# Patient Record
Sex: MALE | Race: WHITE | NOT HISPANIC OR LATINO | Employment: OTHER | URBAN - METROPOLITAN AREA
[De-identification: names, ages, dates, MRNs, and addresses within clinical notes are randomized per-mention and may not be internally consistent; named-entity substitution may affect disease eponyms.]

---

## 2017-05-28 DIAGNOSIS — F41.9 ANXIETY: ICD-10-CM

## 2017-05-28 DIAGNOSIS — I10 ESSENTIAL HYPERTENSION: ICD-10-CM

## 2017-06-01 RX ORDER — AMLODIPINE, VALSARTAN AND HYDROCHLOROTHIAZIDE 10; 320; 25 MG/1; MG/1; MG/1
TABLET ORAL
Qty: 30 TABLET | Refills: 0 | Status: SHIPPED | OUTPATIENT
Start: 2017-06-01 | End: 2017-07-27 | Stop reason: SDUPTHER

## 2017-06-01 RX ORDER — ESCITALOPRAM OXALATE 10 MG/1
TABLET ORAL
Qty: 30 TABLET | Refills: 0 | Status: SHIPPED | OUTPATIENT
Start: 2017-06-01 | End: 2017-07-27 | Stop reason: SDUPTHER

## 2017-07-13 DIAGNOSIS — I10 ESSENTIAL HYPERTENSION: ICD-10-CM

## 2017-07-13 RX ORDER — AMLODIPINE, VALSARTAN AND HYDROCHLOROTHIAZIDE 10; 320; 25 MG/1; MG/1; MG/1
TABLET ORAL
Qty: 30 TABLET | Refills: 0 | OUTPATIENT
Start: 2017-07-13

## 2017-07-27 ENCOUNTER — OFFICE VISIT (OUTPATIENT)
Dept: FAMILY MEDICINE CLINIC | Facility: CLINIC | Age: 62
End: 2017-07-27

## 2017-07-27 VITALS
WEIGHT: 174 LBS | SYSTOLIC BLOOD PRESSURE: 138 MMHG | BODY MASS INDEX: 23.57 KG/M2 | DIASTOLIC BLOOD PRESSURE: 74 MMHG | RESPIRATION RATE: 16 BRPM | HEIGHT: 72 IN | HEART RATE: 78 BPM | TEMPERATURE: 98.1 F

## 2017-07-27 DIAGNOSIS — I10 ESSENTIAL HYPERTENSION: Primary | ICD-10-CM

## 2017-07-27 DIAGNOSIS — F41.9 ANXIETY: ICD-10-CM

## 2017-07-27 DIAGNOSIS — Z12.5 SCREENING FOR PROSTATE CANCER: ICD-10-CM

## 2017-07-27 DIAGNOSIS — E78.49 OTHER HYPERLIPIDEMIA: ICD-10-CM

## 2017-07-27 DIAGNOSIS — R35.1 NOCTURIA: ICD-10-CM

## 2017-07-27 PROCEDURE — 99214 OFFICE O/P EST MOD 30 MIN: CPT | Performed by: FAMILY MEDICINE

## 2017-07-27 RX ORDER — AMLODIPINE, VALSARTAN AND HYDROCHLOROTHIAZIDE 10; 320; 25 MG/1; MG/1; MG/1
1 TABLET ORAL DAILY
Qty: 90 TABLET | Refills: 1 | Status: SHIPPED | OUTPATIENT
Start: 2017-07-27 | End: 2018-01-18 | Stop reason: SDUPTHER

## 2017-07-27 RX ORDER — ESCITALOPRAM OXALATE 10 MG/1
10 TABLET ORAL DAILY
Qty: 90 TABLET | Refills: 1 | Status: SHIPPED | OUTPATIENT
Start: 2017-07-27 | End: 2018-01-18 | Stop reason: SDUPTHER

## 2017-07-27 RX ORDER — SIMVASTATIN 20 MG
20 TABLET ORAL NIGHTLY
Qty: 90 TABLET | Refills: 1 | Status: SHIPPED | OUTPATIENT
Start: 2017-07-27 | End: 2018-02-25 | Stop reason: SDUPTHER

## 2017-07-27 NOTE — PROGRESS NOTES
"Chief Complaint   Patient presents with   • Hypertension   • Hyperlipidemia       Subjective   Patient presents the office to refill medicines.  Labs are due.  Blood pressure is controlled on current therapy.  Lipids are stable pending labs.  He does have some nocturia.  We will check PSA testing.  Anxiety is controlled on current therapy.  No medicine side effects are reported.  I have reviewed and updated his medications, medical history and problem list during today's office visit.        Social History   Substance Use Topics   • Smoking status: Current Every Day Smoker     Packs/day: 1.00     Types: Cigarettes   • Smokeless tobacco: Never Used   • Alcohol use Yes       Review of Systems   Constitutional: Negative for fatigue.   Cardiovascular: Negative for chest pain.       Objective   /74 (BP Location: Right arm, Patient Position: Sitting, Cuff Size: Adult)  Pulse 78  Temp 98.1 °F (36.7 °C) (Oral)   Resp 16  Ht 72\" (182.9 cm)  Wt 174 lb (78.9 kg)  BMI 23.6 kg/m2  Body mass index is 23.6 kg/(m^2).  Physical Exam   Constitutional: He is cooperative. No distress.   Eyes: Conjunctivae and lids are normal.   Neck: Carotid bruit is not present. No tracheal deviation present.   Cardiovascular: Normal rate, regular rhythm and normal heart sounds.    No murmur heard.  Pulmonary/Chest: Effort normal and breath sounds normal.   Neurological: He is alert. He is not disoriented.   Skin: Skin is warm and dry.   Psychiatric: He has a normal mood and affect. His speech is normal and behavior is normal.   Vitals reviewed.          Assessment/Plan     Problem List Items Addressed This Visit        Cardiovascular and Mediastinum    Essential hypertension - Primary    Relevant Medications    Amlodipine-Valsartan-HCTZ -25 MG tablet    Other Relevant Orders    Comprehensive metabolic panel    CBC and Differential    TSH    Hyperlipidemia    Relevant Medications    simvastatin (ZOCOR) 20 MG tablet    Other Relevant " Orders    Lipid panel       Genitourinary    Nocturia    Relevant Orders    PSA       Other    Anxiety    Relevant Medications    escitalopram (LEXAPRO) 10 MG tablet      Other Visit Diagnoses     Screening for prostate cancer        Relevant Orders    PSA          Outpatient Encounter Prescriptions as of 7/27/2017   Medication Sig Dispense Refill   • Amlodipine-Valsartan-HCTZ -25 MG tablet Take 1 tablet by mouth Daily for 180 days. 90 tablet 1   • escitalopram (LEXAPRO) 10 MG tablet Take 1 tablet by mouth Daily for 180 days. 90 tablet 1   • simvastatin (ZOCOR) 20 MG tablet Take 1 tablet by mouth Every Night for 180 days. 90 tablet 1   • [DISCONTINUED] Amlodipine-Valsartan-HCTZ -25 MG tablet TAKE 1 TABLET BY MOUTH DAILY. 30 tablet 0   • [DISCONTINUED] escitalopram (LEXAPRO) 10 MG tablet TAKE 1 TABLET BY MOUTH DAILY. 30 tablet 0   • [DISCONTINUED] simvastatin (ZOCOR) 20 MG tablet Take 1 tablet by mouth every night. 90 tablet 3     No facility-administered encounter medications on file as of 7/27/2017.        Orders Placed This Encounter   Procedures   • Comprehensive metabolic panel   • Lipid panel   • TSH   • PSA   • CBC and Differential     Order Specific Question:   Manual Differential     Answer:   No       Continue with current treatment plan.         F/U in 6 months

## 2017-08-23 DIAGNOSIS — E78.5 HYPERLIPIDEMIA: ICD-10-CM

## 2017-08-23 RX ORDER — SIMVASTATIN 20 MG
TABLET ORAL
Qty: 90 TABLET | Refills: 3 | OUTPATIENT
Start: 2017-08-23

## 2018-01-18 DIAGNOSIS — I10 ESSENTIAL HYPERTENSION: ICD-10-CM

## 2018-01-18 DIAGNOSIS — F41.9 ANXIETY: ICD-10-CM

## 2018-01-18 RX ORDER — ESCITALOPRAM OXALATE 10 MG/1
TABLET ORAL
Qty: 30 TABLET | Refills: 0 | Status: SHIPPED | OUTPATIENT
Start: 2018-01-18 | End: 2018-04-23 | Stop reason: ALTCHOICE

## 2018-01-18 RX ORDER — AMLODIPINE, VALSARTAN AND HYDROCHLOROTHIAZIDE 10; 320; 25 MG/1; MG/1; MG/1
TABLET ORAL
Qty: 30 TABLET | Refills: 0 | Status: SHIPPED | OUTPATIENT
Start: 2018-01-18 | End: 2018-04-23 | Stop reason: SDUPTHER

## 2018-02-17 DIAGNOSIS — F41.9 ANXIETY: ICD-10-CM

## 2018-02-17 DIAGNOSIS — I10 ESSENTIAL HYPERTENSION: ICD-10-CM

## 2018-02-19 RX ORDER — AMLODIPINE, VALSARTAN AND HYDROCHLOROTHIAZIDE 10; 320; 25 MG/1; MG/1; MG/1
TABLET ORAL
Qty: 30 TABLET | Refills: 0 | OUTPATIENT
Start: 2018-02-19

## 2018-02-19 RX ORDER — ESCITALOPRAM OXALATE 10 MG/1
TABLET ORAL
Qty: 30 TABLET | Refills: 0 | OUTPATIENT
Start: 2018-02-19

## 2018-02-25 DIAGNOSIS — E78.49 OTHER HYPERLIPIDEMIA: ICD-10-CM

## 2018-02-26 RX ORDER — SIMVASTATIN 20 MG
TABLET ORAL
Qty: 30 TABLET | Refills: 0 | Status: SHIPPED | OUTPATIENT
Start: 2018-02-26 | End: 2018-04-23 | Stop reason: SDUPTHER

## 2018-04-23 ENCOUNTER — OFFICE VISIT (OUTPATIENT)
Dept: FAMILY MEDICINE CLINIC | Facility: CLINIC | Age: 63
End: 2018-04-23

## 2018-04-23 VITALS
SYSTOLIC BLOOD PRESSURE: 142 MMHG | WEIGHT: 168 LBS | TEMPERATURE: 97.9 F | DIASTOLIC BLOOD PRESSURE: 82 MMHG | BODY MASS INDEX: 22.75 KG/M2 | HEART RATE: 81 BPM | HEIGHT: 72 IN | RESPIRATION RATE: 16 BRPM

## 2018-04-23 DIAGNOSIS — E78.49 OTHER HYPERLIPIDEMIA: ICD-10-CM

## 2018-04-23 DIAGNOSIS — I10 ESSENTIAL HYPERTENSION: Primary | ICD-10-CM

## 2018-04-23 DIAGNOSIS — F41.9 ANXIETY: ICD-10-CM

## 2018-04-23 DIAGNOSIS — R35.1 NOCTURIA: ICD-10-CM

## 2018-04-23 DIAGNOSIS — R97.20 ELEVATED PSA: ICD-10-CM

## 2018-04-23 PROBLEM — R73.9 ELEVATED BLOOD SUGAR LEVEL: Status: ACTIVE | Noted: 2018-04-23

## 2018-04-23 PROCEDURE — 99214 OFFICE O/P EST MOD 30 MIN: CPT | Performed by: FAMILY MEDICINE

## 2018-04-23 RX ORDER — AMLODIPINE, VALSARTAN AND HYDROCHLOROTHIAZIDE 10; 320; 25 MG/1; MG/1; MG/1
1 TABLET ORAL DAILY
Qty: 90 TABLET | Refills: 1 | Status: SHIPPED | OUTPATIENT
Start: 2018-04-23 | End: 2018-08-01 | Stop reason: SDUPTHER

## 2018-04-23 RX ORDER — SIMVASTATIN 20 MG
20 TABLET ORAL NIGHTLY
Qty: 90 TABLET | Refills: 1 | Status: SHIPPED | OUTPATIENT
Start: 2018-04-23 | End: 2018-08-01 | Stop reason: SDUPTHER

## 2018-04-23 RX ORDER — VILAZODONE HYDROCHLORIDE 20 MG/1
20 TABLET ORAL DAILY
Qty: 30 TABLET | Refills: 0 | Status: SHIPPED | OUTPATIENT
Start: 2018-04-23 | End: 2018-08-01 | Stop reason: HOSPADM

## 2018-04-23 NOTE — PROGRESS NOTES
"Chief Complaint   Patient presents with   • Hypertension   • Hyperlipidemia       Subjective     Osman Aparicio presents to the office today to refill his medications and review recent labs. No medication side effects are reported.  Blood pressure is stable.  Lipids are stable.  His blood sugars elevated.  Anxiety is not fully controlled on current dose of escitalopram.  Labs also showed elevation of PSA level.  His nocturia symptoms or 3 times a night.    I have reviewed and updated his medications, medical history and problem list during today's office visit.      Patient Care Team:  Mathieu Santos MD as PCP - General (Family Medicine)    Social History   Substance Use Topics   • Smoking status: Current Every Day Smoker     Packs/day: 1.00     Types: Cigarettes   • Smokeless tobacco: Never Used   • Alcohol use Yes       Review of Systems   Constitutional: Negative for fatigue.   Cardiovascular: Negative for chest pain.   Genitourinary:        See HPI   Psychiatric/Behavioral: The patient is nervous/anxious.        Objective     /82 (BP Location: Right arm, Patient Position: Sitting, Cuff Size: Adult)   Pulse 81   Temp 97.9 °F (36.6 °C) (Oral)   Resp 16   Ht 182.9 cm (72\")   Wt 76.2 kg (168 lb)   BMI 22.78 kg/m²     Body mass index is 22.78 kg/m².    Physical Exam   Constitutional: He is oriented to person, place, and time. He appears well-developed. No distress.   Eyes: Conjunctivae and lids are normal.   Neck: Carotid bruit is not present.   Cardiovascular: Normal rate, regular rhythm and normal heart sounds.    Pulmonary/Chest: Effort normal and breath sounds normal.   Genitourinary: Prostate is enlarged (no nodules). Prostate is not tender.   Neurological: He is alert and oriented to person, place, and time.   Skin: Skin is warm and dry.   Psychiatric: He has a normal mood and affect. His behavior is normal.   Vitals reviewed.      Data Reviewed:             Assessment/Plan     Problem List Items " Addressed This Visit        Cardiovascular and Mediastinum    Essential hypertension - Primary    Relevant Medications    Amlodipine-Valsartan-HCTZ -25 MG tablet    Other hyperlipidemia    Relevant Medications    simvastatin (ZOCOR) 20 MG tablet       Genitourinary    Nocturia    Relevant Orders    Ambulatory Referral to Urology       Other    Anxiety    Relevant Medications    vilazodone (VIIBRYD) 20 MG tablet tablet    Elevated PSA    Relevant Orders    Ambulatory Referral to Urology      Other Visit Diagnoses    None.         Orders Placed This Encounter   Procedures   • Ambulatory Referral to Urology     Referral Priority:   Routine     Referral Type:   Consultation     Referral Reason:   Specialty Services Required     Requested Specialty:   Urology     Number of Visits Requested:   1         Current Outpatient Prescriptions:   •  Amlodipine-Valsartan-HCTZ -25 MG tablet, Take 1 tablet by mouth Daily for 180 days., Disp: 90 tablet, Rfl: 1  •  simvastatin (ZOCOR) 20 MG tablet, Take 1 tablet by mouth Every Night for 180 days., Disp: 90 tablet, Rfl: 1  •  vilazodone (VIIBRYD) 20 MG tablet tablet, Take 1 tablet by mouth Daily for 30 days., Disp: 30 tablet, Rfl: 0    Return in about 1 month (around 5/23/2018) for Recheck.

## 2018-07-30 DIAGNOSIS — I10 ESSENTIAL HYPERTENSION: ICD-10-CM

## 2018-07-31 RX ORDER — AMLODIPINE, VALSARTAN AND HYDROCHLOROTHIAZIDE 10; 320; 25 MG/1; MG/1; MG/1
1 TABLET ORAL DAILY
Qty: 90 TABLET | Refills: 1 | OUTPATIENT
Start: 2018-07-31 | End: 2019-01-27

## 2018-08-01 ENCOUNTER — OFFICE VISIT (OUTPATIENT)
Dept: FAMILY MEDICINE CLINIC | Facility: CLINIC | Age: 63
End: 2018-08-01

## 2018-08-01 VITALS
WEIGHT: 165 LBS | HEIGHT: 72 IN | DIASTOLIC BLOOD PRESSURE: 91 MMHG | RESPIRATION RATE: 16 BRPM | SYSTOLIC BLOOD PRESSURE: 164 MMHG | TEMPERATURE: 97 F | HEART RATE: 76 BPM | BODY MASS INDEX: 22.35 KG/M2

## 2018-08-01 DIAGNOSIS — I10 ESSENTIAL HYPERTENSION: Primary | ICD-10-CM

## 2018-08-01 DIAGNOSIS — E78.49 OTHER HYPERLIPIDEMIA: ICD-10-CM

## 2018-08-01 DIAGNOSIS — R73.9 ELEVATED BLOOD SUGAR LEVEL: ICD-10-CM

## 2018-08-01 DIAGNOSIS — F41.9 ANXIETY: ICD-10-CM

## 2018-08-01 PROCEDURE — 99214 OFFICE O/P EST MOD 30 MIN: CPT | Performed by: FAMILY MEDICINE

## 2018-08-01 RX ORDER — AMLODIPINE, VALSARTAN AND HYDROCHLOROTHIAZIDE 10; 320; 25 MG/1; MG/1; MG/1
1 TABLET ORAL DAILY
Qty: 90 TABLET | Refills: 1 | Status: SHIPPED | OUTPATIENT
Start: 2018-08-01 | End: 2019-06-06 | Stop reason: SDUPTHER

## 2018-08-01 RX ORDER — SIMVASTATIN 20 MG
20 TABLET ORAL NIGHTLY
Qty: 90 TABLET | Refills: 1 | Status: SHIPPED | OUTPATIENT
Start: 2018-08-01 | End: 2019-06-06 | Stop reason: SDUPTHER

## 2018-08-01 RX ORDER — ESCITALOPRAM OXALATE 20 MG/1
20 TABLET ORAL NIGHTLY
Qty: 90 TABLET | Refills: 1 | Status: SHIPPED | OUTPATIENT
Start: 2018-08-01 | End: 2019-01-26 | Stop reason: SDUPTHER

## 2018-08-01 NOTE — PROGRESS NOTES
"Chief Complaint   Patient presents with   • Hypertension   • Hyperlipidemia   • Anxiety       Subjective     Osman Aparicio presents to the office today to refill his medications. No medication side effects are reported.  Today his blood pressure is elevated but he did miss his dose this morning.  He has run out.  Usually it is well controlled.  Lipids are stable pending lab results.  He could not tolerate Viibryd.  He took some of his wife's escitalopram 20 mg and that seemed to help.  Overall, he feels well.    I have reviewed and updated his medications, medical history and problem list during today's office visit.     Patient Care Team:  Mathieu Santos MD as PCP - General (Family Medicine)    Social History   Substance Use Topics   • Smoking status: Current Every Day Smoker     Packs/day: 1.00     Types: Cigarettes   • Smokeless tobacco: Never Used   • Alcohol use Yes       Review of Systems   Constitutional: Negative for fatigue.   Cardiovascular: Negative for chest pain.       Objective     /91   Pulse 76   Temp 97 °F (36.1 °C) (Oral)   Resp 16   Ht 182.9 cm (72\")   Wt 74.8 kg (165 lb)   BMI 22.38 kg/m²     Body mass index is 22.38 kg/m².    Physical Exam   Constitutional: He is oriented to person, place, and time. He appears well-developed. No distress.   Eyes: Conjunctivae and lids are normal.   Neck: Carotid bruit is not present.   Cardiovascular: Normal rate, regular rhythm and normal heart sounds.    Pulmonary/Chest: Effort normal and breath sounds normal.   Neurological: He is alert and oriented to person, place, and time.   Skin: Skin is warm and dry.   Psychiatric: He has a normal mood and affect. His behavior is normal.   Vitals reviewed.      Data Reviewed:             Assessment/Plan     Problem List Items Addressed This Visit     Essential hypertension - Primary    Relevant Medications    Amlodipine-Valsartan-HCTZ -25 MG tablet    Other hyperlipidemia    Relevant Medications    " simvastatin (ZOCOR) 20 MG tablet    Anxiety    Relevant Medications    escitalopram (LEXAPRO) 20 MG tablet    Elevated blood sugar level          No orders of the defined types were placed in this encounter.        Current Outpatient Prescriptions:   •  Amlodipine-Valsartan-HCTZ -25 MG tablet, Take 1 tablet by mouth Daily for 180 days., Disp: 90 tablet, Rfl: 1  •  simvastatin (ZOCOR) 20 MG tablet, Take 1 tablet by mouth Every Night for 180 days., Disp: 90 tablet, Rfl: 1  •  escitalopram (LEXAPRO) 20 MG tablet, Take 1 tablet by mouth Every Night for 180 days., Disp: 90 tablet, Rfl: 1    Return in about 6 months (around 2/1/2019) for Recheck.

## 2019-01-26 DIAGNOSIS — F41.9 ANXIETY: ICD-10-CM

## 2019-01-28 RX ORDER — ESCITALOPRAM OXALATE 20 MG/1
TABLET ORAL
Qty: 90 TABLET | Refills: 0 | Status: SHIPPED | OUTPATIENT
Start: 2019-01-28 | End: 2019-05-04 | Stop reason: SDUPTHER

## 2019-05-04 DIAGNOSIS — F41.9 ANXIETY: ICD-10-CM

## 2019-05-06 RX ORDER — ESCITALOPRAM OXALATE 20 MG/1
TABLET ORAL
Qty: 30 TABLET | Refills: 0 | Status: SHIPPED | OUTPATIENT
Start: 2019-05-06 | End: 2019-06-06 | Stop reason: SDUPTHER

## 2019-06-06 ENCOUNTER — OFFICE VISIT (OUTPATIENT)
Dept: FAMILY MEDICINE CLINIC | Facility: CLINIC | Age: 64
End: 2019-06-06

## 2019-06-06 VITALS
SYSTOLIC BLOOD PRESSURE: 148 MMHG | WEIGHT: 165 LBS | HEART RATE: 76 BPM | DIASTOLIC BLOOD PRESSURE: 84 MMHG | BODY MASS INDEX: 22.35 KG/M2 | OXYGEN SATURATION: 98 % | HEIGHT: 72 IN | RESPIRATION RATE: 16 BRPM

## 2019-06-06 DIAGNOSIS — F41.9 ANXIETY: ICD-10-CM

## 2019-06-06 DIAGNOSIS — I10 ESSENTIAL HYPERTENSION: ICD-10-CM

## 2019-06-06 DIAGNOSIS — E78.49 OTHER HYPERLIPIDEMIA: ICD-10-CM

## 2019-06-06 PROCEDURE — 99214 OFFICE O/P EST MOD 30 MIN: CPT | Performed by: NURSE PRACTITIONER

## 2019-06-06 RX ORDER — NEBIVOLOL 5 MG/1
5 TABLET ORAL DAILY
Qty: 30 TABLET | Refills: 0 | Status: SHIPPED | OUTPATIENT
Start: 2019-06-06 | End: 2019-07-01 | Stop reason: SDUPTHER

## 2019-06-06 RX ORDER — AMLODIPINE, VALSARTAN AND HYDROCHLOROTHIAZIDE 10; 320; 25 MG/1; MG/1; MG/1
1 TABLET ORAL DAILY
Qty: 90 TABLET | Refills: 1 | Status: SHIPPED | OUTPATIENT
Start: 2019-06-06 | End: 2019-12-04 | Stop reason: SDUPTHER

## 2019-06-06 RX ORDER — SIMVASTATIN 20 MG
20 TABLET ORAL NIGHTLY
Qty: 90 TABLET | Refills: 1 | Status: SHIPPED | OUTPATIENT
Start: 2019-06-06 | End: 2020-02-18

## 2019-06-06 RX ORDER — AMLODIPINE BESYLATE 10 MG/1
10 TABLET ORAL DAILY
COMMUNITY
End: 2019-06-06

## 2019-06-06 RX ORDER — ESCITALOPRAM OXALATE 20 MG/1
20 TABLET ORAL DAILY
Qty: 90 TABLET | Refills: 1 | Status: SHIPPED | OUTPATIENT
Start: 2019-06-06 | End: 2019-12-04 | Stop reason: SDUPTHER

## 2019-06-06 NOTE — PROGRESS NOTES
Subjective   Osman Aparicio is a 63 y.o. male.     History of Present Illness   Osman Aparicio 63 y.o. male who presents today for routine follow up check and medication refills.  he has a history of   Patient Active Problem List   Diagnosis   • Essential hypertension   • Other hyperlipidemia   • Anxiety   • Nocturia   • Elevated PSA   • Elevated blood sugar level   .  Since the last visit, he has overall felt well.  He has has been having elevated blood pressure readings and here to evaluate this.  He states BP has been running 150s/80s-90s.  He states he has not been out of medications and that he recently had refilled at [facility].  he has been compliant with current medications have reviewed them.  The patient denies medication side effects.    No results found for this or any previous visit.    Sees urologist Dr. Shepherd recently. He has f/u at the end of the month.      The following portions of the patient's history were reviewed and updated as appropriate: allergies, current medications, past family history, past medical history, past social history, past surgical history and problem list.    Review of Systems   Constitutional: Negative for fatigue.   Eyes: Negative for visual disturbance.   Respiratory: Negative for cough and shortness of breath.    Cardiovascular: Negative for chest pain and palpitations.   Endocrine: Negative for cold intolerance, heat intolerance, polydipsia, polyphagia and polyuria.   Skin: Negative for rash.   Neurological: Negative for dizziness, light-headedness and headaches.   Psychiatric/Behavioral: Negative for dysphoric mood and sleep disturbance. The patient is not nervous/anxious.        Objective   Physical Exam   Constitutional: He is oriented to person, place, and time. He appears well-developed and well-nourished.   Neck: Carotid bruit is not present.   Cardiovascular: Normal rate and regular rhythm.   Pulmonary/Chest: Effort normal and breath sounds normal.   Neurological: He is  oriented to person, place, and time.   Skin: Skin is warm and dry.   Psychiatric: He has a normal mood and affect. His behavior is normal. Judgment and thought content normal.   Nursing note and vitals reviewed.      Assessment/Plan   Osman was seen today for hypertension.    Diagnoses and all orders for this visit:    Other hyperlipidemia  -     simvastatin (ZOCOR) 20 MG tablet; Take 1 tablet by mouth Every Night for 180 days.    Anxiety  -     escitalopram (LEXAPRO) 20 MG tablet; Take 1 tablet by mouth Daily.    Essential hypertension  -     amLODIPine-Valsartan-HCTZ -25 MG tablet; Take 1 tablet by mouth Daily for 180 days.             Added Bystolic 5 mg daily.  He will f/u in 3-4 weeks.    Will get labs done at [facility].

## 2019-07-09 RX ORDER — NEBIVOLOL HYDROCHLORIDE 5 MG/1
TABLET ORAL
Qty: 30 TABLET | Refills: 0 | Status: SHIPPED | OUTPATIENT
Start: 2019-07-09 | End: 2019-07-11 | Stop reason: SDUPTHER

## 2019-07-11 RX ORDER — NEBIVOLOL 5 MG/1
5 TABLET ORAL DAILY
Qty: 30 TABLET | Refills: 0 | Status: SHIPPED | OUTPATIENT
Start: 2019-07-11 | End: 2019-07-16 | Stop reason: SDUPTHER

## 2019-07-16 ENCOUNTER — OFFICE VISIT (OUTPATIENT)
Dept: FAMILY MEDICINE CLINIC | Facility: CLINIC | Age: 64
End: 2019-07-16

## 2019-07-16 VITALS
WEIGHT: 168 LBS | OXYGEN SATURATION: 98 % | BODY MASS INDEX: 22.75 KG/M2 | TEMPERATURE: 98.2 F | SYSTOLIC BLOOD PRESSURE: 156 MMHG | RESPIRATION RATE: 16 BRPM | HEART RATE: 64 BPM | DIASTOLIC BLOOD PRESSURE: 80 MMHG | HEIGHT: 72 IN

## 2019-07-16 DIAGNOSIS — Z71.6 ENCOUNTER FOR SMOKING CESSATION COUNSELING: ICD-10-CM

## 2019-07-16 DIAGNOSIS — I10 ESSENTIAL HYPERTENSION: Primary | ICD-10-CM

## 2019-07-16 PROCEDURE — 99213 OFFICE O/P EST LOW 20 MIN: CPT | Performed by: NURSE PRACTITIONER

## 2019-07-16 RX ORDER — NEBIVOLOL 10 MG/1
10 TABLET ORAL DAILY
Qty: 30 TABLET | Refills: 0 | Status: SHIPPED | OUTPATIENT
Start: 2019-07-16 | End: 2019-08-08 | Stop reason: SDUPTHER

## 2019-07-16 NOTE — PROGRESS NOTES
Subjective   Osman Aparicio is a 63 y.o. male.     History of Present Illness   Patient presents to office to f/u on blood pressure. At last visit he was started on Bystolic 5 mg. Patient states he has been taking as prescribed. He reports feeling well and denies side effects.  Bp is still elevated today at 156/80.  He reports similar readings at home.        He had labs at [facility] last week. Labs reviewed with pt today during visit. All questions answered.  Discussed elevated fasting glucose.        Patient would like to start Chantix to quit smoking.  He is motivated to quit and understands this will help his blood pressure.    The following portions of the patient's history were reviewed and updated as appropriate: allergies, current medications, past family history, past medical history, past social history, past surgical history and problem list.    Review of Systems   Constitutional: Negative for fatigue.   Eyes: Negative for visual disturbance.   Respiratory: Negative for cough and shortness of breath.    Cardiovascular: Negative for chest pain and palpitations.   Skin: Negative for rash.   Neurological: Negative for dizziness, light-headedness and headaches.   Psychiatric/Behavioral: Negative for dysphoric mood and sleep disturbance. The patient is not nervous/anxious.        Objective   Physical Exam   Constitutional: He is oriented to person, place, and time. He appears well-developed and well-nourished.   Pulmonary/Chest: Effort normal.   Neurological: He is oriented to person, place, and time.   Skin: Skin is warm and dry.   Psychiatric: He has a normal mood and affect. His behavior is normal. Judgment and thought content normal.   Nursing note and vitals reviewed.      Assessment/Plan   Osman was seen today for hypertension and hyperlipidemia.    Diagnoses and all orders for this visit:    Essential hypertension  -     nebivolol (BYSTOLIC) 10 MG tablet; Take 1 tablet by mouth Daily.    Encounter for smoking  cessation counseling  -     varenicline (CHANTIX STARTING MONTH TATA) 0.5 MG X 11 & 1 MG X 42 tablet; Take 0.5 mg one daily on days 1-2 and 0.5 mg twice daily on days 4-7. Then 1 mg twice daily for a total of 12 weeks.

## 2019-07-16 NOTE — PATIENT INSTRUCTIONS
"Carbohydrate Counting for Diabetes Mellitus, Adult  Carbohydrate counting is a method of keeping track of how many carbohydrates you eat. Eating carbohydrates naturally increases the amount of sugar (glucose) in the blood. Counting how many carbohydrates you eat helps keep your blood glucose within normal limits, which helps you manage your diabetes (diabetes mellitus).  It is important to know how many carbohydrates you can safely have in each meal. This is different for every person. A diet and nutrition specialist (registered dietitian) can help you make a meal plan and calculate how many carbohydrates you should have at each meal and snack.  Carbohydrates are found in the following foods:  · Grains, such as breads and cereals.  · Dried beans and soy products.  · Starchy vegetables, such as potatoes, peas, and corn.  · Fruit and fruit juices.  · Milk and yogurt.  · Sweets and snack foods, such as cake, cookies, candy, chips, and soft drinks.    How do I count carbohydrates?  There are two ways to count carbohydrates in food. You can use either of the methods or a combination of both.  Reading \"Nutrition Facts\" on packaged food  The \"Nutrition Facts\" list is included on the labels of almost all packaged foods and beverages in the [place] It includes:  · The serving size.  · Information about nutrients in each serving, including the grams (g) of carbohydrate per serving.    To use the “Nutrition Facts\":  · Decide how many servings you will have.  · Multiply the number of servings by the number of carbohydrates per serving.  · The resulting number is the total amount of carbohydrates that you will be having.    Learning standard serving sizes of other foods  When you eat carbohydrate foods that are not packaged or do not include \"Nutrition Facts\" on the label, you need to measure the servings in order to count the amount of carbohydrates:  · Measure the foods that you will eat with a food scale or measuring cup, if " needed.  · Decide how many standard-size servings you will eat.  · Multiply the number of servings by 15. Most carbohydrate-rich foods have about 15 g of carbohydrates per serving.  ? For example, if you eat 8 oz (170 g) of strawberries, you will have eaten 2 servings and 30 g of carbohydrates (2 servings x 15 g = 30 g).  · For foods that have more than one food mixed, such as soups and casseroles, you must count the carbohydrates in each food that is included.    The following list contains standard serving sizes of common carbohydrate-rich foods. Each of these servings has about 15 g of carbohydrates:  · ½ hamburger bun or ½ English muffin.  · ½ oz (15 mL) syrup.  · ½ oz (14 g) jelly.  · 1 slice of bread.  · 1 six-inch tortilla.  · 3 oz (85 g) cooked rice or pasta.  · 4 oz (113 g) cooked dried beans.  · 4 oz (113 g) starchy vegetable, such as peas, corn, or potatoes.  · 4 oz (113 g) hot cereal.  · 4 oz (113 g) mashed potatoes or ¼ of a large baked potato.  · 4 oz (113 g) canned or frozen fruit.  · 4 oz (120 mL) fruit juice.  · 4-6 crackers.  · 6 chicken nuggets.  · 6 oz (170 g) unsweetened dry cereal.  · 6 oz (170 g) plain fat-free yogurt or yogurt sweetened with artificial sweeteners.  · 8 oz (240 mL) milk.  · 8 oz (170 g) fresh fruit or one small piece of fruit.  · 24 oz (680 g) popped popcorn.    Example of carbohydrate counting  Sample meal  · 3 oz (85 g) chicken breast.  · 6 oz (170 g) brown rice.  · 4 oz (113 g) corn.  · 8 oz (240 mL) milk.  · 8 oz (170 g) strawberries with sugar-free whipped topping.  Carbohydrate calculation  1. Identify the foods that contain carbohydrates:  ? Rice.  ? Corn.  ? Milk.  ? Strawberries.  2. Calculate how many servings you have of each food:  ? 2 servings rice.  ? 1 serving corn.  ? 1 serving milk.  ? 1 serving strawberries.  3. Multiply each number of servings by 15 g:  ? 2 servings rice x 15 g = 30 g.  ? 1 serving corn x 15 g = 15 g.  ? 1 serving milk x 15 g = 15 g.  ? 1  serving strawberries x 15 g = 15 g.  4. Add together all of the amounts to find the total grams of carbohydrates eaten:  ? 30 g + 15 g + 15 g + 15 g = 75 g of carbohydrates total.  Summary  · Carbohydrate counting is a method of keeping track of how many carbohydrates you eat.  · Eating carbohydrates naturally increases the amount of sugar (glucose) in the blood.  · Counting how many carbohydrates you eat helps keep your blood glucose within normal limits, which helps you manage your diabetes.  · A diet and nutrition specialist (registered dietitian) can help you make a meal plan and calculate how many carbohydrates you should have at each meal and snack.  This information is not intended to replace advice given to you by your health care provider. Make sure you discuss any questions you have with your health care provider.  Document Released: 12/18/2006 Document Revised: 06/27/2018 Document Reviewed: 05/31/2017  [employer] Patient Education © 2019 [employer]

## 2019-08-08 DIAGNOSIS — I10 ESSENTIAL HYPERTENSION: ICD-10-CM

## 2019-08-08 RX ORDER — NEBIVOLOL HYDROCHLORIDE 10 MG/1
TABLET ORAL
Qty: 30 TABLET | Refills: 0 | Status: SHIPPED | OUTPATIENT
Start: 2019-08-08 | End: 2019-08-19 | Stop reason: SDUPTHER

## 2019-08-19 ENCOUNTER — OFFICE VISIT (OUTPATIENT)
Dept: FAMILY MEDICINE CLINIC | Facility: CLINIC | Age: 64
End: 2019-08-19

## 2019-08-19 VITALS
HEIGHT: 72 IN | HEART RATE: 82 BPM | BODY MASS INDEX: 22.89 KG/M2 | SYSTOLIC BLOOD PRESSURE: 130 MMHG | DIASTOLIC BLOOD PRESSURE: 68 MMHG | WEIGHT: 169 LBS | RESPIRATION RATE: 16 BRPM | TEMPERATURE: 97.7 F | OXYGEN SATURATION: 97 %

## 2019-08-19 DIAGNOSIS — Z71.6 ENCOUNTER FOR SMOKING CESSATION COUNSELING: ICD-10-CM

## 2019-08-19 DIAGNOSIS — I10 ESSENTIAL HYPERTENSION: Primary | ICD-10-CM

## 2019-08-19 PROCEDURE — 99213 OFFICE O/P EST LOW 20 MIN: CPT | Performed by: NURSE PRACTITIONER

## 2019-08-19 RX ORDER — NEBIVOLOL 10 MG/1
10 TABLET ORAL DAILY
Qty: 90 TABLET | Refills: 1 | Status: SHIPPED | OUTPATIENT
Start: 2019-08-19 | End: 2020-03-09

## 2019-08-19 RX ORDER — VARENICLINE TARTRATE 1 MG/1
1 TABLET, FILM COATED ORAL 2 TIMES DAILY
Qty: 60 TABLET | Refills: 5 | Status: SHIPPED | OUTPATIENT
Start: 2019-08-19 | End: 2020-07-20

## 2019-08-19 NOTE — PROGRESS NOTES
Subjective   Osman Aparicio is a 64 y.o. male.     History of Present Illness   Patient presents to office to f/u on blood pressure. At last visit he was started on Bystolic. Patient states he has been taking as prescribed. He reports feeling well and denies side effects.     The following portions of the patient's history were reviewed and updated as appropriate: allergies, current medications, past family history, past medical history, past social history, past surgical history and problem list.    Review of Systems   Constitutional: Negative for fatigue.   Respiratory: Negative for cough and shortness of breath.    Cardiovascular: Negative for chest pain and palpitations.   Skin: Negative for rash.   Neurological: Negative for dizziness, light-headedness and headaches.   Psychiatric/Behavioral: Negative for dysphoric mood and sleep disturbance. The patient is not nervous/anxious.        Objective   Physical Exam   Constitutional: He is oriented to person, place, and time. He appears well-developed and well-nourished.   Pulmonary/Chest: Effort normal.   Neurological: He is oriented to person, place, and time.   Skin: Skin is warm and dry.   Psychiatric: He has a normal mood and affect. His behavior is normal. Judgment and thought content normal.   Nursing note and vitals reviewed.      Assessment/Plan   Osman was seen today for follow-up and hypertension.    Diagnoses and all orders for this visit:    Essential hypertension  -     nebivolol (BYSTOLIC) 10 MG tablet; Take 1 tablet by mouth Daily.    Encounter for smoking cessation counseling  -     varenicline (CHANTIX CONTINUING MONTH TATA) 1 MG tablet; Take 1 tablet by mouth 2 (Two) Times a Day.

## 2019-12-04 DIAGNOSIS — I10 ESSENTIAL HYPERTENSION: ICD-10-CM

## 2019-12-04 DIAGNOSIS — F41.9 ANXIETY: ICD-10-CM

## 2019-12-04 RX ORDER — AMLODIPINE, VALSARTAN AND HYDROCHLOROTHIAZIDE 10; 320; 25 MG/1; MG/1; MG/1
1 TABLET ORAL DAILY
Qty: 30 TABLET | Refills: 0 | Status: SHIPPED | OUTPATIENT
Start: 2019-12-04 | End: 2019-12-17 | Stop reason: SDUPTHER

## 2019-12-04 RX ORDER — ESCITALOPRAM OXALATE 20 MG/1
20 TABLET ORAL DAILY
Qty: 30 TABLET | Refills: 0 | Status: SHIPPED | OUTPATIENT
Start: 2019-12-04 | End: 2020-03-25 | Stop reason: SDUPTHER

## 2019-12-17 DIAGNOSIS — I10 ESSENTIAL HYPERTENSION: ICD-10-CM

## 2019-12-17 RX ORDER — AMLODIPINE, VALSARTAN AND HYDROCHLOROTHIAZIDE 10; 320; 25 MG/1; MG/1; MG/1
TABLET ORAL
Qty: 90 TABLET | Refills: 0 | Status: SHIPPED | OUTPATIENT
Start: 2019-12-17 | End: 2020-03-25 | Stop reason: SDUPTHER

## 2020-02-18 DIAGNOSIS — E78.49 OTHER HYPERLIPIDEMIA: ICD-10-CM

## 2020-02-18 RX ORDER — SIMVASTATIN 20 MG
20 TABLET ORAL NIGHTLY
Qty: 30 TABLET | Refills: 0 | Status: SHIPPED | OUTPATIENT
Start: 2020-02-18 | End: 2020-03-25 | Stop reason: SDUPTHER

## 2020-03-09 DIAGNOSIS — I10 ESSENTIAL HYPERTENSION: ICD-10-CM

## 2020-03-10 RX ORDER — NEBIVOLOL 10 MG/1
10 TABLET ORAL DAILY
Qty: 30 TABLET | Refills: 0 | Status: SHIPPED | OUTPATIENT
Start: 2020-03-10 | End: 2020-03-24 | Stop reason: SDUPTHER

## 2020-03-13 DIAGNOSIS — E78.49 OTHER HYPERLIPIDEMIA: ICD-10-CM

## 2020-03-13 RX ORDER — SIMVASTATIN 20 MG
TABLET ORAL
Qty: 30 TABLET | Refills: 0 | OUTPATIENT
Start: 2020-03-13

## 2020-03-24 DIAGNOSIS — I10 ESSENTIAL HYPERTENSION: ICD-10-CM

## 2020-03-24 RX ORDER — NEBIVOLOL 10 MG/1
10 TABLET ORAL DAILY
Qty: 30 TABLET | Refills: 0 | Status: SHIPPED | OUTPATIENT
Start: 2020-03-24 | End: 2020-03-25 | Stop reason: SDUPTHER

## 2020-03-25 ENCOUNTER — TELEPHONE (OUTPATIENT)
Dept: FAMILY MEDICINE CLINIC | Facility: CLINIC | Age: 65
End: 2020-03-25

## 2020-03-25 DIAGNOSIS — E78.49 OTHER HYPERLIPIDEMIA: ICD-10-CM

## 2020-03-25 DIAGNOSIS — F41.9 ANXIETY: ICD-10-CM

## 2020-03-25 DIAGNOSIS — I10 ESSENTIAL HYPERTENSION: ICD-10-CM

## 2020-03-25 RX ORDER — ESCITALOPRAM OXALATE 20 MG/1
20 TABLET ORAL DAILY
Qty: 90 TABLET | Refills: 0 | Status: SHIPPED | OUTPATIENT
Start: 2020-03-25 | End: 2020-07-20 | Stop reason: SDUPTHER

## 2020-03-25 RX ORDER — SIMVASTATIN 20 MG
20 TABLET ORAL NIGHTLY
Qty: 90 TABLET | Refills: 0 | Status: SHIPPED | OUTPATIENT
Start: 2020-03-25 | End: 2020-07-20 | Stop reason: SDUPTHER

## 2020-03-25 RX ORDER — AMLODIPINE, VALSARTAN AND HYDROCHLOROTHIAZIDE 10; 320; 25 MG/1; MG/1; MG/1
1 TABLET ORAL DAILY
Qty: 90 TABLET | Refills: 0 | Status: SHIPPED | OUTPATIENT
Start: 2020-03-25 | End: 2020-07-20 | Stop reason: SDUPTHER

## 2020-03-25 RX ORDER — NEBIVOLOL 10 MG/1
10 TABLET ORAL DAILY
Qty: 90 TABLET | Refills: 0 | Status: SHIPPED | OUTPATIENT
Start: 2020-03-25 | End: 2020-07-20 | Stop reason: SDUPTHER

## 2020-07-20 ENCOUNTER — OFFICE VISIT (OUTPATIENT)
Dept: FAMILY MEDICINE CLINIC | Facility: CLINIC | Age: 65
End: 2020-07-20

## 2020-07-20 VITALS
HEART RATE: 59 BPM | TEMPERATURE: 98 F | WEIGHT: 172 LBS | BODY MASS INDEX: 23.3 KG/M2 | SYSTOLIC BLOOD PRESSURE: 164 MMHG | OXYGEN SATURATION: 97 % | HEIGHT: 72 IN | RESPIRATION RATE: 16 BRPM | DIASTOLIC BLOOD PRESSURE: 90 MMHG

## 2020-07-20 DIAGNOSIS — F41.9 ANXIETY: Primary | ICD-10-CM

## 2020-07-20 DIAGNOSIS — Z12.12 SCREENING FOR COLORECTAL CANCER: ICD-10-CM

## 2020-07-20 DIAGNOSIS — I10 ESSENTIAL HYPERTENSION: ICD-10-CM

## 2020-07-20 DIAGNOSIS — E78.49 OTHER HYPERLIPIDEMIA: ICD-10-CM

## 2020-07-20 DIAGNOSIS — Z12.11 SCREENING FOR COLORECTAL CANCER: ICD-10-CM

## 2020-07-20 PROCEDURE — 99214 OFFICE O/P EST MOD 30 MIN: CPT | Performed by: NURSE PRACTITIONER

## 2020-07-20 RX ORDER — NEBIVOLOL 10 MG/1
10 TABLET ORAL DAILY
Qty: 90 TABLET | Refills: 1 | Status: SHIPPED | OUTPATIENT
Start: 2020-07-20 | End: 2020-12-22

## 2020-07-20 RX ORDER — SIMVASTATIN 20 MG
20 TABLET ORAL NIGHTLY
Qty: 90 TABLET | Refills: 1 | Status: SHIPPED | OUTPATIENT
Start: 2020-07-20 | End: 2020-11-18

## 2020-07-20 RX ORDER — AMLODIPINE, VALSARTAN AND HYDROCHLOROTHIAZIDE 10; 320; 25 MG/1; MG/1; MG/1
1 TABLET ORAL DAILY
Qty: 90 TABLET | Refills: 1 | Status: SHIPPED | OUTPATIENT
Start: 2020-07-20 | End: 2021-02-01 | Stop reason: SDUPTHER

## 2020-07-20 RX ORDER — ESCITALOPRAM OXALATE 20 MG/1
20 TABLET ORAL DAILY
Qty: 90 TABLET | Refills: 1 | Status: SHIPPED | OUTPATIENT
Start: 2020-07-20 | End: 2021-02-01 | Stop reason: SDUPTHER

## 2020-07-20 NOTE — PROGRESS NOTES
"Subjective   Osman Aparicio is a 64 y.o. male.     History of Present Illness    Since the last visit, he has overall felt well.  He has Essential Hypertension and well controlled on current medication, Hyperlipidemia with goals met with current Rx and anxiety is well controlled on Lexapro.  he has been compliant with current medications have reviewed them.  The patient denies medication side effects.  Will refill medications. /90   Pulse 59   Temp 98 °F (36.7 °C)   Resp 16   Ht 182.9 cm (72\")   Wt 78 kg (172 lb)   SpO2 97%   BMI 23.33 kg/m²     No results found for this or any previous visit.  Patient has been checking BP at home and readings range from 120s-140s/70s-80s with most readings in 120s-130s.    UTD with urology for elevated PSA. He is due for appt.   The following portions of the patient's history were reviewed and updated as appropriate: allergies, current medications, past family history, past medical history, past social history, past surgical history and problem list.    Review of Systems   Constitutional: Negative for fatigue.   Respiratory: Negative for cough and shortness of breath.    Cardiovascular: Negative for chest pain and palpitations.   Endocrine: Negative for cold intolerance, heat intolerance, polydipsia, polyphagia and polyuria.   Skin: Negative for rash.   Psychiatric/Behavioral: Negative for dysphoric mood and sleep disturbance. The patient is not nervous/anxious.        Objective   Physical Exam   Constitutional: He is oriented to person, place, and time. He appears well-developed and well-nourished.   Neck: Carotid bruit is not present.   Cardiovascular: Normal rate and regular rhythm.   Pulmonary/Chest: Effort normal and breath sounds normal.   Neurological: He is oriented to person, place, and time.   Skin: Skin is warm and dry.   Psychiatric: He has a normal mood and affect. His behavior is normal. Judgment and thought content normal.   Nursing note and vitals " reviewed.      Assessment/Plan   Osman was seen today for hypertension, hyperlipidemia, anxiety and depression.    Diagnoses and all orders for this visit:    Anxiety  -     escitalopram (LEXAPRO) 20 MG tablet; Take 1 tablet by mouth Daily.    Other hyperlipidemia  -     simvastatin (ZOCOR) 20 MG tablet; Take 1 tablet by mouth Every Night for 90 days.    Essential hypertension  -     nebivolol (Bystolic) 10 MG tablet; Take 1 tablet by mouth Daily.  -     amLODIPine-Valsartan-HCTZ -25 MG tablet; Take 1 tablet by mouth Daily.  -     Comprehensive metabolic panel  -     Lipid panel  -     CBC and Differential  -     TSH    Screening for colorectal cancer  -     Cologuard - Stool, Per Rectum

## 2020-08-27 DIAGNOSIS — Z12.11 COLON CANCER SCREENING: Primary | ICD-10-CM

## 2020-08-31 ENCOUNTER — TELEPHONE (OUTPATIENT)
Dept: FAMILY MEDICINE CLINIC | Facility: CLINIC | Age: 65
End: 2020-08-31

## 2020-08-31 NOTE — TELEPHONE ENCOUNTER
----- Message from Mathieu Santos MD sent at 8/26/2020  4:23 PM EDT -----      ----- Message -----  From: Laila Sexton  Sent: 8/26/2020  12:54 PM EDT  To: Mathieu Santos MD

## 2020-09-02 ENCOUNTER — TELEPHONE (OUTPATIENT)
Dept: FAMILY MEDICINE CLINIC | Facility: CLINIC | Age: 65
End: 2020-09-02

## 2020-09-02 NOTE — TELEPHONE ENCOUNTER
Called pt about cologuard results. No answer.   Second attempt   ----- Message from Mathieu Santos MD sent at 8/26/2020  4:23 PM EDT -----      ----- Message -----  From: Laila Sexton  Sent: 8/26/2020  12:54 PM EDT  To: Mathieu Santos MD

## 2020-11-12 NOTE — PROGRESS NOTES
GENERAL SURGERY HISTORY AND PHYSICAL     SUMMARY (A/P):    Mr. Osman Aparicio is a 65 y.o. gentleman with a positive Cologuard test. Asymptomatic, no family history. Will plan for colonoscopy. All risks (bleeding, perforation, false positive cologuard), benefits and alternatives were explained to the patient who agreed and wished to proceed.     Referring Provider: No ref. provider found    CC:    + cologard     HPI:    Mr. Osman Aparicio is a 65 y.o. gentleman who presents with a positive Cologuard test.  He is having no prior screening for colon cancer.  He got this done in August but had multiple family members sick with Covid so delayed follow-up.  He states he has no history of blood in his stool, change in bowel habits, or weight loss.    PMH:   • HTN     PSH:    • None    FAMILY HISTORY:    • No family history of colon cancer  • Brother with lung and liver cancer     SOCIAL HISTORY:   • Smokes 1 ppd  • Social alcohol use    ALLERGIES:   No Known Allergies    MEDICATIONS:     Current Outpatient Medications:   •  amLODIPine-Valsartan-HCTZ -25 MG tablet, Take 1 tablet by mouth Daily., Disp: 90 tablet, Rfl: 1  •  escitalopram (LEXAPRO) 20 MG tablet, Take 1 tablet by mouth Daily., Disp: 90 tablet, Rfl: 1  •  nebivolol (Bystolic) 10 MG tablet, Take 1 tablet by mouth Daily., Disp: 90 tablet, Rfl: 1  •  simvastatin (ZOCOR) 20 MG tablet, Take 1 tablet by mouth Every Night for 90 days., Disp: 90 tablet, Rfl: 1    RADIOLOGY/ENDOSCOPY:    • No recent imaging     LABS:    • 8/19/2020 Cologuard +     ROS:   Influenza-like illness: no fever, no  cough, no  sore throat, no  body aches, no loss of sense of taste or smell, no known exposure to person with Covid-19.  Constitutional: Negative for fevers or chills  HENT: Negative for hearing loss or runny nose  Eyes: Negative for vision changes or scleral icterus  Respiratory: Negative for cough or shortness of breath  Cardiovascular: Negative for chest pain or heart  palpitations  Gastrointestinal: Negative for abdominal pain, nausea, vomiting, constipation, melena, or hematochezia  Genitourinary: Negative for hematuria or dysuria  Musculoskeletal: Negative for joint swelling or gait instability  Neurologic: Negative for tremors or seizures  Psychiatric: Negative for suicidal ideations or depression  All other systems reviewed and negative    PHYSICAL EXAM:   • Constitutional: Well-developed well-nourished, no acute distress  • Eyes: Conjunctiva normal, sclera nonicteric  • ENMT: Hearing grossly normal, oral mucosa moist  • Neck: Supple, no palpable mass, trachea midline  • Respiratory: Clear to auscultation, normal inspiratory effort  • Cardiovascular: Regular rate, no jugular venous distention  • Gastrointestinal: Soft, nontender, nondistended  • Lymphatics (palpable nodes):  cervical-negative, axillary-negative  • Skin:  Warm, dry, no rash on visualized skin surfaces  • Musculoskeletal: Symmetric strength, normal gait  • Psychiatric: Alert and oriented ×3, normal affect       MARIPOSA CROUCH M.D.  General and Endoscopic Surgery  [facility]    [address], [address]  [place], [place], [facility]  P: [phone]  F: [phone]

## 2020-11-18 ENCOUNTER — OFFICE VISIT (OUTPATIENT)
Dept: SURGERY | Facility: CLINIC | Age: 65
End: 2020-11-18

## 2020-11-18 ENCOUNTER — PREP FOR SURGERY (OUTPATIENT)
Dept: OTHER | Facility: HOSPITAL | Age: 65
End: 2020-11-18

## 2020-11-18 VITALS — WEIGHT: 174 LBS | BODY MASS INDEX: 23.57 KG/M2 | HEIGHT: 72 IN

## 2020-11-18 DIAGNOSIS — R19.5 POSITIVE COLORECTAL CANCER SCREENING USING COLOGUARD TEST: Primary | ICD-10-CM

## 2020-11-18 PROCEDURE — 99202 OFFICE O/P NEW SF 15 MIN: CPT | Performed by: STUDENT IN AN ORGANIZED HEALTH CARE EDUCATION/TRAINING PROGRAM

## 2020-11-18 RX ORDER — SIMVASTATIN 20 MG
20 TABLET ORAL NIGHTLY
COMMUNITY
End: 2021-02-01 | Stop reason: SDUPTHER

## 2020-11-23 ENCOUNTER — TRANSCRIBE ORDERS (OUTPATIENT)
Dept: SLEEP MEDICINE | Facility: HOSPITAL | Age: 65
End: 2020-11-23

## 2020-11-23 DIAGNOSIS — Z01.818 OTHER SPECIFIED PRE-OPERATIVE EXAMINATION: Primary | ICD-10-CM

## 2020-12-04 ENCOUNTER — LAB (OUTPATIENT)
Dept: LAB | Facility: HOSPITAL | Age: 65
End: 2020-12-04

## 2020-12-04 DIAGNOSIS — Z01.818 OTHER SPECIFIED PRE-OPERATIVE EXAMINATION: ICD-10-CM

## 2020-12-04 PROCEDURE — U0004 COV-19 TEST NON-CDC HGH THRU: HCPCS

## 2020-12-04 PROCEDURE — C9803 HOPD COVID-19 SPEC COLLECT: HCPCS

## 2020-12-05 LAB — SARS-COV-2 RNA RESP QL NAA+PROBE: NOT DETECTED

## 2020-12-07 ENCOUNTER — ANESTHESIA (OUTPATIENT)
Dept: GASTROENTEROLOGY | Facility: HOSPITAL | Age: 65
End: 2020-12-07

## 2020-12-07 ENCOUNTER — HOSPITAL ENCOUNTER (OUTPATIENT)
Facility: HOSPITAL | Age: 65
Setting detail: HOSPITAL OUTPATIENT SURGERY
Discharge: HOME | End: 2020-12-07
Attending: STUDENT IN AN ORGANIZED HEALTH CARE EDUCATION/TRAINING PROGRAM | Admitting: STUDENT IN AN ORGANIZED HEALTH CARE EDUCATION/TRAINING PROGRAM

## 2020-12-07 ENCOUNTER — ANESTHESIA EVENT (OUTPATIENT)
Dept: GASTROENTEROLOGY | Facility: HOSPITAL | Age: 65
End: 2020-12-07

## 2020-12-07 VITALS
RESPIRATION RATE: 17 BRPM | BODY MASS INDEX: 22.89 KG/M2 | HEART RATE: 58 BPM | SYSTOLIC BLOOD PRESSURE: 143 MMHG | HEIGHT: 72 IN | WEIGHT: 169 LBS | OXYGEN SATURATION: 99 % | DIASTOLIC BLOOD PRESSURE: 78 MMHG

## 2020-12-07 DIAGNOSIS — R19.5 POSITIVE COLORECTAL CANCER SCREENING USING COLOGUARD TEST: ICD-10-CM

## 2020-12-07 PROCEDURE — 45385 COLONOSCOPY W/LESION REMOVAL: CPT | Performed by: STUDENT IN AN ORGANIZED HEALTH CARE EDUCATION/TRAINING PROGRAM

## 2020-12-07 PROCEDURE — 88305 TISSUE EXAM BY PATHOLOGIST: CPT | Performed by: STUDENT IN AN ORGANIZED HEALTH CARE EDUCATION/TRAINING PROGRAM

## 2020-12-07 PROCEDURE — 45380 COLONOSCOPY AND BIOPSY: CPT | Performed by: STUDENT IN AN ORGANIZED HEALTH CARE EDUCATION/TRAINING PROGRAM

## 2020-12-07 PROCEDURE — 25010000002 PROPOFOL 10 MG/ML EMULSION: Performed by: ANESTHESIOLOGY

## 2020-12-07 DEVICE — DEV CLIP ENDO RESOLUTION360 CONTRL ROT 235CM: Type: IMPLANTABLE DEVICE | Site: TRANSVERSE COLON | Status: FUNCTIONAL

## 2020-12-07 RX ORDER — SODIUM CHLORIDE, SODIUM LACTATE, POTASSIUM CHLORIDE, CALCIUM CHLORIDE 600; 310; 30; 20 MG/100ML; MG/100ML; MG/100ML; MG/100ML
30 INJECTION, SOLUTION INTRAVENOUS CONTINUOUS PRN
Status: DISCONTINUED | OUTPATIENT
Start: 2020-12-07 | End: 2020-12-07 | Stop reason: HOSPADM

## 2020-12-07 RX ORDER — SODIUM CHLORIDE 0.9 % (FLUSH) 0.9 %
3 SYRINGE (ML) INJECTION EVERY 12 HOURS SCHEDULED
Status: DISCONTINUED | OUTPATIENT
Start: 2020-12-07 | End: 2020-12-07 | Stop reason: HOSPADM

## 2020-12-07 RX ORDER — PROMETHAZINE HYDROCHLORIDE 25 MG/1
25 SUPPOSITORY RECTAL ONCE AS NEEDED
Status: DISCONTINUED | OUTPATIENT
Start: 2020-12-07 | End: 2020-12-07 | Stop reason: HOSPADM

## 2020-12-07 RX ORDER — PROPOFOL 10 MG/ML
VIAL (ML) INTRAVENOUS CONTINUOUS PRN
Status: DISCONTINUED | OUTPATIENT
Start: 2020-12-07 | End: 2020-12-07 | Stop reason: SURG

## 2020-12-07 RX ORDER — PROMETHAZINE HYDROCHLORIDE 25 MG/1
25 TABLET ORAL ONCE AS NEEDED
Status: DISCONTINUED | OUTPATIENT
Start: 2020-12-07 | End: 2020-12-07 | Stop reason: HOSPADM

## 2020-12-07 RX ORDER — EPHEDRINE SULFATE 50 MG/ML
INJECTION, SOLUTION INTRAVENOUS AS NEEDED
Status: DISCONTINUED | OUTPATIENT
Start: 2020-12-07 | End: 2020-12-07 | Stop reason: SURG

## 2020-12-07 RX ORDER — PROPOFOL 10 MG/ML
VIAL (ML) INTRAVENOUS AS NEEDED
Status: DISCONTINUED | OUTPATIENT
Start: 2020-12-07 | End: 2020-12-07 | Stop reason: SURG

## 2020-12-07 RX ORDER — LIDOCAINE HYDROCHLORIDE 20 MG/ML
INJECTION, SOLUTION INFILTRATION; PERINEURAL AS NEEDED
Status: DISCONTINUED | OUTPATIENT
Start: 2020-12-07 | End: 2020-12-07 | Stop reason: SURG

## 2020-12-07 RX ORDER — SODIUM CHLORIDE 0.9 % (FLUSH) 0.9 %
10 SYRINGE (ML) INJECTION AS NEEDED
Status: DISCONTINUED | OUTPATIENT
Start: 2020-12-07 | End: 2020-12-07 | Stop reason: HOSPADM

## 2020-12-07 RX ADMIN — EPHEDRINE SULFATE 10 MG: 50 INJECTION INTRAVENOUS at 14:39

## 2020-12-07 RX ADMIN — LIDOCAINE HYDROCHLORIDE 40 MG: 20 INJECTION, SOLUTION INFILTRATION; PERINEURAL at 14:05

## 2020-12-07 RX ADMIN — PROPOFOL 150 MG: 10 INJECTION, EMULSION INTRAVENOUS at 14:05

## 2020-12-07 RX ADMIN — SODIUM CHLORIDE, POTASSIUM CHLORIDE, SODIUM LACTATE AND CALCIUM CHLORIDE: 600; 310; 30; 20 INJECTION, SOLUTION INTRAVENOUS at 14:06

## 2020-12-07 RX ADMIN — PROPOFOL 200 MCG/KG/MIN: 10 INJECTION, EMULSION INTRAVENOUS at 14:05

## 2020-12-07 NOTE — OP NOTE
PREOPERATIVE DIAGNOSIS:  Positive cologard     POSTOPERATIVE DIAGNOSIS AND FINDINGS:  Ascending colon polyps x5, hot snare and cold biopsy, clip placement x1  Proximal transverse colon polyp via hot snare, clip placement x1  Mid-transverse colon polyps x4 , hot snare x3 and 1 cold forceps biopsy, clip placement x1  Sigmoid colon polyp x1 via hot snare   Distal sigmoid polyp x1, via hot snare     PROCEDURE:  Colonoscopy to cecum with 14 total biopsies as listed above.     SURGEON:  Regi Crouch MD    ANESTHESIA:  MAC    SPECIMEN(S):  Polyp    DESCRIPTION:  In the decubitus position, a digital rectal exam was performed and was normal. The colonoscope was inserted under direct visualization of the lumen to the cecum, which was confirmed by visualization of the ileocecal valve and appendiceal orifice. The scope was then slowly withdrawn circumferentially examining all mucosal surfaces. 14 polyps were removed in total as detailed above. 3 clips were placed on large polyps with some oozing.    The bowel prep was good.     The patient tolerated the procedure well.     RECOMMENDATION FOR FUTURE SURVEILLANCE:  To be determined based on polyp pathology and issued as separate report    REGI CROUHC M.D.  General and Endoscopic Surgery  [facility]    [address] [place] [address], [address]  [place], [place], [address]  P: [phone]  F: [phone]

## 2020-12-07 NOTE — ANESTHESIA POSTPROCEDURE EVALUATION
Patient: Osman Aparicio    Procedure Summary     Date: 12/07/20 Room / Location:  [facility]    Anesthesia Start: 1404 Anesthesia Stop: 1557    Procedure: COLONOSCOPY INTO CECUM WITH HOT SNARE POLYPECTOMIES, COLD BX POLYPECTOMIES, RESOLUTION CLIPS X (N/A ) Diagnosis:       Positive colorectal cancer screening using Cologuard test      (Positive colorectal cancer screening using Cologuard test [R19.5])    Surgeon: Regi Mercado MD Provider: Oleg Martel MD    Anesthesia Type: MAC ASA Status: 3          Anesthesia Type: MAC    Vitals  Vitals Value Taken Time   /85 12/07/20 1557   Temp     Pulse 58 12/07/20 1557   Resp 15 12/07/20 1557   SpO2 97 % 12/07/20 1557           Post Anesthesia Care and Evaluation    Patient location during evaluation: bedside  Patient participation: complete - patient participated  Level of consciousness: awake  Pain management: adequate  Airway patency: patent  Anesthetic complications: No anesthetic complications  PONV Status: none  Cardiovascular status: acceptable  Respiratory status: acceptable  Hydration status: acceptable  Post Neuraxial Block status: Motor and sensory function returned to baseline

## 2020-12-07 NOTE — ANESTHESIA PREPROCEDURE EVALUATION
Anesthesia Evaluation     Patient summary reviewed and Nursing notes reviewed   no history of anesthetic complications:  NPO Solid Status: > 8 hours  NPO Liquid Status: > 8 hours           Airway   Dental      Pulmonary    (+) a smoker Current Smoked day of surgery,   (-) COPD, asthma, shortness of breath, sleep apnea  Cardiovascular     Patient on routine beta blocker and Beta blocker given within 24 hours of surgery    (+) hypertension well controlled 2 medications or greater, hyperlipidemia,   (-) pacemaker, valvular problems/murmurs, past MI, CAD, dysrhythmias, angina, CHF, KRAMER, cardiac stents, PVD, DVT      Neuro/Psych  (+) psychiatric history Anxiety,     (-) seizures, TIA, CVA, weakness, numbness, dementia  GI/Hepatic/Renal/Endo    (-)  obesity, morbid obesity, GERD, liver disease, no renal disease, diabetes, no thyroid disorder    Musculoskeletal     (-) back pain, neck pain, neck stiffness, chronic pain  Abdominal    Substance History   (-) alcohol use, drug use     OB/GYN    (-)  Pregnant        Other        (-) blood dyscrasia, arthritis, history of cancer, autoimmune disease, chronic steroid use                Anesthesia Plan    ASA 3     MAC     intravenous induction     Anesthetic plan, all risks, benefits, and alternatives have been provided, discussed and informed consent has been obtained with: patient.

## 2020-12-07 NOTE — DISCHARGE INSTRUCTIONS
For the next 24 hours patient needs to be with a responsible adult.    For 24 hours DO NOT drive, operate machinery, appliances, drink alcohol, make important decisions or sign legal documents.    Start with a light or bland diet if you are feeling sick to your stomach otherwise advance to regular diet as tolerated.    Follow recommendations on procedure report if provided by your doctor.    Call Dr Mercado for problems [phone]    Problems may include but not limited to: large amounts of bleeding, trouble breathing, repeated vomiting, severe unrelieved pain, fever or chills.

## 2020-12-09 LAB
CYTO UR: NORMAL
LAB AP CASE REPORT: NORMAL
PATH REPORT.FINAL DX SPEC: NORMAL
PATH REPORT.GROSS SPEC: NORMAL

## 2020-12-10 ENCOUNTER — TELEPHONE (OUTPATIENT)
Dept: SURGERY | Facility: CLINIC | Age: 65
End: 2020-12-10

## 2020-12-10 NOTE — TELEPHONE ENCOUNTER
Spoke to Mr. Aparicio regarding recent colonoscopy. Large amount of tubular adenomas and sessile serrated adenoma on path. Will need repeat colonscopy in 1 year. He understands. Will add to c-scope recall.    Final Diagnosis   1. Ascending Colon, Polyps, Polypectomies:               A. Sessile serrated adenoma, tubular adenoma(s), and hyperplastic polyp(s).               B. Largest inked polyp base appears free of adenomatous mucosa.     2. Proximal Transverse Colon, Polyp, Polypectomy:  A. Fragments of tubular adenoma.     3. Mid Transverse Colon, Polyps, Polypectomies:  A. Tubular adenomas.     4. Sigmoid Colon, Polyp, Polypectomy:  A. Tubular adenoma.     5. Distal Sigmoid Colon, Polyp, Polypectomy:  A. Fragments of hyperplastic polyp.     Regi Mercado MD

## 2020-12-22 DIAGNOSIS — I10 ESSENTIAL HYPERTENSION: ICD-10-CM

## 2020-12-22 RX ORDER — NEBIVOLOL 10 MG/1
10 TABLET ORAL DAILY
Qty: 30 TABLET | Refills: 0 | Status: SHIPPED | OUTPATIENT
Start: 2020-12-22 | End: 2021-02-01 | Stop reason: SDUPTHER

## 2021-01-13 DIAGNOSIS — E78.49 OTHER HYPERLIPIDEMIA: ICD-10-CM

## 2021-01-13 DIAGNOSIS — I10 ESSENTIAL HYPERTENSION: ICD-10-CM

## 2021-01-13 DIAGNOSIS — F41.9 ANXIETY: ICD-10-CM

## 2021-01-13 RX ORDER — ESCITALOPRAM OXALATE 20 MG/1
TABLET ORAL
Qty: 90 TABLET | Refills: 1 | OUTPATIENT
Start: 2021-01-13

## 2021-01-13 RX ORDER — SIMVASTATIN 20 MG
TABLET ORAL
Qty: 90 TABLET | Refills: 1 | OUTPATIENT
Start: 2021-01-13

## 2021-01-13 RX ORDER — AMLODIPINE, VALSARTAN AND HYDROCHLOROTHIAZIDE 10; 320; 25 MG/1; MG/1; MG/1
TABLET ORAL
Qty: 90 TABLET | Refills: 1 | OUTPATIENT
Start: 2021-01-13

## 2021-01-20 DIAGNOSIS — I10 ESSENTIAL HYPERTENSION: ICD-10-CM

## 2021-01-20 RX ORDER — NEBIVOLOL HYDROCHLORIDE 10 MG/1
TABLET ORAL
Qty: 30 TABLET | Refills: 0 | OUTPATIENT
Start: 2021-01-20

## 2021-02-01 ENCOUNTER — TELEPHONE (OUTPATIENT)
Dept: FAMILY MEDICINE CLINIC | Facility: CLINIC | Age: 66
End: 2021-02-01

## 2021-02-01 DIAGNOSIS — I10 ESSENTIAL HYPERTENSION: ICD-10-CM

## 2021-02-01 DIAGNOSIS — F41.9 ANXIETY: ICD-10-CM

## 2021-02-01 NOTE — TELEPHONE ENCOUNTER
Caller: Osman Aparicio    Relationship: Self    Best call back number: [phone]    Medication needed:   Requested Prescriptions     Pending Prescriptions Disp Refills   • amLODIPine-Valsartan-HCTZ -25 MG tablet 90 tablet 1     Sig: Take 1 tablet by mouth Daily.   • simvastatin (ZOCOR) 20 MG tablet        Sig: Take 1 tablet by mouth Every Night.   • nebivolol (Bystolic) 10 MG tablet 30 tablet 0     Sig: Take 1 tablet by mouth Daily for 30 days.   • escitalopram (LEXAPRO) 20 MG tablet 90 tablet 1     Sig: Take 1 tablet by mouth Daily.       When do you need the refill by: ASAP    Does the patient have less than a 3 day supply:  [] Yes  [x] No    What is the patient's preferred pharmacy: [id number] [place] - [address] AT [facility] [phone] [facility] [phone] FX

## 2021-02-02 RX ORDER — ESCITALOPRAM OXALATE 20 MG/1
20 TABLET ORAL DAILY
Qty: 90 TABLET | Refills: 1 | Status: SHIPPED | OUTPATIENT
Start: 2021-02-02 | End: 2021-08-11 | Stop reason: SDUPTHER

## 2021-02-02 RX ORDER — NEBIVOLOL 10 MG/1
10 TABLET ORAL DAILY
Qty: 90 TABLET | Refills: 1 | Status: SHIPPED | OUTPATIENT
Start: 2021-02-02 | End: 2021-08-11 | Stop reason: SDUPTHER

## 2021-02-02 RX ORDER — SIMVASTATIN 20 MG
20 TABLET ORAL NIGHTLY
Qty: 90 TABLET | Refills: 1 | Status: SHIPPED | OUTPATIENT
Start: 2021-02-02 | End: 2021-08-11 | Stop reason: SDUPTHER

## 2021-02-02 RX ORDER — AMLODIPINE, VALSARTAN AND HYDROCHLOROTHIAZIDE 10; 320; 25 MG/1; MG/1; MG/1
1 TABLET ORAL DAILY
Qty: 90 TABLET | Refills: 1 | Status: SHIPPED | OUTPATIENT
Start: 2021-02-02 | End: 2021-05-14 | Stop reason: SDUPTHER

## 2021-05-14 ENCOUNTER — TELEPHONE (OUTPATIENT)
Dept: FAMILY MEDICINE CLINIC | Facility: CLINIC | Age: 66
End: 2021-05-14

## 2021-05-14 DIAGNOSIS — I10 ESSENTIAL HYPERTENSION: ICD-10-CM

## 2021-05-14 RX ORDER — AMLODIPINE, VALSARTAN AND HYDROCHLOROTHIAZIDE 10; 320; 25 MG/1; MG/1; MG/1
1 TABLET ORAL DAILY
Qty: 90 TABLET | Refills: 1 | Status: SHIPPED | OUTPATIENT
Start: 2021-05-14 | End: 2021-08-11 | Stop reason: ALTCHOICE

## 2021-05-14 NOTE — TELEPHONE ENCOUNTER
I called [facility] pharmacy to give verbal ok.  I called pt, pt did not answer.  I LM.  Thanks

## 2021-05-14 NOTE — TELEPHONE ENCOUNTER
Caller: Suzanne Aparicio    Relationship: Emergency Contact    Best call back number: [phone]    Medication needed:   Requested Prescriptions     Pending Prescriptions Disp Refills   • amLODIPine-Valsartan-HCTZ -25 MG tablet 90 tablet 1     Sig: Take 1 tablet by mouth Daily.       What additional details did the patient provide when requesting the medication:     PHARMACY TOLD PATIENT THEY CANNOT GET THIS MEDICATION IN THIS FORM . PATIENT WIFE SAID THE PHARMACY TOLD THEM THEY COULD SPLIT UP IN THREE DIFFERENT PILLS INSTEAD OF ONE. PATIENT WIFE NEEDS TO KNOW IF THIS IS OKAY . PLEASE ADVISE AND LET THEM KNOW IF THIS IS OKAY.     Does the patient have less than a 3 day supply:  [x] Yes  [] No    What is the patient's preferred pharmacy: [id number] - [facility] [place] [place] - [address] AT [facility] - [phone] [facility] [phone] FX

## 2021-07-31 DIAGNOSIS — F41.9 ANXIETY: ICD-10-CM

## 2021-07-31 DIAGNOSIS — I10 ESSENTIAL HYPERTENSION: ICD-10-CM

## 2021-08-03 RX ORDER — ESCITALOPRAM OXALATE 20 MG/1
TABLET ORAL
Qty: 90 TABLET | Refills: 1 | OUTPATIENT
Start: 2021-08-03

## 2021-08-03 RX ORDER — NEBIVOLOL HYDROCHLORIDE 10 MG/1
TABLET ORAL
Qty: 90 TABLET | Refills: 1 | OUTPATIENT
Start: 2021-08-03

## 2021-08-03 RX ORDER — SIMVASTATIN 20 MG
TABLET ORAL
Qty: 90 TABLET | Refills: 1 | OUTPATIENT
Start: 2021-08-03

## 2021-08-11 ENCOUNTER — OFFICE VISIT (OUTPATIENT)
Dept: FAMILY MEDICINE CLINIC | Facility: CLINIC | Age: 66
End: 2021-08-11

## 2021-08-11 VITALS
HEART RATE: 68 BPM | WEIGHT: 175 LBS | DIASTOLIC BLOOD PRESSURE: 60 MMHG | RESPIRATION RATE: 16 BRPM | HEIGHT: 72 IN | BODY MASS INDEX: 23.7 KG/M2 | OXYGEN SATURATION: 96 % | TEMPERATURE: 97.2 F | SYSTOLIC BLOOD PRESSURE: 140 MMHG

## 2021-08-11 DIAGNOSIS — I10 ESSENTIAL HYPERTENSION: ICD-10-CM

## 2021-08-11 DIAGNOSIS — E78.49 OTHER HYPERLIPIDEMIA: Primary | ICD-10-CM

## 2021-08-11 DIAGNOSIS — F41.9 ANXIETY: ICD-10-CM

## 2021-08-11 PROCEDURE — 99214 OFFICE O/P EST MOD 30 MIN: CPT | Performed by: NURSE PRACTITIONER

## 2021-08-11 RX ORDER — AMLODIPINE BESYLATE 10 MG/1
10 TABLET ORAL DAILY
Qty: 90 TABLET | Refills: 1 | Status: SHIPPED | OUTPATIENT
Start: 2021-08-11 | End: 2022-02-08

## 2021-08-11 RX ORDER — AMLODIPINE BESYLATE 10 MG/1
10 TABLET ORAL DAILY
COMMUNITY
Start: 2021-05-14 | End: 2021-08-11 | Stop reason: SDUPTHER

## 2021-08-11 RX ORDER — NEBIVOLOL 10 MG/1
10 TABLET ORAL DAILY
Qty: 90 TABLET | Refills: 1 | Status: SHIPPED | OUTPATIENT
Start: 2021-08-11 | End: 2022-02-08

## 2021-08-11 RX ORDER — SIMVASTATIN 20 MG
20 TABLET ORAL NIGHTLY
Qty: 90 TABLET | Refills: 1 | Status: SHIPPED | OUTPATIENT
Start: 2021-08-11 | End: 2022-02-08

## 2021-08-11 RX ORDER — VALSARTAN 320 MG/1
320 TABLET ORAL DAILY
Qty: 90 TABLET | Refills: 1 | Status: SHIPPED | OUTPATIENT
Start: 2021-08-11 | End: 2022-02-08

## 2021-08-11 RX ORDER — HYDROCHLOROTHIAZIDE 25 MG/1
25 TABLET ORAL DAILY
Qty: 90 TABLET | Refills: 1 | Status: SHIPPED | OUTPATIENT
Start: 2021-08-11 | End: 2022-02-08

## 2021-08-11 RX ORDER — ESCITALOPRAM OXALATE 20 MG/1
20 TABLET ORAL DAILY
Qty: 90 TABLET | Refills: 1 | Status: SHIPPED | OUTPATIENT
Start: 2021-08-11 | End: 2022-02-08

## 2021-08-11 RX ORDER — VALSARTAN 320 MG/1
320 TABLET ORAL DAILY
COMMUNITY
Start: 2021-05-14 | End: 2021-08-11 | Stop reason: SDUPTHER

## 2021-08-11 RX ORDER — HYDROCHLOROTHIAZIDE 25 MG/1
25 TABLET ORAL DAILY
COMMUNITY
Start: 2021-05-14 | End: 2021-08-11 | Stop reason: SDUPTHER

## 2021-08-11 NOTE — PROGRESS NOTES
"Subjective   Osman Aparicio is a 65 y.o. male.     History of Present Illness    Since the last visit, he has overall felt well.  He has Essential Hypertension and well controlled on current medication and anxiety that is well controlled on current medication .  he has been compliant with current medications have reviewed them.  The patient denies medication side effects.  Will refill medications. /60   Pulse 68   Temp 97.2 °F (36.2 °C)   Resp 16   Ht 182.9 cm (72\")   Wt 79.4 kg (175 lb)   SpO2 96%   BMI 23.73 kg/m²     Results for orders placed or performed during the hospital encounter of 12/07/20   Tissue Pathology Exam    Specimen: A: Large Intestine, Right / Ascending Colon; Polyp    B: Large Intestine, Transverse Colon; Polyp    C: Large Intestine, Transverse Colon; Polyp    D: Large Intestine, Sigmoid Colon; Polyp    E: Large Intestine, Sigmoid Colon; Polyp   Result Value Ref Range    Case Report       Surgical Pathology Report                         Case: [id number]                                  Authorizing Provider:  Regi Mercado MD        Collected:           12/07/2020 02:41 PM          Ordering Location:     [facility]  Received:            12/07/2020 10:00 PM                                 ENDO SUITES                                                                  Pathologist:           Dena Edwards MD                                                          Specimens:   1) - Large Intestine, Right / Ascending Colon, ASCENDING COLON POLYPS                               2) - Large Intestine, Transverse Colon, PROXIMAL TRANSVERSE POLYP                                   3) - Large Intestine, Transverse Colon, MID-TRANSVERSE COLON POLYPS                                 4) - Large Intestine, Sigmoid Colon, SIGMOID POLYP                                                  5) - Large Intestine, Sigmoid Colon, DISTAL SIGMOID POLYP                                  Final " "Diagnosis       1. Ascending Colon, Polyps, Polypectomies:   A. Sessile serrated adenoma, tubular adenoma(s), and hyperplastic polyp(s).   B. Largest inked polyp base appears free of adenomatous mucosa.    2. Proximal Transverse Colon, Polyp, Polypectomy:  A. Fragments of tubular adenoma.    3. Mid Transverse Colon, Polyps, Polypectomies:  A. Tubular adenomas.    4. Sigmoid Colon, Polyp, Polypectomy:  A. Tubular adenoma.    5. Distal Sigmoid Colon, Polyp, Polypectomy:  A. Fragments of hyperplastic polyp.    jab/pkm        Gross Description       1.  Received in formalin labeled with the patient's name and designated \"ascending colon polyps\".  The specimen consists of an aggregate of irregular pink tan soft tissue fragments and debris measuring 2.2 x 1.1 x 0.3 cm in total aggregate dimension.  The largest fragment is multilobulated and diffusely erythematous.  It measures 0.9 x 0.4 x 0.4 cm.  The margin of excision is inked in blue and the specimen is bisected through the long axis.  The two second largest fragments are each bisected through the long axis.  All tissue is filtered and submitted in cassette 1A.     2.The specimen is received in formalin labeled with the patient's name and further designated 'proximal transverse polyp ' are multiple small fragments of gray-tan tissue. The specimen is submitted for embedding as received.    3.The specimen is received in formalin labeled with the patient's name and further designated 'mid-transverse colon polyps' are multiple small fragments of gray-tan tissue. The specimen is submitted for embedding as received.    4.  Received in formalin labeled with the patient's name and designated \"sigmoid polyp\".  The specimen consists of a single irregular pink tan soft tissue mass measuring 0.8 x 0.4 x 0.3 cm.  The specimen is bisected through the long axis and submitted in cassette 4A.      5.The specimen is received in formalin labeled with the patient's name and further " designated 'distal sigmoid polyp ' are multiple small fragments of gray-tan tissue. The specimen is submitted for embedding as received.    Total blocks this case:  5      Microscopic Description       Performed, incorporated in diagnosis.           The following portions of the patient's history were reviewed and updated as appropriate: allergies, current medications, past family history, past medical history, past social history, past surgical history and problem list.    Review of Systems   Constitutional: Negative for fatigue.   Eyes: Negative for visual disturbance.   Respiratory: Negative for cough and shortness of breath.    Cardiovascular: Negative for chest pain and palpitations.   Endocrine: Negative for cold intolerance, heat intolerance, polydipsia, polyphagia and polyuria.   Skin: Negative for rash.   Psychiatric/Behavioral: Negative for dysphoric mood and sleep disturbance. The patient is not nervous/anxious.        Objective   Physical Exam  Vitals and nursing note reviewed.   Constitutional:       Appearance: Normal appearance. He is well-developed.   Neck:      Vascular: No carotid bruit.   Cardiovascular:      Rate and Rhythm: Normal rate and regular rhythm.   Pulmonary:      Effort: Pulmonary effort is normal.      Breath sounds: Normal breath sounds.   Neurological:      Mental Status: He is alert and oriented to person, place, and time.   Psychiatric:         Mood and Affect: Mood normal.         Behavior: Behavior normal.         Thought Content: Thought content normal.         Judgment: Judgment normal.         Assessment/Plan   Diagnoses and all orders for this visit:    1. Other hyperlipidemia (Primary)  -     simvastatin (ZOCOR) 20 MG tablet; Take 1 tablet by mouth Every Night.  Dispense: 90 tablet; Refill: 1    2. Essential hypertension  -     valsartan (DIOVAN) 320 MG tablet; Take 1 tablet by mouth Daily.  Dispense: 90 tablet; Refill: 1  -     hydroCHLOROthiazide (HYDRODIURIL) 25 MG  tablet; Take 1 tablet by mouth Daily.  Dispense: 90 tablet; Refill: 1  -     amLODIPine (NORVASC) 10 MG tablet; Take 1 tablet by mouth Daily.  Dispense: 90 tablet; Refill: 1  -     nebivolol (Bystolic) 10 MG tablet; Take 1 tablet by mouth Daily for 30 days.  Dispense: 90 tablet; Refill: 1  -     Comprehensive metabolic panel  -     Lipid panel  -     CBC and Differential  -     TSH    3. Anxiety  -     escitalopram (LEXAPRO) 20 MG tablet; Take 1 tablet by mouth Daily.  Dispense: 90 tablet; Refill: 1  -     Comprehensive metabolic panel  -     Lipid panel  -     CBC and Differential  -     TSH

## 2022-02-08 DIAGNOSIS — I10 ESSENTIAL HYPERTENSION: ICD-10-CM

## 2022-02-08 DIAGNOSIS — E78.49 OTHER HYPERLIPIDEMIA: ICD-10-CM

## 2022-02-08 DIAGNOSIS — F41.9 ANXIETY: ICD-10-CM

## 2022-02-08 RX ORDER — HYDROCHLOROTHIAZIDE 25 MG/1
TABLET ORAL
Qty: 30 TABLET | Refills: 0 | Status: SHIPPED | OUTPATIENT
Start: 2022-02-08 | End: 2022-03-11

## 2022-02-08 RX ORDER — NEBIVOLOL 10 MG/1
TABLET ORAL
Qty: 30 TABLET | Refills: 0 | Status: SHIPPED | OUTPATIENT
Start: 2022-02-08 | End: 2022-03-11

## 2022-02-08 RX ORDER — ESCITALOPRAM OXALATE 20 MG/1
TABLET ORAL
Qty: 30 TABLET | Refills: 0 | Status: SHIPPED | OUTPATIENT
Start: 2022-02-08 | End: 2022-03-14

## 2022-02-08 RX ORDER — VALSARTAN 320 MG/1
TABLET ORAL
Qty: 30 TABLET | Refills: 0 | Status: SHIPPED | OUTPATIENT
Start: 2022-02-08 | End: 2022-03-11

## 2022-02-08 RX ORDER — SIMVASTATIN 20 MG
TABLET ORAL
Qty: 30 TABLET | Refills: 0 | Status: SHIPPED | OUTPATIENT
Start: 2022-02-08 | End: 2022-03-11

## 2022-02-08 RX ORDER — AMLODIPINE BESYLATE 10 MG/1
TABLET ORAL
Qty: 90 TABLET | Refills: 1 | Status: SHIPPED | OUTPATIENT
Start: 2022-02-08 | End: 2022-04-05 | Stop reason: HOSPADM

## 2022-03-11 DIAGNOSIS — I10 ESSENTIAL HYPERTENSION: ICD-10-CM

## 2022-03-11 DIAGNOSIS — E78.49 OTHER HYPERLIPIDEMIA: ICD-10-CM

## 2022-03-11 RX ORDER — SIMVASTATIN 20 MG
TABLET ORAL
Qty: 30 TABLET | Refills: 0 | Status: SHIPPED | OUTPATIENT
Start: 2022-03-11 | End: 2022-04-05 | Stop reason: HOSPADM

## 2022-03-11 RX ORDER — NEBIVOLOL 10 MG/1
TABLET ORAL
Qty: 30 TABLET | Refills: 0 | Status: SHIPPED | OUTPATIENT
Start: 2022-03-11 | End: 2022-04-05 | Stop reason: HOSPADM

## 2022-03-11 RX ORDER — HYDROCHLOROTHIAZIDE 25 MG/1
TABLET ORAL
Qty: 30 TABLET | Refills: 0 | Status: SHIPPED | OUTPATIENT
Start: 2022-03-11 | End: 2022-04-05 | Stop reason: HOSPADM

## 2022-03-11 RX ORDER — VALSARTAN 320 MG/1
TABLET ORAL
Qty: 30 TABLET | Refills: 0 | Status: SHIPPED | OUTPATIENT
Start: 2022-03-11 | End: 2022-04-05 | Stop reason: HOSPADM

## 2022-03-12 DIAGNOSIS — F41.9 ANXIETY: ICD-10-CM

## 2022-03-14 RX ORDER — ESCITALOPRAM OXALATE 20 MG/1
TABLET ORAL
Qty: 30 TABLET | Refills: 0 | Status: SHIPPED | OUTPATIENT
Start: 2022-03-14 | End: 2022-04-08

## 2022-03-26 ENCOUNTER — APPOINTMENT (OUTPATIENT)
Dept: GENERAL RADIOLOGY | Facility: HOSPITAL | Age: 67
End: 2022-03-26

## 2022-03-26 ENCOUNTER — APPOINTMENT (OUTPATIENT)
Dept: CT IMAGING | Facility: HOSPITAL | Age: 67
End: 2022-03-26

## 2022-03-26 ENCOUNTER — HOSPITAL ENCOUNTER (INPATIENT)
Facility: HOSPITAL | Age: 67
LOS: 10 days | Discharge: HOME HEALTH | End: 2022-04-05
Attending: EMERGENCY MEDICINE

## 2022-03-26 ENCOUNTER — APPOINTMENT (OUTPATIENT)
Dept: CARDIOLOGY | Facility: HOSPITAL | Age: 67
End: 2022-03-26

## 2022-03-26 DIAGNOSIS — E87.70 HYPERVOLEMIA, UNSPECIFIED HYPERVOLEMIA TYPE: ICD-10-CM

## 2022-03-26 DIAGNOSIS — I21.4 NSTEMI (NON-ST ELEVATED MYOCARDIAL INFARCTION): ICD-10-CM

## 2022-03-26 DIAGNOSIS — E87.1 HYPONATREMIA: ICD-10-CM

## 2022-03-26 DIAGNOSIS — I25.118 CORONARY ARTERY DISEASE INVOLVING NATIVE CORONARY ARTERY OF NATIVE HEART WITH OTHER FORM OF ANGINA PECTORIS: ICD-10-CM

## 2022-03-26 DIAGNOSIS — R06.02 SHORTNESS OF BREATH: Primary | ICD-10-CM

## 2022-03-26 DIAGNOSIS — D64.9 SYMPTOMATIC ANEMIA: ICD-10-CM

## 2022-03-26 DIAGNOSIS — Z95.1 S/P CABG (CORONARY ARTERY BYPASS GRAFT): ICD-10-CM

## 2022-03-26 DIAGNOSIS — G47.33 OBSTRUCTIVE SLEEP APNEA: ICD-10-CM

## 2022-03-26 DIAGNOSIS — R93.1 ABNORMAL FINDINGS ON DIAGNOSTIC IMAGING OF HEART AND CORONARY CIRCULATION: ICD-10-CM

## 2022-03-26 DIAGNOSIS — J44.9 CHRONIC OBSTRUCTIVE PULMONARY DISEASE, UNSPECIFIED COPD TYPE: ICD-10-CM

## 2022-03-26 DIAGNOSIS — N28.1 COMPLEX RENAL CYST: ICD-10-CM

## 2022-03-26 PROBLEM — J43.1 PANLOBULAR EMPHYSEMA (HCC): Status: ACTIVE | Noted: 2022-03-26

## 2022-03-26 PROBLEM — I71.40 AAA (ABDOMINAL AORTIC ANEURYSM): Status: ACTIVE | Noted: 2022-03-26

## 2022-03-26 PROBLEM — J96.01 ACUTE RESPIRATORY FAILURE WITH HYPOXIA (HCC): Status: ACTIVE | Noted: 2022-03-26

## 2022-03-26 PROBLEM — J81.0 ACUTE PULMONARY EDEMA (HCC): Status: ACTIVE | Noted: 2022-03-26

## 2022-03-26 PROBLEM — R79.89 POSITIVE D DIMER: Status: ACTIVE | Noted: 2022-03-26

## 2022-03-26 PROBLEM — E66.3 OVERWEIGHT (BMI 25.0-29.9): Status: ACTIVE | Noted: 2022-03-26

## 2022-03-26 PROBLEM — Z86.010 HISTORY OF COLON POLYPS: Status: ACTIVE | Noted: 2022-03-26

## 2022-03-26 PROBLEM — D72.829 LEUKOCYTOSIS: Status: ACTIVE | Noted: 2022-03-26

## 2022-03-26 PROBLEM — J44.1 COPD EXACERBATION (HCC): Status: ACTIVE | Noted: 2022-03-26

## 2022-03-26 PROBLEM — J44.1 COPD WITH ACUTE EXACERBATION: Status: ACTIVE | Noted: 2022-03-26

## 2022-03-26 PROBLEM — I50.9 ACUTE CHF (CONGESTIVE HEART FAILURE): Status: ACTIVE | Noted: 2022-03-26

## 2022-03-26 PROBLEM — Z86.0100 HISTORY OF COLON POLYPS: Status: ACTIVE | Noted: 2022-03-26

## 2022-03-26 LAB
ALBUMIN SERPL-MCNC: 4 G/DL (ref 3.5–5.2)
ALBUMIN/GLOB SERPL: 1.5 G/DL
ALP SERPL-CCNC: 71 U/L (ref 39–117)
ALT SERPL W P-5'-P-CCNC: 12 U/L (ref 1–41)
ANION GAP SERPL CALCULATED.3IONS-SCNC: 14.2 MMOL/L (ref 5–15)
AORTIC DIMENSIONLESS INDEX: 0.8 (DI)
AST SERPL-CCNC: 19 U/L (ref 1–40)
B PARAPERT DNA SPEC QL NAA+PROBE: NOT DETECTED
B PERT DNA SPEC QL NAA+PROBE: NOT DETECTED
BASOPHILS # BLD AUTO: 0.02 10*3/MM3 (ref 0–0.2)
BASOPHILS NFR BLD AUTO: 0.2 % (ref 0–1.5)
BH CV ECHO LEFT VENTRICLE GLOBAL LONGITUDINAL STRAIN: -18.2 %
BH CV ECHO MEAS - ACS: 2.06 CM
BH CV ECHO MEAS - AO MAX PG: 9.2 MMHG
BH CV ECHO MEAS - AO MEAN PG: 5 MMHG
BH CV ECHO MEAS - AO V2 MAX: 152 CM/SEC
BH CV ECHO MEAS - AO V2 VTI: 29.8 CM
BH CV ECHO MEAS - AVA(I,D): 2.07 CM2
BH CV ECHO MEAS - CONTRAST EF 4CH: 54 CM2
BH CV ECHO MEAS - EDV(CUBED): 185.4 ML
BH CV ECHO MEAS - EDV(MOD-SP2): 200 ML
BH CV ECHO MEAS - EDV(MOD-SP4): 178 ML
BH CV ECHO MEAS - EF(MOD-BP): 55.7 %
BH CV ECHO MEAS - EF(MOD-SP2): 55.5 %
BH CV ECHO MEAS - EF(MOD-SP4): 54.5 %
BH CV ECHO MEAS - ESV(CUBED): 59.6 ML
BH CV ECHO MEAS - ESV(MOD-SP2): 89 ML
BH CV ECHO MEAS - ESV(MOD-SP4): 81 ML
BH CV ECHO MEAS - FS: 31.5 %
BH CV ECHO MEAS - IVS/LVPW: 1.02 CM
BH CV ECHO MEAS - IVSD: 0.99 CM
BH CV ECHO MEAS - LAT PEAK E' VEL: 9.6 CM/SEC
BH CV ECHO MEAS - LV DIASTOLIC VOL/BSA (35-75): 88.3 CM2
BH CV ECHO MEAS - LV MASS(C)D: 220.5 GRAMS
BH CV ECHO MEAS - LV MAX PG: 6.6 MMHG
BH CV ECHO MEAS - LV MEAN PG: 2.9 MMHG
BH CV ECHO MEAS - LV SYSTOLIC VOL/BSA (12-30): 40.2 CM2
BH CV ECHO MEAS - LV V1 MAX: 128.5 CM/SEC
BH CV ECHO MEAS - LV V1 VTI: 23 CM
BH CV ECHO MEAS - LVIDD: 5.7 CM
BH CV ECHO MEAS - LVIDS: 3.9 CM
BH CV ECHO MEAS - LVOT AREA: 2.7 CM2
BH CV ECHO MEAS - LVOT DIAM: 1.85 CM
BH CV ECHO MEAS - LVPWD: 0.97 CM
BH CV ECHO MEAS - MED PEAK E' VEL: 5.8 CM/SEC
BH CV ECHO MEAS - MV A DUR: 0.13 SEC
BH CV ECHO MEAS - MV A MAX VEL: 61.2 CM/SEC
BH CV ECHO MEAS - MV DEC SLOPE: 525.7 CM/SEC2
BH CV ECHO MEAS - MV DEC TIME: 150 MSEC
BH CV ECHO MEAS - MV E MAX VEL: 177.7 CM/SEC
BH CV ECHO MEAS - MV E/A: 2.9
BH CV ECHO MEAS - MV MAX PG: 5.8 MMHG
BH CV ECHO MEAS - MV MEAN PG: 2.6 MMHG
BH CV ECHO MEAS - MV V2 VTI: 31.5 CM
BH CV ECHO MEAS - MVA(VTI): 1.97 CM2
BH CV ECHO MEAS - PULM A REVS DUR: 0.14 SEC
BH CV ECHO MEAS - PULM A REVS VEL: 21.4 CM/SEC
BH CV ECHO MEAS - PULM DIAS VEL: 86.6 CM/SEC
BH CV ECHO MEAS - PULM SYS VEL: 70.9 CM/SEC
BH CV ECHO MEAS - RAP SYSTOLE: 8 MMHG
BH CV ECHO MEAS - RVSP: 51.9 MMHG
BH CV ECHO MEAS - SI(MOD-SP2): 55 ML/M2
BH CV ECHO MEAS - SI(MOD-SP4): 48.1 ML/M2
BH CV ECHO MEAS - SV(LVOT): 61.9 ML
BH CV ECHO MEAS - SV(MOD-SP2): 111 ML
BH CV ECHO MEAS - SV(MOD-SP4): 97 ML
BH CV ECHO MEAS - TAPSE (>1.6): 2.6 CM
BH CV ECHO MEAS - TR MAX PG: 43.9 MMHG
BH CV ECHO MEAS - TR MAX VEL: 331.2 CM/SEC
BH CV ECHO MEASUREMENTS AVERAGE E/E' RATIO: 23.08
BH CV VAS BP RIGHT ARM: NORMAL MMHG
BH CV XLRA - RV BASE: 3.1 CM
BH CV XLRA - RV LENGTH: 8.4 CM
BH CV XLRA - RV MID: 1.89 CM
BH CV XLRA - TDI S': 14.7 CM/SEC
BILIRUB SERPL-MCNC: 1 MG/DL (ref 0–1.2)
BILIRUB UR QL STRIP: NEGATIVE
BUN SERPL-MCNC: 9 MG/DL (ref 8–23)
BUN/CREAT SERPL: 10.1 (ref 7–25)
C PNEUM DNA NPH QL NAA+NON-PROBE: NOT DETECTED
CALCIUM SPEC-SCNC: 8.2 MG/DL (ref 8.6–10.5)
CHLORIDE SERPL-SCNC: 87 MMOL/L (ref 98–107)
CLARITY UR: CLEAR
CO2 SERPL-SCNC: 21.8 MMOL/L (ref 22–29)
COLOR UR: YELLOW
CREAT SERPL-MCNC: 0.89 MG/DL (ref 0.76–1.27)
D DIMER PPP FEU-MCNC: 0.75 MCGFEU/ML (ref 0–0.49)
DEPRECATED RDW RBC AUTO: 72.9 FL (ref 37–54)
EGFRCR SERPLBLD CKD-EPI 2021: 94.5 ML/MIN/1.73
EOSINOPHIL # BLD AUTO: 0.03 10*3/MM3 (ref 0–0.4)
EOSINOPHIL NFR BLD AUTO: 0.3 % (ref 0.3–6.2)
ERYTHROCYTE [DISTWIDTH] IN BLOOD BY AUTOMATED COUNT: 19.2 % (ref 12.3–15.4)
FERRITIN SERPL-MCNC: 528 NG/ML (ref 30–400)
FLUAV SUBTYP SPEC NAA+PROBE: NOT DETECTED
FLUBV RNA ISLT QL NAA+PROBE: NOT DETECTED
FOLATE SERPL-MCNC: 13.9 NG/ML (ref 4.78–24.2)
GLOBULIN UR ELPH-MCNC: 2.7 GM/DL
GLUCOSE SERPL-MCNC: 174 MG/DL (ref 65–99)
GLUCOSE UR STRIP-MCNC: ABNORMAL MG/DL
HADV DNA SPEC NAA+PROBE: NOT DETECTED
HBA1C MFR BLD: 4.4 % (ref 4.8–5.6)
HCOV 229E RNA SPEC QL NAA+PROBE: NOT DETECTED
HCOV HKU1 RNA SPEC QL NAA+PROBE: NOT DETECTED
HCOV NL63 RNA SPEC QL NAA+PROBE: NOT DETECTED
HCOV OC43 RNA SPEC QL NAA+PROBE: NOT DETECTED
HCT VFR BLD AUTO: 23.9 % (ref 37.5–51)
HGB BLD-MCNC: 8.3 G/DL (ref 13–17.7)
HGB UR QL STRIP.AUTO: NEGATIVE
HMPV RNA NPH QL NAA+NON-PROBE: NOT DETECTED
HPIV1 RNA ISLT QL NAA+PROBE: NOT DETECTED
HPIV2 RNA SPEC QL NAA+PROBE: NOT DETECTED
HPIV3 RNA NPH QL NAA+PROBE: NOT DETECTED
HPIV4 P GENE NPH QL NAA+PROBE: NOT DETECTED
IMM GRANULOCYTES # BLD AUTO: 0.22 10*3/MM3 (ref 0–0.05)
IMM GRANULOCYTES NFR BLD AUTO: 1.9 % (ref 0–0.5)
INR PPP: 1.14 (ref 0.9–1.1)
IRON 24H UR-MRATE: 84 MCG/DL (ref 59–158)
IRON SATN MFR SERPL: 27 % (ref 20–50)
KETONES UR QL STRIP: NEGATIVE
LEFT ATRIUM VOLUME INDEX: 40.1 ML/M2
LEUKOCYTE ESTERASE UR QL STRIP.AUTO: NEGATIVE
LYMPHOCYTES # BLD AUTO: 1.39 10*3/MM3 (ref 0.7–3.1)
LYMPHOCYTES NFR BLD AUTO: 11.8 % (ref 19.6–45.3)
M PNEUMO IGG SER IA-ACNC: NOT DETECTED
MAXIMAL PREDICTED HEART RATE: 154 BPM
MCH RBC QN AUTO: 36.4 PG (ref 26.6–33)
MCHC RBC AUTO-ENTMCNC: 34.7 G/DL (ref 31.5–35.7)
MCV RBC AUTO: 104.8 FL (ref 79–97)
MONOCYTES # BLD AUTO: 0.93 10*3/MM3 (ref 0.1–0.9)
MONOCYTES NFR BLD AUTO: 7.9 % (ref 5–12)
NEUTROPHILS NFR BLD AUTO: 77.9 % (ref 42.7–76)
NEUTROPHILS NFR BLD AUTO: 9.14 10*3/MM3 (ref 1.7–7)
NITRITE UR QL STRIP: NEGATIVE
NRBC BLD AUTO-RTO: 0.3 /100 WBC (ref 0–0.2)
NT-PROBNP SERPL-MCNC: 3408 PG/ML (ref 0–900)
OSMOLALITY SERPL: 255 MOSM/KG (ref 280–301)
OSMOLALITY UR: 337 MOSM/KG
PH UR STRIP.AUTO: 6 [PH] (ref 5–8)
PLATELET # BLD AUTO: 262 10*3/MM3 (ref 140–450)
PMV BLD AUTO: 9.6 FL (ref 6–12)
POTASSIUM SERPL-SCNC: 3.8 MMOL/L (ref 3.5–5.2)
PROCALCITONIN SERPL-MCNC: 0.06 NG/ML (ref 0–0.25)
PROT SERPL-MCNC: 6.7 G/DL (ref 6–8.5)
PROT UR QL STRIP: ABNORMAL
PROTHROMBIN TIME: 14.5 SECONDS (ref 11.7–14.2)
QT INTERVAL: 422 MS
RBC # BLD AUTO: 2.28 10*6/MM3 (ref 4.14–5.8)
RETICS # AUTO: 0.15 10*6/MM3 (ref 0.02–0.13)
RETICS/RBC NFR AUTO: 7 % (ref 0.7–1.9)
RHINOVIRUS RNA SPEC NAA+PROBE: NOT DETECTED
RSV RNA NPH QL NAA+NON-PROBE: NOT DETECTED
SARS-COV-2 RNA NPH QL NAA+NON-PROBE: NOT DETECTED
SODIUM SERPL-SCNC: 123 MMOL/L (ref 136–145)
SODIUM UR-SCNC: 36 MMOL/L
SP GR UR STRIP: 1.01 (ref 1–1.03)
STRESS TARGET HR: 131 BPM
TIBC SERPL-MCNC: 310 MCG/DL (ref 298–536)
TRANSFERRIN SERPL-MCNC: 208 MG/DL (ref 200–360)
TROPONIN T SERPL-MCNC: 0.02 NG/ML (ref 0–0.03)
UROBILINOGEN UR QL STRIP: ABNORMAL
VIT B12 BLD-MCNC: 464 PG/ML (ref 211–946)
WBC NRBC COR # BLD: 11.73 10*3/MM3 (ref 3.4–10.8)

## 2022-03-26 PROCEDURE — 85610 PROTHROMBIN TIME: CPT | Performed by: EMERGENCY MEDICINE

## 2022-03-26 PROCEDURE — 87040 BLOOD CULTURE FOR BACTERIA: CPT | Performed by: INTERNAL MEDICINE

## 2022-03-26 PROCEDURE — 85379 FIBRIN DEGRADATION QUANT: CPT | Performed by: EMERGENCY MEDICINE

## 2022-03-26 PROCEDURE — 83880 ASSAY OF NATRIURETIC PEPTIDE: CPT | Performed by: EMERGENCY MEDICINE

## 2022-03-26 PROCEDURE — 84145 PROCALCITONIN (PCT): CPT | Performed by: EMERGENCY MEDICINE

## 2022-03-26 PROCEDURE — 0 IOPAMIDOL PER 1 ML: Performed by: INTERNAL MEDICINE

## 2022-03-26 PROCEDURE — 93005 ELECTROCARDIOGRAM TRACING: CPT | Performed by: EMERGENCY MEDICINE

## 2022-03-26 PROCEDURE — 82607 VITAMIN B-12: CPT | Performed by: INTERNAL MEDICINE

## 2022-03-26 PROCEDURE — 94799 UNLISTED PULMONARY SVC/PX: CPT

## 2022-03-26 PROCEDURE — 84466 ASSAY OF TRANSFERRIN: CPT | Performed by: INTERNAL MEDICINE

## 2022-03-26 PROCEDURE — 84484 ASSAY OF TROPONIN QUANT: CPT | Performed by: EMERGENCY MEDICINE

## 2022-03-26 PROCEDURE — 93306 TTE W/DOPPLER COMPLETE: CPT | Performed by: INTERNAL MEDICINE

## 2022-03-26 PROCEDURE — 82746 ASSAY OF FOLIC ACID SERUM: CPT | Performed by: INTERNAL MEDICINE

## 2022-03-26 PROCEDURE — 93010 ELECTROCARDIOGRAM REPORT: CPT | Performed by: INTERNAL MEDICINE

## 2022-03-26 PROCEDURE — 25010000002 FUROSEMIDE PER 20 MG: Performed by: EMERGENCY MEDICINE

## 2022-03-26 PROCEDURE — 85025 COMPLETE CBC W/AUTO DIFF WBC: CPT | Performed by: EMERGENCY MEDICINE

## 2022-03-26 PROCEDURE — 93356 MYOCRD STRAIN IMG SPCKL TRCK: CPT | Performed by: INTERNAL MEDICINE

## 2022-03-26 PROCEDURE — 84300 ASSAY OF URINE SODIUM: CPT | Performed by: INTERNAL MEDICINE

## 2022-03-26 PROCEDURE — 71275 CT ANGIOGRAPHY CHEST: CPT

## 2022-03-26 PROCEDURE — 93356 MYOCRD STRAIN IMG SPCKL TRCK: CPT

## 2022-03-26 PROCEDURE — 83540 ASSAY OF IRON: CPT | Performed by: INTERNAL MEDICINE

## 2022-03-26 PROCEDURE — 94761 N-INVAS EAR/PLS OXIMETRY MLT: CPT

## 2022-03-26 PROCEDURE — 93306 TTE W/DOPPLER COMPLETE: CPT

## 2022-03-26 PROCEDURE — 83930 ASSAY OF BLOOD OSMOLALITY: CPT | Performed by: INTERNAL MEDICINE

## 2022-03-26 PROCEDURE — 81003 URINALYSIS AUTO W/O SCOPE: CPT | Performed by: INTERNAL MEDICINE

## 2022-03-26 PROCEDURE — 83036 HEMOGLOBIN GLYCOSYLATED A1C: CPT | Performed by: INTERNAL MEDICINE

## 2022-03-26 PROCEDURE — 99285 EMERGENCY DEPT VISIT HI MDM: CPT

## 2022-03-26 PROCEDURE — 82728 ASSAY OF FERRITIN: CPT | Performed by: INTERNAL MEDICINE

## 2022-03-26 PROCEDURE — 80053 COMPREHEN METABOLIC PANEL: CPT | Performed by: EMERGENCY MEDICINE

## 2022-03-26 PROCEDURE — 25010000002 ENOXAPARIN PER 10 MG: Performed by: INTERNAL MEDICINE

## 2022-03-26 PROCEDURE — 36415 COLL VENOUS BLD VENIPUNCTURE: CPT

## 2022-03-26 PROCEDURE — 83935 ASSAY OF URINE OSMOLALITY: CPT | Performed by: INTERNAL MEDICINE

## 2022-03-26 PROCEDURE — 94664 DEMO&/EVAL PT USE INHALER: CPT

## 2022-03-26 PROCEDURE — 71045 X-RAY EXAM CHEST 1 VIEW: CPT

## 2022-03-26 PROCEDURE — 94640 AIRWAY INHALATION TREATMENT: CPT

## 2022-03-26 PROCEDURE — 85045 AUTOMATED RETICULOCYTE COUNT: CPT | Performed by: INTERNAL MEDICINE

## 2022-03-26 PROCEDURE — 25010000002 PERFLUTREN (DEFINITY) 8.476 MG IN SODIUM CHLORIDE (PF) 0.9 % 10 ML INJECTION: Performed by: INTERNAL MEDICINE

## 2022-03-26 PROCEDURE — 0202U NFCT DS 22 TRGT SARS-COV-2: CPT | Performed by: EMERGENCY MEDICINE

## 2022-03-26 RX ORDER — SODIUM CHLORIDE 0.9 % (FLUSH) 0.9 %
3-10 SYRINGE (ML) INJECTION AS NEEDED
Status: DISCONTINUED | OUTPATIENT
Start: 2022-03-26 | End: 2022-04-05 | Stop reason: HOSPADM

## 2022-03-26 RX ORDER — NEBIVOLOL 10 MG/1
10 TABLET ORAL DAILY
Status: DISCONTINUED | OUTPATIENT
Start: 2022-03-26 | End: 2022-03-28

## 2022-03-26 RX ORDER — VALSARTAN 320 MG/1
320 TABLET ORAL DAILY
Status: DISCONTINUED | OUTPATIENT
Start: 2022-03-27 | End: 2022-03-27

## 2022-03-26 RX ORDER — ESCITALOPRAM OXALATE 20 MG/1
20 TABLET ORAL DAILY
Status: DISCONTINUED | OUTPATIENT
Start: 2022-03-26 | End: 2022-04-05 | Stop reason: HOSPADM

## 2022-03-26 RX ORDER — FAMOTIDINE 20 MG/1
20 TABLET, FILM COATED ORAL 2 TIMES DAILY PRN
Status: DISCONTINUED | OUTPATIENT
Start: 2022-03-26 | End: 2022-04-01

## 2022-03-26 RX ORDER — ACETAMINOPHEN 650 MG/1
650 SUPPOSITORY RECTAL EVERY 4 HOURS PRN
Status: DISCONTINUED | OUTPATIENT
Start: 2022-03-26 | End: 2022-04-05 | Stop reason: HOSPADM

## 2022-03-26 RX ORDER — AZITHROMYCIN 250 MG/1
500 TABLET, FILM COATED ORAL DAILY
Status: COMPLETED | OUTPATIENT
Start: 2022-03-26 | End: 2022-03-26

## 2022-03-26 RX ORDER — ONDANSETRON 2 MG/ML
4 INJECTION INTRAMUSCULAR; INTRAVENOUS EVERY 6 HOURS PRN
Status: DISCONTINUED | OUTPATIENT
Start: 2022-03-26 | End: 2022-03-29 | Stop reason: SDUPTHER

## 2022-03-26 RX ORDER — FUROSEMIDE 10 MG/ML
80 INJECTION INTRAMUSCULAR; INTRAVENOUS ONCE
Status: COMPLETED | OUTPATIENT
Start: 2022-03-26 | End: 2022-03-26

## 2022-03-26 RX ORDER — CHOLECALCIFEROL (VITAMIN D3) 125 MCG
5 CAPSULE ORAL NIGHTLY PRN
Status: DISCONTINUED | OUTPATIENT
Start: 2022-03-26 | End: 2022-04-05 | Stop reason: HOSPADM

## 2022-03-26 RX ORDER — LABETALOL HYDROCHLORIDE 5 MG/ML
10 INJECTION, SOLUTION INTRAVENOUS
Status: DISCONTINUED | OUTPATIENT
Start: 2022-03-26 | End: 2022-04-01

## 2022-03-26 RX ORDER — IPRATROPIUM BROMIDE AND ALBUTEROL SULFATE 2.5; .5 MG/3ML; MG/3ML
3 SOLUTION RESPIRATORY (INHALATION)
Status: DISCONTINUED | OUTPATIENT
Start: 2022-03-26 | End: 2022-04-05 | Stop reason: HOSPADM

## 2022-03-26 RX ORDER — ACETAMINOPHEN 325 MG/1
650 TABLET ORAL EVERY 4 HOURS PRN
Status: DISCONTINUED | OUTPATIENT
Start: 2022-03-26 | End: 2022-04-05 | Stop reason: HOSPADM

## 2022-03-26 RX ORDER — NITROGLYCERIN 0.4 MG/1
0.4 TABLET SUBLINGUAL
Status: DISCONTINUED | OUTPATIENT
Start: 2022-03-26 | End: 2022-04-02

## 2022-03-26 RX ORDER — AZITHROMYCIN 250 MG/1
250 TABLET, FILM COATED ORAL DAILY
Status: DISCONTINUED | OUTPATIENT
Start: 2022-03-27 | End: 2022-03-29

## 2022-03-26 RX ORDER — SODIUM CHLORIDE 0.9 % (FLUSH) 0.9 %
3 SYRINGE (ML) INJECTION EVERY 12 HOURS SCHEDULED
Status: DISCONTINUED | OUTPATIENT
Start: 2022-03-26 | End: 2022-04-05 | Stop reason: HOSPADM

## 2022-03-26 RX ORDER — DOCUSATE SODIUM 100 MG/1
100 CAPSULE, LIQUID FILLED ORAL 2 TIMES DAILY PRN
Status: DISCONTINUED | OUTPATIENT
Start: 2022-03-26 | End: 2022-04-05 | Stop reason: HOSPADM

## 2022-03-26 RX ORDER — ONDANSETRON 4 MG/1
4 TABLET, FILM COATED ORAL EVERY 6 HOURS PRN
Status: DISCONTINUED | OUTPATIENT
Start: 2022-03-26 | End: 2022-03-29 | Stop reason: SDUPTHER

## 2022-03-26 RX ORDER — ACETAMINOPHEN 160 MG/5ML
650 SOLUTION ORAL EVERY 4 HOURS PRN
Status: DISCONTINUED | OUTPATIENT
Start: 2022-03-26 | End: 2022-04-05 | Stop reason: HOSPADM

## 2022-03-26 RX ORDER — ATORVASTATIN CALCIUM 10 MG/1
10 TABLET, FILM COATED ORAL DAILY
Refills: 0 | Status: DISCONTINUED | OUTPATIENT
Start: 2022-03-26 | End: 2022-03-28

## 2022-03-26 RX ORDER — NICOTINE 21 MG/24HR
1 PATCH, TRANSDERMAL 24 HOURS TRANSDERMAL DAILY PRN
Status: DISCONTINUED | OUTPATIENT
Start: 2022-03-26 | End: 2022-04-05 | Stop reason: HOSPADM

## 2022-03-26 RX ADMIN — ESCITALOPRAM 20 MG: 20 TABLET, FILM COATED ORAL at 17:33

## 2022-03-26 RX ADMIN — IPRATROPIUM BROMIDE AND ALBUTEROL SULFATE 3 ML: 2.5; .5 SOLUTION RESPIRATORY (INHALATION) at 21:32

## 2022-03-26 RX ADMIN — ENOXAPARIN SODIUM 40 MG: 100 INJECTION SUBCUTANEOUS at 16:48

## 2022-03-26 RX ADMIN — Medication 3 ML: at 21:59

## 2022-03-26 RX ADMIN — ATORVASTATIN CALCIUM 10 MG: 10 TABLET, FILM COATED ORAL at 17:33

## 2022-03-26 RX ADMIN — NEBIVOLOL 10 MG: 10 TABLET ORAL at 17:34

## 2022-03-26 RX ADMIN — PERFLUTREN 2 ML: 6.52 INJECTION, SUSPENSION INTRAVENOUS at 16:35

## 2022-03-26 RX ADMIN — AZITHROMYCIN DIHYDRATE 500 MG: 250 TABLET, FILM COATED ORAL at 17:33

## 2022-03-26 RX ADMIN — Medication 3 ML: at 16:47

## 2022-03-26 RX ADMIN — IOPAMIDOL 95 ML: 755 INJECTION, SOLUTION INTRAVENOUS at 12:23

## 2022-03-26 RX ADMIN — FUROSEMIDE 80 MG: 10 INJECTION, SOLUTION INTRAMUSCULAR; INTRAVENOUS at 10:36

## 2022-03-26 RX ADMIN — IPRATROPIUM BROMIDE AND ALBUTEROL SULFATE 3 ML: 2.5; .5 SOLUTION RESPIRATORY (INHALATION) at 11:34

## 2022-03-27 PROBLEM — F10.10 ALCOHOL ABUSE: Status: ACTIVE | Noted: 2022-03-27

## 2022-03-27 PROBLEM — I50.31 ACUTE DIASTOLIC CONGESTIVE HEART FAILURE: Status: ACTIVE | Noted: 2022-03-26

## 2022-03-27 LAB
ALBUMIN SERPL-MCNC: 4.1 G/DL (ref 3.5–5.2)
ALBUMIN/GLOB SERPL: 1.8 G/DL
ALP SERPL-CCNC: 60 U/L (ref 39–117)
ALT SERPL W P-5'-P-CCNC: 11 U/L (ref 1–41)
ANION GAP SERPL CALCULATED.3IONS-SCNC: 11.1 MMOL/L (ref 5–15)
AST SERPL-CCNC: 15 U/L (ref 1–40)
BILIRUB SERPL-MCNC: 0.6 MG/DL (ref 0–1.2)
BUN SERPL-MCNC: 15 MG/DL (ref 8–23)
BUN/CREAT SERPL: 15.3 (ref 7–25)
CALCIUM SPEC-SCNC: 8.6 MG/DL (ref 8.6–10.5)
CHLORIDE SERPL-SCNC: 94 MMOL/L (ref 98–107)
CO2 SERPL-SCNC: 25.9 MMOL/L (ref 22–29)
CREAT SERPL-MCNC: 0.98 MG/DL (ref 0.76–1.27)
DEPRECATED RDW RBC AUTO: 72.4 FL (ref 37–54)
EGFRCR SERPLBLD CKD-EPI 2021: 85 ML/MIN/1.73
ERYTHROCYTE [DISTWIDTH] IN BLOOD BY AUTOMATED COUNT: 19.4 % (ref 12.3–15.4)
GLOBULIN UR ELPH-MCNC: 2.3 GM/DL
GLUCOSE SERPL-MCNC: 137 MG/DL (ref 65–99)
HCT VFR BLD AUTO: 22.2 % (ref 37.5–51)
HGB BLD-MCNC: 7.8 G/DL (ref 13–17.7)
MCH RBC QN AUTO: 37 PG (ref 26.6–33)
MCHC RBC AUTO-ENTMCNC: 35.1 G/DL (ref 31.5–35.7)
MCV RBC AUTO: 105.2 FL (ref 79–97)
PLATELET # BLD AUTO: 255 10*3/MM3 (ref 140–450)
PMV BLD AUTO: 9.9 FL (ref 6–12)
POTASSIUM SERPL-SCNC: 3.7 MMOL/L (ref 3.5–5.2)
PROT SERPL-MCNC: 6.4 G/DL (ref 6–8.5)
RBC # BLD AUTO: 2.11 10*6/MM3 (ref 4.14–5.8)
SODIUM SERPL-SCNC: 131 MMOL/L (ref 136–145)
TROPONIN T SERPL-MCNC: 0.09 NG/ML (ref 0–0.03)
TROPONIN T SERPL-MCNC: 0.11 NG/ML (ref 0–0.03)
WBC NRBC COR # BLD: 11.07 10*3/MM3 (ref 3.4–10.8)

## 2022-03-27 PROCEDURE — 94799 UNLISTED PULMONARY SVC/PX: CPT

## 2022-03-27 PROCEDURE — 25010000002 ENOXAPARIN PER 10 MG: Performed by: INTERNAL MEDICINE

## 2022-03-27 PROCEDURE — 80053 COMPREHEN METABOLIC PANEL: CPT | Performed by: INTERNAL MEDICINE

## 2022-03-27 PROCEDURE — 94760 N-INVAS EAR/PLS OXIMETRY 1: CPT

## 2022-03-27 PROCEDURE — 25010000002 FUROSEMIDE PER 20 MG: Performed by: INTERNAL MEDICINE

## 2022-03-27 PROCEDURE — 94664 DEMO&/EVAL PT USE INHALER: CPT

## 2022-03-27 PROCEDURE — 90791 PSYCH DIAGNOSTIC EVALUATION: CPT | Performed by: SOCIAL WORKER

## 2022-03-27 PROCEDURE — 63710000001 PREDNISONE PER 1 MG: Performed by: INTERNAL MEDICINE

## 2022-03-27 PROCEDURE — 99222 1ST HOSP IP/OBS MODERATE 55: CPT | Performed by: INTERNAL MEDICINE

## 2022-03-27 PROCEDURE — 36415 COLL VENOUS BLD VENIPUNCTURE: CPT | Performed by: INTERNAL MEDICINE

## 2022-03-27 PROCEDURE — 82272 OCCULT BLD FECES 1-3 TESTS: CPT | Performed by: INTERNAL MEDICINE

## 2022-03-27 PROCEDURE — 84484 ASSAY OF TROPONIN QUANT: CPT | Performed by: INTERNAL MEDICINE

## 2022-03-27 PROCEDURE — 85027 COMPLETE CBC AUTOMATED: CPT | Performed by: INTERNAL MEDICINE

## 2022-03-27 PROCEDURE — 94761 N-INVAS EAR/PLS OXIMETRY MLT: CPT

## 2022-03-27 PROCEDURE — 99222 1ST HOSP IP/OBS MODERATE 55: CPT | Performed by: STUDENT IN AN ORGANIZED HEALTH CARE EDUCATION/TRAINING PROGRAM

## 2022-03-27 RX ORDER — VALSARTAN 80 MG/1
80 TABLET ORAL
Status: DISCONTINUED | OUTPATIENT
Start: 2022-03-28 | End: 2022-04-01

## 2022-03-27 RX ORDER — FUROSEMIDE 10 MG/ML
20 INJECTION INTRAMUSCULAR; INTRAVENOUS
Status: DISCONTINUED | OUTPATIENT
Start: 2022-03-27 | End: 2022-03-28

## 2022-03-27 RX ORDER — DIPHENOXYLATE HYDROCHLORIDE AND ATROPINE SULFATE 2.5; .025 MG/1; MG/1
1 TABLET ORAL DAILY
Status: DISCONTINUED | OUTPATIENT
Start: 2022-03-28 | End: 2022-03-29

## 2022-03-27 RX ORDER — LORAZEPAM 2 MG/ML
1 INJECTION INTRAMUSCULAR
Status: DISCONTINUED | OUTPATIENT
Start: 2022-03-27 | End: 2022-03-31

## 2022-03-27 RX ORDER — LORAZEPAM 0.5 MG/1
0.5 TABLET ORAL
Status: DISCONTINUED | OUTPATIENT
Start: 2022-03-27 | End: 2022-03-31

## 2022-03-27 RX ORDER — LORAZEPAM 1 MG/1
1 TABLET ORAL
Status: DISCONTINUED | OUTPATIENT
Start: 2022-03-27 | End: 2022-03-31

## 2022-03-27 RX ORDER — PREDNISONE 20 MG/1
40 TABLET ORAL
Status: DISCONTINUED | OUTPATIENT
Start: 2022-03-27 | End: 2022-03-29

## 2022-03-27 RX ORDER — LORAZEPAM 2 MG/ML
0.5 INJECTION INTRAMUSCULAR
Status: DISCONTINUED | OUTPATIENT
Start: 2022-03-27 | End: 2022-03-31

## 2022-03-27 RX ORDER — FOLIC ACID 1 MG/1
1 TABLET ORAL DAILY
Status: DISCONTINUED | OUTPATIENT
Start: 2022-03-28 | End: 2022-03-29

## 2022-03-27 RX ORDER — PANTOPRAZOLE SODIUM 40 MG/1
40 TABLET, DELAYED RELEASE ORAL
Status: DISCONTINUED | OUTPATIENT
Start: 2022-03-27 | End: 2022-04-05 | Stop reason: HOSPADM

## 2022-03-27 RX ADMIN — PANTOPRAZOLE SODIUM 40 MG: 40 TABLET, DELAYED RELEASE ORAL at 15:47

## 2022-03-27 RX ADMIN — IPRATROPIUM BROMIDE AND ALBUTEROL SULFATE 3 ML: 2.5; .5 SOLUTION RESPIRATORY (INHALATION) at 15:43

## 2022-03-27 RX ADMIN — FUROSEMIDE 20 MG: 10 INJECTION, SOLUTION INTRAMUSCULAR; INTRAVENOUS at 18:25

## 2022-03-27 RX ADMIN — Medication 3 ML: at 09:12

## 2022-03-27 RX ADMIN — IPRATROPIUM BROMIDE AND ALBUTEROL SULFATE 3 ML: 2.5; .5 SOLUTION RESPIRATORY (INHALATION) at 07:39

## 2022-03-27 RX ADMIN — ATORVASTATIN CALCIUM 10 MG: 10 TABLET, FILM COATED ORAL at 09:12

## 2022-03-27 RX ADMIN — NEBIVOLOL 10 MG: 10 TABLET ORAL at 09:12

## 2022-03-27 RX ADMIN — Medication 3 ML: at 21:51

## 2022-03-27 RX ADMIN — AZITHROMYCIN DIHYDRATE 250 MG: 250 TABLET, FILM COATED ORAL at 09:12

## 2022-03-27 RX ADMIN — ESCITALOPRAM 20 MG: 20 TABLET, FILM COATED ORAL at 09:12

## 2022-03-27 RX ADMIN — ENOXAPARIN SODIUM 40 MG: 100 INJECTION SUBCUTANEOUS at 11:23

## 2022-03-27 RX ADMIN — PREDNISONE 40 MG: 20 TABLET ORAL at 09:11

## 2022-03-27 RX ADMIN — IPRATROPIUM BROMIDE AND ALBUTEROL SULFATE 3 ML: 2.5; .5 SOLUTION RESPIRATORY (INHALATION) at 11:33

## 2022-03-28 LAB
ALBUMIN SERPL-MCNC: 3.9 G/DL (ref 3.5–5.2)
ALBUMIN/GLOB SERPL: 1.4 G/DL
ALP SERPL-CCNC: 58 U/L (ref 39–117)
ALT SERPL W P-5'-P-CCNC: 11 U/L (ref 1–41)
ANION GAP SERPL CALCULATED.3IONS-SCNC: 12.8 MMOL/L (ref 5–15)
AST SERPL-CCNC: 13 U/L (ref 1–40)
BILIRUB SERPL-MCNC: 0.6 MG/DL (ref 0–1.2)
BUN SERPL-MCNC: 16 MG/DL (ref 8–23)
BUN/CREAT SERPL: 14.7 (ref 7–25)
CALCIUM SPEC-SCNC: 9 MG/DL (ref 8.6–10.5)
CHLORIDE SERPL-SCNC: 93 MMOL/L (ref 98–107)
CHOLEST SERPL-MCNC: 104 MG/DL (ref 0–200)
CO2 SERPL-SCNC: 27.2 MMOL/L (ref 22–29)
CREAT SERPL-MCNC: 1.09 MG/DL (ref 0.76–1.27)
DEPRECATED RDW RBC AUTO: 69.2 FL (ref 37–54)
EGFRCR SERPLBLD CKD-EPI 2021: 74.9 ML/MIN/1.73
ERYTHROCYTE [DISTWIDTH] IN BLOOD BY AUTOMATED COUNT: 18.9 % (ref 12.3–15.4)
GLOBULIN UR ELPH-MCNC: 2.8 GM/DL
GLUCOSE SERPL-MCNC: 107 MG/DL (ref 65–99)
HCT VFR BLD AUTO: 23.1 % (ref 37.5–51)
HDLC SERPL-MCNC: 52 MG/DL (ref 40–60)
HEMOCCULT STL QL: NEGATIVE
HGB BLD-MCNC: 8.1 G/DL (ref 13–17.7)
LDLC SERPL CALC-MCNC: 38 MG/DL (ref 0–100)
LDLC/HDLC SERPL: 0.77 {RATIO}
MAGNESIUM SERPL-MCNC: 2.2 MG/DL (ref 1.6–2.4)
MCH RBC QN AUTO: 36.8 PG (ref 26.6–33)
MCHC RBC AUTO-ENTMCNC: 35.1 G/DL (ref 31.5–35.7)
MCV RBC AUTO: 105 FL (ref 79–97)
PLATELET # BLD AUTO: 286 10*3/MM3 (ref 140–450)
PMV BLD AUTO: 10.4 FL (ref 6–12)
POTASSIUM SERPL-SCNC: 3 MMOL/L (ref 3.5–5.2)
PROT SERPL-MCNC: 6.7 G/DL (ref 6–8.5)
RBC # BLD AUTO: 2.2 10*6/MM3 (ref 4.14–5.8)
SODIUM SERPL-SCNC: 133 MMOL/L (ref 136–145)
TRIGL SERPL-MCNC: 61 MG/DL (ref 0–150)
VLDLC SERPL-MCNC: 14 MG/DL (ref 5–40)
WBC NRBC COR # BLD: 14.46 10*3/MM3 (ref 3.4–10.8)

## 2022-03-28 PROCEDURE — 63710000001 PREDNISONE PER 1 MG: Performed by: INTERNAL MEDICINE

## 2022-03-28 PROCEDURE — 80061 LIPID PANEL: CPT | Performed by: INTERNAL MEDICINE

## 2022-03-28 PROCEDURE — 94760 N-INVAS EAR/PLS OXIMETRY 1: CPT

## 2022-03-28 PROCEDURE — 80053 COMPREHEN METABOLIC PANEL: CPT | Performed by: INTERNAL MEDICINE

## 2022-03-28 PROCEDURE — 25010000002 ENOXAPARIN PER 10 MG: Performed by: INTERNAL MEDICINE

## 2022-03-28 PROCEDURE — 99232 SBSQ HOSP IP/OBS MODERATE 35: CPT | Performed by: INTERNAL MEDICINE

## 2022-03-28 PROCEDURE — 85027 COMPLETE CBC AUTOMATED: CPT | Performed by: INTERNAL MEDICINE

## 2022-03-28 PROCEDURE — 83735 ASSAY OF MAGNESIUM: CPT | Performed by: INTERNAL MEDICINE

## 2022-03-28 PROCEDURE — 94799 UNLISTED PULMONARY SVC/PX: CPT

## 2022-03-28 PROCEDURE — 25010000002 FUROSEMIDE PER 20 MG: Performed by: INTERNAL MEDICINE

## 2022-03-28 PROCEDURE — 94664 DEMO&/EVAL PT USE INHALER: CPT

## 2022-03-28 PROCEDURE — 36415 COLL VENOUS BLD VENIPUNCTURE: CPT | Performed by: INTERNAL MEDICINE

## 2022-03-28 PROCEDURE — 94761 N-INVAS EAR/PLS OXIMETRY MLT: CPT

## 2022-03-28 RX ORDER — POTASSIUM CHLORIDE 750 MG/1
40 TABLET, FILM COATED, EXTENDED RELEASE ORAL DAILY
Status: DISCONTINUED | OUTPATIENT
Start: 2022-03-28 | End: 2022-04-01

## 2022-03-28 RX ORDER — POTASSIUM CHLORIDE 750 MG/1
40 TABLET, FILM COATED, EXTENDED RELEASE ORAL AS NEEDED
Status: DISCONTINUED | OUTPATIENT
Start: 2022-03-28 | End: 2022-04-01

## 2022-03-28 RX ORDER — ATORVASTATIN CALCIUM 20 MG/1
40 TABLET, FILM COATED ORAL DAILY
Status: DISCONTINUED | OUTPATIENT
Start: 2022-03-29 | End: 2022-04-02

## 2022-03-28 RX ORDER — CARVEDILOL 6.25 MG/1
6.25 TABLET ORAL 2 TIMES DAILY WITH MEALS
Status: DISCONTINUED | OUTPATIENT
Start: 2022-03-28 | End: 2022-04-01

## 2022-03-28 RX ORDER — POTASSIUM CHLORIDE 1.5 G/1.77G
40 POWDER, FOR SOLUTION ORAL AS NEEDED
Status: DISCONTINUED | OUTPATIENT
Start: 2022-03-28 | End: 2022-04-01

## 2022-03-28 RX ORDER — FUROSEMIDE 40 MG/1
40 TABLET ORAL DAILY
Status: DISCONTINUED | OUTPATIENT
Start: 2022-03-29 | End: 2022-03-29

## 2022-03-28 RX ADMIN — ATORVASTATIN CALCIUM 10 MG: 10 TABLET, FILM COATED ORAL at 08:42

## 2022-03-28 RX ADMIN — IPRATROPIUM BROMIDE AND ALBUTEROL SULFATE 3 ML: 2.5; .5 SOLUTION RESPIRATORY (INHALATION) at 11:06

## 2022-03-28 RX ADMIN — AZITHROMYCIN DIHYDRATE 250 MG: 250 TABLET, FILM COATED ORAL at 08:42

## 2022-03-28 RX ADMIN — Medication 3 ML: at 21:28

## 2022-03-28 RX ADMIN — PANTOPRAZOLE SODIUM 40 MG: 40 TABLET, DELAYED RELEASE ORAL at 06:17

## 2022-03-28 RX ADMIN — VALSARTAN 80 MG: 80 TABLET, FILM COATED ORAL at 08:42

## 2022-03-28 RX ADMIN — CARVEDILOL 6.25 MG: 6.25 TABLET, FILM COATED ORAL at 17:19

## 2022-03-28 RX ADMIN — NEBIVOLOL 10 MG: 10 TABLET ORAL at 08:41

## 2022-03-28 RX ADMIN — FUROSEMIDE 20 MG: 10 INJECTION, SOLUTION INTRAMUSCULAR; INTRAVENOUS at 08:41

## 2022-03-28 RX ADMIN — IPRATROPIUM BROMIDE AND ALBUTEROL SULFATE 3 ML: 2.5; .5 SOLUTION RESPIRATORY (INHALATION) at 07:20

## 2022-03-28 RX ADMIN — Medication 1 TABLET: at 08:42

## 2022-03-28 RX ADMIN — Medication 100 MG: at 08:41

## 2022-03-28 RX ADMIN — ESCITALOPRAM 20 MG: 20 TABLET, FILM COATED ORAL at 08:42

## 2022-03-28 RX ADMIN — POTASSIUM CHLORIDE 40 MEQ: 10 TABLET, EXTENDED RELEASE ORAL at 14:20

## 2022-03-28 RX ADMIN — PREDNISONE 40 MG: 20 TABLET ORAL at 08:42

## 2022-03-28 RX ADMIN — FOLIC ACID 1 MG: 1 TABLET ORAL at 08:42

## 2022-03-28 RX ADMIN — POTASSIUM CHLORIDE 40 MEQ: 10 TABLET, EXTENDED RELEASE ORAL at 10:04

## 2022-03-28 RX ADMIN — IPRATROPIUM BROMIDE AND ALBUTEROL SULFATE 3 ML: 2.5; .5 SOLUTION RESPIRATORY (INHALATION) at 15:09

## 2022-03-28 RX ADMIN — ENOXAPARIN SODIUM 40 MG: 100 INJECTION SUBCUTANEOUS at 10:04

## 2022-03-28 RX ADMIN — IPRATROPIUM BROMIDE AND ALBUTEROL SULFATE 3 ML: 2.5; .5 SOLUTION RESPIRATORY (INHALATION) at 19:32

## 2022-03-29 ENCOUNTER — APPOINTMENT (OUTPATIENT)
Dept: ULTRASOUND IMAGING | Facility: HOSPITAL | Age: 67
End: 2022-03-29

## 2022-03-29 ENCOUNTER — APPOINTMENT (OUTPATIENT)
Dept: GENERAL RADIOLOGY | Facility: HOSPITAL | Age: 67
End: 2022-03-29

## 2022-03-29 PROBLEM — E87.1 HYPONATREMIA: Status: RESOLVED | Noted: 2022-03-26 | Resolved: 2022-03-29

## 2022-03-29 PROBLEM — I21.4 NSTEMI (NON-ST ELEVATED MYOCARDIAL INFARCTION): Status: ACTIVE | Noted: 2022-03-29

## 2022-03-29 LAB
ALBUMIN SERPL-MCNC: 3.8 G/DL (ref 3.5–5.2)
ALBUMIN/GLOB SERPL: 1.4 G/DL
ALP SERPL-CCNC: 53 U/L (ref 39–117)
ALT SERPL W P-5'-P-CCNC: 12 U/L (ref 1–41)
ANION GAP SERPL CALCULATED.3IONS-SCNC: 22.4 MMOL/L (ref 5–15)
AST SERPL-CCNC: 10 U/L (ref 1–40)
BILIRUB SERPL-MCNC: 0.7 MG/DL (ref 0–1.2)
BUN SERPL-MCNC: 22 MG/DL (ref 8–23)
BUN/CREAT SERPL: 19.8 (ref 7–25)
CALCIUM SPEC-SCNC: 9 MG/DL (ref 8.6–10.5)
CHLORIDE SERPL-SCNC: 91 MMOL/L (ref 98–107)
CO2 SERPL-SCNC: 27.6 MMOL/L (ref 22–29)
CREAT SERPL-MCNC: 1.11 MG/DL (ref 0.76–1.27)
DEPRECATED RDW RBC AUTO: 70 FL (ref 37–54)
EGFRCR SERPLBLD CKD-EPI 2021: 73.2 ML/MIN/1.73
ERYTHROCYTE [DISTWIDTH] IN BLOOD BY AUTOMATED COUNT: 18.8 % (ref 12.3–15.4)
GLOBULIN UR ELPH-MCNC: 2.8 GM/DL
GLUCOSE SERPL-MCNC: 100 MG/DL (ref 65–99)
HCT VFR BLD AUTO: 22.8 % (ref 37.5–51)
HGB BLD-MCNC: 7.9 G/DL (ref 13–17.7)
MCH RBC QN AUTO: 36.1 PG (ref 26.6–33)
MCHC RBC AUTO-ENTMCNC: 34.6 G/DL (ref 31.5–35.7)
MCV RBC AUTO: 104.1 FL (ref 79–97)
PLATELET # BLD AUTO: 282 10*3/MM3 (ref 140–450)
PMV BLD AUTO: 10 FL (ref 6–12)
POTASSIUM SERPL-SCNC: 4.6 MMOL/L (ref 3.5–5.2)
PROT SERPL-MCNC: 6.6 G/DL (ref 6–8.5)
RBC # BLD AUTO: 2.19 10*6/MM3 (ref 4.14–5.8)
SODIUM SERPL-SCNC: 141 MMOL/L (ref 136–145)
TSH SERPL DL<=0.05 MIU/L-ACNC: 1.18 UIU/ML (ref 0.27–4.2)
WBC NRBC COR # BLD: 9.12 10*3/MM3 (ref 3.4–10.8)

## 2022-03-29 PROCEDURE — 71046 X-RAY EXAM CHEST 2 VIEWS: CPT

## 2022-03-29 PROCEDURE — 93458 L HRT ARTERY/VENTRICLE ANGIO: CPT | Performed by: INTERNAL MEDICINE

## 2022-03-29 PROCEDURE — B2111ZZ FLUOROSCOPY OF MULTIPLE CORONARY ARTERIES USING LOW OSMOLAR CONTRAST: ICD-10-PCS | Performed by: INTERNAL MEDICINE

## 2022-03-29 PROCEDURE — 25010000002 FENTANYL CITRATE (PF) 50 MCG/ML SOLUTION: Performed by: INTERNAL MEDICINE

## 2022-03-29 PROCEDURE — 25010000002 HEPARIN (PORCINE) PER 1000 UNITS: Performed by: INTERNAL MEDICINE

## 2022-03-29 PROCEDURE — 94799 UNLISTED PULMONARY SVC/PX: CPT

## 2022-03-29 PROCEDURE — 63710000001 PREDNISONE PER 1 MG: Performed by: INTERNAL MEDICINE

## 2022-03-29 PROCEDURE — 4A023N7 MEASUREMENT OF CARDIAC SAMPLING AND PRESSURE, LEFT HEART, PERCUTANEOUS APPROACH: ICD-10-PCS | Performed by: INTERNAL MEDICINE

## 2022-03-29 PROCEDURE — 80053 COMPREHEN METABOLIC PANEL: CPT | Performed by: INTERNAL MEDICINE

## 2022-03-29 PROCEDURE — 85027 COMPLETE CBC AUTOMATED: CPT | Performed by: INTERNAL MEDICINE

## 2022-03-29 PROCEDURE — C1769 GUIDE WIRE: HCPCS | Performed by: INTERNAL MEDICINE

## 2022-03-29 PROCEDURE — 25010000002 FUROSEMIDE PER 20 MG: Performed by: HOSPITALIST

## 2022-03-29 PROCEDURE — 94761 N-INVAS EAR/PLS OXIMETRY MLT: CPT

## 2022-03-29 PROCEDURE — 99152 MOD SED SAME PHYS/QHP 5/>YRS: CPT | Performed by: INTERNAL MEDICINE

## 2022-03-29 PROCEDURE — 84443 ASSAY THYROID STIM HORMONE: CPT | Performed by: INTERNAL MEDICINE

## 2022-03-29 PROCEDURE — B2151ZZ FLUOROSCOPY OF LEFT HEART USING LOW OSMOLAR CONTRAST: ICD-10-PCS | Performed by: INTERNAL MEDICINE

## 2022-03-29 PROCEDURE — C1894 INTRO/SHEATH, NON-LASER: HCPCS | Performed by: INTERNAL MEDICINE

## 2022-03-29 PROCEDURE — 25010000002 MIDAZOLAM PER 1 MG: Performed by: INTERNAL MEDICINE

## 2022-03-29 PROCEDURE — 0 IOPAMIDOL PER 1 ML: Performed by: INTERNAL MEDICINE

## 2022-03-29 PROCEDURE — 99233 SBSQ HOSP IP/OBS HIGH 50: CPT | Performed by: INTERNAL MEDICINE

## 2022-03-29 PROCEDURE — 36415 COLL VENOUS BLD VENIPUNCTURE: CPT | Performed by: INTERNAL MEDICINE

## 2022-03-29 PROCEDURE — 76775 US EXAM ABDO BACK WALL LIM: CPT

## 2022-03-29 RX ORDER — ACETAMINOPHEN 325 MG/1
650 TABLET ORAL EVERY 4 HOURS PRN
Status: DISCONTINUED | OUTPATIENT
Start: 2022-03-29 | End: 2022-04-05 | Stop reason: SDUPTHER

## 2022-03-29 RX ORDER — FENTANYL CITRATE 50 UG/ML
INJECTION, SOLUTION INTRAMUSCULAR; INTRAVENOUS AS NEEDED
Status: DISCONTINUED | OUTPATIENT
Start: 2022-03-29 | End: 2022-03-29 | Stop reason: HOSPADM

## 2022-03-29 RX ORDER — ONDANSETRON 4 MG/1
4 TABLET, FILM COATED ORAL EVERY 6 HOURS PRN
Status: DISCONTINUED | OUTPATIENT
Start: 2022-03-29 | End: 2022-04-05 | Stop reason: HOSPADM

## 2022-03-29 RX ORDER — ECHINACEA PURPUREA EXTRACT 125 MG
2 TABLET ORAL AS NEEDED
Status: DISCONTINUED | OUTPATIENT
Start: 2022-03-29 | End: 2022-04-05 | Stop reason: HOSPADM

## 2022-03-29 RX ORDER — FUROSEMIDE 10 MG/ML
40 INJECTION INTRAMUSCULAR; INTRAVENOUS EVERY 12 HOURS
Status: DISCONTINUED | OUTPATIENT
Start: 2022-03-29 | End: 2022-04-04

## 2022-03-29 RX ORDER — SODIUM CHLORIDE 9 MG/ML
INJECTION, SOLUTION INTRAVENOUS CONTINUOUS PRN
Status: COMPLETED | OUTPATIENT
Start: 2022-03-29 | End: 2022-03-29

## 2022-03-29 RX ORDER — LIDOCAINE HYDROCHLORIDE 20 MG/ML
INJECTION, SOLUTION INFILTRATION; PERINEURAL AS NEEDED
Status: DISCONTINUED | OUTPATIENT
Start: 2022-03-29 | End: 2022-03-29 | Stop reason: HOSPADM

## 2022-03-29 RX ORDER — MIDAZOLAM HYDROCHLORIDE 1 MG/ML
INJECTION INTRAMUSCULAR; INTRAVENOUS AS NEEDED
Status: DISCONTINUED | OUTPATIENT
Start: 2022-03-29 | End: 2022-03-29 | Stop reason: HOSPADM

## 2022-03-29 RX ORDER — SODIUM CHLORIDE 9 MG/ML
50 INJECTION, SOLUTION INTRAVENOUS CONTINUOUS
Status: DISCONTINUED | OUTPATIENT
Start: 2022-03-29 | End: 2022-03-29

## 2022-03-29 RX ORDER — ONDANSETRON 2 MG/ML
4 INJECTION INTRAMUSCULAR; INTRAVENOUS EVERY 6 HOURS PRN
Status: DISCONTINUED | OUTPATIENT
Start: 2022-03-29 | End: 2022-04-05 | Stop reason: HOSPADM

## 2022-03-29 RX ADMIN — Medication 3 ML: at 21:33

## 2022-03-29 RX ADMIN — ESCITALOPRAM 20 MG: 20 TABLET, FILM COATED ORAL at 18:33

## 2022-03-29 RX ADMIN — PREDNISONE 40 MG: 20 TABLET ORAL at 08:44

## 2022-03-29 RX ADMIN — VALSARTAN 80 MG: 80 TABLET, FILM COATED ORAL at 18:33

## 2022-03-29 RX ADMIN — CARVEDILOL 6.25 MG: 6.25 TABLET, FILM COATED ORAL at 08:44

## 2022-03-29 RX ADMIN — FUROSEMIDE 40 MG: 40 TABLET ORAL at 08:44

## 2022-03-29 RX ADMIN — FUROSEMIDE 40 MG: 10 INJECTION, SOLUTION INTRAMUSCULAR; INTRAVENOUS at 18:35

## 2022-03-29 RX ADMIN — ATORVASTATIN CALCIUM 40 MG: 20 TABLET, FILM COATED ORAL at 18:33

## 2022-03-29 RX ADMIN — CARVEDILOL 6.25 MG: 6.25 TABLET, FILM COATED ORAL at 18:33

## 2022-03-29 RX ADMIN — IPRATROPIUM BROMIDE AND ALBUTEROL SULFATE 3 ML: 2.5; .5 SOLUTION RESPIRATORY (INHALATION) at 09:27

## 2022-03-29 RX ADMIN — AZITHROMYCIN DIHYDRATE 250 MG: 250 TABLET, FILM COATED ORAL at 08:44

## 2022-03-29 RX ADMIN — IPRATROPIUM BROMIDE AND ALBUTEROL SULFATE 3 ML: 2.5; .5 SOLUTION RESPIRATORY (INHALATION) at 19:31

## 2022-03-29 RX ADMIN — POTASSIUM CHLORIDE 40 MEQ: 10 TABLET, EXTENDED RELEASE ORAL at 18:33

## 2022-03-30 ENCOUNTER — APPOINTMENT (OUTPATIENT)
Dept: CARDIOLOGY | Facility: HOSPITAL | Age: 67
End: 2022-03-30

## 2022-03-30 PROBLEM — R93.1 ABNORMAL FINDINGS ON DIAGNOSTIC IMAGING OF HEART AND CORONARY CIRCULATION: Status: ACTIVE | Noted: 2022-03-26

## 2022-03-30 PROBLEM — I25.10 CORONARY ARTERY DISEASE: Status: ACTIVE | Noted: 2022-03-26

## 2022-03-30 LAB
ABO GROUP BLD: NORMAL
ALBUMIN SERPL-MCNC: 4.2 G/DL (ref 3.5–5.2)
ALBUMIN SERPL-MCNC: 4.3 G/DL (ref 3.5–5.2)
ALBUMIN/GLOB SERPL: 1.6 G/DL
ALBUMIN/GLOB SERPL: 1.7 G/DL
ALP SERPL-CCNC: 56 U/L (ref 39–117)
ALP SERPL-CCNC: 62 U/L (ref 39–117)
ALT SERPL W P-5'-P-CCNC: 14 U/L (ref 1–41)
ALT SERPL W P-5'-P-CCNC: 17 U/L (ref 1–41)
ANION GAP SERPL CALCULATED.3IONS-SCNC: 10 MMOL/L (ref 5–15)
ANION GAP SERPL CALCULATED.3IONS-SCNC: 12 MMOL/L (ref 5–15)
APTT PPP: 25 SECONDS (ref 22.7–35.4)
ARTERIAL PATENCY WRIST A: POSITIVE
AST SERPL-CCNC: 13 U/L (ref 1–40)
AST SERPL-CCNC: 17 U/L (ref 1–40)
ATMOSPHERIC PRESS: 732.5 MMHG
BASE EXCESS BLDA CALC-SCNC: 3.4 MMOL/L (ref 0–2)
BDY SITE: ABNORMAL
BH CV XLRA MEAS - DIST GSV CALF DIST LEFT: 0.25 CM
BH CV XLRA MEAS - DIST GSV CALF DIST RIGHT: 0.29 CM
BH CV XLRA MEAS - DIST GSV THIGH DIST LEFT: 0.3 CM
BH CV XLRA MEAS - DIST GSV THIGH DIST RIGHT: 0.23 CM
BH CV XLRA MEAS - DIST LSV CALF DIST LEFT: 0.1 CM
BH CV XLRA MEAS - DIST LSV CALF DIST RIGHT: 0.11 CM
BH CV XLRA MEAS - GSV ANKLE DIST LEFT: 0.21 CM
BH CV XLRA MEAS - GSV ANKLE DIST RIGHT: 0.22 CM
BH CV XLRA MEAS - GSV KNEE DIST LEFT: 0.28 CM
BH CV XLRA MEAS - GSV KNEE DIST RIGHT: 0.26 CM
BH CV XLRA MEAS - GSV ORIGIN DIST LEFT: 0.31 CM
BH CV XLRA MEAS - GSV ORIGIN DIST RIGHT: 0.27 CM
BH CV XLRA MEAS - MID GSV CALF LEFT: 0.29 CM
BH CV XLRA MEAS - MID GSV CALF RIGHT: 0.22 CM
BH CV XLRA MEAS - MID GSV THIGH  LEFT: 0.29 CM
BH CV XLRA MEAS - MID GSV THIGH  RIGHT: 0.27 CM
BH CV XLRA MEAS - MID LSV CALF DIST LEFT: 0.14 CM
BH CV XLRA MEAS - MID LSV CALF DIST RIGHT: 0.09 CM
BH CV XLRA MEAS - PROX GSV CALF DIST LEFT: 0.27 CM
BH CV XLRA MEAS - PROX GSV CALF DIST RIGHT: 0.24 CM
BH CV XLRA MEAS - PROX GSV THIGH  LEFT: 0.24 CM
BH CV XLRA MEAS - PROX GSV THIGH  RIGHT: 0.23 CM
BH CV XLRA MEAS - PROX LSV CALF DIST LEFT: 0.13 CM
BH CV XLRA MEAS - PROX LSV CALF DIST RIGHT: 0.09 CM
BH CV XLRA MEAS LEFT DIST CCA EDV: -13.9 CM/SEC
BH CV XLRA MEAS LEFT DIST CCA PSV: -56.2 CM/SEC
BH CV XLRA MEAS LEFT DIST ICA EDV: -26.4 CM/SEC
BH CV XLRA MEAS LEFT DIST ICA PSV: -87.9 CM/SEC
BH CV XLRA MEAS LEFT ICA/CCA RATIO: 2.08
BH CV XLRA MEAS LEFT MID ICA EDV: -20.5 CM/SEC
BH CV XLRA MEAS LEFT MID ICA PSV: -85.6 CM/SEC
BH CV XLRA MEAS LEFT PROX CCA EDV: 12.6 CM/SEC
BH CV XLRA MEAS LEFT PROX CCA PSV: 74.8 CM/SEC
BH CV XLRA MEAS LEFT PROX ECA EDV: -14.1 CM/SEC
BH CV XLRA MEAS LEFT PROX ECA PSV: -108 CM/SEC
BH CV XLRA MEAS LEFT PROX ICA EDV: -34.6 CM/SEC
BH CV XLRA MEAS LEFT PROX ICA PSV: -117 CM/SEC
BH CV XLRA MEAS LEFT PROX SCLA PSV: 112 CM/SEC
BH CV XLRA MEAS LEFT VERTEBRAL A EDV: 11.8 CM/SEC
BH CV XLRA MEAS LEFT VERTEBRAL A PSV: 56.6 CM/SEC
BH CV XLRA MEAS RIGHT DIST CCA EDV: 15.8 CM/SEC
BH CV XLRA MEAS RIGHT DIST CCA PSV: 90.9 CM/SEC
BH CV XLRA MEAS RIGHT DIST ICA EDV: -29.9 CM/SEC
BH CV XLRA MEAS RIGHT DIST ICA PSV: -97.9 CM/SEC
BH CV XLRA MEAS RIGHT ICA/CCA RATIO: 1.16
BH CV XLRA MEAS RIGHT MID ICA EDV: -25.8 CM/SEC
BH CV XLRA MEAS RIGHT MID ICA PSV: -92.6 CM/SEC
BH CV XLRA MEAS RIGHT PROX CCA EDV: 18.8 CM/SEC
BH CV XLRA MEAS RIGHT PROX CCA PSV: 90.3 CM/SEC
BH CV XLRA MEAS RIGHT PROX ECA EDV: -15.7 CM/SEC
BH CV XLRA MEAS RIGHT PROX ECA PSV: -173 CM/SEC
BH CV XLRA MEAS RIGHT PROX ICA EDV: -28.3 CM/SEC
BH CV XLRA MEAS RIGHT PROX ICA PSV: -105 CM/SEC
BH CV XLRA MEAS RIGHT PROX SCLA PSV: 129 CM/SEC
BH CV XLRA MEAS RIGHT VERTEBRAL A EDV: 20.5 CM/SEC
BH CV XLRA MEAS RIGHT VERTEBRAL A PSV: 70.4 CM/SEC
BILIRUB SERPL-MCNC: 0.7 MG/DL (ref 0–1.2)
BILIRUB SERPL-MCNC: 0.9 MG/DL (ref 0–1.2)
BILIRUB UR QL STRIP: NEGATIVE
BLD GP AB SCN SERPL QL: POSITIVE
BUN SERPL-MCNC: 25 MG/DL (ref 8–23)
BUN SERPL-MCNC: 28 MG/DL (ref 8–23)
BUN/CREAT SERPL: 21.6 (ref 7–25)
BUN/CREAT SERPL: 22.8 (ref 7–25)
CALCIUM SPEC-SCNC: 9.2 MG/DL (ref 8.6–10.5)
CALCIUM SPEC-SCNC: 9.2 MG/DL (ref 8.6–10.5)
CHLORIDE SERPL-SCNC: 95 MMOL/L (ref 98–107)
CHLORIDE SERPL-SCNC: 97 MMOL/L (ref 98–107)
CLARITY UR: CLEAR
CLOSE TME COLL+ADP + EPINEP PNL BLD: 33 % (ref 86–100)
CO2 SERPL-SCNC: 29 MMOL/L (ref 22–29)
CO2 SERPL-SCNC: 30 MMOL/L (ref 22–29)
COLOR UR: YELLOW
CREAT SERPL-MCNC: 1.16 MG/DL (ref 0.76–1.27)
CREAT SERPL-MCNC: 1.23 MG/DL (ref 0.76–1.27)
DEPRECATED RDW RBC AUTO: 70.2 FL (ref 37–54)
EGFRCR SERPLBLD CKD-EPI 2021: 64.7 ML/MIN/1.73
EGFRCR SERPLBLD CKD-EPI 2021: 69.5 ML/MIN/1.73
ERYTHROCYTE [DISTWIDTH] IN BLOOD BY AUTOMATED COUNT: 18.6 % (ref 12.3–15.4)
GLOBULIN UR ELPH-MCNC: 2.6 GM/DL
GLOBULIN UR ELPH-MCNC: 2.6 GM/DL
GLUCOSE SERPL-MCNC: 103 MG/DL (ref 65–99)
GLUCOSE SERPL-MCNC: 98 MG/DL (ref 65–99)
GLUCOSE UR STRIP-MCNC: NEGATIVE MG/DL
HCO3 BLDA-SCNC: 27.7 MMOL/L (ref 22–28)
HCT VFR BLD AUTO: 25.7 % (ref 37.5–51)
HGB BLD-MCNC: 8.9 G/DL (ref 13–17.7)
HGB UR QL STRIP.AUTO: NEGATIVE
INHALED O2 CONCENTRATION: 21 %
INR PPP: 1.06 (ref 0.9–1.1)
KETONES UR QL STRIP: NEGATIVE
LEFT ARM BP: NORMAL MMHG
LEUKOCYTE ESTERASE UR QL STRIP.AUTO: NEGATIVE
MAXIMAL PREDICTED HEART RATE: 154 BPM
MAXIMAL PREDICTED HEART RATE: 154 BPM
MCH RBC QN AUTO: 36.6 PG (ref 26.6–33)
MCHC RBC AUTO-ENTMCNC: 34.6 G/DL (ref 31.5–35.7)
MCV RBC AUTO: 105.8 FL (ref 79–97)
MODALITY: ABNORMAL
NITRITE UR QL STRIP: NEGATIVE
NT-PROBNP SERPL-MCNC: 6632 PG/ML (ref 0–900)
O2 A-A PPRESDIFF RESPIRATORY: 0.5 MMHG
PCO2 BLDA: 40.3 MM HG (ref 35–45)
PH BLDA: 7.45 PH UNITS (ref 7.35–7.45)
PH UR STRIP.AUTO: 7 [PH] (ref 5–8)
PLATELET # BLD AUTO: 320 10*3/MM3 (ref 140–450)
PMV BLD AUTO: 9.6 FL (ref 6–12)
PO2 BLDA: 56.6 MM HG (ref 80–100)
POTASSIUM SERPL-SCNC: 3.7 MMOL/L (ref 3.5–5.2)
POTASSIUM SERPL-SCNC: 3.8 MMOL/L (ref 3.5–5.2)
PROT SERPL-MCNC: 6.8 G/DL (ref 6–8.5)
PROT SERPL-MCNC: 6.9 G/DL (ref 6–8.5)
PROT UR QL STRIP: NEGATIVE
PROTHROMBIN TIME: 13.7 SECONDS (ref 11.7–14.2)
RBC # BLD AUTO: 2.43 10*6/MM3 (ref 4.14–5.8)
RH BLD: POSITIVE
RIGHT ARM BP: NORMAL MMHG
SAO2 % BLDCOA: 90.2 % (ref 92–99)
SODIUM SERPL-SCNC: 136 MMOL/L (ref 136–145)
SODIUM SERPL-SCNC: 137 MMOL/L (ref 136–145)
SP GR UR STRIP: 1.01 (ref 1–1.03)
STRESS TARGET HR: 131 BPM
STRESS TARGET HR: 131 BPM
T&S EXPIRATION DATE: NORMAL
TOTAL RATE: 20 BREATHS/MINUTE
UROBILINOGEN UR QL STRIP: NORMAL
WBC NRBC COR # BLD: 8.87 10*3/MM3 (ref 3.4–10.8)

## 2022-03-30 PROCEDURE — 94664 DEMO&/EVAL PT USE INHALER: CPT

## 2022-03-30 PROCEDURE — U0005 INFEC AGEN DETEC AMPLI PROBE: HCPCS | Performed by: PHYSICIAN ASSISTANT

## 2022-03-30 PROCEDURE — 85576 BLOOD PLATELET AGGREGATION: CPT

## 2022-03-30 PROCEDURE — 94799 UNLISTED PULMONARY SVC/PX: CPT

## 2022-03-30 PROCEDURE — 82803 BLOOD GASES ANY COMBINATION: CPT

## 2022-03-30 PROCEDURE — 99232 SBSQ HOSP IP/OBS MODERATE 35: CPT | Performed by: INTERNAL MEDICINE

## 2022-03-30 PROCEDURE — 85027 COMPLETE CBC AUTOMATED: CPT | Performed by: INTERNAL MEDICINE

## 2022-03-30 PROCEDURE — 86905 BLOOD TYPING RBC ANTIGENS: CPT

## 2022-03-30 PROCEDURE — 86901 BLOOD TYPING SEROLOGIC RH(D): CPT

## 2022-03-30 PROCEDURE — 86900 BLOOD TYPING SEROLOGIC ABO: CPT

## 2022-03-30 PROCEDURE — 85730 THROMBOPLASTIN TIME PARTIAL: CPT

## 2022-03-30 PROCEDURE — 86870 RBC ANTIBODY IDENTIFICATION: CPT

## 2022-03-30 PROCEDURE — 36415 COLL VENOUS BLD VENIPUNCTURE: CPT | Performed by: INTERNAL MEDICINE

## 2022-03-30 PROCEDURE — 36600 WITHDRAWAL OF ARTERIAL BLOOD: CPT

## 2022-03-30 PROCEDURE — 25010000002 FUROSEMIDE PER 20 MG: Performed by: HOSPITALIST

## 2022-03-30 PROCEDURE — 80053 COMPREHEN METABOLIC PANEL: CPT | Performed by: INTERNAL MEDICINE

## 2022-03-30 PROCEDURE — 81003 URINALYSIS AUTO W/O SCOPE: CPT

## 2022-03-30 PROCEDURE — 93970 EXTREMITY STUDY: CPT

## 2022-03-30 PROCEDURE — 85610 PROTHROMBIN TIME: CPT

## 2022-03-30 PROCEDURE — 86922 COMPATIBILITY TEST ANTIGLOB: CPT

## 2022-03-30 PROCEDURE — 94760 N-INVAS EAR/PLS OXIMETRY 1: CPT

## 2022-03-30 PROCEDURE — 83880 ASSAY OF NATRIURETIC PEPTIDE: CPT

## 2022-03-30 PROCEDURE — 86850 RBC ANTIBODY SCREEN: CPT

## 2022-03-30 PROCEDURE — 63710000001 PREDNISONE PER 5 MG: Performed by: HOSPITALIST

## 2022-03-30 PROCEDURE — 86920 COMPATIBILITY TEST SPIN: CPT

## 2022-03-30 PROCEDURE — U0004 COV-19 TEST NON-CDC HGH THRU: HCPCS | Performed by: PHYSICIAN ASSISTANT

## 2022-03-30 PROCEDURE — 93880 EXTRACRANIAL BILAT STUDY: CPT

## 2022-03-30 PROCEDURE — 86880 COOMBS TEST DIRECT: CPT

## 2022-03-30 PROCEDURE — 94761 N-INVAS EAR/PLS OXIMETRY MLT: CPT

## 2022-03-30 RX ORDER — PREDNISONE 10 MG/1
10 TABLET ORAL DAILY
Status: DISCONTINUED | OUTPATIENT
Start: 2022-03-30 | End: 2022-04-01

## 2022-03-30 RX ORDER — CEFAZOLIN SODIUM 2 G/100ML
2 INJECTION, SOLUTION INTRAVENOUS
Status: COMPLETED | OUTPATIENT
Start: 2022-04-01 | End: 2022-04-01

## 2022-03-30 RX ADMIN — ESCITALOPRAM 20 MG: 20 TABLET, FILM COATED ORAL at 08:47

## 2022-03-30 RX ADMIN — IPRATROPIUM BROMIDE AND ALBUTEROL SULFATE 3 ML: 2.5; .5 SOLUTION RESPIRATORY (INHALATION) at 18:55

## 2022-03-30 RX ADMIN — Medication 3 ML: at 08:48

## 2022-03-30 RX ADMIN — POTASSIUM CHLORIDE 40 MEQ: 10 TABLET, EXTENDED RELEASE ORAL at 08:47

## 2022-03-30 RX ADMIN — ATORVASTATIN CALCIUM 40 MG: 20 TABLET, FILM COATED ORAL at 08:47

## 2022-03-30 RX ADMIN — PANTOPRAZOLE SODIUM 40 MG: 40 TABLET, DELAYED RELEASE ORAL at 05:37

## 2022-03-30 RX ADMIN — CARVEDILOL 6.25 MG: 6.25 TABLET, FILM COATED ORAL at 08:47

## 2022-03-30 RX ADMIN — Medication 3 ML: at 20:50

## 2022-03-30 RX ADMIN — FUROSEMIDE 40 MG: 10 INJECTION, SOLUTION INTRAMUSCULAR; INTRAVENOUS at 18:24

## 2022-03-30 RX ADMIN — IPRATROPIUM BROMIDE AND ALBUTEROL SULFATE 3 ML: 2.5; .5 SOLUTION RESPIRATORY (INHALATION) at 08:03

## 2022-03-30 RX ADMIN — FUROSEMIDE 40 MG: 10 INJECTION, SOLUTION INTRAMUSCULAR; INTRAVENOUS at 05:38

## 2022-03-30 RX ADMIN — VALSARTAN 80 MG: 80 TABLET, FILM COATED ORAL at 08:47

## 2022-03-30 RX ADMIN — PREDNISONE 10 MG: 10 TABLET ORAL at 20:52

## 2022-03-30 RX ADMIN — CARVEDILOL 6.25 MG: 6.25 TABLET, FILM COATED ORAL at 18:24

## 2022-03-30 RX ADMIN — IPRATROPIUM BROMIDE AND ALBUTEROL SULFATE 3 ML: 2.5; .5 SOLUTION RESPIRATORY (INHALATION) at 11:42

## 2022-03-31 ENCOUNTER — APPOINTMENT (OUTPATIENT)
Dept: CT IMAGING | Facility: HOSPITAL | Age: 67
End: 2022-03-31

## 2022-03-31 ENCOUNTER — ANESTHESIA EVENT (OUTPATIENT)
Dept: PERIOP | Facility: HOSPITAL | Age: 67
End: 2022-03-31

## 2022-03-31 ENCOUNTER — APPOINTMENT (OUTPATIENT)
Dept: RESPIRATORY THERAPY | Facility: HOSPITAL | Age: 67
End: 2022-03-31

## 2022-03-31 LAB
ANTI-C: NORMAL
BACTERIA SPEC AEROBE CULT: NORMAL
BACTERIA SPEC AEROBE CULT: NORMAL
C AG RBC QL: POSITIVE
CLOSE TME COLL+ADP + EPINEP PNL BLD: 98 % (ref 86–100)
DAT C3: NEGATIVE
DAT IGG-SP REAG RBC-IMP: POSITIVE
DAT POLY-SP REAG RBC QL: POSITIVE
DEPRECATED RDW RBC AUTO: 76 FL (ref 37–54)
ERYTHROCYTE [DISTWIDTH] IN BLOOD BY AUTOMATED COUNT: 19.2 % (ref 12.3–15.4)
HCT VFR BLD AUTO: 27.7 % (ref 37.5–51)
HGB BLD-MCNC: 9.4 G/DL (ref 13–17.7)
MCH RBC QN AUTO: 37.6 PG (ref 26.6–33)
MCHC RBC AUTO-ENTMCNC: 33.9 G/DL (ref 31.5–35.7)
MCV RBC AUTO: 110.8 FL (ref 79–97)
NONSPECIFIC ANTIBODY: NORMAL
PLATELET # BLD AUTO: 346 10*3/MM3 (ref 140–450)
PMV BLD AUTO: 9.7 FL (ref 6–12)
RBC # BLD AUTO: 2.5 10*6/MM3 (ref 4.14–5.8)
SARS-COV-2 ORF1AB RESP QL NAA+PROBE: NOT DETECTED
WBC NRBC COR # BLD: 9.15 10*3/MM3 (ref 3.4–10.8)

## 2022-03-31 PROCEDURE — 74174 CTA ABD&PLVS W/CONTRAST: CPT

## 2022-03-31 PROCEDURE — 86902 BLOOD TYPE ANTIGEN DONOR EA: CPT

## 2022-03-31 PROCEDURE — 94729 DIFFUSING CAPACITY: CPT

## 2022-03-31 PROCEDURE — 94799 UNLISTED PULMONARY SVC/PX: CPT

## 2022-03-31 PROCEDURE — 25010000002 FUROSEMIDE PER 20 MG: Performed by: HOSPITALIST

## 2022-03-31 PROCEDURE — 86900 BLOOD TYPING SEROLOGIC ABO: CPT

## 2022-03-31 PROCEDURE — 0 IOPAMIDOL PER 1 ML: Performed by: HOSPITALIST

## 2022-03-31 PROCEDURE — 86901 BLOOD TYPING SEROLOGIC RH(D): CPT

## 2022-03-31 PROCEDURE — 99232 SBSQ HOSP IP/OBS MODERATE 35: CPT | Performed by: INTERNAL MEDICINE

## 2022-03-31 PROCEDURE — 94761 N-INVAS EAR/PLS OXIMETRY MLT: CPT

## 2022-03-31 PROCEDURE — 85576 BLOOD PLATELET AGGREGATION: CPT | Performed by: REGISTERED NURSE

## 2022-03-31 PROCEDURE — 63710000001 PREDNISONE PER 5 MG: Performed by: HOSPITALIST

## 2022-03-31 PROCEDURE — 94664 DEMO&/EVAL PT USE INHALER: CPT

## 2022-03-31 PROCEDURE — 94060 EVALUATION OF WHEEZING: CPT

## 2022-03-31 RX ORDER — ACETAMINOPHEN 325 MG/1
650 TABLET ORAL EVERY 4 HOURS PRN
Status: DISCONTINUED | OUTPATIENT
Start: 2022-03-31 | End: 2022-04-01 | Stop reason: HOSPADM

## 2022-03-31 RX ORDER — SODIUM CHLORIDE 0.9 % (FLUSH) 0.9 %
10 SYRINGE (ML) INJECTION AS NEEDED
Status: DISCONTINUED | OUTPATIENT
Start: 2022-03-31 | End: 2022-04-01 | Stop reason: HOSPADM

## 2022-03-31 RX ORDER — SODIUM CHLORIDE 0.9 % (FLUSH) 0.9 %
10 SYRINGE (ML) INJECTION EVERY 12 HOURS SCHEDULED
Status: DISCONTINUED | OUTPATIENT
Start: 2022-03-31 | End: 2022-04-01 | Stop reason: HOSPADM

## 2022-03-31 RX ORDER — CHLORHEXIDINE GLUCONATE 500 MG/1
1 CLOTH TOPICAL EVERY 12 HOURS PRN
Status: DISCONTINUED | OUTPATIENT
Start: 2022-03-31 | End: 2022-04-01 | Stop reason: HOSPADM

## 2022-03-31 RX ORDER — DIPHENHYDRAMINE HCL 25 MG
25 CAPSULE ORAL NIGHTLY PRN
Status: DISCONTINUED | OUTPATIENT
Start: 2022-03-31 | End: 2022-04-01 | Stop reason: HOSPADM

## 2022-03-31 RX ORDER — ALBUTEROL SULFATE 2.5 MG/3ML
2.5 SOLUTION RESPIRATORY (INHALATION) ONCE AS NEEDED
Status: COMPLETED | OUTPATIENT
Start: 2022-03-31 | End: 2022-03-31

## 2022-03-31 RX ORDER — ALPRAZOLAM 0.5 MG/1
0.25 TABLET ORAL EVERY 8 HOURS PRN
Status: DISCONTINUED | OUTPATIENT
Start: 2022-03-31 | End: 2022-04-01 | Stop reason: HOSPADM

## 2022-03-31 RX ORDER — CHLORHEXIDINE GLUCONATE 0.12 MG/ML
15 RINSE ORAL ONCE
Status: COMPLETED | OUTPATIENT
Start: 2022-03-31 | End: 2022-03-31

## 2022-03-31 RX ADMIN — ALBUTEROL SULFATE 2.5 MG: 2.5 SOLUTION RESPIRATORY (INHALATION) at 15:38

## 2022-03-31 RX ADMIN — ATORVASTATIN CALCIUM 40 MG: 20 TABLET, FILM COATED ORAL at 09:05

## 2022-03-31 RX ADMIN — VALSARTAN 80 MG: 80 TABLET, FILM COATED ORAL at 09:05

## 2022-03-31 RX ADMIN — CARVEDILOL 6.25 MG: 6.25 TABLET, FILM COATED ORAL at 10:58

## 2022-03-31 RX ADMIN — FUROSEMIDE 40 MG: 10 INJECTION, SOLUTION INTRAMUSCULAR; INTRAVENOUS at 18:04

## 2022-03-31 RX ADMIN — Medication 10 ML: at 20:59

## 2022-03-31 RX ADMIN — PANTOPRAZOLE SODIUM 40 MG: 40 TABLET, DELAYED RELEASE ORAL at 05:39

## 2022-03-31 RX ADMIN — IPRATROPIUM BROMIDE AND ALBUTEROL SULFATE 3 ML: 2.5; .5 SOLUTION RESPIRATORY (INHALATION) at 14:31

## 2022-03-31 RX ADMIN — ESCITALOPRAM 20 MG: 20 TABLET, FILM COATED ORAL at 10:58

## 2022-03-31 RX ADMIN — IPRATROPIUM BROMIDE AND ALBUTEROL SULFATE 3 ML: 2.5; .5 SOLUTION RESPIRATORY (INHALATION) at 11:06

## 2022-03-31 RX ADMIN — POTASSIUM CHLORIDE 40 MEQ: 10 TABLET, EXTENDED RELEASE ORAL at 09:04

## 2022-03-31 RX ADMIN — PREDNISONE 10 MG: 10 TABLET ORAL at 09:05

## 2022-03-31 RX ADMIN — IPRATROPIUM BROMIDE AND ALBUTEROL SULFATE 3 ML: 2.5; .5 SOLUTION RESPIRATORY (INHALATION) at 06:44

## 2022-03-31 RX ADMIN — CARVEDILOL 6.25 MG: 6.25 TABLET, FILM COATED ORAL at 18:05

## 2022-03-31 RX ADMIN — FUROSEMIDE 40 MG: 10 INJECTION, SOLUTION INTRAMUSCULAR; INTRAVENOUS at 05:38

## 2022-03-31 RX ADMIN — CHLORHEXIDINE GLUCONATE 0.12% ORAL RINSE 15 ML: 1.2 LIQUID ORAL at 20:57

## 2022-03-31 RX ADMIN — IOPAMIDOL 95 ML: 755 INJECTION, SOLUTION INTRAVENOUS at 10:06

## 2022-03-31 RX ADMIN — IPRATROPIUM BROMIDE AND ALBUTEROL SULFATE 3 ML: 2.5; .5 SOLUTION RESPIRATORY (INHALATION) at 19:52

## 2022-03-31 RX ADMIN — MUPIROCIN 1 APPLICATION: 20 OINTMENT TOPICAL at 20:57

## 2022-03-31 RX ADMIN — Medication 3 ML: at 10:59

## 2022-04-01 ENCOUNTER — ANCILLARY PROCEDURE (OUTPATIENT)
Dept: PERIOP | Facility: HOSPITAL | Age: 67
End: 2022-04-01

## 2022-04-01 ENCOUNTER — APPOINTMENT (OUTPATIENT)
Dept: GENERAL RADIOLOGY | Facility: HOSPITAL | Age: 67
End: 2022-04-01

## 2022-04-01 ENCOUNTER — ANESTHESIA (OUTPATIENT)
Dept: PERIOP | Facility: HOSPITAL | Age: 67
End: 2022-04-01

## 2022-04-01 LAB
ACT BLD: 130 SECONDS (ref 82–152)
ACT BLD: 136 SECONDS (ref 82–152)
ACT BLD: 249 SECONDS (ref 82–152)
ACT BLD: 285 SECONDS (ref 82–152)
ACT BLD: 297 SECONDS (ref 82–152)
ACT BLD: 309 SECONDS (ref 82–152)
ALBUMIN SERPL-MCNC: 3.8 G/DL (ref 3.5–5.2)
ALBUMIN SERPL-MCNC: 4.3 G/DL (ref 3.5–5.2)
ANION GAP SERPL CALCULATED.3IONS-SCNC: 10 MMOL/L (ref 5–15)
ANION GAP SERPL CALCULATED.3IONS-SCNC: 11 MMOL/L (ref 5–15)
ANION GAP SERPL CALCULATED.3IONS-SCNC: 9 MMOL/L (ref 5–15)
ANION GAP SERPL CALCULATED.3IONS-SCNC: 9 MMOL/L (ref 5–15)
APTT PPP: 28.1 SECONDS (ref 22.7–35.4)
ARTERIAL PATENCY WRIST A: ABNORMAL
ARTERIAL PATENCY WRIST A: ABNORMAL
ATMOSPHERIC PRESS: 751.3 MMHG
ATMOSPHERIC PRESS: 753.5 MMHG
BASE EXCESS BLDA CALC-SCNC: -0.1 MMOL/L (ref 0–2)
BASE EXCESS BLDA CALC-SCNC: 0 MMOL/L (ref -5–5)
BASE EXCESS BLDA CALC-SCNC: 0.8 MMOL/L (ref 0–2)
BASE EXCESS BLDA CALC-SCNC: 2 MMOL/L (ref -5–5)
BASE EXCESS BLDA CALC-SCNC: 2 MMOL/L (ref -5–5)
BASE EXCESS BLDA CALC-SCNC: 4 MMOL/L (ref -5–5)
BASE EXCESS BLDA CALC-SCNC: 7 MMOL/L (ref -5–5)
BDY SITE: ABNORMAL
BDY SITE: ABNORMAL
BUN SERPL-MCNC: 28 MG/DL (ref 8–23)
BUN SERPL-MCNC: 28 MG/DL (ref 8–23)
BUN SERPL-MCNC: 29 MG/DL (ref 8–23)
BUN SERPL-MCNC: 33 MG/DL (ref 8–23)
BUN/CREAT SERPL: 23.5 (ref 7–25)
BUN/CREAT SERPL: 24.6 (ref 7–25)
BUN/CREAT SERPL: 25.8 (ref 7–25)
BUN/CREAT SERPL: 26.6 (ref 7–25)
CA-I BLD-MCNC: 5.3 MG/DL (ref 4.6–5.4)
CA-I BLDA-SCNC: ABNORMAL MMOL/L
CA-I SERPL ISE-MCNC: 1.33 MMOL/L (ref 1.15–1.35)
CALCIUM SPEC-SCNC: 8.8 MG/DL (ref 8.6–10.5)
CALCIUM SPEC-SCNC: 8.9 MG/DL (ref 8.6–10.5)
CALCIUM SPEC-SCNC: 8.9 MG/DL (ref 8.6–10.5)
CALCIUM SPEC-SCNC: 9.4 MG/DL (ref 8.6–10.5)
CHLORIDE SERPL-SCNC: 101 MMOL/L (ref 98–107)
CHLORIDE SERPL-SCNC: 101 MMOL/L (ref 98–107)
CHLORIDE SERPL-SCNC: 102 MMOL/L (ref 98–107)
CHLORIDE SERPL-SCNC: 95 MMOL/L (ref 98–107)
CO2 BLDA-SCNC: 27 MMOL/L (ref 24–29)
CO2 BLDA-SCNC: 28 MMOL/L (ref 24–29)
CO2 BLDA-SCNC: 29 MMOL/L (ref 24–29)
CO2 BLDA-SCNC: 31 MMOL/L (ref 24–29)
CO2 BLDA-SCNC: 33 MMOL/L (ref 24–29)
CO2 SERPL-SCNC: 24 MMOL/L (ref 22–29)
CO2 SERPL-SCNC: 25 MMOL/L (ref 22–29)
CO2 SERPL-SCNC: 26 MMOL/L (ref 22–29)
CO2 SERPL-SCNC: 30 MMOL/L (ref 22–29)
CREAT SERPL-MCNC: 1.09 MG/DL (ref 0.76–1.27)
CREAT SERPL-MCNC: 1.14 MG/DL (ref 0.76–1.27)
CREAT SERPL-MCNC: 1.19 MG/DL (ref 0.76–1.27)
CREAT SERPL-MCNC: 1.28 MG/DL (ref 0.76–1.27)
DEPRECATED RDW RBC AUTO: 68.2 FL (ref 37–54)
DEPRECATED RDW RBC AUTO: 70.9 FL (ref 37–54)
DEPRECATED RDW RBC AUTO: 76.3 FL (ref 37–54)
EGFRCR SERPLBLD CKD-EPI 2021: 61.7 ML/MIN/1.73
EGFRCR SERPLBLD CKD-EPI 2021: 67.4 ML/MIN/1.73
EGFRCR SERPLBLD CKD-EPI 2021: 70.9 ML/MIN/1.73
EGFRCR SERPLBLD CKD-EPI 2021: 74.9 ML/MIN/1.73
ERYTHROCYTE [DISTWIDTH] IN BLOOD BY AUTOMATED COUNT: 18.5 % (ref 12.3–15.4)
ERYTHROCYTE [DISTWIDTH] IN BLOOD BY AUTOMATED COUNT: 19.4 % (ref 12.3–15.4)
ERYTHROCYTE [DISTWIDTH] IN BLOOD BY AUTOMATED COUNT: 19.9 % (ref 12.3–15.4)
FIBRINOGEN PPP-MCNC: 278 MG/DL (ref 219–464)
GLUCOSE BLDC GLUCOMTR-MCNC: 104 MG/DL (ref 70–130)
GLUCOSE BLDC GLUCOMTR-MCNC: 111 MG/DL (ref 70–130)
GLUCOSE BLDC GLUCOMTR-MCNC: 118 MG/DL (ref 70–130)
GLUCOSE BLDC GLUCOMTR-MCNC: 125 MG/DL (ref 70–130)
GLUCOSE BLDC GLUCOMTR-MCNC: 126 MG/DL (ref 70–130)
GLUCOSE BLDC GLUCOMTR-MCNC: 129 MG/DL (ref 70–130)
GLUCOSE BLDC GLUCOMTR-MCNC: 135 MG/DL (ref 70–130)
GLUCOSE BLDC GLUCOMTR-MCNC: 141 MG/DL (ref 70–130)
GLUCOSE BLDC GLUCOMTR-MCNC: 146 MG/DL (ref 70–130)
GLUCOSE BLDC GLUCOMTR-MCNC: 147 MG/DL (ref 70–130)
GLUCOSE BLDC GLUCOMTR-MCNC: 148 MG/DL (ref 70–130)
GLUCOSE BLDC GLUCOMTR-MCNC: 155 MG/DL (ref 70–130)
GLUCOSE BLDC GLUCOMTR-MCNC: 155 MG/DL (ref 70–130)
GLUCOSE BLDC GLUCOMTR-MCNC: 164 MG/DL (ref 70–130)
GLUCOSE BLDC GLUCOMTR-MCNC: 175 MG/DL (ref 70–130)
GLUCOSE SERPL-MCNC: 111 MG/DL (ref 65–99)
GLUCOSE SERPL-MCNC: 112 MG/DL (ref 65–99)
GLUCOSE SERPL-MCNC: 137 MG/DL (ref 65–99)
GLUCOSE SERPL-MCNC: 161 MG/DL (ref 65–99)
HCO3 BLDA-SCNC: 25 MMOL/L (ref 22–28)
HCO3 BLDA-SCNC: 25.6 MMOL/L (ref 22–26)
HCO3 BLDA-SCNC: 26.8 MMOL/L (ref 22–28)
HCO3 BLDA-SCNC: 26.9 MMOL/L (ref 22–26)
HCO3 BLDA-SCNC: 27.2 MMOL/L (ref 22–26)
HCO3 BLDA-SCNC: 29.2 MMOL/L (ref 22–26)
HCO3 BLDA-SCNC: 31.5 MMOL/L (ref 22–26)
HCT VFR BLD AUTO: 27.3 % (ref 37.5–51)
HCT VFR BLD AUTO: 29.6 % (ref 37.5–51)
HCT VFR BLD AUTO: 32.9 % (ref 37.5–51)
HCT VFR BLDA CALC: 25 % (ref 38–51)
HCT VFR BLDA CALC: 26 % (ref 38–51)
HCT VFR BLDA CALC: 27 % (ref 38–51)
HGB BLD-MCNC: 10.7 G/DL (ref 13–17.7)
HGB BLD-MCNC: 11.2 G/DL (ref 13–17.7)
HGB BLD-MCNC: 9.5 G/DL (ref 13–17.7)
HGB BLDA-MCNC: 8.5 G/DL (ref 12–17)
HGB BLDA-MCNC: 8.8 G/DL (ref 12–17)
HGB BLDA-MCNC: 9.2 G/DL (ref 12–17)
INHALED O2 CONCENTRATION: 100 %
INHALED O2 CONCENTRATION: 40 %
INR PPP: 1.28 (ref 0.9–1.1)
LYMPHOCYTES # BLD MANUAL: 2 10*3/MM3 (ref 0.7–3.1)
LYMPHOCYTES NFR BLD MANUAL: 4 % (ref 5–12)
MAGNESIUM SERPL-MCNC: 2.1 MG/DL (ref 1.6–2.4)
MAGNESIUM SERPL-MCNC: 2.2 MG/DL (ref 1.6–2.4)
MCH RBC QN AUTO: 35.6 PG (ref 26.6–33)
MCH RBC QN AUTO: 36.1 PG (ref 26.6–33)
MCH RBC QN AUTO: 36.4 PG (ref 26.6–33)
MCHC RBC AUTO-ENTMCNC: 34 G/DL (ref 31.5–35.7)
MCHC RBC AUTO-ENTMCNC: 34.8 G/DL (ref 31.5–35.7)
MCHC RBC AUTO-ENTMCNC: 36.1 G/DL (ref 31.5–35.7)
MCV RBC AUTO: 100 FL (ref 79–97)
MCV RBC AUTO: 104.4 FL (ref 79–97)
MCV RBC AUTO: 104.6 FL (ref 79–97)
MODALITY: ABNORMAL
MODALITY: ABNORMAL
MONOCYTES # BLD: 1.33 10*3/MM3 (ref 0.1–0.9)
NEUTROPHILS # BLD AUTO: 29.98 10*3/MM3 (ref 1.7–7)
NEUTROPHILS NFR BLD MANUAL: 90 % (ref 42.7–76)
O2 A-A PPRESDIFF RESPIRATORY: 0.4 MMHG
O2 A-A PPRESDIFF RESPIRATORY: 0.4 MMHG
OVALOCYTES BLD QL SMEAR: ABNORMAL
PCO2 BLDA: 41.8 MM HG (ref 35–45)
PCO2 BLDA: 44.2 MM HG (ref 35–45)
PCO2 BLDA: 44.4 MM HG (ref 35–45)
PCO2 BLDA: 44.6 MM HG (ref 35–45)
PCO2 BLDA: 47.9 MM HG (ref 35–45)
PCO2 BLDA: 48.1 MM HG (ref 35–45)
PCO2 BLDA: 49 MM HG (ref 35–45)
PEEP RESPIRATORY: 7 CM[H2O]
PEEP RESPIRATORY: 7 CM[H2O]
PH BLDA: 7.36 PH UNITS (ref 7.35–7.45)
PH BLDA: 7.38 PH UNITS (ref 7.35–7.45)
PH BLDA: 7.41 PH UNITS (ref 7.35–7.6)
PH BLDA: 7.41 PH UNITS (ref 7.35–7.6)
PH BLDA: 7.42 PH UNITS (ref 7.35–7.6)
PH BLDA: 7.43 PH UNITS (ref 7.35–7.6)
PH BLDA: 7.43 PH UNITS (ref 7.35–7.6)
PHOSPHATE SERPL-MCNC: 4.3 MG/DL (ref 2.5–4.5)
PHOSPHATE SERPL-MCNC: 4.7 MG/DL (ref 2.5–4.5)
PLAT MORPH BLD: NORMAL
PLATELET # BLD AUTO: 271 10*3/MM3 (ref 140–450)
PLATELET # BLD AUTO: 309 10*3/MM3 (ref 140–450)
PLATELET # BLD AUTO: 322 10*3/MM3 (ref 140–450)
PMV BLD AUTO: 9.2 FL (ref 6–12)
PMV BLD AUTO: 9.5 FL (ref 6–12)
PMV BLD AUTO: 9.7 FL (ref 6–12)
PO2 BLDA: 236 MMHG (ref 80–105)
PO2 BLDA: 246 MMHG (ref 80–105)
PO2 BLDA: 263.8 MM HG (ref 80–100)
PO2 BLDA: 283 MMHG (ref 80–105)
PO2 BLDA: 334 MMHG (ref 80–105)
PO2 BLDA: 522 MMHG (ref 80–105)
PO2 BLDA: 88.9 MM HG (ref 80–100)
POIKILOCYTOSIS BLD QL SMEAR: ABNORMAL
POTASSIUM BLDA-SCNC: 3.6 MMOL/L (ref 3.5–4.9)
POTASSIUM BLDA-SCNC: 4 MMOL/L (ref 3.5–4.9)
POTASSIUM BLDA-SCNC: 4.1 MMOL/L (ref 3.5–4.9)
POTASSIUM BLDA-SCNC: 4.1 MMOL/L (ref 3.5–4.9)
POTASSIUM BLDA-SCNC: 4.2 MMOL/L (ref 3.5–4.9)
POTASSIUM SERPL-SCNC: 4.2 MMOL/L (ref 3.5–5.2)
POTASSIUM SERPL-SCNC: 4.9 MMOL/L (ref 3.5–5.2)
POTASSIUM SERPL-SCNC: 5 MMOL/L (ref 3.5–5.2)
POTASSIUM SERPL-SCNC: 5.3 MMOL/L (ref 3.5–5.2)
PROTHROMBIN TIME: 15.9 SECONDS (ref 11.7–14.2)
PSV: 8 CMH2O
QT INTERVAL: 443 MS
QT INTERVAL: 464 MS
RBC # BLD AUTO: 2.61 10*6/MM3 (ref 4.14–5.8)
RBC # BLD AUTO: 2.96 10*6/MM3 (ref 4.14–5.8)
RBC # BLD AUTO: 3.15 10*6/MM3 (ref 4.14–5.8)
SAO2 % BLDA: 100 % (ref 95–98)
SAO2 % BLDCOA: 96.3 % (ref 92–99)
SAO2 % BLDCOA: 99.9 % (ref 92–99)
SET MECH RESP RATE: 18
SODIUM SERPL-SCNC: 135 MMOL/L (ref 136–145)
SODIUM SERPL-SCNC: 138 MMOL/L (ref 136–145)
TOTAL RATE: 17 BREATHS/MINUTE
TOTAL RATE: 18 BREATHS/MINUTE
VARIANT LYMPHS NFR BLD MANUAL: 6 % (ref 19.6–45.3)
VENTILATOR MODE: ABNORMAL
VENTILATOR MODE: AC
VT ON VENT VENT: 600 ML
WBC MORPH BLD: NORMAL
WBC NRBC COR # BLD: 21.84 10*3/MM3 (ref 3.4–10.8)
WBC NRBC COR # BLD: 33.31 10*3/MM3 (ref 3.4–10.8)
WBC NRBC COR # BLD: 8.4 10*3/MM3 (ref 3.4–10.8)

## 2022-04-01 PROCEDURE — 25010000002 HEPARIN (PORCINE) PER 1000 UNITS: Performed by: STUDENT IN AN ORGANIZED HEALTH CARE EDUCATION/TRAINING PROGRAM

## 2022-04-01 PROCEDURE — 33508 ENDOSCOPIC VEIN HARVEST: CPT

## 2022-04-01 PROCEDURE — 86900 BLOOD TYPING SEROLOGIC ABO: CPT

## 2022-04-01 PROCEDURE — C1751 CATH, INF, PER/CENT/MIDLINE: HCPCS | Performed by: STUDENT IN AN ORGANIZED HEALTH CARE EDUCATION/TRAINING PROGRAM

## 2022-04-01 PROCEDURE — 33533 CABG ARTERIAL SINGLE: CPT

## 2022-04-01 PROCEDURE — 85014 HEMATOCRIT: CPT

## 2022-04-01 PROCEDURE — 06BP4ZZ EXCISION OF RIGHT SAPHENOUS VEIN, PERCUTANEOUS ENDOSCOPIC APPROACH: ICD-10-PCS

## 2022-04-01 PROCEDURE — 0 METHADONE PER 10 MG: Performed by: STUDENT IN AN ORGANIZED HEALTH CARE EDUCATION/TRAINING PROGRAM

## 2022-04-01 PROCEDURE — 25010000002 CEFAZOLIN IN DEXTROSE 2-4 GM/100ML-% SOLUTION

## 2022-04-01 PROCEDURE — 82803 BLOOD GASES ANY COMBINATION: CPT

## 2022-04-01 PROCEDURE — 85384 FIBRINOGEN ACTIVITY: CPT

## 2022-04-01 PROCEDURE — 94799 UNLISTED PULMONARY SVC/PX: CPT

## 2022-04-01 PROCEDURE — 25010000002 PROPOFOL 10 MG/ML EMULSION: Performed by: STUDENT IN AN ORGANIZED HEALTH CARE EDUCATION/TRAINING PROGRAM

## 2022-04-01 PROCEDURE — 02100Z9 BYPASS CORONARY ARTERY, ONE ARTERY FROM LEFT INTERNAL MAMMARY, OPEN APPROACH: ICD-10-PCS

## 2022-04-01 PROCEDURE — 85347 COAGULATION TIME ACTIVATED: CPT

## 2022-04-01 PROCEDURE — A4648 IMPLANTABLE TISSUE MARKER: HCPCS

## 2022-04-01 PROCEDURE — 25010000002 MORPHINE PER 10 MG

## 2022-04-01 PROCEDURE — 33518 CABG ARTERY-VEIN TWO: CPT

## 2022-04-01 PROCEDURE — 25010000002 ALBUMIN HUMAN 5% PER 50 ML

## 2022-04-01 PROCEDURE — 25010000002 HEPARIN (PORCINE) PER 1000 UNITS

## 2022-04-01 PROCEDURE — 85018 HEMOGLOBIN: CPT

## 2022-04-01 PROCEDURE — 25010000002 MAGNESIUM SULFATE IN D5W 1G/100ML (PREMIX) 1-5 GM/100ML-% SOLUTION

## 2022-04-01 PROCEDURE — 25010000002 VANCOMYCIN 1 G RECONSTITUTED SOLUTION

## 2022-04-01 PROCEDURE — 93318 ECHO TRANSESOPHAGEAL INTRAOP: CPT | Performed by: STUDENT IN AN ORGANIZED HEALTH CARE EDUCATION/TRAINING PROGRAM

## 2022-04-01 PROCEDURE — C1713 ANCHOR/SCREW BN/BN,TIS/BN: HCPCS

## 2022-04-01 PROCEDURE — 25010000002 PROTAMINE SULFATE PER 10 MG: Performed by: STUDENT IN AN ORGANIZED HEALTH CARE EDUCATION/TRAINING PROGRAM

## 2022-04-01 PROCEDURE — P9016 RBC LEUKOCYTES REDUCED: HCPCS

## 2022-04-01 PROCEDURE — 021109W BYPASS CORONARY ARTERY, TWO ARTERIES FROM AORTA WITH AUTOLOGOUS VENOUS TISSUE, OPEN APPROACH: ICD-10-PCS

## 2022-04-01 PROCEDURE — 25010000002 PHENYLEPHRINE 10 MG/ML SOLUTION 5 ML VIAL: Performed by: STUDENT IN AN ORGANIZED HEALTH CARE EDUCATION/TRAINING PROGRAM

## 2022-04-01 PROCEDURE — 25010000002 PAPAVERINE PER 60 MG

## 2022-04-01 PROCEDURE — 25010000002 FUROSEMIDE PER 20 MG

## 2022-04-01 PROCEDURE — 80048 BASIC METABOLIC PNL TOTAL CA: CPT | Performed by: HOSPITALIST

## 2022-04-01 PROCEDURE — 25010000002 MIDAZOLAM PER 1 MG: Performed by: STUDENT IN AN ORGANIZED HEALTH CARE EDUCATION/TRAINING PROGRAM

## 2022-04-01 PROCEDURE — 25010000002 FENTANYL CITRATE (PF) 50 MCG/ML SOLUTION: Performed by: STUDENT IN AN ORGANIZED HEALTH CARE EDUCATION/TRAINING PROGRAM

## 2022-04-01 PROCEDURE — 82947 ASSAY GLUCOSE BLOOD QUANT: CPT

## 2022-04-01 PROCEDURE — 85007 BL SMEAR W/DIFF WBC COUNT: CPT

## 2022-04-01 PROCEDURE — 80069 RENAL FUNCTION PANEL: CPT

## 2022-04-01 PROCEDURE — 85610 PROTHROMBIN TIME: CPT

## 2022-04-01 PROCEDURE — 85027 COMPLETE CBC AUTOMATED: CPT | Performed by: INTERNAL MEDICINE

## 2022-04-01 PROCEDURE — 82962 GLUCOSE BLOOD TEST: CPT

## 2022-04-01 PROCEDURE — 93005 ELECTROCARDIOGRAM TRACING: CPT

## 2022-04-01 PROCEDURE — 86902 BLOOD TYPE ANTIGEN DONOR EA: CPT

## 2022-04-01 PROCEDURE — 85027 COMPLETE CBC AUTOMATED: CPT

## 2022-04-01 PROCEDURE — 85730 THROMBOPLASTIN TIME PARTIAL: CPT

## 2022-04-01 PROCEDURE — C1729 CATH, DRAINAGE: HCPCS

## 2022-04-01 PROCEDURE — 94002 VENT MGMT INPAT INIT DAY: CPT

## 2022-04-01 PROCEDURE — 93005 ELECTROCARDIOGRAM TRACING: CPT | Performed by: THORACIC SURGERY (CARDIOTHORACIC VASCULAR SURGERY)

## 2022-04-01 PROCEDURE — 71045 X-RAY EXAM CHEST 1 VIEW: CPT

## 2022-04-01 PROCEDURE — P9041 ALBUMIN (HUMAN),5%, 50ML: HCPCS

## 2022-04-01 PROCEDURE — 83735 ASSAY OF MAGNESIUM: CPT

## 2022-04-01 PROCEDURE — 94761 N-INVAS EAR/PLS OXIMETRY MLT: CPT

## 2022-04-01 PROCEDURE — 82330 ASSAY OF CALCIUM: CPT

## 2022-04-01 PROCEDURE — 36430 TRANSFUSION BLD/BLD COMPNT: CPT

## 2022-04-01 PROCEDURE — 99232 SBSQ HOSP IP/OBS MODERATE 35: CPT | Performed by: INTERNAL MEDICINE

## 2022-04-01 PROCEDURE — 80048 BASIC METABOLIC PNL TOTAL CA: CPT

## 2022-04-01 DEVICE — SS SUTURE, 3 PER SLEEVE
Type: IMPLANTABLE DEVICE | Site: STERNUM | Status: FUNCTIONAL
Brand: MYO/WIRE II

## 2022-04-01 DEVICE — SS SUTURE, 4 PER SLEEVE
Type: IMPLANTABLE DEVICE | Site: STERNUM | Status: FUNCTIONAL
Brand: MYO/WIRE II

## 2022-04-01 RX ORDER — PROTAMINE SULFATE 10 MG/ML
INJECTION, SOLUTION INTRAVENOUS AS NEEDED
Status: DISCONTINUED | OUTPATIENT
Start: 2022-04-01 | End: 2022-04-01 | Stop reason: SURG

## 2022-04-01 RX ORDER — POTASSIUM CHLORIDE 29.8 MG/ML
20 INJECTION INTRAVENOUS
Status: DISCONTINUED | OUTPATIENT
Start: 2022-04-01 | End: 2022-04-02

## 2022-04-01 RX ORDER — POTASSIUM CHLORIDE 7.45 MG/ML
10 INJECTION INTRAVENOUS
Status: DISCONTINUED | OUTPATIENT
Start: 2022-04-01 | End: 2022-04-02

## 2022-04-01 RX ORDER — LORAZEPAM 0.5 MG/1
0.5 TABLET ORAL
Status: DISCONTINUED | OUTPATIENT
Start: 2022-04-01 | End: 2022-04-05 | Stop reason: HOSPADM

## 2022-04-01 RX ORDER — LORAZEPAM 2 MG/ML
1 INJECTION INTRAMUSCULAR
Status: DISCONTINUED | OUTPATIENT
Start: 2022-04-01 | End: 2022-04-05 | Stop reason: HOSPADM

## 2022-04-01 RX ORDER — HYDROCODONE BITARTRATE AND ACETAMINOPHEN 5; 325 MG/1; MG/1
2 TABLET ORAL EVERY 4 HOURS PRN
Status: DISCONTINUED | OUTPATIENT
Start: 2022-04-01 | End: 2022-04-05 | Stop reason: HOSPADM

## 2022-04-01 RX ORDER — ALPRAZOLAM 0.25 MG/1
0.25 TABLET ORAL EVERY 8 HOURS PRN
Status: DISCONTINUED | OUTPATIENT
Start: 2022-04-01 | End: 2022-04-02

## 2022-04-01 RX ORDER — SODIUM CHLORIDE 9 MG/ML
30 INJECTION, SOLUTION INTRAVENOUS CONTINUOUS PRN
Status: DISCONTINUED | OUTPATIENT
Start: 2022-04-01 | End: 2022-04-02

## 2022-04-01 RX ORDER — SODIUM CHLORIDE 0.9 % (FLUSH) 0.9 %
30 SYRINGE (ML) INJECTION ONCE AS NEEDED
Status: DISCONTINUED | OUTPATIENT
Start: 2022-04-01 | End: 2022-04-05 | Stop reason: HOSPADM

## 2022-04-01 RX ORDER — ROCURONIUM BROMIDE 10 MG/ML
INJECTION, SOLUTION INTRAVENOUS AS NEEDED
Status: DISCONTINUED | OUTPATIENT
Start: 2022-04-01 | End: 2022-04-01 | Stop reason: SURG

## 2022-04-01 RX ORDER — SODIUM CHLORIDE 9 MG/ML
30 INJECTION, SOLUTION INTRAVENOUS CONTINUOUS
Status: DISCONTINUED | OUTPATIENT
Start: 2022-04-01 | End: 2022-04-05 | Stop reason: HOSPADM

## 2022-04-01 RX ORDER — ACETAMINOPHEN 325 MG/1
650 TABLET ORAL EVERY 4 HOURS
Status: DISCONTINUED | OUTPATIENT
Start: 2022-04-01 | End: 2022-04-02

## 2022-04-01 RX ORDER — METHADONE HYDROCHLORIDE 10 MG/ML
INJECTION, SOLUTION INTRAMUSCULAR; INTRAVENOUS; SUBCUTANEOUS AS NEEDED
Status: DISCONTINUED | OUTPATIENT
Start: 2022-04-01 | End: 2022-04-01 | Stop reason: SURG

## 2022-04-01 RX ORDER — MEPERIDINE HYDROCHLORIDE 25 MG/ML
25 INJECTION INTRAMUSCULAR; INTRAVENOUS; SUBCUTANEOUS EVERY 4 HOURS PRN
Status: ACTIVE | OUTPATIENT
Start: 2022-04-01 | End: 2022-04-01

## 2022-04-01 RX ORDER — MAGNESIUM SULFATE HEPTAHYDRATE 40 MG/ML
2 INJECTION, SOLUTION INTRAVENOUS AS NEEDED
Status: DISCONTINUED | OUTPATIENT
Start: 2022-04-01 | End: 2022-04-05 | Stop reason: HOSPADM

## 2022-04-01 RX ORDER — ACETAMINOPHEN 650 MG/1
650 SUPPOSITORY RECTAL EVERY 4 HOURS
Status: DISCONTINUED | OUTPATIENT
Start: 2022-04-01 | End: 2022-04-02

## 2022-04-01 RX ORDER — CYCLOBENZAPRINE HCL 10 MG
10 TABLET ORAL EVERY 8 HOURS PRN
Status: DISCONTINUED | OUTPATIENT
Start: 2022-04-02 | End: 2022-04-05 | Stop reason: HOSPADM

## 2022-04-01 RX ORDER — MIDAZOLAM HYDROCHLORIDE 1 MG/ML
INJECTION INTRAMUSCULAR; INTRAVENOUS AS NEEDED
Status: DISCONTINUED | OUTPATIENT
Start: 2022-04-01 | End: 2022-04-01 | Stop reason: SURG

## 2022-04-01 RX ORDER — ATORVASTATIN CALCIUM 20 MG/1
40 TABLET, FILM COATED ORAL NIGHTLY
Status: DISCONTINUED | OUTPATIENT
Start: 2022-04-01 | End: 2022-04-05 | Stop reason: HOSPADM

## 2022-04-01 RX ORDER — PANTOPRAZOLE SODIUM 40 MG/1
40 TABLET, DELAYED RELEASE ORAL EVERY MORNING
Status: DISCONTINUED | OUTPATIENT
Start: 2022-04-02 | End: 2022-04-02

## 2022-04-01 RX ORDER — ACETAMINOPHEN 325 MG/1
650 TABLET ORAL EVERY 4 HOURS PRN
Status: DISCONTINUED | OUTPATIENT
Start: 2022-04-02 | End: 2022-04-05 | Stop reason: SDUPTHER

## 2022-04-01 RX ORDER — ACETAMINOPHEN 160 MG/5ML
650 SOLUTION ORAL EVERY 4 HOURS
Status: DISCONTINUED | OUTPATIENT
Start: 2022-04-01 | End: 2022-04-02

## 2022-04-01 RX ORDER — ALBUMIN, HUMAN INJ 5% 5 %
1500 SOLUTION INTRAVENOUS AS NEEDED
Status: DISCONTINUED | OUTPATIENT
Start: 2022-04-01 | End: 2022-04-02

## 2022-04-01 RX ORDER — DEXMEDETOMIDINE HYDROCHLORIDE 4 UG/ML
.2-1.5 INJECTION INTRAVENOUS
Status: DISCONTINUED | OUTPATIENT
Start: 2022-04-01 | End: 2022-04-02

## 2022-04-01 RX ORDER — AMOXICILLIN 250 MG
2 CAPSULE ORAL 2 TIMES DAILY
Status: DISCONTINUED | OUTPATIENT
Start: 2022-04-01 | End: 2022-04-05 | Stop reason: HOSPADM

## 2022-04-01 RX ORDER — SODIUM CHLORIDE 9 MG/ML
INJECTION, SOLUTION INTRAVENOUS CONTINUOUS PRN
Status: DISCONTINUED | OUTPATIENT
Start: 2022-04-01 | End: 2022-04-01 | Stop reason: SURG

## 2022-04-01 RX ORDER — LORAZEPAM 1 MG/1
2 TABLET ORAL
Status: DISCONTINUED | OUTPATIENT
Start: 2022-04-01 | End: 2022-04-05 | Stop reason: HOSPADM

## 2022-04-01 RX ORDER — FENTANYL CITRATE 50 UG/ML
INJECTION, SOLUTION INTRAMUSCULAR; INTRAVENOUS AS NEEDED
Status: DISCONTINUED | OUTPATIENT
Start: 2022-04-01 | End: 2022-04-01 | Stop reason: SURG

## 2022-04-01 RX ORDER — METOCLOPRAMIDE HYDROCHLORIDE 5 MG/ML
10 INJECTION INTRAMUSCULAR; INTRAVENOUS EVERY 6 HOURS
Status: DISPENSED | OUTPATIENT
Start: 2022-04-01 | End: 2022-04-02

## 2022-04-01 RX ORDER — DOPAMINE HYDROCHLORIDE 160 MG/100ML
2-20 INJECTION, SOLUTION INTRAVENOUS CONTINUOUS PRN
Status: DISCONTINUED | OUTPATIENT
Start: 2022-04-01 | End: 2022-04-02

## 2022-04-01 RX ORDER — NITROGLYCERIN 20 MG/100ML
5-200 INJECTION INTRAVENOUS
Status: DISCONTINUED | OUTPATIENT
Start: 2022-04-01 | End: 2022-04-02

## 2022-04-01 RX ORDER — POLYETHYLENE GLYCOL 3350 17 G/17G
17 POWDER, FOR SOLUTION ORAL DAILY PRN
Status: DISCONTINUED | OUTPATIENT
Start: 2022-04-01 | End: 2022-04-05 | Stop reason: HOSPADM

## 2022-04-01 RX ORDER — PROPOFOL 10 MG/ML
VIAL (ML) INTRAVENOUS CONTINUOUS PRN
Status: DISCONTINUED | OUTPATIENT
Start: 2022-04-01 | End: 2022-04-01 | Stop reason: SURG

## 2022-04-01 RX ORDER — PROPOFOL 10 MG/ML
VIAL (ML) INTRAVENOUS AS NEEDED
Status: DISCONTINUED | OUTPATIENT
Start: 2022-04-01 | End: 2022-04-01 | Stop reason: SURG

## 2022-04-01 RX ORDER — BISACODYL 10 MG
10 SUPPOSITORY, RECTAL RECTAL DAILY PRN
Status: DISCONTINUED | OUTPATIENT
Start: 2022-04-02 | End: 2022-04-05 | Stop reason: HOSPADM

## 2022-04-01 RX ORDER — MAGNESIUM SULFATE 1 G/100ML
1 INJECTION INTRAVENOUS EVERY 8 HOURS
Status: DISPENSED | OUTPATIENT
Start: 2022-04-01 | End: 2022-04-02

## 2022-04-01 RX ORDER — PHENYLEPHRINE HCL IN 0.9% NACL 0.5 MG/5ML
.2-3 SYRINGE (ML) INTRAVENOUS CONTINUOUS PRN
Status: DISCONTINUED | OUTPATIENT
Start: 2022-04-01 | End: 2022-04-02

## 2022-04-01 RX ORDER — OXYCODONE HYDROCHLORIDE 5 MG/1
10 TABLET ORAL EVERY 4 HOURS PRN
Status: DISCONTINUED | OUTPATIENT
Start: 2022-04-01 | End: 2022-04-02

## 2022-04-01 RX ORDER — CHLORHEXIDINE GLUCONATE 0.12 MG/ML
15 RINSE ORAL EVERY 12 HOURS
Status: DISCONTINUED | OUTPATIENT
Start: 2022-04-01 | End: 2022-04-05 | Stop reason: HOSPADM

## 2022-04-01 RX ORDER — CALCIUM CHLORIDE 100 MG/ML
INJECTION INTRAVENOUS; INTRAVENTRICULAR AS NEEDED
Status: DISCONTINUED | OUTPATIENT
Start: 2022-04-01 | End: 2022-04-01 | Stop reason: SURG

## 2022-04-01 RX ORDER — MORPHINE SULFATE 2 MG/ML
1 INJECTION, SOLUTION INTRAMUSCULAR; INTRAVENOUS EVERY 4 HOURS PRN
Status: DISCONTINUED | OUTPATIENT
Start: 2022-04-01 | End: 2022-04-05 | Stop reason: HOSPADM

## 2022-04-01 RX ORDER — CEFAZOLIN SODIUM 2 G/100ML
2 INJECTION, SOLUTION INTRAVENOUS EVERY 8 HOURS
Status: COMPLETED | OUTPATIENT
Start: 2022-04-01 | End: 2022-04-03

## 2022-04-01 RX ORDER — ACETAMINOPHEN 160 MG/5ML
650 SOLUTION ORAL EVERY 4 HOURS PRN
Status: DISCONTINUED | OUTPATIENT
Start: 2022-04-02 | End: 2022-04-05 | Stop reason: SDUPTHER

## 2022-04-01 RX ORDER — LORAZEPAM 2 MG/ML
0.5 INJECTION INTRAMUSCULAR
Status: DISCONTINUED | OUTPATIENT
Start: 2022-04-01 | End: 2022-04-05 | Stop reason: HOSPADM

## 2022-04-01 RX ORDER — NITROGLYCERIN 20 MG/100ML
INJECTION INTRAVENOUS AS NEEDED
Status: DISCONTINUED | OUTPATIENT
Start: 2022-04-01 | End: 2022-04-01 | Stop reason: SURG

## 2022-04-01 RX ORDER — DEXMEDETOMIDINE HYDROCHLORIDE 4 UG/ML
INJECTION, SOLUTION INTRAVENOUS CONTINUOUS PRN
Status: DISCONTINUED | OUTPATIENT
Start: 2022-04-01 | End: 2022-04-01 | Stop reason: SURG

## 2022-04-01 RX ORDER — KETAMINE HYDROCHLORIDE 10 MG/ML
INJECTION INTRAMUSCULAR; INTRAVENOUS AS NEEDED
Status: DISCONTINUED | OUTPATIENT
Start: 2022-04-01 | End: 2022-04-01 | Stop reason: SURG

## 2022-04-01 RX ORDER — CHLORHEXIDINE GLUCONATE 0.12 MG/ML
15 RINSE ORAL ONCE
Status: COMPLETED | OUTPATIENT
Start: 2022-04-01 | End: 2022-04-01

## 2022-04-01 RX ORDER — MIDAZOLAM HYDROCHLORIDE 1 MG/ML
2 INJECTION INTRAMUSCULAR; INTRAVENOUS
Status: DISCONTINUED | OUTPATIENT
Start: 2022-04-01 | End: 2022-04-02

## 2022-04-01 RX ORDER — MAGNESIUM SULFATE HEPTAHYDRATE 40 MG/ML
4 INJECTION, SOLUTION INTRAVENOUS AS NEEDED
Status: DISCONTINUED | OUTPATIENT
Start: 2022-04-01 | End: 2022-04-05 | Stop reason: HOSPADM

## 2022-04-01 RX ORDER — PAPAVERINE HYDROCHLORIDE 30 MG/ML
INJECTION INTRAMUSCULAR; INTRAVENOUS AS NEEDED
Status: DISCONTINUED | OUTPATIENT
Start: 2022-04-01 | End: 2022-04-01 | Stop reason: HOSPADM

## 2022-04-01 RX ORDER — LORAZEPAM 2 MG/ML
2 INJECTION INTRAMUSCULAR
Status: DISCONTINUED | OUTPATIENT
Start: 2022-04-01 | End: 2022-04-05 | Stop reason: HOSPADM

## 2022-04-01 RX ORDER — ACETAMINOPHEN 650 MG/1
650 SUPPOSITORY RECTAL EVERY 4 HOURS PRN
Status: DISCONTINUED | OUTPATIENT
Start: 2022-04-02 | End: 2022-04-05 | Stop reason: SDUPTHER

## 2022-04-01 RX ORDER — NOREPINEPHRINE BITARTRATE 1 MG/ML
INJECTION, SOLUTION INTRAVENOUS CONTINUOUS PRN
Status: DISCONTINUED | OUTPATIENT
Start: 2022-04-01 | End: 2022-04-01 | Stop reason: SURG

## 2022-04-01 RX ORDER — MAGNESIUM HYDROXIDE 1200 MG/15ML
LIQUID ORAL AS NEEDED
Status: DISCONTINUED | OUTPATIENT
Start: 2022-04-01 | End: 2022-04-01 | Stop reason: HOSPADM

## 2022-04-01 RX ORDER — LORAZEPAM 1 MG/1
1 TABLET ORAL EVERY 4 HOURS PRN
Status: DISCONTINUED | OUTPATIENT
Start: 2022-04-01 | End: 2022-04-05 | Stop reason: HOSPADM

## 2022-04-01 RX ORDER — BUPIVACAINE HCL/0.9 % NACL/PF 0.125 %
PLASTIC BAG, INJECTION (ML) EPIDURAL AS NEEDED
Status: DISCONTINUED | OUTPATIENT
Start: 2022-04-01 | End: 2022-04-01 | Stop reason: SURG

## 2022-04-01 RX ORDER — NICARDIPINE HYDROCHLORIDE 2.5 MG/ML
INJECTION INTRAVENOUS AS NEEDED
Status: DISCONTINUED | OUTPATIENT
Start: 2022-04-01 | End: 2022-04-01 | Stop reason: SURG

## 2022-04-01 RX ORDER — MILRINONE LACTATE 0.2 MG/ML
.25-.75 INJECTION, SOLUTION INTRAVENOUS CONTINUOUS PRN
Status: DISCONTINUED | OUTPATIENT
Start: 2022-04-01 | End: 2022-04-02

## 2022-04-01 RX ORDER — BISACODYL 5 MG/1
10 TABLET, DELAYED RELEASE ORAL DAILY PRN
Status: DISCONTINUED | OUTPATIENT
Start: 2022-04-01 | End: 2022-04-05 | Stop reason: HOSPADM

## 2022-04-01 RX ORDER — MORPHINE SULFATE 2 MG/ML
4 INJECTION, SOLUTION INTRAMUSCULAR; INTRAVENOUS
Status: DISCONTINUED | OUTPATIENT
Start: 2022-04-01 | End: 2022-04-02

## 2022-04-01 RX ORDER — ASPIRIN 81 MG/1
81 TABLET ORAL DAILY
Status: DISCONTINUED | OUTPATIENT
Start: 2022-04-02 | End: 2022-04-05 | Stop reason: HOSPADM

## 2022-04-01 RX ORDER — HEPARIN SODIUM 1000 [USP'U]/ML
INJECTION, SOLUTION INTRAVENOUS; SUBCUTANEOUS AS NEEDED
Status: DISCONTINUED | OUTPATIENT
Start: 2022-04-01 | End: 2022-04-01 | Stop reason: SURG

## 2022-04-01 RX ORDER — PANTOPRAZOLE SODIUM 40 MG/10ML
40 INJECTION, POWDER, LYOPHILIZED, FOR SOLUTION INTRAVENOUS ONCE
Status: COMPLETED | OUTPATIENT
Start: 2022-04-01 | End: 2022-04-01

## 2022-04-01 RX ORDER — NALOXONE HCL 0.4 MG/ML
0.4 VIAL (ML) INJECTION
Status: DISCONTINUED | OUTPATIENT
Start: 2022-04-01 | End: 2022-04-05 | Stop reason: HOSPADM

## 2022-04-01 RX ORDER — ONDANSETRON 2 MG/ML
4 INJECTION INTRAMUSCULAR; INTRAVENOUS EVERY 6 HOURS PRN
Status: DISCONTINUED | OUTPATIENT
Start: 2022-04-01 | End: 2022-04-02

## 2022-04-01 RX ORDER — NOREPINEPHRINE BIT/0.9 % NACL 8 MG/250ML
.02-.3 INFUSION BOTTLE (ML) INTRAVENOUS CONTINUOUS PRN
Status: DISCONTINUED | OUTPATIENT
Start: 2022-04-01 | End: 2022-04-02

## 2022-04-01 RX ADMIN — KETAMINE HYDROCHLORIDE 20 MG: 10 INJECTION INTRAMUSCULAR; INTRAVENOUS at 07:47

## 2022-04-01 RX ADMIN — FENTANYL CITRATE 50 MCG: 0.05 INJECTION, SOLUTION INTRAMUSCULAR; INTRAVENOUS at 10:08

## 2022-04-01 RX ADMIN — ALPRAZOLAM 0.25 MG: 0.5 TABLET ORAL at 06:06

## 2022-04-01 RX ADMIN — FENTANYL CITRATE 100 MCG: 0.05 INJECTION, SOLUTION INTRAMUSCULAR; INTRAVENOUS at 06:59

## 2022-04-01 RX ADMIN — ALBUMIN HUMAN 250 ML: 0.05 INJECTION, SOLUTION INTRAVENOUS at 12:31

## 2022-04-01 RX ADMIN — IPRATROPIUM BROMIDE AND ALBUTEROL SULFATE 3 ML: 2.5; .5 SOLUTION RESPIRATORY (INHALATION) at 19:34

## 2022-04-01 RX ADMIN — DOCUSATE SODIUM 50MG AND SENNOSIDES 8.6MG 2 TABLET: 8.6; 5 TABLET, FILM COATED ORAL at 20:19

## 2022-04-01 RX ADMIN — KETAMINE HYDROCHLORIDE 20 MG: 10 INJECTION INTRAMUSCULAR; INTRAVENOUS at 09:39

## 2022-04-01 RX ADMIN — MUPIROCIN 1 APPLICATION: 20 OINTMENT TOPICAL at 06:05

## 2022-04-01 RX ADMIN — FUROSEMIDE 40 MG: 10 INJECTION, SOLUTION INTRAMUSCULAR; INTRAVENOUS at 18:02

## 2022-04-01 RX ADMIN — ROCURONIUM BROMIDE 20 MG: 50 INJECTION INTRAVENOUS at 09:38

## 2022-04-01 RX ADMIN — SODIUM CHLORIDE: 9 INJECTION, SOLUTION INTRAVENOUS at 06:53

## 2022-04-01 RX ADMIN — METHADONE HYDROCHLORIDE 10 MG: 10 INJECTION, SOLUTION INTRAMUSCULAR; INTRAVENOUS; SUBCUTANEOUS at 07:45

## 2022-04-01 RX ADMIN — SODIUM CHLORIDE 30 ML/HR: 9 INJECTION, SOLUTION INTRAVENOUS at 11:44

## 2022-04-01 RX ADMIN — HEPARIN SODIUM 7000 UNITS: 1000 INJECTION INTRAVENOUS; SUBCUTANEOUS at 08:53

## 2022-04-01 RX ADMIN — CEFAZOLIN SODIUM 2 G: 2 INJECTION, SOLUTION INTRAVENOUS at 22:49

## 2022-04-01 RX ADMIN — CEFAZOLIN SODIUM 2 G: 2 INJECTION, SOLUTION INTRAVENOUS at 18:00

## 2022-04-01 RX ADMIN — ROCURONIUM BROMIDE 20 MG: 50 INJECTION INTRAVENOUS at 08:33

## 2022-04-01 RX ADMIN — FENTANYL CITRATE 100 MCG: 0.05 INJECTION, SOLUTION INTRAMUSCULAR; INTRAVENOUS at 07:51

## 2022-04-01 RX ADMIN — DEXMEDETOMIDINE HYDROCHLORIDE 0.5 MCG/KG/HR: 4 INJECTION, SOLUTION INTRAVENOUS at 07:20

## 2022-04-01 RX ADMIN — MORPHINE SULFATE 4 MG: 2 INJECTION, SOLUTION INTRAMUSCULAR; INTRAVENOUS at 13:39

## 2022-04-01 RX ADMIN — Medication 3 ML: at 20:20

## 2022-04-01 RX ADMIN — METOPROLOL TARTRATE 12.5 MG: 25 TABLET, FILM COATED ORAL at 06:05

## 2022-04-01 RX ADMIN — NITROGLYCERIN 40 MCG: 20 INJECTION INTRAVENOUS at 09:14

## 2022-04-01 RX ADMIN — ROCURONIUM BROMIDE 20 MG: 50 INJECTION INTRAVENOUS at 07:46

## 2022-04-01 RX ADMIN — ALBUMIN HUMAN 250 ML: 0.05 INJECTION, SOLUTION INTRAVENOUS at 13:37

## 2022-04-01 RX ADMIN — HEPARIN SODIUM 5000 UNITS: 1000 INJECTION INTRAVENOUS; SUBCUTANEOUS at 08:45

## 2022-04-01 RX ADMIN — MIDAZOLAM 1 MG: 1 INJECTION INTRAMUSCULAR; INTRAVENOUS at 06:48

## 2022-04-01 RX ADMIN — IPRATROPIUM BROMIDE AND ALBUTEROL SULFATE 3 ML: 2.5; .5 SOLUTION RESPIRATORY (INHALATION) at 15:49

## 2022-04-01 RX ADMIN — NICARDIPINE HYDROCHLORIDE 0.3 MG: 2.5 INJECTION, SOLUTION INTRAVENOUS at 07:55

## 2022-04-01 RX ADMIN — CALCIUM CHLORIDE 0.5 G: 100 INJECTION, SOLUTION INTRAVENOUS at 08:57

## 2022-04-01 RX ADMIN — HEPARIN SODIUM 15000 UNITS: 1000 INJECTION INTRAVENOUS; SUBCUTANEOUS at 08:31

## 2022-04-01 RX ADMIN — PHENYLEPHRINE-NACL PREF SYR 1 MG/10ML-0.9% (100 MCG/ML) 200 MCG: 1-0.9/1 SOLUTION PREFILLED SYRINGE at 09:35

## 2022-04-01 RX ADMIN — MORPHINE SULFATE 4 MG: 2 INJECTION, SOLUTION INTRAMUSCULAR; INTRAVENOUS at 14:45

## 2022-04-01 RX ADMIN — ACETAMINOPHEN 650 MG: 325 TABLET ORAL at 20:14

## 2022-04-01 RX ADMIN — OXYCODONE 10 MG: 5 TABLET ORAL at 17:17

## 2022-04-01 RX ADMIN — NICARDIPINE HYDROCHLORIDE 0.2 MG: 2.5 INJECTION, SOLUTION INTRAVENOUS at 09:10

## 2022-04-01 RX ADMIN — MIDAZOLAM 1 MG: 1 INJECTION INTRAMUSCULAR; INTRAVENOUS at 06:50

## 2022-04-01 RX ADMIN — CEFAZOLIN SODIUM 2 G: 2 INJECTION, SOLUTION INTRAVENOUS at 07:40

## 2022-04-01 RX ADMIN — INSULIN HUMAN 2 UNITS/HR: 1 INJECTION, SOLUTION INTRAVENOUS at 13:11

## 2022-04-01 RX ADMIN — CHLORHEXIDINE GLUCONATE 0.12% ORAL RINSE 15 ML: 1.2 LIQUID ORAL at 20:14

## 2022-04-01 RX ADMIN — NICARDIPINE HYDROCHLORIDE 0.2 MG: 2.5 INJECTION, SOLUTION INTRAVENOUS at 09:11

## 2022-04-01 RX ADMIN — MUPIROCIN 1 APPLICATION: 20 OINTMENT TOPICAL at 20:15

## 2022-04-01 RX ADMIN — MORPHINE SULFATE 4 MG: 2 INJECTION, SOLUTION INTRAMUSCULAR; INTRAVENOUS at 15:25

## 2022-04-01 RX ADMIN — KETAMINE HYDROCHLORIDE 10 MG: 10 INJECTION INTRAMUSCULAR; INTRAVENOUS at 06:51

## 2022-04-01 RX ADMIN — CALCIUM CHLORIDE 0.5 G: 100 INJECTION, SOLUTION INTRAVENOUS at 08:35

## 2022-04-01 RX ADMIN — PHENYLEPHRINE HYDROCHLORIDE 0.3 MCG/KG/MIN: 10 INJECTION INTRAVENOUS at 08:59

## 2022-04-01 RX ADMIN — NOREPINEPHRINE BITARTRATE 0.03 MCG/KG/MIN: 1 INJECTION, SOLUTION, CONCENTRATE INTRAVENOUS at 07:21

## 2022-04-01 RX ADMIN — NICARDIPINE HYDROCHLORIDE 5 MG/HR: 2.5 INJECTION, SOLUTION INTRAVENOUS at 07:52

## 2022-04-01 RX ADMIN — PROPOFOL 100 MG: 10 INJECTION, EMULSION INTRAVENOUS at 06:59

## 2022-04-01 RX ADMIN — PANTOPRAZOLE SODIUM 40 MG: 40 INJECTION, POWDER, FOR SOLUTION INTRAVENOUS at 11:28

## 2022-04-01 RX ADMIN — NITROGLYCERIN 40 MCG: 20 INJECTION INTRAVENOUS at 07:52

## 2022-04-01 RX ADMIN — ACETAMINOPHEN 650 MG: 325 TABLET ORAL at 06:05

## 2022-04-01 RX ADMIN — PANTOPRAZOLE SODIUM 40 MG: 40 TABLET, DELAYED RELEASE ORAL at 06:05

## 2022-04-01 RX ADMIN — OXYCODONE 10 MG: 5 TABLET ORAL at 22:48

## 2022-04-01 RX ADMIN — ATORVASTATIN CALCIUM 40 MG: 20 TABLET, FILM COATED ORAL at 20:14

## 2022-04-01 RX ADMIN — CHLORHEXIDINE GLUCONATE 0.12% ORAL RINSE 15 ML: 1.2 LIQUID ORAL at 06:05

## 2022-04-01 RX ADMIN — PROTAMINE SULFATE 200 MG: 10 INJECTION, SOLUTION INTRAVENOUS at 09:54

## 2022-04-01 RX ADMIN — MAGNESIUM SULFATE 1 G: 1 INJECTION INTRAVENOUS at 20:15

## 2022-04-01 RX ADMIN — MORPHINE SULFATE 4 MG: 2 INJECTION, SOLUTION INTRAMUSCULAR; INTRAVENOUS at 18:05

## 2022-04-01 RX ADMIN — ROCURONIUM BROMIDE 60 MG: 50 INJECTION INTRAVENOUS at 06:59

## 2022-04-01 RX ADMIN — Medication 50 MCG/KG/MIN: at 07:20

## 2022-04-01 NOTE — OP NOTE
Operative Note    Date of Dictation: 04/01/22    Date of Procedure: 04/01/22    Referring Physician: Bong Pandey*    Preoperative diagnosis: Multivessel CAD    Postoperative diagnosis: Same    Procedure:   1. CABG x 3 (LIMA to LAD, SVG to OM1, SVG to OM2) Off Pump.  2. EVH of the right legs    Surgeon: Colten Zamora MD     Assistants: MAR Mckenzie (main) and Rani Jimenez PAC was responsible for performing the following activities: Cardiac Surgery First assist, Endoscopic Vein [address] for CABG, surgical wound closure and their skilled assistance was necessary for the success of this case.     Anesthesia: General endotracheal anesthesia and MARTHA    Findings:  The saphenous vein was harvested endoscopically form the right  leg. The vein had a diameter of 3.5 mm and was of good quality. The coronaries had a diameter of 2 mm and were of good quality.      Estimated Blood Loss:  400 mL of Cell Saver returned.    STS Data: The STS Risk and all risks and alternatives were discussed with the patient and family.  Counseling was done regarding abuse of tobacco, alcohol and drugs as needed. They understand and wish to proceed. The antibiotics and b blockers were given in the STS required window.          Description of the procedure:     The patient was placed supine on the operative table. General anesthesia was given and lines placed. The patient was prepped and draped using the usual sterile technique. A median sternotomy was performed with a scalpel and the layers carried down to the sternum using the electrocautery. The sternum was split in the midline using a vertical oscillating saw. Hemostasis was achieved. The LIMA was harvested skeletonized and prepared with papaverine. It was of good quality. 300 units/kg of IV heparin was given to an ACT over 400. A Favaloro retractor was placed and the mediastinum exposed, the pericardium was opened and the edges tacked to the wound. Using the Maquet  device the operation was done off pump. The LIMA was sewn to the distal LAD with 7-0 Prolene.  2 veins were anastomosed to the ascending aorta with 6-0 Prolene and marked with washers using the Heartstring device. The first vein was sewn to the obtuse marginal one of the circunflex artery with 7-0 Prolene.  The next vein was sewn to obtuse marginal two of the circunflex artery with 7-0 Prolene. All anastomoses were checked and had good flow and morphology. The left pleural space was suctioned and the lungs ventilated.The matching dose of protamine was given and the aortic cannula removed as well. AV temporary wires and pleural and mediastinal chest tubes were placed and the wound sprayed with platelet rich plasma. The sternum was closed with single and double wires and soft tissue in layers of reabsorbable material. The wounds were covered with sterile dressings.       Specimen removed:  none    Complications:  none           Disposition: Cardiovascular recovery room           Condition: Critical but stable.

## 2022-04-01 NOTE — ANESTHESIA POSTPROCEDURE EVALUATION
Patient: Osman Aparicio    Procedure Summary     Date: 04/01/22 Room / Location: [address] CARDIOVASCULAR OPERATING ROOM    Anesthesia Start: 0645 Anesthesia Stop: 1117    Procedures:       STERNOTOMY, CORONARY ARTERY BYPASS UTILIZINGTHE RT SAPHENOUS VEIN WITH LEFT INTERNAL MAMMARY ARTERY GRAFT X3 OFF PUMP, PRP (N/A Chest)      TRANSESOPHAGEAL ECHOCARDIOGRAM WITH ANESTHESIA (N/A Chest) Diagnosis:       Coronary artery disease involving native coronary artery of native heart with other form of angina pectoris (HCC)      Abnormal findings on diagnostic imaging of heart and coronary circulation      (Coronary artery disease involving native coronary artery of native heart with other form of angina pectoris (HCC) [I25.118])      (Abnormal findings on diagnostic imaging of heart and coronary circulation [R93.1])    Surgeons: Colten Zamora MD Provider: Neto Byrne MD    Anesthesia Type: general, [place], CVL, PAC ASA Status: 4          Anesthesia Type: general, [place], CVL, PAC    Vitals  Vitals Value Taken Time   BP 97/75 04/01/22 1112   Temp 35 °C (95 °F) 04/01/22 1116   Pulse 80 04/01/22 1116   Resp     SpO2 100 % 04/01/22 1116   Vitals shown include unvalidated device data.        Post Anesthesia Care and Evaluation    Patient location during evaluation: bedside  Patient participation: complete - patient cannot participate  Level of consciousness: obtunded/minimal responses  Pain management: adequate  Airway patency: Intubated.  Anesthetic complications: No anesthetic complications  PONV Status: controlled  Cardiovascular status: acceptable  Respiratory status: ETT  Hydration status: acceptable

## 2022-04-01 NOTE — NURSING NOTE
Patient scheduled for surgery this morning. Reviewed chart and spoke with RN, Casi Gordon consulted for alcohol abuse but pt. Has declined [facility] resources. CIWA scores have been 0. No alcohol withdrawal noted since hospitalized. Patient has been advised to stop drinking alcohol.     Access will sign off but please contact if further assistance needed/if patient wants [facility] resources.

## 2022-04-01 NOTE — ANESTHESIA PROCEDURE NOTES
Airway  Urgency: elective    Date/Time: 4/1/2022 7:04 AM  Airway not difficult    General Information and Staff    Patient location during procedure: OR  Anesthesiologist: Neto Byrne MD    Indications and Patient Condition  Indications for airway management: airway protection    Preoxygenated: yes  MILS maintained throughout  Mask difficulty assessment: 2 - vent by mask + OA or adjuvant +/- NMBA    Final Airway Details  Final airway type: endotracheal airway      Successful airway: ETT  Cuffed: yes   Successful intubation technique: direct laryngoscopy  Endotracheal tube insertion site: oral  Blade: Sharri  Blade size: 3  ETT size (mm): 8.0  Cormack-Lehane Classification: grade I - full view of glottis  Placement verified by: chest auscultation and capnometry   Measured from: teeth  ETT/EBT  to teeth (cm): 21  Number of attempts at approach: 1  Assessment: lips, teeth, and gum same as pre-op and atraumatic intubation

## 2022-04-01 NOTE — ANESTHESIA PROCEDURE NOTES
Arterial Line      Patient location during procedure: OR  Start time: 4/1/2022 6:52 AM  Stop Time:4/1/2022 6:56 AM       Line placed for respiratory failure, hemodynamic monitoring and ABGs/Labs/ISTAT.  Performed By   Anesthesiologist: Neto Byrne MD  Preanesthetic Checklist  Completed: patient identified, IV checked, site marked, risks and benefits discussed, surgical consent, monitors and equipment checked, pre-op evaluation and timeout performed  Arterial Line Prep   Sterile Tech: cap, gloves and sterile barriers  Prep: ChloraPrep  Patient monitoring: EKG, continuous pulse oximetry and blood pressure monitoring  Arterial Line Procedure   Laterality:left  Location:  radial artery  Catheter size: 20 G   Guidance: ultrasound guided  PROCEDURE NOTE/ULTRASOUND INTERPRETATION.  Using ultrasound guidance the potential vascular sites for insertion of the catheter were visualized to determine the patency of the vessel to be used for vascular access.  After selecting the appropriate site for insertion, the needle was visualized under ultrasound being inserted into the radial artery, followed by ultrasound confirmation of wire and catheter placement. There were no abnormalities seen on ultrasound; an image was taken; and the patient tolerated the procedure with no complications.   Number of attempts: 1  Successful placement: yes  Post Assessment   Dressing Type: wrist guard applied, secured with tape and occlusive dressing applied.   Complications no  Circ/Move/Sens Assessment: normal and unchanged.   Patient Tolerance: patient tolerated the procedure well with no apparent complications

## 2022-04-01 NOTE — ANESTHESIA PROCEDURE NOTES
Central Line      Patient location during procedure: OR  Start time: 4/1/2022 7:02 AM  Stop Time:4/1/2022 7:12 AM  Indications: central pressure monitoring and vascular access  Staff  Anesthesiologist: Neto Byrne MD  Preanesthetic Checklist  Completed: patient identified, IV checked, site marked, risks and benefits discussed, surgical consent, monitors and equipment checked, pre-op evaluation and timeout performed  Central Line Prep  Sterile Tech:gloves, cap, gown, mask and sterile barriers  Prep: chloraprep  Patient monitoring: blood pressure monitoring, continuous pulse oximetry and EKG  Central Line Procedure  Laterality:right  Location:internal jugular  Catheter Type:double lumen and MAC  Catheter Size:9 Fr  Guidance:ultrasound guided  PROCEDURE NOTE/ULTRASOUND INTERPRETATION.  Using ultrasound guidance the potential vascular sites for insertion of the catheter were visualized to determine the patency of the vessel to be used for vascular access.  After selecting the appropriate site for insertion, the needle was visualized under ultrasound being inserted into the internal jugular vein, followed by ultrasound confirmation of wire and catheter placement. There were no abnormalities seen on ultrasound; an image was taken; and the patient tolerated the procedure with no complications. Images: still images obtained, printed/placed on chart  Assessment  Post procedure:biopatch applied, line sutured and occlusive dressing applied  Assessement:blood return through all ports, free fluid flow and chest x-ray ordered  Complications:no  Patient Tolerance:patient tolerated the procedure well with no apparent complications

## 2022-04-01 NOTE — ANESTHESIA PREPROCEDURE EVALUATION
Anesthesia Evaluation     Patient summary reviewed and Nursing notes reviewed   no history of anesthetic complications:  NPO Solid Status: > 8 hours  NPO Liquid Status: > 2 hours           Airway   Mallampati: II  TM distance: >3 FB  Neck ROM: full  Dental    (+) poor dentition    Pulmonary    (+) COPD, shortness of breath,   Cardiovascular     ECG reviewed  Patient on routine beta blocker and Beta blocker given within 24 hours of surgery    (+) hypertension, past MI , CAD, CHF Diastolic >=55% and Systolic <55%, hyperlipidemia,     ROS comment: TTE:  · The left ventricular cavity is mildly dilated.  · Calculated left ventricular EF = 55.7% Estimated left ventricular EF was in agreement with the calculated left ventricular EF. Left ventricular systolic function is normal.  · There is severe hypokinesis of the septal apical wall. There is hypokinesis of the lateral and lateral apical walls. The inferior wall also appears to be hypokinetic  · Left ventricular diastolic function is consistent with (grade III w/high LAP) reversible restrictive pattern.  · Left atrial volume is moderately increased.  · There is mild calcification of the aortic valve mainly affecting the non-coronary and right coronary cusp(s).  · Trace to mild aortic valve regurgitation is present.  · Moderate mitral valve regurgitation is present with a centrally-directed jet noted.  · Trace to mild tricuspid valve regurgitation is present.  · Estimated right ventricular systolic pressure from tricuspid regurgitation is moderately elevated (45-55 mmHg).  · There is a trivial pericardial effusion.        Neuro/Psych  (+) psychiatric history Anxiety,    GI/Hepatic/Renal/Endo - negative ROS     Musculoskeletal (-) negative ROS    Abdominal    Substance History   (+) alcohol use,      OB/GYN          Other        ROS/Med Hx Other: Porcelain aorta                Anesthesia Plan    ASA 4     general, [place], CVL and PAC   (I have reviewed the patient's history  with the patient and the chart, including all pertinent laboratory results and imaging. I have explained the risks of anesthesia including but not limited to dental damage, corneal abrasion, nerve injury, MI, stroke, and death. Questions asked and answered. Anesthetic plan discussed with patient and team as indicated. Patient expressed understanding of the above.  )  intravenous induction   Postoperative Plan: Expected vent after surgery  Anesthetic plan, all risks, benefits, and alternatives have been provided, discussed and informed consent has been obtained with: patient.        CODE STATUS:    Level Of Support Discussed With: Patient  Code Status (Patient has no pulse and is not breathing): CPR (Attempt to Resuscitate)  Medical Interventions (Patient has pulse or is breathing): Full Support

## 2022-04-01 NOTE — PROGRESS NOTES
Vascular Surgery    Patient in OR for coronary bypass, and not seen this morning.  CT angiogram performed yesterday reviewed.  Has 5.1 cm abdominal aortic aneurysm with tortuous proximal infrarenal aortic neck, as previously suspected based on CTA of the chest.  Mild dilatation of the right and left common iliac arteries, with suitable access from the common femoral and external iliac arteries.  The JAVAN is patent.  The right renal artery is small, and the lower pole of the right kidney is atrophic, with a cystic type mass.  The left renal artery is large and longer, and there is a suitable landing zone between the superior mesenteric artery and lower left renal artery.  He may be a candidate for a fenestrated aortic endograft.  Will review images with  and follow-up Monday.

## 2022-04-01 NOTE — ANESTHESIA PROCEDURE NOTES
Diagnostic Intraop MARTHA    Procedure Performed: Diagnostic Intraop MARTHA     Start Time:        End Time:        General Procedure Information  Physician Requesting Echo: Colten Zamora MD  Location performed:  OR  Intubated  Bite block placed  Heart visualized  Probe Insertion:  Easy  Probe Type:  Multiplane  Modalities:  2D only, color flow mapping, continuous wave Doppler and pulse wave Doppler    Echocardiographic and Doppler Measurements    Ventricles    Left Ventricle:  Global Function mildly impaired.  Ejection Fraction 45%.          Valves    Aortic Valve:  Mean Gradient: 4 mmHg.  Pressure Half-time: 1000 ms.                        Diastolic Function Measurements:  Diastolic Dysfunction Grade= indeterminate  E= ms  A= ms  E/A Ratio=   DT= ms  S/D=  IVRT=        Anesthesia Information  Performed Personally  Anesthesiologist:  Neto Byrne MD      Echocardiogram Comments:       66 year old male with CAD presents for off pump CABG.  - Mildly dilated LV, mildly reduced LV function (LVEF 40-45%, +anterior/septal/inferior wall hypokinesis)  - Normal RV size, function  - Mild AI (mildly calcific AV annulus with restrictive RCC, narrow central jet, AI 1/2 time >500 ms)  - Trace-mild MR, trace TR, trace PI  - Significant atheroma throughout ascending, arch, descending aorta  - No intracardiac shunt  - No pericardial/pleural effusion  - No aortic dissection     S/p CABG x3:  - Low-normal LVEF (improvement in anterior/septal/inferior wall motion)  - Normal RV size, function  - Unchanged valves  - No pericardial/pleural effusion  - No aortic dissection     Findings discussed with surgical team

## 2022-04-01 NOTE — PROGRESS NOTES
"  Daily Progress Note.   [facility] CARDIO RECOVERY  4/1/2022    Patient:  Name:  Osman Aparicio  MRN:  [id number]  1955  66 y.o.  male         CC: COPD management    Interval History:    S/p cabg today extubated postoperatively without difficulty.  Patient is awake conversant says there is some pain however not excessive.  No worsening shortness of breath or chest tightness.    Physical Exam:  BP 97/75   Pulse 80   Temp 95.18 °F (35.1 °C)   Resp 16   Ht 180.3 cm (70.98\")   Wt 74.6 kg (164 lb 8 oz)   SpO2 100%   BMI 22.95 kg/m²   Body mass index is 22.95 kg/m².    Intake/Output Summary (Last 24 hours) at 4/1/2022 1739  Last data filed at 4/1/2022 1400  Gross per 24 hour   Intake 3090 ml   Output 1804 ml   Net 1286 ml     General appearance: Nontoxic, conversant   Eyes: anicteric sclerae, moist conjunctivae;  HENT: Atraumatic; oropharynx clear with moist mucous membranes  Neck: Trachea midline; right-sided PA cath  Lungs: Bilateral air entry no rhonchi no wheeze at present time unlabored respiratory effort positive chest tubes  CV: RRR, no rub midline incision bandaged  Abdomen: Bowel sounds hypoactive nonrigid  Extremities: No peripheral edema or extremity lymphadenopathy  Skin: Warm dry  Psych: Appropriate affect, alert and oriented   Neuro moves all ext, speech intact    Data Review:  Notable Labs:  Results from last 7 days   Lab Units 04/01/22  1447 04/01/22  1117 04/01/22  1009 04/01/22  0933 04/01/22  0859 04/01/22  0838 04/01/22  0736 04/01/22  0305 03/30/22  2237 03/30/22  0800 03/29/22  0718 03/28/22  1030   WBC 10*3/mm3 21.84* 33.31*  --   --   --   --   --  8.40 9.15 8.87 9.12 14.46*   HEMOGLOBIN g/dL 10.7* 11.2*  --   --   --   --   --  9.5* 9.4* 8.9* 7.9* 8.1*   HEMOGLOBIN, POC g/dL  --   --  8.5* 8.8* 8.5* 9.2* 8.5*  --   --   --   --   --    PLATELETS 10*3/mm3 271 309  --   --   --   --   --  322 [phone] 716     Results from last 7 days   Lab Units 04/01/22  1447 " 04/01/22  1117 04/01/22  0305 03/30/22 2237 03/30/22  0800 03/29/22  0719 03/28/22  0807   SODIUM mmol/L 135* 135* 135* 136 137 141 133*   POTASSIUM mmol/L 5.3* 5.0 4.2 3.8 3.7 4.6 3.0*   CHLORIDE mmol/L 101 102 95* 95* 97* 91* 93*   CO2 mmol/L 25.0 24.0 30.0* 29.0 30.0* 27.6 27.2   BUN mg/dL 28* 29* 33* 28* 25* 22 16   CREATININE mg/dL 1.14 1.09 1.28* 1.23 1.16 1.11 1.09   GLUCOSE mg/dL 137* 161* 111* 103* 98 100* 107*   CALCIUM mg/dL 8.9 8.9 9.4 9.2 9.2 9.0 9.0   MAGNESIUM mg/dL 2.2 2.1  --   --   --   --  2.2   PHOSPHORUS mg/dL 4.7* 4.3  --   --   --   --   --    Estimated Creatinine Clearance: 67.3 mL/min (by C-G formula based on SCr of 1.14 mg/dL).    Results from last 7 days   Lab Units 04/01/22  1447 04/01/22  1117 04/01/22 0305 03/30/22 2237 03/30/22  0800 03/29/22  0719 03/27/22  1013 03/26/22  1109 03/26/22  0946   AST (SGOT) U/L  --   --   --  17 13 10   < >  --  19   ALT (SGPT) U/L  --   --   --  17 14 12   < >  --  12   PROCALCITONIN ng/mL  --   --   --   --   --   --   --   --  0.06   FERRITIN ng/mL  --   --   --   --   --   --   --  528.00*  --    D DIMER QUANT MCGFEU/mL  --   --   --   --   --   --   --   --  0.75*   PLATELETS 10*3/mm3 271 309 322 346 320  --    < >  --  262    < > = values in this interval not displayed.       Results from last 7 days   Lab Units 04/01/22  1516 04/01/22  1141 04/01/22  1009 04/01/22  0933 04/01/22  0859 04/01/22  0838 04/01/22  0736 03/30/22  1911   PH, ARTERIAL pH units 7.355 7.385 7.4080 7.4230 7.4270 7.4140 7.4270 7.445   PCO2, ARTERIAL mm Hg 47.9* 41.8  --   --   --   --   --  40.3   PO2 ART mm Hg 88.9 263.8*  --   --   --   --   --  56.6*   HCO3 ART mmol/L 26.8 25.0  --   --   --   --   --  27.7       Imaging:  Reviewed chest images personally from past 3 days    Scheduled meds:    acetaminophen, 650 mg, Oral, Q4H   Or  acetaminophen, 650 mg, Oral, Q4H   Or  acetaminophen, 650 mg, Rectal, Q4H  [START ON 4/2/2022] aspirin, 81 mg, Oral, Daily  atorvastatin, 40  mg, Oral, Daily  atorvastatin, 40 mg, Oral, Nightly  ceFAZolin, 2 g, Intravenous, Q8H  chlorhexidine, 15 mL, Mouth/Throat, Q12H  [START ON 4/2/2022] enoxaparin, 40 mg, Subcutaneous, Q24H  escitalopram, 20 mg, Oral, Daily  furosemide, 40 mg, Intravenous, Q12H  ipratropium-albuterol, 3 mL, Nebulization, 4x Daily - RT  magnesium sulfate, 1 g, Intravenous, Q8H  metoclopramide, 10 mg, Intravenous, Q6H  [START ON 4/2/2022] metoprolol tartrate, 12.5 mg, Oral, Q12H  mupirocin, , Each Nare, BID  pantoprazole, 40 mg, Oral, Q AM  [START ON 4/2/2022] pantoprazole, 40 mg, Oral, QAM  senna-docusate sodium, 2 tablet, Oral, BID  sodium chloride, 3 mL, Intravenous, Q12H        ASSESSMENT  /  PLAN:  COPD with acute exacerbation   NSTEMI  Alcohol abuse  Acute respiratory failure hypoxemic  Acute diastolic congestive heart failure  Bilateral pleural effusion  Tobacco abuse  CAD s/p cabg 4-1-2022  Leukocytosis?  Reactive    Tolerating extubation well encourage incentive spirometer  Bronchodilators  Diuresis as renal function allows  At present time no wheezing will hold off on steroids if develops any wheezing or increased shortness of breath consideration of IV Solu-Medrol  Out of bed to chair when patient able.  Discussed with bedside nurse.  All issues new to me today reviewed prior imaging lab values consultation notes.         Rj Thakkar MD  [facility]  04/01/22  17:39 EDT

## 2022-04-01 NOTE — ANESTHESIA PROCEDURE NOTES
Central Line      Patient location during procedure: OR  Start time: 4/1/2022 7:12 AM  Stop Time:4/1/2022 7:14 AM  Indications: central pressure monitoring and vascular access  Staff  Anesthesiologist: Neto Byrne MD  Preanesthetic Checklist  Completed: patient identified, IV checked, site marked, risks and benefits discussed, surgical consent, monitors and equipment checked, pre-op evaluation and timeout performed  Central Line Prep  Sterile Tech:gloves, cap, gown, mask and sterile barriers  Prep: chloraprep  Patient monitoring: blood pressure monitoring, continuous pulse oximetry and EKG  Central Line Procedure  Laterality:right  Location:internal jugular  Catheter Type:[place]  Assessment  Post procedure:biopatch applied, line sutured and occlusive dressing applied  Assessement:blood return through all ports, free fluid flow and chest x-ray ordered  Complications:no  Patient Tolerance:patient tolerated the procedure well with no apparent complications

## 2022-04-01 NOTE — PROGRESS NOTES
Name: Osman Aparicio ADMIT: 3/26/2022   : 1955  PCP: Desire Greer APRN    MRN: [id number] LOS: 6 days   AGE/SEX: 66 y.o. male  ROOM:      Subjective   Subjective   Seen in CVR. Intubated and sedated    Review of Systems     Objective   Objective   Vital Signs  Temp:  [94.82 °F (34.9 °C)-98.6 °F (37 °C)] 95.18 °F (35.1 °C)  Heart Rate:  [57-80] 80  Resp:  [16-18] 16  BP: ()/(56-77) 97/75  Arterial Line BP: ()/(62-69) 105/67  FiO2 (%):  [100 %] 100 %  SpO2:  [92 %-100 %] 100 %  on   ;   Device (Oxygen Therapy): ventilator  Body mass index is 22.95 kg/m².  Physical Exam  Vitals and nursing note reviewed.   Constitutional:       General: He is not in acute distress.     Interventions: He is sedated and intubated.   HENT:      Head: Normocephalic and atraumatic.   Cardiovascular:      Rate and Rhythm: Normal rate and regular rhythm.   Pulmonary:      Effort: No respiratory distress. He is intubated.   Skin:     Findings: No rash.         Results Review     I reviewed the patient's new clinical results.  Results from last 7 days   Lab Units 22  1117 22  1009 22  0933 22  0859 22  0736 225 22  0800   WBC 10*3/mm3 33.31*  --   --   --   --  8.40 9.15 8.87   HEMOGLOBIN g/dL 11.2*  --   --   --   --  9.5* 9.4* 8.9*   HEMOGLOBIN, POC g/dL  --  8.5* 8.8* 8.5*   < >  --   --   --    PLATELETS 10*3/mm3 309  --   --   --   --  322 346 320    < > = values in this interval not displayed.     Results from last 7 days   Lab Units 22  1117 22  0305 22  0800   SODIUM mmol/L 135* 135* 136 137   POTASSIUM mmol/L 5.0 4.2 3.8 3.7   CHLORIDE mmol/L 102 95* 95* 97*   CO2 mmol/L 24.0 30.0* 29.0 30.0*   BUN mg/dL 29* 33* 28* 25*   CREATININE mg/dL 1.09 1.28* 1.23 1.16   GLUCOSE mg/dL 161* 111* 103* 98   EGFR mL/min/1.73 74.9 61.7 64.7 69.5     Results from last 7 days   Lab Units 22  1117 22  03/30/22  0800 03/29/22  0719 03/28/22  0807   ALBUMIN g/dL 3.80 4.20 4.30 3.80 3.90   BILIRUBIN mg/dL  --  0.7 0.9 0.7 0.6   ALK PHOS U/L  --  62 56 53 58   AST (SGOT) U/L  --  17 13 10 13   ALT (SGPT) U/L  --  17 14 12 11     Results from last 7 days   Lab Units 04/01/22  1117 04/01/22  0305 03/30/22  2237 03/30/22  0800 03/29/22  0719 03/28/22  0807   CALCIUM mg/dL 8.9 9.4 9.2 9.2 9.0 9.0   ALBUMIN g/dL 3.80  --  4.20 4.30 3.80 3.90   MAGNESIUM mg/dL 2.1  --   --   --   --  2.2   PHOSPHORUS mg/dL 4.3  --   --   --   --   --      Results from last 7 days   Lab Units 03/26/22  0946   PROCALCITONIN ng/mL 0.06     Glucose   Date/Time Value Ref Range Status   04/01/2022 1122 175 (H) 70 - 130 mg/dL Final     Comment:     Meter: [id number] : [id number] Neno Culp RN   04/01/2022 1009 164 (H) 70 - 130 mg/dL Final   04/01/2022 0933 155 (H) 70 - 130 mg/dL Final   04/01/2022 0859 141 (H) 70 - 130 mg/dL Final   04/01/2022 0838 147 (H) 70 - 130 mg/dL Final   04/01/2022 0736 135 (H) 70 - 130 mg/dL Final   04/01/2022 0452 129 70 - 130 mg/dL Final     Comment:     Meter: [id number] : [id number] Missael ISABEL       XR Chest 1 View  ONE VIEW PORTABLE CHEST AT 12:01 PM     HISTORY: Recent cardiac surgery. Lines and tubes.     FINDINGS: The patient has had recent cardiac surgery with chest tubes at  the bases and there is a suspicion of a very tiny left apical  pneumothorax measuring 3 mm. The lungs are clear except for some minimal  vague atelectasis at the right base.     The heart is borderline enlarged without change from the preoperative  exam. A PA line ends in the main pulmonary outflow tract and ET tube  ends 5.8 cm above the mateus.     This report was finalized on 4/1/2022 12:17 PM by Dr. Vinny Guaman M.D.     Diagnostic Intraop MARTHA  Neto Byrne MD     4/1/2022 10:16 AM  Diagnostic Intraop MARTHA    Procedure Performed: Diagnostic Intraop MARTHA     Start Time:        End Time:      General Procedure  Information  Physician Requesting Echo: Colten Zamora MD  Location performed:  OR  Intubated  Bite block placed  Heart visualized  Probe Insertion:  Easy  Probe Type:  Multiplane  Modalities:  2D only, color flow mapping, continuous wave Doppler and   pulse wave Doppler    Echocardiographic and Doppler Measurements    Ventricles    Left Ventricle:  Global Function mildly impaired.  Ejection Fraction 45%.      Valves    Aortic Valve:  Mean Gradient: 4 mmHg.  Pressure Half-time: 1000 ms.      Diastolic Function Measurements:  Diastolic Dysfunction Grade= indeterminate  E= ms  A= ms  E/A Ratio=   DT= ms  S/D=  IVRT=    Anesthesia Information  Performed Personally  Anesthesiologist:  Neto Byrne MD    Echocardiogram Comments:       66 year old male with CAD presents for off pump CABG.  - Mildly dilated LV, mildly reduced LV function (LVEF 40-45%,   +anterior/septal/inferior wall hypokinesis)  - Normal RV size, function  - Mild AI (mildly calcific AV annulus with restrictive RCC, narrow central   jet, AI 1/2 time >500 ms)  - Trace-mild MR, trace TR, trace PI  - Significant atheroma throughout ascending, arch, descending aorta  - No intracardiac shunt  - No pericardial/pleural effusion  - No aortic dissection     S/p CABG x3:  - Low-normal LVEF (improvement in anterior/septal/inferior wall motion)  - Normal RV size, function  - Unchanged valves  - No pericardial/pleural effusion  - No aortic dissection     Findings discussed with surgical team  Central Line  Neto Byrne MD     4/1/2022  8:27 AM  Central Line    Patient location during procedure: OR  Start time: 4/1/2022 7:12 AM  Stop Time:4/1/2022 7:14 AM  Indications: central pressure monitoring and vascular access  Staff  Anesthesiologist: Neto Byrne MD  Preanesthetic Checklist  Completed: patient identified, IV checked, site marked, risks and benefits   discussed, surgical consent, monitors and equipment checked, pre-op    evaluation and timeout performed  Central Line Prep  Sterile Tech:gloves, cap, gown, mask and sterile barriers  Prep: chloraprep  Patient monitoring: blood pressure monitoring, continuous pulse oximetry   and EKG  Central Line Procedure  Laterality:right  Location:internal jugular  Catheter Type:[place]oop  Assessment  Post procedure:biopatch applied, line sutured and occlusive dressing   applied  Assessement:blood return through all ports, free fluid flow and chest   x-ray ordered  Complications:no  Patient Tolerance:patient tolerated the procedure well with no apparent   complications  Central Line  Neto Byrne MD     4/1/2022  8:27 AM  Central Line    Patient location during procedure: OR  Start time: 4/1/2022 7:02 AM  Stop Time:4/1/2022 7:12 AM  Indications: central pressure monitoring and vascular access  Staff  Anesthesiologist: Neto Byrne MD  Preanesthetic Checklist  Completed: patient identified, IV checked, site marked, risks and benefits   discussed, surgical consent, monitors and equipment checked, pre-op   evaluation and timeout performed  Central Line Prep  Sterile Tech:gloves, cap, gown, mask and sterile barriers  Prep: chloraprep  Patient monitoring: blood pressure monitoring, continuous pulse oximetry   and EKG  Central Line Procedure  Laterality:right  Location:internal jugular  Catheter Type:double lumen and MAC  Catheter Size:9 Fr  Guidance:ultrasound guided  PROCEDURE NOTE/ULTRASOUND INTERPRETATION.  Using ultrasound guidance the   potential vascular sites for insertion of the catheter were visualized to   determine the patency of the vessel to be used for vascular access.  After   selecting the appropriate site for insertion, the needle was visualized   under ultrasound being inserted into the internal jugular vein, followed   by ultrasound confirmation of wire and catheter placement. There were no   abnormalities seen on ultrasound; an image was taken; and the patient    tolerated the procedure with no complications. Images: still images   obtained, printed/placed on chart  Assessment  Post procedure:biopatch applied, line sutured and occlusive dressing   applied  Assessement:blood return through all ports, free fluid flow and chest   x-ray ordered  Complications:no  Patient Tolerance:patient tolerated the procedure well with no apparent   complications  Arterial Line  Neto Byrne MD     4/1/2022  8:26 AM  Arterial Line    Patient location during procedure: OR  Start time: 4/1/2022 6:52 AM  Stop Time:4/1/2022 6:56 AM       Line placed for respiratory failure, hemodynamic monitoring and   ABGs/Labs/ISTAT.  Performed By   Anesthesiologist: Neto Byrne MD  Preanesthetic Checklist  Completed: patient identified, IV checked, site marked, risks and benefits   discussed, surgical consent, monitors and equipment checked, pre-op   evaluation and timeout performed  Arterial Line Prep   Sterile Tech: cap, gloves and sterile barriers  Prep: ChloraPrep  Patient monitoring: EKG, continuous pulse oximetry and blood pressure   monitoring  Arterial Line Procedure   Laterality:left  Location:  radial artery  Catheter size: 20 G   Guidance: ultrasound guided  PROCEDURE NOTE/ULTRASOUND INTERPRETATION.  Using ultrasound guidance the   potential vascular sites for insertion of the catheter were visualized to   determine the patency of the vessel to be used for vascular access.  After   selecting the appropriate site for insertion, the needle was visualized   under ultrasound being inserted into the radial artery, followed by   ultrasound confirmation of wire and catheter placement. There were no   abnormalities seen on ultrasound; an image was taken; and the patient   tolerated the procedure with no complications.   Number of attempts: 1  Successful placement: yes  Post Assessment   Dressing Type: wrist guard applied, secured with tape and occlusive   dressing applied.    Complications no  Circ/Move/Sens Assessment: normal and unchanged.   Patient Tolerance: patient tolerated the procedure well with no apparent   complications    Scheduled Medications  acetaminophen, 650 mg, Oral, Q4H   Or  acetaminophen, 650 mg, Oral, Q4H   Or  acetaminophen, 650 mg, Rectal, Q4H  [START ON 4/2/2022] aspirin, 81 mg, Oral, Daily  atorvastatin, 40 mg, Oral, Daily  atorvastatin, 40 mg, Oral, Nightly  ceFAZolin, 2 g, Intravenous, Q8H  chlorhexidine, 15 mL, Mouth/Throat, Q12H  [START ON 4/2/2022] enoxaparin, 40 mg, Subcutaneous, Q24H  escitalopram, 20 mg, Oral, Daily  furosemide, 40 mg, Intravenous, Q12H  ipratropium-albuterol, 3 mL, Nebulization, 4x Daily - RT  magnesium sulfate, 1 g, Intravenous, Q8H  metoclopramide, 10 mg, Intravenous, Q6H  [START ON 4/2/2022] metoprolol tartrate, 12.5 mg, Oral, Q12H  mupirocin, , Each Nare, BID  pantoprazole, 40 mg, Oral, Q AM  [START ON 4/2/2022] pantoprazole, 40 mg, Oral, QAM  predniSONE, 10 mg, Oral, Daily  senna-docusate sodium, 2 tablet, Oral, BID  sodium chloride, 3 mL, Intravenous, Q12H    Infusions  clevidipine, 2-32 mg/hr  dexmedetomidine, 0.2-1.5 mcg/kg/hr, Last Rate: 0.5 mcg/kg/hr (04/01/22 1125)  DOPamine, 2-20 mcg/kg/min  EPINEPHrine, 0.02-0.3 mcg/kg/min  insulin, 0-50 Units/hr  milrinone, 0.25-0.75 mcg/kg/min  niCARdipine, 5-15 mg/hr  nitroglycerin, 5-200 mcg/min  norepinephrine, 0.02-0.3 mcg/kg/min, Last Rate: 0.04 mcg/kg/min (04/01/22 1115)  phenylephrine, 0.2-3 mcg/kg/min  propofol, 5-50 mcg/kg/min, Last Rate: 50 mcg/kg/min (04/01/22 1115)  sodium chloride, 30 mL/hr, Last Rate: 30 mL/hr (04/01/22 1144)  sodium chloride, 30 mL/hr    Diet  NPO Diet       Assessment/Plan     Active Hospital Problems    Diagnosis  POA   • **COPD with acute exacerbation (HCC) [J44.1]  Yes   • NSTEMI (non-ST elevated myocardial infarction) (HCC) [I21.4]  Yes   • Alcohol abuse [F10.10]  Yes   • Shortness of breath [R06.02]  Yes   • Acute respiratory failure with hypoxia  (HCC) [J96.01]  Yes   • Acute diastolic congestive heart failure (HCC) [I50.31]  Yes   • Acute pulmonary edema (HCC) [J81.0]  Yes   • Leukocytosis [D72.829]  Yes   • Positive D dimer [R79.89]  Yes   • Anemia [D64.9]  Yes   • History of colon polyps [Z86.010]  Not Applicable   • Overweight (BMI 25.0-29.9) [E66.3]  Yes   • AAA (abdominal aortic aneurysm) (HCC) [I71.4]  Yes   • Coronary artery disease [I25.10]  Unknown   • Abnormal findings on diagnostic imaging of heart and coronary circulation [R93.1]  Unknown   • Essential hypertension [I10]  Yes   • Other hyperlipidemia [E78.49]  Yes      Resolved Hospital Problems    Diagnosis Date Resolved POA   • Hyponatremia [E87.1] 03/29/2022 Yes       66 y.o. male admitted with acute hypoxic respiratory failure, likely related to acute pulmonary edema due to diastolic heart failure as well as COPD    COPD exacerbation-s/p azithromycin.   -Restart nebs upon extubation.  Wean vent as per protocol  -We will DC prednisone  -Counseled previously on smoking cessation  -Pulmonary following    NSTEMI with acute diastolic heart failure now status post CABG  -Continue routine postop protocol following CABG  -Repeat chest x-ray 3/29 shows significant edema still.  3/31 CTA shows some improvement at the bases  -Defer IV diuresis management to cardiology/surgery  -Would continue IV diuresis preoperatively  -Continue BB, atorvastatin per card recs. Valsartan d/c'ed  -ASA started.  Monitor hemoglobin    Anemia-improving.  Work-up looks consistent with chronic disease.  -Monitor H&H postop, transfuse as needed   -Hemoccult negative.  Iron stores normal.  No obvious bleeding  -Suspect his anemia may relate more to chronic alcohol use and bone marrow suppression from this, not blood loss or other vitamin deficiency    AAA- 5.5 cm on u/s and CTA.  Vascular following, will eventually need repair once cardiac issues are sorted out.     Complex right renal cyst/atrophic right kidney: Will need MRI  to evaluate further.  Will hold off given acute cardiac issues. Probably could be done prior to discharge    Daily alcohol use/abuse-received vitamin replacement but no sign of withdrawal.  Discontinued CIWA protocol     · SCDs for DVT prophylaxis.  · Discussed with RN at bedside      Ehsan Leon MD  [facility]  04/01/22  13:05 EDT

## 2022-04-01 NOTE — PROGRESS NOTES
LOS: 6 days   Patient Care Team:  Desire Greer APRN as PCP - General (Family Medicine)  Desire Greer APRN as Nurse Practitioner (Family Medicine)    Chief Complaint:     Cad, s/p cabg    Interval History:     Orally intubated, went to the OR this morning.    Objective   Vital Signs  Temp:  [94.82 °F (34.9 °C)-98.6 °F (37 °C)] 95.18 °F (35.1 °C)  Heart Rate:  [57-80] 80  Resp:  [16-18] 16  BP: ()/(56-77) 97/75  Arterial Line BP: ()/(62-69) 105/67  FiO2 (%):  [100 %] 100 %    Intake/Output Summary (Last 24 hours) at 4/1/2022 1312  Last data filed at 4/1/2022 1145  Gross per 24 hour   Intake 2840 ml   Output 1600 ml   Net 1240 ml       Comfortable NAD  Neck supple, no JVD or thyromegaly appreciated  S1/S2 RRR, no m/r/g  Lungs CTA B, normal effort  Abdomen S/NT/ND (+) BS, no HSM appreciated  Extremities warm, no clubbing, cyanosis, or edema  No visible or palpable skin lesions  A/Ox4, mood and affect appropriate    Results Review:      Results from last 7 days   Lab Units 04/01/22  1117 04/01/22  0305 03/30/22  2237   SODIUM mmol/L 135* 135* 136   POTASSIUM mmol/L 5.0 4.2 3.8   CHLORIDE mmol/L 102 95* 95*   CO2 mmol/L 24.0 30.0* 29.0   BUN mg/dL 29* 33* 28*   CREATININE mg/dL 1.09 1.28* 1.23   GLUCOSE mg/dL 161* 111* 103*   CALCIUM mg/dL 8.9 9.4 9.2     Results from last 7 days   Lab Units 03/27/22  1550 03/27/22  1013 03/26/22  0946   TROPONIN T ng/mL 0.085* 0.110* 0.023     Results from last 7 days   Lab Units 04/01/22  1117 04/01/22  1009 04/01/22  0933 04/01/22  0736 04/01/22  0305 03/30/22  2237   WBC 10*3/mm3 33.31*  --   --   --  8.40 9.15   HEMOGLOBIN g/dL 11.2*  --   --   --  9.5* 9.4*   HEMOGLOBIN, POC g/dL  --  8.5* 8.8*   < >  --   --    HEMATOCRIT % 32.9*  --   --   --  27.3* 27.7*   HEMATOCRIT POC %  --  25* 26*   < >  --   --    PLATELETS 10*3/mm3 309  --   --   --  322 346    < > = values in this interval not displayed.     Results from last 7 days   Lab Units  04/01/22  1117 03/30/22  2237 03/26/22  0946   INR  1.28* 1.06 1.14*   APTT seconds 28.1 25.0  --      Results from last 7 days   Lab Units 03/28/22  0807   CHOLESTEROL mg/dL 104     Results from last 7 days   Lab Units 04/01/22  1117   MAGNESIUM mg/dL 2.1     Results from last 7 days   Lab Units 03/28/22  0807   CHOLESTEROL mg/dL 104   TRIGLYCERIDES mg/dL 61   HDL CHOL mg/dL 52   LDL CHOL mg/dL 38       I reviewed the patient's new clinical results.  I personally viewed and interpreted the patient's EKG/Telemetry data        Medication Review:   acetaminophen, 650 mg, Oral, Q4H   Or  acetaminophen, 650 mg, Oral, Q4H   Or  acetaminophen, 650 mg, Rectal, Q4H  [START ON 4/2/2022] aspirin, 81 mg, Oral, Daily  atorvastatin, 40 mg, Oral, Daily  atorvastatin, 40 mg, Oral, Nightly  ceFAZolin, 2 g, Intravenous, Q8H  chlorhexidine, 15 mL, Mouth/Throat, Q12H  [START ON 4/2/2022] enoxaparin, 40 mg, Subcutaneous, Q24H  escitalopram, 20 mg, Oral, Daily  furosemide, 40 mg, Intravenous, Q12H  ipratropium-albuterol, 3 mL, Nebulization, 4x Daily - RT  magnesium sulfate, 1 g, Intravenous, Q8H  metoclopramide, 10 mg, Intravenous, Q6H  [START ON 4/2/2022] metoprolol tartrate, 12.5 mg, Oral, Q12H  mupirocin, , Each Nare, BID  pantoprazole, 40 mg, Oral, Q AM  [START ON 4/2/2022] pantoprazole, 40 mg, Oral, QAM  senna-docusate sodium, 2 tablet, Oral, BID  sodium chloride, 3 mL, Intravenous, Q12H        clevidipine, 2-32 mg/hr  dexmedetomidine, 0.2-1.5 mcg/kg/hr, Last Rate: 0.5 mcg/kg/hr (04/01/22 1125)  DOPamine, 2-20 mcg/kg/min  EPINEPHrine, 0.02-0.3 mcg/kg/min  insulin, 0-50 Units/hr  milrinone, 0.25-0.75 mcg/kg/min  niCARdipine, 5-15 mg/hr  nitroglycerin, 5-200 mcg/min  norepinephrine, 0.02-0.3 mcg/kg/min, Last Rate: 0.04 mcg/kg/min (04/01/22 1115)  phenylephrine, 0.2-3 mcg/kg/min  propofol, 5-50 mcg/kg/min, Last Rate: 50 mcg/kg/min (04/01/22 1115)  sodium chloride, 30 mL/hr, Last Rate: 30 mL/hr (04/01/22 1144)  sodium chloride, 30  mL/hr        Assessment/Plan       COPD with acute exacerbation (HCC)    Essential hypertension    Other hyperlipidemia    Shortness of breath    Acute respiratory failure with hypoxia (HCC)    Acute diastolic congestive heart failure (HCC)    Acute pulmonary edema (HCC)    Leukocytosis    Positive D dimer    Anemia    History of colon polyps    Overweight (BMI 25.0-29.9)    AAA (abdominal aortic aneurysm) (HCC)    Alcohol abuse    NSTEMI (non-ST elevated myocardial infarction) (HCC)    Coronary artery disease    Abnormal findings on diagnostic imaging of heart and coronary circulation    1. CAD, multivessel.  CABG 4/1/22  2. NSTEMI -as above.  3. Hypertension -well-controlled continue sodium restricted diet  4. Dyslipidemia -continue statin  5. TAA -borderline elevation of ascending aorta heavily calcified  6. AAA -5.5 cm  - vascular is followin.   7. Tobacco abuse -counseled on need for cessation  8. Alcohol abuse -WA protocol, drinks 6 beers daily, advised no further alcohol.  9. Anemia-improving, no active bleeding, iron levels are normal, possibly due to bone marrow suppression from heavy alcohol use.    Making urine, is awake, hopeful extubate later, overall looks good.    Susan Claudio MD  04/01/22  13:12 EDT

## 2022-04-02 ENCOUNTER — APPOINTMENT (OUTPATIENT)
Dept: GENERAL RADIOLOGY | Facility: HOSPITAL | Age: 67
End: 2022-04-02

## 2022-04-02 LAB
ALBUMIN SERPL-MCNC: 4.1 G/DL (ref 3.5–5.2)
ANION GAP SERPL CALCULATED.3IONS-SCNC: 11.4 MMOL/L (ref 5–15)
ANION GAP SERPL CALCULATED.3IONS-SCNC: 9.9 MMOL/L (ref 5–15)
BASOPHILS # BLD AUTO: 0.03 10*3/MM3 (ref 0–0.2)
BASOPHILS NFR BLD AUTO: 0.2 % (ref 0–1.5)
BH BB BLOOD EXPIRATION DATE: NORMAL
BH BB BLOOD EXPIRATION DATE: NORMAL
BH BB BLOOD TYPE BARCODE: 5100
BH BB BLOOD TYPE BARCODE: 9500
BH BB DISPENSE STATUS: NORMAL
BH BB DISPENSE STATUS: NORMAL
BH BB PRODUCT CODE: NORMAL
BH BB PRODUCT CODE: NORMAL
BH BB UNIT NUMBER: NORMAL
BH BB UNIT NUMBER: NORMAL
BUN SERPL-MCNC: 27 MG/DL (ref 8–23)
BUN SERPL-MCNC: 27 MG/DL (ref 8–23)
BUN/CREAT SERPL: 21.3 (ref 7–25)
BUN/CREAT SERPL: 23.9 (ref 7–25)
CA-I BLD-MCNC: 4.8 MG/DL (ref 4.6–5.4)
CA-I SERPL ISE-MCNC: 1.21 MMOL/L (ref 1.15–1.35)
CALCIUM SPEC-SCNC: 8.5 MG/DL (ref 8.6–10.5)
CALCIUM SPEC-SCNC: 8.6 MG/DL (ref 8.6–10.5)
CHLORIDE SERPL-SCNC: 101 MMOL/L (ref 98–107)
CHLORIDE SERPL-SCNC: 96 MMOL/L (ref 98–107)
CO2 SERPL-SCNC: 25.1 MMOL/L (ref 22–29)
CO2 SERPL-SCNC: 26.6 MMOL/L (ref 22–29)
CREAT SERPL-MCNC: 1.13 MG/DL (ref 0.76–1.27)
CREAT SERPL-MCNC: 1.27 MG/DL (ref 0.76–1.27)
CROSSMATCH INTERPRETATION: NORMAL
CROSSMATCH INTERPRETATION: NORMAL
DEPRECATED RDW RBC AUTO: 69.3 FL (ref 37–54)
EGFRCR SERPLBLD CKD-EPI 2021: 62.3 ML/MIN/1.73
EGFRCR SERPLBLD CKD-EPI 2021: 71.7 ML/MIN/1.73
EOSINOPHIL # BLD AUTO: 0.01 10*3/MM3 (ref 0–0.4)
EOSINOPHIL NFR BLD AUTO: 0.1 % (ref 0.3–6.2)
ERYTHROCYTE [DISTWIDTH] IN BLOOD BY AUTOMATED COUNT: 19.3 % (ref 12.3–15.4)
GLUCOSE BLDC GLUCOMTR-MCNC: 105 MG/DL (ref 70–130)
GLUCOSE BLDC GLUCOMTR-MCNC: 106 MG/DL (ref 70–130)
GLUCOSE BLDC GLUCOMTR-MCNC: 111 MG/DL (ref 70–130)
GLUCOSE BLDC GLUCOMTR-MCNC: 124 MG/DL (ref 70–130)
GLUCOSE BLDC GLUCOMTR-MCNC: 144 MG/DL (ref 70–130)
GLUCOSE SERPL-MCNC: 117 MG/DL (ref 65–99)
GLUCOSE SERPL-MCNC: 96 MG/DL (ref 65–99)
HCT VFR BLD AUTO: 27.9 % (ref 37.5–51)
HGB BLD-MCNC: 10.1 G/DL (ref 13–17.7)
INR PPP: 1.14 (ref 0.9–1.1)
LYMPHOCYTES # BLD AUTO: 2.58 10*3/MM3 (ref 0.7–3.1)
LYMPHOCYTES NFR BLD AUTO: 15.1 % (ref 19.6–45.3)
MAGNESIUM SERPL-MCNC: 2.4 MG/DL (ref 1.6–2.4)
MCH RBC QN AUTO: 36.3 PG (ref 26.6–33)
MCHC RBC AUTO-ENTMCNC: 36.2 G/DL (ref 31.5–35.7)
MCV RBC AUTO: 100.4 FL (ref 79–97)
MONOCYTES # BLD AUTO: 1.7 10*3/MM3 (ref 0.1–0.9)
MONOCYTES NFR BLD AUTO: 9.9 % (ref 5–12)
NEUTROPHILS NFR BLD AUTO: 12.12 10*3/MM3 (ref 1.7–7)
NEUTROPHILS NFR BLD AUTO: 70.8 % (ref 42.7–76)
PHOSPHATE SERPL-MCNC: 4.1 MG/DL (ref 2.5–4.5)
PLATELET # BLD AUTO: 263 10*3/MM3 (ref 140–450)
PMV BLD AUTO: 9.3 FL (ref 6–12)
POTASSIUM SERPL-SCNC: 3.7 MMOL/L (ref 3.5–5.2)
POTASSIUM SERPL-SCNC: 4.1 MMOL/L (ref 3.5–5.2)
PROTHROMBIN TIME: 14.6 SECONDS (ref 11.7–14.2)
QT INTERVAL: 383 MS
RBC # BLD AUTO: 2.78 10*6/MM3 (ref 4.14–5.8)
SODIUM SERPL-SCNC: 134 MMOL/L (ref 136–145)
SODIUM SERPL-SCNC: 136 MMOL/L (ref 136–145)
UNIT  ABO: NORMAL
UNIT  ABO: NORMAL
UNIT  RH: NORMAL
UNIT  RH: NORMAL
WBC NRBC COR # BLD: 17.11 10*3/MM3 (ref 3.4–10.8)

## 2022-04-02 PROCEDURE — 25010000002 FUROSEMIDE PER 20 MG

## 2022-04-02 PROCEDURE — 85610 PROTHROMBIN TIME: CPT

## 2022-04-02 PROCEDURE — 99231 SBSQ HOSP IP/OBS SF/LOW 25: CPT | Performed by: INTERNAL MEDICINE

## 2022-04-02 PROCEDURE — 71045 X-RAY EXAM CHEST 1 VIEW: CPT

## 2022-04-02 PROCEDURE — 99024 POSTOP FOLLOW-UP VISIT: CPT | Performed by: NURSE PRACTITIONER

## 2022-04-02 PROCEDURE — 82962 GLUCOSE BLOOD TEST: CPT

## 2022-04-02 PROCEDURE — 85025 COMPLETE CBC W/AUTO DIFF WBC: CPT

## 2022-04-02 PROCEDURE — 97116 GAIT TRAINING THERAPY: CPT

## 2022-04-02 PROCEDURE — 25010000002 METOCLOPRAMIDE PER 10 MG

## 2022-04-02 PROCEDURE — 83735 ASSAY OF MAGNESIUM: CPT

## 2022-04-02 PROCEDURE — 25010000002 MORPHINE PER 10 MG

## 2022-04-02 PROCEDURE — 80069 RENAL FUNCTION PANEL: CPT

## 2022-04-02 PROCEDURE — 25010000002 CEFAZOLIN IN DEXTROSE 2-4 GM/100ML-% SOLUTION

## 2022-04-02 PROCEDURE — 97162 PT EVAL MOD COMPLEX 30 MIN: CPT

## 2022-04-02 PROCEDURE — 25010000002 CEFAZOLIN IN DEXTROSE 2-4 GM/100ML-% SOLUTION: Performed by: NURSE PRACTITIONER

## 2022-04-02 PROCEDURE — 82330 ASSAY OF CALCIUM: CPT

## 2022-04-02 PROCEDURE — 93005 ELECTROCARDIOGRAM TRACING: CPT

## 2022-04-02 PROCEDURE — 25010000002 ENOXAPARIN PER 10 MG

## 2022-04-02 PROCEDURE — 94799 UNLISTED PULMONARY SVC/PX: CPT

## 2022-04-02 PROCEDURE — 80048 BASIC METABOLIC PNL TOTAL CA: CPT | Performed by: NURSE PRACTITIONER

## 2022-04-02 PROCEDURE — 25010000002 MAGNESIUM SULFATE IN D5W 1G/100ML (PREMIX) 1-5 GM/100ML-% SOLUTION

## 2022-04-02 RX ORDER — POTASSIUM CHLORIDE 1.5 G/1.77G
40 POWDER, FOR SOLUTION ORAL AS NEEDED
Status: DISCONTINUED | OUTPATIENT
Start: 2022-04-02 | End: 2022-04-05 | Stop reason: HOSPADM

## 2022-04-02 RX ORDER — MULTIPLE VITAMINS W/ MINERALS TAB 9MG-400MCG
1 TAB ORAL DAILY
Status: DISCONTINUED | OUTPATIENT
Start: 2022-04-02 | End: 2022-04-05 | Stop reason: HOSPADM

## 2022-04-02 RX ORDER — INSULIN LISPRO 100 [IU]/ML
0-7 INJECTION, SOLUTION INTRAVENOUS; SUBCUTANEOUS
Status: DISCONTINUED | OUTPATIENT
Start: 2022-04-03 | End: 2022-04-05 | Stop reason: HOSPADM

## 2022-04-02 RX ORDER — GUAIFENESIN 600 MG/1
1200 TABLET, EXTENDED RELEASE ORAL EVERY 12 HOURS SCHEDULED
Status: DISCONTINUED | OUTPATIENT
Start: 2022-04-02 | End: 2022-04-05 | Stop reason: HOSPADM

## 2022-04-02 RX ORDER — NICOTINE POLACRILEX 4 MG
15 LOZENGE BUCCAL
Status: DISCONTINUED | OUTPATIENT
Start: 2022-04-02 | End: 2022-04-05 | Stop reason: HOSPADM

## 2022-04-02 RX ORDER — DEXTROSE MONOHYDRATE 25 G/50ML
25 INJECTION, SOLUTION INTRAVENOUS
Status: DISCONTINUED | OUTPATIENT
Start: 2022-04-02 | End: 2022-04-05 | Stop reason: HOSPADM

## 2022-04-02 RX ORDER — FOLIC ACID 1 MG/1
1 TABLET ORAL DAILY
Status: DISCONTINUED | OUTPATIENT
Start: 2022-04-02 | End: 2022-04-05 | Stop reason: HOSPADM

## 2022-04-02 RX ORDER — POTASSIUM CHLORIDE 7.45 MG/ML
10 INJECTION INTRAVENOUS
Status: DISCONTINUED | OUTPATIENT
Start: 2022-04-02 | End: 2022-04-05 | Stop reason: HOSPADM

## 2022-04-02 RX ORDER — POTASSIUM CHLORIDE 750 MG/1
40 TABLET, FILM COATED, EXTENDED RELEASE ORAL AS NEEDED
Status: DISCONTINUED | OUTPATIENT
Start: 2022-04-02 | End: 2022-04-05 | Stop reason: HOSPADM

## 2022-04-02 RX ADMIN — PANTOPRAZOLE SODIUM 40 MG: 40 TABLET, DELAYED RELEASE ORAL at 06:37

## 2022-04-02 RX ADMIN — DOCUSATE SODIUM 50MG AND SENNOSIDES 8.6MG 2 TABLET: 8.6; 5 TABLET, FILM COATED ORAL at 10:12

## 2022-04-02 RX ADMIN — IPRATROPIUM BROMIDE AND ALBUTEROL SULFATE 3 ML: 2.5; .5 SOLUTION RESPIRATORY (INHALATION) at 15:09

## 2022-04-02 RX ADMIN — ACETAMINOPHEN 650 MG: 325 TABLET ORAL at 00:03

## 2022-04-02 RX ADMIN — METOPROLOL TARTRATE 12.5 MG: 25 TABLET, FILM COATED ORAL at 21:51

## 2022-04-02 RX ADMIN — MUPIROCIN 1 APPLICATION: 20 OINTMENT TOPICAL at 22:02

## 2022-04-02 RX ADMIN — CEFAZOLIN SODIUM 2 G: 2 INJECTION, SOLUTION INTRAVENOUS at 23:57

## 2022-04-02 RX ADMIN — GUAIFENESIN 1200 MG: 600 TABLET, EXTENDED RELEASE ORAL at 10:12

## 2022-04-02 RX ADMIN — MAGNESIUM SULFATE 1 G: 1 INJECTION INTRAVENOUS at 04:41

## 2022-04-02 RX ADMIN — CEFAZOLIN SODIUM 2 G: 2 INJECTION, SOLUTION INTRAVENOUS at 07:56

## 2022-04-02 RX ADMIN — IPRATROPIUM BROMIDE AND ALBUTEROL SULFATE 3 ML: 2.5; .5 SOLUTION RESPIRATORY (INHALATION) at 07:40

## 2022-04-02 RX ADMIN — ASPIRIN 81 MG: 81 TABLET, COATED ORAL at 10:12

## 2022-04-02 RX ADMIN — Medication 3 ML: at 21:53

## 2022-04-02 RX ADMIN — DOCUSATE SODIUM 50MG AND SENNOSIDES 8.6MG 2 TABLET: 8.6; 5 TABLET, FILM COATED ORAL at 21:51

## 2022-04-02 RX ADMIN — OXYCODONE 10 MG: 5 TABLET ORAL at 03:12

## 2022-04-02 RX ADMIN — HYDROCODONE BITARTRATE AND ACETAMINOPHEN 2 TABLET: 5; 325 TABLET ORAL at 01:17

## 2022-04-02 RX ADMIN — Medication 100 MG: at 21:51

## 2022-04-02 RX ADMIN — METOCLOPRAMIDE HYDROCHLORIDE 10 MG: 5 INJECTION INTRAMUSCULAR; INTRAVENOUS at 00:03

## 2022-04-02 RX ADMIN — IPRATROPIUM BROMIDE AND ALBUTEROL SULFATE 3 ML: 2.5; .5 SOLUTION RESPIRATORY (INHALATION) at 20:21

## 2022-04-02 RX ADMIN — HYDROCODONE BITARTRATE AND ACETAMINOPHEN 2 TABLET: 5; 325 TABLET ORAL at 22:07

## 2022-04-02 RX ADMIN — METOCLOPRAMIDE HYDROCHLORIDE 10 MG: 5 INJECTION INTRAMUSCULAR; INTRAVENOUS at 06:36

## 2022-04-02 RX ADMIN — ESCITALOPRAM 20 MG: 20 TABLET, FILM COATED ORAL at 10:13

## 2022-04-02 RX ADMIN — MULTIPLE VITAMINS W/ MINERALS TAB 1 TABLET: TAB at 21:51

## 2022-04-02 RX ADMIN — FUROSEMIDE 40 MG: 10 INJECTION, SOLUTION INTRAMUSCULAR; INTRAVENOUS at 16:41

## 2022-04-02 RX ADMIN — METOPROLOL TARTRATE 12.5 MG: 25 TABLET, FILM COATED ORAL at 10:11

## 2022-04-02 RX ADMIN — ENOXAPARIN SODIUM 40 MG: 100 INJECTION SUBCUTANEOUS at 17:27

## 2022-04-02 RX ADMIN — OXYCODONE 10 MG: 5 TABLET ORAL at 10:21

## 2022-04-02 RX ADMIN — FOLIC ACID 1 MG: 1 TABLET ORAL at 21:51

## 2022-04-02 RX ADMIN — CEFAZOLIN SODIUM 2 G: 2 INJECTION, SOLUTION INTRAVENOUS at 16:41

## 2022-04-02 RX ADMIN — CHLORHEXIDINE GLUCONATE 0.12% ORAL RINSE 15 ML: 1.2 LIQUID ORAL at 10:12

## 2022-04-02 RX ADMIN — OXYCODONE 10 MG: 5 TABLET ORAL at 14:14

## 2022-04-02 RX ADMIN — HYDROCODONE BITARTRATE AND ACETAMINOPHEN 2 TABLET: 5; 325 TABLET ORAL at 18:11

## 2022-04-02 RX ADMIN — CHLORHEXIDINE GLUCONATE 0.12% ORAL RINSE 15 ML: 1.2 LIQUID ORAL at 21:53

## 2022-04-02 RX ADMIN — MORPHINE SULFATE 1 MG: 2 INJECTION, SOLUTION INTRAMUSCULAR; INTRAVENOUS at 05:18

## 2022-04-02 RX ADMIN — IPRATROPIUM BROMIDE AND ALBUTEROL SULFATE 3 ML: 2.5; .5 SOLUTION RESPIRATORY (INHALATION) at 11:27

## 2022-04-02 RX ADMIN — ATORVASTATIN CALCIUM 40 MG: 20 TABLET, FILM COATED ORAL at 21:51

## 2022-04-02 RX ADMIN — MUPIROCIN 1 APPLICATION: 20 OINTMENT TOPICAL at 10:12

## 2022-04-02 RX ADMIN — FUROSEMIDE 40 MG: 10 INJECTION, SOLUTION INTRAMUSCULAR; INTRAVENOUS at 04:41

## 2022-04-02 RX ADMIN — Medication 3 ML: at 10:12

## 2022-04-02 RX ADMIN — GUAIFENESIN 1200 MG: 600 TABLET, EXTENDED RELEASE ORAL at 21:51

## 2022-04-02 NOTE — PROGRESS NOTES
Name: Osman Aparicio ADMIT: 3/26/2022   : 1955  PCP: Desire Greer APRN    MRN: [id number] LOS: 7 days   AGE/SEX: 66 y.o. male  ROOM: /     Subjective   Subjective   Positive atypical chest pain.  Positive exertional dyspnea.  No lower extremity edema.  No palpitation.  No PND orthopnea.  Positive cough productive of yellowish sputum.  No hemoptysis.  No fever or chills.    Review of Systems  GI.  Tolerating clear liquids well.  No nausea or vomiting.  No bowel movement today.  Positive abdominal pain (upper).  .  No dysuria or hematuria with good urine output.  CNS.  No tremor.  No anxiety.  No focal neurological symptoms.  No loss of consciousness or dizziness.     Objective   Objective   Vital Signs  Temp:  [97.88 °F (36.6 °C)-99.68 °F (37.6 °C)] 99.5 °F (37.5 °C)  Heart Rate:  [69-82] 69  Resp:  [10-18] 14  BP: (105-131)/(69-92) 126/70  Arterial Line BP: (108-141)/(52-67) 141/57  SpO2:  [91 %-99 %] 96 %  on  Flow (L/min):  [4-6] 4;   Device (Oxygen Therapy): nasal cannula    Intake/Output Summary (Last 24 hours) at 2022 1707  Last data filed at 2022 1600  Gross per 24 hour   Intake 2313.2 ml   Output 3960 ml   Net -1646.8 ml     Body mass index is 21.04 kg/m².      22  0639 22  2111 22  0600   Weight: 74.5 kg (164 lb 4.8 oz) 74.6 kg (164 lb 8 oz) 68.4 kg (150 lb 12.7 oz)     Physical Exam  General.  Middle-aged gentleman.  Appears older than stated age.  Alert and oriented x3.  No apparent pain/distress/diaphoresis.  Normal mood and affect.  No tremors.   Eyes.  Pupils equal round and reactive.  Intact extraocular musculature.  No pallor or jaundice.  Normal conjunctivae and lids.  Oral cavity.  Moist mucous membrane.  Neck.  No JVD.  C line in the right neck.  Supple.  No lymphadenopathy or thyromegaly.  Cardiovascular.  Dressed sternotomy wound.  Regular rate and rhythm and grade 2 systolic murmur.  Chest.  Poor bilateral air entry with scattered bilateral  rhonchi chest tubes bilaterally.  Abdomen.  Soft lax.  No tenderness.  No organomegaly.  No guarding or rebound.  Extremities.  Trace bilateral lower extremity edema.  No clubbing or cyanosis.    CNS.  No acute focal neurological deficits.          Results Review:      Results from last 7 days   Lab Units 04/02/22  0303 04/01/22 2000 04/01/22  1447 04/01/22  1117 04/01/22  0305 03/30/22  2237 03/30/22  0800 03/29/22  0719 03/28/22  0807 03/27/22  1013   SODIUM mmol/L 136 138 135* 135* 135* 136 137 141 133* 131*   POTASSIUM mmol/L 4.1 4.9 5.3* 5.0 4.2 3.8 3.7 4.6 3.0* 3.7   CHLORIDE mmol/L 101 101 101 102 95* 95* 97* 91* 93* 94*   CO2 mmol/L 25.1 26.0 25.0 24.0 30.0* 29.0 30.0* 27.6 27.2 25.9   BUN mg/dL 27* 28* 28* 29* 33* 28* 25* 22 16 15   CREATININE mg/dL 1.13 1.19 1.14 1.09 1.28* 1.23 1.16 1.11 1.09 0.98   GLUCOSE mg/dL 96 112* 137* 161* 111* 103* 98 100* 107* 137*   CALCIUM mg/dL 8.5* 8.8 8.9 8.9 9.4 9.2 9.2 9.0 9.0 8.6   AST (SGOT) U/L  --   --   --   --   --  17 13 10 13 15   ALT (SGPT) U/L  --   --   --   --   --  17 14 12 11 11     Estimated Creatinine Clearance: 62.2 mL/min (by C-G formula based on SCr of 1.13 mg/dL).      Results from last 7 days   Lab Units 04/02/22  0649 04/02/22  0309 04/02/22  0209 04/02/22  0021 04/01/22  2254 04/01/22  2154 04/01/22  2106 04/01/22  1950   GLUCOSE mg/dL 124 105 106 111 104 111 118 125     Results from last 7 days   Lab Units 03/27/22  1550 03/27/22  1013   TROPONIN T ng/mL 0.085* 0.110*     Results from last 7 days   Lab Units 03/30/22  2237   PROBNP pg/mL 6,632.0*     Results from last 7 days   Lab Units 03/29/22  0719   TSH uIU/mL 1.180     Results from last 7 days   Lab Units 04/02/22  0303 04/01/22  1447 04/01/22  1117 03/28/22  0807   MAGNESIUM mg/dL 2.4 2.2 2.1 2.2     Results from last 7 days   Lab Units 03/28/22  0807   CHOLESTEROL mg/dL 104   TRIGLYCERIDES mg/dL 61   HDL CHOL mg/dL 52     Results from last 7 days   Lab Units 04/02/22  0303 04/01/22  1447  04/01/22  1117 04/01/22  1009 04/01/22  0933 04/01/22  0859 04/01/22  0838 04/01/22  0736 04/01/22  0305 03/30/22  2237 03/30/22  0800 03/29/22  0718   WBC 10*3/mm3 17.11* 21.84* 33.31*  --   --   --   --   --  8.40 9.15 8.87 9.12   HEMOGLOBIN g/dL 10.1* 10.7* 11.2*  --   --   --   --   --  9.5* 9.4* 8.9* 7.9*   HEMOGLOBIN, POC g/dL  --   --   --  8.5* 8.8* 8.5* 9.2*   < >  --   --   --   --    HEMATOCRIT % 27.9* 29.6* 32.9*  --   --   --   --   --  27.3* 27.7* 25.7* 22.8*   HEMATOCRIT POC %  --   --   --  25* 26* 25* 27*   < >  --   --   --   --    PLATELETS 10*3/mm3 263 271 309  --   --   --   --   --  322 346 320 282   MCV fL 100.4* 100.0* 104.4*  --   --   --   --   --  104.6* 110.8* 105.8* 104.1*   MCH pg 36.3* 36.1* 35.6*  --   --   --   --   --  36.4* 37.6* 36.6* 36.1*   MCHC g/dL 36.2* 36.1* 34.0  --   --   --   --   --  34.8 33.9 34.6 34.6   RDW % 19.3* 19.4* 19.9*  --   --   --   --   --  18.5* 19.2* 18.6* 18.8*   RDW-SD fl 69.3* 70.9* 76.3*  --   --   --   --   --  68.2* 76.0* 70.2* 70.0*   MPV fL 9.3 9.7 9.2  --   --   --   --   --  9.5 9.7 9.6 10.0   NEUTROPHIL % % 70.8  --   --   --   --   --   --   --   --   --   --   --    LYMPHOCYTE % % 15.1*  --   --   --   --   --   --   --   --   --   --   --    MONOCYTES % % 9.9  --   --   --   --   --   --   --   --   --   --   --    EOSINOPHIL % % 0.1*  --   --   --   --   --   --   --   --   --   --   --    BASOPHIL % % 0.2  --   --   --   --   --   --   --   --   --   --   --    NEUTROS ABS 10*3/mm3 12.12*  --  29.98*  --   --   --   --   --   --   --   --   --    LYMPHS ABS 10*3/mm3 2.58  --   --   --   --   --   --   --   --   --   --   --    MONOS ABS 10*3/mm3 1.70*  --   --   --   --   --   --   --   --   --   --   --    EOS ABS 10*3/mm3 0.01  --   --   --   --   --   --   --   --   --   --   --    BASOS ABS 10*3/mm3 0.03  --   --   --   --   --   --   --   --   --   --   --     < > = values in this interval not displayed.     Results from last 7 days    Lab Units 04/02/22  0303 04/01/22  1117 03/30/22  2237   INR  1.14* 1.28* 1.06   APTT seconds  --  28.1 25.0     Results from last 7 days   Lab Units 04/01/22  1516 04/01/22  1141 04/01/22  1009 04/01/22  0933 04/01/22  0859 04/01/22  0838 04/01/22  0736 03/30/22  1911   PH, ARTERIAL pH units 7.355 7.385 7.4080 7.4230 7.4270 7.4140 7.4270 7.445   PO2 ART mm Hg 88.9 263.8*  --   --   --   --   --  56.6*   PCO2, ARTERIAL mm Hg 47.9* 41.8  --   --   --   --   --  40.3   HCO3 ART mmol/L 26.8 25.0  --   --   --   --   --  27.7                         Results from last 7 days   Lab Units 03/30/22  2125   NITRITE UA  Negative           Imaging:  Imaging Results (Last 24 Hours)     Procedure Component Value Units Date/Time    XR Chest 1 View [[id number]] Collected: 04/02/22 0450     Updated: 04/02/22 0454    Narrative:      SINGLE VIEW OF THE CHEST     HISTORY: Postop open heart surgery     COMPARISON: 04/01/2022     FINDINGS:  Endotracheal tube has been removed. Remaining tubes and lines are  stable. Cardiomegaly is present. There is vascular congestion. There is  increasing bibasilar atelectasis. There is a tiny left apical  pneumothorax. No definite pneumothorax is seen on the right. There may  be a trace left pleural effusion.       Impression:      Increasing bibasilar atelectasis. Tiny left apical pneumothorax.     This report was finalized on 4/2/2022 4:51 AM by Dr. Yasmin Ramos M.D.                I reviewed the patient's new clinical results / labs / tests / procedures      Assessment/Plan     Active Hospital Problems    Diagnosis  POA   • **COPD with acute exacerbation (HCC) [J44.1]  Yes   • NSTEMI (non-ST elevated myocardial infarction) (HCC) [I21.4]  Yes   • Alcohol abuse [F10.10]  Yes   • Shortness of breath [R06.02]  Yes   • Acute respiratory failure with hypoxia (HCC) [J96.01]  Yes   • Acute diastolic congestive heart failure (HCC) [I50.31]  Yes   • Acute pulmonary edema (HCC) [J81.0]  Yes   •  Leukocytosis [D72.829]  Yes   • Positive D dimer [R79.89]  Yes   • Anemia [D64.9]  Yes   • History of colon polyps [Z86.010]  Not Applicable   • Overweight (BMI 25.0-29.9) [E66.3]  Yes   • AAA (abdominal aortic aneurysm) (HCC) [I71.4]  Yes   • Coronary artery disease [I25.10]  Unknown   • Abnormal findings on diagnostic imaging of heart and coronary circulation [R93.1]  Unknown   • Essential hypertension [I10]  Yes   • Other hyperlipidemia [E78.49]  Yes      Resolved Hospital Problems    Diagnosis Date Resolved POA   • Hyponatremia [E87.1] 03/29/2022 Yes           · Acute hypoxemic respiratory failure secondary to COPD exacerbation and pulmonary edema.  Look below for management.  Improved.  Status post extubation.  · COPD exacerbation.  Improved.  Currently on DuoNebs.  S/p prednisone.  Chest x-ray today with left apical small pneumothorax, vascular congestion, bilateral lung atelectasis.  Continue duo nebs and incentive spirometry.  Pulmonary is following.  No evidence of pneumonia by chest x-ray.  · N STEMI with acute diastolic congestive heart failure and pulmonary edema status post three-vessel CABG.  Currently stable.  Off ventilator.  Currently on aspirin/Lipitor/Lopressor.  Valsartan has been DC'd.  Status post single dose of IV Lasix today.  Cardiology and cardiothoracic surgery is following.  Cardiothoracic surgery plans Plavix at discharge.  On cefazolin in the perioperative period.  · Anemia of chronic disease secondary to bone marrow suppression because of alcohol.  Status post transfusion.  Hemoglobin is stable with a baseline between 8.9 and 10.  Hemoccult stool is negative.  · Abdominal aortic aneurysm.  5.5 cm.  Vascular surgery is following planning surgery after stabilization.  · Right renal atrophy and complex cyst.  MRI once stable.  · Alcohol abuse.  No signs of withdrawal.  Continue CIWA and initiate thiamine/folate/multivitamin.  · VT prophylaxis.  Patient on Lovenox.    Discussed my findings  and plan of treatment with the patient/nurse.    Saima Yancey MD  [facility]  04/02/22  17:07 EDT

## 2022-04-02 NOTE — PLAN OF CARE
Goal Outcome Evaluation:  Plan of Care Reviewed With: patient        Progress: no change  Outcome Evaluation: Oirented to unit. VSS. Monitor vital signs, CT output, pain level, input and output and telemetry

## 2022-04-02 NOTE — PROGRESS NOTES
"  Daily Progress Note.   [facility] CARDIO RECOVERY  4/2/2022    Patient:  Name:  Osman Aparicio  MRN:  [id number]  1955  66 y.o.  male         CC: COPD management    Interval History:  S/p cabg today extubated postoperatively without difficulty day of surgery.     Doing well denies chest tightness wheezing walked with therapist today.  Up to chair this morning.  Using incentive spirometer.  No undue chest pain wheezing or chest tightness.  He is awake and alert and oriented.  No really confusion either.    Physical Exam:  /79   Pulse 70   Temp 99.68 °F (37.6 °C)   Resp 18   Ht 180.3 cm (70.98\")   Wt 68.4 kg (150 lb 12.7 oz)   SpO2 95%   BMI 21.04 kg/m²   Body mass index is 21.04 kg/m².    Intake/Output Summary (Last 24 hours) at 4/2/2022 1226  Last data filed at 4/2/2022 1200  Gross per 24 hour   Intake 1593.2 ml   Output 4089 ml   Net -2495.8 ml     General appearance: Nontoxic, conversant   Eyes: anicteric sclerae, moist conjunctivae;  HENT: Atraumatic; oropharynx clear with moist mucous membranes  Neck: Trachea midline; right-sided PA cath  Lungs: Bilateral air entry no rhonchi no wheeze at present time unlabored respiratory effort positive chest tubes  CV: RRR, no rub midline incision bandaged  Abdomen: Bowel sounds hypoactive nonrigid  Extremities: No peripheral edema or extremity lymphadenopathy  Skin: Warm dry  Psych: Appropriate affect, alert and oriented   Neuro moves all ext, speech intact    Data Review:  Notable Labs:  Results from last 7 days   Lab Units 04/02/22  0303 04/01/22  1447 04/01/22  1117 04/01/22  1009 04/01/22  0933 04/01/22  0859 04/01/22  0838 04/01/22  0736 04/01/22  0305 03/30/22  2237 03/30/22  0800 03/29/22  0718   WBC 10*3/mm3 17.11* 21.84* 33.31*  --   --   --   --   --  8.40 9.15 8.87 9.12   HEMOGLOBIN g/dL 10.1* 10.7* 11.2*  --   --   --   --   --  9.5* 9.4* 8.9* 7.9*   HEMOGLOBIN, POC g/dL  --   --   --  8.5* 8.8* 8.5* 9.2*   < >  --   --   --   --  "   PLATELETS 10*3/mm3 263 271 309  --   --   --   --   --  322 346 320 282    < > = values in this interval not displayed.     Results from last 7 days   Lab Units 04/02/22 0303 04/01/22 2000 04/01/22 1447 04/01/22 1117 04/01/22 0305 03/30/22 2237 03/30/22  0800 03/29/22  0719 03/28/22  0807   SODIUM mmol/L 136 138 135* 135* 135* 136 137   < > 133*   POTASSIUM mmol/L 4.1 4.9 5.3* 5.0 4.2 3.8 3.7   < > 3.0*   CHLORIDE mmol/L 101 101 101 102 95* 95* 97*   < > 93*   CO2 mmol/L 25.1 26.0 25.0 24.0 30.0* 29.0 30.0*   < > 27.2   BUN mg/dL 27* 28* 28* 29* 33* 28* 25*   < > 16   CREATININE mg/dL 1.13 1.19 1.14 1.09 1.28* 1.23 1.16   < > 1.09   GLUCOSE mg/dL 96 112* 137* 161* 111* 103* 98   < > 107*   CALCIUM mg/dL 8.5* 8.8 8.9 8.9 9.4 9.2 9.2   < > 9.0   MAGNESIUM mg/dL 2.4  --  2.2 2.1  --   --   --   --  2.2   PHOSPHORUS mg/dL 4.1  --  4.7* 4.3  --   --   --   --   --     < > = values in this interval not displayed.   Estimated Creatinine Clearance: 62.2 mL/min (by C-G formula based on SCr of 1.13 mg/dL).    Results from last 7 days   Lab Units 04/02/22 0303 04/01/22 1447 04/01/22 1117 04/01/22 0305 03/30/22 2237 03/30/22  0800 03/29/22  0719   AST (SGOT) U/L  --   --   --   --  17 13 10   ALT (SGPT) U/L  --   --   --   --  17 14 12   PLATELETS 10*3/mm3 263 271 309   < > 346 320  --     < > = values in this interval not displayed.       Results from last 7 days   Lab Units 04/01/22  1516 04/01/22  1141 04/01/22  1009 04/01/22  0933 04/01/22  0859 04/01/22  0838 04/01/22  0736 03/30/22  1911   PH, ARTERIAL pH units 7.355 7.385 7.4080 7.4230 7.4270 7.4140 7.4270 7.445   PCO2, ARTERIAL mm Hg 47.9* 41.8  --   --   --   --   --  40.3   PO2 ART mm Hg 88.9 263.8*  --   --   --   --   --  56.6*   HCO3 ART mmol/L 26.8 25.0  --   --   --   --   --  27.7       Imaging:  Reviewed chest images personally from past 3 days    Scheduled meds:    aspirin, 81 mg, Oral, Daily  atorvastatin, 40 mg, Oral, Nightly  ceFAZolin, 2 g,  Intravenous, Q8H  chlorhexidine, 15 mL, Mouth/Throat, Q12H  enoxaparin, 40 mg, Subcutaneous, Q24H  escitalopram, 20 mg, Oral, Daily  furosemide, 40 mg, Intravenous, Q12H  guaiFENesin, 1,200 mg, Oral, Q12H  ipratropium-albuterol, 3 mL, Nebulization, 4x Daily - RT  metoprolol tartrate, 12.5 mg, Oral, Q12H  mupirocin, , Each Nare, BID  pantoprazole, 40 mg, Oral, Q AM  pantoprazole, 40 mg, Oral, QAM  senna-docusate sodium, 2 tablet, Oral, BID  sodium chloride, 3 mL, Intravenous, Q12H        ASSESSMENT  /  PLAN:  COPD with acute exacerbation   NSTEMI  Alcohol abuse  Acute respiratory failure hypoxemic  Acute diastolic congestive heart failure  Bilateral pleural effusion  Tobacco abuse  CAD s/p cabg 4-1-2022  Leukocytosis?  Reactive    Encourage incentive spirometer  Bronchodilators-schedule duo nebs  Diuresis - Lasix given today monitor renal function-chest x-ray did look a little bit volume up  At present time no wheezing will hold off on steroids if develops any wheezing or increased shortness of breath consideration of IV Solu-Medrol  Out of bed to chair   Discussed with bedside nurse.     Rj Thakkar MD  [facility]  04/02/22  1257 EDT

## 2022-04-02 NOTE — PROGRESS NOTES
" LOS: 7 days   Patient Care Team:  Desire Greer APRN as PCP - General (Family Medicine)  Desire Greer APRN as Nurse Practitioner (Family Medicine)    Chief Complaint: post op fu    Subjective:  Symptoms:  No shortness of breath or chest pain.    Diet:  No nausea.    Pain:  Pain is requiring pain medication and well controlled.          Vital Signs  Temp:  [94.82 °F (34.9 °C)-99.68 °F (37.6 °C)] 99.68 °F (37.6 °C)  Heart Rate:  [70-82] 70  Resp:  [10-18] 18  BP: ()/(69-92) 123/77  Arterial Line BP: ()/(51-74) 122/52  FiO2 (%):  [100 %] 100 %  Body mass index is 21.04 kg/m².    Intake/Output Summary (Last 24 hours) at 4/2/2022 0848  Last data filed at 4/2/2022 0700  Gross per 24 hour   Intake 3893.2 ml   Output 4004 ml   Net -110.8 ml     No intake/output data recorded.    Chest tube drainage last 8 hours:  100, 270        03/31/22  0639 03/31/22  2111 04/02/22  0600   Weight: 74.5 kg (164 lb 4.8 oz) 74.6 kg (164 lb 8 oz) 68.4 kg (150 lb 12.7 oz)         Objective:  General Appearance:  Comfortable.    Vital signs: (most recent): Blood pressure 123/77, pulse 70, temperature 99.68 °F (37.6 °C), resp. rate 18, height 180.3 cm (70.98\"), weight 68.4 kg (150 lb 12.7 oz), SpO2 99 %.    Lungs:  Normal effort and normal respiratory rate.  (O2 at 4 liters)  Heart: Normal rate.  Regular rhythm.    Abdomen: Abdomen is soft and non-distended.    Extremities: There is no dependent edema.    Neurological: Patient is alert and oriented to person, place and time.    Skin:  Warm and dry.  (Sternal dressing intact, left leg incision well approximated without erythema, edema, or drainage)            Results Review:        WBC WBC   Date Value Ref Range Status   04/02/2022 17.11 (H) 3.40 - 10.80 10*3/mm3 Final   04/01/2022 21.84 (H) 3.40 - 10.80 10*3/mm3 Final   04/01/2022 33.31 (C) 3.40 - 10.80 10*3/mm3 Final   04/01/2022 8.40 3.40 - 10.80 10*3/mm3 Final   03/30/2022 9.15 3.40 - 10.80 10*3/mm3 Final "      HGB Hemoglobin   Date Value Ref Range Status   04/02/2022 10.1 (L) 13.0 - 17.7 g/dL Final   04/01/2022 10.7 (L) 13.0 - 17.7 g/dL Final   04/01/2022 11.2 (L) 13.0 - 17.7 g/dL Final   04/01/2022 8.5 (L) 12.0 - 17.0 g/dL Final   04/01/2022 8.8 (L) 12.0 - 17.0 g/dL Final   04/01/2022 8.5 (L) 12.0 - 17.0 g/dL Final   04/01/2022 9.2 (L) 12.0 - 17.0 g/dL Final   04/01/2022 8.5 (L) 12.0 - 17.0 g/dL Final   04/01/2022 9.5 (L) 13.0 - 17.7 g/dL Final   03/30/2022 9.4 (L) 13.0 - 17.7 g/dL Final      HCT Hematocrit   Date Value Ref Range Status   04/02/2022 27.9 (L) 37.5 - 51.0 % Final   04/01/2022 29.6 (L) 37.5 - 51.0 % Final   04/01/2022 32.9 (L) 37.5 - 51.0 % Final   04/01/2022 25 (L) 38 - 51 % Final   04/01/2022 26 (L) 38 - 51 % Final   04/01/2022 25 (L) 38 - 51 % Final   04/01/2022 27 (L) 38 - 51 % Final   04/01/2022 25 (L) 38 - 51 % Final   04/01/2022 27.3 (L) 37.5 - 51.0 % Final   03/30/2022 27.7 (L) 37.5 - 51.0 % Final      Platelets Platelets   Date Value Ref Range Status   04/02/2022 263 140 - 450 10*3/mm3 Final   04/01/2022 271 140 - 450 10*3/mm3 Final   04/01/2022 309 140 - 450 10*3/mm3 Final   04/01/2022 322 140 - 450 10*3/mm3 Final   03/30/2022 346 140 - 450 10*3/mm3 Final        PT/INR:    Protime   Date Value Ref Range Status   04/02/2022 14.6 (H) 11.7 - 14.2 Seconds Final   04/01/2022 15.9 (H) 11.7 - 14.2 Seconds Final   03/30/2022 13.7 11.7 - 14.2 Seconds Final   /  INR   Date Value Ref Range Status   04/02/2022 1.14 (H) 0.90 - 1.10 Final   04/01/2022 1.28 (H) 0.90 - 1.10 Final   03/30/2022 1.06 0.90 - 1.10 Final       Sodium Sodium   Date Value Ref Range Status   04/02/2022 136 136 - 145 mmol/L Final   04/01/2022 138 136 - 145 mmol/L Final   04/01/2022 135 (L) 136 - 145 mmol/L Final   04/01/2022 135 (L) 136 - 145 mmol/L Final   04/01/2022 135 (L) 136 - 145 mmol/L Final   03/30/2022 136 136 - 145 mmol/L Final      Potassium Potassium   Date Value Ref Range Status   04/02/2022 4.1 3.5 - 5.2 mmol/L Final    04/01/2022 4.9 3.5 - 5.2 mmol/L Final     Comment:     Slight hemolysis detected by analyzer. Results may be affected.   04/01/2022 5.3 (H) 3.5 - 5.2 mmol/L Final   04/01/2022 5.0 3.5 - 5.2 mmol/L Final     Comment:     Slight hemolysis detected by analyzer. Results may be affected.   04/01/2022 4.2 3.5 - 5.2 mmol/L Final   03/30/2022 3.8 3.5 - 5.2 mmol/L Final      Chloride Chloride   Date Value Ref Range Status   04/02/2022 101 98 - 107 mmol/L Final   04/01/2022 101 98 - 107 mmol/L Final   04/01/2022 101 98 - 107 mmol/L Final   04/01/2022 102 98 - 107 mmol/L Final   04/01/2022 95 (L) 98 - 107 mmol/L Final   03/30/2022 95 (L) 98 - 107 mmol/L Final      Bicarbonate CO2   Date Value Ref Range Status   04/02/2022 25.1 22.0 - 29.0 mmol/L Final   04/01/2022 26.0 22.0 - 29.0 mmol/L Final   04/01/2022 25.0 22.0 - 29.0 mmol/L Final   04/01/2022 24.0 22.0 - 29.0 mmol/L Final   04/01/2022 30.0 (H) 22.0 - 29.0 mmol/L Final   03/30/2022 29.0 22.0 - 29.0 mmol/L Final      BUN BUN   Date Value Ref Range Status   04/02/2022 27 (H) 8 - 23 mg/dL Final   04/01/2022 28 (H) 8 - 23 mg/dL Final   04/01/2022 28 (H) 8 - 23 mg/dL Final   04/01/2022 29 (H) 8 - 23 mg/dL Final   04/01/2022 33 (H) 8 - 23 mg/dL Final   03/30/2022 28 (H) 8 - 23 mg/dL Final      Creatinine Creatinine   Date Value Ref Range Status   04/02/2022 1.13 0.76 - 1.27 mg/dL Final   04/01/2022 1.19 0.76 - 1.27 mg/dL Final   04/01/2022 1.14 0.76 - 1.27 mg/dL Final   04/01/2022 1.09 0.76 - 1.27 mg/dL Final   04/01/2022 1.28 (H) 0.76 - 1.27 mg/dL Final   03/30/2022 1.23 0.76 - 1.27 mg/dL Final      Calcium Calcium   Date Value Ref Range Status   04/02/2022 8.5 (L) 8.6 - 10.5 mg/dL Final   04/01/2022 8.8 8.6 - 10.5 mg/dL Final   04/01/2022 8.9 8.6 - 10.5 mg/dL Final   04/01/2022 8.9 8.6 - 10.5 mg/dL Final   04/01/2022 9.4 8.6 - 10.5 mg/dL Final   03/30/2022 9.2 8.6 - 10.5 mg/dL Final      Magnesium Magnesium   Date Value Ref Range Status   04/02/2022 2.4 1.6 - 2.4 mg/dL  Final   04/01/2022 2.2 1.6 - 2.4 mg/dL Final   04/01/2022 2.1 1.6 - 2.4 mg/dL Final      Troponin No results found for: TROPONIN   BNP No results found for: BNP         aspirin, 81 mg, Oral, Daily  atorvastatin, 40 mg, Oral, Daily  atorvastatin, 40 mg, Oral, Nightly  ceFAZolin, 2 g, Intravenous, Q8H  chlorhexidine, 15 mL, Mouth/Throat, Q12H  enoxaparin, 40 mg, Subcutaneous, Q24H  escitalopram, 20 mg, Oral, Daily  furosemide, 40 mg, Intravenous, Q12H  ipratropium-albuterol, 3 mL, Nebulization, 4x Daily - RT  magnesium sulfate, 1 g, Intravenous, Q8H  metoclopramide, 10 mg, Intravenous, Q6H  metoprolol tartrate, 12.5 mg, Oral, Q12H  mupirocin, , Each Nare, BID  pantoprazole, 40 mg, Oral, Q AM  pantoprazole, 40 mg, Oral, QAM  senna-docusate sodium, 2 tablet, Oral, BID  sodium chloride, 3 mL, Intravenous, Q12H      clevidipine, 2-32 mg/hr  dexmedetomidine, 0.2-1.5 mcg/kg/hr, Last Rate: Stopped (04/01/22 1700)  DOPamine, 2-20 mcg/kg/min  EPINEPHrine, 0.02-0.3 mcg/kg/min  insulin, 0-50 Units/hr, Last Rate: Stopped (04/02/22 0209)  milrinone, 0.25-0.75 mcg/kg/min  niCARdipine, 5-15 mg/hr  nitroglycerin, 5-200 mcg/min  norepinephrine, 0.02-0.3 mcg/kg/min, Last Rate: Stopped (04/01/22 2000)  phenylephrine, 0.2-3 mcg/kg/min  propofol, 5-50 mcg/kg/min, Last Rate: Stopped (04/01/22 1337)  sodium chloride, 30 mL/hr, Last Rate: 30 mL/hr (04/01/22 1144)  sodium chloride, 30 mL/hr, Last Rate: Stopped (04/02/22 0209)        PRN Meds:.•  acetaminophen **OR** acetaminophen **OR** acetaminophen  •  acetaminophen **OR** acetaminophen **OR** acetaminophen  •  acetaminophen  •  albumin human  •  ALPRAZolam  •  bisacodyl  •  bisacodyl  •  clevidipine  •  cyclobenzaprine  •  docusate sodium  •  DOPamine  •  EPINEPHrine  •  HYDROcodone-acetaminophen  •  insulin  •  LORazepam **OR** LORazepam **OR** LORazepam **OR** LORazepam **OR** LORazepam **OR** LORazepam **OR** LORazepam **OR** LORazepam  •  magnesium hydroxide  •  magnesium sulfate  **OR** magnesium sulfate **OR** magnesium sulfate  •  melatonin  •  midazolam  •  milrinone  •  Morphine **AND** naloxone  •  Morphine  •  niCARdipine  •  nicotine  •  nitroglycerin  •  norepinephrine  •  ondansetron **OR** ondansetron  •  ondansetron  •  oxyCODONE  •  phenylephrine  •  polyethylene glycol  •  potassium chloride **OR** potassium chloride  •  potassium chloride  •  potassium chloride  •  potassium chloride  •  propofol  •  sodium chloride  •  sodium chloride  •  sodium chloride  •  sodium chloride    Patient Active Problem List   Diagnosis Code   • Essential hypertension I10   • Other hyperlipidemia E78.49   • Anxiety F41.9   • Nocturia R35.1   • Elevated PSA R97.20   • Elevated blood sugar level R73.9   • Positive colorectal cancer screening using Cologuard test R19.5   • Shortness of breath R06.02   • Acute respiratory failure with hypoxia ([facility]) J96.01   • Acute diastolic congestive heart failure ([facility]) I50.31   • Acute pulmonary edema ([facility]) J81.0   • Leukocytosis D72.829   • Positive D dimer R79.89   • Anemia D64.9   • COPD with acute exacerbation ([facility]) J44.1   • Emphysema ([facility]) J43.1   • History of colon polyps Z86.010   • Overweight (BMI 25.0-29.9) E66.3   • AAA (abdominal aortic aneurysm) ([facility]) I71.4   • Alcohol abuse F10.10   • NSTEMI (non-ST elevated myocardial infarction) ([facility]) I21.4   • Coronary artery disease I25.10   • Abnormal findings on diagnostic imaging of heart and coronary circulation R93.1       Assessment & Plan    - Multivessel CAD, NSTEMI s/p OP CABG x3 with LIMA-----POD #1 ([place]), will start Plavix prior to d/c  - HTN----controlled  - HLD----statin  - Ascending aortic dilation--- mildly dilated, heavily calcified  - Abdominal aortic aneuysm--- 5.5cm aneurysm on ultrasound, vascular following, intervention after recover from CABG  - Tobacco abuse---mucinex,  - COPD----pulmonary following, duonebs  - Etoh abuse--- 6 beers daily, encourage cessation,  CIWA  - Chronic anemia (from ETOH?), post op expected blood loss anemia  - Leukocytosis----reactive, trending down    Lasix 40 IV given this am with good result, weaning down on O2, continue Lasix for now and check bmp this afternoon.  Keep chest tubes today, keep petty until chest tubes out.  Transfer to step down.      Michell Phelps, APRN  04/02/22  08:48 EDT

## 2022-04-02 NOTE — PROGRESS NOTES
LOS: 7 days   Patient Care Team:  Desire Greer APRN as PCP - General (Family Medicine)  Desire Greer APRN as Nurse Practitioner (Family Medicine)    Chief Complaint:  Shortness of Breath    Interval History:   Extubated yesterday and respiratory status is stable.  Chest tubes remain in place.     Objective   Vital Signs  Temp:  [97.88 °F (36.6 °C)-99.68 °F (37.6 °C)] 99.1 °F (37.3 °C)  Heart Rate:  [69-82] 70  Resp:  [10-18] 18  BP: ()/(69-92) 126/74  Arterial Line BP: ()/(51-67) 141/57    Intake/Output Summary (Last 24 hours) at 4/2/2022 1546  Last data filed at 4/2/2022 1400  Gross per 24 hour   Intake 2313.2 ml   Output 3925 ml   Net -1611.8 ml         Physical Exam  Vitals and nursing note reviewed.   Constitutional:       Appearance: Normal appearance.   Cardiovascular:      Rate and Rhythm: Normal rate and regular rhythm.      Pulses: Normal pulses.      Comments: Surgical incision c/d/i  Chest tubes in place.   Pulmonary:      Effort: Pulmonary effort is normal.   Skin:     General: Skin is warm.   Neurological:      General: No focal deficit present.      Mental Status: He is alert and oriented to person, place, and time.   Psychiatric:         Attention and Perception: Attention normal.         Mood and Affect: Mood normal.         Behavior: Behavior normal. Behavior is cooperative.           Results Review:      Results from last 7 days   Lab Units 04/02/22  0303 04/01/22  2000 04/01/22  1447   SODIUM mmol/L 136 138 135*   POTASSIUM mmol/L 4.1 4.9 5.3*   CHLORIDE mmol/L 101 101 101   CO2 mmol/L 25.1 26.0 25.0   BUN mg/dL 27* 28* 28*   CREATININE mg/dL 1.13 1.19 1.14   GLUCOSE mg/dL 96 112* 137*   CALCIUM mg/dL 8.5* 8.8 8.9     Results from last 7 days   Lab Units 03/27/22  1550 03/27/22  1013   TROPONIN T ng/mL 0.085* 0.110*     Results from last 7 days   Lab Units 04/02/22  0303 04/01/22  1447 04/01/22  1117   WBC 10*3/mm3 17.11* 21.84* 33.31*   HEMOGLOBIN g/dL 10.1*  10.7* 11.2*   HEMATOCRIT % 27.9* 29.6* 32.9*   PLATELETS 10*3/mm3 263 271 309     Results from last 7 days   Lab Units 04/02/22  0303 04/01/22  1117 03/30/22  2237   INR  1.14* 1.28* 1.06   APTT seconds  --  28.1 25.0     Results from last 7 days   Lab Units 03/28/22  0807   CHOLESTEROL mg/dL 104     Results from last 7 days   Lab Units 04/02/22  0303   MAGNESIUM mg/dL 2.4     Results from last 7 days   Lab Units 03/28/22  0807   CHOLESTEROL mg/dL 104   TRIGLYCERIDES mg/dL 61   HDL CHOL mg/dL 52   LDL CHOL mg/dL 38       I reviewed the patient's new clinical results.  I personally viewed and interpreted the patient's EKG/Telemetry data        Medication Review:   aspirin, 81 mg, Oral, Daily  atorvastatin, 40 mg, Oral, Nightly  ceFAZolin, 2 g, Intravenous, Q8H  chlorhexidine, 15 mL, Mouth/Throat, Q12H  enoxaparin, 40 mg, Subcutaneous, Q24H  escitalopram, 20 mg, Oral, Daily  furosemide, 40 mg, Intravenous, Q12H  guaiFENesin, 1,200 mg, Oral, Q12H  ipratropium-albuterol, 3 mL, Nebulization, 4x Daily - RT  metoprolol tartrate, 12.5 mg, Oral, Q12H  mupirocin, , Each Nare, BID  pantoprazole, 40 mg, Oral, Q AM  pantoprazole, 40 mg, Oral, QAM  senna-docusate sodium, 2 tablet, Oral, BID  sodium chloride, 3 mL, Intravenous, Q12H        clevidipine, 2-32 mg/hr  dexmedetomidine, 0.2-1.5 mcg/kg/hr, Last Rate: Stopped (04/01/22 1700)  DOPamine, 2-20 mcg/kg/min  EPINEPHrine, 0.02-0.3 mcg/kg/min  insulin, 0-50 Units/hr, Last Rate: Stopped (04/02/22 0209)  milrinone, 0.25-0.75 mcg/kg/min  niCARdipine, 5-15 mg/hr  nitroglycerin, 5-200 mcg/min  norepinephrine, 0.02-0.3 mcg/kg/min, Last Rate: Stopped (04/01/22 2000)  phenylephrine, 0.2-3 mcg/kg/min  propofol, 5-50 mcg/kg/min, Last Rate: Stopped (04/01/22 1337)  sodium chloride, 30 mL/hr, Last Rate: 30 mL/hr (04/01/22 1144)  sodium chloride, 30 mL/hr, Last Rate: Stopped (04/02/22 0209)        Assessment/Plan       COPD with acute exacerbation (HCC)    Essential hypertension    Other  hyperlipidemia    Shortness of breath    Acute respiratory failure with hypoxia (HCC)    Acute diastolic congestive heart failure (HCC)    Acute pulmonary edema (HCC)    Leukocytosis    Positive D dimer    Anemia    History of colon polyps    Overweight (BMI 25.0-29.9)    AAA (abdominal aortic aneurysm) (HCC)    Alcohol abuse    NSTEMI (non-ST elevated myocardial infarction) (HCC)    Coronary artery disease    Abnormal findings on diagnostic imaging of heart and coronary circulation    Respiratory status improving with diuresis, likely to step down today.  Will continue to follow.     Tony Mack MD  04/02/22  15:46 EDT

## 2022-04-02 NOTE — THERAPY EVALUATION
Patient Name: Osman Aparicio  : 1955    MRN: [id number]                              Today's Date: 2022       Admit Date: 3/26/2022    Visit Dx:     ICD-10-CM ICD-9-CM   1. Shortness of breath  R06.02 786.05   2. Hypervolemia, unspecified hypervolemia type  E87.70 276.69   3. Hyponatremia  E87.1 276.1   4. Chronic obstructive pulmonary disease, unspecified COPD type ([facility])  J44.9 496   5. Symptomatic anemia  D64.9 285.9   6. Coronary artery disease involving native coronary artery of native heart with other form of angina pectoris ([facility])  I25.118 414.01     413.9   7. Abnormal findings on diagnostic imaging of heart and coronary circulation  R93.1 794.39   8. NSTEMI (non-ST elevated myocardial infarction) ([facility])  I21.4 410.70     Patient Active Problem List   Diagnosis   • Essential hypertension   • Other hyperlipidemia   • Anxiety   • Nocturia   • Elevated PSA   • Elevated blood sugar level   • Positive colorectal cancer screening using Cologuard test   • Shortness of breath   • Acute respiratory failure with hypoxia (HCC)   • Acute diastolic congestive heart failure (HCC)   • Acute pulmonary edema (HCC)   • Leukocytosis   • Positive D dimer   • Anemia   • COPD with acute exacerbation (HCC)   • Emphysema (HCC)   • History of colon polyps   • Overweight (BMI 25.0-29.9)   • AAA (abdominal aortic aneurysm) (HCC)   • Alcohol abuse   • NSTEMI (non-ST elevated myocardial infarction) ([facility])   • Coronary artery disease   • Abnormal findings on diagnostic imaging of heart and coronary circulation     Past Medical History:   Diagnosis Date   • Abnormal glucose    • Anxiety    • Encounter for special screening examination for neoplasm of prostate 10/2012   • Hematuria    • Hyperlipidemia    • Hypertension    • Plantar wart    • Vitamin D deficiency disease      Past Surgical History:   Procedure Laterality Date   • CARDIAC CATHETERIZATION N/A 3/29/2022    Procedure: Left Heart Cath;  Surgeon: Neto Paige MD;   Location:  [facility];  Service: Cardiology;  Laterality: N/A;   • CARDIAC CATHETERIZATION N/A 3/29/2022    Procedure: Coronary angiography;  Surgeon: Neto Paige MD;  Location:  [facility];  Service: Cardiology;  Laterality: N/A;   • CARDIAC CATHETERIZATION N/A 3/29/2022    Procedure: Left ventriculography;  Surgeon: Neto Paige MD;  Location:  [facility];  Service: Cardiology;  Laterality: N/A;   • COLONOSCOPY N/A 12/7/2020    Procedure: COLONOSCOPY INTO CECUM WITH HOT SNARE POLYPECTOMIES, COLD BX POLYPECTOMIES, RESOLUTION CLIPS X;  Surgeon: Regi Mercado MD;  Location:  [facility];  Service: General;  Laterality: N/A;  PRE:  POSITIVE COLOGUARD  POST:  POLYPS      General Information     Row Name 04/02/22 1224          Physical Therapy Time and Intention    Document Type evaluation  -     Mode of Treatment individual therapy;physical therapy  -     Row Name 04/02/22 1224          General Information    Patient Profile Reviewed yes  -     Prior Level of Function independent:;gait;transfer;bed mobility  -     Existing Precautions/Restrictions cardiac;fall;sternal  -     Barriers to Rehab medically complex  -     Row Name 04/02/22 1224          Living Environment    People in Home spouse;child(phi), adult;grandchild(phi)  -           User Key  (r) = Recorded By, (t) = Taken By, (c) = Cosigned By    Initials Name Provider Type     Jocelin Pope Physical Therapist               Mobility     Row Name 04/02/22 1305          Bed Mobility    Bed Mobility sit-supine;supine-sit  -     Supine-Sit [place] (Bed Mobility) not tested  -     Sit-Supine [place] (Bed Mobility) not tested  formerly [facility]     Comment, (Bed Mobility) [facility]     Row Name 04/02/22 1305          Sit-Stand Transfer    Sit-Stand [place] (Transfers) minimum assist (75% patient effort);2 person assist;nonverbal cues (demo/gesture);verbal cues  formerly [facility]      Assistive Device (Sit-Stand Transfers) other (see comments)  A x2  -BH     Row Name 04/02/22 1305          Gait/Stairs (Locomotion)    [place] Level (Gait) verbal cues;nonverbal cues (demo/gesture);minimum assist (75% patient effort);2 person assist  -     Assistive Device (Gait) other (see comments)  A x2  -     Distance in Feet (Gait) 60ft  -     Deviations/Abnormal Patterns (Gait) antalgic;base of support, wide;tia decreased;gait speed decreased;festinating/shuffling;stride length decreased;weight shifting decreased  -     Bilateral Gait Deviations forward flexed posture;heel strike decreased  -     [place] Level (Stairs) not tested  -     Comment, (Gait/Stairs) Tolerated gait well, distance limited 2/2 fatigue  -           User Key  (r) = Recorded By, (t) = Taken By, (c) = Cosigned By    Initials Name Provider Type     Jocelin Pope Physical Therapist               Obj/Interventions     Row Name 04/02/22 1309          Range of Motion Comprehensive    General Range of Motion bilateral lower extremity ROM WNL  -[facility] Name 04/02/22 1309          Strength Comprehensive (MMT)    General Manual Muscle Testing (MMT) Assessment lower extremity strength deficits identified  -     Comment, General Manual Muscle Testing (MMT) Assessment Expected post-op strength deficits, BLE grossly 4/5  -[facility] Name 04/02/22 1309          Motor Skills    Therapeutic Exercise --  10 reps B AP/LAQ/seated marches  -[facility] Name 04/02/22 1309          Balance    Balance Assessment sitting static balance;sitting dynamic balance;standing static balance;standing dynamic balance  -     Static Sitting Balance standby assist  -     Dynamic Sitting Balance standby assist  -     Position, Sitting Balance sitting in chair  -     Static Standing Balance contact guard  -     Dynamic Standing Balance minimal assist  -     Position/Device Used, Standing Balance supported;other (see comments)   HHA x2  -     Row Name 04/02/22 1309          Sensory Assessment (Somatosensory)    Sensory Assessment (Somatosensory) LE sensation intact  -           User Key  (r) = Recorded By, (t) = Taken By, (c) = Cosigned By    Initials Name Provider Type     Jocelin Pope Physical Therapist               Goals/Plan     Row Name 04/02/22 1316          Problem Specific Goal 1 (PT)    Problem Specific Goal 1 (PT) Cardiac Level 5  -     Time Frame (Problem Specific Goal 1, PT) 1 week  -           User Key  (r) = Recorded By, (t) = Taken By, (c) = Cosigned By    Initials Name Provider Type     Jocelin Pope Physical Therapist               Clinical Impression     Row Name 04/02/22 1310          Pain    Pretreatment Pain Rating 2/10  -     Posttreatment Pain Rating 2/10  -     Pain Location - chest  -     Pain Intervention(s) Repositioned;Ambulation/increased activity  -     Row Name 04/02/22 1310          Plan of Care Review    Plan of Care Reviewed With patient  -     Outcome Evaluation Pt is a 67 yo M POD 1 CABG x3. Pt lives with his wife, adult daughter, and grandchilden. Pt reports independence at BL and denies use of AD. Pt has 3 FERMÍN and no steps inside. Pt presents to PT with impaired strength, endurance, and overall limited mobility. Pt required min A x2 for STS and ambulated 60ft with min A x2 and HHA. Pt with minimal c/o fatigue, but no dizziness/lightheadedness. Pt will continue to benefit from skilled PT to address functional deficits and improve overall mobility. PT recommending D/C home with HHPT.  -     Row Name 04/02/22 1310          Therapy Assessment/Plan (PT)    Patient/Family Therapy Goals Statement (PT) Return to OF  -     Rehab Potential (PT) good, to achieve stated therapy goals  -     Criteria for Skilled Interventions Met (PT) yes  -     Row Name 04/02/22 1310          Positioning and Restraints    Pre-Treatment Position sitting in chair/recliner  -     Post Treatment  Position chair  -BH     In Chair reclined;call light within reach;encouraged to call for assist  -           User Key  (r) = Recorded By, (t) = Taken By, (c) = Cosigned By    Initials Name Provider Type     Jocelin Pope Physical Therapist               Outcome Measures     Row Name 04/02/22 1316          How much help from another person do you currently need...    Turning from your back to your side while in flat bed without using bedrails? 2  -BH     Moving from lying on back to sitting on the side of a flat bed without bedrails? 2  -BH     Moving to and from a bed to a chair (including a wheelchair)? 2  -BH     Standing up from a chair using your arms (e.g., wheelchair, bedside chair)? 2  -BH     Climbing 3-5 steps with a railing? 1  -BH     To walk in hospital room? 3  -BH     AM-PAC 6 Clicks Score (PT) 12  -     Row Name 04/02/22 1316          Functional Assessment    Outcome Measure Options AM-PAC 6 Clicks Basic Mobility (PT)  -           User Key  (r) = Recorded By, (t) = Taken By, (c) = Cosigned By    Initials Name Provider Type     Jocelin Pope Physical Therapist                             Physical Therapy Education                 Title: PT OT SLP Therapies (Done)     Topic: Physical Therapy (Done)     Point: Mobility training (Done)     Learning Progress Summary           Patient Acceptance, E,TB,D, VU,NR by  at 4/2/2022 1317                   Point: Home exercise program (Done)     Learning Progress Summary           Patient Acceptance, E,TB,D, VU,NR by  at 4/2/2022 1317                   Point: Body mechanics (Done)     Learning Progress Summary           Patient Acceptance, E,TB,D, VU,NR by  at 4/2/2022 1317                   Point: Precautions (Done)     Learning Progress Summary           Patient Acceptance, E,TB,D, VU,NR by  at 4/2/2022 1317                               User Key     Initials Effective Dates Name Provider Type [facility] 05/10/21 -  Jocelin Pope  Physical Therapist PT              PT Recommendation and Plan  Planned Therapy Interventions (PT): balance training, bed mobility training, gait training, home exercise program, patient/family education, stair training, strengthening, transfer training  Plan of Care Reviewed With: patient  Outcome Evaluation: Pt is a 65 yo M POD 1 CABG x3. Pt lives with his wife, adult daughter, and grandchilden. Pt reports independence at BL and denies use of AD. Pt has 3 FERMÍN and no steps inside. Pt presents to PT with impaired strength, endurance, and overall limited mobility. Pt required min A x2 for STS and ambulated 60ft with min A x2 and HHA. Pt with minimal c/o fatigue, but no dizziness/lightheadedness. Pt will continue to benefit from skilled PT to address functional deficits and improve overall mobility. PT recommending D/C home with HHPT.     Time Calculation:    PT Charges     Row Name 04/02/22 1317             Time Calculation    Start Time 0855  -      Stop Time 0912  -      Time Calculation (min) 17 min  -      PT Received On 04/02/22  -      PT - Next Appointment 04/04/22  -      PT Goal Re-Cert Due Date 04/09/22  -              Time Calculation- PT    Total Timed Code Minutes- PT 15 minute(s)  -              Timed Charges    13300 - Gait Training Minutes  10  -      07415 - PT Therapeutic Activity Minutes 5  -BH              Untimed Charges    PT Eval/Re-eval Minutes 5  -BH              Total Minutes    Timed Charges Total Minutes 15  -BH      Untimed Charges Total Minutes 5  -BH       Total Minutes 20  -BH            User Key  (r) = Recorded By, (t) = Taken By, (c) = Cosigned By    Initials Name Provider Type     Jocelin Pope Physical Therapist              Therapy Charges for Today     Code Description Service Date Service Provider Modifiers Qty    39963995102 HC GAIT TRAINING EA 15 MIN 4/2/2022 Jocelin Pope GP 1    [id number]  PT EVAL MOD COMPLEXITY 3 4/2/2022 Jocelin Pope GP 1     [id number]  PT THER SUPP EA 15 MIN 4/2/2022 Jocelin Pope GP 1          PT G-Codes  Outcome Measure Options: AM-PAC 6 Clicks Basic Mobility (PT)  AM-PAC 6 Clicks Score (PT): 12    JOCELIN POPE  4/2/2022

## 2022-04-02 NOTE — PLAN OF CARE
Goal Outcome Evaluation:  Plan of Care Reviewed With: patient           Outcome Evaluation: Pt is a 67 yo M POD 1 CABG x3. Pt lives with his wife, adult daughter, and grandchilden. Pt reports independence at BL and denies use of AD. Pt has 3 FERMÍN and no steps inside. Pt presents to PT with impaired strength, endurance, and overall limited mobility. Pt required min A x2 for STS and ambulated 60ft with min A x2 and HHA. Pt with minimal c/o fatigue, but no dizziness/lightheadedness. Pt will continue to benefit from skilled PT to address functional deficits and improve overall mobility. PT recommending D/C home with HHPT.

## 2022-04-03 ENCOUNTER — APPOINTMENT (OUTPATIENT)
Dept: GENERAL RADIOLOGY | Facility: HOSPITAL | Age: 67
End: 2022-04-03

## 2022-04-03 LAB
ANION GAP SERPL CALCULATED.3IONS-SCNC: 9 MMOL/L (ref 5–15)
BH BB BLOOD EXPIRATION DATE: NORMAL
BH BB BLOOD EXPIRATION DATE: NORMAL
BH BB BLOOD TYPE BARCODE: 9500
BH BB BLOOD TYPE BARCODE: 9500
BH BB DISPENSE STATUS: NORMAL
BH BB DISPENSE STATUS: NORMAL
BH BB PRODUCT CODE: NORMAL
BH BB PRODUCT CODE: NORMAL
BH BB UNIT NUMBER: NORMAL
BH BB UNIT NUMBER: NORMAL
BUN SERPL-MCNC: 24 MG/DL (ref 8–23)
BUN/CREAT SERPL: 19.7 (ref 7–25)
CALCIUM SPEC-SCNC: 8.4 MG/DL (ref 8.6–10.5)
CHLORIDE SERPL-SCNC: 95 MMOL/L (ref 98–107)
CO2 SERPL-SCNC: 28 MMOL/L (ref 22–29)
CREAT SERPL-MCNC: 1.22 MG/DL (ref 0.76–1.27)
CROSSMATCH INTERPRETATION: NORMAL
CROSSMATCH INTERPRETATION: NORMAL
DEPRECATED RDW RBC AUTO: 72.1 FL (ref 37–54)
EGFRCR SERPLBLD CKD-EPI 2021: 65.4 ML/MIN/1.73
ERYTHROCYTE [DISTWIDTH] IN BLOOD BY AUTOMATED COUNT: 18.8 % (ref 12.3–15.4)
GLUCOSE BLDC GLUCOMTR-MCNC: 138 MG/DL (ref 70–130)
GLUCOSE BLDC GLUCOMTR-MCNC: 142 MG/DL (ref 70–130)
GLUCOSE BLDC GLUCOMTR-MCNC: 154 MG/DL (ref 70–130)
GLUCOSE BLDC GLUCOMTR-MCNC: 179 MG/DL (ref 70–130)
GLUCOSE SERPL-MCNC: 109 MG/DL (ref 65–99)
HCT VFR BLD AUTO: 27.2 % (ref 37.5–51)
HGB BLD-MCNC: 9.5 G/DL (ref 13–17.7)
MCH RBC QN AUTO: 36.5 PG (ref 26.6–33)
MCHC RBC AUTO-ENTMCNC: 34.9 G/DL (ref 31.5–35.7)
MCV RBC AUTO: 104.6 FL (ref 79–97)
PLATELET # BLD AUTO: 197 10*3/MM3 (ref 140–450)
PMV BLD AUTO: 10.4 FL (ref 6–12)
POTASSIUM SERPL-SCNC: 3.4 MMOL/L (ref 3.5–5.2)
QT INTERVAL: 411 MS
RBC # BLD AUTO: 2.6 10*6/MM3 (ref 4.14–5.8)
SODIUM SERPL-SCNC: 132 MMOL/L (ref 136–145)
UNIT  ABO: NORMAL
UNIT  ABO: NORMAL
UNIT  RH: NORMAL
UNIT  RH: NORMAL
WBC NRBC COR # BLD: 15.79 10*3/MM3 (ref 3.4–10.8)

## 2022-04-03 PROCEDURE — 94799 UNLISTED PULMONARY SVC/PX: CPT

## 2022-04-03 PROCEDURE — 85027 COMPLETE CBC AUTOMATED: CPT | Performed by: NURSE PRACTITIONER

## 2022-04-03 PROCEDURE — 25010000002 FUROSEMIDE PER 20 MG: Performed by: NURSE PRACTITIONER

## 2022-04-03 PROCEDURE — 97116 GAIT TRAINING THERAPY: CPT

## 2022-04-03 PROCEDURE — 82962 GLUCOSE BLOOD TEST: CPT

## 2022-04-03 PROCEDURE — 99232 SBSQ HOSP IP/OBS MODERATE 35: CPT | Performed by: INTERNAL MEDICINE

## 2022-04-03 PROCEDURE — 99024 POSTOP FOLLOW-UP VISIT: CPT | Performed by: NURSE PRACTITIONER

## 2022-04-03 PROCEDURE — 25010000002 MORPHINE PER 10 MG: Performed by: NURSE PRACTITIONER

## 2022-04-03 PROCEDURE — 80048 BASIC METABOLIC PNL TOTAL CA: CPT | Performed by: NURSE PRACTITIONER

## 2022-04-03 PROCEDURE — 25010000002 ENOXAPARIN PER 10 MG: Performed by: NURSE PRACTITIONER

## 2022-04-03 PROCEDURE — 71045 X-RAY EXAM CHEST 1 VIEW: CPT

## 2022-04-03 PROCEDURE — 93005 ELECTROCARDIOGRAM TRACING: CPT

## 2022-04-03 PROCEDURE — 63710000001 INSULIN LISPRO (HUMAN) PER 5 UNITS: Performed by: NURSE PRACTITIONER

## 2022-04-03 RX ORDER — POTASSIUM CHLORIDE 750 MG/1
20 TABLET, FILM COATED, EXTENDED RELEASE ORAL 2 TIMES DAILY WITH MEALS
Status: DISCONTINUED | OUTPATIENT
Start: 2022-04-03 | End: 2022-04-05 | Stop reason: HOSPADM

## 2022-04-03 RX ADMIN — FUROSEMIDE 40 MG: 10 INJECTION, SOLUTION INTRAMUSCULAR; INTRAVENOUS at 04:54

## 2022-04-03 RX ADMIN — DOCUSATE SODIUM 50MG AND SENNOSIDES 8.6MG 2 TABLET: 8.6; 5 TABLET, FILM COATED ORAL at 21:46

## 2022-04-03 RX ADMIN — GUAIFENESIN 1200 MG: 600 TABLET, EXTENDED RELEASE ORAL at 09:04

## 2022-04-03 RX ADMIN — MULTIPLE VITAMINS W/ MINERALS TAB 1 TABLET: TAB at 09:03

## 2022-04-03 RX ADMIN — IPRATROPIUM BROMIDE AND ALBUTEROL SULFATE 3 ML: 2.5; .5 SOLUTION RESPIRATORY (INHALATION) at 10:32

## 2022-04-03 RX ADMIN — GUAIFENESIN 1200 MG: 600 TABLET, EXTENDED RELEASE ORAL at 21:46

## 2022-04-03 RX ADMIN — Medication 3 ML: at 21:51

## 2022-04-03 RX ADMIN — POTASSIUM CHLORIDE 20 MEQ: 10 TABLET, EXTENDED RELEASE ORAL at 17:04

## 2022-04-03 RX ADMIN — METOPROLOL TARTRATE 25 MG: 25 TABLET, FILM COATED ORAL at 21:46

## 2022-04-03 RX ADMIN — CHLORHEXIDINE GLUCONATE 0.12% ORAL RINSE 15 ML: 1.2 LIQUID ORAL at 09:04

## 2022-04-03 RX ADMIN — ESCITALOPRAM 20 MG: 20 TABLET, FILM COATED ORAL at 09:04

## 2022-04-03 RX ADMIN — ATORVASTATIN CALCIUM 40 MG: 20 TABLET, FILM COATED ORAL at 21:46

## 2022-04-03 RX ADMIN — Medication 3 ML: at 09:04

## 2022-04-03 RX ADMIN — IPRATROPIUM BROMIDE AND ALBUTEROL SULFATE 3 ML: 2.5; .5 SOLUTION RESPIRATORY (INHALATION) at 19:29

## 2022-04-03 RX ADMIN — ASPIRIN 81 MG: 81 TABLET, COATED ORAL at 09:05

## 2022-04-03 RX ADMIN — CHLORHEXIDINE GLUCONATE 0.12% ORAL RINSE 15 ML: 1.2 LIQUID ORAL at 21:51

## 2022-04-03 RX ADMIN — MUPIROCIN 1 APPLICATION: 20 OINTMENT TOPICAL at 09:03

## 2022-04-03 RX ADMIN — Medication 100 MG: at 09:04

## 2022-04-03 RX ADMIN — HYDROCODONE BITARTRATE AND ACETAMINOPHEN 2 TABLET: 5; 325 TABLET ORAL at 04:53

## 2022-04-03 RX ADMIN — HYDROCODONE BITARTRATE AND ACETAMINOPHEN 2 TABLET: 5; 325 TABLET ORAL at 16:55

## 2022-04-03 RX ADMIN — IPRATROPIUM BROMIDE AND ALBUTEROL SULFATE 3 ML: 2.5; .5 SOLUTION RESPIRATORY (INHALATION) at 14:10

## 2022-04-03 RX ADMIN — IPRATROPIUM BROMIDE AND ALBUTEROL SULFATE 3 ML: 2.5; .5 SOLUTION RESPIRATORY (INHALATION) at 07:25

## 2022-04-03 RX ADMIN — MUPIROCIN 1 APPLICATION: 20 OINTMENT TOPICAL at 21:46

## 2022-04-03 RX ADMIN — ENOXAPARIN SODIUM 40 MG: 100 INJECTION SUBCUTANEOUS at 17:04

## 2022-04-03 RX ADMIN — METOPROLOL TARTRATE 12.5 MG: 25 TABLET, FILM COATED ORAL at 09:05

## 2022-04-03 RX ADMIN — POTASSIUM CHLORIDE 40 MEQ: 10 TABLET, EXTENDED RELEASE ORAL at 09:04

## 2022-04-03 RX ADMIN — FOLIC ACID 1 MG: 1 TABLET ORAL at 09:04

## 2022-04-03 RX ADMIN — PANTOPRAZOLE SODIUM 40 MG: 40 TABLET, DELAYED RELEASE ORAL at 05:14

## 2022-04-03 RX ADMIN — MORPHINE SULFATE 1 MG: 2 INJECTION, SOLUTION INTRAMUSCULAR; INTRAVENOUS at 01:34

## 2022-04-03 RX ADMIN — DOCUSATE SODIUM 50MG AND SENNOSIDES 8.6MG 2 TABLET: 8.6; 5 TABLET, FILM COATED ORAL at 09:05

## 2022-04-03 RX ADMIN — FUROSEMIDE 40 MG: 10 INJECTION, SOLUTION INTRAMUSCULAR; INTRAVENOUS at 16:55

## 2022-04-03 RX ADMIN — INSULIN LISPRO 2 UNITS: 100 INJECTION, SOLUTION INTRAVENOUS; SUBCUTANEOUS at 11:54

## 2022-04-03 NOTE — PROGRESS NOTES
" LOS: 8 days   Patient Care Team:  Desire Greer APRN as PCP - General (Family Medicine)  Desire Greer APRN as Nurse Practitioner (Family Medicine)    Chief Complaint: post op fu    Subjective:  Symptoms:  No shortness of breath or chest pain.    Diet:  No nausea.    Activity level: Returning to normal.    Pain:  He reports no pain.          Vital Signs  Temp:  [97.7 °F (36.5 °C)-99.5 °F (37.5 °C)] 99.5 °F (37.5 °C)  Heart Rate:  [69-82] 73  Resp:  [10-18] 18  BP: (109-127)/(69-95) 121/77  Body mass index is 23.3 kg/m².    Intake/Output Summary (Last 24 hours) at 4/3/2022 1013  Last data filed at 4/3/2022 0900  Gross per 24 hour   Intake 1180 ml   Output 2075 ml   Net -895 ml     I/O this shift:  In: 360 [P.O.:360]  Out: 750 [Urine:750]    Chest tube drainage last 8 hours:  30, 90 (no air leak)        03/31/22  2111 04/02/22  0600 04/03/22  0640   Weight: 74.6 kg (164 lb 8 oz) 68.4 kg (150 lb 12.7 oz) 75.8 kg (167 lb)         Objective:  General Appearance:  Comfortable.    Vital signs: (most recent): Blood pressure 121/77, pulse 73, temperature 99.5 °F (37.5 °C), temperature source Oral, resp. rate 18, height 180.3 cm (70.98\"), weight 75.8 kg (167 lb), SpO2 95 %.    Output: Producing urine.    Lungs:  Normal effort and normal respiratory rate.  (O2 at 3 liters)  Heart: Normal rate.  Regular rhythm.    Extremities: There is no dependent edema.    Neurological: Patient is alert and oriented to person, place and time.    Skin:  Warm and dry.  (Sternal dressing intact)            Results Review:        WBC WBC   Date Value Ref Range Status   04/03/2022 15.79 (H) 3.40 - 10.80 10*3/mm3 Final   04/02/2022 17.11 (H) 3.40 - 10.80 10*3/mm3 Final   04/01/2022 21.84 (H) 3.40 - 10.80 10*3/mm3 Final   04/01/2022 33.31 (C) 3.40 - 10.80 10*3/mm3 Final   04/01/2022 8.40 3.40 - 10.80 10*3/mm3 Final      HGB Hemoglobin   Date Value Ref Range Status   04/03/2022 9.5 (L) 13.0 - 17.7 g/dL Final   04/02/2022 10.1 " (L) 13.0 - 17.7 g/dL Final   04/01/2022 10.7 (L) 13.0 - 17.7 g/dL Final   04/01/2022 11.2 (L) 13.0 - 17.7 g/dL Final   04/01/2022 8.5 (L) 12.0 - 17.0 g/dL Final   04/01/2022 8.8 (L) 12.0 - 17.0 g/dL Final   04/01/2022 8.5 (L) 12.0 - 17.0 g/dL Final   04/01/2022 9.2 (L) 12.0 - 17.0 g/dL Final   04/01/2022 8.5 (L) 12.0 - 17.0 g/dL Final   04/01/2022 9.5 (L) 13.0 - 17.7 g/dL Final      HCT Hematocrit   Date Value Ref Range Status   04/03/2022 27.2 (L) 37.5 - 51.0 % Final   04/02/2022 27.9 (L) 37.5 - 51.0 % Final   04/01/2022 29.6 (L) 37.5 - 51.0 % Final   04/01/2022 32.9 (L) 37.5 - 51.0 % Final   04/01/2022 25 (L) 38 - 51 % Final   04/01/2022 26 (L) 38 - 51 % Final   04/01/2022 25 (L) 38 - 51 % Final   04/01/2022 27 (L) 38 - 51 % Final   04/01/2022 25 (L) 38 - 51 % Final   04/01/2022 27.3 (L) 37.5 - 51.0 % Final      Platelets Platelets   Date Value Ref Range Status   04/03/2022 197 140 - 450 10*3/mm3 Final   04/02/2022 263 140 - 450 10*3/mm3 Final   04/01/2022 271 140 - 450 10*3/mm3 Final   04/01/2022 309 140 - 450 10*3/mm3 Final   04/01/2022 322 140 - 450 10*3/mm3 Final        PT/INR:    Protime   Date Value Ref Range Status   04/02/2022 14.6 (H) 11.7 - 14.2 Seconds Final   04/01/2022 15.9 (H) 11.7 - 14.2 Seconds Final   /  INR   Date Value Ref Range Status   04/02/2022 1.14 (H) 0.90 - 1.10 Final   04/01/2022 1.28 (H) 0.90 - 1.10 Final       Sodium Sodium   Date Value Ref Range Status   04/03/2022 132 (L) 136 - 145 mmol/L Final   04/02/2022 134 (L) 136 - 145 mmol/L Final   04/02/2022 136 136 - 145 mmol/L Final   04/01/2022 138 136 - 145 mmol/L Final   04/01/2022 135 (L) 136 - 145 mmol/L Final   04/01/2022 135 (L) 136 - 145 mmol/L Final   04/01/2022 135 (L) 136 - 145 mmol/L Final      Potassium Potassium   Date Value Ref Range Status   04/03/2022 3.4 (L) 3.5 - 5.2 mmol/L Final   04/02/2022 3.7 3.5 - 5.2 mmol/L Final   04/02/2022 4.1 3.5 - 5.2 mmol/L Final   04/01/2022 4.9 3.5 - 5.2 mmol/L Final     Comment:     Slight  hemolysis detected by analyzer. Results may be affected.   04/01/2022 5.3 (H) 3.5 - 5.2 mmol/L Final   04/01/2022 5.0 3.5 - 5.2 mmol/L Final     Comment:     Slight hemolysis detected by analyzer. Results may be affected.   04/01/2022 4.2 3.5 - 5.2 mmol/L Final      Chloride Chloride   Date Value Ref Range Status   04/03/2022 95 (L) 98 - 107 mmol/L Final   04/02/2022 96 (L) 98 - 107 mmol/L Final   04/02/2022 101 98 - 107 mmol/L Final   04/01/2022 101 98 - 107 mmol/L Final   04/01/2022 101 98 - 107 mmol/L Final   04/01/2022 102 98 - 107 mmol/L Final   04/01/2022 95 (L) 98 - 107 mmol/L Final      Bicarbonate CO2   Date Value Ref Range Status   04/03/2022 28.0 22.0 - 29.0 mmol/L Final   04/02/2022 26.6 22.0 - 29.0 mmol/L Final   04/02/2022 25.1 22.0 - 29.0 mmol/L Final   04/01/2022 26.0 22.0 - 29.0 mmol/L Final   04/01/2022 25.0 22.0 - 29.0 mmol/L Final   04/01/2022 24.0 22.0 - 29.0 mmol/L Final   04/01/2022 30.0 (H) 22.0 - 29.0 mmol/L Final      BUN BUN   Date Value Ref Range Status   04/03/2022 24 (H) 8 - 23 mg/dL Final   04/02/2022 27 (H) 8 - 23 mg/dL Final   04/02/2022 27 (H) 8 - 23 mg/dL Final   04/01/2022 28 (H) 8 - 23 mg/dL Final   04/01/2022 28 (H) 8 - 23 mg/dL Final   04/01/2022 29 (H) 8 - 23 mg/dL Final   04/01/2022 33 (H) 8 - 23 mg/dL Final      Creatinine Creatinine   Date Value Ref Range Status   04/03/2022 1.22 0.76 - 1.27 mg/dL Final   04/02/2022 1.27 0.76 - 1.27 mg/dL Final   04/02/2022 1.13 0.76 - 1.27 mg/dL Final   04/01/2022 1.19 0.76 - 1.27 mg/dL Final   04/01/2022 1.14 0.76 - 1.27 mg/dL Final   04/01/2022 1.09 0.76 - 1.27 mg/dL Final   04/01/2022 1.28 (H) 0.76 - 1.27 mg/dL Final      Calcium Calcium   Date Value Ref Range Status   04/03/2022 8.4 (L) 8.6 - 10.5 mg/dL Final   04/02/2022 8.6 8.6 - 10.5 mg/dL Final   04/02/2022 8.5 (L) 8.6 - 10.5 mg/dL Final   04/01/2022 8.8 8.6 - 10.5 mg/dL Final   04/01/2022 8.9 8.6 - 10.5 mg/dL Final   04/01/2022 8.9 8.6 - 10.5 mg/dL Final   04/01/2022 9.4 8.6 -  10.5 mg/dL Final      Magnesium Magnesium   Date Value Ref Range Status   04/02/2022 2.4 1.6 - 2.4 mg/dL Final   04/01/2022 2.2 1.6 - 2.4 mg/dL Final   04/01/2022 2.1 1.6 - 2.4 mg/dL Final      Troponin No results found for: TROPONIN   BNP No results found for: BNP         aspirin, 81 mg, Oral, Daily  atorvastatin, 40 mg, Oral, Nightly  chlorhexidine, 15 mL, Mouth/Throat, Q12H  enoxaparin, 40 mg, Subcutaneous, Q24H  escitalopram, 20 mg, Oral, Daily  folic acid, 1 mg, Oral, Daily  furosemide, 40 mg, Intravenous, Q12H  guaiFENesin, 1,200 mg, Oral, Q12H  insulin lispro, 0-7 Units, Subcutaneous, TID AC  ipratropium-albuterol, 3 mL, Nebulization, 4x Daily - RT  metoprolol tartrate, 12.5 mg, Oral, Q12H  multivitamin with minerals, 1 tablet, Oral, Daily  mupirocin, , Each Nare, BID  pantoprazole, 40 mg, Oral, Q AM  senna-docusate sodium, 2 tablet, Oral, BID  sodium chloride, 3 mL, Intravenous, Q12H  thiamine, 100 mg, Oral, Daily      sodium chloride, 30 mL/hr, Last Rate: 30 mL/hr (04/01/22 1144)        PRN Meds:.•  acetaminophen **OR** acetaminophen **OR** acetaminophen  •  acetaminophen **OR** acetaminophen **OR** acetaminophen  •  acetaminophen  •  bisacodyl  •  bisacodyl  •  cyclobenzaprine  •  dextrose  •  dextrose  •  docusate sodium  •  glucagon (human recombinant)  •  HYDROcodone-acetaminophen  •  LORazepam **OR** LORazepam **OR** LORazepam **OR** LORazepam **OR** LORazepam **OR** LORazepam **OR** LORazepam **OR** LORazepam  •  magnesium hydroxide  •  magnesium sulfate **OR** magnesium sulfate **OR** magnesium sulfate  •  melatonin  •  Morphine **AND** naloxone  •  nicotine  •  ondansetron **OR** ondansetron  •  polyethylene glycol  •  potassium chloride **OR** potassium chloride **OR** potassium chloride  •  sodium chloride  •  sodium chloride  •  sodium chloride    Patient Active Problem List   Diagnosis Code   • Essential hypertension I10   • Other hyperlipidemia E78.49   • Anxiety F41.9   • Nocturia R35.1   •  Elevated PSA R97.20   • Elevated blood sugar level R73.9   • Positive colorectal cancer screening using Cologuard test R19.5   • Shortness of breath R06.02   • Acute respiratory failure with hypoxia ([facility]) J96.01   • Acute diastolic congestive heart failure ([facility]) I50.31   • Acute pulmonary edema ([facility]) J81.0   • Leukocytosis D72.829   • Positive D dimer R79.89   • Anemia D64.9   • COPD with acute exacerbation ([facility]) J44.1   • Emphysema ([facility]) J43.1   • History of colon polyps Z86.010   • Overweight (BMI 25.0-29.9) E66.3   • AAA (abdominal aortic aneurysm) ([facility]) I71.4   • Alcohol abuse F10.10   • NSTEMI (non-ST elevated myocardial infarction) ([facility]) I21.4   • Coronary artery disease I25.10   • Abnormal findings on diagnostic imaging of heart and coronary circulation R93.1       Assessment & Plan    - Multivessel CAD, NSTEMI s/p OP CABG x3 with LIMA-----POD #2 ([place]), will start Plavix prior to d/c  - HTN----controlled  - HLD----statin  - Ascending aortic dilation--- mildly dilated, heavily calcified  - Abdominal aortic aneuysm--- 5.5cm aneurysm on ultrasound, vascular following, intervention after recover from CABG  - Tobacco abuse---mucinex,  - COPD----pulmonary following, duonebs  - Etoh abuse--- 6 beers daily, encourage cessation, CIWA, thiamine/folate  - Chronic anemia (from ETOH?), post op expected blood loss anemia---monitor  - Leukocytosis----reactive, trending down    Still with congestion on cxr again today, continue IV lasix, replace K+, wean O2 as tolerated.  D/c chest tubes, petty, cordis.  Increase lopressor, keep wires today.    TAYLOR Longoria  04/03/22  10:13 EDT

## 2022-04-03 NOTE — THERAPY TREATMENT NOTE
Patient Name: Osman Aparicio  : 1955    MRN: [id number]                              Today's Date: 4/3/2022       Admit Date: 3/26/2022    Visit Dx:     ICD-10-CM ICD-9-CM   1. Shortness of breath  R06.02 786.05   2. Hypervolemia, unspecified hypervolemia type  E87.70 276.69   3. Hyponatremia  E87.1 276.1   4. Chronic obstructive pulmonary disease, unspecified COPD type ([facility])  J44.9 496   5. Symptomatic anemia  D64.9 285.9   6. Coronary artery disease involving native coronary artery of native heart with other form of angina pectoris ([facility])  I25.118 414.01     413.9   7. Abnormal findings on diagnostic imaging of heart and coronary circulation  R93.1 794.39   8. NSTEMI (non-ST elevated myocardial infarction) ([facility])  I21.4 410.70     Patient Active Problem List   Diagnosis   • Essential hypertension   • Other hyperlipidemia   • Anxiety   • Nocturia   • Elevated PSA   • Elevated blood sugar level   • Positive colorectal cancer screening using Cologuard test   • Shortness of breath   • Acute respiratory failure with hypoxia (HCC)   • Acute diastolic congestive heart failure (HCC)   • Acute pulmonary edema (HCC)   • Leukocytosis   • Positive D dimer   • Anemia   • COPD with acute exacerbation (HCC)   • Emphysema (HCC)   • History of colon polyps   • Overweight (BMI 25.0-29.9)   • AAA (abdominal aortic aneurysm) (HCC)   • Alcohol abuse   • NSTEMI (non-ST elevated myocardial infarction) ([facility])   • Coronary artery disease   • Abnormal findings on diagnostic imaging of heart and coronary circulation     Past Medical History:   Diagnosis Date   • Abnormal glucose    • Anxiety    • Encounter for special screening examination for neoplasm of prostate 10/2012   • Hematuria    • Hyperlipidemia    • Hypertension    • Plantar wart    • Vitamin D deficiency disease      Past Surgical History:   Procedure Laterality Date   • CARDIAC CATHETERIZATION N/A 3/29/2022    Procedure: Left Heart Cath;  Surgeon: Neto Paige MD;   Location:  [facility];  Service: Cardiology;  Laterality: N/A;   • CARDIAC CATHETERIZATION N/A 3/29/2022    Procedure: Coronary angiography;  Surgeon: Neto Paige MD;  Location:  [facility];  Service: Cardiology;  Laterality: N/A;   • CARDIAC CATHETERIZATION N/A 3/29/2022    Procedure: Left ventriculography;  Surgeon: Neto Paige MD;  Location:  [facility];  Service: Cardiology;  Laterality: N/A;   • COLONOSCOPY N/A 12/7/2020    Procedure: COLONOSCOPY INTO CECUM WITH HOT SNARE POLYPECTOMIES, COLD BX POLYPECTOMIES, RESOLUTION CLIPS X;  Surgeon: Regi Mercado MD;  Location: [facility] ENDOSCOPY;  Service: General;  Laterality: N/A;  PRE:  POSITIVE COLOGUARD  POST:  POLYPS      General Information     Row Name 04/03/22 1253          Physical Therapy Time and Intention    Document Type therapy note (daily note)  -     Mode of Treatment individual therapy;physical therapy  -     Row Name 04/03/22 1253          General Information    Patient Profile Reviewed yes  -     Existing Precautions/Restrictions cardiac;fall;sternal  -     Row Name 04/03/22 1253          Cognition    Orientation Status (Cognition) oriented x 3  -     Row Name 04/03/22 1253          Safety Issues, Functional Mobility    Impairments Affecting Function (Mobility) balance;endurance/activity tolerance;strength;pain  -           User Key  (r) = Recorded By, (t) = Taken By, (c) = Cosigned By    Initials Name Provider Type     Jocelin Pope Physical Therapist               Mobility     Row Name 04/03/22 1254          Bed Mobility    Bed Mobility sit-supine;supine-sit  -     Supine-Sit [place] (Bed Mobility) not tested  -     Sit-Supine [place] (Bed Mobility) not tested  -     Comment, (Bed Mobility) [facility]  -     Row Name 04/03/22 1254          Sit-Stand Transfer    Sit-Stand [place] (Transfers) nonverbal cues (demo/gesture);verbal cues;contact  guard  -     Assistive Device (Sit-Stand Transfers) --  -     Row Name 04/03/22 1254          Gait/Stairs (Locomotion)    [place] Level (Gait) verbal cues;nonverbal cues (demo/gesture);minimum assist (75% patient effort);2 person assist  -     Assistive Device (Gait) other (see comments)  HHA x2  -     Distance in Feet (Gait) 150ft  -     Deviations/Abnormal Patterns (Gait) antalgic;base of support, wide;tia decreased;gait speed decreased;festinating/shuffling;stride length decreased;weight shifting decreased  -     Bilateral Gait Deviations forward flexed posture;heel strike decreased  -     [place] Level (Stairs) not tested  -     Comment, (Gait/Stairs) Progressed gait distance, tolerated ambulation on 2L O2 with min c/o SOA towards end of session, overall steady gait with no LOB  -           User Key  (r) = Recorded By, (t) = Taken By, (c) = Cosigned By    Initials Name Provider Type     Jocelin Pope Physical Therapist               Obj/Interventions     Row Name 04/03/22 1257          Motor Skills    Therapeutic Exercise --  10 reps cardiac rehab protocol  -           User Key  (r) = Recorded By, (t) = Taken By, (c) = Cosigned By    Initials Name Provider Type     Jocelin Pope Physical Therapist               Goals/Plan    No documentation.                Clinical Impression     Row Name 04/03/22 1257          Pain    Pretreatment Pain Rating 0/10 - no pain  -     Posttreatment Pain Rating 0/10 - no pain  -[facility] Name 04/03/22 1257          Plan of Care Review    Plan of Care Reviewed With patient;spouse  -     Progress improving  -     Outcome Evaluation Pt agreeable to PT this AM and able to progress distance. Completed STS from recliner with CGA and ambulated 150ft with min A x2 for equipment. Pt with minimal unsteadiness, ambulated on 2L O2 with mild c/o SOA towards end of session - satting 91% upon return to room. Pt will continue to benefit from skilled  PT to address functional deficits. Anticipate D/C home with HHPT.  -     Row Name 04/03/22 1257          Positioning and Restraints    Pre-Treatment Position sitting in chair/recliner  -     Post Treatment Position chair  -     In Chair sitting;call light within reach;with family/caregiver;encouraged to call for assist  -           User Key  (r) = Recorded By, (t) = Taken By, (c) = Cosigned By    Initials Name Provider Type     Jocelin Pope Physical Therapist               Outcome Measures     Row Name 04/03/22 1300 04/03/22 0905       How much help from another person do you currently need...    Turning from your back to your side while in flat bed without using bedrails? 3  - 3  -AC    Moving from lying on back to sitting on the side of a flat bed without bedrails? 3  - 3  -AC    Moving to and from a bed to a chair (including a wheelchair)? 3  - 3  -AC    Standing up from a chair using your arms (e.g., wheelchair, bedside chair)? 3  - 2  -AC    Climbing 3-5 steps with a railing? 2  - 2  -AC    To walk in hospital room? 3  - 3  -AC    AM-PAC 6 Clicks Score (PT) 17  - 16  -AC    Row Name 04/03/22 1300          Functional Assessment    Outcome Measure Options AM-PAC 6 Clicks Basic Mobility (PT)  -           User Key  (r) = Recorded By, (t) = Taken By, (c) = Cosigned By    Initials Name Provider Type     Kathryn Davidson, RN Registered Nurse     Jocelin Pope Physical Therapist                             Physical Therapy Education                 Title: PT OT SLP Therapies (Done)     Topic: Physical Therapy (Done)     Point: Mobility training (Done)     Learning Progress Summary           Patient Acceptance, E,TB,D, VU,NR by  at 4/3/2022 1301    Acceptance, E,TB,D, VU,NR by  at 4/2/2022 1317                   Point: Home exercise program (Done)     Learning Progress Summary           Patient Acceptance, E,TB,D, VU,NR by  at 4/3/2022 1301    Acceptance, E,TB,D, VU,NR by  at  4/2/2022 1317                   Point: Body mechanics (Done)     Learning Progress Summary           Patient Acceptance, E,TB,D, VU,NR by  at 4/3/2022 1301    Acceptance, E,TB,D, VU,NR by  at 4/2/2022 1317                   Point: Precautions (Done)     Learning Progress Summary           Patient Acceptance, E,TB,D, VU,NR by  at 4/3/2022 1301    Acceptance, E,TB,D, VU,NR by  at 4/2/2022 1317                               User Key     Initials Effective Dates Name Provider Type Discipline     05/10/21 -  Jocelin Pope Physical Therapist PT              PT Recommendation and Plan  Planned Therapy Interventions (PT): balance training, bed mobility training, gait training, home exercise program, patient/family education, stair training, strengthening, transfer training  Plan of Care Reviewed With: patient, spouse  Progress: improving  Outcome Evaluation: Pt agreeable to PT this AM and able to progress distance. Completed STS from recliner with CGA and ambulated 150ft with min A x2 for equipment. Pt with minimal unsteadiness, ambulated on 2L O2 with mild c/o SOA towards end of session - satting 91% upon return to room. Pt will continue to benefit from skilled PT to address functional deficits. Anticipate D/C home with HHPT.     Time Calculation:    PT Charges     Row Name 04/03/22 1301             Time Calculation    Start Time 1012  -      Stop Time 1030  -      Time Calculation (min) 18 min  -      PT Received On 04/03/22  -      PT - Next Appointment 04/04/22  -              Time Calculation- PT    Total Timed Code Minutes- PT 18 minute(s)  -              Timed Charges    37233 - PT Therapeutic Exercise Minutes 4  -      59532 - Gait Training Minutes  10  -      02357 - PT Therapeutic Activity Minutes 4  -              Total Minutes    Timed Charges Total Minutes 18  -       Total Minutes 18  -            User Key  (r) = Recorded By, (t) = Taken By, (c) = Cosigned By    Initials Name  Provider Type     Jocelin Pope Physical Therapist              Therapy Charges for Today     Code Description Service Date Service Provider Modifiers Qty    18795752575 HC GAIT TRAINING EA 15 MIN 4/2/2022 Jocelin Pope GP 1    48112841600 HC PT EVAL MOD COMPLEXITY 3 4/2/2022 Jocelin Poep GP 1    17304555133 HC PT THER SUPP EA 15 MIN 4/2/2022 Jocelin Pope GP 1    13484642051 HC GAIT TRAINING EA 15 MIN 4/3/2022 Jocelin Pope GP 1    29324609409 HC PT THER SUPP EA 15 MIN 4/3/2022 Jocelin Pope GP 1          PT G-Codes  Outcome Measure Options: AM-PAC 6 Clicks Basic Mobility (PT)  AM-PAC 6 Clicks Score (PT): 17    JOCELIN POPE  4/3/2022

## 2022-04-03 NOTE — PLAN OF CARE
Goal Outcome Evaluation:  Plan of Care Reviewed With: patient, spouse        Progress: improving  Outcome Evaluation: Pt agreeable to PT this AM and able to progress distance. Completed STS from recliner with CGA and ambulated 150ft with min A x2 for equipment. Pt with minimal unsteadiness, ambulated on 2L O2 with mild c/o SOA towards end of session - satting 91% upon return to room. Pt will continue to benefit from skilled PT to address functional deficits. Anticipate D/C home with HHPT.

## 2022-04-03 NOTE — PROGRESS NOTES
"  Daily Progress Note.   [facility] CARDIOVASC UNIT  4/3/2022    Patient:  Name:  Osman Aparicio  MRN:  [id number]  1955  66 y.o.  male         CC: COPD management    Interval History:  S/p cabg today extubated postoperatively without difficulty day of surgery.       Remains on 4lnc  No fever overnight t max 37.5  afvss  Awake alert  Using is but says he just cant get the valve up very far  No wheeze or chest tightness sensation no hemoptysis   No confusion  Says he drank a lot of water yesterday      Physical Exam:  /70 (BP Location: Right arm, Patient Position: Lying)   Pulse 74   Temp 98.3 °F (36.8 °C) (Oral)   Resp 16   Ht 180.3 cm (70.98\")   Wt 68.4 kg (150 lb 12.7 oz)   SpO2 94%   BMI 21.04 kg/m²   Body mass index is 21.04 kg/m².    Intake/Output Summary (Last 24 hours) at 4/3/2022 0555  Last data filed at 4/3/2022 0510  Gross per 24 hour   Intake 1572 ml   Output 2395 ml   Net -823 ml     General appearance: Nontoxic, conversant   Eyes: anicteric sclerae, moist conjunctivae;  HENT: Atraumatic; oropharynx clear with moist mucous membranes  Neck: Trachea midline;   Lungs: MOSES no rhonchi no wheeze at present time unlabored respiratory effort positive chest tubes  CV: RRR, no rub midline incision bandaged  Abdomen: Bowel sounds hypoactive nonrigid  Extremities: no sig peripheral edema    Skin: Warm dry  Psych: Appropriate affect, alert and oriented   Neuro moves all ext, speech intact    Data Review:  Notable Labs:  Results from last 7 days   Lab Units 04/03/22  0246 04/02/22  0303 04/01/22  1447 04/01/22  1117 04/01/22  1009 04/01/22  0933 04/01/22  0859 04/01/22  0736 04/01/22  0305 03/30/22  2237 03/30/22  0800   WBC 10*3/mm3 15.79* 17.11* 21.84* 33.31*  --   --   --   --  8.40 9.15 8.87   HEMOGLOBIN g/dL 9.5* 10.1* 10.7* 11.2*  --   --   --   --  9.5* 9.4* 8.9*   HEMOGLOBIN, POC g/dL  --   --   --   --  8.5* 8.8* 8.5*   < >  --   --   --    PLATELETS 10*3/mm3 197 704 611 309  --  "  --   --   --  322 346 320    < > = values in this interval not displayed.     Results from last 7 days   Lab Units 04/03/22  0246 04/02/22  1640 04/02/22  0303 04/01/22  2000 04/01/22  1447 04/01/22  1117 04/01/22  0305 03/29/22  0719 03/28/22  0807   SODIUM mmol/L 132* 134* 136 138 135* 135* 135*   < > 133*   POTASSIUM mmol/L 3.4* 3.7 4.1 4.9 5.3* 5.0 4.2   < > 3.0*   CHLORIDE mmol/L 95* 96* 101 101 101 102 95*   < > 93*   CO2 mmol/L 28.0 26.6 25.1 26.0 25.0 24.0 30.0*   < > 27.2   BUN mg/dL 24* 27* 27* 28* 28* 29* 33*   < > 16   CREATININE mg/dL 1.22 1.27 1.13 1.19 1.14 1.09 1.28*   < > 1.09   GLUCOSE mg/dL 109* 117* 96 112* 137* 161* 111*   < > 107*   CALCIUM mg/dL 8.4* 8.6 8.5* 8.8 8.9 8.9 9.4   < > 9.0   MAGNESIUM mg/dL  --   --  2.4  --  2.2 2.1  --   --  2.2   PHOSPHORUS mg/dL  --   --  4.1  --  4.7* 4.3  --   --   --     < > = values in this interval not displayed.   Estimated Creatinine Clearance: 57.6 mL/min (by C-G formula based on SCr of 1.22 mg/dL).    Results from last 7 days   Lab Units 04/03/22  0246 04/02/22 0303 04/01/22 1447 04/01/22 0305 03/30/22  2237 03/30/22  0800 03/29/22  0719   AST (SGOT) U/L  --   --   --   --  17 13 10   ALT (SGPT) U/L  --   --   --   --  17 14 12   PLATELETS 10*3/mm3 197 263 271   < > 346 320  --     < > = values in this interval not displayed.       Results from last 7 days   Lab Units 04/01/22  1516 04/01/22  1141 04/01/22  1009 04/01/22  0933 04/01/22  0859 04/01/22  0838 04/01/22  0736 03/30/22  1911   PH, ARTERIAL pH units 7.355 7.385 7.4080 7.4230 7.4270 7.4140 7.4270 7.445   PCO2, ARTERIAL mm Hg 47.9* 41.8  --   --   --   --   --  40.3   PO2 ART mm Hg 88.9 263.8*  --   --   --   --   --  56.6*   HCO3 ART mmol/L 26.8 25.0  --   --   --   --   --  27.7       Imaging:  Reviewed chest images personally from past 3 days    Scheduled meds:    aspirin, 81 mg, Oral, Daily  atorvastatin, 40 mg, Oral, Nightly  chlorhexidine, 15 mL, Mouth/Throat, Q12H  enoxaparin, 40 mg,  Subcutaneous, Q24H  escitalopram, 20 mg, Oral, Daily  folic acid, 1 mg, Oral, Daily  furosemide, 40 mg, Intravenous, Q12H  guaiFENesin, 1,200 mg, Oral, Q12H  insulin lispro, 0-7 Units, Subcutaneous, TID AC  ipratropium-albuterol, 3 mL, Nebulization, 4x Daily - RT  metoprolol tartrate, 12.5 mg, Oral, Q12H  multivitamin with minerals, 1 tablet, Oral, Daily  mupirocin, , Each Nare, BID  pantoprazole, 40 mg, Oral, Q AM  senna-docusate sodium, 2 tablet, Oral, BID  sodium chloride, 3 mL, Intravenous, Q12H  thiamine, 100 mg, Oral, Daily        ASSESSMENT  /  PLAN:  COPD with acute exacerbation   NSTEMI  Alcohol abuse  Acute respiratory failure hypoxemic  Acute diastolic congestive heart failure  Bilateral pleural effusion  Tobacco abuse  CAD s/p cabg 4-1-2022  Leukocytosis?  Reactive - improving    Encourage incentive spirometer  Bronchodilators-schedule duo nebs  Diuresis - Lasix 40 q12h - cxr looks like more pulm edema and more atelectasis this am    At present time no wheezing will hold off on steroids if develops any wheezing or increased shortness of breath consideration of IV Solu-Medrol - cont duonebs scheduled    Out of bed to chair        Rj Thakkar MD  [facility]  04/03/22  621 EDT

## 2022-04-03 NOTE — PLAN OF CARE
Goal Outcome Evaluation:  Plan of Care Reviewed With: patient        Progress: improving  Outcome Evaluation: VSS. Chest tubes discontinued and RIJ and petty catheter discontinued. Voiding per urinal. Up in chair most of day. Norco for pain with good output. Pacemaker continued but on standby. Monitor vital signs, labs and  output.

## 2022-04-03 NOTE — PROGRESS NOTES
LOS: 8 days   Patient Care Team:  Desire Greer APRN as PCP - General (Family Medicine)  Desire Greer APRN as Nurse Practitioner (Family Medicine)    Chief Complaint:  Shortness of Breath    Interval History:   Moved to stepdown unit.  He is getting further diuresis today.  Plan is to remove chest tubes and Philip.  Pacing wires remain in place, but he is in sinus rhythm.    Objective   Vital Signs  Temp:  [97.7 °F (36.5 °C)-99.5 °F (37.5 °C)] 97.8 °F (36.6 °C)  Heart Rate:  [69-82] 70  Resp:  [14-18] 17  BP: (109-127)/(67-95) 125/67    Intake/Output Summary (Last 24 hours) at 4/3/2022 1200  Last data filed at 4/3/2022 0900  Gross per 24 hour   Intake 700 ml   Output 1755 ml   Net -1055 ml         Physical Exam  Vitals and nursing note reviewed.   Constitutional:       Appearance: Normal appearance.   Cardiovascular:      Rate and Rhythm: Normal rate and regular rhythm.      Pulses: Normal pulses.      Comments: Surgical incision c/d/i  Chest tubes in place.   Pulmonary:      Effort: Pulmonary effort is normal.   Skin:     General: Skin is warm.   Neurological:      General: No focal deficit present.      Mental Status: He is alert and oriented to person, place, and time.   Psychiatric:         Attention and Perception: Attention normal.         Mood and Affect: Mood normal.         Behavior: Behavior normal. Behavior is cooperative.           Results Review:      Results from last 7 days   Lab Units 04/03/22  0246 04/02/22  1640 04/02/22  0303   SODIUM mmol/L 132* 134* 136   POTASSIUM mmol/L 3.4* 3.7 4.1   CHLORIDE mmol/L 95* 96* 101   CO2 mmol/L 28.0 26.6 25.1   BUN mg/dL 24* 27* 27*   CREATININE mg/dL 1.22 1.27 1.13   GLUCOSE mg/dL 109* 117* 96   CALCIUM mg/dL 8.4* 8.6 8.5*     Results from last 7 days   Lab Units 03/27/22  1550   TROPONIN T ng/mL 0.085*     Results from last 7 days   Lab Units 04/03/22  0246 04/02/22  0303 04/01/22  1447   WBC 10*3/mm3 15.79* 17.11* 21.84*   HEMOGLOBIN  g/dL 9.5* 10.1* 10.7*   HEMATOCRIT % 27.2* 27.9* 29.6*   PLATELETS 10*3/mm3 197 263 271     Results from last 7 days   Lab Units 04/02/22  0303 04/01/22  1117 03/30/22  2237   INR  1.14* 1.28* 1.06   APTT seconds  --  28.1 25.0     Results from last 7 days   Lab Units 03/28/22  0807   CHOLESTEROL mg/dL 104     Results from last 7 days   Lab Units 04/02/22  0303   MAGNESIUM mg/dL 2.4     Results from last 7 days   Lab Units 03/28/22  0807   CHOLESTEROL mg/dL 104   TRIGLYCERIDES mg/dL 61   HDL CHOL mg/dL 52   LDL CHOL mg/dL 38       I reviewed the patient's new clinical results.  I personally viewed and interpreted the patient's EKG/Telemetry data        Medication Review:   aspirin, 81 mg, Oral, Daily  atorvastatin, 40 mg, Oral, Nightly  chlorhexidine, 15 mL, Mouth/Throat, Q12H  enoxaparin, 40 mg, Subcutaneous, Q24H  escitalopram, 20 mg, Oral, Daily  folic acid, 1 mg, Oral, Daily  furosemide, 40 mg, Intravenous, Q12H  guaiFENesin, 1,200 mg, Oral, Q12H  insulin lispro, 0-7 Units, Subcutaneous, TID AC  ipratropium-albuterol, 3 mL, Nebulization, 4x Daily - RT  metoprolol tartrate, 25 mg, Oral, Q12H  multivitamin with minerals, 1 tablet, Oral, Daily  mupirocin, , Each Nare, BID  pantoprazole, 40 mg, Oral, Q AM  potassium chloride, 20 mEq, Oral, BID With Meals  senna-docusate sodium, 2 tablet, Oral, BID  sodium chloride, 3 mL, Intravenous, Q12H  thiamine, 100 mg, Oral, Daily        sodium chloride, 30 mL/hr, Last Rate: 30 mL/hr (04/01/22 1144)        Assessment/Plan       COPD with acute exacerbation (HCC)    Essential hypertension    Other hyperlipidemia    Shortness of breath    Acute respiratory failure with hypoxia (HCC)    Acute diastolic congestive heart failure (HCC)    Acute pulmonary edema (HCC)    Leukocytosis    Positive D dimer    Anemia    History of colon polyps    Overweight (BMI 25.0-29.9)    AAA (abdominal aortic aneurysm) (HCC)    Alcohol abuse    NSTEMI (non-ST elevated myocardial infarction) (HCC)     Coronary artery disease    Abnormal findings on diagnostic imaging of heart and coronary circulation    Agree with continued diuretics.  Plan is to start Plavix before discharge.    AAA, will be evaluated and likely repair as an outpatient.    He is on atorvastatin 40 mg daily.      Tony Mack MD  04/03/22  12:00 EDT

## 2022-04-03 NOTE — PROGRESS NOTES
Name: Osman Aparicio ADMIT: 3/26/2022   : 1955  PCP: Desire Greer APRN    MRN: [id number] LOS: 8 days   AGE/SEX: 66 y.o. male  ROOM:      Subjective   Subjective   Patient looks much better today.atypical chest pain/exertional dyspnea/cough which is productive of yellowish sputum are all better today..  No lower extremity edema.  No palpitation.  No PND orthopnea. No hemoptysis.  No fever or chills.    Review of Systems    GI.  Tolerating regular diet well.  No nausea or vomiting.  No abdominal pain  .  No dysuria or hematuria with good urine output.  CNS.  No tremor.  No anxiety.  No focal neurological symptoms.  No loss of consciousness or dizziness.     Objective   Objective   Vital Signs  Temp:  [97.8 °F (36.6 °C)-99.5 °F (37.5 °C)] 98.5 °F (36.9 °C)  Heart Rate:  [69-82] 69  Resp:  [15-18] 18  BP: (103-132)/(59-77) 103/59  SpO2:  [90 %-100 %] 100 %  on  Flow (L/min):  [3-4] 4;   Device (Oxygen Therapy): nasal cannula    Intake/Output Summary (Last 24 hours) at 4/3/2022 1941  Last data filed at 4/3/2022 1805  Gross per 24 hour   Intake 1180 ml   Output 2120 ml   Net -940 ml     Body mass index is 23.3 kg/m².      22  2111 22  0600 22  0640   Weight: 74.6 kg (164 lb 8 oz) 68.4 kg (150 lb 12.7 oz) 75.8 kg (167 lb)     Physical Exam    General.  Middle-aged gentleman.  Appears older than stated age.  Alert and oriented x3.  No apparent pain/distress/diaphoresis.  Normal mood and affect.  No tremors.   Eyes.  Pupils equal round and reactive.  Intact extraocular musculature.  No pallor or jaundice.  Normal conjunctivae and lids.  Oral cavity.  Moist mucous membrane.  Neck.  No JVD.  C line in the right neck.  Supple.  No lymphadenopathy or thyromegaly.  Cardiovascular.  Dressed sternotomy wound.  Regular rate and rhythm and grade 2 systolic murmur.  Chest.  Poor bilateral air entry with scattered bilateral rhonchi chest tubes have been removed.  Abdomen.  Soft lax.  No  tenderness.  No organomegaly.  No guarding or rebound.  Extremities.  No edema..  No clubbing or cyanosis.    CNS.  No acute focal neurological deficits.          Results Review:      Results from last 7 days   Lab Units 04/03/22  0246 04/02/22  1640 04/02/22  0303 04/01/22  2000 04/01/22  1447 04/01/22  1117 04/01/22  0305 03/30/22  2237 03/30/22  0800 03/29/22  0719 03/28/22  0807   SODIUM mmol/L 132* 134* 136 138 135* 135* 135* 136 137 141 133*   POTASSIUM mmol/L 3.4* 3.7 4.1 4.9 5.3* 5.0 4.2 3.8 3.7 4.6 3.0*   CHLORIDE mmol/L 95* 96* 101 101 101 102 95* 95* 97* 91* 93*   CO2 mmol/L 28.0 26.6 25.1 26.0 25.0 24.0 30.0* 29.0 30.0* 27.6 27.2   BUN mg/dL 24* 27* 27* 28* 28* 29* 33* 28* 25* 22 16   CREATININE mg/dL 1.22 1.27 1.13 1.19 1.14 1.09 1.28* 1.23 1.16 1.11 1.09   GLUCOSE mg/dL 109* 117* 96 112* 137* 161* 111* 103* 98 100* 107*   CALCIUM mg/dL 8.4* 8.6 8.5* 8.8 8.9 8.9 9.4 9.2 9.2 9.0 9.0   AST (SGOT) U/L  --   --   --   --   --   --   --  17 13 10 13   ALT (SGPT) U/L  --   --   --   --   --   --   --  17 14 12 11     Estimated Creatinine Clearance: 63.9 mL/min (by C-G formula based on SCr of 1.22 mg/dL).      Results from last 7 days   Lab Units 04/03/22  1529 04/03/22  1042 04/03/22  0543 04/02/22  2053 04/02/22  0649 04/02/22  0309 04/02/22  0209 04/02/22  0021   GLUCOSE mg/dL 142* 154* 138* 144* 124 105 106 111         Results from last 7 days   Lab Units 03/30/22  2237   PROBNP pg/mL 6,632.0*     Results from last 7 days   Lab Units 03/29/22  0719   TSH uIU/mL 1.180     Results from last 7 days   Lab Units 04/02/22  0303 04/01/22  1447 04/01/22  1117 03/28/22  0807   MAGNESIUM mg/dL 2.4 2.2 2.1 2.2     Results from last 7 days   Lab Units 03/28/22  0807   CHOLESTEROL mg/dL 104   TRIGLYCERIDES mg/dL 61   HDL CHOL mg/dL 52     Results from last 7 days   Lab Units 04/03/22  0246 04/02/22  0303 04/01/22  1447 04/01/22  1117 04/01/22  1009 04/01/22  0933 04/01/22  0859 04/01/22  0736 04/01/22  0305  03/30/22  2237 03/30/22  0800   WBC 10*3/mm3 15.79* 17.11* 21.84* 33.31*  --   --   --   --  8.40 9.15 8.87   HEMOGLOBIN g/dL 9.5* 10.1* 10.7* 11.2*  --   --   --   --  9.5* 9.4* 8.9*   HEMOGLOBIN, POC g/dL  --   --   --   --  8.5* 8.8* 8.5*   < >  --   --   --    HEMATOCRIT % 27.2* 27.9* 29.6* 32.9*  --   --   --   --  27.3* 27.7* 25.7*   HEMATOCRIT POC %  --   --   --   --  25* 26* 25*   < >  --   --   --    PLATELETS 10*3/mm3 197 263 271 309  --   --   --   --  322 346 320   MCV fL 104.6* 100.4* 100.0* 104.4*  --   --   --   --  104.6* 110.8* 105.8*   MCH pg 36.5* 36.3* 36.1* 35.6*  --   --   --   --  36.4* 37.6* 36.6*   MCHC g/dL 34.9 36.2* 36.1* 34.0  --   --   --   --  34.8 33.9 34.6   RDW % 18.8* 19.3* 19.4* 19.9*  --   --   --   --  18.5* 19.2* 18.6*   RDW-SD fl 72.1* 69.3* 70.9* 76.3*  --   --   --   --  68.2* 76.0* 70.2*   MPV fL 10.4 9.3 9.7 9.2  --   --   --   --  9.5 9.7 9.6   NEUTROPHIL % %  --  70.8  --   --   --   --   --   --   --   --   --    LYMPHOCYTE % %  --  15.1*  --   --   --   --   --   --   --   --   --    MONOCYTES % %  --  9.9  --   --   --   --   --   --   --   --   --    EOSINOPHIL % %  --  0.1*  --   --   --   --   --   --   --   --   --    BASOPHIL % %  --  0.2  --   --   --   --   --   --   --   --   --    NEUTROS ABS 10*3/mm3  --  12.12*  --  29.98*  --   --   --   --   --   --   --    LYMPHS ABS 10*3/mm3  --  2.58  --   --   --   --   --   --   --   --   --    MONOS ABS 10*3/mm3  --  1.70*  --   --   --   --   --   --   --   --   --    EOS ABS 10*3/mm3  --  0.01  --   --   --   --   --   --   --   --   --    BASOS ABS 10*3/mm3  --  0.03  --   --   --   --   --   --   --   --   --     < > = values in this interval not displayed.     Results from last 7 days   Lab Units 04/02/22  0303 04/01/22  1117 03/30/22  2237   INR  1.14* 1.28* 1.06   APTT seconds  --  28.1 25.0     Results from last 7 days   Lab Units 04/01/22  1516 04/01/22  1141 04/01/22  1009 04/01/22  0933 04/01/22  0859  04/01/22  0838 04/01/22  0736 03/30/22  1911   PH, ARTERIAL pH units 7.355 7.385 7.4080 7.4230 7.4270 7.4140 7.4270 7.445   PO2 ART mm Hg 88.9 263.8*  --   --   --   --   --  56.6*   PCO2, ARTERIAL mm Hg 47.9* 41.8  --   --   --   --   --  40.3   HCO3 ART mmol/L 26.8 25.0  --   --   --   --   --  27.7                         Results from last 7 days   Lab Units 03/30/22 2125   NITRITE UA  Negative           Imaging:  Imaging Results (Last 24 Hours)     Procedure Component Value Units Date/Time    XR Chest 1 View [[id number]] Collected: 04/03/22 1412     Updated: 04/03/22 1416    Narrative:      ONE VIEW PORTABLE CHEST AT 1:52 PM     HISTORY: Recent cardiac surgery. Chest tube removal.     FINDINGS: The lungs are moderately expanded with some minimal  atelectasis at the bases, right greater than left showing slight  improvement from 9 hours ago. There is no evidence of pneumothorax. The  heart is mildly enlarged without change.     This report was finalized on 4/3/2022 2:13 PM by Dr. Vinny Guaman M.D.       XR Chest 1 View [[id number]] Collected: 04/03/22 0624     Updated: 04/03/22 0628    Narrative:      ONE VIEW PORTABLE CHEST AT 4:34 AM     HISTORY: Recent cardiac surgery. Atelectasis.     FINDINGS: The lungs are moderately expanded with scattered atelectasis  in both lungs, right greater than left showing slight worsening from  yesterday. There remains mild generalized vascular congestion. There is  no evidence of pneumothorax. The heart remains mildly enlarged. A right  jugular sheath ends in the SVC.     This report was finalized on 4/3/2022 6:25 AM by Dr. Vinny Guaman M.D.                I reviewed the patient's new clinical results / labs / tests / procedures      Assessment/Plan     Active Hospital Problems    Diagnosis  POA   • **COPD with acute exacerbation (HCC) [J44.1]  Yes   • NSTEMI (non-ST elevated myocardial infarction) (HCC) [I21.4]  Yes   • Alcohol abuse [F10.10]  Yes   • Shortness of breath  [R06.02]  Yes   • Acute respiratory failure with hypoxia (HCC) [J96.01]  Yes   • Acute diastolic congestive heart failure (HCC) [I50.31]  Yes   • Acute pulmonary edema (HCC) [J81.0]  Yes   • Leukocytosis [D72.829]  Yes   • Positive D dimer [R79.89]  Yes   • Anemia [D64.9]  Yes   • History of colon polyps [Z86.010]  Not Applicable   • Overweight (BMI 25.0-29.9) [E66.3]  Yes   • AAA (abdominal aortic aneurysm) (HCC) [I71.4]  Yes   • Coronary artery disease [I25.10]  Unknown   • Abnormal findings on diagnostic imaging of heart and coronary circulation [R93.1]  Unknown   • Essential hypertension [I10]  Yes   • Other hyperlipidemia [E78.49]  Yes      Resolved Hospital Problems    Diagnosis Date Resolved POA   • Hyponatremia [E87.1] 03/29/2022 Yes           · Acute hypoxemic respiratory failure secondary to COPD exacerbation and pulmonary edema.  Look below for management.  Improved.  Status post extubation.  · COPD exacerbation.  Improved.  Currently on DuoNebs.  S/p prednisone.  Chest x-ray today with resolution of left apical small pneumothorax, resolution of vascular congestion, improving bilateral lung atelectasis.  Continue duo nebs and incentive spirometry.  Pulmonary is following.  No evidence of pneumonia by chest x-ray.  · N STEMI with acute diastolic congestive heart failure and pulmonary edema status post three-vessel CABG.  Currently stable.  Off ventilator.  Currently on aspirin/Lipitor/Lopressor.  Valsartan has been DC'd.  Started on IV Lasix today.  Cardiology and cardiothoracic surgery is following.  Cardiothoracic surgery plans Plavix at discharge.  On cefazolin in the perioperative period.  Ambulated with physical therapy today.  · Anemia of chronic disease secondary to bone marrow suppression because of alcohol.  Status post transfusion.  Positive hemoglobin drop today.  Will monitor.  Transfuse as needed.  Hemoccult stool is negative.  · Abdominal aortic aneurysm.  5.5 cm.  Vascular surgery is  following planning surgery after stabilization.  · Right renal atrophy and complex cyst.  MRI once stable.  · Alcohol abuse.  No signs of withdrawal.  Continue CIWA and thiamine/folate/multivitamin.  · Hypokalemia.  Continue substitution.  · VT prophylaxis.  Patient on Lovenox.    Discussed my findings and plan of treatment with the patient/nurse.    Saima Yancey MD  [facility]  04/03/22  19:41 EDT

## 2022-04-04 ENCOUNTER — APPOINTMENT (OUTPATIENT)
Dept: GENERAL RADIOLOGY | Facility: HOSPITAL | Age: 67
End: 2022-04-04

## 2022-04-04 LAB
ALBUMIN SERPL-MCNC: 3.8 G/DL (ref 3.5–5.2)
ALBUMIN/GLOB SERPL: 1.5 G/DL
ALP SERPL-CCNC: 56 U/L (ref 39–117)
ALT SERPL W P-5'-P-CCNC: 10 U/L (ref 1–41)
ANION GAP SERPL CALCULATED.3IONS-SCNC: 11 MMOL/L (ref 5–15)
AST SERPL-CCNC: 25 U/L (ref 1–40)
BILIRUB SERPL-MCNC: 0.5 MG/DL (ref 0–1.2)
BUN SERPL-MCNC: 19 MG/DL (ref 8–23)
BUN/CREAT SERPL: 14.1 (ref 7–25)
CALCIUM SPEC-SCNC: 8.6 MG/DL (ref 8.6–10.5)
CHLORIDE SERPL-SCNC: 94 MMOL/L (ref 98–107)
CO2 SERPL-SCNC: 28 MMOL/L (ref 22–29)
CREAT SERPL-MCNC: 1.35 MG/DL (ref 0.76–1.27)
DEPRECATED RDW RBC AUTO: 65.7 FL (ref 37–54)
EGFRCR SERPLBLD CKD-EPI 2021: 57.9 ML/MIN/1.73
ERYTHROCYTE [DISTWIDTH] IN BLOOD BY AUTOMATED COUNT: 17.5 % (ref 12.3–15.4)
GLOBULIN UR ELPH-MCNC: 2.6 GM/DL
GLUCOSE BLDC GLUCOMTR-MCNC: 130 MG/DL (ref 70–130)
GLUCOSE BLDC GLUCOMTR-MCNC: 149 MG/DL (ref 70–130)
GLUCOSE BLDC GLUCOMTR-MCNC: 156 MG/DL (ref 70–130)
GLUCOSE BLDC GLUCOMTR-MCNC: 161 MG/DL (ref 70–130)
GLUCOSE SERPL-MCNC: 120 MG/DL (ref 65–99)
HCT VFR BLD AUTO: 25.1 % (ref 37.5–51)
HGB BLD-MCNC: 8.6 G/DL (ref 13–17.7)
MCH RBC QN AUTO: 35.4 PG (ref 26.6–33)
MCHC RBC AUTO-ENTMCNC: 34.3 G/DL (ref 31.5–35.7)
MCV RBC AUTO: 103.3 FL (ref 79–97)
PLATELET # BLD AUTO: 190 10*3/MM3 (ref 140–450)
PMV BLD AUTO: 10.2 FL (ref 6–12)
POTASSIUM SERPL-SCNC: 3.3 MMOL/L (ref 3.5–5.2)
PROT SERPL-MCNC: 6.4 G/DL (ref 6–8.5)
RBC # BLD AUTO: 2.43 10*6/MM3 (ref 4.14–5.8)
SODIUM SERPL-SCNC: 133 MMOL/L (ref 136–145)
WBC NRBC COR # BLD: 11.13 10*3/MM3 (ref 3.4–10.8)

## 2022-04-04 PROCEDURE — 94799 UNLISTED PULMONARY SVC/PX: CPT

## 2022-04-04 PROCEDURE — 99232 SBSQ HOSP IP/OBS MODERATE 35: CPT | Performed by: NURSE PRACTITIONER

## 2022-04-04 PROCEDURE — 94761 N-INVAS EAR/PLS OXIMETRY MLT: CPT

## 2022-04-04 PROCEDURE — 25010000002 FUROSEMIDE PER 20 MG: Performed by: NURSE PRACTITIONER

## 2022-04-04 PROCEDURE — 80053 COMPREHEN METABOLIC PANEL: CPT | Performed by: REGISTERED NURSE

## 2022-04-04 PROCEDURE — 85027 COMPLETE CBC AUTOMATED: CPT | Performed by: NURSE PRACTITIONER

## 2022-04-04 PROCEDURE — 94760 N-INVAS EAR/PLS OXIMETRY 1: CPT

## 2022-04-04 PROCEDURE — 82962 GLUCOSE BLOOD TEST: CPT

## 2022-04-04 PROCEDURE — 63710000001 INSULIN LISPRO (HUMAN) PER 5 UNITS: Performed by: NURSE PRACTITIONER

## 2022-04-04 PROCEDURE — 71046 X-RAY EXAM CHEST 2 VIEWS: CPT

## 2022-04-04 PROCEDURE — 25010000002 ENOXAPARIN PER 10 MG: Performed by: NURSE PRACTITIONER

## 2022-04-04 PROCEDURE — 94664 DEMO&/EVAL PT USE INHALER: CPT

## 2022-04-04 RX ORDER — POTASSIUM CHLORIDE 750 MG/1
20 TABLET, FILM COATED, EXTENDED RELEASE ORAL ONCE
Status: COMPLETED | OUTPATIENT
Start: 2022-04-05 | End: 2022-04-05

## 2022-04-04 RX ORDER — FUROSEMIDE 40 MG/1
40 TABLET ORAL DAILY
Status: DISCONTINUED | OUTPATIENT
Start: 2022-04-05 | End: 2022-04-05 | Stop reason: HOSPADM

## 2022-04-04 RX ORDER — NITROGLYCERIN 0.4 MG/1
0.4 TABLET SUBLINGUAL
Status: DISCONTINUED | OUTPATIENT
Start: 2022-04-04 | End: 2022-04-05 | Stop reason: HOSPADM

## 2022-04-04 RX ADMIN — Medication 3 ML: at 21:34

## 2022-04-04 RX ADMIN — ASPIRIN 81 MG: 81 TABLET, COATED ORAL at 09:47

## 2022-04-04 RX ADMIN — MUPIROCIN 1 APPLICATION: 20 OINTMENT TOPICAL at 21:30

## 2022-04-04 RX ADMIN — FOLIC ACID 1 MG: 1 TABLET ORAL at 09:48

## 2022-04-04 RX ADMIN — MULTIPLE VITAMINS W/ MINERALS TAB 1 TABLET: TAB at 09:47

## 2022-04-04 RX ADMIN — METOPROLOL TARTRATE 25 MG: 25 TABLET, FILM COATED ORAL at 21:30

## 2022-04-04 RX ADMIN — IPRATROPIUM BROMIDE AND ALBUTEROL SULFATE 3 ML: 2.5; .5 SOLUTION RESPIRATORY (INHALATION) at 10:56

## 2022-04-04 RX ADMIN — HYDROCODONE BITARTRATE AND ACETAMINOPHEN 2 TABLET: 5; 325 TABLET ORAL at 05:48

## 2022-04-04 RX ADMIN — IPRATROPIUM BROMIDE AND ALBUTEROL SULFATE 3 ML: 2.5; .5 SOLUTION RESPIRATORY (INHALATION) at 15:56

## 2022-04-04 RX ADMIN — DOCUSATE SODIUM 50MG AND SENNOSIDES 8.6MG 2 TABLET: 8.6; 5 TABLET, FILM COATED ORAL at 21:30

## 2022-04-04 RX ADMIN — FUROSEMIDE 40 MG: 10 INJECTION, SOLUTION INTRAMUSCULAR; INTRAVENOUS at 05:48

## 2022-04-04 RX ADMIN — Medication 100 MG: at 09:48

## 2022-04-04 RX ADMIN — ESCITALOPRAM 20 MG: 20 TABLET, FILM COATED ORAL at 09:48

## 2022-04-04 RX ADMIN — Medication 3 ML: at 09:48

## 2022-04-04 RX ADMIN — GUAIFENESIN 1200 MG: 600 TABLET, EXTENDED RELEASE ORAL at 21:30

## 2022-04-04 RX ADMIN — DOCUSATE SODIUM 50MG AND SENNOSIDES 8.6MG 2 TABLET: 8.6; 5 TABLET, FILM COATED ORAL at 09:47

## 2022-04-04 RX ADMIN — IPRATROPIUM BROMIDE AND ALBUTEROL SULFATE 3 ML: 2.5; .5 SOLUTION RESPIRATORY (INHALATION) at 19:57

## 2022-04-04 RX ADMIN — POTASSIUM CHLORIDE 20 MEQ: 10 TABLET, EXTENDED RELEASE ORAL at 09:47

## 2022-04-04 RX ADMIN — HYDROCODONE BITARTRATE AND ACETAMINOPHEN 2 TABLET: 5; 325 TABLET ORAL at 15:05

## 2022-04-04 RX ADMIN — CHLORHEXIDINE GLUCONATE 0.12% ORAL RINSE 15 ML: 1.2 LIQUID ORAL at 21:30

## 2022-04-04 RX ADMIN — METOPROLOL TARTRATE 25 MG: 25 TABLET, FILM COATED ORAL at 09:47

## 2022-04-04 RX ADMIN — POTASSIUM CHLORIDE 40 MEQ: 10 TABLET, EXTENDED RELEASE ORAL at 14:40

## 2022-04-04 RX ADMIN — INSULIN LISPRO 2 UNITS: 100 INJECTION, SOLUTION INTRAVENOUS; SUBCUTANEOUS at 16:30

## 2022-04-04 RX ADMIN — GUAIFENESIN 1200 MG: 600 TABLET, EXTENDED RELEASE ORAL at 09:48

## 2022-04-04 RX ADMIN — PANTOPRAZOLE SODIUM 40 MG: 40 TABLET, DELAYED RELEASE ORAL at 05:48

## 2022-04-04 RX ADMIN — POTASSIUM CHLORIDE 40 MEQ: 10 TABLET, EXTENDED RELEASE ORAL at 18:46

## 2022-04-04 RX ADMIN — ATORVASTATIN CALCIUM 40 MG: 20 TABLET, FILM COATED ORAL at 21:30

## 2022-04-04 RX ADMIN — IPRATROPIUM BROMIDE AND ALBUTEROL SULFATE 3 ML: 2.5; .5 SOLUTION RESPIRATORY (INHALATION) at 06:44

## 2022-04-04 RX ADMIN — ENOXAPARIN SODIUM 40 MG: 100 INJECTION SUBCUTANEOUS at 18:45

## 2022-04-04 NOTE — PLAN OF CARE
Goal Outcome Evaluation:  Plan of Care Reviewed With: patient, spouse        Outcome Evaluation: POD 3 CABG. NSR on tele, all other VSS. Intermittently requiring 2L O2. AV wires removed per order. Pain treated per MAR. Encouraging use of IS, ambulating SBA. Tentative d/c in the next day or two per provider notes. WCTM.

## 2022-04-04 NOTE — PROGRESS NOTES
Name: Osman Aparicio ADMIT: 3/26/2022   : 1955  PCP: Desire Greer APRN    MRN: [id number] LOS: 9 days   AGE/SEX: 66 y.o. male  ROOM:    [facility]    [place], Subsequent hospital care    66 y.o. male admitted to the hospital with shortness of breath, identified with coronary artery disease and pulmonary hypertension, and today is postop day 3 from off-pump coronary bypass .  Patient doing very well.  Minimal shortness of breath.  Has not been too active just yet postoperatively.  No abdominal complaints.    Scheduled Medications:   aspirin, 81 mg, Oral, Daily  atorvastatin, 40 mg, Oral, Nightly  chlorhexidine, 15 mL, Mouth/Throat, Q12H  enoxaparin, 40 mg, Subcutaneous, Q24H  escitalopram, 20 mg, Oral, Daily  folic acid, 1 mg, Oral, Daily  [START ON 2022] furosemide, 40 mg, Oral, Daily  guaiFENesin, 1,200 mg, Oral, Q12H  insulin lispro, 0-7 Units, Subcutaneous, TID AC  ipratropium-albuterol, 3 mL, Nebulization, 4x Daily - RT  metoprolol tartrate, 25 mg, Oral, Q12H  multivitamin with minerals, 1 tablet, Oral, Daily  mupirocin, , Each Nare, BID  pantoprazole, 40 mg, Oral, Q AM  potassium chloride, 20 mEq, Oral, BID With Meals  [START ON 2022] potassium chloride, 20 mEq, Oral, Once  senna-docusate sodium, 2 tablet, Oral, BID  sodium chloride, 3 mL, Intravenous, Q12H  thiamine, 100 mg, Oral, Daily    Vital Signs  Vital Signs Patient Vitals for the past 24 hrs:   BP Temp Temp src Pulse Resp SpO2 Weight   22 1556 -- -- -- 70 18 91 % --   22 1525 95/60 98.4 °F (36.9 °C) Oral 71 18 90 % --   22 1435 -- -- -- 81 -- -- --   22 1415 106/64 -- -- 72 18 (!) 89 % --   22 1400 102/58 -- -- 70 18 (!) 87 % --   22 1345 92/62 -- -- 69 -- 91 % --   22 1056 -- -- -- -- 18 -- --   22 1050 121/68 98.4 °F (36.9 °C) Oral -- 18 -- --   22 0705 112/65 99 °F (37.2 °C) Oral 68 18 91 % --   22 0655 -- -- -- -- -- -- 75.4 kg (166 lb 3.2 oz)    04/04/22 0644 -- -- -- 61 18 92 % --   04/04/22 0300 100/54 98.2 °F (36.8 °C) Oral 62 18 96 % --   04/03/22 2146 118/62 -- -- 68 -- -- --   04/03/22 1934 -- -- -- 69 18 100 % --   04/03/22 1929 -- -- -- 70 18 100 % --   04/03/22 1915 103/59 98.5 °F (36.9 °C) Oral 70 18 99 % --   04/03/22 1700 -- -- -- -- -- 100 % --   04/03/22 1655 -- -- -- -- -- 98 % --     I/O:  I/O last 3 completed shifts:  In: 1540 [P.O.:1540]  Out: 2470 [Urine:2350; Chest Tube:120]    Physical Exam: Sternotomy incision and right lower extremity Endo vein harvest incisions healing well.  Abdomen nondistended, soft and nontender.     CBC    Results from last 7 days   Lab Units 04/04/22  1349 04/03/22  0246 04/02/22  0303 04/01/22  1447 04/01/22  1117 04/01/22  1009 04/01/22  0933 04/01/22  0736 04/01/22  0305 03/30/22  2237   WBC 10*3/mm3 11.13* 15.79* 17.11* 21.84* 33.31*  --   --   --  8.40 9.15   HEMOGLOBIN g/dL 8.6* 9.5* 10.1* 10.7* 11.2*  --   --   --  9.5* 9.4*   HEMOGLOBIN, POC g/dL  --   --   --   --   --  8.5* 8.8*   < >  --   --    PLATELETS 10*3/mm3 190 197 263 271 309  --   --   --  322 346    < > = values in this interval not displayed.     BMP   Results from last 7 days   Lab Units 04/04/22  1349 04/03/22  0246 04/02/22  1640 04/02/22  0303 04/01/22  2000 04/01/22  1447 04/01/22  1117   SODIUM mmol/L 133* 132* 134* 136 138 135* 135*   POTASSIUM mmol/L 3.3* 3.4* 3.7 4.1 4.9 5.3* 5.0   CHLORIDE mmol/L 94* 95* 96* 101 101 101 102   CO2 mmol/L 28.0 28.0 26.6 25.1 26.0 25.0 24.0   BUN mg/dL 19 24* 27* 27* 28* 28* 29*   CREATININE mg/dL 1.35* 1.22 1.27 1.13 1.19 1.14 1.09   GLUCOSE mg/dL 120* 109* 117* 96 112* 137* 161*   MAGNESIUM mg/dL  --   --   --  2.4  --  2.2 2.1   PHOSPHORUS mg/dL  --   --   --  4.1  --  4.7* 4.3     Cr Clearance Estimated Creatinine Clearance: 57.4 mL/min (A) (by C-G formula based on SCr of 1.35 mg/dL (H)).     CTA reviewed in detail and discussed with [facility] vascular representative.  Aneurysm measures 5.5 cm and  is confined to abdominal aorta.  JAVAN is patent middle and lower zones of the right kidney are atrophic.  The upper pole of the right kidney comprising about 1/3-1/4 of right medial mass is healthy appearing.  The iliac arteries appear to represent good landing zones.  The right renal artery measures 4 mm, and shows AP tortuosity.  The left renal artery measures about 8 mm and is widely patent and perfuses a normal-appearing kidney.  There is modest stenosis of the very proximal left external iliac artery.    Assessment/Plan   Assessment & Plan    COPD with acute exacerbation (HCC)    Essential hypertension    Other hyperlipidemia    Shortness of breath    Acute respiratory failure with hypoxia (HCC)    Acute diastolic congestive heart failure (HCC)    Acute pulmonary edema (HCC)    Leukocytosis    Positive D dimer    Anemia    History of colon polyps    Overweight (BMI 25.0-29.9)    AAA (abdominal aortic aneurysm) (HCC)    Alcohol abuse    NSTEMI (non-ST elevated myocardial infarction) ([facility])    Coronary artery disease    Abnormal findings on diagnostic imaging of heart and coronary circulation    66 y.o. male with intact abdominal aortic aneurysm discovered incidentally by CT angiogram of the chest.  The aneurysm measures 5.5 cm and he appears to be a candidate for repair.  He is now postop day 3 from coronary bypass performed off-pump, and the patient appears to have had an uncomplicated postop course to date.  He is not an ideal candidate for open abdominal aortic aneurysm repair, given recent surgery and pulmonary hypertension.  Yet he is a candidate for open repair.  On the other hand, he is also a candidate for repair with custom made, fenestrated aortic graft.  Would not recommend attempting to revascularize the right kidney, since the artery would be at high risk of dissection and occlusion by trying to prestented to open it, and adding a fenestration adds another potential mode for a type Ia endoleak.  Thus  I would recommend percutaneous endovascular repair with the operative plan being to perform stent grafting of the left renal artery but intentionally covering the right renal artery origin.  Although he has had a slight bump in his renal function given contrast studies for CT, cardiac catheterization and for his surgery, his baseline renal function appears normal.  I have presented the above to the patient.  I confirmed that he has an aneurysm, and is a candidate for repair based on size criteria.  I also described the fact that percutaneous endovascular repair is very possible with good expectation of outcome, but will not be straightforward.  The patient would like some time to consider his options.  He will discuss with his wife, but is thinking for many reasons that he would like to pursue treatment toward the end of the year.  I described that while there is nothing about his his aneurysm that causes us to be concerned, I would be unable to estimate the safety of postponing aneurysm surgery for this long.  Since his graft is custom made, we would need 6 weeks lead time to create the graft.  I will follow up again with him tomorrow.    Mariusz Kumar MD  04/04/22  16:50 EDT    Please call my office with any question: [phone]    Active Hospital Problems    Diagnosis  POA   • **COPD with acute exacerbation (HCC) [J44.1]  Yes   • NSTEMI (non-ST elevated myocardial infarction) ([facility]) [I21.4]  Yes   • Alcohol abuse [F10.10]  Yes   • Shortness of breath [R06.02]  Yes   • Acute respiratory failure with hypoxia ([facility]) [J96.01]  Yes   • Acute diastolic congestive heart failure (HCC) [I50.31]  Yes   • Acute pulmonary edema (HCC) [J81.0]  Yes   • Leukocytosis [D72.829]  Yes   • Positive D dimer [R79.89]  Yes   • Anemia [D64.9]  Yes   • History of colon polyps [Z86.010]  Not Applicable   • Overweight (BMI 25.0-29.9) [E66.3]  Yes   • AAA (abdominal aortic aneurysm) ([facility]) [I71.4]  Yes   • Coronary artery disease  [I25.10]  Unknown   • Abnormal findings on diagnostic imaging of heart and coronary circulation [R93.1]  Unknown   • Essential hypertension [I10]  Yes   • Other hyperlipidemia [E78.49]  Yes      Resolved Hospital Problems    Diagnosis Date Resolved POA   • Hyponatremia [E87.1] 03/29/2022 Yes

## 2022-04-04 NOTE — PROGRESS NOTES
HOSPITAL FOLLOW UP NOTE    Patient Name: Osman Aparicio  Patient : 1955        Date of Service:22  Provider of Service: TAYLOR Sorensen  Place of Service: [facility]  Referral Provider: Bong Pandey*          Follow Up: Coronary artery disease, shortness of breath    Interval Hx: Sinus rhythm on telemetry. Sitting in with no complaints.  Blood pressure well controlled.  Getting 1500 cc on incentive spirometry.  On room air.  Appears euvolemic    OBJECTIVE  Temp:  [97.8 °F (36.6 °C)-99 °F (37.2 °C)] 99 °F (37.2 °C)  Heart Rate:  [61-73] 68  Resp:  [17-18] 18  BP: (100-132)/(54-71) 112/65     Intake/Output Summary (Last 24 hours) at 2022 0856  Last data filed at 2022 0525  Gross per 24 hour   Intake 1200 ml   Output 975 ml   Net 225 ml     Body mass index is 23.19 kg/m².      22  0600 22  0640 22  0655   Weight: 68.4 kg (150 lb 12.7 oz) 75.8 kg (167 lb) 75.4 kg (166 lb 3.2 oz)         Physical Exam:     General Appearance:    Alert, cooperative, in no acute distress           Neck:  no thyromegaly, no   carotid bruit, no JVD   Lungs:     Clear to auscultation,respirations regular, even and unlabored    Heart:    Regular rhythm and normal rate, normal S1 and S2, no murmur, no gallop, no rub, no click   Chest Wall:    No abnormalities observed   Abdomen:     Normal bowel sounds, no masses, no organomegaly, soft, nontender, nondistended, no guarding, no rebound  tenderness   Extremities:   Moves all extremities well, no edema, no cyanosis, no redness   Pulses:   Pulses palpable and equal bilaterally.          CURRENT MEDS    Scheduled Meds:aspirin, 81 mg, Oral, Daily  atorvastatin, 40 mg, Oral, Nightly  chlorhexidine, 15 mL, Mouth/Throat, Q12H  enoxaparin, 40 mg, Subcutaneous, Q24H  escitalopram, 20 mg, Oral, Daily  folic acid, 1 mg, Oral, Daily  furosemide, 40 mg, Intravenous, Q12H  guaiFENesin, 1,200 mg, Oral, Q12H  insulin lispro, 0-7 Units,  Subcutaneous, TID AC  ipratropium-albuterol, 3 mL, Nebulization, 4x Daily - RT  metoprolol tartrate, 25 mg, Oral, Q12H  multivitamin with minerals, 1 tablet, Oral, Daily  mupirocin, , Each Nare, BID  pantoprazole, 40 mg, Oral, Q AM  potassium chloride, 20 mEq, Oral, BID With Meals  senna-docusate sodium, 2 tablet, Oral, BID  sodium chloride, 3 mL, Intravenous, Q12H  thiamine, 100 mg, Oral, Daily      Continuous Infusions:sodium chloride, 30 mL/hr, Last Rate: 30 mL/hr (04/01/22 1144)          Lab Review:   Results from last 7 days   Lab Units 04/03/22  0246 04/02/22  1640 04/01/22  0305 03/30/22 2237 03/30/22  0800   SODIUM mmol/L 132* 134*   < > 136 137   POTASSIUM mmol/L 3.4* 3.7   < > 3.8 3.7   CHLORIDE mmol/L 95* 96*   < > 95* 97*   CO2 mmol/L 28.0 26.6   < > 29.0 30.0*   BUN mg/dL 24* 27*   < > 28* 25*   CREATININE mg/dL 1.22 1.27   < > 1.23 1.16   GLUCOSE mg/dL 109* 117*   < > 103* 98   CALCIUM mg/dL 8.4* 8.6   < > 9.2 9.2   AST (SGOT) U/L  --   --   --  17 13   ALT (SGPT) U/L  --   --   --  17 14    < > = values in this interval not displayed.         Results from last 7 days   Lab Units 04/03/22  0246 04/02/22  0303   WBC 10*3/mm3 15.79* 17.11*   HEMOGLOBIN g/dL 9.5* 10.1*   HEMATOCRIT % 27.2* 27.9*   PLATELETS 10*3/mm3 197 263     Results from last 7 days   Lab Units 04/02/22  0303 04/01/22  1117 03/30/22 2237   INR  1.14* 1.28* 1.06   APTT seconds  --  28.1 25.0     Results from last 7 days   Lab Units 04/02/22  0303 04/01/22  1447   MAGNESIUM mg/dL 2.4 2.2         Results from last 7 days   Lab Units 03/30/22  2237   PROBNP pg/mL 6,632.0*     Results from last 7 days   Lab Units 03/29/22  0719   TSH uIU/mL 1.180           ASSESSMENT & PLAN    COPD with acute exacerbation (HCC)    Essential hypertension    Other hyperlipidemia    Shortness of breath    Acute respiratory failure with hypoxia (HCC)    Acute diastolic congestive heart failure (HCC)    Acute pulmonary edema (HCC)    Leukocytosis    Positive D  dimer    Anemia    History of colon polyps    Overweight (BMI 25.0-29.9)    AAA (abdominal aortic aneurysm) (HCC)    Alcohol abuse    NSTEMI (non-ST elevated myocardial infarction) (HCC)    Coronary artery disease    Abnormal findings on diagnostic imaging of heart and coronary circulation    1.  Coronary artery disease s/p CABGx3 ( LIMA-LAD<SVG to OM1, SVG to OM 2) POD #3: on ASA, statin, BB.  Plavix to start prior to DC per CV surgery  2.  Hypertension: Well-controlled on beta-blocker  3.  Hyperlipidemia: On statin therapy  4.  Acute diastolic congestive heart failure: Intra-Op LVEF 45%, 2D echocardiogram 3/26/2022 showed LVEF 56%, remains on IV diuretic. Euvolemic  5.  Positive D-dimer: CT negative for PE  6.  Acute exacerbation COPD: Pulmonary following  7. AAA: 5.5 cm aneurysm on ultrasound, vascular following      Jennie Duarte, APRN  04/04/22

## 2022-04-04 NOTE — PROGRESS NOTES
Name: Osman Aparicio ADMIT: 3/26/2022   : 1955  PCP: Desire Greer APRN    MRN: [id number] LOS: 9 days   AGE/SEX: 66 y.o. male  ROOM:      Subjective   Subjective   Patient feels well today.  There are no chest pain/exertional dyspnea/cough.  Ambulating with physical therapy.  No lower extremity edema.  No palpitation.  No PND orthopnea. No hemoptysis.  No fever or chills.    Review of Systems    GI.  Tolerating regular diet well.  No nausea or vomiting.  No abdominal pain.  Normal bowel movement without constipation/diarrhea/bleeding per rectum/melena.  .  No dysuria or hematuria with good urine output.  CNS.  No tremor.  No anxiety.  No focal neurological symptoms.  No loss of consciousness or dizziness.     Objective   Objective   Vital Signs  Temp:  [98.2 °F (36.8 °C)-99 °F (37.2 °C)] 98.4 °F (36.9 °C)  Heart Rate:  [61-72] 68  Resp:  [17-18] 18  BP: (100-132)/(54-71) 121/68  SpO2:  [90 %-100 %] 91 %  on  Flow (L/min):  [2-4] 2;   Device (Oxygen Therapy): room air    Intake/Output Summary (Last 24 hours) at 2022 1255  Last data filed at 2022 1225  Gross per 24 hour   Intake 1060 ml   Output 975 ml   Net 85 ml     Body mass index is 23.19 kg/m².      22  0600 22  0640 22  0655   Weight: 68.4 kg (150 lb 12.7 oz) 75.8 kg (167 lb) 75.4 kg (166 lb 3.2 oz)     Physical Exam    General.  Middle-aged gentleman.  Appears older than stated age.  Alert and oriented x3.  No apparent pain/distress/diaphoresis.  Normal mood and affect.  No tremors.   Eyes.  Pupils equal round and reactive.  Intact extraocular musculature.  No pallor or jaundice.  Normal conjunctivae and lids.  Oral cavity.  Moist mucous membrane.  Neck.  No JVD.  C line in the right neck.  Supple.  No lymphadenopathy or thyromegaly.  Cardiovascular.  Dressed sternotomy wound.  Regular rate and rhythm and grade 2 systolic murmur.  Chest.  Poor bilateral air entry with scattered bilateral rhonchi chest tubes  have been removed.  Abdomen.  Soft lax.  No tenderness.  No organomegaly.  No guarding or rebound.  Extremities.  No edema..  No clubbing or cyanosis.    CNS.  No acute focal neurological deficits.          Results Review:      Results from last 7 days   Lab Units 04/03/22  0246 04/02/22  1640 04/02/22  0303 04/01/22  2000 04/01/22  1447 04/01/22  1117 04/01/22  0305 03/30/22  2237 03/30/22  0800 03/29/22  0719   SODIUM mmol/L 132* 134* 136 138 135* 135* 135* 136 137 141   POTASSIUM mmol/L 3.4* 3.7 4.1 4.9 5.3* 5.0 4.2 3.8 3.7 4.6   CHLORIDE mmol/L 95* 96* 101 101 101 102 95* 95* 97* 91*   CO2 mmol/L 28.0 26.6 25.1 26.0 25.0 24.0 30.0* 29.0 30.0* 27.6   BUN mg/dL 24* 27* 27* 28* 28* 29* 33* 28* 25* 22   CREATININE mg/dL 1.22 1.27 1.13 1.19 1.14 1.09 1.28* 1.23 1.16 1.11   GLUCOSE mg/dL 109* 117* 96 112* 137* 161* 111* 103* 98 100*   CALCIUM mg/dL 8.4* 8.6 8.5* 8.8 8.9 8.9 9.4 9.2 9.2 9.0   AST (SGOT) U/L  --   --   --   --   --   --   --  17 13 10   ALT (SGPT) U/L  --   --   --   --   --   --   --  17 14 12     Estimated Creatinine Clearance: 63.5 mL/min (by C-G formula based on SCr of 1.22 mg/dL).      Results from last 7 days   Lab Units 04/04/22  1139 04/04/22  0556 04/03/22 2047 04/03/22  1529 04/03/22  1042 04/03/22  0543 04/02/22 2053 04/02/22  0649   GLUCOSE mg/dL 130 149* 179* 142* 154* 138* 144* 124         Results from last 7 days   Lab Units 03/30/22  2237   PROBNP pg/mL 6,632.0*     Results from last 7 days   Lab Units 03/29/22  0719   TSH uIU/mL 1.180     Results from last 7 days   Lab Units 04/02/22  0303 04/01/22  1447 04/01/22  1117   MAGNESIUM mg/dL 2.4 2.2 2.1           Invalid input(s): LDLCALC  Results from last 7 days   Lab Units 04/03/22  0246 04/02/22  0303 04/01/22  1447 04/01/22  1117 04/01/22  1009 04/01/22  0933 04/01/22  0859 04/01/22  0736 04/01/22  0305 03/30/22  2237 03/30/22  0800   WBC 10*3/mm3 15.79* 17.11* 21.84* 33.31*  --   --   --   --  8.40 9.15 8.87   HEMOGLOBIN g/dL 9.5*  10.1* 10.7* 11.2*  --   --   --   --  9.5* 9.4* 8.9*   HEMOGLOBIN, POC g/dL  --   --   --   --  8.5* 8.8* 8.5*   < >  --   --   --    HEMATOCRIT % 27.2* 27.9* 29.6* 32.9*  --   --   --   --  27.3* 27.7* 25.7*   HEMATOCRIT POC %  --   --   --   --  25* 26* 25*   < >  --   --   --    PLATELETS 10*3/mm3 197 263 271 309  --   --   --   --  322 346 320   MCV fL 104.6* 100.4* 100.0* 104.4*  --   --   --   --  104.6* 110.8* 105.8*   MCH pg 36.5* 36.3* 36.1* 35.6*  --   --   --   --  36.4* 37.6* 36.6*   MCHC g/dL 34.9 36.2* 36.1* 34.0  --   --   --   --  34.8 33.9 34.6   RDW % 18.8* 19.3* 19.4* 19.9*  --   --   --   --  18.5* 19.2* 18.6*   RDW-SD fl 72.1* 69.3* 70.9* 76.3*  --   --   --   --  68.2* 76.0* 70.2*   MPV fL 10.4 9.3 9.7 9.2  --   --   --   --  9.5 9.7 9.6   NEUTROPHIL % %  --  70.8  --   --   --   --   --   --   --   --   --    LYMPHOCYTE % %  --  15.1*  --   --   --   --   --   --   --   --   --    MONOCYTES % %  --  9.9  --   --   --   --   --   --   --   --   --    EOSINOPHIL % %  --  0.1*  --   --   --   --   --   --   --   --   --    BASOPHIL % %  --  0.2  --   --   --   --   --   --   --   --   --    NEUTROS ABS 10*3/mm3  --  12.12*  --  29.98*  --   --   --   --   --   --   --    LYMPHS ABS 10*3/mm3  --  2.58  --   --   --   --   --   --   --   --   --    MONOS ABS 10*3/mm3  --  1.70*  --   --   --   --   --   --   --   --   --    EOS ABS 10*3/mm3  --  0.01  --   --   --   --   --   --   --   --   --    BASOS ABS 10*3/mm3  --  0.03  --   --   --   --   --   --   --   --   --     < > = values in this interval not displayed.     Results from last 7 days   Lab Units 04/02/22  0303 04/01/22  1117 03/30/22  2237   INR  1.14* 1.28* 1.06   APTT seconds  --  28.1 25.0     Results from last 7 days   Lab Units 04/01/22  1516 04/01/22  1141 04/01/22  1009 04/01/22  0933 04/01/22  0859 04/01/22  0838 04/01/22  0736 03/30/22  1911   PH, ARTERIAL pH units 7.355 7.385 7.4080 7.4230 7.4270 7.4140 7.4270 7.445   PO2 ART  mm Hg 88.9 263.8*  --   --   --   --   --  56.6*   PCO2, ARTERIAL mm Hg 47.9* 41.8  --   --   --   --   --  40.3   HCO3 ART mmol/L 26.8 25.0  --   --   --   --   --  27.7                         Results from last 7 days   Lab Units 03/30/22  2125   NITRITE UA  Negative           Imaging:  Imaging Results (Last 24 Hours)     Procedure Component Value Units Date/Time    XR Chest PA & Lateral [[id number]] Collected: 04/04/22 0912     Updated: 04/04/22 0917    Narrative:      XR CHEST PA AND LATERAL-     HISTORY:  Postop open heart.     COMPARISON:  Chest radiograph 04/03/2022.     FINDINGS:    2 views of the chest were obtained. Epicardial pacing wires are present.  The cardiac silhouette is normal in size. There are postsurgical changes  from CABG. There is calcific aortic atherosclerosis. There is mild  linear opacity at the right lung base, favored to reflect subsegmental  atelectasis, similar to 04/03/2022. There are mildly prominent  interstitial opacities, concerning for pulmonary edema in the  appropriate clinical setting. There are trace bilateral pleural  effusions. Median sternotomy wires are intact.     This report was finalized on 4/4/2022 9:14 AM by Dr. Shira Stark M.D.       XR Chest 1 View [[id number]] Collected: 04/03/22 1412     Updated: 04/03/22 1416    Narrative:      ONE VIEW PORTABLE CHEST AT 1:52 PM     HISTORY: Recent cardiac surgery. Chest tube removal.     FINDINGS: The lungs are moderately expanded with some minimal  atelectasis at the bases, right greater than left showing slight  improvement from 9 hours ago. There is no evidence of pneumothorax. The  heart is mildly enlarged without change.     This report was finalized on 4/3/2022 2:13 PM by Dr. Vinny Guaman M.D.                I reviewed the patient's new clinical results / labs / tests / procedures      Assessment/Plan     Active Hospital Problems    Diagnosis  POA   • **COPD with acute exacerbation (HCC) [J44.1]  Yes   • NSTEMI  (non-ST elevated myocardial infarction) (HCC) [I21.4]  Yes   • Alcohol abuse [F10.10]  Yes   • Shortness of breath [R06.02]  Yes   • Acute respiratory failure with hypoxia (HCC) [J96.01]  Yes   • Acute diastolic congestive heart failure (HCC) [I50.31]  Yes   • Acute pulmonary edema (HCC) [J81.0]  Yes   • Leukocytosis [D72.829]  Yes   • Positive D dimer [R79.89]  Yes   • Anemia [D64.9]  Yes   • History of colon polyps [Z86.010]  Not Applicable   • Overweight (BMI 25.0-29.9) [E66.3]  Yes   • AAA (abdominal aortic aneurysm) (HCC) [I71.4]  Yes   • Coronary artery disease [I25.10]  Unknown   • Abnormal findings on diagnostic imaging of heart and coronary circulation [R93.1]  Unknown   • Essential hypertension [I10]  Yes   • Other hyperlipidemia [E78.49]  Yes      Resolved Hospital Problems    Diagnosis Date Resolved POA   • Hyponatremia [E87.1] 03/29/2022 Yes           · Acute hypoxemic respiratory failure secondary to COPD exacerbation and pulmonary edema.  Look below for management.  Improved.  Status post extubation.  · COPD exacerbation.  Improved.  Currently on DuoNebs.  S/p prednisone.  Chest x-ray today with resolution of left apical small pneumothorax, resolution of vascular congestion, improving bilateral lung atelectasis.  Continue duo nebs and incentive spirometry.  Pulmonary is following.  No evidence of pneumonia by chest x-ray.  · N STEMI with acute diastolic congestive heart failure and pulmonary edema status post three-vessel CABG.  Currently stable.  Off ventilator.  Currently on aspirin/Lipitor/Lopressor/p.o. agents..  Valsartan has been IV Lasix DC'd..  Cardiology and cardiothoracic surgery is following.  Cardiothoracic surgery plans Plavix at discharge.  Status post cefazolin in the perioperative period.  Ambulated with physical therapy today.  Improved leukocytosis.  No fever.  · Anemia of chronic disease secondary to bone marrow suppression because of alcohol.  Status post transfusion.  Positive  hemoglobin drop today.  Will monitor.  Transfuse as needed.  Hemoccult stool is negative.  · Abdominal aortic aneurysm.  5.5 cm.  Vascular surgery is following planning surgery after stabilization.  · Right renal atrophy and complex cyst.  MRI once stable with urology.  · Alcohol abuse.  No signs of withdrawal.  Continue CIWA and thiamine/folate/multivitamin.  · Hypokalemia.  Continue substitution.  · VT prophylaxis.  Patient on Lovenox.    Discussed my findings and plan of treatment with the patient  Disposition.  Hopefully home tomorrow if okay with consultants.    Saima Yancey MD  [facility]  04/04/22  12:55 EDT

## 2022-04-04 NOTE — PROGRESS NOTES
"..  3/26/2022  9:21 AM    LOS: 9 days   Patient Care Team:  Desire Greer APRN as PCP - General (Family Medicine)  Desire Greer APRN as Nurse Practitioner (Family Medicine)    Chief Complaint:  Post-op    Subjective:  Symptoms:  No shortness of breath or chest pain.    Diet:  No nausea or vomiting.    Activity level: Returning to normal.        Vital Signs  Temp:  [98.2 °F (36.8 °C)-99 °F (37.2 °C)] 99 °F (37.2 °C)  Heart Rate:  [61-72] 68  Resp:  [17-18] 18  BP: (100-132)/(54-71) 112/65  Body mass index is 23.19 kg/m².    Intake/Output Summary (Last 24 hours) at 4/4/2022 1056  Last data filed at 4/4/2022 0855  Gross per 24 hour   Intake 960 ml   Output 975 ml   Net -15 ml             04/02/22  0600 04/03/22  0640 04/04/22  0655   Weight: 68.4 kg (150 lb 12.7 oz) 75.8 kg (167 lb) 75.4 kg (166 lb 3.2 oz)         Objective:  General Appearance:  Comfortable, well-appearing and in no acute distress.    Vital signs: (most recent): Blood pressure 112/65, pulse 68, temperature 99 °F (37.2 °C), temperature source Oral, resp. rate 18, height 180.3 cm (70.98\"), weight 75.4 kg (166 lb 3.2 oz), SpO2 91 %.  Vital signs are normal.  No fever.    Output: Producing urine.    HEENT: Normal HEENT exam.    Lungs:  Normal effort and normal respiratory rate.  Breath sounds clear to auscultation.    Heart: Normal rate.  Regular rhythm.  S1 normal and S2 normal.    Abdomen: Abdomen is soft.  Bowel sounds are normal.     Neurological: Patient is alert and oriented to person, place and time.    Pupils:  Pupils are equal, round, and reactive to light.    Skin:  Warm and dry.  (Mid-sternal incision dressing -- CDI)          Results Review:    WBC WBC   Date Value Ref Range Status   04/03/2022 15.79 (H) 3.40 - 10.80 10*3/mm3 Final   04/02/2022 17.11 (H) 3.40 - 10.80 10*3/mm3 Final   04/01/2022 21.84 (H) 3.40 - 10.80 10*3/mm3 Final   04/01/2022 33.31 (C) 3.40 - 10.80 10*3/mm3 Final      HGB Hemoglobin   Date Value Ref " Range Status   04/03/2022 9.5 (L) 13.0 - 17.7 g/dL Final   04/02/2022 10.1 (L) 13.0 - 17.7 g/dL Final   04/01/2022 10.7 (L) 13.0 - 17.7 g/dL Final   04/01/2022 11.2 (L) 13.0 - 17.7 g/dL Final      HCT Hematocrit   Date Value Ref Range Status   04/03/2022 27.2 (L) 37.5 - 51.0 % Final   04/02/2022 27.9 (L) 37.5 - 51.0 % Final   04/01/2022 29.6 (L) 37.5 - 51.0 % Final   04/01/2022 32.9 (L) 37.5 - 51.0 % Final      Platelets Platelets   Date Value Ref Range Status   04/03/2022 197 140 - 450 10*3/mm3 Final   04/02/2022 263 140 - 450 10*3/mm3 Final   04/01/2022 271 140 - 450 10*3/mm3 Final   04/01/2022 309 140 - 450 10*3/mm3 Final        PT/INR:    Protime   Date Value Ref Range Status   04/02/2022 14.6 (H) 11.7 - 14.2 Seconds Final   04/01/2022 15.9 (H) 11.7 - 14.2 Seconds Final   /  INR   Date Value Ref Range Status   04/02/2022 1.14 (H) 0.90 - 1.10 Final   04/01/2022 1.28 (H) 0.90 - 1.10 Final       Sodium Sodium   Date Value Ref Range Status   04/03/2022 132 (L) 136 - 145 mmol/L Final   04/02/2022 134 (L) 136 - 145 mmol/L Final   04/02/2022 136 136 - 145 mmol/L Final   04/01/2022 138 136 - 145 mmol/L Final   04/01/2022 135 (L) 136 - 145 mmol/L Final   04/01/2022 135 (L) 136 - 145 mmol/L Final      Potassium Potassium   Date Value Ref Range Status   04/03/2022 3.4 (L) 3.5 - 5.2 mmol/L Final   04/02/2022 3.7 3.5 - 5.2 mmol/L Final   04/02/2022 4.1 3.5 - 5.2 mmol/L Final   04/01/2022 4.9 3.5 - 5.2 mmol/L Final     Comment:     Slight hemolysis detected by analyzer. Results may be affected.   04/01/2022 5.3 (H) 3.5 - 5.2 mmol/L Final   04/01/2022 5.0 3.5 - 5.2 mmol/L Final     Comment:     Slight hemolysis detected by analyzer. Results may be affected.      Chloride Chloride   Date Value Ref Range Status   04/03/2022 95 (L) 98 - 107 mmol/L Final   04/02/2022 96 (L) 98 - 107 mmol/L Final   04/02/2022 101 98 - 107 mmol/L Final   04/01/2022 101 98 - 107 mmol/L Final   04/01/2022 101 98 - 107 mmol/L Final   04/01/2022 102 98  - 107 mmol/L Final      Bicarbonate CO2   Date Value Ref Range Status   04/03/2022 28.0 22.0 - 29.0 mmol/L Final   04/02/2022 26.6 22.0 - 29.0 mmol/L Final   04/02/2022 25.1 22.0 - 29.0 mmol/L Final   04/01/2022 26.0 22.0 - 29.0 mmol/L Final   04/01/2022 25.0 22.0 - 29.0 mmol/L Final   04/01/2022 24.0 22.0 - 29.0 mmol/L Final      BUN BUN   Date Value Ref Range Status   04/03/2022 24 (H) 8 - 23 mg/dL Final   04/02/2022 27 (H) 8 - 23 mg/dL Final   04/02/2022 27 (H) 8 - 23 mg/dL Final   04/01/2022 28 (H) 8 - 23 mg/dL Final   04/01/2022 28 (H) 8 - 23 mg/dL Final   04/01/2022 29 (H) 8 - 23 mg/dL Final      Creatinine Creatinine   Date Value Ref Range Status   04/03/2022 1.22 0.76 - 1.27 mg/dL Final   04/02/2022 1.27 0.76 - 1.27 mg/dL Final   04/02/2022 1.13 0.76 - 1.27 mg/dL Final   04/01/2022 1.19 0.76 - 1.27 mg/dL Final   04/01/2022 1.14 0.76 - 1.27 mg/dL Final   04/01/2022 1.09 0.76 - 1.27 mg/dL Final      Calcium Calcium   Date Value Ref Range Status   04/03/2022 8.4 (L) 8.6 - 10.5 mg/dL Final   04/02/2022 8.6 8.6 - 10.5 mg/dL Final   04/02/2022 8.5 (L) 8.6 - 10.5 mg/dL Final   04/01/2022 8.8 8.6 - 10.5 mg/dL Final   04/01/2022 8.9 8.6 - 10.5 mg/dL Final   04/01/2022 8.9 8.6 - 10.5 mg/dL Final      Magnesium Magnesium   Date Value Ref Range Status   04/02/2022 2.4 1.6 - 2.4 mg/dL Final   04/01/2022 2.2 1.6 - 2.4 mg/dL Final   04/01/2022 2.1 1.6 - 2.4 mg/dL Final        aspirin, 81 mg, Oral, Daily  atorvastatin, 40 mg, Oral, Nightly  chlorhexidine, 15 mL, Mouth/Throat, Q12H  enoxaparin, 40 mg, Subcutaneous, Q24H  escitalopram, 20 mg, Oral, Daily  folic acid, 1 mg, Oral, Daily  [START ON 4/5/2022] furosemide, 40 mg, Oral, Daily  guaiFENesin, 1,200 mg, Oral, Q12H  insulin lispro, 0-7 Units, Subcutaneous, TID AC  ipratropium-albuterol, 3 mL, Nebulization, 4x Daily - RT  metoprolol tartrate, 25 mg, Oral, Q12H  multivitamin with minerals, 1 tablet, Oral, Daily  mupirocin, , Each Nare, BID  pantoprazole, 40 mg, Oral, Q  AM  potassium chloride, 20 mEq, Oral, BID With Meals  [START ON 4/5/2022] potassium chloride, 20 mEq, Oral, Once  senna-docusate sodium, 2 tablet, Oral, BID  sodium chloride, 3 mL, Intravenous, Q12H  thiamine, 100 mg, Oral, Daily      sodium chloride, 30 mL/hr, Last Rate: 30 mL/hr (04/01/22 1144)        Patient Active Problem List   Diagnosis Code   • Essential hypertension I10   • Other hyperlipidemia E78.49   • Anxiety F41.9   • Nocturia R35.1   • Elevated PSA R97.20   • Elevated blood sugar level R73.9   • Positive colorectal cancer screening using Cologuard test R19.5   • Shortness of breath R06.02   • Acute respiratory failure with hypoxia ([facility]) J96.01   • Acute diastolic congestive heart failure ([facility]) I50.31   • Acute pulmonary edema ([facility]) J81.0   • Leukocytosis D72.829   • Positive D dimer R79.89   • Anemia D64.9   • COPD with acute exacerbation ([facility]) J44.1   • Emphysema ([facility]) J43.1   • History of colon polyps Z86.010   • Overweight (BMI 25.0-29.9) E66.3   • AAA (abdominal aortic aneurysm) ([facility]) I71.4   • Alcohol abuse F10.10   • NSTEMI (non-ST elevated myocardial infarction) ([facility]) I21.4   • Coronary artery disease I25.10   • Abnormal findings on diagnostic imaging of heart and coronary circulation R93.1       Assessment & Plan:  - Multivessel CAD, NSTEMI  - HTN  - HLD  - Ascending aortic dilation--- mildly dilated, heavily calcified  - Abdominal aortic aneuysm--- 5.5cm aneurysm on ultrasound, vascular consulted  - Tobacco abuse--- encourage cessation  - Etoh abuse--- 6 beers daily, encourage cessation, monitor for withdrawal  - Chronic anemia  - Post-op anemia -- expected blood loss  - Leukocytosis -- likely reactive    Sitting in chair -- looks good -- no acute complaints/concern  Congestion improved on CXR  Tolerating Ra  Transition to PO diuresis with K repeltion  DC AV wires  Mobilize. Encourage pulmonary toilet  Continue routine/supportive care    Hope to discharge home next 1-2 days    Thank you for  allowing us to participate in this patient's plan of care.     TAYLOR Londono  04/04/22  10:56 EDT   ..Electronically signed by TAYLOR Londono, 04/04/22, 12:42 PM EDT.

## 2022-04-04 NOTE — PROGRESS NOTES
Adult Nutrition  Assessment/PES    Patient Name:  Osman Aparicio  YOB: 1955  MRN: [id number]  Admit Date:  3/26/2022    Assessment Date:  4/4/2022    Comments:  F/u need for education. Pt on regular; cardiac diet. Average intake x 5 meals is 70%. Pt reported he is eating well. Continue to follow.     Reason for Assessment     Row Name 04/04/22 1625          Reason for Assessment    Reason For Assessment follow-up protocol     Identified At Risk by Screening Criteria need for education                Nutrition/Diet History     Row Name 04/04/22 1625          Nutrition/Diet History    Typical Intake (Food/Fluid/EN/PN) Pt on regular; cardiac diet. Pt reported he's eating well. % intake documented.                  Labs/Tests/Procedures/Meds     Row Name 04/04/22 1626          Labs/Procedures/Meds    Lab Results Reviewed reviewed     Lab Results Comments Na (low), K (low), Cl (low), Glu (high), Creatinine (high)            Diagnostic Tests/Procedures    Diagnostic Test/Procedure Reviewed reviewed            Medications    Pertinent Medications Reviewed reviewed     Pertinent Medications Comments lipitor, peridex, lovenox, lexapro, folic acid, lasix, insulin, lopressor, multivitamin with minerals, pantoprazole, potassium chloride, pericolace, sodium chloride, thiamine, continuous: sodium chloride                    Nutrition Prescription Ordered     Row Name 04/04/22 1627          Nutrition Prescription PO    Current PO Diet Regular     Common Modifiers Cardiac                Evaluation of Received Nutrient/Fluid Intake     Row Name 04/04/22 1627          PO Evaluation    % PO Intake 70% average intake x 5 meals                     Problem/Interventions:           Intervention Goal     Row Name 04/04/22 1626          Intervention Goal    General Maintain nutrition;Meet nutritional needs for age/condition;Disease management/therapy;Improved nutrition related lab(s)     PO Tolerate PO;Meet estimated  needs     Weight Maintain weight                Nutrition Intervention     Row Name 04/04/22 1628          Nutrition Intervention    RD/Tech Action Care plan reviewd;Follow Tx progress;Encourage intake                  Education/Evaluation     Row Name 04/04/22 1628          Education    Education Will Instruct as appropriate            Monitor/Evaluation    Monitor Per protocol;I&O;PO intake;Pertinent labs;Weight;Symptoms;Skin status                 Electronically signed by:  Karen Wilkins RD  04/04/22 16:29 EDT

## 2022-04-04 NOTE — PROGRESS NOTES
"  Daily Progress Note.   [facility] CARDIO RECOVERY  4/4/2022    Patient:  Name:  Osman Aparicio  MRN:  [id number]  1955  66 y.o.  male         CC: COPD management    Interval History:  S/p cabg  extubated postoperatively without difficulty day of surgery.        Doing well now on room air  No increased wheeze, soa, sputum cp.  No confusion  Walking working with pt    Physical Exam:  /68 (BP Location: Right arm, Patient Position: Sitting)   Pulse 68   Temp 98.4 °F (36.9 °C) (Oral)   Resp 18   Ht 180.3 cm (70.98\")   Wt 75.4 kg (166 lb 3.2 oz)   SpO2 91%   BMI 23.19 kg/m²   Body mass index is 23.19 kg/m².    Intake/Output Summary (Last 24 hours) at 4/4/2022 1145  Last data filed at 4/4/2022 0855  Gross per 24 hour   Intake 960 ml   Output 975 ml   Net -15 ml     General appearance: Nontoxic, conversant   Eyes: anicteric sclerae, moist conjunctivae;  HENT: Atraumatic; oropharynx clear with moist mucous membranes  Neck: Trachea midline;   Lungs:  no rhonchi no wheeze  unlabored respiratory effort   CV: RRR, no rub midline incision bandaged  Abdomen: Bowel sounds hypoactive nonrigid  Extremities: no sig peripheral edema    Skin: Warm dry  Psych: Appropriate affect, alert and oriented   Neuro moves all ext, speech intact    Data Review:  Notable Labs:  Results from last 7 days   Lab Units 04/03/22  0246 04/02/22  0303 04/01/22  1447 04/01/22  1117 04/01/22  1009 04/01/22  0933 04/01/22  0859 04/01/22  0736 04/01/22  0305 03/30/22  2237 03/30/22  0800   WBC 10*3/mm3 15.79* 17.11* 21.84* 33.31*  --   --   --   --  8.40 9.15 8.87   HEMOGLOBIN g/dL 9.5* 10.1* 10.7* 11.2*  --   --   --   --  9.5* 9.4* 8.9*   HEMOGLOBIN, POC g/dL  --   --   --   --  8.5* 8.8* 8.5*   < >  --   --   --    PLATELETS 10*3/mm3 197 263 271 309  --   --   --   --  322 346 320    < > = values in this interval not displayed.     Results from last 7 days   Lab Units 04/03/22  0246 04/02/22  1640 04/02/22  0303 04/01/22 2000 " 04/01/22  1447 04/01/22  1117 04/01/22  0305   SODIUM mmol/L 132* 134* 136 138 135* 135* 135*   POTASSIUM mmol/L 3.4* 3.7 4.1 4.9 5.3* 5.0 4.2   CHLORIDE mmol/L 95* 96* 101 101 101 102 95*   CO2 mmol/L 28.0 26.6 25.1 26.0 25.0 24.0 30.0*   BUN mg/dL 24* 27* 27* 28* 28* 29* 33*   CREATININE mg/dL 1.22 1.27 1.13 1.19 1.14 1.09 1.28*   GLUCOSE mg/dL 109* 117* 96 112* 137* 161* 111*   CALCIUM mg/dL 8.4* 8.6 8.5* 8.8 8.9 8.9 9.4   MAGNESIUM mg/dL  --   --  2.4  --  2.2 2.1  --    PHOSPHORUS mg/dL  --   --  4.1  --  4.7* 4.3  --    Estimated Creatinine Clearance: 63.5 mL/min (by C-G formula based on SCr of 1.22 mg/dL).    Results from last 7 days   Lab Units 04/03/22  0246 04/02/22  0303 04/01/22  1447 04/01/22  0305 03/30/22  2237 03/30/22  0800 03/29/22  0719   AST (SGOT) U/L  --   --   --   --  17 13 10   ALT (SGPT) U/L  --   --   --   --  17 14 12   PLATELETS 10*3/mm3 197 263 271   < > 346 320  --     < > = values in this interval not displayed.       Results from last 7 days   Lab Units 04/01/22  1516 04/01/22  1141 04/01/22  1009 04/01/22  0933 04/01/22  0859 04/01/22  0838 04/01/22  0736 03/30/22  1911   PH, ARTERIAL pH units 7.355 7.385 7.4080 7.4230 7.4270 7.4140 7.4270 7.445   PCO2, ARTERIAL mm Hg 47.9* 41.8  --   --   --   --   --  40.3   PO2 ART mm Hg 88.9 263.8*  --   --   --   --   --  56.6*   HCO3 ART mmol/L 26.8 25.0  --   --   --   --   --  27.7       Imaging:  Reviewed chest images personally from past 3 days    Scheduled meds:    aspirin, 81 mg, Oral, Daily  atorvastatin, 40 mg, Oral, Nightly  chlorhexidine, 15 mL, Mouth/Throat, Q12H  enoxaparin, 40 mg, Subcutaneous, Q24H  escitalopram, 20 mg, Oral, Daily  folic acid, 1 mg, Oral, Daily  [START ON 4/5/2022] furosemide, 40 mg, Oral, Daily  guaiFENesin, 1,200 mg, Oral, Q12H  insulin lispro, 0-7 Units, Subcutaneous, TID AC  ipratropium-albuterol, 3 mL, Nebulization, 4x Daily - RT  metoprolol tartrate, 25 mg, Oral, Q12H  multivitamin with minerals, 1 tablet,  Oral, Daily  mupirocin, , Each Nare, BID  pantoprazole, 40 mg, Oral, Q AM  potassium chloride, 20 mEq, Oral, BID With Meals  [START ON 4/5/2022] potassium chloride, 20 mEq, Oral, Once  senna-docusate sodium, 2 tablet, Oral, BID  sodium chloride, 3 mL, Intravenous, Q12H  thiamine, 100 mg, Oral, Daily        ASSESSMENT  /  PLAN:  COPD with acute exacerbation   NSTEMI  Alcohol abuse  Acute respiratory failure hypoxemic  Acute diastolic congestive heart failure  Bilateral pleural effusion  Tobacco abuse  CAD s/p cabg 4-1-2022  Leukocytosis?  Reactive - improving    Encourage incentive spirometer  Bronchodilators-schedule duo nebs  Diuresis - Lasix  Watching cr  Doing well from pulm perspective           Rj Thakkar MD  [facility]  04/04/22  6219 EDT

## 2022-04-05 ENCOUNTER — READMISSION MANAGEMENT (OUTPATIENT)
Dept: CALL CENTER | Facility: HOSPITAL | Age: 67
End: 2022-04-05

## 2022-04-05 ENCOUNTER — HOME HEALTH ADMISSION (OUTPATIENT)
Dept: HOME HEALTH SERVICES | Facility: HOME HEALTHCARE | Age: 67
End: 2022-04-05

## 2022-04-05 VITALS
WEIGHT: 168.2 LBS | SYSTOLIC BLOOD PRESSURE: 126 MMHG | DIASTOLIC BLOOD PRESSURE: 76 MMHG | HEIGHT: 71 IN | BODY MASS INDEX: 23.55 KG/M2 | TEMPERATURE: 98.4 F | OXYGEN SATURATION: 94 % | RESPIRATION RATE: 20 BRPM | HEART RATE: 69 BPM

## 2022-04-05 PROBLEM — J96.01 ACUTE RESPIRATORY FAILURE WITH HYPOXIA (HCC): Status: RESOLVED | Noted: 2022-03-26 | Resolved: 2022-04-05

## 2022-04-05 PROBLEM — J81.0 ACUTE PULMONARY EDEMA: Status: RESOLVED | Noted: 2022-03-26 | Resolved: 2022-04-05

## 2022-04-05 LAB
ANION GAP SERPL CALCULATED.3IONS-SCNC: 9 MMOL/L (ref 5–15)
BASOPHILS # BLD AUTO: 0.05 10*3/MM3 (ref 0–0.2)
BASOPHILS NFR BLD AUTO: 0.4 % (ref 0–1.5)
BUN SERPL-MCNC: 19 MG/DL (ref 8–23)
BUN/CREAT SERPL: 18.6 (ref 7–25)
CALCIUM SPEC-SCNC: 8.6 MG/DL (ref 8.6–10.5)
CHLORIDE SERPL-SCNC: 98 MMOL/L (ref 98–107)
CO2 SERPL-SCNC: 25 MMOL/L (ref 22–29)
CREAT SERPL-MCNC: 1.02 MG/DL (ref 0.76–1.27)
DEPRECATED RDW RBC AUTO: 64.4 FL (ref 37–54)
EGFRCR SERPLBLD CKD-EPI 2021: 81.1 ML/MIN/1.73
EOSINOPHIL # BLD AUTO: 0.28 10*3/MM3 (ref 0–0.4)
EOSINOPHIL NFR BLD AUTO: 2.5 % (ref 0.3–6.2)
ERYTHROCYTE [DISTWIDTH] IN BLOOD BY AUTOMATED COUNT: 17.6 % (ref 12.3–15.4)
GLUCOSE BLDC GLUCOMTR-MCNC: 142 MG/DL (ref 70–130)
GLUCOSE BLDC GLUCOMTR-MCNC: 154 MG/DL (ref 70–130)
GLUCOSE SERPL-MCNC: 111 MG/DL (ref 65–99)
HCT VFR BLD AUTO: 25.2 % (ref 37.5–51)
HGB BLD-MCNC: 8.7 G/DL (ref 13–17.7)
IMM GRANULOCYTES # BLD AUTO: 0.21 10*3/MM3 (ref 0–0.05)
IMM GRANULOCYTES NFR BLD AUTO: 1.9 % (ref 0–0.5)
LYMPHOCYTES # BLD AUTO: 2.4 10*3/MM3 (ref 0.7–3.1)
LYMPHOCYTES NFR BLD AUTO: 21.3 % (ref 19.6–45.3)
MCH RBC QN AUTO: 35.4 PG (ref 26.6–33)
MCHC RBC AUTO-ENTMCNC: 34.5 G/DL (ref 31.5–35.7)
MCV RBC AUTO: 102.4 FL (ref 79–97)
MONOCYTES # BLD AUTO: 1.09 10*3/MM3 (ref 0.1–0.9)
MONOCYTES NFR BLD AUTO: 9.7 % (ref 5–12)
NEUTROPHILS NFR BLD AUTO: 64.2 % (ref 42.7–76)
NEUTROPHILS NFR BLD AUTO: 7.23 10*3/MM3 (ref 1.7–7)
NRBC BLD AUTO-RTO: 0 /100 WBC (ref 0–0.2)
PLATELET # BLD AUTO: 212 10*3/MM3 (ref 140–450)
PMV BLD AUTO: 10.5 FL (ref 6–12)
POTASSIUM SERPL-SCNC: 4.5 MMOL/L (ref 3.5–5.2)
RBC # BLD AUTO: 2.46 10*6/MM3 (ref 4.14–5.8)
SODIUM SERPL-SCNC: 132 MMOL/L (ref 136–145)
WBC NRBC COR # BLD: 11.26 10*3/MM3 (ref 3.4–10.8)

## 2022-04-05 PROCEDURE — 94761 N-INVAS EAR/PLS OXIMETRY MLT: CPT

## 2022-04-05 PROCEDURE — 80048 BASIC METABOLIC PNL TOTAL CA: CPT | Performed by: NURSE PRACTITIONER

## 2022-04-05 PROCEDURE — 82962 GLUCOSE BLOOD TEST: CPT

## 2022-04-05 PROCEDURE — 94664 DEMO&/EVAL PT USE INHALER: CPT

## 2022-04-05 PROCEDURE — 85025 COMPLETE CBC W/AUTO DIFF WBC: CPT | Performed by: INTERNAL MEDICINE

## 2022-04-05 PROCEDURE — 94799 UNLISTED PULMONARY SVC/PX: CPT

## 2022-04-05 PROCEDURE — 99232 SBSQ HOSP IP/OBS MODERATE 35: CPT | Performed by: NURSE PRACTITIONER

## 2022-04-05 RX ORDER — MULTIPLE VITAMINS W/ MINERALS TAB 9MG-400MCG
1 TAB ORAL DAILY
Qty: 30 TABLET | Refills: 3 | Status: SHIPPED | OUTPATIENT
Start: 2022-04-06 | End: 2022-08-30

## 2022-04-05 RX ORDER — GUAIFENESIN 600 MG/1
1200 TABLET, EXTENDED RELEASE ORAL EVERY 12 HOURS SCHEDULED
Qty: 60 TABLET | Refills: 3 | Status: SHIPPED | OUTPATIENT
Start: 2022-04-05 | End: 2023-01-18

## 2022-04-05 RX ORDER — BUDESONIDE AND FORMOTEROL FUMARATE DIHYDRATE 160; 4.5 UG/1; UG/1
2 AEROSOL RESPIRATORY (INHALATION)
Qty: 1 EACH | Refills: 3 | Status: SHIPPED | OUTPATIENT
Start: 2022-04-05 | End: 2022-10-03

## 2022-04-05 RX ORDER — NITROGLYCERIN 0.4 MG/1
0.4 TABLET SUBLINGUAL
Qty: 30 TABLET | Refills: 3 | Status: ON HOLD | OUTPATIENT
Start: 2022-04-05

## 2022-04-05 RX ORDER — ASPIRIN 81 MG/1
81 TABLET ORAL DAILY
Qty: 30 TABLET | Refills: 3 | Status: SHIPPED | OUTPATIENT
Start: 2022-04-06 | End: 2022-08-30

## 2022-04-05 RX ORDER — FUROSEMIDE 40 MG/1
40 TABLET ORAL DAILY
Qty: 30 TABLET | Refills: 3 | Status: SHIPPED | OUTPATIENT
Start: 2022-04-06 | End: 2022-04-12 | Stop reason: SDUPTHER

## 2022-04-05 RX ORDER — PANTOPRAZOLE SODIUM 40 MG/1
40 TABLET, DELAYED RELEASE ORAL
Qty: 30 TABLET | Refills: 3 | Status: SHIPPED | OUTPATIENT
Start: 2022-04-06 | End: 2022-04-14 | Stop reason: ALTCHOICE

## 2022-04-05 RX ORDER — FOLIC ACID 1 MG/1
1 TABLET ORAL DAILY
Qty: 30 TABLET | Refills: 3 | Status: ON HOLD | OUTPATIENT
Start: 2022-04-06

## 2022-04-05 RX ORDER — HYDROCODONE BITARTRATE AND ACETAMINOPHEN 5; 325 MG/1; MG/1
2 TABLET ORAL EVERY 4 HOURS PRN
Qty: 12 TABLET | Refills: 0 | Status: SHIPPED | OUTPATIENT
Start: 2022-04-05 | End: 2022-04-12 | Stop reason: ALTCHOICE

## 2022-04-05 RX ORDER — CLOPIDOGREL BISULFATE 75 MG/1
75 TABLET ORAL DAILY
Status: DISCONTINUED | OUTPATIENT
Start: 2022-04-05 | End: 2022-04-05 | Stop reason: HOSPADM

## 2022-04-05 RX ORDER — ALBUTEROL SULFATE 90 UG/1
2 AEROSOL, METERED RESPIRATORY (INHALATION) EVERY 4 HOURS PRN
Qty: 18 G | Refills: 0 | Status: ON HOLD | OUTPATIENT
Start: 2022-04-05

## 2022-04-05 RX ORDER — POTASSIUM CHLORIDE 750 MG/1
10 TABLET, FILM COATED, EXTENDED RELEASE ORAL 2 TIMES DAILY
Qty: 30 TABLET | Refills: 3 | Status: SHIPPED | OUTPATIENT
Start: 2022-04-05 | End: 2022-04-12 | Stop reason: SDUPTHER

## 2022-04-05 RX ORDER — CLOPIDOGREL BISULFATE 75 MG/1
75 TABLET ORAL DAILY
Qty: 30 TABLET | Refills: 3 | Status: SHIPPED | OUTPATIENT
Start: 2022-04-05 | End: 2022-07-01 | Stop reason: SDUPTHER

## 2022-04-05 RX ADMIN — DOCUSATE SODIUM 50MG AND SENNOSIDES 8.6MG 2 TABLET: 8.6; 5 TABLET, FILM COATED ORAL at 08:05

## 2022-04-05 RX ADMIN — POTASSIUM CHLORIDE 20 MEQ: 750 TABLET, EXTENDED RELEASE ORAL at 06:20

## 2022-04-05 RX ADMIN — Medication 3 ML: at 08:05

## 2022-04-05 RX ADMIN — Medication 100 MG: at 08:04

## 2022-04-05 RX ADMIN — MULTIPLE VITAMINS W/ MINERALS TAB 1 TABLET: TAB at 08:04

## 2022-04-05 RX ADMIN — IPRATROPIUM BROMIDE AND ALBUTEROL SULFATE 3 ML: 2.5; .5 SOLUTION RESPIRATORY (INHALATION) at 06:23

## 2022-04-05 RX ADMIN — ASPIRIN 81 MG: 81 TABLET, COATED ORAL at 08:05

## 2022-04-05 RX ADMIN — GUAIFENESIN 1200 MG: 600 TABLET, EXTENDED RELEASE ORAL at 08:04

## 2022-04-05 RX ADMIN — METOPROLOL TARTRATE 25 MG: 25 TABLET, FILM COATED ORAL at 08:04

## 2022-04-05 RX ADMIN — HYDROCODONE BITARTRATE AND ACETAMINOPHEN 2 TABLET: 5; 325 TABLET ORAL at 06:20

## 2022-04-05 RX ADMIN — ESCITALOPRAM 20 MG: 20 TABLET, FILM COATED ORAL at 08:04

## 2022-04-05 RX ADMIN — IPRATROPIUM BROMIDE AND ALBUTEROL SULFATE 3 ML: 2.5; .5 SOLUTION RESPIRATORY (INHALATION) at 10:22

## 2022-04-05 RX ADMIN — PANTOPRAZOLE SODIUM 40 MG: 40 TABLET, DELAYED RELEASE ORAL at 06:20

## 2022-04-05 RX ADMIN — FUROSEMIDE 40 MG: 40 TABLET ORAL at 08:04

## 2022-04-05 RX ADMIN — FOLIC ACID 1 MG: 1 TABLET ORAL at 08:04

## 2022-04-05 NOTE — PROGRESS NOTES
Patient is discharging today . Spoke to wife and patient who are agreeable to home health. Verified face sheet information is correct . Orders in [facility] for home health SN. Please assess need for current health monitor . Thank you !

## 2022-04-05 NOTE — PROGRESS NOTES
"  Daily Progress Note.   [facility] CARDIOVASC UNIT  4/5/2022    Patient:  Name:  Osman Aparicio  MRN:  [id number]  1955  66 y.o.  male         CC: COPD management    Interval History:  S/p cabg  extubated postoperatively without difficulty day of surgery.     Doing well no soa cough, walked in castro.    No cp no hemoptysis.    Physical Exam:  /76 (BP Location: Right arm, Patient Position: Sitting)   Pulse 69   Temp 98.4 °F (36.9 °C) (Oral)   Resp 20   Ht 180.3 cm (70.98\")   Wt 76.3 kg (168 lb 3.2 oz)   SpO2 94%   BMI 23.47 kg/m²   Body mass index is 23.47 kg/m².    Intake/Output Summary (Last 24 hours) at 4/5/2022 1102  Last data filed at 4/5/2022 0803  Gross per 24 hour   Intake 580 ml   Output 900 ml   Net -320 ml     General appearance: Nontoxic, conversant   Eyes: anicteric sclerae, moist conjunctivae;  HENT: Atraumatic; oropharynx clear with moist mucous membranes  Neck: Trachea midline;   Lungs:  no rhonchi no wheeze  unlabored respiratory effort   CV: RRR, no rub midline incision bandaged  Abdomen: Bowel sounds hypoactive nonrigid  Extremities: no sig peripheral edema    Skin: Warm dry  Psych: Appropriate affect, alert and oriented   Neuro moves all ext, speech intact    Data Review:  Notable Labs:  Results from last 7 days   Lab Units 04/05/22  0302 04/04/22  1349 04/03/22  0246 04/02/22  0303 04/01/22  1447 04/01/22  1117 04/01/22  1009 04/01/22  0736 04/01/22  0305   WBC 10*3/mm3 11.26* 11.13* 15.79* 17.11* 21.84* 33.31*  --   --  8.40   HEMOGLOBIN g/dL 8.7* 8.6* 9.5* 10.1* 10.7* 11.2*  --   --  9.5*   HEMOGLOBIN, POC g/dL  --   --   --   --   --   --  8.5*   < >  --    PLATELETS 10*3/mm3 212 190 197 263 271 309  --   --  322    < > = values in this interval not displayed.     Results from last 7 days   Lab Units 04/05/22  0302 04/04/22  1349 04/03/22  0246 04/02/22  1640 04/02/22  0303 04/01/22  2000 04/01/22  1447 04/01/22  1117   SODIUM mmol/L 132* 133* 132* 134* 136 138 " 135* 135*   POTASSIUM mmol/L 4.5 3.3* 3.4* 3.7 4.1 4.9 5.3* 5.0   CHLORIDE mmol/L 98 94* 95* 96* 101 101 101 102   CO2 mmol/L 25.0 28.0 28.0 26.6 25.1 26.0 25.0 24.0   BUN mg/dL 19 19 24* 27* 27* 28* 28* 29*   CREATININE mg/dL 1.02 1.35* 1.22 1.27 1.13 1.19 1.14 1.09   GLUCOSE mg/dL 111* 120* 109* 117* 96 112* 137* 161*   CALCIUM mg/dL 8.6 8.6 8.4* 8.6 8.5* 8.8 8.9 8.9   MAGNESIUM mg/dL  --   --   --   --  2.4  --  2.2 2.1   PHOSPHORUS mg/dL  --   --   --   --  4.1  --  4.7* 4.3   Estimated Creatinine Clearance: 76.9 mL/min (by C-G formula based on SCr of 1.02 mg/dL).    Results from last 7 days   Lab Units 04/05/22  0302 04/04/22  1349 04/03/22  0246 04/01/22  0305 03/30/22  2237 03/30/22  0800   AST (SGOT) U/L  --  25  --   --  17 13   ALT (SGPT) U/L  --  10  --   --  17 14   PLATELETS 10*3/mm3 212 190 197   < > 346 320    < > = values in this interval not displayed.       Results from last 7 days   Lab Units 04/01/22  1516 04/01/22  1141 04/01/22  1009 04/01/22  0933 04/01/22  0859 04/01/22  0838 04/01/22  0736 03/30/22  1911   PH, ARTERIAL pH units 7.355 7.385 7.4080 7.4230 7.4270 7.4140 7.4270 7.445   PCO2, ARTERIAL mm Hg 47.9* 41.8  --   --   --   --   --  40.3   PO2 ART mm Hg 88.9 263.8*  --   --   --   --   --  56.6*   HCO3 ART mmol/L 26.8 25.0  --   --   --   --   --  27.7       Imaging:  Reviewed chest images personally from past 3 days    Scheduled meds:    aspirin, 81 mg, Oral, Daily  atorvastatin, 40 mg, Oral, Nightly  chlorhexidine, 15 mL, Mouth/Throat, Q12H  clopidogrel, 75 mg, Oral, Daily  enoxaparin, 40 mg, Subcutaneous, Q24H  escitalopram, 20 mg, Oral, Daily  folic acid, 1 mg, Oral, Daily  furosemide, 40 mg, Oral, Daily  guaiFENesin, 1,200 mg, Oral, Q12H  insulin lispro, 0-7 Units, Subcutaneous, TID AC  ipratropium-albuterol, 3 mL, Nebulization, 4x Daily - RT  metoprolol tartrate, 25 mg, Oral, Q12H  multivitamin with minerals, 1 tablet, Oral, Daily  mupirocin, , Each Nare, BID  pantoprazole, 40 mg,  Oral, Q AM  potassium chloride, 20 mEq, Oral, BID With Meals  senna-docusate sodium, 2 tablet, Oral, BID  sodium chloride, 3 mL, Intravenous, Q12H  thiamine, 100 mg, Oral, Daily        ASSESSMENT  /  PLAN:  COPD with acute exacerbation   NSTEMI  Alcohol abuse  Acute respiratory failure hypoxemic  Acute diastolic congestive heart failure  Bilateral pleural effusion  Tobacco abuse  CAD s/p cabg 4-1-2022  Leukocytosis?  Reactive - improving      Fol,low up as outpt  Encouraged sleep clinic follow up  Discussed and counseled on alessandra.       Rj Thakkar MD  [facility]  04/05/22  7330 EDT

## 2022-04-05 NOTE — CASE MANAGEMENT/SOCIAL WORK
Continued Stay Note  [facility]     Patient Name: Osman Aparicio  MRN: [id number]  Today's Date: 4/5/2022    Admit Date: 3/26/2022     Discharge Plan     Row Name 04/05/22 1110       Plan    Plan Home w/ [facility]    Provided Post Acute Provider List? Yes    Post Acute Provider List Home Health    Delivered To Patient    Method of Delivery In person    Plan Comments CCP spoke with Pt and spouse at bedside to update his d/c plan.  Pt was given a home health list to review.  Pt chose [facility].  Referral given to Vani/[facility] and she accepted........Janna VAZQUEZ/CHANCE CM               Discharge Codes    No documentation.               Expected Discharge Date and Time     Expected Discharge Date Expected Discharge Time    Apr 5, 2022             Janna Dutta RN

## 2022-04-05 NOTE — OUTREACH NOTE
Prep Survey    Flowsheet Row Responses   [facility] patient discharged from? [place]   Is LACE score < 7 ? No   Emergency Room discharge w/ pulse ox? No   Eligibility [facility]   Date of Admission 03/26/22   Date of Discharge 04/05/22   Discharge Disposition Home-Health Care Svc   Discharge diagnosis CABGxw   Does the patient have one of the following disease processes/diagnoses(primary or secondary)? Cardiothoracic surgery   Does the patient have Home health ordered? Yes   What is the Home health agency?  [facility]    Is there a DME ordered? No   Prep survey completed? Yes          MARJORIE GUTIERREZ - Registered Nurse

## 2022-04-05 NOTE — CONSULTS
Met with patient and wife prior to surgery, will contact after home health to schedule cardiac rehab.

## 2022-04-05 NOTE — DISCHARGE PLACEMENT REQUEST
"Osman Aparicio (66 y.o. Male)             Date of Birth   1955    Social Security Number       Address   [address] [place] [address]    Home Phone   [phone]    MRN   [id number]       P[employer]ism   Unknown    Marital Status                               Admission Date   3/26/22    Admission Type   Emergency    Admitting Provider   Bong Pandey MD    Attending Provider   Saima Yancey MD    Department, Room/Bed   [facility] CARDIOVASC UNIT, 2221/1       Discharge Date       Discharge Disposition   Home-Health Care Svc    Discharge Destination                               Attending Provider: Saima Yancey MD    Allergies: No Known Allergies    Isolation: None   Infection: None   Code Status: CPR   Advance Care Planning Activity    Ht: 180.3 cm (70.98\")   Wt: 76.3 kg (168 lb 3.2 oz)    Admission Cmt: None   Principal Problem: COPD with acute exacerbation (HCC) [J44.1]                 Active Insurance as of 3/26/2022     Primary Coverage     Payor Plan Insurance Group Employer/Plan Group    MEDICARE MEDICARE A & B      Payor Plan Address Payor Plan Phone Number Payor Plan Fax Number Effective Dates    [address] [phone]  8/1/2020 - None Entered    [facility] [id number]       Subscriber Name Subscriber Birth Date Member ID       OSMAN APARICIO 1955 [id number]           Secondary Coverage     Payor Plan Insurance Group Employer/Plan Group    [facility] SUPP [id number]     Payor Plan Address Payor Plan Phone Number Payor Plan Fax Number Effective Dates    [address]   8/1/2020 - None Entered    [facility] [address]       Subscriber Name Subscriber Birth Date Member ID       OSMAN APARICIO 1955 [id number]                 Emergency Contacts      (Rel.) Home Phone Work Phone Mobile Phone    Suzanne Aparicio (Spouse) [phone] -- [phone]              "

## 2022-04-05 NOTE — PROGRESS NOTES
[facility] Pharmacy Services: Patient Counseling Consult    Osman Aparicio has requested a pharmacist to  this patient on their new medications. Counseling points included the followin) Explained indication of each medication, and patient's need for these medications.  2) Went over dosing and frequency of each medication.  3) Discussed any special administration, storage, or monitoring instructions with medications.  4) Discussed all important drug interactions, including over-the-counter medications and supplements.  5) Explained possible side effects for each medication.  6) Instructed the patient not to begin or discontinue any medications without informing his/her physician/pharmacist.  7) Addressed any specific questions the patient had in regards to their medication:    Patient expressed understanding and had no further questions.      [facility]  Clinical Pharmacist

## 2022-04-05 NOTE — PROGRESS NOTES
"    Patient Name: Osman Aparicio  :1955  66 y.o.      Patient Care Team:  Desire Greer APRN as PCP - General (Family Medicine)  Desire Greer APRN as Nurse Practitioner (Family Medicine)    Chief Complaint: follow up coronary artery disease    Interval History: he is resting in bed. He feels great. Ambulating well.        Objective   Vital Signs  Temp:  [97.3 °F (36.3 °C)-98.4 °F (36.9 °C)] 98.4 °F (36.9 °C)  Heart Rate:  [69-86] 69  Resp:  [18-20] 20  BP: ()/(58-76) 126/76    Intake/Output Summary (Last 24 hours) at 2022 1100  Last data filed at 2022 0803  Gross per 24 hour   Intake 580 ml   Output 900 ml   Net -320 ml     Flowsheet Rows    Flowsheet Row First Filed Value   Admission Height 180.3 cm (71\") Documented at 2022 1343   Admission Weight 81.9 kg (180 lb 9.6 oz) Documented at 2022 1343          Physical Exam:   General Appearance:    Alert, cooperative, in no acute distress   Lungs:     Clear to auscultation.  Normal respiratory effort and rate.      Heart:    Regular rhythm and normal rate, normal S1 and S2, no murmurs, gallops or rubs.     Chest Wall:    No abnormalities observed midline incision open to air. Well approximated   Abdomen:     Soft, nontender, positive bowel sounds.     Extremities:   no cyanosis, clubbing or edema.  No marked joint deformities.  Adequate musculoskeletal strength.  Right EVH site, well approximated no drainage.      Results Review:    Results from last 7 days   Lab Units 22  030   SODIUM mmol/L 132*   POTASSIUM mmol/L 4.5   CHLORIDE mmol/L 98   CO2 mmol/L 25.0   BUN mg/dL 19   CREATININE mg/dL 1.02   GLUCOSE mg/dL 111*   CALCIUM mg/dL 8.6         Results from last 7 days   Lab Units 22  0302   WBC 10*3/mm3 11.26*   HEMOGLOBIN g/dL 8.7*   HEMATOCRIT % 25.2*   PLATELETS 10*3/mm3 212     Results from last 7 days   Lab Units 22  0303 22  1117 22  2237   INR  1.14* 1.28* 1.06   APTT seconds  -- "  28.1 25.0     Results from last 7 days   Lab Units 04/02/22  0303   MAGNESIUM mg/dL 2.4                   Medication Review:   aspirin, 81 mg, Oral, Daily  atorvastatin, 40 mg, Oral, Nightly  chlorhexidine, 15 mL, Mouth/Throat, Q12H  clopidogrel, 75 mg, Oral, Daily  enoxaparin, 40 mg, Subcutaneous, Q24H  escitalopram, 20 mg, Oral, Daily  folic acid, 1 mg, Oral, Daily  furosemide, 40 mg, Oral, Daily  guaiFENesin, 1,200 mg, Oral, Q12H  insulin lispro, 0-7 Units, Subcutaneous, TID AC  ipratropium-albuterol, 3 mL, Nebulization, 4x Daily - RT  metoprolol tartrate, 25 mg, Oral, Q12H  multivitamin with minerals, 1 tablet, Oral, Daily  mupirocin, , Each Nare, BID  pantoprazole, 40 mg, Oral, Q AM  potassium chloride, 20 mEq, Oral, BID With Meals  senna-docusate sodium, 2 tablet, Oral, BID  sodium chloride, 3 mL, Intravenous, Q12H  thiamine, 100 mg, Oral, Daily         sodium chloride, 30 mL/hr, Last Rate: 30 mL/hr (04/01/22 1144)        Assessment/Plan   1. NSTEMI - multivessel coronary artery disease status post CABG x 3 (LIMA to LAD, SVG to OM1, SVG to OM2).  POD #4  Preserved LVEF.   2. Acute pulmonary edema (presented with)   3. Abdominal aortic aneurysm - 5.5 cm on ultrasound. Vascular surgery is following and considering percutaneous endovascular repair with custom graft which would take at least 6 weeks. The patient may want to wait longer.   4. Tobacco abuse - cessation encouraged.   5. COPD  6. EtOH overuse   7. Anemia, expected blood loss post op on chronic anemia    He looks great. Remains SR.   No objection to discharge. He saw Dr. Claudio all last week and would like to follow up with her. See NP in one week and dr. Claudio in 6 weeks.       TAYLOR Johnson  [facility]  04/05/22  11:00 EDT

## 2022-04-05 NOTE — PROGRESS NOTES
" LOS: 10 days   Patient Care Team:  Desire Greer APRN as PCP - General (Family Medicine)  Desire Greer APRN as Nurse Practitioner (Family Medicine)    Chief Complaint:   Post-op    Subjective:  Symptoms:  Stable.  No shortness of breath, chest pain, weakness or chest pressure.    Diet:  Adequate intake.  No nausea or vomiting.    Activity level: Impaired due to weakness.    Pain:  He reports no pain.        Vital Signs  Temp:  [97.3 °F (36.3 °C)-98.4 °F (36.9 °C)] 98.4 °F (36.9 °C)  Heart Rate:  [69-86] 69  Resp:  [18-20] 20  BP: ()/(58-76) 126/76      04/03/22  0640 04/04/22  0655 04/05/22  0610   Weight: 75.8 kg (167 lb) 75.4 kg (166 lb 3.2 oz) 76.3 kg (168 lb 3.2 oz)     Body mass index is 23.47 kg/m².    Intake/Output Summary (Last 24 hours) at 4/5/2022 0808  Last data filed at 4/5/2022 0340  Gross per 24 hour   Intake 460 ml   Output 900 ml   Net -440 ml     No intake/output data recorded.    Objective:  General Appearance:  Well-appearing and in no acute distress.    Vital signs: (most recent): Blood pressure 126/76, pulse 69, temperature 98.4 °F (36.9 °C), temperature source Oral, resp. rate 20, height 180.3 cm (70.98\"), weight 76.3 kg (168 lb 3.2 oz), SpO2 99 %.  Vital signs are normal.  No fever.    Output: Producing urine and producing stool.    Lungs:  Normal effort and normal respiratory rate.  Breath sounds clear to auscultation.    Heart: Normal rate.  Regular rhythm.                Results Review:        WBC WBC   Date Value Ref Range Status   04/05/2022 11.26 (H) 3.40 - 10.80 10*3/mm3 Final   04/04/2022 11.13 (H) 3.40 - 10.80 10*3/mm3 Final   04/03/2022 15.79 (H) 3.40 - 10.80 10*3/mm3 Final      HGB Hemoglobin   Date Value Ref Range Status   04/05/2022 8.7 (L) 13.0 - 17.7 g/dL Final   04/04/2022 8.6 (L) 13.0 - 17.7 g/dL Final   04/03/2022 9.5 (L) 13.0 - 17.7 g/dL Final      HCT Hematocrit   Date Value Ref Range Status   04/05/2022 25.2 (L) 37.5 - 51.0 % Final "   04/04/2022 25.1 (L) 37.5 - 51.0 % Final   04/03/2022 27.2 (L) 37.5 - 51.0 % Final      Platelets Platelets   Date Value Ref Range Status   04/05/2022 212 140 - 450 10*3/mm3 Final   04/04/2022 190 140 - 450 10*3/mm3 Final   04/03/2022 197 140 - 450 10*3/mm3 Final        PT/INR:  No results found for: PROTIME/No results found for: INR    Sodium Sodium   Date Value Ref Range Status   04/05/2022 132 (L) 136 - 145 mmol/L Final   04/04/2022 133 (L) 136 - 145 mmol/L Final   04/03/2022 132 (L) 136 - 145 mmol/L Final   04/02/2022 134 (L) 136 - 145 mmol/L Final      Potassium Potassium   Date Value Ref Range Status   04/05/2022 4.5 3.5 - 5.2 mmol/L Final   04/04/2022 3.3 (L) 3.5 - 5.2 mmol/L Final   04/03/2022 3.4 (L) 3.5 - 5.2 mmol/L Final   04/02/2022 3.7 3.5 - 5.2 mmol/L Final      Chloride Chloride   Date Value Ref Range Status   04/05/2022 98 98 - 107 mmol/L Final   04/04/2022 94 (L) 98 - 107 mmol/L Final   04/03/2022 95 (L) 98 - 107 mmol/L Final   04/02/2022 96 (L) 98 - 107 mmol/L Final      Bicarbonate CO2   Date Value Ref Range Status   04/05/2022 25.0 22.0 - 29.0 mmol/L Final   04/04/2022 28.0 22.0 - 29.0 mmol/L Final   04/03/2022 28.0 22.0 - 29.0 mmol/L Final   04/02/2022 26.6 22.0 - 29.0 mmol/L Final      BUN BUN   Date Value Ref Range Status   04/05/2022 19 8 - 23 mg/dL Final   04/04/2022 19 8 - 23 mg/dL Final   04/03/2022 24 (H) 8 - 23 mg/dL Final   04/02/2022 27 (H) 8 - 23 mg/dL Final      Creatinine Creatinine   Date Value Ref Range Status   04/05/2022 1.02 0.76 - 1.27 mg/dL Final   04/04/2022 1.35 (H) 0.76 - 1.27 mg/dL Final   04/03/2022 1.22 0.76 - 1.27 mg/dL Final   04/02/2022 1.27 0.76 - 1.27 mg/dL Final      Calcium Calcium   Date Value Ref Range Status   04/05/2022 8.6 8.6 - 10.5 mg/dL Final   04/04/2022 8.6 8.6 - 10.5 mg/dL Final   04/03/2022 8.4 (L) 8.6 - 10.5 mg/dL Final   04/02/2022 8.6 8.6 - 10.5 mg/dL Final      Magnesium No results found for: MG       aspirin, 81 mg, Oral, Daily  atorvastatin, 40  mg, Oral, Nightly  chlorhexidine, 15 mL, Mouth/Throat, Q12H  enoxaparin, 40 mg, Subcutaneous, Q24H  escitalopram, 20 mg, Oral, Daily  folic acid, 1 mg, Oral, Daily  furosemide, 40 mg, Oral, Daily  guaiFENesin, 1,200 mg, Oral, Q12H  insulin lispro, 0-7 Units, Subcutaneous, TID AC  ipratropium-albuterol, 3 mL, Nebulization, 4x Daily - RT  metoprolol tartrate, 25 mg, Oral, Q12H  multivitamin with minerals, 1 tablet, Oral, Daily  mupirocin, , Each Nare, BID  pantoprazole, 40 mg, Oral, Q AM  potassium chloride, 20 mEq, Oral, BID With Meals  senna-docusate sodium, 2 tablet, Oral, BID  sodium chloride, 3 mL, Intravenous, Q12H  thiamine, 100 mg, Oral, Daily      sodium chloride, 30 mL/hr, Last Rate: 30 mL/hr (04/01/22 1144)            Patient Active Problem List   Diagnosis Code   • Essential hypertension I10   • Other hyperlipidemia E78.49   • Anxiety F41.9   • Nocturia R35.1   • Elevated PSA R97.20   • Elevated blood sugar level R73.9   • Positive colorectal cancer screening using Cologuard test R19.5   • Shortness of breath R06.02   • Acute respiratory failure with hypoxia ([facility]) J96.01   • Acute diastolic congestive heart failure ([facility]) I50.31   • Acute pulmonary edema ([facility]) J81.0   • Leukocytosis D72.829   • Positive D dimer R79.89   • Anemia D64.9   • COPD with acute exacerbation ([facility]) J44.1   • Emphysema ([facility]) J43.1   • History of colon polyps Z86.010   • Overweight (BMI 25.0-29.9) E66.3   • AAA (abdominal aortic aneurysm) ([facility]) I71.4   • Alcohol abuse F10.10   • NSTEMI (non-ST elevated myocardial infarction) ([facility]) I21.4   • Coronary artery disease I25.10   • Abnormal findings on diagnostic imaging of heart and coronary circulation R93.1       Assessment & Plan  - Multivessel CAD, NSTEMI s/p OP CABG x3 with LIMA-----POD #4 ([place]), will start Plavix prior to d/c  - HTN----controlled  - HLD----statin  - Ascending aortic dilation--- mildly dilated, heavily calcified  - Abdominal aortic aneuysm--- 5.5cm aneurysm  on ultrasound, vascular following, intervention after recover from CABG  - Tobacco abuse  - COPD----pulmonary following, duonebs  - Etoh abuse--- 6 beers daily, encourage cessation, CIWA, thiamine/folate  - Chronic anemia (from ETOH?), post op expected blood loss anemia---monitor  - Leukocytosis----reactive, trending down    Looks good this morning-- no complaints of pain   WBC 11.2 today -- was 11.1  Follow up is scheduled for 5/6 at 1:00pm  Discharge home today with home health     Nani Mas PA-C  04/05/22  08:08 EDT

## 2022-04-05 NOTE — DISCHARGE SUMMARY
Patient Name: Osman Aparicio  : 1955  MRN: [id number]    Date of Admission: 3/26/2022  Date of Discharge:  2022  Primary Care Physician: Desire Greer APRN      Discharge Diagnoses     Active Hospital Problems    Diagnosis  POA   • **COPD with acute exacerbation (HCC) [J44.1]  Yes   • NSTEMI (non-ST elevated myocardial infarction) (HCC) [I21.4]  Yes   • Alcohol abuse [F10.10]  Yes   • Shortness of breath [R06.02]  Yes   • Acute diastolic congestive heart failure (HCC) [I50.31]  Yes   • Leukocytosis [D72.829]  Yes   • Positive D dimer [R79.89]  Yes   • Anemia [D64.9]  Yes   • History of colon polyps [Z86.010]  Not Applicable   • AAA (abdominal aortic aneurysm) (HCC) [I71.4]  Yes   • Coronary artery disease [I25.10]  Yes   • Abnormal findings on diagnostic imaging of heart and coronary circulation [R93.1]  Yes   • Essential hypertension [I10]  Yes   • Other hyperlipidemia [E78.49]  Yes      Resolved Hospital Problems    Diagnosis Date Resolved POA   • Acute respiratory failure with hypoxia (HCC) [J96.01] 2022 Yes   • Hyponatremia [E87.1] 2022 Yes   • Acute pulmonary edema (HCC) [J81.0] 2022 Yes        Hospital Course     Brief admission history and physical.  Please refer to the H&P for full details.  A pleasant 66 years old white gentleman with a past history of tobacco/alcohol use/glucose intolerance/hematuria/dyslipidemia/hypertension/vitamin D deficiency presented to the emergency department with progressive shortness of breath over the last 1 day this was associated with fatigue called EMS and presented to the emergency department.  Physical examination on admission included a temperature of 97.9 a pulse of 69 respiratory rate of 20 and blood pressure 110/63.  The rest of the examination is remarkable for tachypnea/poor bilateral air entry otherwise negative.  Hospital course.  Initial evaluation in the emergency department included a reticulocyte count that was elevated  at 7.  A1c was 4.4.  Procalcitonin normal.  CBC normal except a white count of 11.7, hemoglobin 8.3, MCV of 104.8.  Troponin was normal.  D-dimer was elevated at 0.75.  proBNP elevated at 3/4/2008.  INR 1.1.  CMP normal except a random blood sugar of 174, sodium 123, chloride 87, CO2 21, calcium 8.2.  Respiratory PCR panel including COVID was negative.  B12 was normal.  Folic acid was normal.  Ferritin was elevated at 528.  Iron profile was normal.  Blood cultures obtained in the emergency department.  UA was negative.  Serum osmolarity was low at 255.  Chest x-ray revealed pulmonary edema.  CTA of the chest revealed no PE and mild bilateral pleural effusion with dependent atelectasis versus infiltrate and positive pulmonary edema and COPD changes with abdominal aortic aneurysm that is partially included.  Patient was subsequently admitted.  His hospital course will be approached systematically   Cardiac.  Patient appears to have pulmonary edema contributing to his acute hypoxemic respiratory failure.  And subsequently diagnosed with an non-STEMI MI.  Ejection fraction was 45 by echo.  He was started on IV diuresis and a cardiology consult was obtained.  Cardiac catheterization was done and the patient had significant coronary artery disease.  Cardiothoracic surgery was consulted and the patient underwent three-vessel CABG.  Postoperatively he has done very well and he was started on aspirin/beta-blockers/diuretics with good control of his high blood pressure and resolution of the pulmonary edema.  Plavix was added to his regimen at the time of discharge per cardiothoracic surgery recommendation.  Both cardiothoracic surgery and cardiology okay for discharge with follow-up.  Home health will be following up with the patient.  Patient was given cardiac rehab follow-up.  Pulmonary.  Patient was diagnosed with COPD exacerbation and he was placed on duo nebs and steroids.  He subsequently developed pneumonia with  leukocytosis.  Pulmonary was consulted he was placed on duo nebs and appropriate antibiotics.  At the time of discharge he was switched to metered-dose inhalers.  He was able to be weaned off oxygen.  He will follow-up with the pulmonologist.  Pulmonologist okayed the discharge.  He has remained stable respiratory status at the time of discharge.  Anemia.  Patient was noted to have anemia on admission.  Anemia work-up revealed chronic disease anemia.  He has macrocytosis which is most likely secondary to the alcohol.  He has received transfusion after hemoglobin dropped post CABG.  His hemoglobin subsequently remained stable.  His Hemoccult stool was negative.  Vascular.  Incidental finding of an abdominal aortic aneurysm was noted.  Abdominal aortic ultrasound was done revealed a 5.5 cm abdominal aortic aneurysm.  Vascular surgery was consulted.  They recommend surgery after stabilization from his cardiac status. outpatient follow-up with vascular was arranged.  Renal.  Also incidental finding is a right renal atrophia and a right complex cyst.  Renal function remained stable.  Urology consulted.  Urology planning MRI once cardiac status is stable and he was given an follow-up at the time of discharge.  Alcoholism/tobacco abuse he was counseled about both during this admission.  He was given nicotine patch.  He was placed on CIWA protocol and detoxification with vitamins during this admission.  He did not experience any withdrawal from alcohol during this admission.  Discharged on multivitamin/thiamine/folate.  Access center was consulted and he was given resources.  Metabolic.  During the hospital stay his electrolytes were monitored.  He was noted to have hypokalemia and this was substituted.  Physical therapy saw the patient during this admission after CABG he did fairly well with them.  Patient is being discharged home with home health to follow-up checking on the wounds which appear to be healthy at the time  of discharge.  At the time of discharge patient was hemodynamically stable without complaints.  All consultants okayed the discharge.  Consultants     Consult Orders (all) (From admission, onward)     Start     Ordered    04/05/22 0944  Cardiac Rehab Evaluation and Enrollment  Once        Provider:  (Not yet assigned)    04/05/22 0945    04/01/22 1109  Cardiac Rehab Evaluation and Enrollment  Once        Provider:  (Not yet assigned)    04/01/22 1109    04/01/22 1109  Consult to Case Management /   Once        Provider:  (Not yet assigned)    04/01/22 1109    03/31/22 0000  Inpatient Anesthesiology Consult  Once,   Status:  Canceled        Specialty:  Anesthesiology  Provider:  Nolberto Coats MD    03/30/22 1455    03/30/22 1719  Inpatient Pulmonology Consult  Once        Specialty:  Pulmonary Disease  Provider:  Dwayne Orozco MD    03/30/22 1718    03/30/22 1149  Inpatient Vascular Surgery Consult  Once        Specialty:  Vascular Surgery  Provider:  Mariusz Kumar MD    03/30/22 1148    03/29/22 1509  Inpatient Cardiothoracic Surgery Consult  Once        Specialty:  Cardiothoracic Surgery  Provider:  Josh Pérez MD    03/29/22 1508    03/28/22 0937  Inpatient Nutrition Consult  Once        Comments: CAD and CHF   Provider:  (Not yet assigned)    03/28/22 0936    03/27/22 1654  Inpatient Access Center Consult  Once        Provider:  (Not yet assigned)    03/27/22 1653    03/27/22 1139  Inpatient General Surgery Consult  Once        Specialty:  General Surgery  Provider:  Regi Mercado MD    03/27/22 1139    03/27/22 1136  Inpatient Case Management  Consult  Once        Provider:  (Not yet assigned)    03/27/22 1135    03/27/22 0631  Inpatient Cardiology Consult  Once        Specialty:  Cardiology  Provider:  Neto Paige MD    03/27/22 0631    03/26/22 1612  Case Management Consult - Verify Coverage / Pre-Authorization Need  Once        Provider:  (Not  yet assigned)    03/26/22 1612    03/26/22 1610  Inpatient Nutrition Consult - BMI 25-29.9  Once        Provider:  (Not yet assigned)    03/26/22 1609    03/26/22 1018  [facility] (on-call MD unless specified) Details  Once        Specialty:  Hospitalist  Provider:  Bong Pandey MD    03/26/22 1017              Procedures     Imaging Results (All)     Procedure Component Value Units Date/Time    XR Chest PA & Lateral [[id number]] Collected: 04/04/22 0912     Updated: 04/04/22 0917    Narrative:      XR CHEST PA AND LATERAL-     HISTORY:  Postop open heart.     COMPARISON:  Chest radiograph 04/03/2022.     FINDINGS:    2 views of the chest were obtained. Epicardial pacing wires are present.  The cardiac silhouette is normal in size. There are postsurgical changes  from CABG. There is calcific aortic atherosclerosis. There is mild  linear opacity at the right lung base, favored to reflect subsegmental  atelectasis, similar to 04/03/2022. There are mildly prominent  interstitial opacities, concerning for pulmonary edema in the  appropriate clinical setting. There are trace bilateral pleural  effusions. Median sternotomy wires are intact.     This report was finalized on 4/4/2022 9:14 AM by Dr. Shira Stark M.D.       XR Chest 1 View [[id number]] Collected: 04/03/22 1412     Updated: 04/03/22 1416    Narrative:      ONE VIEW PORTABLE CHEST AT 1:52 PM     HISTORY: Recent cardiac surgery. Chest tube removal.     FINDINGS: The lungs are moderately expanded with some minimal  atelectasis at the bases, right greater than left showing slight  improvement from 9 hours ago. There is no evidence of pneumothorax. The  heart is mildly enlarged without change.     This report was finalized on 4/3/2022 2:13 PM by Dr. Vinny Guaman M.D.       XR Chest 1 View [[id number]] Collected: 04/03/22 0624     Updated: 04/03/22 0628    Narrative:      ONE VIEW PORTABLE CHEST AT 4:34 AM     HISTORY: Recent cardiac surgery. Atelectasis.      FINDINGS: The lungs are moderately expanded with scattered atelectasis  in both lungs, right greater than left showing slight worsening from  yesterday. There remains mild generalized vascular congestion. There is  no evidence of pneumothorax. The heart remains mildly enlarged. A right  jugular sheath ends in the SVC.     This report was finalized on 4/3/2022 6:25 AM by Dr. Vinny Guaman M.D.       XR Chest 1 View [[id number]] Collected: 04/02/22 0450     Updated: 04/02/22 0454    Narrative:      SINGLE VIEW OF THE CHEST     HISTORY: Postop open heart surgery     COMPARISON: 04/01/2022     FINDINGS:  Endotracheal tube has been removed. Remaining tubes and lines are  stable. Cardiomegaly is present. There is vascular congestion. There is  increasing bibasilar atelectasis. There is a tiny left apical  pneumothorax. No definite pneumothorax is seen on the right. There may  be a trace left pleural effusion.       Impression:      Increasing bibasilar atelectasis. Tiny left apical pneumothorax.     This report was finalized on 4/2/2022 4:51 AM by Dr. Yasmin Ramos M.D.       XR Chest 1 View [[id number]] Collected: 04/01/22 1215     Updated: 04/01/22 1221    Narrative:      ONE VIEW PORTABLE CHEST AT 12:01 PM     HISTORY: Recent cardiac surgery. Lines and tubes.     FINDINGS: The patient has had recent cardiac surgery with chest tubes at  the bases and there is a suspicion of a very tiny left apical  pneumothorax measuring 3 mm. The lungs are clear except for some minimal  vague atelectasis at the right base.     The heart is borderline enlarged without change from the preoperative  exam. A PA line ends in the main pulmonary outflow tract and ET tube  ends 5.8 cm above the mateus.     This report was finalized on 4/1/2022 12:17 PM by Dr. Vinny Guaman M.D.       CT Angiogram Abdomen Pelvis [[id number]] Collected: 03/31/22 1106     Updated: 03/31/22 1114    Narrative:      CTA ABDOMEN AND PELVIS WITH IV CONTRAST      HISTORY: Abdominal aneurysm     TECHNIQUE: Radiation dose reduction techniques were utilized, including  automated exposure control and exposure modulation based on body size.   3 mm images were obtained through the abdomen and pelvis after the  administration of IV contrast.   Please note evaluation of solid organs is somewhat limited by arterial  phase imaging.        COMPARISON: Ultrasound 03/29/2022 with 5.5 cm abdominal aortic  aneurysm., 03/26/2022 CT chest     FINDINGS:  Lower chest: Improving bibasilar airspace disease with near complete  resolution of bilateral pleural effusions. The heart size is stable.     Liver: Morphologic changes suggestive of chronic liver disease in the  appropriate setting.     Biliary tract: Septation or fold..     Spleen: Within normal limits.     Pancreas: Lobular contour of the pancreas particularly along the tail  and areas of fatty infiltration similar to the prior.      Adrenals: Thickening, left greater than right.     Kidneys:  Atrophic native right kidney. Renal parenchymal scarring and  thinning with perinephric stranding right greater than left.  Indeterminate bilateral renal lesions including a stable complex cystic  lesion in the posterior right upper pole approximating 2.1 cm with  enhancing septations (coronal image 97). The largest low-attenuation  lesion in the interpolar left kidney favors a cyst. No hydronephrosis.     Bowel:  The stomach is with high density material in the dependent  portion which is hyperdense to the blood pool favoring ingested enteric  contents. No obstruction. Moderate colonic stool burden. Normal  appendix.     Peritoneum: Within normal limits.     Vasculature:    Moderate calcific atherosclerotic changes abdominal  aorta and branch vessels with focal stenosis at the celiac origin and  poststenotic dilatation measuring 1 cm.     Aneurysmal dilatation of the juxta/infrarenal abdominal aorta measuring  5.4 x 5.6 cm x 12.1 cm measured near  the JAVAN. Aneurysmal dilatation  extends from just below the level of the renal vasculature to the  bifurcation.     Eccentric calcified and noncalcified mural thrombus particularly along  the left anterior lateral margin. The common iliac arteries measure  within normal limits. There is extensive atherosclerosis of the  aortobiiliacs with opacification noted to the groin.     No periaortic fluid collections or active extravasation of contrast.     Lymph Nodes:  Within normal limits.                                                            Pelvic organs: Heterogeneous prostate with coarse calcifications. The  bladder is incompletely distended. Small fat-containing inguinal  hernias.     Abdominal/Pelvic Wall: Tiny fat-containing umbilical hernia.     Bones: Multilevel degenerative changes.       Impression:      1.  Abdominal aortic aneurysm measuring 5.4 x 5.6 x 12.1 cm (measured at  the level of the JAVAN) extending from just below the renal vasculature to  the bifurcation.  2.  Atrophic right kidney with a complex cystic mass in the right upper  pole. Recommend further evaluation with ultrasound or renal mass  protocol MRI.  3.  Hyperdensity layering within the stomach favors ingested enteric  contents.  4.  Improving bibasilar airspace disease with near resolution of pleural  effusions  5.  Please see above for additional findings and recommendations.     This report was finalized on 3/31/2022 11:11 AM by Dr. Chucky Blankenship M.D.       US Aorta Limited [[id number]] Collected: 03/29/22 1315     Updated: 03/29/22 1327    Narrative:      US AORTA LIMITED-     Clinical: Abdominal aortic aneurysm     Ultrasound findings: The proximal abdominal aorta has a maximum diameter  of 3.4 cm, midportion a maximum diameter of 4.7 cm and distally a  maximum diameter of 5.5 cm.     Dilatation of the right common iliac artery measuring 21 mm in maximum  diameter, dilatation of the left common iliac artery having a maximum  diameter of  18 mm.     CONCLUSION: Abdominal aortic aneurysm, maximum diameter 5.5 cm. The  findings were called to the [address] [place] [address] at the time of  completion, 1:30 PM.     This report was finalized on 3/29/2022 1:19 PM by Dr. Alexis Escalante M.D.       XR Chest PA & Lateral [[id number]] Collected: 03/29/22 1041     Updated: 03/29/22 1045    Narrative:      TWO-VIEW CHEST     HISTORY: Congestive heart failure. Shortness of breath.     FINDINGS: There is cardiomegaly with extensive interstitial prominence  and peribronchial thickening and haziness combined with very small  pleural effusions consistent with severe congestive heart failure. This  shows very slight improvement from 3 days ago.     This report was finalized on 3/29/2022 10:41 AM by Dr. Vinny Guaman M.D.       CT Angiogram Chest With & Without Contrast [[id number]] Collected: 03/26/22 1234     Updated: 03/26/22 1245    Narrative:      CT ANGIOGRAM CHEST W WO CONTRAST-     INDICATIONS: Possible pulmonary embolism.  Radiation dose reduction  techniques were utilized, including automated exposure control and  exposure modulation based on body size.     TECHNIQUE: CT angiography of the chest. Three-dimensional  reconstructions.     COMPARISON: Correlated with chest x-ray from same date     FINDINGS:     No pulmonary embolism. No aortic dissection. Ascending aorta is mildly  dilated, 3.8 cm.     The heart size is borderline without pericardial effusion. Coronary  arterial calcifications are moderate to prominent. Nonspecific  mediastinal lymph nodes are seen up to about 1.3 cm in short axis, could  be reactive in nature or potentially evidence of neoplasm, clinical  correlation follow-up recommended. A right hilar lymph node on axial  image 69 also measures 1.3 cm short axis. The airways appear clear.     Mild bilateral pleural effusions are seen.     The lungs show no focal pulmonary consolidation or mass.  Atelectasis/infiltrate is present in the lower  lungs. Apical predominant  emphysematous changes are noted. Septal thickening in the lower lungs is  compatible with mild edema.     Upper abdominal structures show abdominal aneurysm, only partly  included, measures at least as much is about 4.1 cm on the sagittal  images, further imaging evaluation of the abdominal aorta is advised.  Fatty infiltration is seen in the pancreas. The right kidney especially  the lower aspect, is asymmetrically atrophied.     Degenerative changes are seen in the spine. Motion artifact limits  assessment of the sternum.       Impression:            1. No pulmonary embolism. Mild bilateral pleural effusions and dependent  atelectasis/infiltrate, possibility of developing pneumonias not  excluded. Mild pulmonary edema. Emphysematous changes in the lungs.  2. Abdominal aortic aneurysm, only partly included. Dilated ascending  aorta.     This report was finalized on 3/26/2022 12:42 PM by Dr. Dl Veras M.D.       XR Chest 1 View [[id number]] Collected: 03/26/22 0943     Updated: 03/26/22 0948    Narrative:      XR CHEST 1 VW-     HISTORY: Male who is 66 years-old,  short of breath     TECHNIQUE: Frontal views of the chest     COMPARISON: 9/20/2012     FINDINGS: The heart size is borderline. Aorta is calcified. Pulmonary  vasculature is congested with pulmonary edema. No pleural effusion, or  pneumothorax. No acute osseous process.       Impression:      Borderline heart size with pulmonary vascular congestion and  pulmonary edema, follow-up recommended.     This report was finalized on 3/26/2022 9:44 AM by Dr. Dl Veras M.D.             Pertinent Labs     Results from last 7 days   Lab Units 04/05/22  0302 04/04/22  1349 04/03/22  0246 04/02/22  0303   WBC 10*3/mm3 11.26* 11.13* 15.79* 17.11*   HEMOGLOBIN g/dL 8.7* 8.6* 9.5* 10.1*   PLATELETS 10*3/mm3 212 190 197 263     Results from last 7 days   Lab Units 04/05/22  0302 04/04/22  1349 04/03/22  0246 04/02/22  1640    SODIUM mmol/L 132* 133* 132* 134*   POTASSIUM mmol/L 4.5 3.3* 3.4* 3.7   CHLORIDE mmol/L 98 94* 95* 96*   CO2 mmol/L 25.0 28.0 28.0 26.6   BUN mg/dL 19 19 24* 27*   CREATININE mg/dL 1.02 1.35* 1.22 1.27   GLUCOSE mg/dL 111* 120* 109* 117*   Estimated Creatinine Clearance: 76.9 mL/min (by C-G formula based on SCr of 1.02 mg/dL).  Results from last 7 days   Lab Units 04/04/22  1349 04/02/22  0303 04/01/22  1447 04/01/22  1117 03/30/22 2237 03/30/22  0800   ALBUMIN g/dL 3.80 4.10 4.30 3.80 4.20 4.30   BILIRUBIN mg/dL 0.5  --   --   --  0.7 0.9   ALK PHOS U/L 56  --   --   --  62 56   AST (SGOT) U/L 25  --   --   --  17 13   ALT (SGPT) U/L 10  --   --   --  17 14     Results from last 7 days   Lab Units 04/05/22  0302 04/04/22  1349 04/03/22  0246 04/02/22  1640 04/02/22  0303 04/01/22  2000 04/01/22  1447 04/01/22  1117   CALCIUM mg/dL 8.6 8.6 8.4* 8.6 8.5*   < > 8.9 8.9   ALBUMIN g/dL  --  3.80  --   --  4.10  --  4.30 3.80   MAGNESIUM mg/dL  --   --   --   --  2.4  --  2.2 2.1   PHOSPHORUS mg/dL  --   --   --   --  4.1  --  4.7* 4.3    < > = values in this interval not displayed.       Results from last 7 days   Lab Units 03/30/22  2237   PROBNP pg/mL 6,632.0*           Invalid input(s): LDLCALC      Imaging Results (Last 24 Hours)     ** No results found for the last 24 hours. **          Test Results Pending at Discharge         Discharge Exam   Physical Exam  Vitals.  Temperature 98.4 a pulse of 69 respiratory rate of 20 and blood pressure 126/76 and O2 sats of 99% on room air  General.  Middle-aged gentleman.  Appears older than stated age.  Alert and oriented x3.  No apparent pain/distress/diaphoresis.  Normal mood and affect.  No tremors.   Eyes.  Pupils equal round and reactive.  Intact extraocular musculature.  No pallor or jaundice.  Normal conjunctivae and lids.  Oral cavity.  Moist mucous membrane.  Neck.  No JVD.  C line in the right neck.  Supple.  No lymphadenopathy or thyromegaly.  Cardiovascular.     Healthy sternotomy wound.  Regular rate and rhythm and grade 2 systolic murmur.  Chest.  Poor bilateral air entry with few scattered right rhonchi.  Chest tubes have been removed.  Abdomen.  Soft lax.  No tenderness.  No organomegaly.  No guarding or rebound.  Extremities.  No edema..  No clubbing or cyanosis.    CNS.  No acute focal neurological deficits.  Discharge Details        Discharge Medications      New Medications      Instructions Start Date   albuterol sulfate  (90 Base) MCG/ACT inhaler  Commonly known as: Proventil HFA   2 puffs, Inhalation, Every 4 Hours PRN      aspirin 81 MG EC tablet   81 mg, Oral, Daily   Start Date: April 6, 2022     budesonide-formoterol 160-4.5 MCG/ACT inhaler  Commonly known as: Symbicort   2 puffs, Inhalation, 2 Times Daily - RT      clopidogrel 75 MG tablet  Commonly known as: PLAVIX   75 mg, Oral, Daily      folic acid 1 MG tablet  Commonly known as: FOLVITE   1 mg, Oral, Daily   Start Date: April 6, 2022     furosemide 40 MG tablet  Commonly known as: LASIX   40 mg, Oral, Daily   Start Date: April 6, 2022     guaiFENesin 600 MG 12 hr tablet  Commonly known as: MUCINEX   1,200 mg, Oral, Every 12 Hours Scheduled      HYDROcodone-acetaminophen 5-325 MG per tablet  Commonly known as: NORCO   2 tablets, Oral, Every 4 Hours PRN      multivitamin with minerals tablet tablet   1 tablet, Oral, Daily   Start Date: April 6, 2022     nitroglycerin 0.4 MG SL tablet  Commonly known as: NITROSTAT   0.4 mg, Sublingual, Every 5 Minutes PRN, Take no more than 3 doses in 15 minutes.      pantoprazole 40 MG EC tablet  Commonly known as: PROTONIX   40 mg, Oral, Every Early Morning   Start Date: April 6, 2022     potassium chloride 10 MEQ CR tablet   10 mEq, Oral, 2 Times Daily      thiamine 100 MG tablet  tablet  Commonly known as: VITAMIN B-1   100 mg, Oral, Daily   Start Date: April 6, 2022     tiotropium 18 MCG per inhalation capsule  Commonly known as: Spiriva HandiHaler   1  capsule, Inhalation, Daily - RT         Continue These Medications      Instructions Start Date   escitalopram 20 MG tablet  Commonly known as: LEXAPRO   TAKE 1 TABLET BY MOUTH EVERY DAY      VITAMIN C PO   1 tablet, Oral, Daily         Stop These Medications    amLODIPine 10 MG tablet  Commonly known as: NORVASC     hydroCHLOROthiazide 25 MG tablet  Commonly known as: HYDRODIURIL     nebivolol 10 MG tablet  Commonly known as: BYSTOLIC     simvastatin 20 MG tablet  Commonly known as: ZOCOR     valsartan 320 MG tablet  Commonly known as: DIOVAN            No Known Allergies      Discharge Disposition:  Condition: Stable    Diet:   Diet Order   Procedures   • Diet Regular; Cardiac       Activity:   Activity Instructions     Activity as Tolerated      Measure Weight Daily      Instruct patient on daily weights    Measure Weight Daily      Instruct patient on daily weights          Counseling : DC alcohol and smoking    CODE STATUS:    Code Status and Medical Interventions:   Ordered at: 03/26/22 1106     Level Of Support Discussed With:    Patient     Code Status (Patient has no pulse and is not breathing):    CPR (Attempt to Resuscitate)     Medical Interventions (Patient has pulse or is breathing):    Full Support       Future Appointments   Date Time Provider Department Center   5/6/2022  1:00 PM Casi Parson APRN [facility]     Additional Instructions for the Follow-ups that You Need to Schedule     Ambulatory Referral to Cardiac Rehab   As directed      Ambulatory Referral to Home Health (Hospital)   As directed      Face to Face Visit Date: 4/5/2022    Follow-up provider for Plan of Care?: I treated the patient in an acute care facility and will not continue treatment after discharge.    Follow-up provider: DEDE MONTOYA [[id number]]    Reason/Clinical Findings: post-op open heart    Describe mobility limitations that make leaving [facility] difficult[facility]    Nursing/Therapeutic  Services Requested: Skilled Nursing    Skilled nursing orders: Post CABG care    Frequency: 1 Week 1         Ambulatory Referral to Sleep Medicine   As directed      Call MD With Problems / Concerns   As directed      Instructions: Call MD or return to ER if chest pain/shortness of breath/cough/hemoptysis/fever or chills/nausea or vomiting/lower extremity edema/weight gain more than 3 pounds.    Order Comments: Instructions: Call MD or return to ER if chest pain/shortness of breath/cough/hemoptysis/fever or chills/nausea or vomiting/lower extremity edema/weight gain more than 3 pounds.          Discharge Follow-up with PCP   As directed       Currently Documented PCP:    Desire Greer APRN    PCP Phone Number:    [phone]     Follow Up Details: Primary MD.  Alcohol and tobacco abuse/coronary artery disease/status post CABG/abdominal aortic aneurysm/COPD         Discharge Follow-up with Specialty: Cardiology per their recs   As directed      Specialty: Cardiology per their recs         Discharge Follow-up with Specialty: Vascular; 2 Weeks   As directed      Specialty: Vascular    Follow Up: 2 Weeks    Follow Up Details: Abdominal aortic aneurysm         Discharge Follow-up with Specified Provider: Cardiology; 2 Weeks   As directed      To: Cardiology    Follow Up: 2 Weeks    Follow Up Details: Coronary artery disease status post CABG         Discharge Follow-up with Specified Provider: Cardiothoracic surgery; 2 Weeks   As directed      To: Cardiothoracic surgery    Follow Up: 2 Weeks    Follow Up Details: Coronary artery disease status post CABG         Discharge Follow-up with Specified Provider: Pulmonary; 2 Weeks   As directed      To: Pulmonary    Follow Up: 2 Weeks    Follow Up Details: COPD/pneumonia            Follow-up Information     Neto Paige MD Follow up.    Specialty: Cardiology  Why: performed your cardiac cath on 3/29/22  Contact information:  [address]  [facility]  [address]  [facility] [id number] [phone]             [facility] .    Specialty: Cardiac Rehabilitation  Contact information:  [address]  [facility] [address] [phone]           [facility] .    Specialty: Sleep Medicine  Contact information:  [address]  [address]  [facility] [address] [phone]  Additional information:  Phone:  [phone].  From [employer], heading [address] [facility], turn onto [address].  Building has signs with [facility] and [facility].  Sleep Lab is located in [address].           Desire Greer APRN .    Specialty: Family Medicine  Why: Primary MD.  Alcohol and tobacco abuse/coronary artery disease/status post CABG/abdominal aortic aneurysm/COPD  Contact information:  [address]  [address]  [facility] [id number] [phone]                           Time Spent on Discharge:  Greater than 30 minutes      Saima Yancey MD  [facility]  04/05/22  09:47 EDT      4/22/2022 at 4:14 PM.  Addendum.  Patient was discharged on aspirin/Plavix/Lasix.  Beta-blockers and valsartan has been DC'd secondary to inability of the blood pressure to tolerate (blood pressure 95/60 on 4/4/2022..  Blood pressure has normalized after stopping these agents and at the time of discharge.  Patient had weakness and myalgia at the time of discharge and her Zocor and statins have been held.  Will have to reconsider starting beta-blockers, ACE or ARB's, statin as an outpatient.

## 2022-04-05 NOTE — CASE MANAGEMENT/SOCIAL WORK
Case Management Discharge Note      Final Note: Pt discharged home with [facility] ……..SS/CCP    Provided Post Acute Provider List?: Yes  Post Acute Provider List: Home Health  Delivered To: Patient  Method of Delivery: In person    Selected Continued Care - Discharged on 4/5/2022 Admission date: 3/26/2022 - Discharge disposition: Home-Health Care Svc    Destination    No services have been selected for the patient.              Durable Medical Equipment    No services have been selected for the patient.              Dialysis/Infusion    No services have been selected for the patient.              Home Medical Care Coordination complete.    Service Provider Selected Services Address Phone Fax Patient Preferred     [facility]  [facility] [address] [address] [phone] --          Therapy    No services have been selected for the patient.              Community Resources    No services have been selected for the patient.              Community & DME    No services have been selected for the patient.                       Final Discharge Disposition Code: 06 - home with home health care

## 2022-04-06 ENCOUNTER — HOME CARE VISIT (OUTPATIENT)
Dept: HOME HEALTH SERVICES | Facility: HOME HEALTHCARE | Age: 67
End: 2022-04-06

## 2022-04-06 ENCOUNTER — TRANSITIONAL CARE MANAGEMENT TELEPHONE ENCOUNTER (OUTPATIENT)
Dept: CALL CENTER | Facility: HOSPITAL | Age: 67
End: 2022-04-06

## 2022-04-06 VITALS
HEART RATE: 67 BPM | RESPIRATION RATE: 18 BRPM | HEIGHT: 78 IN | OXYGEN SATURATION: 98 % | SYSTOLIC BLOOD PRESSURE: 138 MMHG | DIASTOLIC BLOOD PRESSURE: 72 MMHG | BODY MASS INDEX: 19.53 KG/M2 | TEMPERATURE: 96.8 F | WEIGHT: 168.8 LBS

## 2022-04-06 PROCEDURE — G0299 HHS/HOSPICE OF RN EA 15 MIN: HCPCS

## 2022-04-06 NOTE — OUTREACH NOTE
Call Center TCM Note    Flowsheet Row Responses   [facility] patient discharged from? [place]   Does the patient have one of the following disease processes/diagnoses(primary or secondary)? Cardiothoracic surgery   TCM attempt successful? No   Unsuccessful attempts Attempt 1          Wendie London MA    4/6/2022, 15:11 EDT

## 2022-04-06 NOTE — OUTREACH NOTE
Call Center TCM Note    Flowsheet Row Responses   [facility] patient discharged from? [place]   Does the patient have one of the following disease processes/diagnoses(primary or secondary)? Cardiothoracic surgery   TCM attempt successful? Yes   Discharge diagnosis CABGxw   Meds reviewed with patient/caregiver? Yes   Is the patient having any side effects they believe may be caused by any medication additions or changes? No   Does the patient have all medications related to this admission filled (includes all antibiotics, pain medications, cardiac medications, etc.) Yes   Is the patient taking all medications as directed (includes completed medication regime)? Yes   Does the patient have a primary care provider?  Yes   Does the patient have an appointment scheduled with their C/T surgeon? Yes   Comments regarding PCP TCM APPT with PCP Desire Greer is 04/14/2022. CARDIO MD appt is 04/12/2022.   Has the patient kept scheduled appointments due by today? N/A   What is the Home health agency?  [facility]   Has home health visited the patient within 72 hours of discharge? Yes   Psychosocial issues? No   Did the patient receive a copy of their discharge instructions? Yes   Nursing interventions Reviewed instructions with patient   What is the patient's perception of their health status since discharge? Improving   Nursing interventions Nurse provided patient education   Is the patient/caregiver able to teach back normal signs of recovery? Nausea and lack of appetite, Constipation, Depression or irritability, Pain or discomfort at incisional site   Is the patient /caregiver able to teach back basic post-op care? Continue use of incentive spirometry at least 1 week post discharge, Take showers only when approved by MD-sponge bathe until then, Do not remove steri-strips, Lifting as instructed by MD in discharge instructions, No tub bath, swimming, or hot tub until instructed by MD, Practice 'cough and deep breath',  Drive as instructed by MD in discharge instructions, Keep incision areas clean, dry and protected   Is the patient/caregiver able to teach back signs and symptoms of incisional infection? Incisional warmth, Increased drainage or bleeding, Fever, Pus or odor from incision, Increased redness, swelling or pain at the incisonal site   Is the patient/caregiver able to teach back steps to recovery at home? Set small, achievable goals for return to baseline health, Practice good oral hygiene, Eat a well-balance diet, Rest and rebuild strength, gradually increase activity, Weigh daily, Make a list of questions for surgeon's appointment   Is the patient/caregiver able to teach back the hierarchy of who to call/visit for symptoms/problems? PCP, Specialist, Home health nurse, Urgent Care, ED, 911 Yes   TCM call completed? Yes   Wrap up additional comments P tfeleing really well s/p emergent CABGx3. New long list of medications in place. Pt understands all changes. HH saw pt today. No questions at this time. TCM APPT with PCP Desire Greer is 04/14/2022. CARDIO MD appt is 04/12/2022.          Wendie London MA    4/6/2022, 16:32 EDT

## 2022-04-07 DIAGNOSIS — E78.49 OTHER HYPERLIPIDEMIA: ICD-10-CM

## 2022-04-07 DIAGNOSIS — I10 ESSENTIAL HYPERTENSION: ICD-10-CM

## 2022-04-07 RX ORDER — NEBIVOLOL 10 MG/1
TABLET ORAL
Qty: 30 TABLET | Refills: 0 | OUTPATIENT
Start: 2022-04-07

## 2022-04-07 RX ORDER — SIMVASTATIN 20 MG
TABLET ORAL
Qty: 30 TABLET | Refills: 0 | OUTPATIENT
Start: 2022-04-07

## 2022-04-07 RX ORDER — VALSARTAN 320 MG/1
TABLET ORAL
Qty: 30 TABLET | Refills: 0 | OUTPATIENT
Start: 2022-04-07

## 2022-04-07 RX ORDER — HYDROCHLOROTHIAZIDE 25 MG/1
TABLET ORAL
Qty: 30 TABLET | Refills: 0 | OUTPATIENT
Start: 2022-04-07

## 2022-04-08 ENCOUNTER — HOME CARE VISIT (OUTPATIENT)
Dept: HOME HEALTH SERVICES | Facility: HOME HEALTHCARE | Age: 67
End: 2022-04-08

## 2022-04-08 VITALS
OXYGEN SATURATION: 95 % | RESPIRATION RATE: 18 BRPM | TEMPERATURE: 98.7 F | DIASTOLIC BLOOD PRESSURE: 76 MMHG | SYSTOLIC BLOOD PRESSURE: 122 MMHG | HEART RATE: 78 BPM

## 2022-04-08 DIAGNOSIS — F41.9 ANXIETY: ICD-10-CM

## 2022-04-08 PROCEDURE — G0299 HHS/HOSPICE OF RN EA 15 MIN: HCPCS

## 2022-04-08 RX ORDER — ESCITALOPRAM OXALATE 20 MG/1
TABLET ORAL
Qty: 30 TABLET | Refills: 0 | Status: SHIPPED | OUTPATIENT
Start: 2022-04-08 | End: 2022-04-14 | Stop reason: SDUPTHER

## 2022-04-09 NOTE — HOME HEALTH
post cabg, doing well   up no assisitve devices  pain now 2 just sore,   can go up to a 6 especially with coughing   t/i on use of pillow. for comfort.  on inhaler t/ i on need to rinse out mouth   and we rev use of nitro, and pain meds.  we also discussed lasix and potassium use, and why he is taking both.   Stressed need to weigh daily. which he is doing and rev when to call nursing

## 2022-04-11 ENCOUNTER — HOME CARE VISIT (OUTPATIENT)
Dept: HOME HEALTH SERVICES | Facility: HOME HEALTHCARE | Age: 67
End: 2022-04-11

## 2022-04-11 VITALS
TEMPERATURE: 97.8 F | WEIGHT: 163 LBS | SYSTOLIC BLOOD PRESSURE: 132 MMHG | OXYGEN SATURATION: 98 % | BODY MASS INDEX: 16.24 KG/M2 | DIASTOLIC BLOOD PRESSURE: 80 MMHG | RESPIRATION RATE: 18 BRPM | HEART RATE: 68 BPM

## 2022-04-11 PROCEDURE — G0299 HHS/HOSPICE OF RN EA 15 MIN: HCPCS

## 2022-04-11 PROCEDURE — G0180 MD CERTIFICATION HHA PATIENT: HCPCS

## 2022-04-11 NOTE — HOME HEALTH
ASSESSED PT POST CABG. INCISION IS CLEAN, DRY, AND INTACT. PATIENT IS DOING VERY WELL AND HAS BEEN USING INCENTIVE SPIROMETER AS ORDERED AND TAKING MEDS AS PRESCRIBED.   PLAN FOR NEXT VISIT: ASSESS MIDLINE CHEST INCISION FOR S/S OF INFECTION

## 2022-04-12 ENCOUNTER — OFFICE VISIT (OUTPATIENT)
Dept: CARDIOLOGY | Facility: CLINIC | Age: 67
End: 2022-04-12

## 2022-04-12 VITALS
WEIGHT: 163 LBS | HEART RATE: 70 BPM | SYSTOLIC BLOOD PRESSURE: 146 MMHG | BODY MASS INDEX: 22.82 KG/M2 | DIASTOLIC BLOOD PRESSURE: 86 MMHG | HEIGHT: 71 IN

## 2022-04-12 DIAGNOSIS — I21.4 NSTEMI (NON-ST ELEVATED MYOCARDIAL INFARCTION): ICD-10-CM

## 2022-04-12 DIAGNOSIS — Z95.1 S/P CABG (CORONARY ARTERY BYPASS GRAFT): Primary | ICD-10-CM

## 2022-04-12 DIAGNOSIS — I25.10 CORONARY ARTERY DISEASE INVOLVING NATIVE CORONARY ARTERY OF NATIVE HEART WITHOUT ANGINA PECTORIS: ICD-10-CM

## 2022-04-12 DIAGNOSIS — I10 ESSENTIAL HYPERTENSION: ICD-10-CM

## 2022-04-12 PROCEDURE — 99214 OFFICE O/P EST MOD 30 MIN: CPT | Performed by: NURSE PRACTITIONER

## 2022-04-12 PROCEDURE — 93000 ELECTROCARDIOGRAM COMPLETE: CPT | Performed by: NURSE PRACTITIONER

## 2022-04-12 RX ORDER — POTASSIUM CHLORIDE 750 MG/1
10 TABLET, FILM COATED, EXTENDED RELEASE ORAL DAILY
Qty: 30 TABLET | Refills: 0 | Status: SHIPPED | OUTPATIENT
Start: 2022-04-12 | End: 2022-05-17

## 2022-04-12 RX ORDER — FUROSEMIDE 20 MG/1
20 TABLET ORAL DAILY
Qty: 30 TABLET | Refills: 0 | Status: SHIPPED | OUTPATIENT
Start: 2022-04-12 | End: 2022-05-25

## 2022-04-12 NOTE — PROGRESS NOTES
Date of Office Visit: 2022  Encounter Provider: Regine Davenport, MJ, APRN  Place of Service: [facility] CARDIOLOGY  Patient Name: Osman Aparicio  :1955        Subjective:     Chief Complaint:  CAD w/ NSTEMI s/p CABG      History of Present Illness:  Osman Aparicio is a 66 y.o. male patient of Dr. Claudio.  This patient is new to me and I have reviewed his records.    Patient has a history of hypertension, hyperlipidemia, COPD, thoracic aortic aneurysm, abdominal aortic aneurysm, tobacco abuse, alcohol pulmonary edema, anxiety, CAD, CABG.    Patient presented to the ER 3/2022 with shortness of breath that was progressively worse.  He had recently been having throat pain.  He had 2 brothers who  of MIs in their 40s.  He was smoking a pack of cigarettes a day.  EKG was abnormal and troponin was elevated.  proBNP was also elevated and chest x-ray showed pulmonary edema.  Patient was hyponatremic.  He was given IV Lasix and admitted for volume overload, COPD.  Pulmonary status improved.  Was placed on CIWA protocol for history of alcohol abuse.  Echo 3/2022 showed normal LV systolic function with EF of 56%, wall motion abnormalities, grade 3 diastolic dysfunction, moderately increased left atrial volume, valvular calcification with trace to mild aortic regurgitation moderate mitral regurgitation with trace to mild tricuspid regurgitation and moderately elevated RVSP with trivial pericardial effusion.  Heart cath 3/29/2022 showed severe multivessel coronary artery disease and CT surgery was consulted.  Preop carotid ultrasound showed mild bilateral carotid artery stenosis.  Ascending thoracic aorta was mildly dilated and calcified.  Vascular surgery was consulted for abdominal aortic aneurysm plans for repair as an outpatient.  Patient underwent CABG x3 with LIMA to LAD, SVG to OM1, SVG to OM 2.      Patient presents to office today for follow-up appointment.  Patient's wife is  with him in the office today, per patient preference.  Patient reports he is doing very well since hospital discharge.  He is home with home health.  Pain well controlled with as needed Tylenol.  Denies dyspnea, palpitations, edema, syncope, near syncope, falls, fatigue, or abnormal bleeding.  He has lost about 5 pounds since hospital discharge.  Heart rate staying 60s to 70s.  Blood pressure readings fluctuating quite a bit, not sure home cuff accurate.  Has been having some lightheadedness looks to be getting a little dry.        Past Medical History:   Diagnosis Date   • Abnormal glucose    • Anxiety    • Encounter for special screening examination for neoplasm of prostate 10/2012   • Hematuria    • Hyperlipidemia    • Hypertension    • Plantar wart    • Vitamin D deficiency disease      Past Surgical History:   Procedure Laterality Date   • CARDIAC CATHETERIZATION N/A 3/29/2022    Procedure: Left Heart Cath;  Surgeon: Neto Paige MD;  Location: [facility] CATH INVASIVE LOCATION;  Service: Cardiology;  Laterality: N/A;   • CARDIAC CATHETERIZATION N/A 3/29/2022    Procedure: Coronary angiography;  Surgeon: Neto Paige MD;  Location: [facility] CATH INVASIVE LOCATION;  Service: Cardiology;  Laterality: N/A;   • CARDIAC CATHETERIZATION N/A 3/29/2022    Procedure: Left ventriculography;  Surgeon: Neto Paige MD;  Location: [facility] CATH INVASIVE LOCATION;  Service: Cardiology;  Laterality: N/A;   • COLONOSCOPY N/A 12/7/2020    Procedure: COLONOSCOPY INTO CECUM WITH HOT SNARE POLYPECTOMIES, COLD BX POLYPECTOMIES, RESOLUTION CLIPS X;  Surgeon: Regi Mercado MD;  Location: [facility] ENDOSCOPY;  Service: General;  Laterality: N/A;  PRE:  POSITIVE COLOGUARD  POST:  POLYPS   • CORONARY ARTERY BYPASS GRAFT N/A 4/1/2022    Procedure: STERNOTOMY, CORONARY ARTERY BYPASS UTILIZINGTHE RT SAPHENOUS VEIN WITH LEFT INTERNAL MAMMARY ARTERY GRAFT X3 OFF PUMP, PRP;  Surgeon: Colten Zamora MD;   Location: [facility];  Service: Cardiothoracic;  Laterality: N/A;   • TRANSESOPHAGEAL ECHOCARDIOGRAM (MARTHA) N/A 4/1/2022    Procedure: TRANSESOPHAGEAL ECHOCARDIOGRAM WITH ANESTHESIA;  Surgeon: Colten Zamora MD;  Location: [facility];  Service: Cardiothoracic;  Laterality: N/A;     Outpatient Medications Prior to Visit   Medication Sig Dispense Refill   • albuterol sulfate HFA (Proventil HFA) 108 (90 Base) MCG/ACT inhaler Inhale 2 puffs Every 4 (Four) Hours As Needed for Wheezing. 18 g 0   • Ascorbic Acid (VITAMIN C PO) Take 1 tablet by mouth Daily.     • aspirin 81 MG EC tablet Take 1 tablet by mouth Daily. 30 tablet 3   • budesonide-formoterol (Symbicort) 160-4.5 MCG/ACT inhaler Inhale 2 puffs 2 (Two) Times a Day. 1 each 3   • clopidogrel (PLAVIX) 75 MG tablet Take 1 tablet by mouth Daily. 30 tablet 3   • escitalopram (LEXAPRO) 20 MG tablet TAKE 1 TABLET BY MOUTH EVERY DAY 30 tablet 0   • folic acid (FOLVITE) 1 MG tablet Take 1 tablet by mouth Daily. 30 tablet 3   • guaiFENesin (MUCINEX) 600 MG 12 hr tablet Take 2 tablets by mouth Every 12 (Twelve) Hours. 60 tablet 3   • multivitamin with minerals tablet tablet Take 1 tablet by mouth Daily. 30 tablet 3   • nitroglycerin (NITROSTAT) 0.4 MG SL tablet Place 1 tablet under the tongue Every 5 (Five) Minutes As Needed for Chest Pain (Only if SBP Greater Than 100). Take no more than 3 doses in 15 minutes. 30 tablet 3   • pantoprazole (PROTONIX) 40 MG EC tablet Take 1 tablet by mouth Every Morning. 30 tablet 3   • thiamine (VITAMIN B-1) 100 MG tablet  tablet Take 1 tablet by mouth Daily. 30 tablet 3   • tiotropium (Spiriva HandiHaler) 18 MCG per inhalation capsule Place 1 capsule into inhaler and inhale Daily. 30 capsule 3   • furosemide (LASIX) 40 MG tablet Take 1 tablet by mouth Daily. 30 tablet 3   • potassium chloride 10 MEQ CR tablet Take 1 tablet by mouth 2 (Two) Times a Day. 30 tablet 3   • HYDROcodone-acetaminophen (NORCO) 5-325 MG per tablet Take 2  "tablets by mouth Every 4 (Four) Hours As Needed for Moderate Pain  for up to 6 days. 12 tablet 0     No facility-administered medications prior to visit.       Allergies as of 2022   • (No Known Allergies)     Social History     Socioeconomic History   • Marital status:    Tobacco Use   • Smoking status: Former Smoker     Packs/day: 1.00     Years: 45.00     Pack years: 45.00     Types: Cigarettes     Quit date: 3/26/2022     Years since quittin.0   • Smokeless tobacco: Never Used   • Tobacco comment: caffeine - 3 cups coffee daily, tea or soda occas.   Vaping Use   • Vaping Use: Never used   Substance and Sexual Activity   • Alcohol use: Not Currently   • Drug use: No   • Sexual activity: Defer     Family History   Problem Relation Age of Onset   • Lung cancer Brother    • Liver cancer Brother    • Colon polyps Brother        Review of Systems   Constitutional: Negative for malaise/fatigue.   Cardiovascular:        SEE HPI   Respiratory: Negative for shortness of breath.    Hematologic/Lymphatic: Negative for bleeding problem.   Musculoskeletal: Negative for falls.   Gastrointestinal: Negative for melena.   Genitourinary: Negative for hematuria.   Neurological: Positive for light-headedness.   Psychiatric/Behavioral: Negative for altered mental status.          Objective:     Vitals:    22 1400   BP: 146/86   BP Location: Left arm   Patient Position: Sitting   Pulse: 70   Weight: 73.9 kg (163 lb)   Height: 180.3 cm (71\")     Body mass index is 22.73 kg/m².      PHYSICAL EXAM:  Constitutional:       General: Not in acute distress.     Appearance: Well-developed. Not diaphoretic.   HENT:      Head: Normocephalic and atraumatic.   Neck:      Vascular: No JVD.   Pulmonary:      Effort: Pulmonary effort is normal. No respiratory distress.      Breath sounds: Normal breath sounds. No wheezing. No rales.   Cardiovascular:      Normal rate. Regular rhythm.      Murmurs: There is no murmur.      No " gallop. No click. No rub.      Comments: Chest incision looks great, chest wall stable, no signs/symptoms of infection or complication.  Vein graft site also looks great.  No redness, swelling, or tenderness.  No signs/symptoms of infection or complication.  Edema:     Peripheral edema absent.   Abdominal:      General: Bowel sounds are normal. There is no distension.   Musculoskeletal: Normal range of motion.         General: No tenderness or deformity.      Cervical back: Neck supple. Skin:     General: Skin is warm and dry.      Findings: No erythema or rash.   Neurological:      Mental Status: Alert and oriented to person, place, and time.             ECG 12 Lead    Date/Time: 4/12/2022 2:10 PM  Performed by: Regine Davenport DNP, APRN  Authorized by: Regine Davenport DNP, TAYLOR   Comparison: compared with previous ECG from 4/3/2022  Rhythm: sinus rhythm  BPM: 70  Other findings: non-specific ST-T wave changes  Comments: No significant changes from previous EKG              Assessment:       Diagnosis Plan   1. S/P CABG (coronary artery bypass graft)  Basic Metabolic Panel   2. Essential hypertension     3. Coronary artery disease involving native coronary artery of native heart without angina pectoris     4. NSTEMI (non-ST elevated myocardial infarction) (formerly [facility])           Plan:     1. NSTEMI with multivessel coronary artery disease status post CABG x3: With LIMA to LAD, SVG to OM1, SVG to OM2.  EF 45% on intraoperative MARTHA.   Patient is feeling much improved.  He reports he should be cleared from home health soon and will be doing cardiac rehab after.  Looks like he was tolerating beta-blocker well in the hospital.  Will resume beta-blocker at this time, metoprolol XL 25 mg daily.  Will increase as needed for better blood pressure control.  2. Hypertension: Blood pressure elevated  in the office today.  Has been somewhat labile at home.  Home health getting readings more like 120s-130s/70s-80s.  Recommended  having home health check patient's electronic blood pressure cuff for accuracy.  Patient to call back with some updated readings in a few days.  Would like to have him on his metoprolol XL if he can tolerate it.    3. Abdominal aortic aneurysm: Following with vascular surgery and repair being considered  4. History of pulmonary edema: Chest x-ray prior to discharge did not mention pleural effusions or evidence of pulmonary edema. Slight atelectasis was seen with improvement from previous chest x-ray.  Patient's lungs are clear on exam.  No evidence of volume overload.  No dyspnea symptoms.  Actually has been having a little bit of lightheadedness and looks to be getting a little dry.  We will decrease Lasix and potassium dose.  Patient to monitor daily weights and call if he gains more than 2 pounds in 1 day or 5 pounds in a week or for swelling or shortness of breath issues.  Otherwise he will call back with updated weights in 1 week and update on symptoms in may stop Lasix/potassium at that point.  He will call sooner for issues or concerns.  Check BMP.  5. Carotid artery stenosis: Mild bilateral on 3/2022 preop carotid ultrasounds.  Otherwise plaque noted.  6. COPD  7. Former smoker: Patient reports he has not smoked since hospitalization.  Recommended continued cessation.  8. History of alcohol abuse: Patient reports he has not had any alcohol since hospital discharge.  Recommended continuing to abstain.  9. Chronic anemia: Felt to likely be multifactorial.  Patient continue to follow with PCP on this.    Patient to keep May follow-up with Dr. Claudio as scheduled or follow-up sooner if needed for any new, recurrent, or worsening symptoms or concerns.  Discussed in detail signs/symptoms that warrant sooner call or follow-up to the office or ER visit.           Your medication list          Accurate as of April 12, 2022 11:59 PM. If you have any questions, ask your nurse or doctor.            START taking these  medications      Instructions Last Dose Given Next Dose Due   metoprolol succinate XL 25 MG 24 hr tablet  Commonly known as: TOPROL-XL  Started by: Regine Davenport DNP, APRN      Take 1 tablet by mouth Daily.          CHANGE how you take these medications      Instructions Last Dose Given Next Dose Due   furosemide 20 MG tablet  Commonly known as: LASIX  What changed:   · medication strength  · how much to take  Changed by: Regine Davenport DNP, APRN      Take 1 tablet by mouth Daily.       potassium chloride 10 MEQ CR tablet  What changed: when to take this  Changed by: Regine Davenport DNP, APRN      Take 1 tablet by mouth Daily.          CONTINUE taking these medications      Instructions Last Dose Given Next Dose Due   albuterol sulfate  (90 Base) MCG/ACT inhaler  Commonly known as: Proventil HFA      Inhale 2 puffs Every 4 (Four) Hours As Needed for Wheezing.       aspirin 81 MG EC tablet      Take 1 tablet by mouth Daily.       budesonide-formoterol 160-4.5 MCG/ACT inhaler  Commonly known as: Symbicort      Inhale 2 puffs 2 (Two) Times a Day.       clopidogrel 75 MG tablet  Commonly known as: PLAVIX      Take 1 tablet by mouth Daily.       escitalopram 20 MG tablet  Commonly known as: LEXAPRO      TAKE 1 TABLET BY MOUTH EVERY DAY       folic acid 1 MG tablet  Commonly known as: FOLVITE      Take 1 tablet by mouth Daily.       guaiFENesin 600 MG 12 hr tablet  Commonly known as: MUCINEX      Take 2 tablets by mouth Every 12 (Twelve) Hours.       multivitamin with minerals tablet tablet      Take 1 tablet by mouth Daily.       nitroglycerin 0.4 MG SL tablet  Commonly known as: NITROSTAT      Place 1 tablet under the tongue Every 5 (Five) Minutes As Needed for Chest Pain (Only if SBP Greater Than 100). Take no more than 3 doses in 15 minutes.       pantoprazole 40 MG EC tablet  Commonly known as: PROTONIX      Take 1 tablet by mouth Every Morning.       thiamine 100 MG tablet  tablet  Commonly known as:  VITAMIN B-1      Take 1 tablet by mouth Daily.       tiotropium 18 MCG per inhalation capsule  Commonly known as: Spiriva HandiHaler      Place 1 capsule into inhaler and inhale Daily.       VITAMIN C PO      Take 1 tablet by mouth Daily.             Where to Get Your Medications      These medications were sent to [facility]/pharmacy #[id number] - [facility] [place] - [address] AT [facility] [phone] [facility] [phone]   [address] [facility] [address]    Phone: [phone]   · furosemide 20 MG tablet  · metoprolol succinate XL 25 MG 24 hr tablet  · potassium chloride 10 MEQ CR tablet         The above medication changes may not have been made by this provider.  Patient's medication list was updated to reflect medications they are currently taking including medication changes made by other providers.            Thanks,    Regine Davenport, MJ, APRN  04/12/2022         Dictated utilizing Dragon dictation

## 2022-04-13 ENCOUNTER — HOME CARE VISIT (OUTPATIENT)
Dept: HOME HEALTH SERVICES | Facility: HOME HEALTHCARE | Age: 67
End: 2022-04-13

## 2022-04-13 PROCEDURE — G0299 HHS/HOSPICE OF RN EA 15 MIN: HCPCS

## 2022-04-13 RX ORDER — METOPROLOL SUCCINATE 25 MG/1
25 TABLET, EXTENDED RELEASE ORAL DAILY
Qty: 30 TABLET | Refills: 1 | Status: SHIPPED | OUTPATIENT
Start: 2022-04-13 | End: 2022-05-06

## 2022-04-14 ENCOUNTER — OFFICE VISIT (OUTPATIENT)
Dept: FAMILY MEDICINE CLINIC | Facility: CLINIC | Age: 67
End: 2022-04-14

## 2022-04-14 VITALS
SYSTOLIC BLOOD PRESSURE: 132 MMHG | DIASTOLIC BLOOD PRESSURE: 84 MMHG | HEART RATE: 84 BPM | WEIGHT: 163.8 LBS | TEMPERATURE: 96.3 F | BODY MASS INDEX: 22.85 KG/M2 | RESPIRATION RATE: 18 BRPM | OXYGEN SATURATION: 96 %

## 2022-04-14 VITALS
RESPIRATION RATE: 16 BRPM | HEART RATE: 67 BPM | OXYGEN SATURATION: 97 % | BODY MASS INDEX: 22.82 KG/M2 | DIASTOLIC BLOOD PRESSURE: 72 MMHG | WEIGHT: 163 LBS | TEMPERATURE: 97.5 F | SYSTOLIC BLOOD PRESSURE: 130 MMHG | HEIGHT: 71 IN

## 2022-04-14 DIAGNOSIS — I10 ESSENTIAL HYPERTENSION: ICD-10-CM

## 2022-04-14 DIAGNOSIS — Z09 HOSPITAL DISCHARGE FOLLOW-UP: ICD-10-CM

## 2022-04-14 DIAGNOSIS — F10.11 HISTORY OF ALCOHOL ABUSE: ICD-10-CM

## 2022-04-14 DIAGNOSIS — E78.49 OTHER HYPERLIPIDEMIA: ICD-10-CM

## 2022-04-14 DIAGNOSIS — E55.9 VITAMIN D DEFICIENCY, UNSPECIFIED: ICD-10-CM

## 2022-04-14 DIAGNOSIS — D64.9 ANEMIA, UNSPECIFIED TYPE: ICD-10-CM

## 2022-04-14 DIAGNOSIS — F41.9 ANXIETY: ICD-10-CM

## 2022-04-14 DIAGNOSIS — N17.9 ACUTE KIDNEY INJURY: ICD-10-CM

## 2022-04-14 DIAGNOSIS — E78.49 OTHER HYPERLIPIDEMIA: Primary | ICD-10-CM

## 2022-04-14 DIAGNOSIS — Z95.1 S/P CABG X 3: ICD-10-CM

## 2022-04-14 PROCEDURE — 99495 TRANSJ CARE MGMT MOD F2F 14D: CPT | Performed by: NURSE PRACTITIONER

## 2022-04-14 RX ORDER — SIMVASTATIN 20 MG
TABLET ORAL
Qty: 30 TABLET | Refills: 0 | OUTPATIENT
Start: 2022-04-14

## 2022-04-14 RX ORDER — ESCITALOPRAM OXALATE 20 MG/1
20 TABLET ORAL DAILY
Qty: 90 TABLET | Refills: 1 | Status: SHIPPED | OUTPATIENT
Start: 2022-04-14 | End: 2022-08-30

## 2022-04-14 RX ORDER — OMEPRAZOLE 20 MG/1
20 CAPSULE, DELAYED RELEASE ORAL DAILY
COMMUNITY
End: 2022-05-25 | Stop reason: ALTCHOICE

## 2022-04-14 RX ORDER — VALSARTAN 320 MG/1
TABLET ORAL
Qty: 30 TABLET | Refills: 0 | OUTPATIENT
Start: 2022-04-14

## 2022-04-14 NOTE — PROGRESS NOTES
Subjective   Osman Aparicio is a 66 y.o. male.     History of Present Illness   Osman Aparicio 66 y.o. male presents today for hosptial follow up.  he was treated [facility] for acute respiratory failure, hyponatremia, CHF.  I reviewed all of the labs and diagnostic testing.  The patient's medications were not changed:  Current outpatient and discharge medications have been reconciled for the patient.  Reviewed by: TAYLOR Hernandez    he does have a follow up appointment with a specialist:     Hospital note as follows:     Brief admission history and physical.  Please refer to the H&P for full details.  A pleasant 66 years old white gentleman with a past history of tobacco/alcohol use/glucose intolerance/hematuria/dyslipidemia/hypertension/vitamin D deficiency presented to the emergency department with progressive shortness of breath over the last 1 day this was associated with fatigue called EMS and presented to the emergency department.  Physical examination on admission included a temperature of 97.9 a pulse of 69 respiratory rate of 20 and blood pressure 110/63.  The rest of the examination is remarkable for tachypnea/poor bilateral air entry otherwise negative.  Hospital course.  Initial evaluation in the emergency department included a reticulocyte count that was elevated at 7.  A1c was 4.4.  Procalcitonin normal.  CBC normal except a white count of 11.7, hemoglobin 8.3, MCV of 104.8.  Troponin was normal.  D-dimer was elevated at 0.75.  proBNP elevated at 3/4/2008.  INR 1.1.  CMP normal except a random blood sugar of 174, sodium 123, chloride 87, CO2 21, calcium 8.2.  Respiratory PCR panel including COVID was negative.  B12 was normal.  Folic acid was normal.  Ferritin was elevated at 528.  Iron profile was normal.  Blood cultures obtained in the emergency department.  UA was negative.  Serum osmolarity was low at 255.  Chest x-ray revealed pulmonary edema.  CTA of the chest revealed no PE and mild bilateral  pleural effusion with dependent atelectasis versus infiltrate and positive pulmonary edema and COPD changes with abdominal aortic aneurysm that is partially included.  Patient was subsequently admitted.  His hospital course will be approached systematically   Cardiac.  Patient appears to have pulmonary edema contributing to his acute hypoxemic respiratory failure.  And subsequently diagnosed with an non-STEMI MI.  Ejection fraction was 45 by echo.  He was started on IV diuresis and a cardiology consult was obtained.  Cardiac catheterization was done and the patient had significant coronary artery disease.  Cardiothoracic surgery was consulted and the patient underwent three-vessel CABG.  Postoperatively he has done very well and he was started on aspirin/beta-blockers/diuretics with good control of his high blood pressure and resolution of the pulmonary edema.  Plavix was added to his regimen at the time of discharge per cardiothoracic surgery recommendation.  Both cardiothoracic surgery and cardiology okay for discharge with follow-up.  Home health will be following up with the patient.  Patient was given cardiac rehab follow-up.  Pulmonary.  Patient was diagnosed with COPD exacerbation and he was placed on duo nebs and steroids.  He subsequently developed pneumonia with leukocytosis.  Pulmonary was consulted he was placed on duo nebs and appropriate antibiotics.  At the time of discharge he was switched to metered-dose inhalers.  He was able to be weaned off oxygen.  He will follow-up with the pulmonologist.  Pulmonologist okayed the discharge.  He has remained stable respiratory status at the time of discharge.  Anemia.  Patient was noted to have anemia on admission.  Anemia work-up revealed chronic disease anemia.  He has macrocytosis which is most likely secondary to the alcohol.  He has received transfusion after hemoglobin dropped post CABG.  His hemoglobin subsequently remained stable.  His Hemoccult stool  was negative.  Vascular.  Incidental finding of an abdominal aortic aneurysm was noted.  Abdominal aortic ultrasound was done revealed a 5.5 cm abdominal aortic aneurysm.  Vascular surgery was consulted.  They recommend surgery after stabilization from his cardiac status. outpatient follow-up with vascular was arranged.  Renal.  Also incidental finding is a right renal atrophia and a right complex cyst.  Renal function remained stable.  Urology consulted.  Urology planning MRI once cardiac status is stable and he was given an follow-up at the time of discharge.  Alcoholism/tobacco abuse he was counseled about both during this admission.  He was given nicotine patch.  He was placed on CIWA protocol and detoxification with vitamins during this admission.  He did not experience any withdrawal from alcohol during this admission.  Discharged on multivitamin/thiamine/folate.  Access center was consulted and he was given resources.  Metabolic.  During the hospital stay his electrolytes were monitored.  He was noted to have hypokalemia and this was substituted.  Physical therapy saw the patient during this admission after CABG he did fairly well with them.  Patient is being discharged home with home health to follow-up checking on the wounds which appear to be healthy at the time of discharge.  At the time of discharge patient was hemodynamically stable without complaints.  All consultants okayed the discharge.      Sees cardiology on 5/6 and pulmonology on 5/21 and vascular surgery 4/29.   He reports feeling much improved. He denies increased lower extremity edema or chest pain.  He reports incision site from CABG is healing well and denies significant pain.  He still has some elevated BP readings in the morning but states it is normal by afternoon.  It is 130/72 in office today. He has f/u appts scheduled.  He needs CBC recheck. Renal function was improved by discharge but will recheck.  Will also check iron and B12.  He  needs refill on his Lexapro that he takes for anxiety. He has not smoked a cigarette or had an alcoholic beverage since before hospitalization. He is committed to staying off alcohol and nicotine.  He has f/u with vascular for AAA.  He understands to avoid any trauma to the abdominal area and importance of BP control.   The following portions of the patient's history were reviewed and updated as appropriate: allergies, current medications, past family history, past medical history, past social history, past surgical history and problem list.    Review of Systems   Constitutional: Positive for fatigue. Negative for unexpected weight change.   Respiratory: Positive for shortness of breath. Negative for cough.    Cardiovascular: Negative for chest pain, palpitations and leg swelling.   Skin: Negative for rash.   Psychiatric/Behavioral: Negative for dysphoric mood and sleep disturbance. The patient is not nervous/anxious.        Objective   Physical Exam  Vitals and nursing note reviewed.   Constitutional:       Appearance: Normal appearance. He is well-developed.   Neck:      Vascular: No carotid bruit.   Cardiovascular:      Rate and Rhythm: Normal rate and regular rhythm.   Pulmonary:      Effort: Pulmonary effort is normal.      Breath sounds: Normal breath sounds.   Neurological:      Mental Status: He is alert and oriented to person, place, and time.   Psychiatric:         Mood and Affect: Mood normal.         Behavior: Behavior normal.         Thought Content: Thought content normal.         Judgment: Judgment normal.         Assessment/Plan   Diagnoses and all orders for this visit:    1. Other hyperlipidemia (Primary)  -     CBC & Differential  -     Iron level  -     Vitamin B12  -     Comprehensive metabolic panel  -     Vitamin D 25 Hydroxy    2. Anxiety  -     escitalopram (LEXAPRO) 20 MG tablet; Take 1 tablet by mouth Daily.  Dispense: 90 tablet; Refill: 1  -     CBC & Differential  -     Iron level  -      Vitamin B12  -     Comprehensive metabolic panel    3. Acute kidney injury (HCC)  -     CBC & Differential  -     Iron level  -     Vitamin B12  -     Comprehensive metabolic panel    4. History of alcohol abuse  -     CBC & Differential  -     Iron level  -     Vitamin B12  -     Comprehensive metabolic panel  -     Vitamin D 25 Hydroxy    5. Anemia, unspecified type  -     CBC & Differential  -     Iron level  -     Vitamin B12  -     Comprehensive metabolic panel  -     Vitamin D 25 Hydroxy    6. Vitamin D deficiency, unspecified   -     Vitamin D 25 Hydroxy    7. Hospital discharge follow-up    8. S/P CABG x 3              Hospital records reviewed with pt confirming HPI.      Current outpatient and discharge medications have been reconciled for the patient.  Reviewed by: TAYLOR Hernandez

## 2022-04-14 NOTE — HOME HEALTH
PATIENT IS DOING WELL POST OP CABG. HE IS TAKING ALL MEDS AS ORDERED. INCISION IS CLEAN, DRY, AND INTACT    PLAN FOR NEXT VISIT: ASSESS CABG

## 2022-04-15 ENCOUNTER — READMISSION MANAGEMENT (OUTPATIENT)
Dept: CALL CENTER | Facility: HOSPITAL | Age: 67
End: 2022-04-15

## 2022-04-15 ENCOUNTER — HOME CARE VISIT (OUTPATIENT)
Dept: HOME HEALTH SERVICES | Facility: HOME HEALTHCARE | Age: 67
End: 2022-04-15

## 2022-04-15 LAB
25(OH)D3+25(OH)D2 SERPL-MCNC: 35.8 NG/ML (ref 30–100)
ALBUMIN SERPL-MCNC: 4.3 G/DL (ref 3.8–4.8)
ALBUMIN/GLOB SERPL: 1.5 {RATIO} (ref 1.2–2.2)
ALP SERPL-CCNC: 86 IU/L (ref 44–121)
ALT SERPL-CCNC: 19 IU/L (ref 0–44)
AST SERPL-CCNC: 16 IU/L (ref 0–40)
BASOPHILS # BLD AUTO: 0.1 X10E3/UL (ref 0–0.2)
BASOPHILS NFR BLD AUTO: 1 %
BILIRUB SERPL-MCNC: 0.4 MG/DL (ref 0–1.2)
BUN SERPL-MCNC: 18 MG/DL (ref 8–27)
BUN/CREAT SERPL: 18 (ref 10–24)
CALCIUM SERPL-MCNC: 9 MG/DL (ref 8.6–10.2)
CHLORIDE SERPL-SCNC: 94 MMOL/L (ref 96–106)
CO2 SERPL-SCNC: 21 MMOL/L (ref 20–29)
CREAT SERPL-MCNC: 1 MG/DL (ref 0.76–1.27)
EGFRCR SERPLBLD CKD-EPI 2021: 83 ML/MIN/1.73
EOSINOPHIL # BLD AUTO: 0.3 X10E3/UL (ref 0–0.4)
EOSINOPHIL NFR BLD AUTO: 3 %
ERYTHROCYTE [DISTWIDTH] IN BLOOD BY AUTOMATED COUNT: 16.2 % (ref 11.6–15.4)
GLOBULIN SER CALC-MCNC: 2.8 G/DL (ref 1.5–4.5)
GLUCOSE SERPL-MCNC: 99 MG/DL (ref 65–99)
HCT VFR BLD AUTO: 27.6 % (ref 37.5–51)
HGB BLD-MCNC: 9.4 G/DL (ref 13–17.7)
IMM GRANULOCYTES # BLD AUTO: 0.1 X10E3/UL (ref 0–0.1)
IMM GRANULOCYTES NFR BLD AUTO: 1 %
IRON SERPL-MCNC: 39 UG/DL (ref 38–169)
LYMPHOCYTES # BLD AUTO: 2.3 X10E3/UL (ref 0.7–3.1)
LYMPHOCYTES NFR BLD AUTO: 29 %
MCH RBC QN AUTO: 34.7 PG (ref 26.6–33)
MCHC RBC AUTO-ENTMCNC: 34.1 G/DL (ref 31.5–35.7)
MCV RBC AUTO: 102 FL (ref 79–97)
MONOCYTES # BLD AUTO: 0.7 X10E3/UL (ref 0.1–0.9)
MONOCYTES NFR BLD AUTO: 9 %
NEUTROPHILS # BLD AUTO: 4.6 X10E3/UL (ref 1.4–7)
NEUTROPHILS NFR BLD AUTO: 57 %
PLATELET # BLD AUTO: 508 X10E3/UL (ref 150–450)
POTASSIUM SERPL-SCNC: 5.2 MMOL/L (ref 3.5–5.2)
PROT SERPL-MCNC: 7.1 G/DL (ref 6–8.5)
RBC # BLD AUTO: 2.71 X10E6/UL (ref 4.14–5.8)
SODIUM SERPL-SCNC: 130 MMOL/L (ref 134–144)
VIT B12 SERPL-MCNC: 1395 PG/ML (ref 232–1245)
WBC # BLD AUTO: 8 X10E3/UL (ref 3.4–10.8)

## 2022-04-15 PROCEDURE — G0299 HHS/HOSPICE OF RN EA 15 MIN: HCPCS

## 2022-04-15 NOTE — OUTREACH NOTE
CT Surgery Week 2 Survey    Flowsheet Row Responses   [facility] patient discharged from? [place]   Does the patient have one of the following disease processes/diagnoses(primary or secondary)? Cardiothoracic surgery   Week 2 attempt successful? Yes   Call start time [id number]   Call end time 1642   Discharge diagnosis CABGxw   Person spoke with today (if not patient) and relationship Patient   Meds reviewed with patient/caregiver? Yes   Is the patient having any side effects they believe may be caused by any medication additions or changes? No   Does the patient have all medications related to this admission filled (includes all antibiotics, pain medications, cardiac medications, etc.) Yes   Is the patient taking all medications as directed (includes completed medication regime)? Yes   Does the patient have a primary care provider?  Yes   Does the patient have an appointment scheduled with their C/T surgeon? Yes   Has the patient kept scheduled appointments due by today? N/A   What is the Home health agency?  [facility]   Has home health visited the patient within 72 hours of discharge? Yes   Home health comments  dc'd,  last visit on 4/15/22   Psychosocial issues? No   What is the patient's perception of their health status since discharge? Improving   Is the patient /caregiver able to teach back basic post-op care? Take showers only when approved by MD-sponge bathe until then, No tub bath, swimming, or hot tub until instructed by MD, Keep incision areas clean, dry and protected, Lifting as instructed by MD in discharge instructions   Is the patient/caregiver able to teach back signs and symptoms of incisional infection? Increased redness, swelling or pain at the incisonal site, Increased drainage or bleeding, Incisional warmth, Pus or odor from incision, Fever   Is the patient/caregiver able to teach back steps to recovery at home? Rest and rebuild strength, gradually increase activity, Eat a well-balance  diet   Week 2 call completed? 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 No questions/concerns.          VINCENT VAZQUEZ - Registered Nurse

## 2022-04-18 VITALS
OXYGEN SATURATION: 98 % | WEIGHT: 168 LBS | DIASTOLIC BLOOD PRESSURE: 82 MMHG | SYSTOLIC BLOOD PRESSURE: 130 MMHG | RESPIRATION RATE: 18 BRPM | HEART RATE: 80 BPM | BODY MASS INDEX: 23.43 KG/M2 | TEMPERATURE: 97.2 F

## 2022-04-18 NOTE — HOME HEALTH
PATIENT IS DOING WELL POST CABG. HE STARTS OUTPATIENT CARDIAC REHAB IN 1 WEEK.     PLAN FOR NEXT VISIT: PREPARE FOR DC TO OUTPATIENT CARDIAC REHAB

## 2022-04-20 ENCOUNTER — HOME CARE VISIT (OUTPATIENT)
Dept: HOME HEALTH SERVICES | Facility: HOME HEALTHCARE | Age: 67
End: 2022-04-20

## 2022-04-20 PROCEDURE — G0299 HHS/HOSPICE OF RN EA 15 MIN: HCPCS

## 2022-04-22 VITALS
BODY MASS INDEX: 23.01 KG/M2 | WEIGHT: 165 LBS | OXYGEN SATURATION: 97 % | RESPIRATION RATE: 18 BRPM | SYSTOLIC BLOOD PRESSURE: 124 MMHG | DIASTOLIC BLOOD PRESSURE: 64 MMHG

## 2022-04-26 ENCOUNTER — OFFICE VISIT (OUTPATIENT)
Dept: CARDIAC REHAB | Facility: HOSPITAL | Age: 67
End: 2022-04-26

## 2022-04-26 DIAGNOSIS — I21.4 NSTEMI (NON-ST ELEVATED MYOCARDIAL INFARCTION): Primary | ICD-10-CM

## 2022-04-26 DIAGNOSIS — Z95.1 S/P CABG (CORONARY ARTERY BYPASS GRAFT): ICD-10-CM

## 2022-04-26 PROCEDURE — 93797 PHYS/QHP OP CAR RHAB WO ECG: CPT

## 2022-04-28 ENCOUNTER — READMISSION MANAGEMENT (OUTPATIENT)
Dept: CALL CENTER | Facility: HOSPITAL | Age: 67
End: 2022-04-28

## 2022-04-28 NOTE — OUTREACH NOTE
CT Surgery Week 4 Survey    Flowsheet Row Responses   [facility] patient discharged from? [place]   Does the patient have one of the following disease processes/diagnoses(primary or secondary)? Cardiothoracic surgery   Week 4 attempt successful? No          VINCENT VAZQUEZ - Registered Nurse

## 2022-04-29 ENCOUNTER — TELEPHONE (OUTPATIENT)
Dept: CARDIOLOGY | Facility: CLINIC | Age: 67
End: 2022-04-29

## 2022-04-30 DIAGNOSIS — I10 ESSENTIAL HYPERTENSION: ICD-10-CM

## 2022-04-30 DIAGNOSIS — E78.49 OTHER HYPERLIPIDEMIA: ICD-10-CM

## 2022-05-02 ENCOUNTER — TREATMENT (OUTPATIENT)
Dept: CARDIAC REHAB | Facility: HOSPITAL | Age: 67
End: 2022-05-02

## 2022-05-02 ENCOUNTER — TELEPHONE (OUTPATIENT)
Dept: CARDIAC SURGERY | Facility: CLINIC | Age: 67
End: 2022-05-02

## 2022-05-02 DIAGNOSIS — I21.4 NSTEMI (NON-ST ELEVATED MYOCARDIAL INFARCTION): Primary | ICD-10-CM

## 2022-05-02 PROCEDURE — 93798 PHYS/QHP OP CAR RHAB W/ECG: CPT

## 2022-05-02 RX ORDER — SIMVASTATIN 20 MG
TABLET ORAL
Qty: 30 TABLET | Refills: 0 | Status: SHIPPED | OUTPATIENT
Start: 2022-05-02 | End: 2022-05-25

## 2022-05-02 RX ORDER — VALSARTAN 320 MG/1
TABLET ORAL
Qty: 30 TABLET | Refills: 0 | Status: SHIPPED | OUTPATIENT
Start: 2022-05-02 | End: 2022-05-06

## 2022-05-02 NOTE — TELEPHONE ENCOUNTER
Pt called 4/28 to report a stitch coming out of his midsternal incision. Called pt 5/2 to follow up and he states that it is gone now. Will evaluate at appt 5/6. Pt will call back with any other issues.

## 2022-05-04 ENCOUNTER — APPOINTMENT (OUTPATIENT)
Dept: CARDIAC REHAB | Facility: HOSPITAL | Age: 67
End: 2022-05-04

## 2022-05-06 ENCOUNTER — OFFICE VISIT (OUTPATIENT)
Dept: CARDIAC SURGERY | Facility: CLINIC | Age: 67
End: 2022-05-06

## 2022-05-06 ENCOUNTER — TREATMENT (OUTPATIENT)
Dept: CARDIAC REHAB | Facility: HOSPITAL | Age: 67
End: 2022-05-06

## 2022-05-06 VITALS
BODY MASS INDEX: 23.52 KG/M2 | HEIGHT: 71 IN | OXYGEN SATURATION: 98 % | RESPIRATION RATE: 18 BRPM | SYSTOLIC BLOOD PRESSURE: 146 MMHG | HEART RATE: 83 BPM | TEMPERATURE: 97.8 F | DIASTOLIC BLOOD PRESSURE: 87 MMHG | WEIGHT: 168 LBS

## 2022-05-06 DIAGNOSIS — I21.4 NSTEMI (NON-ST ELEVATED MYOCARDIAL INFARCTION): Primary | ICD-10-CM

## 2022-05-06 DIAGNOSIS — Z95.1 S/P CABG X 3: Primary | ICD-10-CM

## 2022-05-06 PROCEDURE — 93798 PHYS/QHP OP CAR RHAB W/ECG: CPT

## 2022-05-06 PROCEDURE — 99024 POSTOP FOLLOW-UP VISIT: CPT | Performed by: REGISTERED NURSE

## 2022-05-06 RX ORDER — FUROSEMIDE 20 MG/1
TABLET ORAL
Qty: 30 TABLET | Refills: 0 | OUTPATIENT
Start: 2022-05-06

## 2022-05-06 RX ORDER — METOPROLOL SUCCINATE 25 MG/1
TABLET, EXTENDED RELEASE ORAL
Qty: 30 TABLET | Refills: 1 | Status: SHIPPED | OUTPATIENT
Start: 2022-05-06 | End: 2022-05-25 | Stop reason: SDUPTHER

## 2022-05-06 NOTE — PATIENT INSTRUCTIONS
Continue lifting restriction of 10 lbs until 6 weeks and 50 lbs until 12 weeks from date of surgery. No excessive jarring motions or twisting motions until 12 weeks from date of surgery.     Weigh daily.  Take Lasix (furosemide) for weight gain of 3 lbs in 24 hours or 5 lbs in 72 hours, take potassium pill with every Lasix dose.       Fat and Cholesterol Restricted Eating Plan  Getting too much fat and cholesterol in your diet may cause health problems. Choosing the right foods helps keep your fat and cholesterol at normal levels. This can keep you from getting certain diseases.  Your doctor may recommend an eating plan that includes:  Total fat: ______% or less of total calories a day.  Saturated fat: ______% or less of total calories a day.  Cholesterol: less than _________mg a day.  Fiber: ______g a day.  What are tips for following this plan?  Meal planning  At meals, divide your plate into four equal parts:  Fill one-half of your plate with vegetables and green salads.  Fill one-fourth of your plate with whole grains.  Fill one-fourth of your plate with low-fat (lean) protein foods.  Eat fish that is high in omega-3 fats at least two times a week. This includes mackerel, tuna, sardines, and salmon.  Eat foods that are high in fiber, such as whole grains, beans, apples, broccoli, carrots, peas, and barley.  General tips       Work with your doctor to lose weight if you need to.  Avoid:  Foods with added sugar.  Fried foods.  Foods with partially hydrogenated oils.  Limit alcohol intake to no more than 1 drink a day for nonpregnant women and 2 drinks a day for men. One drink equals 12 oz of beer, 5 oz of wine, or 1½ oz of hard liquor.  Reading food labels  Check food labels for:  Trans fats.  Partially hydrogenated oils.  Saturated fat (g) in each serving.  Cholesterol (mg) in each serving.  Fiber (g) in each serving.  Choose foods with healthy fats, such as:  Monounsaturated fats.  Polyunsaturated fats.  Omega-3  "fats.  Choose grain products that have whole grains. Look for the word \"whole\" as the first word in the ingredient list.  Cooking  Cook foods using low-fat methods. These include baking, boiling, grilling, and broiling.  Eat more home-cooked foods. Eat at restaurants and buffets less often.  Avoid cooking using saturated fats, such as butter, cream, palm oil, palm kernel oil, and coconut oil.  Recommended foods       Fruits  All fresh, canned (in natural juice), or frozen fruits.  Vegetables  Fresh or frozen vegetables (raw, steamed, roasted, or grilled). Green salads.  Grains  Whole grains, such as whole wheat or whole grain breads, crackers, cereals, and pasta. Unsweetened oatmeal, bulgur, barley, quinoa, or brown rice. Corn or whole wheat flour tortillas.  Meats and other protein foods  Ground beef (85% or leaner), grass-fed beef, or beef trimmed of fat. Skinless chicken or turkey. Ground chicken or turkey. Pork trimmed of fat. All fish and seafood. Egg whites. Dried beans, peas, or lentils. Unsalted nuts or seeds. Unsalted canned beans. Nut butters without added sugar or oil.  Dairy  Low-fat or nonfat dairy products, such as skim or 1% milk, 2% or reduced-fat cheeses, low-fat and fat-free ricotta or cottage cheese, or plain low-fat and nonfat yogurt.  Fats and oils  Tub margarine without trans fats. Light or reduced-fat mayonnaise and salad dressings. Avocado. Olive, canola, sesame, or safflower oils.  The items listed above may not be a complete list of foods and beverages you can eat. Contact a dietitian for more information.  Foods to avoid  Fruits  Canned fruit in heavy syrup. Fruit in cream or butter sauce. Fried fruit.  Vegetables  Vegetables cooked in cheese, cream, or butter sauce. Fried vegetables.  Grains  White bread. White pasta. White rice. Cornbread. Bagels, pastries, and croissants. Crackers and snack foods that contain trans fat and hydrogenated oils.  Meats and other protein foods  Fatty cuts of " meat. Ribs, chicken wings, hawley, sausage, bologna, salami, chitterlings, fatback, hot dogs, bratwurst, and packaged lunch meats. Liver and organ meats. Whole eggs and egg yolks. Chicken and turkey with skin. Fried meat.  Dairy  Whole or 2% milk, cream, half-and-half, and cream cheese. Whole milk cheeses. Whole-fat or sweetened yogurt. Full-fat cheeses. Nondairy creamers and whipped toppings. Processed cheese, cheese spreads, and cheese curds.  Beverages  Alcohol. Sugar-sweetened drinks such as sodas, lemonade, and fruit drinks.  Fats and oils  Butter, stick margarine, lard, shortening, ghee, or hawley fat. Coconut, palm kernel, and palm oils.  Sweets and desserts  Corn syrup, sugars, honey, and molasses. Candy. Jam and jelly. Syrup. Sweetened cereals. Cookies, pies, cakes, donuts, muffins, and ice cream.  The items listed above may not be a complete list of foods and beverages you should avoid. Contact a dietitian for more information.  Summary  Choosing the right foods helps keep your fat and cholesterol at normal levels. This can keep you from getting certain diseases.  At meals, fill one-half of your plate with vegetables and green salads.  Eat high-fiber foods, like whole grains, beans, apples, carrots, peas, and barley.  Limit added sugar, saturated fats, alcohol, and fried foods.  This information is not intended to replace advice given to you by your health care provider. Make sure you discuss any questions you have with your health care provider.  Document Revised: 08/21/2019 Document Reviewed: 09/04/2018  [employer] Patient Education © 2020 [employer]

## 2022-05-09 ENCOUNTER — TREATMENT (OUTPATIENT)
Dept: CARDIAC REHAB | Facility: HOSPITAL | Age: 67
End: 2022-05-09

## 2022-05-09 ENCOUNTER — APPOINTMENT (OUTPATIENT)
Dept: SLEEP MEDICINE | Facility: HOSPITAL | Age: 67
End: 2022-05-09

## 2022-05-09 DIAGNOSIS — I21.4 NSTEMI (NON-ST ELEVATED MYOCARDIAL INFARCTION): Primary | ICD-10-CM

## 2022-05-09 PROCEDURE — 93798 PHYS/QHP OP CAR RHAB W/ECG: CPT

## 2022-05-10 NOTE — PROGRESS NOTES
"..CARDIOVASCULAR SURGERY FOLLOW-UP PROGRESS NOTE    Chief Complaint: Post-Op Follow-Up      HPI:   Dear Percy, TAYLOR Young and colleagues:    It was nice to see Osman Aparicio in follow up approximately 6 weeks after surgery.  As you know, he is a 66 y.o. male with PMH significant for CAD, recent NSTEMI, tobacco/alcohol use, glucose intolerance, hematuria, HLD, HTN, 5.5cm abdominal aneurysm, 3.8c.m ascending aortic aneurysm, and vitamin D deficiency. Underwent Off pump CABG x3 utilizing LIMA to LAD, SVG to OM1, SVG to OM2 with Dr. Zamora on 4/1/2022.     He did well post-operatively.  He did experience congestion post-operatively, requiring several doses of diuresis.  On POD#4 he was deemed appropriate for discharge home with home health.     Mr. Aparicio presents to office today for follow-up. Appears well and has no acute complaints/concerns. Denies popping/clicking/rubbing in the sternum. Denies fever/chills & sign/symptoms of infection. Mid-sternal incision and RSVG healing nicely with no erythema/drainage/dehiscence present.  Sternum stable on palpation.  Has followed up with his PCP & Cardiology.  Plans to initiate cardiac rehab shortly.     From a surgical standpoint Mr. Aparicio is progressing well. No need for scheduled follow-up visit in our office.                /87 (BP Location: Right arm, Patient Position: Sitting, Cuff Size: Adult)   Pulse 83   Temp 97.8 °F (36.6 °C)   Resp 18   Ht 180.3 cm (71\")   Wt 76.2 kg (168 lb)   SpO2 98%   BMI 23.43 kg/m²   Heart:  regular rate and rhythm, S1, S2 normal, no murmur, click, rub or gallop, no rub  Lungs:  clear to auscultation bilaterally  Extremities:  no edema  Incision(s):  mid chest healing well, no significant drainage, no dehiscence, no significant erythema, right leg healing well, no significant drainage, no dehiscence, no significant erythema, sternum stable    Assessment/Plan:     S/P CABG. Overall, he is doing well.    No significant " post-op complications    Okay to gradually begin lifting >10 lb (up to 50 lb) up until 12 weeks  Keep incisions clean and dry  OK to drive if not taking narcotic pain medicine  OK to begin cardiac rehab  Follow-up as scheduled with cardiology  Follow-up as scheduled with PCP  Follow-up with CT surgery prn    Encouraged patient to call office with any questions or concerns as scheduled follow-up is not required at this time.   Instructions/restrictions verbally reviewed with patient -- verbalized understanding.  After visit summary provided to patient.    Thank you for allowing me to participate in the plan of care for your patient.  Best Regards,    TAYLOR Silva  05/10/22  13:09 EDT   ..Electronically signed by TAYLOR Londono, 05/10/22, 1:57 PM EDT.

## 2022-05-11 ENCOUNTER — TREATMENT (OUTPATIENT)
Dept: CARDIAC REHAB | Facility: HOSPITAL | Age: 67
End: 2022-05-11

## 2022-05-11 DIAGNOSIS — I21.4 NSTEMI (NON-ST ELEVATED MYOCARDIAL INFARCTION): Primary | ICD-10-CM

## 2022-05-11 PROCEDURE — 93798 PHYS/QHP OP CAR RHAB W/ECG: CPT

## 2022-05-13 ENCOUNTER — TREATMENT (OUTPATIENT)
Dept: CARDIAC REHAB | Facility: HOSPITAL | Age: 67
End: 2022-05-13

## 2022-05-13 DIAGNOSIS — I21.4 NSTEMI (NON-ST ELEVATED MYOCARDIAL INFARCTION): Primary | ICD-10-CM

## 2022-05-13 PROCEDURE — 93798 PHYS/QHP OP CAR RHAB W/ECG: CPT

## 2022-05-16 ENCOUNTER — HOSPITAL ENCOUNTER (EMERGENCY)
Facility: HOSPITAL | Age: 67
Discharge: HOME | End: 2022-05-16
Attending: EMERGENCY MEDICINE | Admitting: EMERGENCY MEDICINE

## 2022-05-16 ENCOUNTER — TELEPHONE (OUTPATIENT)
Dept: CARDIOLOGY | Facility: CLINIC | Age: 67
End: 2022-05-16

## 2022-05-16 ENCOUNTER — APPOINTMENT (OUTPATIENT)
Dept: GENERAL RADIOLOGY | Facility: HOSPITAL | Age: 67
End: 2022-05-16

## 2022-05-16 ENCOUNTER — APPOINTMENT (OUTPATIENT)
Dept: CT IMAGING | Facility: HOSPITAL | Age: 67
End: 2022-05-16

## 2022-05-16 VITALS
DIASTOLIC BLOOD PRESSURE: 96 MMHG | RESPIRATION RATE: 17 BRPM | SYSTOLIC BLOOD PRESSURE: 156 MMHG | OXYGEN SATURATION: 96 % | TEMPERATURE: 97.4 F | HEART RATE: 68 BPM

## 2022-05-16 DIAGNOSIS — R05.9 COUGH: ICD-10-CM

## 2022-05-16 DIAGNOSIS — Z87.09 HISTORY OF COPD: ICD-10-CM

## 2022-05-16 DIAGNOSIS — Z95.1 HX OF CABG: ICD-10-CM

## 2022-05-16 DIAGNOSIS — R50.9 INTERMITTENT FEVER: ICD-10-CM

## 2022-05-16 DIAGNOSIS — J18.9 PNEUMONIA OF BOTH LUNGS DUE TO INFECTIOUS ORGANISM, UNSPECIFIED PART OF LUNG: Primary | ICD-10-CM

## 2022-05-16 DIAGNOSIS — Z86.79 HISTORY OF HYPERTENSION: ICD-10-CM

## 2022-05-16 LAB
ALBUMIN SERPL-MCNC: 4 G/DL (ref 3.5–5.2)
ALBUMIN/GLOB SERPL: 1.4 G/DL
ALP SERPL-CCNC: 66 U/L (ref 39–117)
ALT SERPL W P-5'-P-CCNC: 8 U/L (ref 1–41)
ANION GAP SERPL CALCULATED.3IONS-SCNC: 11 MMOL/L (ref 5–15)
AST SERPL-CCNC: 10 U/L (ref 1–40)
BASOPHILS # BLD AUTO: 0.04 10*3/MM3 (ref 0–0.2)
BASOPHILS NFR BLD AUTO: 0.3 % (ref 0–1.5)
BILIRUB SERPL-MCNC: 0.5 MG/DL (ref 0–1.2)
BILIRUB UR QL STRIP: NEGATIVE
BUN SERPL-MCNC: 12 MG/DL (ref 8–23)
BUN/CREAT SERPL: 15 (ref 7–25)
CALCIUM SPEC-SCNC: 9.2 MG/DL (ref 8.6–10.5)
CHLORIDE SERPL-SCNC: 94 MMOL/L (ref 98–107)
CLARITY UR: CLEAR
CO2 SERPL-SCNC: 23 MMOL/L (ref 22–29)
COLOR UR: YELLOW
CREAT SERPL-MCNC: 0.8 MG/DL (ref 0.76–1.27)
D-LACTATE SERPL-SCNC: 1 MMOL/L (ref 0.5–2)
DEPRECATED RDW RBC AUTO: 50.6 FL (ref 37–54)
EGFRCR SERPLBLD CKD-EPI 2021: 97.6 ML/MIN/1.73
EOSINOPHIL # BLD AUTO: 0.1 10*3/MM3 (ref 0–0.4)
EOSINOPHIL NFR BLD AUTO: 0.7 % (ref 0.3–6.2)
ERYTHROCYTE [DISTWIDTH] IN BLOOD BY AUTOMATED COUNT: 14.8 % (ref 12.3–15.4)
FLUAV SUBTYP SPEC NAA+PROBE: NOT DETECTED
FLUBV RNA ISLT QL NAA+PROBE: NOT DETECTED
GLOBULIN UR ELPH-MCNC: 2.9 GM/DL
GLUCOSE SERPL-MCNC: 113 MG/DL (ref 65–99)
GLUCOSE UR STRIP-MCNC: NEGATIVE MG/DL
HCT VFR BLD AUTO: 28.7 % (ref 37.5–51)
HGB BLD-MCNC: 10 G/DL (ref 13–17.7)
HGB UR QL STRIP.AUTO: NEGATIVE
IMM GRANULOCYTES # BLD AUTO: 0.38 10*3/MM3 (ref 0–0.05)
IMM GRANULOCYTES NFR BLD AUTO: 2.6 % (ref 0–0.5)
KETONES UR QL STRIP: NEGATIVE
LEUKOCYTE ESTERASE UR QL STRIP.AUTO: NEGATIVE
LYMPHOCYTES # BLD AUTO: 2.58 10*3/MM3 (ref 0.7–3.1)
LYMPHOCYTES NFR BLD AUTO: 17.5 % (ref 19.6–45.3)
MAGNESIUM SERPL-MCNC: 2.1 MG/DL (ref 1.6–2.4)
MCH RBC QN AUTO: 33.3 PG (ref 26.6–33)
MCHC RBC AUTO-ENTMCNC: 34.8 G/DL (ref 31.5–35.7)
MCV RBC AUTO: 95.7 FL (ref 79–97)
MONOCYTES # BLD AUTO: 1.18 10*3/MM3 (ref 0.1–0.9)
MONOCYTES NFR BLD AUTO: 8 % (ref 5–12)
NEUTROPHILS NFR BLD AUTO: 10.43 10*3/MM3 (ref 1.7–7)
NEUTROPHILS NFR BLD AUTO: 70.9 % (ref 42.7–76)
NITRITE UR QL STRIP: NEGATIVE
NRBC BLD AUTO-RTO: 0 /100 WBC (ref 0–0.2)
PH UR STRIP.AUTO: 6 [PH] (ref 5–8)
PLATELET # BLD AUTO: 322 10*3/MM3 (ref 140–450)
PMV BLD AUTO: 9.9 FL (ref 6–12)
POTASSIUM SERPL-SCNC: 3.7 MMOL/L (ref 3.5–5.2)
PROCALCITONIN SERPL-MCNC: 0.15 NG/ML (ref 0–0.25)
PROT SERPL-MCNC: 6.9 G/DL (ref 6–8.5)
PROT UR QL STRIP: NEGATIVE
QT INTERVAL: 428 MS
RBC # BLD AUTO: 3 10*6/MM3 (ref 4.14–5.8)
SARS-COV-2 RNA PNL SPEC NAA+PROBE: NOT DETECTED
SODIUM SERPL-SCNC: 128 MMOL/L (ref 136–145)
SP GR UR STRIP: 1.01 (ref 1–1.03)
UROBILINOGEN UR QL STRIP: NORMAL
WBC NRBC COR # BLD: 14.71 10*3/MM3 (ref 3.4–10.8)

## 2022-05-16 PROCEDURE — 84145 PROCALCITONIN (PCT): CPT | Performed by: EMERGENCY MEDICINE

## 2022-05-16 PROCEDURE — 71045 X-RAY EXAM CHEST 1 VIEW: CPT

## 2022-05-16 PROCEDURE — 81003 URINALYSIS AUTO W/O SCOPE: CPT | Performed by: EMERGENCY MEDICINE

## 2022-05-16 PROCEDURE — 85025 COMPLETE CBC W/AUTO DIFF WBC: CPT | Performed by: EMERGENCY MEDICINE

## 2022-05-16 PROCEDURE — 71260 CT THORAX DX C+: CPT

## 2022-05-16 PROCEDURE — 80053 COMPREHEN METABOLIC PANEL: CPT | Performed by: EMERGENCY MEDICINE

## 2022-05-16 PROCEDURE — 25010000002 IOPAMIDOL 61 % SOLUTION: Performed by: EMERGENCY MEDICINE

## 2022-05-16 PROCEDURE — 93005 ELECTROCARDIOGRAM TRACING: CPT

## 2022-05-16 PROCEDURE — 36415 COLL VENOUS BLD VENIPUNCTURE: CPT

## 2022-05-16 PROCEDURE — 87636 SARSCOV2 & INF A&B AMP PRB: CPT | Performed by: EMERGENCY MEDICINE

## 2022-05-16 PROCEDURE — 93005 ELECTROCARDIOGRAM TRACING: CPT | Performed by: EMERGENCY MEDICINE

## 2022-05-16 PROCEDURE — 99283 EMERGENCY DEPT VISIT LOW MDM: CPT

## 2022-05-16 PROCEDURE — 83605 ASSAY OF LACTIC ACID: CPT | Performed by: EMERGENCY MEDICINE

## 2022-05-16 PROCEDURE — 93010 ELECTROCARDIOGRAM REPORT: CPT | Performed by: INTERNAL MEDICINE

## 2022-05-16 PROCEDURE — 83735 ASSAY OF MAGNESIUM: CPT | Performed by: EMERGENCY MEDICINE

## 2022-05-16 RX ORDER — AZITHROMYCIN 250 MG/1
500 TABLET, FILM COATED ORAL ONCE
Status: COMPLETED | OUTPATIENT
Start: 2022-05-16 | End: 2022-05-16

## 2022-05-16 RX ORDER — DOXYCYCLINE 100 MG/1
100 CAPSULE ORAL 2 TIMES DAILY
Qty: 14 CAPSULE | Refills: 0 | Status: SHIPPED | OUTPATIENT
Start: 2022-05-16 | End: 2022-05-23

## 2022-05-16 RX ORDER — AMOXICILLIN 250 MG/1
1000 CAPSULE ORAL ONCE
Status: COMPLETED | OUTPATIENT
Start: 2022-05-16 | End: 2022-05-16

## 2022-05-16 RX ORDER — AMOXICILLIN 500 MG/1
1000 CAPSULE ORAL 3 TIMES DAILY
Qty: 42 CAPSULE | Refills: 0 | Status: SHIPPED | OUTPATIENT
Start: 2022-05-16 | End: 2022-05-23

## 2022-05-16 RX ADMIN — IOPAMIDOL 85 ML: 612 INJECTION, SOLUTION INTRAVENOUS at 19:30

## 2022-05-16 RX ADMIN — AZITHROMYCIN DIHYDRATE 500 MG: 250 TABLET, FILM COATED ORAL at 20:32

## 2022-05-16 RX ADMIN — AMOXICILLIN 1000 MG: 250 CAPSULE ORAL at 20:32

## 2022-05-16 NOTE — TELEPHONE ENCOUNTER
Pt's wife called this afternoon. She said that four days ago Osman started running a fever of 101 and he was very chilled. He has been fatigued since then, has a cough and some chest tightness. I advised that pt go to Urgent Care to be tested for COVID and possible respiratory infection. Pt's wife verbalized understanding and they will follow up with us if needed after he is seen at .    Thank you,    Holly Steven, RN  Triage [facility]

## 2022-05-16 NOTE — ED NOTES
Patient from home via [facility]; c/o fever since Wednesday. Fever has been intermittent since then. Tylenol helps with fever temporarily. Last tylenol was last night.

## 2022-05-16 NOTE — ED NOTES
Pt c/o fever 4-5 days ago. Peak temp 102. Fever is intermittent and responsive to tylenol. Pt reports sinus drainage and tightness in chest.  Pt had CABG done 4/1.  Suture sites well approximated with no drainage or redness    This RN wore mask and goggles during time of contact

## 2022-05-16 NOTE — ED PROVIDER NOTES
EMERGENCY DEPARTMENT ENCOUNTER    Room Number:  25/25  Date of encounter:  5/16/2022  PCP: Desire Greer APRN  Historian: Patient      HPI:  Chief Complaint: Fever  A complete HPI/ROS/PMH/PSH/SH/FH are unobtainable due to: None    Context: Osman Aparicio is a 66 y.o. male who presents to the ED via private vehicle for 2 intermittent fevers, first 1 on Wednesday up to 102 with chills and rigors.  Felt fine in the interim until Sunday night when he had another fever which was less severe.  Has had a mild increased cough from baseline, no body aches, no chest pain or shortness of breath.  No vomiting or diarrhea, no abdominal pains, eating and drinking well.  Had a CABG on April 1.       MEDICAL RECORD REVIEW    Chart review in epic shows op note from April 1, 2022 with the patient undergoing CABG x3 with Dr. Zamora.     PAST MEDICAL HISTORY  Active Ambulatory Problems     Diagnosis Date Noted   • Essential hypertension 06/01/2016   • Other hyperlipidemia 06/01/2016   • Anxiety 06/01/2016   • Nocturia 07/27/2017   • Elevated PSA 04/23/2018   • Elevated blood sugar level 04/23/2018   • Positive colorectal cancer screening using Cologuard test 11/18/2020   • Shortness of breath 03/26/2022   • Acute diastolic congestive heart failure (HCC) 03/26/2022   • Leukocytosis 03/26/2022   • Positive D dimer 03/26/2022   • Anemia 03/26/2022   • COPD with acute exacerbation (HCC) 03/26/2022   • Emphysema ([facility]) 03/26/2022   • History of colon polyps 03/26/2022   • AAA (abdominal aortic aneurysm) ([facility]) 03/26/2022   • Alcohol abuse 03/27/2022   • NSTEMI (non-ST elevated myocardial infarction) ([facility]) 03/29/2022   • Coronary artery disease involving native coronary artery of native heart without angina pectoris 03/26/2022   • Abnormal findings on diagnostic imaging of heart and coronary circulation 03/26/2022     Resolved Ambulatory Problems     Diagnosis Date Noted   • Acute respiratory failure with hypoxia ([facility])  03/26/2022   • Hyponatremia 03/26/2022   • Acute pulmonary edema (HCC) 03/26/2022     Past Medical History:   Diagnosis Date   • Abnormal glucose    • Encounter for special screening examination for neoplasm of prostate 10/2012   • Hematuria    • Hyperlipidemia    • Hypertension    • Plantar wart    • Vitamin D deficiency disease          PAST SURGICAL HISTORY  Past Surgical History:   Procedure Laterality Date   • CARDIAC CATHETERIZATION N/A 3/29/2022    Procedure: Left Heart Cath;  Surgeon: Neto Paige MD;  Location: [facility] CATH INVASIVE LOCATION;  Service: Cardiology;  Laterality: N/A;   • CARDIAC CATHETERIZATION N/A 3/29/2022    Procedure: Coronary angiography;  Surgeon: Neto Paige MD;  Location: [facility] CATH INVASIVE LOCATION;  Service: Cardiology;  Laterality: N/A;   • CARDIAC CATHETERIZATION N/A 3/29/2022    Procedure: Left ventriculography;  Surgeon: Neto Paige MD;  Location: [facility] CATH INVASIVE LOCATION;  Service: Cardiology;  Laterality: N/A;   • COLONOSCOPY N/A 12/7/2020    Procedure: COLONOSCOPY INTO CECUM WITH HOT SNARE POLYPECTOMIES, COLD BX POLYPECTOMIES, RESOLUTION CLIPS X;  Surgeon: Regi Mercado MD;  Location: [facility] ENDOSCOPY;  Service: General;  Laterality: N/A;  PRE:  POSITIVE COLOGUARD  POST:  POLYPS   • CORONARY ARTERY BYPASS GRAFT N/A 4/1/2022    Procedure: STERNOTOMY, CORONARY ARTERY BYPASS UTILIZINGTHE RT SAPHENOUS VEIN WITH LEFT INTERNAL MAMMARY ARTERY GRAFT X3 OFF PUMP, PRP;  Surgeon: Colten Zamora MD;  Location: [facility];  Service: Cardiothoracic;  Laterality: N/A;   • TRANSESOPHAGEAL ECHOCARDIOGRAM (MARTHA) N/A 4/1/2022    Procedure: TRANSESOPHAGEAL ECHOCARDIOGRAM WITH ANESTHESIA;  Surgeon: Colten Zamora MD;  Location: [facility];  Service: Cardiothoracic;  Laterality: N/A;         FAMILY HISTORY  Family History   Problem Relation Age of Onset   • Lung cancer Brother    • Liver cancer Brother    • Colon polyps Brother           SOCIAL HISTORY  Social History     Socioeconomic History   • Marital status:    Tobacco Use   • Smoking status: Former Smoker     Packs/day: 1.00     Years: 45.00     Pack years: 45.00     Types: Cigarettes     Quit date: 3/26/2022     Years since quittin.1   • Smokeless tobacco: Never Used   • Tobacco comment: caffeine - 3 cups coffee daily, tea or soda occas.   Vaping Use   • Vaping Use: Never used   Substance and Sexual Activity   • Alcohol use: Not Currently   • Drug use: No   • Sexual activity: Defer         ALLERGIES  Patient has no known allergies.        REVIEW OF SYSTEMS  Review of Systems     All systems reviewed and negative except for those discussed in HPI.       PHYSICAL EXAM    I have reviewed the triage vital signs and nursing notes.    ED Triage Vitals   Temp Heart Rate Resp BP SpO2   22 1631 22 1631 22 1631 22 1632 22 1631   97.4 °F (36.3 °C) 78 16 146/82 96 %      Temp src Heart Rate Source Patient Position BP Location FiO2 (%)   -- -- -- -- --              Physical Exam  General: No acute distress, nontoxic, nondiaphoretic  HEENT: Mucous membranes moist, atraumatic, EOMI  Neck: Full ROM  Pulm: Symmetric chest rise, nonlabored, lungs CTAB  Cardiovascular: Regular rate and rhythm, intact distal pulses, no peripheral edema  GI: Soft, nontender, nondistended, no rebound, no guarding, bowel sounds present  MSK: Full ROM, no deformity, well-healing sternal surgical scar site with no erythema, tenderness, or purulence  Skin: Warm, dry  Neuro: Awake, alert, oriented x 4, GCS 15, moving all extremities, no focal deficits  Psych: Calm, cooperative      N95, protective eye goggles, and gloves used during this encounter. Patient in surgical mask.      LAB RESULTS  Recent Results (from the past 24 hour(s))   ECG 12 Lead    Collection Time: 22  4:36 PM   Result Value Ref Range    QT Interval 428 ms   Comprehensive Metabolic Panel    Collection Time:  05/16/22  5:35 PM    Specimen: Blood   Result Value Ref Range    Glucose 113 (H) 65 - 99 mg/dL    BUN 12 8 - 23 mg/dL    Creatinine 0.80 0.76 - 1.27 mg/dL    Sodium 128 (L) 136 - 145 mmol/L    Potassium 3.7 3.5 - 5.2 mmol/L    Chloride 94 (L) 98 - 107 mmol/L    CO2 23.0 22.0 - 29.0 mmol/L    Calcium 9.2 8.6 - 10.5 mg/dL    Total Protein 6.9 6.0 - 8.5 g/dL    Albumin 4.00 3.50 - 5.20 g/dL    ALT (SGPT) 8 1 - 41 U/L    AST (SGOT) 10 1 - 40 U/L    Alkaline Phosphatase 66 39 - 117 U/L    Total Bilirubin 0.5 0.0 - 1.2 mg/dL    Globulin 2.9 gm/dL    A/G Ratio 1.4 g/dL    BUN/Creatinine Ratio 15.0 7.0 - 25.0    Anion Gap 11.0 5.0 - 15.0 mmol/L    eGFR 97.6 >60.0 mL/min/1.73   Lactic Acid, Plasma    Collection Time: 05/16/22  5:35 PM    Specimen: Blood   Result Value Ref Range    Lactate 1.0 0.5 - 2.0 mmol/L   Procalcitonin    Collection Time: 05/16/22  5:35 PM    Specimen: Blood   Result Value Ref Range    Procalcitonin 0.15 0.00 - 0.25 ng/mL   Magnesium    Collection Time: 05/16/22  5:35 PM    Specimen: Blood   Result Value Ref Range    Magnesium 2.1 1.6 - 2.4 mg/dL   CBC Auto Differential    Collection Time: 05/16/22  5:35 PM    Specimen: Blood   Result Value Ref Range    WBC 14.71 (H) 3.40 - 10.80 10*3/mm3    RBC 3.00 (L) 4.14 - 5.80 10*6/mm3    Hemoglobin 10.0 (L) 13.0 - 17.7 g/dL    Hematocrit 28.7 (L) 37.5 - 51.0 %    MCV 95.7 79.0 - 97.0 fL    MCH 33.3 (H) 26.6 - 33.0 pg    MCHC 34.8 31.5 - 35.7 g/dL    RDW 14.8 12.3 - 15.4 %    RDW-SD 50.6 37.0 - 54.0 fl    MPV 9.9 6.0 - 12.0 fL    Platelets 322 140 - 450 10*3/mm3    Neutrophil % 70.9 42.7 - 76.0 %    Lymphocyte % 17.5 (L) 19.6 - 45.3 %    Monocyte % 8.0 5.0 - 12.0 %    Eosinophil % 0.7 0.3 - 6.2 %    Basophil % 0.3 0.0 - 1.5 %    Immature Grans % 2.6 (H) 0.0 - 0.5 %    Neutrophils, Absolute 10.43 (H) 1.70 - 7.00 10*3/mm3    Lymphocytes, Absolute 2.58 0.70 - 3.10 10*3/mm3    Monocytes, Absolute 1.18 (H) 0.10 - 0.90 10*3/mm3    Eosinophils, Absolute 0.10 0.00 - 0.40  10*3/mm3    Basophils, Absolute 0.04 0.00 - 0.20 10*3/mm3    Immature Grans, Absolute 0.38 (H) 0.00 - 0.05 10*3/mm3    nRBC 0.0 0.0 - 0.2 /100 WBC   COVID-19 and FLU A/B PCR - Swab, Nasopharynx    Collection Time: 05/16/22  5:36 PM    Specimen: Nasopharynx; Swab   Result Value Ref Range    COVID19 Not Detected Not Detected - Ref. Range    Influenza A PCR Not Detected Not Detected    Influenza B PCR Not Detected Not Detected   Urinalysis With Microscopic If Indicated (No Culture) - Urine, Clean Catch    Collection Time: 05/16/22  5:37 PM    Specimen: Urine, Clean Catch   Result Value Ref Range    Color, UA Yellow Yellow, Straw    Appearance, UA Clear Clear    pH, UA 6.0 5.0 - 8.0    Specific Gravity, UA 1.015 1.005 - 1.030    Glucose, UA Negative Negative    Ketones, UA Negative Negative    Bilirubin, UA Negative Negative    Blood, UA Negative Negative    Protein, UA Negative Negative    Leuk Esterase, UA Negative Negative    Nitrite, UA Negative Negative    Urobilinogen, UA 0.2 E.U./dL 0.2 - 1.0 E.U./dL       Ordered the above labs and independently reviewed the results.        RADIOLOGY  CT Chest With Contrast Diagnostic    Result Date: 5/16/2022  CT CHEST W CONTRAST DIAGNOSTIC-  INDICATIONS: Fever  TECHNIQUE: Radiation dose reduction techniques were utilized, including automated exposure control and exposure modulation based on body size. ENHANCED CHEST CT  COMPARISON: 03/26/2022  FINDINGS:    The heart size is normal without pericardial effusion. Moderate to prominent coronary arterial calcifications are seen. The ascending aorta is dilated, 3.8 centimeter, stable. Previous nonspecific mediastinal lymph nodes show no obvious increase. A previous right hilar lymph node was 1.3 cm short axis now measures 1.0 cm short axis. Sternotomy changes are seen.  The airways appear clear.  Trace left pleural effusion is seen.  The lungs show apical predominant emphysematous changes. Multifocal nodular infiltrates suggest  multifocal pneumonia, follow-up recommended to exclude any possibility of an underlying lesion. For example, pulmonary opacities are seen medially in the right middle lobe, and anteriorly and posteriorly inferiorly in the left lower lobe. Also posteriorly in the right upper lobe, axial image 28, DICOM series 3.  Upper abdominal structures show no acute findings. Previous right renal atrophy is partly included on this exam.  Degenerative changes are seen in the spine. No acute fracture is identified.       1. Multifocal bilateral nodular pulmonary infiltrates, suggesting pneumonia, follow-up recommended to characterize resolution and to exclude any possibility of underlying lesions.  This report was finalized on 5/16/2022 7:48 PM by Dr. Dl Veras M.D.      XR Chest 1 View    Result Date: 5/16/2022  PORTABLE CHEST 05/16/2022 AT 5:47 PM  CLINICAL HISTORY: Fever and chills.  Compared to the previous chest dated 04/04/2022.  The lungs are well-expanded and appear free of infiltrates. There are no pleural effusions. The heart is mildly enlarged. The thoracic aorta is calcified and mildly tortuous.  IMPRESSIONS: No evidence of acute disease within the chest.  This report was finalized on 5/16/2022 6:26 PM by Dr. Mariusz Patton M.D.        I ordered the above noted radiological studies. Reviewed by me.  See dictation for official radiology interpretation.      PROCEDURES    Procedures  EKG    EKG Time: 1636  Rhythm/Rate: Sinus rhythm with rate of 60  Normal axis  Normal intervals  Repolarization abnormalities in the lateral leads I and aVL as well as some T wave inversions in leads V2 no acute ischemic changes  No STEMI     Interpreted Contemporaneously by me, independently viewed  These are stable changes compared to April 12, 2020      MEDICATIONS GIVEN IN ER    Medications   iopamidol (ISOVUE-300) 61 % injection 100 mL (85 mL Intravenous Given 5/16/22 1930)   amoxicillin (AMOXIL) capsule 1,000 mg (1,000 mg Oral  Given 5/16/22 2032)   azithromycin (ZITHROMAX) tablet 500 mg (500 mg Oral Given 5/16/22 2032)         PROGRESS, DATA ANALYSIS, CONSULTS, AND MEDICAL DECISION MAKING    All labs have been independently reviewed by me.  All radiology studies have been reviewed by me and discussed with radiologist dictating the report.   EKG's independently viewed and interpreted by me.  Discussion below represents my analysis of pertinent findings related to patient's condition, differential diagnosis, treatment plan and final disposition.    Initial concern for community-acquired pneumonia, viral pneumonia, viral process, dehydration, renal failure, electrolyte abnormalities, heart failure, renal failure, among others.  Plan for labs, chest x-ray, and reevaluation with results.    ED Course as of 05/16/22 2126   Mon May 16, 2022   1753 Nitrite, UA: Negative [DC]   1753 Leukocytes, UA: Negative [DC]   1753 Ketones, UA: Negative [DC]   1807 WBC(!): 14.71 [DC]   1807 Hemoglobin(!): 10.0 [DC]   1807 Platelets: 322 [DC]   1824 Lactate: 1.0 [DC]   1824 Procalcitonin: 0.15 [DC]   1824 Magnesium: 2.1 [DC]   1824 Glucose(!): 113 [DC]   1824 BUN: 12 [DC]   1824 Creatinine: 0.80 [DC]   1824 Sodium(!): 128  130 one month ago [DC]   1824 Potassium: 3.7 [DC]   1824 ALT (SGPT): 8 [DC]   1824 AST (SGOT): 10 [DC]   1824 Alkaline Phosphatase: 66 [DC]   1824 Total Bilirubin: 0.5 [DC]   1833 XR Chest 1 View  reviewed [DC]   1858 COVID-19 and FLU A/B PCR - Swab, Nasopharynx  reviewed [DC]   2037 CT Chest With Contrast Diagnostic  Reviewed, patient hemodynamically stable, no respiratory distress, no hypoxia, no tachycardia or hypotension.  Afebrile here.  Will treat with amoxicillin and azithromycin, close outpatient follow-up.  ED return for worsening symptoms as needed. [DC]      ED Course User Index  [DC] Angel Norman MD       AS OF 21:26 EDT VITALS:    BP - 156/96  HR - 68  TEMP - 97.4 °F (36.3 °C)  02 SATS - 96%        DIAGNOSIS  Final diagnoses:    Pneumonia of both lungs due to infectious organism, unspecified part of lung   Intermittent fever   Cough   Hx of CABG   History of COPD   History of hypertension         DISPOSITION  DISCHARGE    Patient discharged in stable condition.    Reviewed implications of results, diagnosis, meds, responsibility to follow up, warning signs and symptoms of possible worsening, potential complications and reasons to return to ER.    Patient/Family voiced understanding of above instructions.    Discussed plan for discharge, as there is no emergent indication for admission. Patient referred to primary care provider for BP management due to today's BP. Pt/family is agreeable and understands need for follow up and repeat testing.  Pt is aware that discharge does not mean that nothing is wrong but it indicates no emergency is present that requires admission and they must continue care with follow-up as given below or physician of their choice.     FOLLOW-UP  [facility] Emergency Department  [address] [place] [address] [phone]    As needed, If symptoms worsen    Desire Greer, APRN  [address]  [facility] [address]  [place] [id number] [phone]    Schedule an appointment as soon as possible for a visit   for recheck in 1-2 weeks         Medication List      New Prescriptions    amoxicillin 500 MG capsule  Commonly known as: AMOXIL  Take 2 capsules by mouth 3 (Three) Times a Day for 7 days.     doxycycline 100 MG capsule  Commonly known as: MONODOX  Take 1 capsule by mouth 2 (Two) Times a Day for 7 days.           Where to Get Your Medications      These medications were sent to [facility]/pharmacy #[id number] - [facility], [place] - [address] AT [facility] [phone] [facility] [phone]   [address], [facility] [address]    Phone: [phone]   · amoxicillin 500 MG capsule  · doxycycline 100 MG capsule                    Angel Norman MD  05/16/22  2129

## 2022-05-17 RX ORDER — POTASSIUM CHLORIDE 750 MG/1
TABLET, FILM COATED, EXTENDED RELEASE ORAL
Qty: 30 TABLET | Refills: 0 | Status: SHIPPED | OUTPATIENT
Start: 2022-05-17 | End: 2022-05-25

## 2022-05-18 ENCOUNTER — APPOINTMENT (OUTPATIENT)
Dept: CARDIAC REHAB | Facility: HOSPITAL | Age: 67
End: 2022-05-18

## 2022-05-20 ENCOUNTER — APPOINTMENT (OUTPATIENT)
Dept: CARDIAC REHAB | Facility: HOSPITAL | Age: 67
End: 2022-05-20

## 2022-05-23 ENCOUNTER — TREATMENT (OUTPATIENT)
Dept: CARDIAC REHAB | Facility: HOSPITAL | Age: 67
End: 2022-05-23

## 2022-05-23 DIAGNOSIS — I21.4 NSTEMI (NON-ST ELEVATED MYOCARDIAL INFARCTION): Primary | ICD-10-CM

## 2022-05-23 PROCEDURE — 93798 PHYS/QHP OP CAR RHAB W/ECG: CPT

## 2022-05-25 ENCOUNTER — OFFICE VISIT (OUTPATIENT)
Dept: CARDIOLOGY | Facility: CLINIC | Age: 67
End: 2022-05-25

## 2022-05-25 ENCOUNTER — TREATMENT (OUTPATIENT)
Dept: CARDIAC REHAB | Facility: HOSPITAL | Age: 67
End: 2022-05-25

## 2022-05-25 VITALS
HEART RATE: 60 BPM | BODY MASS INDEX: 23.94 KG/M2 | WEIGHT: 171 LBS | HEIGHT: 71 IN | DIASTOLIC BLOOD PRESSURE: 82 MMHG | SYSTOLIC BLOOD PRESSURE: 140 MMHG

## 2022-05-25 DIAGNOSIS — I71.40 ABDOMINAL AORTIC ANEURYSM (AAA) WITHOUT RUPTURE: ICD-10-CM

## 2022-05-25 DIAGNOSIS — I50.31 ACUTE DIASTOLIC CONGESTIVE HEART FAILURE: ICD-10-CM

## 2022-05-25 DIAGNOSIS — I21.4 NSTEMI (NON-ST ELEVATED MYOCARDIAL INFARCTION): Primary | ICD-10-CM

## 2022-05-25 DIAGNOSIS — I25.10 CORONARY ARTERY DISEASE INVOLVING NATIVE CORONARY ARTERY OF NATIVE HEART WITHOUT ANGINA PECTORIS: Primary | ICD-10-CM

## 2022-05-25 DIAGNOSIS — I21.4 NSTEMI (NON-ST ELEVATED MYOCARDIAL INFARCTION): ICD-10-CM

## 2022-05-25 DIAGNOSIS — I10 ESSENTIAL HYPERTENSION: ICD-10-CM

## 2022-05-25 DIAGNOSIS — E78.49 OTHER HYPERLIPIDEMIA: ICD-10-CM

## 2022-05-25 PROCEDURE — 99214 OFFICE O/P EST MOD 30 MIN: CPT | Performed by: INTERNAL MEDICINE

## 2022-05-25 PROCEDURE — 93798 PHYS/QHP OP CAR RHAB W/ECG: CPT

## 2022-05-25 PROCEDURE — 93000 ELECTROCARDIOGRAM COMPLETE: CPT | Performed by: INTERNAL MEDICINE

## 2022-05-25 RX ORDER — LOSARTAN POTASSIUM 25 MG/1
25 TABLET ORAL EVERY MORNING
Qty: 90 TABLET | Refills: 3 | Status: SHIPPED | OUTPATIENT
Start: 2022-05-25 | End: 2022-06-27

## 2022-05-25 RX ORDER — ROSUVASTATIN CALCIUM 20 MG/1
20 TABLET, COATED ORAL NIGHTLY
Qty: 90 TABLET | Refills: 3 | Status: ON HOLD | OUTPATIENT
Start: 2022-05-25

## 2022-05-25 RX ORDER — METOPROLOL SUCCINATE 25 MG/1
25 TABLET, EXTENDED RELEASE ORAL NIGHTLY
Qty: 90 TABLET | Refills: 3 | Status: SHIPPED | OUTPATIENT
Start: 2022-05-25 | End: 2022-07-01

## 2022-05-25 NOTE — PROGRESS NOTES
Date of Office Visit: 2022  Encounter Provider: Susan Claudio MD  Place of Service: [facility] CARDIOLOGY  Patient Name: Osman Aparicio  :1955      Patient ID:  Osman Aparicio is a 66 y.o. male is here for  followup for CAD.          History of Present Illness    He has a past medical history of hyperlipidemia, hypertension, current tobacco use (>40 year smoking history), alcohol abuse, COPD and impaired fasting glucose.  He is here for CAD, status post CABG, diastolic dysfunction, abdominal aortic aneurysm.     He presented to the emergency department with complaints of shortness of air 3/26/2022. His shortness of breath began days ago, but progressively worsened last night and he called EMS. In route to the hospital, he received IV Solumedrol and a duoneb and arrived to [facility] ER on 5LNC. Per patient, his shortness of breath was greatly improved with oxygen and the medications given.    He also admits to orthopnea and PND as well some mild chest pain.  He ruled in for myocardial infarction.    He has 2 brothers with myocardial infarction in their 40s.       He had an echo done done 3/26/22 showing mild left ventricular dilation, ejection fraction 56%, grade 3 reversible restrictive diastolic dysfunction, moderate left atrial lodgment, moderate mitral insufficiency, trace to mild aortic and tricuspid insufficiency, severe septal apical hypokinesis, RVSP 52 mmHg.  He had a CT of the chest which showed mild pulmonary edema, abdominal aortic aneurysm, mild bilateral pleural effusions but no pulmonary embolism.  Cath done 3/29/2022 showed calcified left main without stenosis, chronically occluded ostial to mid LAD, 89% first OM stenosis, 95% second OM stenosis, 50% distal RCA stenosis.  The LAD filled via left to left collaterals.  He went on for CABG done 2022 with receiving LIMA to LAD, SVG to OM1, SVG to OM 2.  He also had an abdominal aortic ultrasound which  showed an abdominal aortic aneurysm measuring 5.5 cm.  Vascular surgery saw him and arrange for outpatient follow-up - Dr. Kumar.  Carotid duplex showed mild bilateral internal carotid artery stenosis.  Laboratory values done 5/16/2022 show glucose 113, sodium 128, potassium 3.7, otherwise normal CMP, hemoglobin 10, otherwise normal CBC.  Lipids in 3/20/2022 showed LDL 38, HDL 52, total cholesterol 104, VLDL 14, triglycerides 61.    He has noticed a small abnormality in chest incision.  He had pneumonia about 2 weeks ago but that has resolved.  He is doing cardiac rehabilitation.  He does not feel his heart racing or skipping.  He has no fevers or chills and no cough.  He has no heart racing or skipping.  He has some dizziness.  His blood pressure is somewhat labile.  He has no exertional chest tightness or pressure no orthopnea or PND.  He has no exertional dyspnea.  He is walking the golf course with friends in addition to his cardiac rehab.  He has not smoked since his admission March 2022.    Past Medical History:   Diagnosis Date   • Abnormal glucose    • Anxiety    • Encounter for special screening examination for neoplasm of prostate 10/2012   • Hematuria    • Hyperlipidemia    • Hypertension    • Plantar wart    • Vitamin D deficiency disease          Past Surgical History:   Procedure Laterality Date   • CARDIAC CATHETERIZATION N/A 3/29/2022    Procedure: Left Heart Cath;  Surgeon: eNto Paige MD;  Location:  [facility];  Service: Cardiology;  Laterality: N/A;   • CARDIAC CATHETERIZATION N/A 3/29/2022    Procedure: Coronary angiography;  Surgeon: Neto Paige MD;  Location:  [facility];  Service: Cardiology;  Laterality: N/A;   • CARDIAC CATHETERIZATION N/A 3/29/2022    Procedure: Left ventriculography;  Surgeon: Neto Paige MD;  Location:  [facility];  Service: Cardiology;  Laterality: N/A;   • COLONOSCOPY N/A 12/7/2020     Procedure: COLONOSCOPY INTO CECUM WITH HOT SNARE POLYPECTOMIES, COLD BX POLYPECTOMIES, RESOLUTION CLIPS X;  Surgeon: Regi Mercado MD;  Location: [facility] ENDOSCOPY;  Service: General;  Laterality: N/A;  PRE:  POSITIVE COLOGUARD  POST:  POLYPS   • CORONARY ARTERY BYPASS GRAFT N/A 4/1/2022    Procedure: STERNOTOMY, CORONARY ARTERY BYPASS UTILIZINGTHE RT SAPHENOUS VEIN WITH LEFT INTERNAL MAMMARY ARTERY GRAFT X3 OFF PUMP, PRP;  Surgeon: Colten Zamora MD;  Location: [facility] CVOR;  Service: Cardiothoracic;  Laterality: N/A;   • TRANSESOPHAGEAL ECHOCARDIOGRAM (MARTHA) N/A 4/1/2022    Procedure: TRANSESOPHAGEAL ECHOCARDIOGRAM WITH ANESTHESIA;  Surgeon: Colten Zamora MD;  Location: [facility];  Service: Cardiothoracic;  Laterality: N/A;       Current Outpatient Medications on File Prior to Visit   Medication Sig Dispense Refill   • albuterol sulfate HFA (Proventil HFA) 108 (90 Base) MCG/ACT inhaler Inhale 2 puffs Every 4 (Four) Hours As Needed for Wheezing. 18 g 0   • Ascorbic Acid (VITAMIN C PO) Take 1 tablet by mouth Daily.     • aspirin 81 MG EC tablet Take 1 tablet by mouth Daily. 30 tablet 3   • budesonide-formoterol (Symbicort) 160-4.5 MCG/ACT inhaler Inhale 2 puffs 2 (Two) Times a Day. 1 each 3   • clopidogrel (PLAVIX) 75 MG tablet Take 1 tablet by mouth Daily. 30 tablet 3   • escitalopram (LEXAPRO) 20 MG tablet Take 1 tablet by mouth Daily. 90 tablet 1   • Ferrous Sulfate (Iron) 28 MG tablet Take  by mouth.     • folic acid (FOLVITE) 1 MG tablet Take 1 tablet by mouth Daily. 30 tablet 3   • furosemide (LASIX) 20 MG tablet Take 1 tablet by mouth Daily. 30 tablet 0   • guaiFENesin (MUCINEX) 600 MG 12 hr tablet Take 2 tablets by mouth Every 12 (Twelve) Hours. 60 tablet 3   • metoprolol succinate XL (TOPROL-XL) 25 MG 24 hr tablet TAKE 1 TABLET BY MOUTH EVERY DAY 30 tablet 1   • multivitamin with minerals tablet tablet Take 1 tablet by mouth Daily. 30 tablet 3   • nitroglycerin (NITROSTAT) 0.4 MG SL  "tablet Place 1 tablet under the tongue Every 5 (Five) Minutes As Needed for Chest Pain (Only if SBP Greater Than 100). Take no more than 3 doses in 15 minutes. 30 tablet 3   • potassium chloride 10 MEQ CR tablet TAKE 1 TABLET BY MOUTH EVERY DAY 30 tablet 0   • simvastatin (ZOCOR) 20 MG tablet TAKE 1 TABLET BY MOUTH EVERY DAY AT NIGHT 30 tablet 0   • thiamine (VITAMIN B-1) 100 MG tablet  tablet Take 1 tablet by mouth Daily. 30 tablet 3   • tiotropium (Spiriva HandiHaler) 18 MCG per inhalation capsule Place 1 capsule into inhaler and inhale Daily. 30 capsule 3   • omeprazole (priLOSEC) 20 MG capsule Take 20 mg by mouth Daily.       No current facility-administered medications on file prior to visit.       Social History     Socioeconomic History   • Marital status:    Tobacco Use   • Smoking status: Former Smoker     Packs/day: 1.00     Years: 45.00     Pack years: 45.00     Types: Cigarettes     Quit date: 3/26/2022     Years since quittin.1   • Smokeless tobacco: Never Used   • Tobacco comment: caffeine - 3 cups coffee daily, tea or soda occas.   Vaping Use   • Vaping Use: Never used   Substance and Sexual Activity   • Alcohol use: Not Currently   • Drug use: No   • Sexual activity: Defer           ROS    Procedures    ECG 12 Lead    Date/Time: 2022 1:53 PM  Performed by: Susan Claudio MD  Authorized by: Susan Claudio MD   Comparison: compared with previous ECG   Similar to previous ECG  Rhythm: sinus rhythm  T inversion: aVL    Clinical impression: abnormal EKG                Objective:      Vitals:    22 1333   BP: 140/82   Pulse: 60   Weight: 77.6 kg (171 lb)   Height: 180.3 cm (71\")     Body mass index is 23.85 kg/m².    Vitals reviewed.   Constitutional:       General: Not in acute distress.     Appearance: Well-developed. Not diaphoretic.   Eyes:      General: No scleral icterus.     Conjunctiva/sclera: Conjunctivae normal.   HENT:      Head: Normocephalic and atraumatic. "   Neck:      Thyroid: No thyromegaly.      Vascular: No carotid bruit or JVD.      Lymphadenopathy: No cervical adenopathy.   Pulmonary:      Effort: Pulmonary effort is normal. No respiratory distress.      Breath sounds: Normal breath sounds. No wheezing. No rhonchi. No rales.   Chest:      Chest wall: Not tender to palpatation.   Cardiovascular:      Normal rate. Regular rhythm.      Murmurs: There is no murmur.      No gallop.   Pulses:     Intact distal pulses.   Edema:     Peripheral edema absent.   Abdominal:      General: Bowel sounds are normal. There is no distension or abdominal bruit.      Palpations: Abdomen is soft. There is no abdominal mass.      Tenderness: There is no abdominal tenderness.   Musculoskeletal:         General: No deformity.      Extremities: No clubbing present.     Cervical back: Neck supple. Skin:     General: Skin is warm and dry. There is no cyanosis.      Coloration: Skin is not pale.      Findings: No rash.   Neurological:      Mental Status: Alert and oriented to person, place, and time.      Cranial Nerves: No cranial nerve deficit.   Psychiatric:         Judgment: Judgment normal.         Lab Review:       Assessment:      Diagnosis Plan   1. Coronary artery disease involving native coronary artery of native heart without angina pectoris     2. Abdominal aortic aneurysm (AAA) without rupture (HCC)     3. Acute diastolic congestive heart failure (HCC)     4. Essential hypertension     5. Other hyperlipidemia     6. NSTEMI (non-ST elevated myocardial infarction) (HCC)       1. CAD, status post CABG, on aspirin, no angina, no heart failure.  2. Abdominal aortic aneurysm, seeing Dr. Kumar.  Planning for an endovascular repair  3. Hypertension, goal less than 120/80, add losartan 25 mg daily.  4. Mild bilateral carotid artery stenosis  5. COPD  6. Diastolic dysfunction  7. Hyponatremia  8. Anemia  9. Hyperlipidemia, changed to rosuvastatin 20 mg nightly.     Plan:       See  Lois in 3 months, stop potassium, stop furosemide, stop simvastatin use rosuvastatin instead.  Spoke with Dr. Zamora.  Would wait 6 months before having his abdominal aortic aneurysm repair.  Continue cardiac rehab and exercise on his own.  No lifting over 50 pounds.    Addendum: I spoke with Dr. Kumar and we are in agreement to wait about 6 months out from his open heart surgery especially given the fact that he is stable.

## 2022-05-26 ENCOUNTER — TRANSCRIBE ORDERS (OUTPATIENT)
Dept: ADMINISTRATIVE | Facility: HOSPITAL | Age: 67
End: 2022-05-26

## 2022-05-26 DIAGNOSIS — R91.8 PULMONARY INFILTRATE: Primary | ICD-10-CM

## 2022-05-27 ENCOUNTER — TELEPHONE (OUTPATIENT)
Dept: CARDIOLOGY | Facility: CLINIC | Age: 67
End: 2022-05-27

## 2022-05-27 ENCOUNTER — TREATMENT (OUTPATIENT)
Dept: CARDIAC REHAB | Facility: HOSPITAL | Age: 67
End: 2022-05-27

## 2022-05-27 DIAGNOSIS — I21.4 NSTEMI (NON-ST ELEVATED MYOCARDIAL INFARCTION): Primary | ICD-10-CM

## 2022-05-27 PROCEDURE — 93798 PHYS/QHP OP CAR RHAB W/ECG: CPT

## 2022-05-27 RX ORDER — MUPIROCIN CALCIUM 20 MG/G
1 CREAM TOPICAL 2 TIMES DAILY
Qty: 15 G | Refills: 0 | Status: SHIPPED | OUTPATIENT
Start: 2022-05-27 | End: 2022-08-25

## 2022-05-27 NOTE — TELEPHONE ENCOUNTER
Patient called and stated that you advised them to use Bactroban on his incision but the RX they had at home was 5 yrs old and he will need a new RX.  Please advise.    Gisell

## 2022-05-28 DIAGNOSIS — I10 ESSENTIAL HYPERTENSION: ICD-10-CM

## 2022-06-01 ENCOUNTER — TREATMENT (OUTPATIENT)
Dept: CARDIAC REHAB | Facility: HOSPITAL | Age: 67
End: 2022-06-01

## 2022-06-01 DIAGNOSIS — I21.4 NSTEMI (NON-ST ELEVATED MYOCARDIAL INFARCTION): Primary | ICD-10-CM

## 2022-06-01 PROCEDURE — 93798 PHYS/QHP OP CAR RHAB W/ECG: CPT

## 2022-06-01 RX ORDER — AMLODIPINE BESYLATE 10 MG/1
TABLET ORAL
Qty: 90 TABLET | Refills: 1 | OUTPATIENT
Start: 2022-06-01

## 2022-06-03 ENCOUNTER — TREATMENT (OUTPATIENT)
Dept: CARDIAC REHAB | Facility: HOSPITAL | Age: 67
End: 2022-06-03

## 2022-06-03 DIAGNOSIS — I21.4 NSTEMI (NON-ST ELEVATED MYOCARDIAL INFARCTION): Primary | ICD-10-CM

## 2022-06-03 PROCEDURE — 93798 PHYS/QHP OP CAR RHAB W/ECG: CPT

## 2022-06-06 ENCOUNTER — TREATMENT (OUTPATIENT)
Dept: CARDIAC REHAB | Facility: HOSPITAL | Age: 67
End: 2022-06-06

## 2022-06-06 DIAGNOSIS — I21.4 NSTEMI (NON-ST ELEVATED MYOCARDIAL INFARCTION): Primary | ICD-10-CM

## 2022-06-06 PROCEDURE — 93798 PHYS/QHP OP CAR RHAB W/ECG: CPT

## 2022-06-08 ENCOUNTER — TREATMENT (OUTPATIENT)
Dept: CARDIAC REHAB | Facility: HOSPITAL | Age: 67
End: 2022-06-08

## 2022-06-08 DIAGNOSIS — I21.4 NSTEMI (NON-ST ELEVATED MYOCARDIAL INFARCTION): Primary | ICD-10-CM

## 2022-06-08 PROCEDURE — 93798 PHYS/QHP OP CAR RHAB W/ECG: CPT

## 2022-06-10 ENCOUNTER — APPOINTMENT (OUTPATIENT)
Dept: CARDIAC REHAB | Facility: HOSPITAL | Age: 67
End: 2022-06-10

## 2022-06-13 ENCOUNTER — APPOINTMENT (OUTPATIENT)
Dept: CARDIAC REHAB | Facility: HOSPITAL | Age: 67
End: 2022-06-13

## 2022-06-15 ENCOUNTER — APPOINTMENT (OUTPATIENT)
Dept: CARDIAC REHAB | Facility: HOSPITAL | Age: 67
End: 2022-06-15

## 2022-06-17 ENCOUNTER — APPOINTMENT (OUTPATIENT)
Dept: CARDIAC REHAB | Facility: HOSPITAL | Age: 67
End: 2022-06-17

## 2022-06-20 ENCOUNTER — APPOINTMENT (OUTPATIENT)
Dept: CARDIAC REHAB | Facility: HOSPITAL | Age: 67
End: 2022-06-20

## 2022-06-22 ENCOUNTER — APPOINTMENT (OUTPATIENT)
Dept: CARDIAC REHAB | Facility: HOSPITAL | Age: 67
End: 2022-06-22

## 2022-06-24 ENCOUNTER — APPOINTMENT (OUTPATIENT)
Dept: CARDIAC REHAB | Facility: HOSPITAL | Age: 67
End: 2022-06-24

## 2022-06-27 ENCOUNTER — TELEPHONE (OUTPATIENT)
Dept: CARDIOLOGY | Facility: CLINIC | Age: 67
End: 2022-06-27

## 2022-06-27 ENCOUNTER — APPOINTMENT (OUTPATIENT)
Dept: CARDIAC REHAB | Facility: HOSPITAL | Age: 67
End: 2022-06-27

## 2022-06-27 RX ORDER — LOSARTAN POTASSIUM 50 MG/1
50 TABLET ORAL EVERY MORNING
Qty: 30 TABLET | Refills: 11 | Status: SHIPPED | OUTPATIENT
Start: 2022-06-27 | End: 2022-07-18

## 2022-06-27 NOTE — TELEPHONE ENCOUNTER
Patient called and stated that his BP has been running high around 150-160/80 (every night last week).    Current medication   Losartan 25 mg in AM  Metoprolol 25 mg in PM    C/O-stiff neck, H/A, dizziness (when he gets up)    Denies-Edema, CP    Please advise.    Next OV-08/30/22-TMM  Last OV-05/25/22-RM    Plan:       See Lois in 3 months, stop potassium, stop furosemide, stop simvastatin use rosuvastatin instead.  Spoke with Dr. Zamora.  Would wait 6 months before having his abdominal aortic aneurysm repair.  Continue cardiac rehab and exercise on his own.  No lifting over 50 pounds.     Addendum: I spoke with Dr. Kumar and we are in agreement to wait about 6 months out from his open heart surgery especially given the fact that he is stable.    Labs done in April and May (in Epic)    CB: [phone]    Gisell

## 2022-06-27 NOTE — TELEPHONE ENCOUNTER
OK to increase losartan to 50 mg daily. I sent in 50 mg tablets to [facility], but he can use 2-25 mg tablets until he runs out.     Thanks!  Lois Talavera, APRN

## 2022-06-29 ENCOUNTER — APPOINTMENT (OUTPATIENT)
Dept: CARDIAC REHAB | Facility: HOSPITAL | Age: 67
End: 2022-06-29

## 2022-07-01 ENCOUNTER — TELEPHONE (OUTPATIENT)
Dept: CARDIOLOGY | Facility: CLINIC | Age: 67
End: 2022-07-01

## 2022-07-01 ENCOUNTER — APPOINTMENT (OUTPATIENT)
Dept: CARDIAC REHAB | Facility: HOSPITAL | Age: 67
End: 2022-07-01

## 2022-07-01 RX ORDER — METOPROLOL SUCCINATE 50 MG/1
50 TABLET, EXTENDED RELEASE ORAL NIGHTLY
Qty: 30 TABLET | Refills: 11 | Status: SHIPPED | OUTPATIENT
Start: 2022-07-01 | End: 2022-08-10

## 2022-07-01 RX ORDER — CLOPIDOGREL BISULFATE 75 MG/1
75 TABLET ORAL DAILY
Qty: 30 TABLET | Refills: 3 | Status: SHIPPED | OUTPATIENT
Start: 2022-07-01 | End: 2022-08-30

## 2022-07-01 RX ORDER — FOLIC ACID 1 MG/1
1 TABLET ORAL DAILY
Qty: 30 TABLET | Refills: 3 | OUTPATIENT
Start: 2022-07-01

## 2022-07-01 NOTE — TELEPHONE ENCOUNTER
Caller: Suzanne Aparicio    Relationship: Emergency Contact    Best call back number: [phone]  Requested Prescriptions:   Requested Prescriptions     Pending Prescriptions Disp Refills   • folic acid (FOLVITE) 1 MG tablet 30 tablet 3     Sig: Take 1 tablet by mouth Daily.   • thiamine (VITAMIN B-1) 100 MG tablet  tablet 30 tablet 3     Sig: Take 1 tablet by mouth Daily.        Pharmacy where request should be sent: [id number] - [facility], [place] - [address] AT [facility] [phone] [facility] [phone]      Additional details provided by patient: PATIENT HAS 3 DAYS LEFT    Does the patient have less than a 3 day supply:  [x] Yes  [] No    Sola Ernst Rep   07/01/22 16:13 EDT

## 2022-07-01 NOTE — TELEPHONE ENCOUNTER
Patient wife called and stated that the patient has ran out of his medications and wanted to know if we would refill his B1, folic acid, and plavix.  She stated that her  has requested the refills and we have not received them.  Do you want to fill these or have PCP fill them?  Please advise.    CB: [phone]      Thanks,  Gisell

## 2022-07-06 ENCOUNTER — APPOINTMENT (OUTPATIENT)
Dept: CARDIAC REHAB | Facility: HOSPITAL | Age: 67
End: 2022-07-06

## 2022-07-08 ENCOUNTER — APPOINTMENT (OUTPATIENT)
Dept: CARDIAC REHAB | Facility: HOSPITAL | Age: 67
End: 2022-07-08

## 2022-07-11 ENCOUNTER — APPOINTMENT (OUTPATIENT)
Dept: CARDIAC REHAB | Facility: HOSPITAL | Age: 67
End: 2022-07-11

## 2022-07-13 ENCOUNTER — APPOINTMENT (OUTPATIENT)
Dept: CARDIAC REHAB | Facility: HOSPITAL | Age: 67
End: 2022-07-13

## 2022-07-14 NOTE — TELEPHONE ENCOUNTER
Caller: Suzanne Aparicio    Relationship: Emergency Contact    Best call back number: [phone]    Requested Prescriptions:   Requested Prescriptions     Pending Prescriptions Disp Refills   • thiamine (VITAMIN B-1) 100 MG tablet  tablet 30 tablet 3     Sig: Take 1 tablet by mouth Daily.        Pharmacy where request should be sent: [id number] - [place], [place] - [address] AT [facility] [phone]  - [phone] FX     Additional details provided by patient: OUT OF MEDICATION NOT SURE IF HE SHOULD STILL BE TAKING MEDICATION PLEASE CALL AND ADVISE     Does the patient have less than a 3 day supply:  [x] Yes  [] No    Krista Lamas, Sola Rep   07/14/22 10:16 EDT

## 2022-07-15 ENCOUNTER — APPOINTMENT (OUTPATIENT)
Dept: CARDIAC REHAB | Facility: HOSPITAL | Age: 67
End: 2022-07-15

## 2022-07-18 ENCOUNTER — APPOINTMENT (OUTPATIENT)
Dept: CARDIAC REHAB | Facility: HOSPITAL | Age: 67
End: 2022-07-18

## 2022-07-18 ENCOUNTER — TELEPHONE (OUTPATIENT)
Dept: CARDIOLOGY | Facility: CLINIC | Age: 67
End: 2022-07-18

## 2022-07-18 RX ORDER — LOSARTAN POTASSIUM 100 MG/1
100 TABLET ORAL DAILY
Qty: 30 TABLET | Refills: 11 | Status: ON HOLD | OUTPATIENT
Start: 2022-07-18

## 2022-07-18 NOTE — TELEPHONE ENCOUNTER
Pt called with B/P readings. Pt states that he takes the B/P mid-day.    7/11/22 155/75  HR:74    7/12/22 144/75  HR: 73    7/13/22 141/79   HR: 69    7/14/22  147/71   HR: 64    7/15/22  164/92   HR: 64    7/16/22   159/86  HR: 68    7/17/22    150/86   HR: 65

## 2022-07-18 NOTE — TELEPHONE ENCOUNTER
Please have increase losartan to 100 mg daily. Also, ask if he is having any swelling?    Thanks!  TAYLOR Leos

## 2022-07-19 NOTE — TELEPHONE ENCOUNTER
Notified patient about recommendations. He verbalized understanding. He denies any swelling.    Loreto Emanuel RN  Triage [facility]

## 2022-07-19 NOTE — TELEPHONE ENCOUNTER
Lois,   Called and left a vm.  Will continue to try to reach pt    Mony Enrique RN  [facility] [place] [facility]

## 2022-07-20 ENCOUNTER — APPOINTMENT (OUTPATIENT)
Dept: CARDIAC REHAB | Facility: HOSPITAL | Age: 67
End: 2022-07-20

## 2022-07-22 ENCOUNTER — APPOINTMENT (OUTPATIENT)
Dept: CARDIAC REHAB | Facility: HOSPITAL | Age: 67
End: 2022-07-22

## 2022-07-25 ENCOUNTER — APPOINTMENT (OUTPATIENT)
Dept: CARDIAC REHAB | Facility: HOSPITAL | Age: 67
End: 2022-07-25

## 2022-07-27 ENCOUNTER — APPOINTMENT (OUTPATIENT)
Dept: CARDIAC REHAB | Facility: HOSPITAL | Age: 67
End: 2022-07-27

## 2022-07-29 ENCOUNTER — APPOINTMENT (OUTPATIENT)
Dept: CARDIAC REHAB | Facility: HOSPITAL | Age: 67
End: 2022-07-29

## 2022-08-10 ENCOUNTER — TELEPHONE (OUTPATIENT)
Dept: CARDIOLOGY | Facility: CLINIC | Age: 67
End: 2022-08-10

## 2022-08-10 RX ORDER — METOPROLOL SUCCINATE 50 MG/1
75 TABLET, EXTENDED RELEASE ORAL NIGHTLY
Qty: 30 TABLET | Refills: 11 | Status: SHIPPED | OUTPATIENT
Start: 2022-08-10 | End: 2022-08-25

## 2022-08-10 NOTE — TELEPHONE ENCOUNTER
Patient wanted to report BP readings.    BP log    144/79  74  150/83  68  155/85  61  151/85  63  159/84  65  155/77  68  150/77  72    Current meds:    Losartan 100 mg daily  Metoprolol 50 mg daily      CB: [phone]    Gisell Benites

## 2022-08-10 NOTE — TELEPHONE ENCOUNTER
Called and spoke with the patient and advised him of the above.  He verbalized understanding.    Gisell

## 2022-08-16 ENCOUNTER — APPOINTMENT (OUTPATIENT)
Dept: GENERAL RADIOLOGY | Facility: HOSPITAL | Age: 67
End: 2022-08-16

## 2022-08-16 ENCOUNTER — HOSPITAL ENCOUNTER (EMERGENCY)
Facility: HOSPITAL | Age: 67
Discharge: HOME | End: 2022-08-16
Attending: EMERGENCY MEDICINE | Admitting: EMERGENCY MEDICINE

## 2022-08-16 VITALS
SYSTOLIC BLOOD PRESSURE: 167 MMHG | TEMPERATURE: 98.1 F | HEART RATE: 73 BPM | DIASTOLIC BLOOD PRESSURE: 77 MMHG | OXYGEN SATURATION: 97 % | RESPIRATION RATE: 18 BRPM

## 2022-08-16 DIAGNOSIS — S62.101A RIGHT WRIST FRACTURE, CLOSED, INITIAL ENCOUNTER: Primary | ICD-10-CM

## 2022-08-16 PROCEDURE — 99283 EMERGENCY DEPT VISIT LOW MDM: CPT

## 2022-08-16 PROCEDURE — 73110 X-RAY EXAM OF WRIST: CPT

## 2022-08-16 PROCEDURE — 73100 X-RAY EXAM OF WRIST: CPT

## 2022-08-16 PROCEDURE — 0 LIDOCAINE 1 % SOLUTION: Performed by: PHYSICIAN ASSISTANT

## 2022-08-16 RX ORDER — LIDOCAINE HYDROCHLORIDE 10 MG/ML
10 INJECTION, SOLUTION INFILTRATION; PERINEURAL ONCE
Status: COMPLETED | OUTPATIENT
Start: 2022-08-16 | End: 2022-08-16

## 2022-08-16 RX ORDER — OXYCODONE HYDROCHLORIDE AND ACETAMINOPHEN 5; 325 MG/1; MG/1
1 TABLET ORAL EVERY 6 HOURS PRN
Qty: 15 TABLET | Refills: 0 | Status: SHIPPED | OUTPATIENT
Start: 2022-08-16 | End: 2022-09-06

## 2022-08-16 RX ORDER — HYDROCODONE BITARTRATE AND ACETAMINOPHEN 7.5; 325 MG/1; MG/1
1 TABLET ORAL ONCE
Status: COMPLETED | OUTPATIENT
Start: 2022-08-16 | End: 2022-08-16

## 2022-08-16 RX ADMIN — LIDOCAINE HYDROCHLORIDE 10 ML: 10 INJECTION, SOLUTION INFILTRATION; PERINEURAL at 17:19

## 2022-08-16 RX ADMIN — HYDROCODONE BITARTRATE AND ACETAMINOPHEN 1 TABLET: 7.5; 325 TABLET ORAL at 17:17

## 2022-08-16 NOTE — ED TRIAGE NOTES
Patient to Er via [place] for missed step causing fall and right wrist injury    Patient wearing mask this RN in PPE

## 2022-08-16 NOTE — ED PROVIDER NOTES
MD ATTESTATION NOTE    The KAVON and I have discussed this patient's history, physical exam, and treatment plan.  I have reviewed the documentation and personally had a face to face interaction with the patient. I affirm the documentation and agree with the treatment and plan.  The attached note describes my personal findings.      I provided a substantive portion of the care of the patient.  I personally performed the physical exam in its entirety, and below are my findings.  For this patient encounter, the patient wore surgical mask, I wore full protective PPE including N95 and eye protection.      Brief HPI: Patient presents for evaluation of right wrist pain.  Patient was golfing and fell on outstretched right wrist.  Patient is right-hand dominant.    PHYSICAL EXAM  ED Triage Vitals   Temp Heart Rate Resp BP SpO2   08/16/22 1618 08/16/22 1618 08/16/22 1618 08/16/22 1718 08/16/22 1618   98.1 °F (36.7 °C) 73 18 143/86 97 %      Temp src Heart Rate Source Patient Position BP Location FiO2 (%)   -- -- -- -- --                GENERAL: no acute distress  HENT: nares patent  EYES: no scleral icterus    RESPIRATORY: normal effort    MUSCULOSKELETAL: Deformity right wrist  NEURO: alert, moves all extremities, follows commands  PSYCH:  calm, cooperative  SKIN: warm, dry    Vital signs and nursing notes reviewed.        Plan: Splint, hand surgeon       Kirk Sutton MD  08/16/22 1370

## 2022-08-16 NOTE — ED PROVIDER NOTES
EMERGENCY DEPARTMENT ENCOUNTER    Room Number:  B10/10  Date of encounter:  8/16/2022  PCP: Desire Greer APRN  Historian: Patient  Full history not obtainable due to: None    HPI:  Chief Complaint: Wrist pain    Context: Osman Aparicio is a 66 y.o. male who presents to the ED c/o right wrist pain after a fall today.  The patient was golfing and was hitting off of an uneven surface when he swung and lost his balance.  He fell backward and caught himself on his right outstretched wrist.  He immediately felt a pop and noticed a deformity.  He came here directly for further evaluation.  He took some Tylenol which has alleviated symptoms somewhat.  He denies any numbness or tingling distally.  No other injury sustained at this time.      MEDICAL RECORD REVIEW:    Upon review of the medical record it appears the patient was evaluated most recently in the office with his cardiologist on 5/25/2022 for CAD follow-up      PAST MEDICAL HISTORY    Active Ambulatory Problems     Diagnosis Date Noted   • Essential hypertension 06/01/2016   • Other hyperlipidemia 06/01/2016   • Anxiety 06/01/2016   • Nocturia 07/27/2017   • Elevated PSA 04/23/2018   • Elevated blood sugar level 04/23/2018   • Positive colorectal cancer screening using Cologuard test 11/18/2020   • Shortness of breath 03/26/2022   • Acute diastolic congestive heart failure (HCC) 03/26/2022   • Leukocytosis 03/26/2022   • Positive D dimer 03/26/2022   • Anemia 03/26/2022   • COPD with acute exacerbation (Formerly [facility]) 03/26/2022   • Emphysema (Formerly [facility]) 03/26/2022   • History of colon polyps 03/26/2022   • AAA (abdominal aortic aneurysm) (Formerly [facility]) 03/26/2022   • Alcohol abuse 03/27/2022   • NSTEMI (non-ST elevated myocardial infarction) (Formerly [facility]) 03/29/2022   • Coronary artery disease involving native coronary artery of native heart without angina pectoris 03/26/2022   • Abnormal findings on diagnostic imaging of heart and coronary circulation 03/26/2022     Resolved Ambulatory  Problems     Diagnosis Date Noted   • Acute respiratory failure with hypoxia (HCC) 03/26/2022   • Hyponatremia 03/26/2022   • Acute pulmonary edema (HCC) 03/26/2022     Past Medical History:   Diagnosis Date   • Abnormal glucose    • Encounter for special screening examination for neoplasm of prostate 10/2012   • Hematuria    • Hyperlipidemia    • Hypertension    • Plantar wart    • Vitamin D deficiency disease          PAST SURGICAL HISTORY  Past Surgical History:   Procedure Laterality Date   • CARDIAC CATHETERIZATION N/A 3/29/2022    Procedure: Left Heart Cath;  Surgeon: Neto Paige MD;  Location: [facility] CATH INVASIVE LOCATION;  Service: Cardiology;  Laterality: N/A;   • CARDIAC CATHETERIZATION N/A 3/29/2022    Procedure: Coronary angiography;  Surgeon: Neto Paige MD;  Location: [facility] CATH INVASIVE LOCATION;  Service: Cardiology;  Laterality: N/A;   • CARDIAC CATHETERIZATION N/A 3/29/2022    Procedure: Left ventriculography;  Surgeon: Neto Paige MD;  Location: [facility] CATH INVASIVE LOCATION;  Service: Cardiology;  Laterality: N/A;   • COLONOSCOPY N/A 12/7/2020    Procedure: COLONOSCOPY INTO CECUM WITH HOT SNARE POLYPECTOMIES, COLD BX POLYPECTOMIES, RESOLUTION CLIPS X;  Surgeon: Regi Mercado MD;  Location: [facility] ENDOSCOPY;  Service: General;  Laterality: N/A;  PRE:  POSITIVE COLOGUARD  POST:  POLYPS   • CORONARY ARTERY BYPASS GRAFT N/A 4/1/2022    Procedure: STERNOTOMY, CORONARY ARTERY BYPASS UTILIZINGTHE RT SAPHENOUS VEIN WITH LEFT INTERNAL MAMMARY ARTERY GRAFT X3 OFF PUMP, PRP;  Surgeon: Coltne Zamora MD;  Location: [facility];  Service: Cardiothoracic;  Laterality: N/A;   • TRANSESOPHAGEAL ECHOCARDIOGRAM (MARTHA) N/A 4/1/2022    Procedure: TRANSESOPHAGEAL ECHOCARDIOGRAM WITH ANESTHESIA;  Surgeon: Colten Zamora MD;  Location: [facility];  Service: Cardiothoracic;  Laterality: N/A;         FAMILY HISTORY  Family History   Problem Relation Age of Onset   •  Lung cancer Brother    • Liver cancer Brother    • Colon polyps Brother          SOCIAL HISTORY  Social History     Socioeconomic History   • Marital status:    Tobacco Use   • Smoking status: Former Smoker     Packs/day: 1.00     Years: 45.00     Pack years: 45.00     Types: Cigarettes     Quit date: 3/26/2022     Years since quittin.3   • Smokeless tobacco: Never Used   • Tobacco comment: caffeine - 3 cups coffee daily, tea or soda occas.   Vaping Use   • Vaping Use: Never used   Substance and Sexual Activity   • Alcohol use: Not Currently   • Drug use: No   • Sexual activity: Defer         ALLERGIES  Patient has no known allergies.        REVIEW OF SYSTEMS    All systems reviewed and marked as negative except as listed in HPI     PHYSICAL EXAM    I have reviewed the triage vital signs and nursing notes.    ED Triage Vitals [22 1618]   Temp Heart Rate Resp BP SpO2   98.1 °F (36.7 °C) 73 18 -- 97 %      Temp src Heart Rate Source Patient Position BP Location FiO2 (%)   -- -- -- -- --       Physical Exam  Constitutional:       General: He is not in acute distress.     Appearance: He is well-developed.   HENT:      Head: Normocephalic and atraumatic.   Eyes:      General: No scleral icterus.     Conjunctiva/sclera: Conjunctivae normal.   Neck:      Trachea: No tracheal deviation.   Cardiovascular:      Rate and Rhythm: Normal rate and regular rhythm.   Pulmonary:      Effort: Pulmonary effort is normal.      Breath sounds: Normal breath sounds.   Abdominal:      Palpations: Abdomen is soft.      Tenderness: There is no abdominal tenderness. There is no guarding.   Musculoskeletal:      Right wrist: Swelling and deformity present. No tenderness or bony tenderness. Decreased range of motion.      Cervical back: Normal range of motion.   Lymphadenopathy:      Cervical: No cervical adenopathy.   Skin:     General: Skin is warm and dry.   Neurological:      Mental Status: He is alert and oriented to  person, place, and time.   Psychiatric:         Behavior: Behavior normal.         Vital signs and nursing notes reviewed.            LAB RESULTS  No results found for this or any previous visit (from the past 24 hour(s)).    Ordered the above labs and independently reviewed the results.        RADIOLOGY  XR Wrist 3+ View Right    Result Date: 8/16/2022  XR WRIST 3+ VW RIGHT-  08/16/2022  HISTORY: Fell, wrist injury.  There is a comminuted impacted fracture of the distal right radius. Fracture of the ulnar styloid process is seen.  No fractures of the carpal bones are seen.      1. Distal right radius and ulnar styloid process fractures.  This report was finalized on 8/16/2022 5:03 PM by Dr. Dickson Aparicio M.D.        I ordered the above noted radiological studies. Independently reviewed by me and discussed with radiologist.  See dictation above for official radiology interpretation.      PROCEDURES    FX Dislocation    Date/Time: 8/16/2022 5:45 PM  Performed by: Mariusz Nava PA  Authorized by: Kirk Sutton MD     Consent:     Consent obtained:  Verbal    Consent given by:  Patient    Risks, benefits, and alternatives were discussed: yes      Risks discussed:  Pain and nerve damage  Universal protocol:     Patient identity confirmed:  Verbally with patient  Injury:     Injury location:  Forearm    Forearm injury location:  R forearm    Forearm fracture type: distal radius and ulnar styloid    Pre-procedure details:     Distal neurologic exam:  Normal    Distal perfusion: distal pulses strong      Range of motion: reduced    Sedation:     Sedation type:  None  Anesthesia:     Anesthesia method: Hematoma block.  Procedure details:     Manipulation performed: yes      Immobilization:  Splint    Supplies used:  Fiberglass and elastic bandage    Additional details:  Sugar tong    Attestation: Splint applied and adjusted personally by me    Post-procedure details:     Distal neurologic exam:  Normal     Distal perfusion: distal pulses strong      Range of motion: normal      Procedure completion:  Tolerated well, no immediate complications  Comments:      As I reduced the wrist the patient tolerated the procedure extremely well.  However the fracture felt very unstable and I could feel the fracture sliding out of place as I was placing the splint.  I attempted to hold the splint as it dried to keep the wrist in place but I have concern that the wrist continues to be displaced at the fracture site.  Plan to have the patient follow-up closely with orthopedics.            MEDICATIONS GIVEN IN ER    Medications   lidocaine (XYLOCAINE) 1 % injection 10 mL (10 mL Injection Given 8/16/22 1719)   HYDROcodone-acetaminophen (NORCO) 7.5-325 MG per tablet 1 tablet (1 tablet Oral Given 8/16/22 1717)         PROGRESS, DATA ANALYSIS, CONSULTS, AND MEDICAL DECISION MAKING    All labs have been independently reviewed by me.  All radiology studies have been reviewed by me.   EKG's independently reviewed by me.  Discussion below represents my analysis of pertinent findings related to patient's condition, differential diagnosis, treatment plan and final disposition.    DIFFERENTIAL DIAGNOSIS INCLUDE BUT NOT LIMITED TO:     Wrist fracture, hand fracture, wrist sprain         AS OF 18:32 EDT VITALS:    BP - 143/86  HR - 73  TEMP - 98.1 °F (36.7 °C)  02 SATS - 97%    1831 I rechecked the patient.  I discussed the patient's radiology findings (including all incidental findings), diagnosis, and plan for discharge.  A repeat exam reveals no new worrisome changes from my initial exam findings.  The patient understands that the fact that they are being discharged does not denote that nothing is abnormal, it indicates that no clinical emergency is present and that they must follow-up as directed in order to properly maintain their health.  Follow-up instructions (specifically listed below) and return to ER precautions were given at this time.   I specifically instructed the patient to follow-up with their PCP.  The patient understands and agrees with the plan, and is ready for discharge.  All questions answered.        DIAGNOSIS  Final diagnoses:   Right wrist fracture, closed, initial encounter         DISPOSITION  D/c    Pt masked in first look. I wore a surgical mask throughout my encounters with the pt. I performed hand hygiene on entry into the pt room and upon exit.     Dictated utilizing Dragon dictation      Mariusz Nava PA  08/18/22 0982

## 2022-08-18 ENCOUNTER — HOSPITAL ENCOUNTER (OUTPATIENT)
Dept: CT IMAGING | Facility: HOSPITAL | Age: 67
Discharge: HOME | End: 2022-08-18
Admitting: INTERNAL MEDICINE

## 2022-08-18 DIAGNOSIS — R91.8 PULMONARY INFILTRATE: ICD-10-CM

## 2022-08-18 PROCEDURE — 71250 CT THORAX DX C-: CPT

## 2022-08-19 ENCOUNTER — TELEPHONE (OUTPATIENT)
Dept: CARDIOLOGY | Facility: CLINIC | Age: 67
End: 2022-08-19

## 2022-08-19 DIAGNOSIS — I10 ESSENTIAL HYPERTENSION: ICD-10-CM

## 2022-08-19 RX ORDER — AMLODIPINE BESYLATE 10 MG/1
TABLET ORAL
Qty: 90 TABLET | Refills: 1 | Status: SHIPPED | OUTPATIENT
Start: 2022-08-19 | End: 2022-08-30 | Stop reason: SDUPTHER

## 2022-08-19 NOTE — TELEPHONE ENCOUNTER
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 He had CABG in April 2022. He is on plavix following surgery. He also needs aortic aneurysm repair which is being planned 6 months post-CABG.     He fell and broke his wrist. Surgery is planned for 8/26/22. Orthopedics wants to know if plavix can be held. I wasn't sure what the recommendations were for plavix following CABG. I'm looking them up, but I thought I'd try to catch a physician before it got too late on a Friday.     Thanks!  TAYLOR Leos

## 2022-08-19 NOTE — TELEPHONE ENCOUNTER
Cm received a return phone call from [facility] program  Per representative, patient is only interested in outpatient services  [facility] would like for CM to contact them to tell them when patient has discharged  Cm informed DORA Clark also  Patient wife called x2.     She stated that he patient has fallen and broken his wrist in 2 places.  She stated that the surgeon is wanting him to hold the plavix.  He is wanting to know the followin. Can he hold the plavix?  2. If so, how long he should hold it for and when should he resume it?    Please advise.    CB: [phone]-pt  CB: [phone]-wife    I have called and left a message for the wife letting her know that we were in clinic and that I would get this message to the provider as soon as I could.      I also called the patient and let him know the same and assured him that I would have someone address this being he is scheduled to have surgery on his wrist on 22.  Patient verbalized understanding.    Gisell

## 2022-08-19 NOTE — TELEPHONE ENCOUNTER
Hi, I'm Lois Talavera, a nurse practitioner with Dr. Claudio.     Mr. Aparicio had surgery with you in April 2022 and is following with Dr. Claudio. He will also need to have aorta aneurysm repair with vascular; you recommended that he wait 6 months before undergoing this surgery.    He called today to report that he will need surgery on 8/26/22 with orthopedics after falling and breaking his wrist this week. I wanted you to be aware. He is on Plavix; I was going to call him on Monday stating ok to hold his plavix.     Thanks!  Lois Talavera

## 2022-08-19 NOTE — TELEPHONE ENCOUNTER
As long as there is no other contraindication to holding Plavix, such as recent stenting, it should be okay to hold this after CABG.  Thanks.    Regis

## 2022-08-22 ENCOUNTER — TELEPHONE (OUTPATIENT)
Dept: CARDIOLOGY | Facility: CLINIC | Age: 67
End: 2022-08-22

## 2022-08-22 NOTE — TELEPHONE ENCOUNTER
Called and spoke with the patient and let him know the above, he verbalized understanding.      Gisell

## 2022-08-22 NOTE — TELEPHONE ENCOUNTER
He can hold it starting today. Should be able to resume the day after surgery.     Thanks!  TAYLOR Leos

## 2022-08-22 NOTE — TELEPHONE ENCOUNTER
Pt is calling to report his BP readin/12/22: 128/65 P: 69    22: 147/79 P: 64    22: 136/70 P: 63    8/15/22 154/88 P: 57    22: 159/82 P: 52    22: 147/73 P: 73    22: 143/77 P: 72      PT is taking:     AMLODIPINE 10 mg DAILY  LOSARTAN 100 MG DAILY  METOPROLOL SUCC 50 MG DAILY    PH#: [phone]

## 2022-08-24 NOTE — TELEPHONE ENCOUNTER
He is having PAT at [facility] tomorrow at 0730. Can he come in for BP check tomorrow? I'm not sure how long PAT will be or how early a BP check can be scheduled???    I don't mind to check his BP myself; I could be here at 8:30.    Thanks!  TAYLOR Leos

## 2022-08-25 ENCOUNTER — CLINICAL SUPPORT (OUTPATIENT)
Dept: CARDIOLOGY | Facility: CLINIC | Age: 67
End: 2022-08-25

## 2022-08-25 ENCOUNTER — PRE-ADMISSION TESTING (OUTPATIENT)
Dept: PREADMISSION TESTING | Facility: HOSPITAL | Age: 67
End: 2022-08-25

## 2022-08-25 VITALS — DIASTOLIC BLOOD PRESSURE: 82 MMHG | SYSTOLIC BLOOD PRESSURE: 160 MMHG | HEART RATE: 66 BPM | OXYGEN SATURATION: 98 %

## 2022-08-25 VITALS
SYSTOLIC BLOOD PRESSURE: 180 MMHG | HEART RATE: 55 BPM | OXYGEN SATURATION: 96 % | WEIGHT: 183.7 LBS | RESPIRATION RATE: 18 BRPM | HEIGHT: 71 IN | BODY MASS INDEX: 25.72 KG/M2 | DIASTOLIC BLOOD PRESSURE: 84 MMHG | TEMPERATURE: 98.3 F

## 2022-08-25 PROBLEM — S52.531A CLOSED COLLES' FRACTURE OF RIGHT RADIUS: Status: ACTIVE | Noted: 2022-08-25

## 2022-08-25 LAB
ALBUMIN SERPL-MCNC: 4 G/DL (ref 3.5–5.2)
ALBUMIN/GLOB SERPL: 1.3 G/DL
ALP SERPL-CCNC: 61 U/L (ref 39–117)
ALT SERPL W P-5'-P-CCNC: 14 U/L (ref 1–41)
ANION GAP SERPL CALCULATED.3IONS-SCNC: 7.6 MMOL/L (ref 5–15)
AST SERPL-CCNC: 17 U/L (ref 1–40)
BASOPHILS # BLD MANUAL: 0.15 10*3/MM3 (ref 0–0.2)
BASOPHILS NFR BLD MANUAL: 2 % (ref 0–1.5)
BILIRUB SERPL-MCNC: 0.4 MG/DL (ref 0–1.2)
BILIRUB UR QL STRIP: NEGATIVE
BUN SERPL-MCNC: 17 MG/DL (ref 8–23)
BUN/CREAT SERPL: 16.7 (ref 7–25)
CALCIUM SPEC-SCNC: 9 MG/DL (ref 8.6–10.5)
CHLORIDE SERPL-SCNC: 100 MMOL/L (ref 98–107)
CLARITY UR: CLEAR
CO2 SERPL-SCNC: 23.4 MMOL/L (ref 22–29)
COLOR UR: YELLOW
CREAT SERPL-MCNC: 1.02 MG/DL (ref 0.76–1.27)
DEPRECATED RDW RBC AUTO: 44.2 FL (ref 37–54)
EGFRCR SERPLBLD CKD-EPI 2021: 80.6 ML/MIN/1.73
ERYTHROCYTE [DISTWIDTH] IN BLOOD BY AUTOMATED COUNT: 13.3 % (ref 12.3–15.4)
GLOBULIN UR ELPH-MCNC: 3 GM/DL
GLUCOSE SERPL-MCNC: 110 MG/DL (ref 65–99)
GLUCOSE UR STRIP-MCNC: NEGATIVE MG/DL
HCT VFR BLD AUTO: 31.6 % (ref 37.5–51)
HGB BLD-MCNC: 11 G/DL (ref 13–17.7)
HGB UR QL STRIP.AUTO: NEGATIVE
KETONES UR QL STRIP: NEGATIVE
LEUKOCYTE ESTERASE UR QL STRIP.AUTO: NEGATIVE
LYMPHOCYTES # BLD MANUAL: 3.29 10*3/MM3 (ref 0.7–3.1)
LYMPHOCYTES NFR BLD MANUAL: 3 % (ref 5–12)
MCH RBC QN AUTO: 32.1 PG (ref 26.6–33)
MCHC RBC AUTO-ENTMCNC: 34.8 G/DL (ref 31.5–35.7)
MCV RBC AUTO: 92.1 FL (ref 79–97)
MONOCYTES # BLD: 0.23 10*3/MM3 (ref 0.1–0.9)
NEUTROPHILS # BLD AUTO: 3.91 10*3/MM3 (ref 1.7–7)
NEUTROPHILS NFR BLD MANUAL: 51.5 % (ref 42.7–76)
NITRITE UR QL STRIP: NEGATIVE
PH UR STRIP.AUTO: 6 [PH] (ref 5–8)
PLAT MORPH BLD: NORMAL
PLATELET # BLD AUTO: 279 10*3/MM3 (ref 140–450)
PMV BLD AUTO: 9.8 FL (ref 6–12)
POTASSIUM SERPL-SCNC: 4.2 MMOL/L (ref 3.5–5.2)
PROT SERPL-MCNC: 7 G/DL (ref 6–8.5)
PROT UR QL STRIP: NEGATIVE
RBC # BLD AUTO: 3.43 10*6/MM3 (ref 4.14–5.8)
RBC MORPH BLD: NORMAL
SARS-COV-2 ORF1AB RESP QL NAA+PROBE: NOT DETECTED
SODIUM SERPL-SCNC: 131 MMOL/L (ref 136–145)
SP GR UR STRIP: 1.02 (ref 1–1.03)
UROBILINOGEN UR QL STRIP: NORMAL
VARIANT LYMPHS NFR BLD MANUAL: 43.4 % (ref 19.6–45.3)
WBC MORPH BLD: NORMAL
WBC NRBC COR # BLD: 7.59 10*3/MM3 (ref 3.4–10.8)

## 2022-08-25 PROCEDURE — C9803 HOPD COVID-19 SPEC COLLECT: HCPCS

## 2022-08-25 PROCEDURE — 80053 COMPREHEN METABOLIC PANEL: CPT

## 2022-08-25 PROCEDURE — 85025 COMPLETE CBC W/AUTO DIFF WBC: CPT

## 2022-08-25 PROCEDURE — 36415 COLL VENOUS BLD VENIPUNCTURE: CPT

## 2022-08-25 PROCEDURE — 85007 BL SMEAR W/DIFF WBC COUNT: CPT

## 2022-08-25 PROCEDURE — 81003 URINALYSIS AUTO W/O SCOPE: CPT

## 2022-08-25 PROCEDURE — U0004 COV-19 TEST NON-CDC HGH THRU: HCPCS

## 2022-08-25 PROCEDURE — U0005 INFEC AGEN DETEC AMPLI PROBE: HCPCS

## 2022-08-25 RX ORDER — METOPROLOL SUCCINATE 100 MG/1
100 TABLET, EXTENDED RELEASE ORAL NIGHTLY
Qty: 30 TABLET | Refills: 11 | Status: SHIPPED | OUTPATIENT
Start: 2022-08-25 | End: 2022-08-30

## 2022-08-25 NOTE — DISCHARGE INSTRUCTIONS
Take the following medications the morning of surgery:  AMLODIPINE  SPIRIVA AND SYMBICORT  BRING RESCUE INHALER    If you are on prescription narcotic pain medication to control your pain you may also take that medication the morning of surgery.    General Instructions:  Do not eat solid food after midnight the night before surgery.  You may drink clear liquids day of surgery but must stop at least one hour before your hospital arrival time.  It is beneficial for you to have a clear drink that contains carbohydrates the day of surgery.  We suggest a 12 to 20 ounce bottle of Gatorade or Powerade for non-diabetic patients or a 12 to 20 ounce bottle of G2 or Powerade Zero for diabetic patients. (Pediatric patients, are not advised to drink a 12 to 20 ounce carbohydrate drink)    Clear liquids are liquids you can see through.  Nothing red in color.     Plain water                               Sports drinks  Sodas                                   Gelatin (Jell-O)  Fruit juices without pulp such as white grape juice and apple juice  Popsicles that contain no fruit or yogurt  Tea or coffee (no cream or milk added)  Gatorade / Powerade  G2 / Powerade Zero    Infants may have breast milk up to four hours before surgery.  Infants drinking formula may drink formula up to six hours before surgery.   Patients who avoid smoking, chewing tobacco and alcohol for 4 weeks prior to surgery have a reduced risk of post-operative complications.  Quit smoking as many days before surgery as you can.  Do not smoke, use chewing tobacco or drink alcohol the day of surgery.   If applicable bring your C-PAP/ BI-PAP machine.  Bring any papers given to you in the doctor’s office.  Wear clean comfortable clothes.  Do not wear contact lenses, false eyelashes or make-up.  Bring a case for your glasses.   Bring crutches or walker if applicable.  Remove all piercings.  Leave jewelry and any other valuables at home.  Hair extensions with metal clips  must be removed prior to surgery.  The Pre-Admission Testing nurse will instruct you to bring medications if unable to obtain an accurate list in Pre-Admission Testing.        If you were given a blood bank ID arm band remember to bring it with you the day of surgery.    Preventing a Surgical Site Infection:  For 2 to 3 days before surgery, avoid shaving with a razor because the razor can irritate skin and make it easier to develop an infection.    Any areas of open skin can increase the risk of a post-operative wound infection by allowing bacteria to enter and travel throughout the body.  Notify your surgeon if you have any skin wounds / rashes even if it is not near the expected surgical site.  The area will need assessed to determine if surgery should be delayed until it is healed.  The night prior to surgery shower using a fresh bar of anti-bacterial soap (such as Dial) and clean washcloth.  Sleep in a clean bed with clean clothing.  Do not allow pets to sleep with you.  Shower on the morning of surgery using a fresh bar of anti-bacterial soap (such as Dial) and clean washcloth.  Dry with a clean towel and dress in clean clothing.  Ask your surgeon if you will be receiving antibiotics prior to surgery.  Make sure you, your family, and all healthcare providers clean their hands with soap and water or an alcohol based hand  before caring for you or your wound.    Day of surgery:8/26/2022 PT WILL BE NOTIFIED OF ARRIVAL TIME   Your arrival time is approximately two hours before your scheduled surgery time.  Upon arrival, a Pre-op nurse and Anesthesiologist will review your health history, obtain vital signs, and answer questions you may have.  The only belongings needed at this time will be a list of your home medications and if applicable your C-PAP/BI-PAP machine.  A Pre-op nurse will start an IV and you may receive medication in preparation for surgery, including something to help you relax.     Please  be aware that surgery does come with discomfort.  We want to make every effort to control your discomfort so please discuss any uncontrolled symptoms with your nurse.   Your doctor will most likely have prescribed pain medications.      If you are going home after surgery you will receive individualized written care instructions before being discharged.  A responsible adult must drive you to and from the hospital on the day of your surgery and stay with you for 24 hours.  Discharge prescriptions can be filled by the hospital pharmacy during regular pharmacy hours.  If you are having surgery late in the day/evening your prescription may be e-prescribed to your pharmacy.  Please verify your pharmacy hours or chose a 24 hour pharmacy to avoid not having access to your prescription because your pharmacy has closed for the day.    If you are staying overnight following surgery, you will be transported to your hospital room following the recovery period.  [facility] has all private rooms.    If you have any questions please call Pre-Admission Testing at [phone].  Deductibles and co-payments are collected on the day of service. Please be prepared to pay the required co-pay, deductible or deposit on the day of service as defined by your plan.    Call your surgeon immediately if you experience any of the following symptoms:  Sore Throat  Shortness of Breath or difficulty breathing  Cough  Chills  Body soreness or muscle pain  Headache  Fever  New loss of taste or smell  Do not arrive for your surgery ill.  Your procedure will need to be rescheduled to another time.  You will need to call your physician before the day of surgery to avoid any unnecessary exposure to hospital staff as well as other patients.

## 2022-08-25 NOTE — DISCHARGE INSTRUCTIONS
Orthopaedic & Hand Surgery  Distal Radius Fracture Fixation Discharge Instructions  Dr. Bubba Mello  [phone]    INCISION CARE  The postoperative dressings should be left in place until your follow-up appointment.  You will receive instructions at this appointment on whether your injury requires continued immobilization.  Your surgeon will instruct you regarding suture or staple removal. In general, this happens at the 1-2-week postoperative appointment.  Once postoperative splinting is discontinued, new dry dressings can then be placed around the wound and held in place with an Ace wrap.   Dressings should be changed at least daily or if visibly soiled.  Wash your hands prior to dressing changes  No creams or ointments to the incision until 4 weeks post op.   You may remove dressing once the incision is completely free of drainage.  Do not touch or pick at the incision  Check incision every day and notify surgeon immediately if any of the following signs or symptoms are seen:  Increase in redness  Increase in swelling around the incision and of the entire extremity  Increase in pain  NEW drainage or oozing from the incision  Pulling apart of the edges of the incision  Increase in overall body temperature (greater than 100.4°F)    ACTIVITIES  Exercises:  Physical therapy may or may not be needed after surgery. Once some healing has occurred, it will be possible to start therapy if you wish. However, most patients get their function back fairly well after surgery without formal therapy.  Activities of Daily Living:   Showering may begin immediately if the postoperative dressing can be protected. The dressing/splint and incision cannot get wet after surgery.   No tub baths, hot tubs, or swimming pools for 4 weeks.  May shower and let water run over the incision once the sutures are removed if there is no drainage and the wound is well healing. A new dry dressing can be applied after showering.        Restrictions  Weight: Do not put weight on the injured arm until instructed to do so. Your surgeon will discuss with restrictions in terms of activities allowed. In general, anything more strenuous than holding a pen or piece of paper should be avoided, the other hand should do the vast majority of the work until the injured side had healed enough that it is not painful.  Driving: Many patients have questions about when it is safe to return to driving. The answer is that this is extremely variable. It depends on how quickly you heal. Until you can safely navigate the steering wheel, and are off of all narcotics, driving is not permitted. Your surgeon cannot “clear” you to return to driving, only you can make the decision when you feel it is safe.     Pain Control  Ice:  Ice is an excellent pain reliever. This can be used regardless of whether or not you are taking pain medication.  Apply an ice pack to the surgical area for 20 minutes at a time, removing it for at least an hour to prevent frostbite.  You should keep a towel between any dressings on the ice pack to prevent them from getting damp and from developing frostbite on the operative site.  Elevation:  Elevation is another easy way to control pain after surgery. Whenever possible, keep the operative limb elevated above the level of your heart to reduce swelling.  Acetaminophen (Tylenol):  CLASSIFICATION: A non-narcotic medication that is available without a prescription.  Acetaminophen controls pain but does not affect swelling or inflammation.  DRIVING: Acetaminophen will not impair your ability to drive. It is safe to drive while taking if your physical condition does not limit you.  POTENTIAL SIDE EFFECTS: nausea, stomach upset, liver failure if taken in large doses.  Interactions: Some narcotic medications contain acetaminophen. If you have a narcotic prescription, make sure to cut back on the acetaminophen if you are taking the narcotic. You should  never take more 3000 mg of acetaminophen in one 24-hour period.  DOSAGE:  Following surgery, you may take two regular strength (325 mg) tabs to control pain every 6 hours. This can be taken with NSAIDs (see below) or alternating the two.  After the initial surgical pain begins to resolve, you may begin to decrease the pain medication. By the end of a few weeks, you should be off of pain medications.  NSAIDS: This includes aspirin, ibuprofen, naproxen, Motrin, Aleve, Mobic, Celebrex  CLASSIFICATION: These are non-narcotic medications that are available without a prescription.  They are particularly effective at reducing swelling and inflammation  DRIVING: These medications will not impair your ability to drive. It is safe to drive while taking these medications if your physical condition does not limit you.  POTENTIAL SIDE EFFECTS: nausea, stomach upset, ulcer, gastric bleeding, kidney failure.  DOSAGE:  Following surgery, you may take ibuprofen (Motrin) 600 mg to control pain every 6 hours with food. It helps to take it scheduled (around the clock) to allow it to help reduce swelling.  After the initial surgical pain begins to resolve, you may begin to decrease the pain medication. By the end of a few weeks, you should be off of pain medications.  Narcotic Pain Medications: Utilized after surgery. This is some general information about these medications.  CLASSIFICATION: Prescription pain medications are called Opioids and are narcotics  LEGALITIES: It is illegal to share narcotics with others  DRIVING: it is illegal to drive while under the influence of narcotics. Doing so is a DUI.  POTENTIAL SIDE EFFECTS: nausea, vomiting, itching, dizziness, drowsiness, dry mouth, constipation, and difficulty urinating.  POTENTIAL ADVERSE EFFECTS:  Opioid tolerance can develop with use of pain medications and this simply means that it requires more and more of the medication to control pain. However, this is seen more in  patients that use opioids for longer periods of time.  Opioid dependence can develop with use of Opioids. People with opioid dependence will experience withdrawal symptoms upon cessation of the medication.  Opioid addiction can develop with use of Opioids. The incidence of this is very unlikely in patients who take the medications as ordered and stop the medications as instructed.  Opioid overdose can be dangerous, but is unlikely when the medication is taken as ordered and stopped when ordered. It is important not to mix opioids with alcohol as this can lead to over sedation and respiratory difficulty.  DOSAGE:  After the initial surgical pain begins to resolve, you should begin to decrease the pain medication dose and frequency. By the end of a few weeks, you should be off of narcotic pain medications.  Refills will not be given by the office during evening hours, on weekends, or after 6 weeks post-op. You are responsible for weaning off of pain medication.  To seek refills on pain medications during the initial 6-week post-operative period, you must call the office to request the refill. The office will then notify you when to  the prescription. DO NOT wait until you are out of the medication to request a refill. Prescriptions will not be filled over the weekend and it may take a couple days for the prescription to be available. Someone will have to pick the prescription in person at the office.    Other Medications  Anticoagulants: After upper extremity surgery most patients do not require an anticoagulant unless you have another injury that will be keeping you from mobilizing.  If you were already taking an anticoagulant (commonly Aspirin, Coumadin, or Plavix) you will likely be resuming your normal dose postoperatively and will be continuing that medication at the discretion of the prescribing physician.  Stool Softeners: You will be at greater risk of constipation after surgery due to being less mobile  and the narcotic pain medications.  Take stool softeners as needed. Over the counter Colace 100 mg 1-2 capsules twice daily can be taken.  If stools become too loose or too frequent, please decreases the dosage or stop the stool softener.  If constipation occurs despite use of stool softeners, you are to continue the stool softeners and add a laxative (Milk of Magnesia 1 ounce daily as needed)  Drink plenty of fluids, and eat fruits and vegetables during your recovery time. Getting up and mobilizing will help the bowels to recover their regular function, as will weaning off of all narcotics when the pain becomes tolerable.    FOLLOW-UP VISITS  You will need to follow up in the office with your surgeon in 1-2 weeks, or as instructed elsewhere in your discharge paperwork. Please call this number [phone] to schedule this appointment if one has not already been made.   If you have any concerns or suspected complications prior to your follow up visit, please call the office. Do not wait until your appointment time if you suspect complications. These will need to be addressed in the office promptly.      Bubba Mello MD, PhD  Orthopaedic Surgery  [facility]           What to expect after a Nerve Block    Nerve blocks administered to block pain affect many types of nerves, including those nerves that control movement, pain, and normal sensation. Following a nerve block, you may notice some bruising at the site where the block was given. You may experience sensations such as: numbness of the affected area or limb, tingling, heaviness (that is the limb feels heavy to you), weakness or inability to move the affected arm or leg, or a feeling as if your arm or leg has “fallen asleep.”     A nerve block can last from 2 to 36 hours depending on the medications used.  Usually the weakness wears off first followed by the tingling and heaviness. As the block wears off, you may begin to notice pain;  however, this sequence of events may occur in any order. Typically, you will be able to move your limb before you will feel it. Once a nerve block begins to wear off, the effects are usually completely gone within 60 minutes.  If you experience continued side effects that you believe are block related for longer than 48 hours, please call your healthcare provider. Please see block-specific instructions below.    Instructions for any block involving the shoulder or arm  If you have had any kind of shoulder/arm block, you will go home with your arm in a sling. Wear the sling until the block has completely worn off. You may be required to wear it for a longer period of time per your surgeon’s recommendations.  If you have had a shoulder/arm block, it is a good idea to sleep on a recliner with pillows under your arm.    You may experience symptoms such as:  Shortness of breath  Hoarseness   Blurry vision  Unequal pupils  Drooping of your face on the same side as the block was performed    These are side effects associated with this kind of block and should go away within 12 hours.    Note: If you have severe or prolonged shortness of breath, please seek medical assistance as soon as possible.     Protection of a “blocked” arm or leg (limb)  After a nerve block, you cannot feel pain, pressure, or extremes of temperature in the affected limb. And because of this, your blocked limb is at more risk for injury. For example, it is possible to burn your limb on an extremely hot surface without feeling it.     When resting, it is important to reposition your limb periodically to avoid prolonged pressure on it. This may require the use of pillows and padding.    While sleeping, you should avoid rolling onto the affected limb or putting too much pressure on it.     If you have a cast or tight dressing, check the color of your fingers or toes of the affected limb. Call your surgeon if they look discolored (that is, dusky, dark  colored).    Use caution in cold weather. Cover your limb appropriately to protect it from the cold.      Pain Management:    Your surgeon will give you a prescription for pain medication. Begin taking this before the nerve block wears off. Bear in mind that sometimes the block can wear off in the middle of the night.

## 2022-08-25 NOTE — PROGRESS NOTES
Pt came in for b/p only . Vitals are in the rooming tab. Gave pt blp check log for baseline and for two hours later after medication. Lois Talavera came in and seen the pt.

## 2022-08-26 ENCOUNTER — APPOINTMENT (OUTPATIENT)
Dept: GENERAL RADIOLOGY | Facility: HOSPITAL | Age: 67
End: 2022-08-26

## 2022-08-26 ENCOUNTER — ANESTHESIA (OUTPATIENT)
Dept: PERIOP | Facility: HOSPITAL | Age: 67
End: 2022-08-26

## 2022-08-26 ENCOUNTER — ANESTHESIA EVENT (OUTPATIENT)
Dept: PERIOP | Facility: HOSPITAL | Age: 67
End: 2022-08-26

## 2022-08-26 ENCOUNTER — HOSPITAL ENCOUNTER (OUTPATIENT)
Facility: HOSPITAL | Age: 67
Setting detail: HOSPITAL OUTPATIENT SURGERY
Discharge: HOME | End: 2022-08-26
Attending: STUDENT IN AN ORGANIZED HEALTH CARE EDUCATION/TRAINING PROGRAM | Admitting: STUDENT IN AN ORGANIZED HEALTH CARE EDUCATION/TRAINING PROGRAM

## 2022-08-26 VITALS
WEIGHT: 183.64 LBS | BODY MASS INDEX: 25.71 KG/M2 | HEIGHT: 71 IN | SYSTOLIC BLOOD PRESSURE: 148 MMHG | OXYGEN SATURATION: 95 % | DIASTOLIC BLOOD PRESSURE: 84 MMHG | RESPIRATION RATE: 16 BRPM | HEART RATE: 66 BPM | TEMPERATURE: 97.1 F

## 2022-08-26 DIAGNOSIS — S52.531A CLOSED COLLES' FRACTURE OF RIGHT RADIUS, INITIAL ENCOUNTER: Primary | ICD-10-CM

## 2022-08-26 PROCEDURE — C1713 ANCHOR/SCREW BN/BN,TIS/BN: HCPCS | Performed by: STUDENT IN AN ORGANIZED HEALTH CARE EDUCATION/TRAINING PROGRAM

## 2022-08-26 PROCEDURE — 25010000002 DEXAMETHASONE PER 1 MG: Performed by: ANESTHESIOLOGY

## 2022-08-26 PROCEDURE — 25010000002 MIDAZOLAM PER 1 MG: Performed by: ANESTHESIOLOGY

## 2022-08-26 PROCEDURE — 25010000002 CEFAZOLIN IN DEXTROSE 2-4 GM/100ML-% SOLUTION: Performed by: STUDENT IN AN ORGANIZED HEALTH CARE EDUCATION/TRAINING PROGRAM

## 2022-08-26 PROCEDURE — 76942 ECHO GUIDE FOR BIOPSY: CPT | Performed by: STUDENT IN AN ORGANIZED HEALTH CARE EDUCATION/TRAINING PROGRAM

## 2022-08-26 PROCEDURE — 73100 X-RAY EXAM OF WRIST: CPT | Performed by: STUDENT IN AN ORGANIZED HEALTH CARE EDUCATION/TRAINING PROGRAM

## 2022-08-26 PROCEDURE — 25010000002 PROPOFOL 10 MG/ML EMULSION: Performed by: ANESTHESIOLOGY

## 2022-08-26 PROCEDURE — 76000 FLUOROSCOPY <1 HR PHYS/QHP: CPT | Performed by: STUDENT IN AN ORGANIZED HEALTH CARE EDUCATION/TRAINING PROGRAM

## 2022-08-26 PROCEDURE — 25010000002 ROPIVACAINE PER 1 MG: Performed by: ANESTHESIOLOGY

## 2022-08-26 DEVICE — 2.5 TRILOCK EXPRESS SCREW 24MM,HD7,5/PKG
Type: IMPLANTABLE DEVICE | Site: RADIUS | Status: FUNCTIONAL
Brand: APTUS

## 2022-08-26 DEVICE — 2.5 TRILOCK SCREW 12MM, HD7, 1/PKG
Type: IMPLANTABLE DEVICE | Site: RADIUS | Status: FUNCTIONAL
Brand: APTUS

## 2022-08-26 DEVICE — 2.5 TRILOCK SCREW 18MM, HD7, 1/PKG
Type: IMPLANTABLE DEVICE | Site: RADIUS | Status: FUNCTIONAL
Brand: APTUS

## 2022-08-26 DEVICE — 2.5 TRILOCK EXPRESS SCREW 18MM,HD7,5/PKG
Type: IMPLANTABLE DEVICE | Site: RADIUS | Status: FUNCTIONAL
Brand: APTUS

## 2022-08-26 DEVICE — 2.5 CORTICAL SCREW 14MM, HD7, 1/PKG
Type: IMPLANTABLE DEVICE | Site: RADIUS | Status: FUNCTIONAL
Brand: APTUS

## 2022-08-26 DEVICE — 1.6 K-WIRE, TROCAR, 150MM, 1/PKG
Type: IMPLANTABLE DEVICE | Site: RADIUS | Status: FUNCTIONAL
Brand: APTUS

## 2022-08-26 DEVICE — 2.5 TRILOCK SCREW 14MM, HD7, 1/PKG
Type: IMPLANTABLE DEVICE | Site: RADIUS | Status: FUNCTIONAL
Brand: APTUS

## 2022-08-26 DEVICE — 2.5 TRILOCK EXPRESS SCREW 22MM,HD7,5/PKG
Type: IMPLANTABLE DEVICE | Site: RADIUS | Status: FUNCTIONAL
Brand: APTUS

## 2022-08-26 DEVICE — 2.5 ADAPTIVE II TRILOCK DISTRAD.PL VOL R
Type: IMPLANTABLE DEVICE | Site: RADIUS | Status: FUNCTIONAL
Brand: APTUS

## 2022-08-26 RX ORDER — PROPOFOL 10 MG/ML
VIAL (ML) INTRAVENOUS CONTINUOUS PRN
Status: DISCONTINUED | OUTPATIENT
Start: 2022-08-26 | End: 2022-08-26 | Stop reason: SURG

## 2022-08-26 RX ORDER — MIDAZOLAM HYDROCHLORIDE 1 MG/ML
0.5 INJECTION INTRAMUSCULAR; INTRAVENOUS
Status: COMPLETED | OUTPATIENT
Start: 2022-08-26 | End: 2022-08-26

## 2022-08-26 RX ORDER — BUPIVACAINE HYDROCHLORIDE AND EPINEPHRINE 2.5; 5 MG/ML; UG/ML
INJECTION, SOLUTION EPIDURAL; INFILTRATION; INTRACAUDAL; PERINEURAL
Status: COMPLETED | OUTPATIENT
Start: 2022-08-26 | End: 2022-08-26

## 2022-08-26 RX ORDER — GLYCOPYRROLATE 0.2 MG/ML
INJECTION INTRAMUSCULAR; INTRAVENOUS AS NEEDED
Status: DISCONTINUED | OUTPATIENT
Start: 2022-08-26 | End: 2022-08-26 | Stop reason: SURG

## 2022-08-26 RX ORDER — LIDOCAINE HYDROCHLORIDE AND EPINEPHRINE 10; 10 MG/ML; UG/ML
INJECTION, SOLUTION INFILTRATION; PERINEURAL AS NEEDED
Status: DISCONTINUED | OUTPATIENT
Start: 2022-08-26 | End: 2022-08-26 | Stop reason: HOSPADM

## 2022-08-26 RX ORDER — CEFAZOLIN SODIUM 2 G/100ML
2 INJECTION, SOLUTION INTRAVENOUS ONCE
Status: COMPLETED | OUTPATIENT
Start: 2022-08-26 | End: 2022-08-26

## 2022-08-26 RX ORDER — KETAMINE HYDROCHLORIDE 10 MG/ML
INJECTION INTRAMUSCULAR; INTRAVENOUS AS NEEDED
Status: DISCONTINUED | OUTPATIENT
Start: 2022-08-26 | End: 2022-08-26 | Stop reason: SURG

## 2022-08-26 RX ORDER — FAMOTIDINE 10 MG/ML
20 INJECTION, SOLUTION INTRAVENOUS ONCE
Status: COMPLETED | OUTPATIENT
Start: 2022-08-26 | End: 2022-08-26

## 2022-08-26 RX ORDER — HYDROCODONE BITARTRATE AND ACETAMINOPHEN 5; 325 MG/1; MG/1
1 TABLET ORAL EVERY 4 HOURS PRN
Qty: 30 TABLET | Refills: 0 | Status: SHIPPED | OUTPATIENT
Start: 2022-08-26 | End: 2022-10-03

## 2022-08-26 RX ORDER — FENTANYL CITRATE 50 UG/ML
50 INJECTION, SOLUTION INTRAMUSCULAR; INTRAVENOUS
Status: DISCONTINUED | OUTPATIENT
Start: 2022-08-26 | End: 2022-08-26 | Stop reason: HOSPADM

## 2022-08-26 RX ORDER — SODIUM CHLORIDE, SODIUM LACTATE, POTASSIUM CHLORIDE, CALCIUM CHLORIDE 600; 310; 30; 20 MG/100ML; MG/100ML; MG/100ML; MG/100ML
9 INJECTION, SOLUTION INTRAVENOUS CONTINUOUS
Status: DISCONTINUED | OUTPATIENT
Start: 2022-08-26 | End: 2022-08-26 | Stop reason: HOSPADM

## 2022-08-26 RX ORDER — DEXAMETHASONE SODIUM PHOSPHATE 4 MG/ML
INJECTION, SOLUTION INTRA-ARTICULAR; INTRALESIONAL; INTRAMUSCULAR; INTRAVENOUS; SOFT TISSUE
Status: COMPLETED | OUTPATIENT
Start: 2022-08-26 | End: 2022-08-26

## 2022-08-26 RX ORDER — MIDAZOLAM HYDROCHLORIDE 1 MG/ML
INJECTION INTRAMUSCULAR; INTRAVENOUS AS NEEDED
Status: DISCONTINUED | OUTPATIENT
Start: 2022-08-26 | End: 2022-08-26 | Stop reason: SURG

## 2022-08-26 RX ORDER — LIDOCAINE HYDROCHLORIDE 10 MG/ML
0.5 INJECTION, SOLUTION EPIDURAL; INFILTRATION; INTRACAUDAL; PERINEURAL ONCE AS NEEDED
Status: DISCONTINUED | OUTPATIENT
Start: 2022-08-26 | End: 2022-08-26 | Stop reason: HOSPADM

## 2022-08-26 RX ORDER — ROPIVACAINE HYDROCHLORIDE 5 MG/ML
INJECTION, SOLUTION EPIDURAL; INFILTRATION; PERINEURAL
Status: COMPLETED | OUTPATIENT
Start: 2022-08-26 | End: 2022-08-26

## 2022-08-26 RX ORDER — MAGNESIUM HYDROXIDE 1200 MG/15ML
LIQUID ORAL AS NEEDED
Status: DISCONTINUED | OUTPATIENT
Start: 2022-08-26 | End: 2022-08-26 | Stop reason: HOSPADM

## 2022-08-26 RX ORDER — SODIUM CHLORIDE 0.9 % (FLUSH) 0.9 %
3-10 SYRINGE (ML) INJECTION AS NEEDED
Status: DISCONTINUED | OUTPATIENT
Start: 2022-08-26 | End: 2022-08-26 | Stop reason: HOSPADM

## 2022-08-26 RX ORDER — SODIUM CHLORIDE 0.9 % (FLUSH) 0.9 %
3 SYRINGE (ML) INJECTION EVERY 12 HOURS SCHEDULED
Status: DISCONTINUED | OUTPATIENT
Start: 2022-08-26 | End: 2022-08-26 | Stop reason: HOSPADM

## 2022-08-26 RX ORDER — LIDOCAINE HYDROCHLORIDE 20 MG/ML
INJECTION, SOLUTION INFILTRATION; PERINEURAL AS NEEDED
Status: DISCONTINUED | OUTPATIENT
Start: 2022-08-26 | End: 2022-08-26 | Stop reason: SURG

## 2022-08-26 RX ADMIN — KETAMINE HYDROCHLORIDE 5 MG: 10 INJECTION INTRAMUSCULAR; INTRAVENOUS at 09:57

## 2022-08-26 RX ADMIN — FAMOTIDINE 20 MG: 10 INJECTION, SOLUTION INTRAVENOUS at 08:15

## 2022-08-26 RX ADMIN — ROPIVACAINE HYDROCHLORIDE 15 ML: 5 INJECTION EPIDURAL; INFILTRATION; PERINEURAL at 08:30

## 2022-08-26 RX ADMIN — KETAMINE HYDROCHLORIDE 5 MG: 10 INJECTION INTRAMUSCULAR; INTRAVENOUS at 09:42

## 2022-08-26 RX ADMIN — SODIUM CHLORIDE, POTASSIUM CHLORIDE, SODIUM LACTATE AND CALCIUM CHLORIDE 9 ML/HR: 600; 310; 30; 20 INJECTION, SOLUTION INTRAVENOUS at 08:03

## 2022-08-26 RX ADMIN — MIDAZOLAM 1 MG: 1 INJECTION INTRAMUSCULAR; INTRAVENOUS at 10:02

## 2022-08-26 RX ADMIN — MIDAZOLAM 0.5 MG: 1 INJECTION INTRAMUSCULAR; INTRAVENOUS at 08:36

## 2022-08-26 RX ADMIN — MIDAZOLAM 1 MG: 1 INJECTION INTRAMUSCULAR; INTRAVENOUS at 09:45

## 2022-08-26 RX ADMIN — KETAMINE HYDROCHLORIDE 5 MG: 10 INJECTION INTRAMUSCULAR; INTRAVENOUS at 09:22

## 2022-08-26 RX ADMIN — KETAMINE HYDROCHLORIDE 5 MG: 10 INJECTION INTRAMUSCULAR; INTRAVENOUS at 09:27

## 2022-08-26 RX ADMIN — KETAMINE HYDROCHLORIDE 5 MG: 10 INJECTION INTRAMUSCULAR; INTRAVENOUS at 09:37

## 2022-08-26 RX ADMIN — PROPOFOL 100 MCG/KG/MIN: 10 INJECTION, EMULSION INTRAVENOUS at 09:13

## 2022-08-26 RX ADMIN — KETAMINE HYDROCHLORIDE 5 MG: 10 INJECTION INTRAMUSCULAR; INTRAVENOUS at 09:17

## 2022-08-26 RX ADMIN — KETAMINE HYDROCHLORIDE 5 MG: 10 INJECTION INTRAMUSCULAR; INTRAVENOUS at 09:52

## 2022-08-26 RX ADMIN — LIDOCAINE HYDROCHLORIDE 80 MG: 20 INJECTION, SOLUTION INFILTRATION; PERINEURAL at 09:13

## 2022-08-26 RX ADMIN — MIDAZOLAM 0.5 MG: 1 INJECTION INTRAMUSCULAR; INTRAVENOUS at 08:16

## 2022-08-26 RX ADMIN — BUPIVACAINE HYDROCHLORIDE AND EPINEPHRINE BITARTRATE 15 ML: 2.5; .005 INJECTION, SOLUTION EPIDURAL; INFILTRATION; INTRACAUDAL; PERINEURAL at 08:30

## 2022-08-26 RX ADMIN — CEFAZOLIN SODIUM 2 G: 2 INJECTION, SOLUTION INTRAVENOUS at 08:50

## 2022-08-26 RX ADMIN — DEXAMETHASONE SODIUM PHOSPHATE 3 MG: 4 INJECTION, SOLUTION INTRAMUSCULAR; INTRAVENOUS at 08:30

## 2022-08-26 RX ADMIN — KETAMINE HYDROCHLORIDE 5 MG: 10 INJECTION INTRAMUSCULAR; INTRAVENOUS at 09:32

## 2022-08-26 RX ADMIN — KETAMINE HYDROCHLORIDE 5 MG: 10 INJECTION INTRAMUSCULAR; INTRAVENOUS at 09:47

## 2022-08-26 RX ADMIN — KETAMINE HYDROCHLORIDE 5 MG: 10 INJECTION INTRAMUSCULAR; INTRAVENOUS at 10:02

## 2022-08-26 RX ADMIN — GLYCOPYRROLATE 0.1 MG: 1 INJECTION INTRAMUSCULAR; INTRAVENOUS at 09:13

## 2022-08-26 NOTE — INTERVAL H&P NOTE
H&P reviewed. The patient was examined and there are no changes to the H&P.    Bubba Mello MD, PhD  Orthopaedic & Hand Surgery  [facility]  [phone] - [place] [facility]  [phone] - [facility]

## 2022-08-26 NOTE — ANESTHESIA PROCEDURE NOTES
Peripheral Block    Pre-sedation assessment completed: 8/26/2022 8:24 AM    Patient reassessed immediately prior to procedure    Patient location during procedure: holding area  Start time: 8/26/2022 8:27 AM  Stop time: 8/26/2022 8:31 AM  Reason for block: at surgeon's request and post-op pain management  Performed by  Anesthesiologist: Jaime Beckford DO  Preanesthetic Checklist  Completed: patient identified, IV checked, site marked, risks and benefits discussed, surgical consent, monitors and equipment checked, pre-op evaluation and timeout performed  Prep:  Pt Position: sitting  Sterile barriers:gloves, mask, cap and washed/disinfected hands  Prep: ChloraPrep  Patient monitoring: blood pressure monitoring, continuous pulse oximetry and EKG  Procedure    Sedation: yes  Performed under: local infiltration  Guidance:ultrasound guided    ULTRASOUND INTERPRETATION. Using ultrasound guidance a gauge needle was placed in close proximity to the brachial plexus nerve, at which point, under ultrasound guidance anesthetic was injected in the area of the nerve and spread of the anesthesia was seen on ultrasound in close proximity thereto.  There were no abnormalities seen on ultrasound; a digital image was taken; and the patient tolerated the procedure with no complications. Images:still images obtained, printed/placed on chart    Laterality:right  Block Type:supraclavicular  Injection Technique:single-shot  Needle Type:echogenic  Needle Gauge:22 G  Resistance on Injection: none    Medications Used: dexamethasone (DECADRON) injection, 3 mg  ropivacaine (NAROPIN) 0.5 % injection, 15 mL  bupivacaine-EPINEPHrine PF (MARCAINE w/EPI) 0.25% -1:[id number] injection, 15 mL  Med administered at 8/26/2022 8:30 AM      Medications  Comment:Ultrasound Interpretation:  Using ultrasound guidance the needle was placed in close proximity to the brachial plexus and anesthetic was injected in the area of the nerve and spread of the  anesthetic was seen on ultrasound in close proximity thereto.  There were no abnormalities seen on ultrasound; a digital image was taken; and the patient tolerated the procedure with no complications.      Post Assessment  Injection Assessment: negative aspiration for heme, no paresthesia on injection and incremental injection  Patient Tolerance:comfortable throughout block  Complications:no  Additional Notes  Ultrasound guidance was used to both view and verify local anesthetic placement and disbursement. In addition, it was used to evaluate the respective anatomy to determine needle approach and placement.

## 2022-08-26 NOTE — NURSING NOTE
Patient up to bathroom prior to leaving and c/o of inability to void. Rested and assisted up to bathroom again. States he was still unable to void and states he feels pressure in lower abdomen and discomfort in left lower back. Dr. Oliveira consulted. Bladder scanner done at bedside with 582 cc noted x three.

## 2022-08-26 NOTE — OP NOTE
Orthopaedic & Hand Surgery  Wrist Fracture Operative Report  Dr. Bubba Mello  [phone]      PATIENT NAME: Osman Aparicio  MRN: [id number]  : 1955 AGE: 67 y.o. GENDER: male  DATE OF OPERATION: 2022  PREOPERATIVE DIAGNOSIS:   • Right extraarticular distal radial fracture  POSTOPERATIVE DIAGNOSIS:   • Right extraarticular distal radial fracture  OPERATION PERFORMED:   • Open treatment of extraarticular distal radial fracture (CPT 38710)  SURGEON: Bubba Mello MD, PhD  ANESTHESIA: Local and Sedation with Block  ASSISTANT: None  ESTIMATED BLOOD LOSS: <5 ml  SPONGE AND NEEDLE COUNT: Correct  INDICATIONS: The patient is as 67 y.o. male, who sustained a displaced distal radius fracture following a fall onto an outstretched hand. Given the unstable pattern, it was recommended that he undergo open reduction internal fixation. The risks of surgery were discussed and included Pain, Bleeding, Infection, Complications of anesthesia, Damage to blood vessels, nerves and other surrounding structures, Stiffness, Scar, Further procedures, Nonunion or malunion, Hardware Failure and Unforeseen risks of surgery including stroke, heart stoppage or death. No guarantees were made. Following a thorough discussion, questions were solicited and answered to his satisfaction. Verbal and written consent were obtained prior to proceeding with surgery.    COMPONENTS:   • [employer] Plate and Screws    PERTINENT FINDINGS:   • Following fixation:  o There was restoration of anatomic volar tilt and radial inclination  o The fracture was stable taking the wrist through a range of motion and without crepitance.   o The DRUJ was stable following internal fixation    TOURNIQUET TIME:   • 55 Minutes    DETAILS OF PROCEDURE:  The patient was met in the preoperative area. The site was marked. The consent and H&P were reviewed. The patient was then wheeled back to the operative suite and transferred to the operative table with the  operative limb extended onto a hand table. The patient submitted to anesthesia. A tourniquet was placed on the operative upper arm. Surgical alcohol was used to thoroughly clean the entire operative extremity. The operative arm was then prepped in the normal sterile fashion, which included Chloroprep and multiple layers of sterile drapes. After a surgical timeout in which the patient's identity, the surgical side/site, planned procedure, and the administration of preoperative antibiotics were confirmed, the limb was exsanguinated with an Esmarch bandage and the tourniquet was inflated.    The standard trans-FCR approach to the volar distal radius was performed. Incision was made in line with the FCR. FCR sheath and subsheath were divided. Care was taken to avoid damage to the palmar cutaneous branch of the median nerve. Floor of the FCR subsheath was entered. Pronator quadratus was identified and reflected off of the radius in an ulnarward fashion. This gave us full exposure to the fracture. We then elevated the brachioradialis off the distal radius fragment to allow for the reduction. We pronated the radius out of the way, and released the dorsal periosteum.    We selected a plate from the [employer] distal radius set and provisionally fixed it to the radius using nonlocking screws. Following this, a reduction maneuver was then performed with longitudinal traction volar flexion and ulnar translation. This brought the distal fragment over the proximal fragment, reducing the fracture. The fracture was provisionally fixed with wires through the plate. Position was checked in the AP and lateral fluoroscopic views and found to be acceptable. We then placed locking pegs in the distal metaphyseal fragment, taking 2 mm off of each measured length and position was checked in the AP, lateral and dorsal tangential fluoroscopic views. We were able to confirm that the screws were subchondral and not penetrating the joint. After  this, the final fixation was carried out by placing additional locking screws into the shaft fragment. Final position was checked in the AP, lateral fluoroscopic views and found to have restored the distal radius anatomy. Screw and peg positions were acceptable. We then checked the DRUJ and it was found to be stable.    The wound was thoroughly irrigated. Pronator quadratus was repaired. Tourniquet was deflated at 55 min. Bleeding was controlled with a combination of direct pressure and bipolar cautery. The wound was closed in layers.    A sterile dressing and volar resting splint were applied and secured with an ACE bandage. Anesthesia was reversed without complication, the patient was transferred to the [facility] and taken to the recovery room in stable condition. Sponge and needle count were reported to me as correct. There were no immediate complications. Patient tolerated the procedure well.    Post Operative Plan:   • Wound & dressing care  o Keep the postoperative dressing in place until the first post-op visit  o Once the postoperative dressing is removed, the patient may shower with regular soap and water.  o No immersion until 1 week following suture removal  • Weightbearing & ROM  o Weight bearing is limited to no lifting greater than:  - 1 pound until 2 weeks postop  - 5 pounds until 6 weeks postop  - 25 pounds until 12 weeks postop.  o Range of motion of the fingers and wrist is unrestricted and may be done as tolerated.  • Therapy  o A postsurgical follow-up, the patient will be provided with a volar forearm-based wrist brace, and will receive instructions for a home exercise program.  o The patient will be encouraged to do unrestricted finger and wrist range of motion exercises at home.  • Brace wear  o Brace wear should be full time for the first 2 weeks, except for bathing and ROM exercises.   - The brace may be removed anytime for ROM exercises.  o After 2 weeks, he should not wear the brace inside  the home and only wear the brace when out of the house.  o By 4 weeks, the orthosis should be discontinued completely.  • Follow-up  o Follow-up as scheduled with Dr. Mello in 1-2 weeks. At that time, he will have his wounds checked and range of motion checked.     Bubba Mello MD, PhD  Orthopaedic & Hand Surgery  [facility]  [phone] - [place] [facility]  [phone] - [facility]

## 2022-08-26 NOTE — ANESTHESIA POSTPROCEDURE EVALUATION
"Patient: Osman Aparicio    Procedure Summary     Date: 08/26/22 Room / Location:  [facility] [address] [place] [address] [facility]    Anesthesia Start: 0855 Anesthesia Stop:     Procedure: RIGHT DISTAL RADIUS FRACTURE OPEN REDUCTION INTERNAL FIXATION (Right Wrist) Diagnosis:     Surgeons: Bubba Mello MD Provider: Dl Garner MD    Anesthesia Type: MAC, regional ASA Status: 3          Anesthesia Type: MAC, regional    Vitals  No vitals data found for the desired time range.          Post Anesthesia Care and Evaluation    Patient location during evaluation: PHASE II  Patient participation: complete - patient participated  Level of consciousness: awake  Pain management: adequate    Airway patency: patent  Anesthetic complications: No anesthetic complications    Cardiovascular status: acceptable  Respiratory status: acceptable  Hydration status: acceptable    Comments: /72 (BP Location: Left arm, Patient Position: Lying)   Pulse 62   Temp 36.7 °C (98 °F) (Oral)   Resp 16   Ht 180.3 cm (70.98\")   Wt 83.3 kg (183 lb 10.3 oz)   SpO2 98%   BMI 25.62 kg/m²         "

## 2022-08-26 NOTE — ANESTHESIA PREPROCEDURE EVALUATION
Anesthesia Evaluation     Patient summary reviewed and Nursing notes reviewed   no history of anesthetic complications:  NPO Solid Status: > 8 hours  NPO Liquid Status: > 2 hours           Airway   Mallampati: II  TM distance: >3 FB  Neck ROM: full  No difficulty expected  Dental    (+) poor dentition    Pulmonary     breath sounds clear to auscultation  (+) a smoker Former, COPD,   (-) shortness of breath, recent URI  Cardiovascular   Exercise tolerance: good (4-7 METS) ([place] )    ECG reviewed  PT is on anticoagulation therapy  Patient on routine beta blocker and Beta blocker given within 24 hours of surgery  Rhythm: regular  Rate: normal    (+) hypertension, past MI  3-6 months, CAD, CABG 3-6 Months, hyperlipidemia,   (-) CHF    ROS comment: 4-2022 TTE:  · The left ventricular cavity is mildly dilated.  · Calculated left ventricular EF = 55.7% Estimated left ventricular EF was in agreement with the calculated left ventricular EF. Left ventricular systolic function is normal.  · There is severe hypokinesis of the septal apical wall. There is hypokinesis of the lateral and lateral apical walls. The inferior wall also appears to be hypokinetic  · Left ventricular diastolic function is consistent with (grade III w/high LAP) reversible restrictive pattern.  · Left atrial volume is moderately increased.  · There is mild calcification of the aortic valve mainly affecting the non-coronary and right coronary cusp(s).  · Trace to mild aortic valve regurgitation is present.  · Moderate mitral valve regurgitation is present with a centrally-directed jet noted.  · Trace to mild tricuspid valve regurgitation is present.  · Estimated right ventricular systolic pressure from tricuspid regurgitation is moderately elevated (45-55 mmHg).  · There is a trivial pericardial effusion.        Neuro/Psych  (+) psychiatric history Anxiety,    (-) seizures, CVA  GI/Hepatic/Renal/Endo    (-)  obesity, liver disease, no  renal disease, diabetes    Musculoskeletal     Abdominal    Substance History   (+) alcohol use,      OB/GYN          Other        ROS/Med Hx Other: Last plavix 8/21                    Anesthesia Plan    ASA 3     MAC and regional     (MAC anesthesia discussed with patient and/or patient representative. Risks (including but not limited to intra-op awareness), benefits, and alternatives were discussed. Understanding was voiced with an agreement to proceed with a MAC technique and General as a backup option. )    Anesthetic plan, risks, benefits, and alternatives have been provided, discussed and informed consent has been obtained with: patient.    Plan discussed with CRNA.        CODE STATUS:

## 2022-08-30 ENCOUNTER — OFFICE VISIT (OUTPATIENT)
Dept: CARDIOLOGY | Facility: CLINIC | Age: 67
End: 2022-08-30

## 2022-08-30 VITALS
BODY MASS INDEX: 26.2 KG/M2 | OXYGEN SATURATION: 98 % | SYSTOLIC BLOOD PRESSURE: 128 MMHG | WEIGHT: 183 LBS | DIASTOLIC BLOOD PRESSURE: 78 MMHG | HEIGHT: 70 IN | HEART RATE: 56 BPM

## 2022-08-30 DIAGNOSIS — I25.10 CORONARY ARTERY DISEASE INVOLVING NATIVE CORONARY ARTERY OF NATIVE HEART WITHOUT ANGINA PECTORIS: Primary | ICD-10-CM

## 2022-08-30 DIAGNOSIS — J44.1 COPD WITH ACUTE EXACERBATION: ICD-10-CM

## 2022-08-30 DIAGNOSIS — I34.0 NONRHEUMATIC MITRAL VALVE REGURGITATION: ICD-10-CM

## 2022-08-30 DIAGNOSIS — E78.49 OTHER HYPERLIPIDEMIA: ICD-10-CM

## 2022-08-30 DIAGNOSIS — I10 ESSENTIAL HYPERTENSION: ICD-10-CM

## 2022-08-30 DIAGNOSIS — F10.10 ALCOHOL ABUSE: ICD-10-CM

## 2022-08-30 DIAGNOSIS — F41.9 ANXIETY: ICD-10-CM

## 2022-08-30 DIAGNOSIS — I71.40 ABDOMINAL AORTIC ANEURYSM (AAA) WITHOUT RUPTURE: ICD-10-CM

## 2022-08-30 PROBLEM — I50.32 CHRONIC DIASTOLIC CONGESTIVE HEART FAILURE (HCC): Status: ACTIVE | Noted: 2022-03-26

## 2022-08-30 PROCEDURE — 93000 ELECTROCARDIOGRAM COMPLETE: CPT | Performed by: NURSE PRACTITIONER

## 2022-08-30 PROCEDURE — 99213 OFFICE O/P EST LOW 20 MIN: CPT | Performed by: NURSE PRACTITIONER

## 2022-08-30 RX ORDER — MULTIPLE VITAMINS W/ MINERALS TAB 9MG-400MCG
1 TAB ORAL DAILY
Qty: 30 TABLET | Refills: 3 | Status: ON HOLD
Start: 2022-08-30

## 2022-08-30 RX ORDER — CLOPIDOGREL BISULFATE 75 MG/1
75 TABLET ORAL DAILY
Qty: 30 TABLET | Refills: 3
Start: 2022-08-30 | End: 2022-10-03

## 2022-08-30 RX ORDER — AMLODIPINE BESYLATE 10 MG/1
10 TABLET ORAL NIGHTLY
Qty: 90 TABLET | Refills: 1
Start: 2022-08-30 | End: 2023-02-24 | Stop reason: SDUPTHER

## 2022-08-30 RX ORDER — ASPIRIN 81 MG/1
81 TABLET ORAL DAILY
Qty: 30 TABLET | Refills: 3 | Status: ON HOLD
Start: 2022-08-30

## 2022-08-30 RX ORDER — METOPROLOL SUCCINATE 50 MG/1
75 TABLET, EXTENDED RELEASE ORAL NIGHTLY
Qty: 30 TABLET | Refills: 11
Start: 2022-08-30 | End: 2022-10-03

## 2022-08-30 RX ORDER — ESCITALOPRAM OXALATE 20 MG/1
20 TABLET ORAL NIGHTLY
Qty: 30 TABLET | Refills: 0
Start: 2022-08-30 | End: 2023-03-21 | Stop reason: SDUPTHER

## 2022-08-30 NOTE — PROGRESS NOTES
[facility] CARDIOLOGY  [address]  [facility] [address]  [facility] [address]  Phone: [phone]      Patient Name: Osman Aparicio  :1955  Age: 67 y.o.  Primary Cardiologist: Susan Claudio MD  Encounter Provider:  TAYLOR Moore      Chief Complaint     Chief Complaint: Follow-up, Hypertension, Coronary Artery Disease, and hld      SUBJECTIVE     History of Present Illness:  Osman Aparicio is a 67 y.o.  male whose medical history includes hyperlipidemia, hypertension, current tobacco use, alcohol abuse, COPD.  He is followed in our office by Dr. Claudio for coronary artery disease, diastolic dysfunction, and abdominal aortic aneurysm.     22 Follow-up:  He is here for follow-up of hypertension.  Losartan has been increased to 100 mg nightly and his metoprolol succinate has been increased to 75mg daily.  He saw him for blood pressure check last week; he had fallen while playing golf and broke his wrist and required surgical repair.  His Plavix and aspirin were held for 2 days; has resumed it and is having no bleeding.  His pain is controlled.  His blood pressure at home has been 120s to 130s over 70s with heart rate 60s most of the day but BP 150s in the morning.  He denies chest pain, dyspnea with exertion, orthopnea, leg swelling, or palpitations.  He has some occasional lightheadedness but attributes this to the recent blood pressure medication changes.  He continues to abstain from cigarettes and has reduced his alcohol intake to 2 beers per day.    Below is a summary of pertinent cardiology findings:  • He has 2 brothers who have had MIs in their 40s.  • Coronary artery disease/Diastolic dysfunction/Mitral insufficiency -3/26/2022 ruled in for MI (dyspnea with exertion, orthopnea, PND, chest pain).  Echocardiogram showed mild LV dilation, EF 56%, grade 3 reversible restrictive diastolic dysfunction, moderate left atrial enlargement, moderate mitral  insufficiency, trace to mild aortic and tricuspid insufficiency, severe septal apical hypokinesis, RVSP 52 mmHg.  CT chest showed mild pulmonary edema, abdominal aortic aneurysm, mild bilateral pleural effusions but no PE.  • 3/29/2022 cardiac catheterization showed a calcified left main without stenosis, chronically occluded ostial to mid LAD, 89% first OM stenosis, 95% second OM stenosis, and 50% distal RCA stenosis; the LAD fills via left to left collaterals.  • 2022 CABG x3 with LIMA to LAD, SVG to OM1, SVG to OM 2.  • Abdominal aortic aneurysm -measuring 5.5 cm.  He was evaluated by Dr. Kumar in hospitalization and will require AAA repair.  Carotid artery Doppler study showed mild bilateral internal carotid artery stenosis.    Past Medical History:   Diagnosis Date   • AAA (abdominal aortic aneurysm) ([facility])    • Abnormal glucose    • Anxiety    • COPD (chronic obstructive pulmonary disease) ([facility])    • Coronary artery disease    • Encounter for special screening examination for neoplasm of prostate 10/2012   • Hematuria    • History of COVID-19    • Hyperlipidemia    • Hypertension    • Radius fracture    • Vitamin D deficiency disease        Past Surgical History:  • CABG 2022    Social History     Socioeconomic History   • Marital status:    Tobacco Use   • Smoking status: Former Smoker     Packs/day: 1.00     Years: 45.00     Pack years: 45.00     Types: Cigarettes     Quit date: 3/26/2022     Years since quittin.4   • Smokeless tobacco: Never Used   • Tobacco comment: caffeine - 3 cups coffee daily, tea or soda occas.   Vaping Use   • Vaping Use: Never used   Substance and Sexual Activity   • Alcohol use: Yes     Comment: 2 BEERS DAY   • Drug use: No   • Sexual activity: Defer         Review of Systems     Review of Systems   Cardiovascular: Negative.    Neurological: Positive for light-headedness.       Medications     Allergies as of 2022   • (No Known Allergies)       Current  Outpatient Medications on File Prior to Visit   Medication Sig   • albuterol sulfate HFA (Proventil HFA) 108 (90 Base) MCG/ACT inhaler Inhale 2 puffs Every 4 (Four) Hours As Needed for Wheezing.   • Ascorbic Acid (VITAMIN C PO) Take 1 tablet by mouth Daily. HOLD PRIOR TO SURG   • budesonide-formoterol (Symbicort) 160-4.5 MCG/ACT inhaler Inhale 2 puffs 2 (Two) Times a Day.   • Ferrous Sulfate (Iron) 28 MG tablet Take  by mouth.   • folic acid (FOLVITE) 1 MG tablet Take 1 tablet by mouth Daily.   • guaiFENesin (MUCINEX) 600 MG 12 hr tablet Take 2 tablets by mouth Every 12 (Twelve) Hours.   • HYDROcodone-acetaminophen (NORCO) 5-325 MG per tablet Take 1 tablet by mouth Every 4 (Four) Hours As Needed for Severe Pain .   • losartan (Cozaar) 100 MG tablet Take 1 tablet by mouth Daily.   • nitroglycerin (NITROSTAT) 0.4 MG SL tablet Place 1 tablet under the tongue Every 5 (Five) Minutes As Needed for Chest Pain (Only if SBP Greater Than 100). Take no more than 3 doses in 15 minutes.   • oxyCODONE-acetaminophen (PERCOCET) 5-325 MG per tablet Take 1 tablet by mouth Every 6 (Six) Hours As Needed for Moderate Pain .   • rosuvastatin (CRESTOR) 20 MG tablet Take 1 tablet by mouth Every Night.   • thiamine (VITAMIN B-1) 100 MG tablet  tablet Take 1 tablet by mouth Daily. (Patient taking differently: Take 100 mg by mouth Daily. HOLD PRIOR TO SURG)   • tiotropium (Spiriva HandiHaler) 18 MCG per inhalation capsule Place 1 capsule into inhaler and inhale Daily.   • [DISCONTINUED] amLODIPine (NORVASC) 10 MG tablet TAKE 1 TABLET BY MOUTH EVERY DAY   • [DISCONTINUED] aspirin 81 MG EC tablet Take 1 tablet by mouth Daily. (Patient taking differently: Take 81 mg by mouth Daily. HOLDING FOR SURGERY)   • [DISCONTINUED] clopidogrel (PLAVIX) 75 MG tablet Take 1 tablet by mouth Daily. (Patient taking differently: Take 75 mg by mouth Daily. HOLDING FOR SURGERY LAST DOSE 8/22/2022 PER MD)   • [DISCONTINUED] escitalopram (LEXAPRO) 20 MG tablet Take  "1 tablet by mouth Daily. (Patient taking differently: Take 20 mg by mouth Every Night.)   • [DISCONTINUED] metoprolol succinate XL (TOPROL-XL) 100 MG 24 hr tablet Take 1 tablet by mouth Every Night.   • [DISCONTINUED] multivitamin with minerals tablet tablet Take 1 tablet by mouth Daily. (Patient taking differently: Take 1 tablet by mouth Daily. HOLD PRIOR TO SURG)     No current facility-administered medications on file prior to visit.          OBJECTIVE     Vital Signs:   /78   Pulse 56   Ht 177.8 cm (70\")   Wt 83 kg (183 lb)   SpO2 98%   BMI 26.26 kg/m²       Weight:  Wt Readings from Last 3 Encounters:   08/30/22 83 kg (183 lb)   08/26/22 83.3 kg (183 lb 10.3 oz)   08/25/22 83.3 kg (183 lb 11.2 oz)     Body mass index is 26.26 kg/m².      Physical Exam     Physical Exam  Constitutional:       General: He is not in acute distress.  HENT:      Head: Normocephalic and atraumatic.      Mouth/Throat:      Mouth: Mucous membranes are moist.   Eyes:      General: No scleral icterus.     Extraocular Movements: Extraocular movements intact.      Conjunctiva/sclera: Conjunctivae normal.      Pupils: Pupils are equal, round, and reactive to light.   Cardiovascular:      Rate and Rhythm: Normal rate and regular rhythm.      Pulses: Normal pulses.      Heart sounds: S1 normal and S2 normal. Murmur heard.      Comments: Murmur at RSB  Pulmonary:      Effort: No respiratory distress.      Breath sounds: Normal breath sounds. No wheezing, rhonchi or rales.   Abdominal:      General: Bowel sounds are normal. There is no distension.      Palpations: Abdomen is soft.      Tenderness: There is no abdominal tenderness.   Musculoskeletal:         General: Signs of injury present. Normal range of motion.      Cervical back: Normal range of motion and neck supple.      Right lower leg: No edema.      Left lower leg: No edema.      Comments: Right wrist in ace bandage   Skin:     General: Skin is warm and dry.      " Coloration: Skin is not jaundiced.   Neurological:      Mental Status: He is alert and oriented to person, place, and time.   Psychiatric:         Mood and Affect: Mood normal.         Reviewed Data     Result Review :  The following data was reviewed by TAYLOR Moore on 08/30/22:  • Labs 08/25/2022:  cr 1.0, K 4.2,  otherwise unremarkable CMP, Hgb 11.0, Plt 279  • Labs 03/28/2022:  Chol 104, HDL 52, LDL 88, Trig 61      ECG 12 Lead    Date/Time: 8/30/2022 11:08 AM  Performed by: Helen Talavera APRN  Authorized by: Helen Talavera APRN   Comparison: compared with previous ECG from 5/25/2022  Similar to previous ECG  Rhythm: sinus bradycardia  Rate: bradycardic  BPM: 50  ST Elevation: V1 and V2    Clinical impression: abnormal EKG            Assessment and Plan        Assessment and Plan     Assessment:  1. Coronary artery disease involving native coronary artery of native heart without angina pectoris    2. Nonrheumatic mitral valve regurgitation    3. Abdominal aortic aneurysm (AAA) without rupture (HCC)    4. Essential hypertension    5. Other hyperlipidemia    6. COPD with acute exacerbation (HCC)    7. Alcohol abuse    8. Anxiety         1. Coronary artery disease: Strong family history with 2 brothers who had MI in their 40s.  Cardiac catheterization in March 2022 showed multivessel coronary artery disease.  He had CABG x3 on April 1, 2022 with LIMA to LAD, SVG to OM1, SVG to OM 2.  He denies chest pain.  His medical therapy includes 10 mg amlodipine daily, 81 mg aspirin daily, 75 mg Plavix daily, 75 mg Toprol nightly, 100 mg losartan daily, and 20 mg rosuvastatin nightly.  2. Mitral valve insufficiency: This was graded as moderate on echocardiogram from March 2022.  He is compensated by exam today.  3. Abdominal aortic aneurysm: Noted during hospitalization in March to April 2022, measuring 5.5 cm.  There are plans for AAA repair with Dr. Kumar once his aspirin  and Plavix can be held.  Per Dr. Mercedes, she would like for him to be on aspirin and Plavix for 6 months; this would be the beginning of October 2022.  4. He denies abdominal or back pain.  5. Hypertension: Better controlled on current regimen.  He is starting to have a little bit of lightheadedness.  6. Hyperlipidemia: He is treated with 20 mg rosuvastatin nightly.  7. COPD: No evidence of exacerbation today.  He continues to abstain from cigarettes; he stopped smoking in March 2022.  8. Alcohol abuse: He has reduced his daily alcohol intake to 2 beers per day.      Plan:  1. I made no medication changes today but I did switch his amlodipine from morning to nightly for better a.m. blood pressure control.  He will continue to monitor blood pressure and heart rate at home.  2. We will see him back the first week of October 2022 and we will discuss stopping aspirin and Plavix at that time to prepare him for AAA repair.      Follow Up:  Return for Follow-up with TAYLOR Leos first week of October 2022.  Orders Placed This Encounter   Procedures   • ECG 12 Lead      New Medications Ordered This Visit   Medications   • aspirin 81 MG EC tablet     Sig: Take 1 tablet by mouth Daily.     Dispense:  30 tablet     Refill:  3   • clopidogrel (PLAVIX) 75 MG tablet     Sig: Take 1 tablet by mouth Daily.     Dispense:  30 tablet     Refill:  3   • escitalopram (LEXAPRO) 20 MG tablet     Sig: Take 1 tablet by mouth Every Night.     Dispense:  30 tablet     Refill:  0   • multivitamin with minerals tablet tablet     Sig: Take 1 tablet by mouth Daily.     Dispense:  30 tablet     Refill:  3   • metoprolol succinate XL (TOPROL-XL) 50 MG 24 hr tablet     Sig: Take 1.5 tablets by mouth Every Night.     Dispense:  30 tablet     Refill:  11   • amLODIPine (NORVASC) 10 MG tablet     Sig: Take 1 tablet by mouth Every Night.     Dispense:  90 tablet     Refill:  1         Thank you the opportunity to participate in this  patient's care.    TAYLOR Farrell    This office note has been dictated.

## 2022-09-02 ENCOUNTER — TELEPHONE (OUTPATIENT)
Dept: FAMILY MEDICINE CLINIC | Facility: CLINIC | Age: 67
End: 2022-09-02

## 2022-09-02 NOTE — TELEPHONE ENCOUNTER
Caller: Osman Aparicio    Relationship: Self    Best call back number: [phone]    What medication are you requesting: MUSCLE RELAXER    What are your current symptoms: PINCHED NERVE IN LOWER BACK, WHEN HE TURNS OR STANDS UP IT'S A SHARP SHOOTING PAIN    How long have you been experiencing symptoms: FOUR DAYS     Have you had these symptoms before:    [x] Yes  [] No    Have you been treated for these symptoms before:   [x] Yes  [] No    If a prescription is needed, what is your preferred pharmacy and phone number: [facility]/PHARMACY [facility][id number] [facility] [place] - [address] AT [facility] - [phone] [facility] [phone] FX     Additional notes: PLEASE ADVISE

## 2022-09-06 ENCOUNTER — OFFICE VISIT (OUTPATIENT)
Dept: FAMILY MEDICINE CLINIC | Facility: CLINIC | Age: 67
End: 2022-09-06

## 2022-09-06 VITALS
RESPIRATION RATE: 16 BRPM | HEART RATE: 51 BPM | BODY MASS INDEX: 26.05 KG/M2 | HEIGHT: 70 IN | DIASTOLIC BLOOD PRESSURE: 74 MMHG | WEIGHT: 182 LBS | TEMPERATURE: 97.5 F | SYSTOLIC BLOOD PRESSURE: 138 MMHG | OXYGEN SATURATION: 98 %

## 2022-09-06 DIAGNOSIS — F32.A ANXIETY AND DEPRESSION: Primary | ICD-10-CM

## 2022-09-06 DIAGNOSIS — M54.50 ACUTE MIDLINE LOW BACK PAIN WITHOUT SCIATICA: ICD-10-CM

## 2022-09-06 DIAGNOSIS — F41.9 ANXIETY AND DEPRESSION: Primary | ICD-10-CM

## 2022-09-06 PROCEDURE — 99214 OFFICE O/P EST MOD 30 MIN: CPT | Performed by: NURSE PRACTITIONER

## 2022-09-06 RX ORDER — PREDNISONE 20 MG/1
20 TABLET ORAL DAILY
Qty: 7 TABLET | Refills: 0 | Status: SHIPPED | OUTPATIENT
Start: 2022-09-06 | End: 2022-10-03

## 2022-09-06 RX ORDER — BUPROPION HYDROCHLORIDE 150 MG/1
150 TABLET ORAL DAILY
Qty: 30 TABLET | Refills: 0 | Status: SHIPPED | OUTPATIENT
Start: 2022-09-06 | End: 2022-10-03 | Stop reason: SDUPTHER

## 2022-09-06 RX ORDER — LOSARTAN POTASSIUM 25 MG/1
25 TABLET ORAL EVERY MORNING
COMMUNITY
Start: 2022-08-20 | End: 2022-09-06 | Stop reason: ALTCHOICE

## 2022-09-06 NOTE — PROGRESS NOTES
"Subjective   Osman Aparicio is a 67 y.o. male.     History of Present Illness    Back Pain (Low back, center x 1 week)   Anxiety (Had bypass surgery April 1 and has been having a rough time.  Wants to do things but just can't make himself.)   Depression    Osman Aparicio male 67 y.o., /74   Pulse 51   Temp 97.5 °F (36.4 °C)   Resp 16   Ht 177.8 cm (70\")   Wt 82.6 kg (182 lb)   SpO2 98%   BMI 26.11 kg/m²   who presents today for evaluation and treatment of Depression and Anxiety.  He reports depressed mood, fatigue, anxiety, anhedonia, depressed mood and anxiety, excessive worry and unable to relax. Onset of symptoms was approximately several months ago. He denies current suicidal and homicidal ideation. Risk factors are chronic illness and grief.  He is already on escitalopram and has been for over 10 years.  He feels anxiety and depression has worsened since April after bypass surgery but he has also had 3 people close to him that have committed suicide.  PHQ9 score is 24. Patient denies suicidal ideation but reports little desire to do things.     Also reports midline low back pain for 1 week. Denies known injury. Does radiate to left upper leg. Reports it is more of a shooting pain and is worse when sitting.  He states it has seemed less stiff the past 2 days.    The following portions of the patient's history were reviewed and updated as appropriate: allergies, current medications, past family history, past medical history, past social history, past surgical history and problem list.    Review of Systems   Constitutional: Positive for fatigue.   Respiratory: Negative for cough and shortness of breath.    Cardiovascular: Negative for chest pain and palpitations.   Musculoskeletal: Positive for back pain. Negative for gait problem.   Skin: Negative for rash.   Psychiatric/Behavioral: Positive for decreased concentration and dysphoric mood. Negative for self-injury and suicidal ideas. The patient is " nervous/anxious.        Objective   Physical Exam  Vitals and nursing note reviewed.   Constitutional:       Appearance: Normal appearance. He is well-developed.   Cardiovascular:      Rate and Rhythm: Normal rate and regular rhythm.   Pulmonary:      Effort: Pulmonary effort is normal.      Breath sounds: Normal breath sounds.   Musculoskeletal:      Lumbar back: Tenderness present. No swelling, spasms or bony tenderness. Normal range of motion.        Back:    Skin:     General: Skin is warm and dry.   Neurological:      Mental Status: He is alert and oriented to person, place, and time.   Psychiatric:         Mood and Affect: Mood normal.         Behavior: Behavior normal.         Thought Content: Thought content normal.         Judgment: Judgment normal.         Assessment & Plan   Diagnoses and all orders for this visit:    1. Anxiety and depression (Primary)  -     buPROPion XL (Wellbutrin XL) 150 MG 24 hr tablet; Take 1 tablet by mouth Daily.  Dispense: 30 tablet; Refill: 0    2. Acute midline low back pain without sciatica  -     predniSONE (DELTASONE) 20 MG tablet; Take 1 tablet by mouth Daily.  Dispense: 7 tablet; Refill: 0      Wellbutrin added to escitalopram.   F/u in 4 weeks or sooner if needed.

## 2022-09-20 ENCOUNTER — TELEPHONE (OUTPATIENT)
Dept: CARDIOLOGY | Facility: CLINIC | Age: 67
End: 2022-09-20

## 2022-09-20 NOTE — TELEPHONE ENCOUNTER
Pt was seen on 8/30/22. This was the plan:        He has been taking Metoprolol 100 MG nightly. I advise that he should be taking Metoprolol 75 MG nightly and losartan 100 MG daily.    Pt is wanting to know if he should continue to take metoprolol 75 MG or 100 MG nightly?    PT # [phone]

## 2022-09-20 NOTE — TELEPHONE ENCOUNTER
Patient is supposed to call back and let us know the dose of metoprolol he is taking so that we can send in refill.    Gisell

## 2022-10-03 ENCOUNTER — OFFICE VISIT (OUTPATIENT)
Dept: CARDIOLOGY | Facility: CLINIC | Age: 67
End: 2022-10-03

## 2022-10-03 VITALS
SYSTOLIC BLOOD PRESSURE: 132 MMHG | BODY MASS INDEX: 26.63 KG/M2 | HEART RATE: 52 BPM | WEIGHT: 186 LBS | OXYGEN SATURATION: 97 % | DIASTOLIC BLOOD PRESSURE: 70 MMHG | HEIGHT: 70 IN

## 2022-10-03 DIAGNOSIS — I71.40 ABDOMINAL AORTIC ANEURYSM (AAA) WITHOUT RUPTURE, UNSPECIFIED PART: ICD-10-CM

## 2022-10-03 DIAGNOSIS — I34.0 NONRHEUMATIC MITRAL VALVE REGURGITATION: ICD-10-CM

## 2022-10-03 DIAGNOSIS — F10.10 ALCOHOL ABUSE: ICD-10-CM

## 2022-10-03 DIAGNOSIS — I25.10 CORONARY ARTERY DISEASE INVOLVING NATIVE CORONARY ARTERY OF NATIVE HEART WITHOUT ANGINA PECTORIS: Primary | ICD-10-CM

## 2022-10-03 DIAGNOSIS — F32.A ANXIETY AND DEPRESSION: ICD-10-CM

## 2022-10-03 DIAGNOSIS — F41.9 ANXIETY AND DEPRESSION: ICD-10-CM

## 2022-10-03 DIAGNOSIS — I10 ESSENTIAL HYPERTENSION: ICD-10-CM

## 2022-10-03 DIAGNOSIS — E78.49 OTHER HYPERLIPIDEMIA: ICD-10-CM

## 2022-10-03 PROCEDURE — 93000 ELECTROCARDIOGRAM COMPLETE: CPT | Performed by: NURSE PRACTITIONER

## 2022-10-03 PROCEDURE — 99213 OFFICE O/P EST LOW 20 MIN: CPT | Performed by: NURSE PRACTITIONER

## 2022-10-03 RX ORDER — METOPROLOL SUCCINATE 50 MG/1
100 TABLET, EXTENDED RELEASE ORAL NIGHTLY
Qty: 60 TABLET | Refills: 11
Start: 2022-10-03 | End: 2022-10-21

## 2022-10-03 RX ORDER — CLOPIDOGREL BISULFATE 75 MG/1
TABLET ORAL
Qty: 90 TABLET | Refills: 0 | Status: SHIPPED | OUTPATIENT
Start: 2022-10-03 | End: 2022-12-30

## 2022-10-03 NOTE — TELEPHONE ENCOUNTER
Caller: Osman Aparicio    Relationship: Self    Best call back number: [phone]     Requested Prescriptions:   Requested Prescriptions     Pending Prescriptions Disp Refills   • buPROPion XL (Wellbutrin XL) 150 MG 24 hr tablet 30 tablet 0     Sig: Take 1 tablet by mouth Daily.        Pharmacy where request should be sent: [facility]/PHARMACY #[id number] [place], [place] - [address] AT [facility] [phone] [facility] [phone] FX     PATIENT STATED THAT HE WAS SUPPOSED TO COME IN FOR A 30 DAY CHECK IN FOR THIS MEDICATION.    THE PATIENT STATES THAT HIS INSURANCE IS BILLING HIM $150 EVERY TIME HE COMES IN FOR AN APPOINTMENT, AND HE HAS YET TO FIGURE OUT WHY. HE WOULD LIKE A REFILL ON THIS MEDICATION, AS HE SAYS IT IS WORKING FINE.    Does the patient have less than a 3 day supply:  [] Yes  [x] No    Sola Martínez Rep   10/03/22 15:17 EDT

## 2022-10-03 NOTE — PROGRESS NOTES
[facility] CARDIOLOGY  [address]  [facility] [address]  [facility] [address]  Phone: [phone]      Patient Name: Osman Aparicio  :1955  Age: 67 y.o.  Primary Cardiologist: Susan Claudio MD  Encounter Provider:  TAYLOR Moore      Chief Complaint     Chief Complaint: Follow-up, Hypertension, and hld      SUBJECTIVE     History of Present Illness:  Osman Aparicio is a 67 y.o.  male whose medical history includes hyperlipidemia, hypertension, current tobacco use, alcohol abuse, COPD.  He is followed in our office by Dr. Claudio for coronary artery disease, diastolic dysfunction, and abdominal aortic aneurysm.     10/03/22 Follow-up:  He is here for follow-up of hypertension.  BP is better at home; 120-130s/70s.  He is taking 100 mg losartan every morning, 100 mg Toprol every night, and 10 mg amlodipine every night.  He has occasional lightheadedness when standing up.  He denies chest pain, dyspnea with exertion, orthopnea, leg swelling, or palpitations.  He has been having some anxiety due to change in lifestyle and upcoming surgery.  He has been started on Wellbutrin in addition to Lexapro which is helping.    Below is a summary of pertinent cardiology findings:  • He has 2 brothers who have had MIs in their 40s.  • Coronary artery disease/Diastolic dysfunction/Mitral insufficiency -3/26/2022 ruled in for MI (dyspnea with exertion, orthopnea, PND, chest pain).  Echocardiogram showed mild LV dilation, EF 56%, grade 3 reversible restrictive diastolic dysfunction, moderate left atrial enlargement, moderate mitral insufficiency, trace to mild aortic and tricuspid insufficiency, severe septal apical hypokinesis, RVSP 52 mmHg.  CT chest showed mild pulmonary edema, abdominal aortic aneurysm, mild bilateral pleural effusions but no PE.  • 3/29/2022 cardiac catheterization showed a calcified left main without stenosis, chronically occluded ostial to mid LAD, 89% first  OM stenosis, 95% second OM stenosis, and 50% distal RCA stenosis; the LAD fills via left to left collaterals.  • 2022 CABG x3 with LIMA to LAD, SVG to OM1, SVG to OM 2.  • Abdominal aortic aneurysm -measuring 5.5 cm.  He was evaluated by Dr. Kumar in hospitalization and will require AAA repair.  Carotid artery Doppler study showed mild bilateral internal carotid artery stenosis.    Past Medical History:   Diagnosis Date   • AAA (abdominal aortic aneurysm)    • Abnormal glucose    • Anxiety    • COPD (chronic obstructive pulmonary disease) (HCC)    • Coronary artery disease    • Encounter for special screening examination for neoplasm of prostate 10/2012   • Hematuria    • History of COVID-19    • Hyperlipidemia    • Hypertension    • Radius fracture    • Vitamin D deficiency disease        Past Surgical History:  • CABG 2022    Social History     Socioeconomic History   • Marital status:    Tobacco Use   • Smoking status: Former Smoker     Packs/day: 1.00     Years: 45.00     Pack years: 45.00     Types: Cigarettes     Quit date: 3/26/2022     Years since quittin.5   • Smokeless tobacco: Never Used   • Tobacco comment: caffeine - 3 cups coffee daily, tea or soda occas.   Vaping Use   • Vaping Use: Never used   Substance and Sexual Activity   • Alcohol use: Yes     Comment: 2 BEERS DAY   • Drug use: No   • Sexual activity: Defer         Review of Systems     Review of Systems   Cardiovascular: Negative.    Neurological: Positive for light-headedness.       Medications     Allergies as of 10/03/2022   • (No Known Allergies)       Current Outpatient Medications on File Prior to Visit   Medication Sig   • albuterol sulfate HFA (Proventil HFA) 108 (90 Base) MCG/ACT inhaler Inhale 2 puffs Every 4 (Four) Hours As Needed for Wheezing.   • amLODIPine (NORVASC) 10 MG tablet Take 1 tablet by mouth Every Night.   • Ascorbic Acid (VITAMIN C PO) Take 1 tablet by mouth Daily. HOLD PRIOR TO SURG   • aspirin 81  MG EC tablet Take 1 tablet by mouth Daily.   • buPROPion XL (Wellbutrin XL) 150 MG 24 hr tablet Take 1 tablet by mouth Daily.   • clopidogrel (PLAVIX) 75 MG tablet TAKE 1 TABLET BY MOUTH EVERY DAY   • escitalopram (LEXAPRO) 20 MG tablet Take 1 tablet by mouth Every Night.   • Ferrous Sulfate (Iron) 28 MG tablet Take 1 tablet by mouth Daily.   • Fluticasone-Umeclidin-Vilant (TRELEGY ELLIPTA IN) Inhale.   • folic acid (FOLVITE) 1 MG tablet Take 1 tablet by mouth Daily.   • guaiFENesin (MUCINEX) 600 MG 12 hr tablet Take 2 tablets by mouth Every 12 (Twelve) Hours.   • losartan (Cozaar) 100 MG tablet Take 1 tablet by mouth Daily.   • multivitamin with minerals tablet tablet Take 1 tablet by mouth Daily.   • nitroglycerin (NITROSTAT) 0.4 MG SL tablet Place 1 tablet under the tongue Every 5 (Five) Minutes As Needed for Chest Pain (Only if SBP Greater Than 100). Take no more than 3 doses in 15 minutes.   • rosuvastatin (CRESTOR) 20 MG tablet Take 1 tablet by mouth Every Night.   • thiamine (VITAMIN B-1) 100 MG tablet  tablet Take 1 tablet by mouth Daily. (Patient taking differently: Take 100 mg by mouth Daily. HOLD PRIOR TO SURG)   • [DISCONTINUED] metoprolol succinate XL (TOPROL-XL) 50 MG 24 hr tablet Take 1.5 tablets by mouth Every Night. (Patient taking differently: Take 100 mg by mouth Every Night.)   • [DISCONTINUED] tiotropium (Spiriva HandiHaler) 18 MCG per inhalation capsule Place 1 capsule into inhaler and inhale Daily.   • [DISCONTINUED] budesonide-formoterol (Symbicort) 160-4.5 MCG/ACT inhaler Inhale 2 puffs 2 (Two) Times a Day.   • [DISCONTINUED] clopidogrel (PLAVIX) 75 MG tablet Take 1 tablet by mouth Daily.   • [DISCONTINUED] HYDROcodone-acetaminophen (NORCO) 5-325 MG per tablet Take 1 tablet by mouth Every 4 (Four) Hours As Needed for Severe Pain .   • [DISCONTINUED] predniSONE (DELTASONE) 20 MG tablet Take 1 tablet by mouth Daily. (Patient taking differently: Take 11 mg by mouth Daily.)     No current  "facility-administered medications on file prior to visit.          OBJECTIVE     Vital Signs:   /70   Pulse 52   Ht 177.8 cm (70\")   Wt 84.4 kg (186 lb)   SpO2 97%   BMI 26.69 kg/m²       Weight:  Wt Readings from Last 3 Encounters:   10/03/22 84.4 kg (186 lb)   09/06/22 82.6 kg (182 lb)   08/30/22 83 kg (183 lb)     Body mass index is 26.69 kg/m².      Physical Exam     Physical Exam  Constitutional:       General: He is not in acute distress.  HENT:      Head: Normocephalic and atraumatic.      Mouth/Throat:      Mouth: Mucous membranes are moist.   Eyes:      General: No scleral icterus.     Extraocular Movements: Extraocular movements intact.      Conjunctiva/sclera: Conjunctivae normal.      Pupils: Pupils are equal, round, and reactive to light.   Cardiovascular:      Rate and Rhythm: Normal rate and regular rhythm.      Pulses: Normal pulses.      Heart sounds: S1 normal and S2 normal. No murmur heard.     Comments: Murmur at RSB  Pulmonary:      Effort: No respiratory distress.      Breath sounds: Normal breath sounds. No wheezing, rhonchi or rales.   Abdominal:      General: Bowel sounds are normal. There is no distension.      Palpations: Abdomen is soft.      Tenderness: There is no abdominal tenderness.   Musculoskeletal:         General: Normal range of motion.      Cervical back: Normal range of motion and neck supple.      Right lower leg: No edema.      Left lower leg: No edema.   Skin:     General: Skin is warm and dry.      Coloration: Skin is not jaundiced.   Neurological:      Mental Status: He is alert and oriented to person, place, and time.   Psychiatric:         Mood and Affect: Mood normal.         Reviewed Data     Result Review :  The following data was reviewed by TAYLOR Moore on 10/03/22:  • Labs 08/25/2022:  cr 1.0, K 4.2,  otherwise unremarkable CMP, Hgb 11.0, Plt 279  • Labs 03/28/2022:  Chol 104, HDL 52, LDL 88, Trig 61  • 10/03/22 no new labs to " review      ECG 12 Lead    Date/Time: 10/3/2022 2:00 PM  Performed by: Helen Talavera APRN  Authorized by: Helen Talavera APRN   Comparison: compared with previous ECG from 8/30/2022  Similar to previous ECG  Rhythm: sinus bradycardia  Rate: bradycardic  BPM: 45  ST Elevation: II, III and aVF  ST Depression: V6  Other findings: non-specific ST-T wave changes    Clinical impression: abnormal EKG            Assessment and Plan        Assessment and Plan     Assessment:  1. Coronary artery disease involving native coronary artery of native heart without angina pectoris    2. Nonrheumatic mitral valve regurgitation    3. Abdominal aortic aneurysm (AAA) without rupture, unspecified part    4. Essential hypertension    5. Other hyperlipidemia    6. Alcohol abuse         1. Coronary artery disease: Strong family history with 2 brothers who had MI in their 40s.  Cardiac catheterization in March 2022 showed multivessel coronary artery disease.  He had CABG x3 on April 1, 2022 with LIMA to LAD, SVG to OM1, SVG to OM 2.  He denies chest pain.  His medical therapy includes 10 mg amlodipine daily, 81 mg aspirin daily, 75 mg Plavix daily, 100 mg Toprol nightly, 100 mg losartan daily, and 20 mg rosuvastatin nightly.  2. Mitral valve insufficiency: This was graded as moderate on echocardiogram from March 2022.  He remains compensated by exam today.  3. Abdominal aortic aneurysm: Noted during hospitalization in March to April 2022, measuring 5.5 cm.  There are plans for AAA repair with Dr. Kumar once his aspirin and Plavix can be held.  Per Dr. Mercedes, she would like for him to be on aspirin and Plavix for 6 months; this would be the beginning of October 2022. He denies abdominal or back pain.  4. Hypertension: Better controlled on current regimen.  He has occasional lightheadedness  5. Hyperlipidemia: He is treated with 20 mg rosuvastatin nightly.  6. COPD: No evidence of exacerbation today.  He  continues to abstain from cigarettes; he stopped smoking in March 2022.  7. Alcohol abuse: He has reduced his daily alcohol intake to 2 beers per day.      Plan:  1. I made no medication changes today.  2. I will discuss with Dr. Claudio if aspirin and plavix can be stopped prior to vascular surgery and if he needs to be seen again prior to surgery.       Follow Up:  No follow-ups on file.  Orders Placed This Encounter   Procedures   • ECG 12 Lead      New Medications Ordered This Visit   Medications   • metoprolol succinate XL (TOPROL-XL) 50 MG 24 hr tablet     Sig: Take 2 tablets by mouth Every Night.     Dispense:  60 tablet     Refill:  11         Thank you the opportunity to participate in this patient's care.    TAYLOR Farrell    This office note has been dictated.

## 2022-10-04 RX ORDER — BUPROPION HYDROCHLORIDE 150 MG/1
150 TABLET ORAL DAILY
Qty: 30 TABLET | Refills: 0 | Status: SHIPPED | OUTPATIENT
Start: 2022-10-04 | End: 2022-10-31

## 2022-10-14 ENCOUNTER — TELEPHONE (OUTPATIENT)
Dept: CARDIOLOGY | Facility: CLINIC | Age: 67
End: 2022-10-14

## 2022-10-14 NOTE — TELEPHONE ENCOUNTER
Patient wife called and wanted to know if you had talked to his vascular surgeon about having the surgery.  She was under the impression that they would be getting a call from us advising him of what was discussed and plans to move forward with surgery.  Please advise.    CB: [phone]    ThanksGisell    Plan:  1. I made no medication changes today.  2. I will discuss with Dr. Claudio if aspirin and plavix can be stopped prior to vascular surgery and if he needs to be seen again prior to surgery.

## 2022-10-17 NOTE — TELEPHONE ENCOUNTER
Called and left a message on voicemail with the above and advised that the patient call back if he has any further questions.    Gisell

## 2022-10-17 NOTE — TELEPHONE ENCOUNTER
Patient called and stated that it was not Dr. Marvin that needs to be contacted, he stated that it was a Dr. Kumar (spelling?).  Please let me know if there is anything that you need from me?    Gisell

## 2022-10-17 NOTE — TELEPHONE ENCOUNTER
I called Dr. Kumar's office and left a message with his staff that Mr. Aparicio could have surgery.     Thanks!  TAYLOR Leos

## 2022-10-20 NOTE — TELEPHONE ENCOUNTER
Patient called and wanted to know if we had called Dr. Kumar's office and I called him back and let him know that we have called and left a message letting them know that he is good to proceed with surgery.    Gisell

## 2022-10-28 ENCOUNTER — TRANSCRIBE ORDERS (OUTPATIENT)
Dept: GENERAL RADIOLOGY | Facility: HOSPITAL | Age: 67
End: 2022-10-28

## 2022-10-28 DIAGNOSIS — I71.40 ABDOMINAL AORTIC ANEURYSM (AAA) WITHOUT RUPTURE, UNSPECIFIED PART: Primary | ICD-10-CM

## 2022-10-31 DIAGNOSIS — F41.9 ANXIETY AND DEPRESSION: ICD-10-CM

## 2022-10-31 DIAGNOSIS — F32.A ANXIETY AND DEPRESSION: ICD-10-CM

## 2022-10-31 RX ORDER — BUPROPION HYDROCHLORIDE 150 MG/1
TABLET ORAL
Qty: 30 TABLET | Refills: 0 | Status: SHIPPED | OUTPATIENT
Start: 2022-10-31 | End: 2022-11-03 | Stop reason: SDUPTHER

## 2022-10-31 NOTE — TELEPHONE ENCOUNTER
Rx Refill Note  Requested Prescriptions     Pending Prescriptions Disp Refills   • buPROPion XL (WELLBUTRIN XL) 150 MG 24 hr tablet [Pharmacy Med Name: BUPROPION HCL  MG TABLET] 30 tablet 0     Sig: TAKE 1 TABLET BY MOUTH EVERY DAY      Last office visit with prescribing clinician: 9/6/2022      Next office visit with prescribing clinician: Visit date not found   {  Sent a 30 day RX. Pt is needing an appointment.     Okay for the HUB to relay message

## 2022-11-03 ENCOUNTER — OFFICE VISIT (OUTPATIENT)
Dept: FAMILY MEDICINE CLINIC | Facility: CLINIC | Age: 67
End: 2022-11-03

## 2022-11-03 VITALS
OXYGEN SATURATION: 99 % | RESPIRATION RATE: 16 BRPM | SYSTOLIC BLOOD PRESSURE: 140 MMHG | HEIGHT: 72 IN | BODY MASS INDEX: 24.79 KG/M2 | HEART RATE: 49 BPM | DIASTOLIC BLOOD PRESSURE: 80 MMHG | TEMPERATURE: 97.5 F | WEIGHT: 183 LBS

## 2022-11-03 DIAGNOSIS — F32.A ANXIETY AND DEPRESSION: ICD-10-CM

## 2022-11-03 DIAGNOSIS — F41.9 ANXIETY AND DEPRESSION: ICD-10-CM

## 2022-11-03 PROCEDURE — 99213 OFFICE O/P EST LOW 20 MIN: CPT | Performed by: NURSE PRACTITIONER

## 2022-11-03 RX ORDER — BUPROPION HYDROCHLORIDE 300 MG/1
300 TABLET ORAL DAILY
Qty: 30 TABLET | Refills: 0 | Status: SHIPPED | OUTPATIENT
Start: 2022-11-03 | End: 2022-12-12

## 2022-11-03 NOTE — PROGRESS NOTES
"Subjective   Osman Aparicio is a 67 y.o. male.     History of Present Illness   Osman Aparicio male 67 y.o., /80   Pulse (!) 49   Temp 97.5 °F (36.4 °C)   Resp 16   Ht 182.9 cm (72\")   Wt 83 kg (183 lb)   SpO2 99%   BMI 24.82 kg/m²   who presents today for follow up of Depression and Anxiety.  He reports addition of Wellbutrin has helped his anxiety about 70% but has not helped his depression.  He denies any side effects and is willing to try dose increase.       The following portions of the patient's history were reviewed and updated as appropriate: allergies, current medications, past family history, past medical history, past social history, past surgical history and problem list.    Review of Systems   Respiratory: Negative for cough and shortness of breath.    Cardiovascular: Negative for chest pain and palpitations.   Skin: Negative for rash.   Psychiatric/Behavioral: Positive for dysphoric mood. Negative for sleep disturbance. The patient is not nervous/anxious.        Objective   Physical Exam  Vitals and nursing note reviewed.   Constitutional:       Appearance: Normal appearance. He is well-developed.   Pulmonary:      Effort: Pulmonary effort is normal.   Neurological:      Mental Status: He is alert and oriented to person, place, and time.   Psychiatric:         Mood and Affect: Mood normal.         Behavior: Behavior normal.         Thought Content: Thought content normal.         Judgment: Judgment normal.         Assessment & Plan   Diagnoses and all orders for this visit:    1. Anxiety and depression  -     buPROPion XL (WELLBUTRIN XL) 300 MG 24 hr tablet; Take 1 tablet by mouth Daily.  Dispense: 30 tablet; Refill: 0        Notify in 4 weeks of symptom improvement.         "

## 2022-11-18 ENCOUNTER — HOSPITAL ENCOUNTER (OUTPATIENT)
Dept: CT IMAGING | Facility: HOSPITAL | Age: 67
Discharge: HOME | End: 2022-11-18

## 2022-11-18 ENCOUNTER — HOSPITAL ENCOUNTER (OUTPATIENT)
Dept: RADIOLOGY | Facility: HOSPITAL | Age: 67
Discharge: HOME | End: 2022-11-18

## 2022-11-18 DIAGNOSIS — I71.40 ABDOMINAL AORTIC ANEURYSM (AAA) WITHOUT RUPTURE, UNSPECIFIED PART: ICD-10-CM

## 2022-11-18 LAB — CREAT BLDA-MCNC: 1.2 MG/DL (ref 0.6–1.3)

## 2022-11-18 PROCEDURE — 82565 ASSAY OF CREATININE: CPT

## 2022-11-18 PROCEDURE — 96361 HYDRATE IV INFUSION ADD-ON: CPT

## 2022-11-18 PROCEDURE — 74174 CTA ABD&PLVS W/CONTRAST: CPT

## 2022-11-18 PROCEDURE — 96360 HYDRATION IV INFUSION INIT: CPT

## 2022-11-18 PROCEDURE — 0 IOPAMIDOL PER 1 ML: Performed by: SURGERY

## 2022-11-18 RX ORDER — SODIUM CHLORIDE 9 MG/ML
500 INJECTION, SOLUTION INTRAVENOUS CONTINUOUS
Status: ACTIVE | OUTPATIENT
Start: 2022-11-18 | End: 2022-11-18

## 2022-11-18 RX ORDER — SODIUM CHLORIDE 9 MG/ML
100 INJECTION, SOLUTION INTRAVENOUS CONTINUOUS
Status: ACTIVE | OUTPATIENT
Start: 2022-11-18 | End: 2022-11-18

## 2022-11-18 RX ADMIN — IOPAMIDOL 95 ML: 755 INJECTION, SOLUTION INTRAVENOUS at 09:51

## 2022-11-18 RX ADMIN — SODIUM CHLORIDE 500 ML/HR: 9 INJECTION, SOLUTION INTRAVENOUS at 08:35

## 2022-12-10 DIAGNOSIS — F41.9 ANXIETY AND DEPRESSION: ICD-10-CM

## 2022-12-10 DIAGNOSIS — F32.A ANXIETY AND DEPRESSION: ICD-10-CM

## 2022-12-12 RX ORDER — BUPROPION HYDROCHLORIDE 300 MG/1
TABLET ORAL
Qty: 30 TABLET | Refills: 0 | Status: SHIPPED | OUTPATIENT
Start: 2022-12-12 | End: 2023-01-18 | Stop reason: SDUPTHER

## 2022-12-30 RX ORDER — CLOPIDOGREL BISULFATE 75 MG/1
TABLET ORAL
Qty: 90 TABLET | Refills: 0 | Status: SHIPPED | OUTPATIENT
Start: 2022-12-30 | End: 2023-01-18

## 2023-01-18 ENCOUNTER — OFFICE VISIT (OUTPATIENT)
Dept: FAMILY MEDICINE CLINIC | Facility: CLINIC | Age: 68
End: 2023-01-18
Payer: MEDICARE

## 2023-01-18 VITALS
HEIGHT: 72 IN | RESPIRATION RATE: 16 BRPM | HEART RATE: 51 BPM | DIASTOLIC BLOOD PRESSURE: 68 MMHG | OXYGEN SATURATION: 100 % | TEMPERATURE: 97.5 F | WEIGHT: 191 LBS | BODY MASS INDEX: 25.87 KG/M2 | SYSTOLIC BLOOD PRESSURE: 100 MMHG

## 2023-01-18 DIAGNOSIS — F41.9 ANXIETY AND DEPRESSION: ICD-10-CM

## 2023-01-18 DIAGNOSIS — F32.A ANXIETY AND DEPRESSION: ICD-10-CM

## 2023-01-18 PROCEDURE — 99213 OFFICE O/P EST LOW 20 MIN: CPT | Performed by: NURSE PRACTITIONER

## 2023-01-18 RX ORDER — BUPROPION HYDROCHLORIDE 300 MG/1
300 TABLET ORAL DAILY
Qty: 30 TABLET | Refills: 0 | Status: SHIPPED | OUTPATIENT
Start: 2023-01-18 | End: 2023-02-16

## 2023-01-18 RX ORDER — BUPROPION HYDROCHLORIDE 150 MG/1
150 TABLET ORAL DAILY
Qty: 30 TABLET | Refills: 0 | Status: SHIPPED | OUTPATIENT
Start: 2023-01-18 | End: 2023-02-16 | Stop reason: SDUPTHER

## 2023-01-18 NOTE — PROGRESS NOTES
"Subjective   Osman Aparicio is a 67 y.o. male.     History of Present Illness   Osman Aparicio male 67 y.o., /68   Pulse 51   Temp 97.5 °F (36.4 °C)   Resp 16   Ht 182.9 cm (72\")   Wt 86.6 kg (191 lb)   SpO2 100%   BMI 25.90 kg/m²   who presents today for follow up of Depression and Anxiety.  He reports change in medication from last visit has only helped slightly, but willing to try increasing dose again. Denies side effects.     The following portions of the patient's history were reviewed and updated as appropriate: allergies, current medications, past family history, past medical history, past social history, past surgical history and problem list.    Review of Systems   Constitutional: Positive for fatigue.   Respiratory: Negative for shortness of breath.    Cardiovascular: Negative for chest pain and palpitations.   Skin: Negative for rash.   Psychiatric/Behavioral: Negative for dysphoric mood. The patient is nervous/anxious.        Objective   Physical Exam  Vitals and nursing note reviewed.   Constitutional:       Appearance: Normal appearance. He is well-developed.   Cardiovascular:      Rate and Rhythm: Bradycardia present.   Pulmonary:      Effort: Pulmonary effort is normal.   Neurological:      Mental Status: He is alert and oriented to person, place, and time.   Psychiatric:         Mood and Affect: Mood normal.         Behavior: Behavior normal.         Thought Content: Thought content normal.         Judgment: Judgment normal.         Assessment & Plan   Diagnoses and all orders for this visit:    1. Anxiety and depression  -     buPROPion XL (WELLBUTRIN XL) 300 MG 24 hr tablet; Take 1 tablet by mouth Daily. To be taken with 150 mg daily for total of 450 mg daily  Dispense: 30 tablet; Refill: 0  -     buPROPion XL (Wellbutrin XL) 150 MG 24 hr tablet; Take 1 tablet by mouth Daily. To be taken with 300 mg daily for total of 450 mg daily.  Dispense: 30 tablet; Refill: 0               "

## 2023-01-27 ENCOUNTER — PRE-ADMISSION TESTING (OUTPATIENT)
Dept: PREADMISSION TESTING | Facility: HOSPITAL | Age: 68
DRG: 269 | End: 2023-01-27
Payer: MEDICARE

## 2023-01-27 VITALS
TEMPERATURE: 97.5 F | RESPIRATION RATE: 16 BRPM | WEIGHT: 189.6 LBS | HEIGHT: 71 IN | SYSTOLIC BLOOD PRESSURE: 130 MMHG | OXYGEN SATURATION: 98 % | DIASTOLIC BLOOD PRESSURE: 72 MMHG | BODY MASS INDEX: 26.54 KG/M2 | HEART RATE: 49 BPM

## 2023-01-27 LAB
ABO GROUP BLD: NORMAL
ANION GAP SERPL CALCULATED.3IONS-SCNC: 9 MMOL/L (ref 5–15)
BLD GP AB SCN SERPL QL: NEGATIVE
BUN SERPL-MCNC: 17 MG/DL (ref 8–23)
BUN/CREAT SERPL: 13.7 (ref 7–25)
CALCIUM SPEC-SCNC: 9.3 MG/DL (ref 8.6–10.5)
CHLORIDE SERPL-SCNC: 97 MMOL/L (ref 98–107)
CO2 SERPL-SCNC: 27 MMOL/L (ref 22–29)
CREAT SERPL-MCNC: 1.24 MG/DL (ref 0.76–1.27)
DEPRECATED RDW RBC AUTO: 42.3 FL (ref 37–54)
EGFRCR SERPLBLD CKD-EPI 2021: 63.7 ML/MIN/1.73
ERYTHROCYTE [DISTWIDTH] IN BLOOD BY AUTOMATED COUNT: 12.8 % (ref 12.3–15.4)
GLUCOSE SERPL-MCNC: 121 MG/DL (ref 65–99)
HCT VFR BLD AUTO: 35.3 % (ref 37.5–51)
HGB BLD-MCNC: 11.9 G/DL (ref 13–17.7)
MCH RBC QN AUTO: 30.5 PG (ref 26.6–33)
MCHC RBC AUTO-ENTMCNC: 33.7 G/DL (ref 31.5–35.7)
MCV RBC AUTO: 90.5 FL (ref 79–97)
PLATELET # BLD AUTO: 302 10*3/MM3 (ref 140–450)
PMV BLD AUTO: 9.7 FL (ref 6–12)
POTASSIUM SERPL-SCNC: 4.2 MMOL/L (ref 3.5–5.2)
RBC # BLD AUTO: 3.9 10*6/MM3 (ref 4.14–5.8)
RH BLD: POSITIVE
SODIUM SERPL-SCNC: 133 MMOL/L (ref 136–145)
T&S EXPIRATION DATE: NORMAL
WBC NRBC COR # BLD: 6.35 10*3/MM3 (ref 3.4–10.8)

## 2023-01-27 PROCEDURE — 86900 BLOOD TYPING SEROLOGIC ABO: CPT

## 2023-01-27 PROCEDURE — 86901 BLOOD TYPING SEROLOGIC RH(D): CPT

## 2023-01-27 PROCEDURE — 86920 COMPATIBILITY TEST SPIN: CPT

## 2023-01-27 PROCEDURE — 36415 COLL VENOUS BLD VENIPUNCTURE: CPT

## 2023-01-27 PROCEDURE — 80048 BASIC METABOLIC PNL TOTAL CA: CPT

## 2023-01-27 PROCEDURE — 86850 RBC ANTIBODY SCREEN: CPT

## 2023-01-27 PROCEDURE — 85027 COMPLETE CBC AUTOMATED: CPT

## 2023-01-27 PROCEDURE — 86902 BLOOD TYPE ANTIGEN DONOR EA: CPT

## 2023-01-27 PROCEDURE — 86922 COMPATIBILITY TEST ANTIGLOB: CPT

## 2023-01-27 RX ORDER — CHLORHEXIDINE GLUCONATE 500 MG/1
CLOTH TOPICAL
Status: ON HOLD | COMMUNITY
End: 2023-01-30

## 2023-01-27 NOTE — DISCHARGE INSTRUCTIONS
Take the following medications the morning of surgery:    TRELEGY INHALER.    ARRIVE AT 0530.      If you are on prescription narcotic pain medication to control your pain you may also take that medication the morning of surgery.    General Instructions:  Do not eat solid food after midnight the night before surgery.  You may drink clear liquids day of surgery but must stop at least one hour before your hospital arrival time.  It is beneficial for you to have a clear drink that contains carbohydrates the day of surgery.  We suggest a 12 to 20 ounce bottle of Gatorade or Powerade for non-diabetic patients or a 12 to 20 ounce bottle of G2 or Powerade Zero for diabetic patients. (Pediatric patients, are not advised to drink a 12 to 20 ounce carbohydrate drink)    Clear liquids are liquids you can see through.  Nothing red in color.     Plain water                               Sports drinks  Sodas                                   Gelatin (Jell-O)  Fruit juices without pulp such as white grape juice and apple juice  Popsicles that contain no fruit or yogurt  Tea or coffee (no cream or milk added)  Gatorade / Powerade  G2 / Powerade Zero    Infants may have breast milk up to four hours before surgery.  Infants drinking formula may drink formula up to six hours before surgery.   Patients who avoid smoking, chewing tobacco and alcohol for 4 weeks prior to surgery have a reduced risk of post-operative complications.  Quit smoking as many days before surgery as you can.  Do not smoke, use chewing tobacco or drink alcohol the day of surgery.   If applicable bring your C-PAP/ BI-PAP machine.  Bring any papers given to you in the doctor’s office.  Wear clean comfortable clothes.  Do not wear contact lenses, false eyelashes or make-up.  Bring a case for your glasses.   Bring crutches or walker if applicable.  Remove all piercings.  Leave jewelry and any other valuables at home.  Hair extensions with metal clips must be removed  prior to surgery.  The Pre-Admission Testing nurse will instruct you to bring medications if unable to obtain an accurate list in Pre-Admission Testing.        If you were given a blood bank ID arm band remember to bring it with you the day of surgery.    Preventing a Surgical Site Infection:  For 2 to 3 days before surgery, avoid shaving with a razor because the razor can irritate skin and make it easier to develop an infection.    Any areas of open skin can increase the risk of a post-operative wound infection by allowing bacteria to enter and travel throughout the body.  Notify your surgeon if you have any skin wounds / rashes even if it is not near the expected surgical site.  The area will need assessed to determine if surgery should be delayed until it is healed.  The night prior to surgery shower using a fresh bar of anti-bacterial soap (such as Dial) and clean washcloth.  Sleep in a clean bed with clean clothing.  Do not allow pets to sleep with you.  Shower on the morning of surgery using a fresh bar of anti-bacterial soap (such as Dial) and clean washcloth.  Dry with a clean towel and dress in clean clothing.  Ask your surgeon if you will be receiving antibiotics prior to surgery.  Make sure you, your family, and all healthcare providers clean their hands with soap and water or an alcohol based hand  before caring for you or your wound.    Day of surgery:  Your arrival time is approximately two hours before your scheduled surgery time.  Upon arrival, a Pre-op nurse and Anesthesiologist will review your health history, obtain vital signs, and answer questions you may have.  The only belongings needed at this time will be a list of your home medications and if applicable your C-PAP/BI-PAP machine.  A Pre-op nurse will start an IV and you may receive medication in preparation for surgery, including something to help you relax.     Please be aware that surgery does come with discomfort.  We want to  make every effort to control your discomfort so please discuss any uncontrolled symptoms with your nurse.   Your doctor will most likely have prescribed pain medications.      If you are going home after surgery you will receive individualized written care instructions before being discharged.  A responsible adult must drive you to and from the hospital on the day of your surgery and stay with you for 24 hours.  Discharge prescriptions can be filled by the hospital pharmacy during regular pharmacy hours.  If you are having surgery late in the day/evening your prescription may be e-prescribed to your pharmacy.  Please verify your pharmacy hours or chose a 24 hour pharmacy to avoid not having access to your prescription because your pharmacy has closed for the day.    If you are staying overnight following surgery, you will be transported to your hospital room following the recovery period.  [facility] has all private rooms.    If you have any questions please call Pre-Admission Testing at [phone].  Deductibles and co-payments are collected on the day of service. Please be prepared to pay the required co-pay, deductible or deposit on the day of service as defined by your plan.    Call your surgeon immediately if you experience any of the following symptoms:  Sore Throat  Shortness of Breath or difficulty breathing  Cough  Chills  Body soreness or muscle pain  Headache  Fever  New loss of taste or smell  Do not arrive for your surgery ill.  Your procedure will need to be rescheduled to another time.  You will need to call your physician before the day of surgery to avoid any unnecessary exposure to hospital staff as well as other patients.     CHLORHEXIDINE CLOTH INSTRUCTIONS  The morning of surgery follow these instructions using the Chlorhexidine cloths you've been given.  These steps reduce bacteria on the body.  Do not use the cloths near your eyes, ears mouth, genitalia or on open wounds.   Throw the cloths away after use but do not try to flush them down a toilet.      Open and remove one cloth at a time from the package.    Leave the cloth unfolded and begin the bathing.  Massage the skin with the cloths using gentle pressure to remove bacteria.  Do not scrub harshly.   Follow the steps below with one 2% CHG cloth per area (6 total cloths).  One cloth for neck, shoulders and chest.  One cloth for both arms, hands, fingers and underarms (do underarms last).  One cloth for the abdomen followed by groin.  One cloth for right leg and foot including between the toes.  One cloth for left leg and foot including between the toes.  The last cloth is to be used for the back of the neck, back and buttocks.    Allow the CHG to air dry 3 minutes on the skin which will give it time to work and decrease the chance of irritation.  The skin may feel sticky until it is dry.  Do not rinse with water or any other liquid or you will lose the beneficial effects of the CHG.  If mild skin irritation occurs, do rinse the skin to remove the CHG.  Report this to the nurse at time of admission.  Do not apply lotions, creams, ointments, deodorants or perfumes after using the clothes. Dress in clean clothes before coming to the hospital.

## 2023-01-30 ENCOUNTER — HOSPITAL ENCOUNTER (INPATIENT)
Facility: HOSPITAL | Age: 68
LOS: 1 days | Discharge: HOME | DRG: 269 | End: 2023-01-31
Attending: SURGERY | Admitting: SURGERY
Payer: MEDICARE

## 2023-01-30 ENCOUNTER — ANESTHESIA EVENT (OUTPATIENT)
Dept: PERIOP | Facility: HOSPITAL | Age: 68
DRG: 269 | End: 2023-01-30
Payer: MEDICARE

## 2023-01-30 ENCOUNTER — APPOINTMENT (OUTPATIENT)
Dept: GENERAL RADIOLOGY | Facility: HOSPITAL | Age: 68
DRG: 269 | End: 2023-01-30
Payer: MEDICARE

## 2023-01-30 ENCOUNTER — ANESTHESIA (OUTPATIENT)
Dept: PERIOP | Facility: HOSPITAL | Age: 68
DRG: 269 | End: 2023-01-30
Payer: MEDICARE

## 2023-01-30 DIAGNOSIS — I71.42 JUXTARENAL ABDOMINAL AORTIC ANEURYSM (AAA) WITHOUT RUPTURE: Primary | Chronic | ICD-10-CM

## 2023-01-30 PROCEDURE — C1894 INTRO/SHEATH, NON-LASER: HCPCS | Performed by: SURGERY

## 2023-01-30 PROCEDURE — 25010000002 PROPOFOL 10 MG/ML EMULSION: Performed by: REGISTERED NURSE

## 2023-01-30 PROCEDURE — 85347 COAGULATION TIME ACTIVATED: CPT

## 2023-01-30 PROCEDURE — C1887 CATHETER, GUIDING: HCPCS | Performed by: SURGERY

## 2023-01-30 PROCEDURE — 25010000002 FENTANYL CITRATE (PF) 50 MCG/ML SOLUTION: Performed by: REGISTERED NURSE

## 2023-01-30 PROCEDURE — C1769 GUIDE WIRE: HCPCS | Performed by: SURGERY

## 2023-01-30 PROCEDURE — C1760 CLOSURE DEV, VASC: HCPCS | Performed by: SURGERY

## 2023-01-30 PROCEDURE — 0 IOPAMIDOL PER 1 ML: Performed by: SURGERY

## 2023-01-30 PROCEDURE — C1874 STENT, COATED/COV W/DEL SYS: HCPCS | Performed by: SURGERY

## 2023-01-30 PROCEDURE — C1725 CATH, TRANSLUMIN NON-LASER: HCPCS | Performed by: SURGERY

## 2023-01-30 PROCEDURE — 25010000002 PROTAMINE SULFATE PER 10 MG: Performed by: REGISTERED NURSE

## 2023-01-30 PROCEDURE — 25010000002 HEPARIN (PORCINE) PER 1000 UNITS: Performed by: SURGERY

## 2023-01-30 PROCEDURE — 25010000002 HEPARIN (PORCINE) PER 1000 UNITS: Performed by: REGISTERED NURSE

## 2023-01-30 PROCEDURE — 25010000002 DEXAMETHASONE SODIUM PHOSPHATE 20 MG/5ML SOLUTION: Performed by: REGISTERED NURSE

## 2023-01-30 PROCEDURE — 25010000002 CEFAZOLIN IN DEXTROSE 2-4 GM/100ML-% SOLUTION: Performed by: SURGERY

## 2023-01-30 PROCEDURE — 25010000002 MIDAZOLAM PER 1 MG: Performed by: ANESTHESIOLOGY

## 2023-01-30 PROCEDURE — C2628 CATHETER, OCCLUSION: HCPCS | Performed by: SURGERY

## 2023-01-30 PROCEDURE — 25010000002 FENTANYL CITRATE (PF) 50 MCG/ML SOLUTION: Performed by: ANESTHESIOLOGY

## 2023-01-30 PROCEDURE — 25010000002 ONDANSETRON PER 1 MG: Performed by: REGISTERED NURSE

## 2023-01-30 PROCEDURE — 04V03EZ RESTRICTION OF ABDOMINAL AORTA WITH BRANCHED OR FENESTRATED INTRALUMINAL DEVICE, ONE OR TWO ARTERIES, PERCUTANEOUS APPROACH: ICD-10-PCS | Performed by: SURGERY

## 2023-01-30 DEVICE — GRFT AAA ZENITH FEN BIFUR DIST 20F 28X76: Type: IMPLANTABLE DEVICE | Site: AORTA | Status: FUNCTIONAL

## 2023-01-30 DEVICE — FLOSEAL HEMOSTATIC MATRIX, 10ML
Type: IMPLANTABLE DEVICE | Site: GROIN | Status: FUNCTIONAL
Brand: FLOSEAL HEMOSTATIC MATRIX

## 2023-01-30 DEVICE — ICAST COVERED STENT, 7MMX22MMX120CM
Type: IMPLANTABLE DEVICE | Site: ARTERIAL | Status: FUNCTIONAL
Brand: ICAST

## 2023-01-30 DEVICE — GRFT AAA ZENITH SPIRAL Z ILIAC LEG 16X74: Type: IMPLANTABLE DEVICE | Site: ARTERIAL | Status: FUNCTIONAL

## 2023-01-30 DEVICE — IMPLANTABLE DEVICE: Type: IMPLANTABLE DEVICE | Site: AORTA | Status: FUNCTIONAL

## 2023-01-30 RX ORDER — DIPHENHYDRAMINE HYDROCHLORIDE 50 MG/ML
12.5 INJECTION INTRAMUSCULAR; INTRAVENOUS
Status: DISCONTINUED | OUTPATIENT
Start: 2023-01-30 | End: 2023-01-30 | Stop reason: HOSPADM

## 2023-01-30 RX ORDER — NITROGLYCERIN 0.4 MG/1
0.4 TABLET SUBLINGUAL
Status: DISCONTINUED | OUTPATIENT
Start: 2023-01-30 | End: 2023-01-31 | Stop reason: HOSPADM

## 2023-01-30 RX ORDER — SODIUM CHLORIDE, SODIUM LACTATE, POTASSIUM CHLORIDE, CALCIUM CHLORIDE 600; 310; 30; 20 MG/100ML; MG/100ML; MG/100ML; MG/100ML
9 INJECTION, SOLUTION INTRAVENOUS CONTINUOUS
Status: DISCONTINUED | OUTPATIENT
Start: 2023-01-30 | End: 2023-01-30

## 2023-01-30 RX ORDER — CEFAZOLIN SODIUM 2 G/100ML
2 INJECTION, SOLUTION INTRAVENOUS EVERY 8 HOURS
Status: COMPLETED | OUTPATIENT
Start: 2023-01-30 | End: 2023-01-31

## 2023-01-30 RX ORDER — ONDANSETRON 2 MG/ML
INJECTION INTRAMUSCULAR; INTRAVENOUS AS NEEDED
Status: DISCONTINUED | OUTPATIENT
Start: 2023-01-30 | End: 2023-01-30 | Stop reason: SURG

## 2023-01-30 RX ORDER — FAMOTIDINE 10 MG/ML
20 INJECTION, SOLUTION INTRAVENOUS ONCE
Status: COMPLETED | OUTPATIENT
Start: 2023-01-30 | End: 2023-01-30

## 2023-01-30 RX ORDER — ASPIRIN 81 MG/1
81 TABLET ORAL DAILY
Status: DISCONTINUED | OUTPATIENT
Start: 2023-01-30 | End: 2023-01-31 | Stop reason: HOSPADM

## 2023-01-30 RX ORDER — CEFAZOLIN SODIUM 2 G/100ML
2 INJECTION, SOLUTION INTRAVENOUS ONCE
Status: COMPLETED | OUTPATIENT
Start: 2023-01-30 | End: 2023-01-30

## 2023-01-30 RX ORDER — PROTAMINE SULFATE 10 MG/ML
INJECTION, SOLUTION INTRAVENOUS AS NEEDED
Status: DISCONTINUED | OUTPATIENT
Start: 2023-01-30 | End: 2023-01-30 | Stop reason: SURG

## 2023-01-30 RX ORDER — EPHEDRINE SULFATE 50 MG/ML
5 INJECTION, SOLUTION INTRAVENOUS ONCE AS NEEDED
Status: DISCONTINUED | OUTPATIENT
Start: 2023-01-30 | End: 2023-01-30 | Stop reason: HOSPADM

## 2023-01-30 RX ORDER — NALOXONE HCL 0.4 MG/ML
0.2 VIAL (ML) INJECTION AS NEEDED
Status: DISCONTINUED | OUTPATIENT
Start: 2023-01-30 | End: 2023-01-30 | Stop reason: HOSPADM

## 2023-01-30 RX ORDER — HYDROCODONE BITARTRATE AND ACETAMINOPHEN 7.5; 325 MG/1; MG/1
1 TABLET ORAL EVERY 4 HOURS PRN
Status: DISCONTINUED | OUTPATIENT
Start: 2023-01-30 | End: 2023-01-31 | Stop reason: HOSPADM

## 2023-01-30 RX ORDER — ROCURONIUM BROMIDE 10 MG/ML
INJECTION, SOLUTION INTRAVENOUS AS NEEDED
Status: DISCONTINUED | OUTPATIENT
Start: 2023-01-30 | End: 2023-01-30 | Stop reason: SURG

## 2023-01-30 RX ORDER — FENTANYL CITRATE 50 UG/ML
INJECTION, SOLUTION INTRAMUSCULAR; INTRAVENOUS AS NEEDED
Status: DISCONTINUED | OUTPATIENT
Start: 2023-01-30 | End: 2023-01-30 | Stop reason: SURG

## 2023-01-30 RX ORDER — GLYCOPYRROLATE 0.2 MG/ML
INJECTION INTRAMUSCULAR; INTRAVENOUS AS NEEDED
Status: DISCONTINUED | OUTPATIENT
Start: 2023-01-30 | End: 2023-01-30 | Stop reason: SURG

## 2023-01-30 RX ORDER — ENOXAPARIN SODIUM 100 MG/ML
40 INJECTION SUBCUTANEOUS DAILY
Status: DISCONTINUED | OUTPATIENT
Start: 2023-01-31 | End: 2023-01-31 | Stop reason: HOSPADM

## 2023-01-30 RX ORDER — LIDOCAINE HYDROCHLORIDE 10 MG/ML
0.5 INJECTION, SOLUTION EPIDURAL; INFILTRATION; INTRACAUDAL; PERINEURAL ONCE AS NEEDED
Status: DISCONTINUED | OUTPATIENT
Start: 2023-01-30 | End: 2023-01-30 | Stop reason: HOSPADM

## 2023-01-30 RX ORDER — ESCITALOPRAM OXALATE 20 MG/1
20 TABLET ORAL NIGHTLY
Status: DISCONTINUED | OUTPATIENT
Start: 2023-01-30 | End: 2023-01-31 | Stop reason: HOSPADM

## 2023-01-30 RX ORDER — BUPROPION HYDROCHLORIDE 300 MG/1
300 TABLET ORAL DAILY
Status: DISCONTINUED | OUTPATIENT
Start: 2023-01-30 | End: 2023-01-31 | Stop reason: HOSPADM

## 2023-01-30 RX ORDER — AMLODIPINE BESYLATE 10 MG/1
10 TABLET ORAL NIGHTLY
Status: DISCONTINUED | OUTPATIENT
Start: 2023-01-30 | End: 2023-01-31 | Stop reason: HOSPADM

## 2023-01-30 RX ORDER — DIPHENHYDRAMINE HCL 25 MG
25 CAPSULE ORAL
Status: DISCONTINUED | OUTPATIENT
Start: 2023-01-30 | End: 2023-01-30 | Stop reason: HOSPADM

## 2023-01-30 RX ORDER — METOPROLOL SUCCINATE 100 MG/1
100 TABLET, EXTENDED RELEASE ORAL
Status: DISCONTINUED | OUTPATIENT
Start: 2023-01-30 | End: 2023-01-31 | Stop reason: HOSPADM

## 2023-01-30 RX ORDER — SODIUM CHLORIDE 9 MG/ML
INJECTION, SOLUTION INTRAVENOUS CONTINUOUS PRN
Status: DISCONTINUED | OUTPATIENT
Start: 2023-01-30 | End: 2023-01-30 | Stop reason: SURG

## 2023-01-30 RX ORDER — ALBUTEROL SULFATE 2.5 MG/3ML
2.5 SOLUTION RESPIRATORY (INHALATION) EVERY 4 HOURS PRN
Status: DISCONTINUED | OUTPATIENT
Start: 2023-01-30 | End: 2023-01-31 | Stop reason: HOSPADM

## 2023-01-30 RX ORDER — SODIUM CHLORIDE 0.9 % (FLUSH) 0.9 %
3-10 SYRINGE (ML) INJECTION AS NEEDED
Status: DISCONTINUED | OUTPATIENT
Start: 2023-01-30 | End: 2023-01-30 | Stop reason: HOSPADM

## 2023-01-30 RX ORDER — LOSARTAN POTASSIUM 100 MG/1
100 TABLET ORAL DAILY
Status: DISCONTINUED | OUTPATIENT
Start: 2023-01-30 | End: 2023-01-31 | Stop reason: HOSPADM

## 2023-01-30 RX ORDER — SODIUM CHLORIDE 0.9 % (FLUSH) 0.9 %
3 SYRINGE (ML) INJECTION EVERY 12 HOURS SCHEDULED
Status: DISCONTINUED | OUTPATIENT
Start: 2023-01-30 | End: 2023-01-30 | Stop reason: HOSPADM

## 2023-01-30 RX ORDER — HYDRALAZINE HYDROCHLORIDE 20 MG/ML
5 INJECTION INTRAMUSCULAR; INTRAVENOUS
Status: DISCONTINUED | OUTPATIENT
Start: 2023-01-30 | End: 2023-01-30 | Stop reason: HOSPADM

## 2023-01-30 RX ORDER — LIDOCAINE HYDROCHLORIDE 20 MG/ML
INJECTION, SOLUTION INFILTRATION; PERINEURAL AS NEEDED
Status: DISCONTINUED | OUTPATIENT
Start: 2023-01-30 | End: 2023-01-30 | Stop reason: SURG

## 2023-01-30 RX ORDER — PROMETHAZINE HYDROCHLORIDE 25 MG/1
25 SUPPOSITORY RECTAL ONCE AS NEEDED
Status: DISCONTINUED | OUTPATIENT
Start: 2023-01-30 | End: 2023-01-30 | Stop reason: HOSPADM

## 2023-01-30 RX ORDER — FENTANYL CITRATE 50 UG/ML
INJECTION, SOLUTION INTRAMUSCULAR; INTRAVENOUS
Status: COMPLETED | OUTPATIENT
Start: 2023-01-30 | End: 2023-01-30

## 2023-01-30 RX ORDER — BUPROPION HYDROCHLORIDE 150 MG/1
150 TABLET ORAL DAILY
Status: DISCONTINUED | OUTPATIENT
Start: 2023-01-30 | End: 2023-01-31 | Stop reason: HOSPADM

## 2023-01-30 RX ORDER — MIDAZOLAM HYDROCHLORIDE 1 MG/ML
INJECTION INTRAMUSCULAR; INTRAVENOUS
Status: COMPLETED | OUTPATIENT
Start: 2023-01-30 | End: 2023-01-30

## 2023-01-30 RX ORDER — HYDROMORPHONE HYDROCHLORIDE 1 MG/ML
0.5 INJECTION, SOLUTION INTRAMUSCULAR; INTRAVENOUS; SUBCUTANEOUS
Status: DISCONTINUED | OUTPATIENT
Start: 2023-01-30 | End: 2023-01-31 | Stop reason: HOSPADM

## 2023-01-30 RX ORDER — ONDANSETRON 2 MG/ML
4 INJECTION INTRAMUSCULAR; INTRAVENOUS ONCE AS NEEDED
Status: DISCONTINUED | OUTPATIENT
Start: 2023-01-30 | End: 2023-01-30 | Stop reason: HOSPADM

## 2023-01-30 RX ORDER — NALOXONE HCL 0.4 MG/ML
0.4 VIAL (ML) INJECTION
Status: DISCONTINUED | OUTPATIENT
Start: 2023-01-30 | End: 2023-01-31 | Stop reason: HOSPADM

## 2023-01-30 RX ORDER — ONDANSETRON 2 MG/ML
4 INJECTION INTRAMUSCULAR; INTRAVENOUS EVERY 6 HOURS PRN
Status: DISCONTINUED | OUTPATIENT
Start: 2023-01-30 | End: 2023-01-31 | Stop reason: HOSPADM

## 2023-01-30 RX ORDER — LABETALOL HYDROCHLORIDE 5 MG/ML
5 INJECTION, SOLUTION INTRAVENOUS
Status: DISCONTINUED | OUTPATIENT
Start: 2023-01-30 | End: 2023-01-30 | Stop reason: HOSPADM

## 2023-01-30 RX ORDER — DEXAMETHASONE SODIUM PHOSPHATE 4 MG/ML
INJECTION, SOLUTION INTRA-ARTICULAR; INTRALESIONAL; INTRAMUSCULAR; INTRAVENOUS; SOFT TISSUE AS NEEDED
Status: DISCONTINUED | OUTPATIENT
Start: 2023-01-30 | End: 2023-01-30 | Stop reason: SURG

## 2023-01-30 RX ORDER — FENTANYL CITRATE 50 UG/ML
50 INJECTION, SOLUTION INTRAMUSCULAR; INTRAVENOUS
Status: DISCONTINUED | OUTPATIENT
Start: 2023-01-30 | End: 2023-01-30 | Stop reason: HOSPADM

## 2023-01-30 RX ORDER — DEXTROSE, SODIUM CHLORIDE, AND POTASSIUM CHLORIDE 5; .45; .15 G/100ML; G/100ML; G/100ML
100 INJECTION INTRAVENOUS CONTINUOUS
Status: DISCONTINUED | OUTPATIENT
Start: 2023-01-30 | End: 2023-01-31

## 2023-01-30 RX ORDER — ONDANSETRON 4 MG/1
4 TABLET, FILM COATED ORAL EVERY 6 HOURS PRN
Status: DISCONTINUED | OUTPATIENT
Start: 2023-01-30 | End: 2023-01-31 | Stop reason: HOSPADM

## 2023-01-30 RX ORDER — EPHEDRINE SULFATE 50 MG/ML
INJECTION, SOLUTION INTRAVENOUS AS NEEDED
Status: DISCONTINUED | OUTPATIENT
Start: 2023-01-30 | End: 2023-01-30 | Stop reason: SURG

## 2023-01-30 RX ORDER — OXYCODONE AND ACETAMINOPHEN 7.5; 325 MG/1; MG/1
1 TABLET ORAL EVERY 4 HOURS PRN
Status: DISCONTINUED | OUTPATIENT
Start: 2023-01-30 | End: 2023-01-30 | Stop reason: HOSPADM

## 2023-01-30 RX ORDER — ROSUVASTATIN CALCIUM 20 MG/1
20 TABLET, COATED ORAL NIGHTLY
Status: DISCONTINUED | OUTPATIENT
Start: 2023-01-30 | End: 2023-01-31 | Stop reason: HOSPADM

## 2023-01-30 RX ORDER — PROPOFOL 10 MG/ML
VIAL (ML) INTRAVENOUS AS NEEDED
Status: DISCONTINUED | OUTPATIENT
Start: 2023-01-30 | End: 2023-01-30 | Stop reason: SURG

## 2023-01-30 RX ORDER — HEPARIN SODIUM 1000 [USP'U]/ML
INJECTION, SOLUTION INTRAVENOUS; SUBCUTANEOUS AS NEEDED
Status: DISCONTINUED | OUTPATIENT
Start: 2023-01-30 | End: 2023-01-30 | Stop reason: SURG

## 2023-01-30 RX ORDER — PROMETHAZINE HYDROCHLORIDE 25 MG/1
25 TABLET ORAL ONCE AS NEEDED
Status: DISCONTINUED | OUTPATIENT
Start: 2023-01-30 | End: 2023-01-30 | Stop reason: HOSPADM

## 2023-01-30 RX ORDER — HYDROMORPHONE HYDROCHLORIDE 1 MG/ML
0.5 INJECTION, SOLUTION INTRAMUSCULAR; INTRAVENOUS; SUBCUTANEOUS
Status: DISCONTINUED | OUTPATIENT
Start: 2023-01-30 | End: 2023-01-30 | Stop reason: HOSPADM

## 2023-01-30 RX ORDER — HYDROCODONE BITARTRATE AND ACETAMINOPHEN 7.5; 325 MG/1; MG/1
1 TABLET ORAL ONCE AS NEEDED
Status: DISCONTINUED | OUTPATIENT
Start: 2023-01-30 | End: 2023-01-30 | Stop reason: HOSPADM

## 2023-01-30 RX ORDER — FLUMAZENIL 0.1 MG/ML
0.2 INJECTION INTRAVENOUS AS NEEDED
Status: DISCONTINUED | OUTPATIENT
Start: 2023-01-30 | End: 2023-01-30 | Stop reason: HOSPADM

## 2023-01-30 RX ORDER — KETAMINE HCL IN NACL, ISO-OSM 100MG/10ML
SYRINGE (ML) INJECTION AS NEEDED
Status: DISCONTINUED | OUTPATIENT
Start: 2023-01-30 | End: 2023-01-30 | Stop reason: SURG

## 2023-01-30 RX ORDER — MIDAZOLAM HYDROCHLORIDE 1 MG/ML
0.5 INJECTION INTRAMUSCULAR; INTRAVENOUS
Status: DISCONTINUED | OUTPATIENT
Start: 2023-01-30 | End: 2023-01-30 | Stop reason: HOSPADM

## 2023-01-30 RX ADMIN — METOPROLOL SUCCINATE 100 MG: 100 TABLET, EXTENDED RELEASE ORAL at 22:12

## 2023-01-30 RX ADMIN — HEPARIN SODIUM 3000 UNITS: 1000 INJECTION INTRAVENOUS; SUBCUTANEOUS at 10:05

## 2023-01-30 RX ADMIN — AMLODIPINE BESYLATE 10 MG: 10 TABLET ORAL at 19:55

## 2023-01-30 RX ADMIN — Medication 10 MG: at 08:42

## 2023-01-30 RX ADMIN — Medication 15 MG: at 09:27

## 2023-01-30 RX ADMIN — ESCITALOPRAM 20 MG: 20 TABLET, FILM COATED ORAL at 19:53

## 2023-01-30 RX ADMIN — FAMOTIDINE 20 MG: 10 INJECTION INTRAVENOUS at 06:58

## 2023-01-30 RX ADMIN — IOPAMIDOL 110 ML: 510 INJECTION, SOLUTION INTRAVASCULAR at 10:36

## 2023-01-30 RX ADMIN — ONDANSETRON 4 MG: 2 INJECTION INTRAMUSCULAR; INTRAVENOUS at 08:06

## 2023-01-30 RX ADMIN — Medication 100 MG: at 16:33

## 2023-01-30 RX ADMIN — LOSARTAN POTASSIUM 100 MG: 100 TABLET, FILM COATED ORAL at 16:33

## 2023-01-30 RX ADMIN — BUPROPION HYDROCHLORIDE 150 MG: 150 TABLET, EXTENDED RELEASE ORAL at 16:33

## 2023-01-30 RX ADMIN — GLYCOPYRROLATE 0.2 MG: 0.2 INJECTION INTRAMUSCULAR; INTRAVENOUS at 08:23

## 2023-01-30 RX ADMIN — HEPARIN SODIUM 3000 UNITS: 1000 INJECTION INTRAVENOUS; SUBCUTANEOUS at 09:19

## 2023-01-30 RX ADMIN — CEFAZOLIN SODIUM 2 G: 2 INJECTION, SOLUTION INTRAVENOUS at 07:20

## 2023-01-30 RX ADMIN — FENTANYL CITRATE 50 MCG: 50 INJECTION, SOLUTION INTRAMUSCULAR; INTRAVENOUS at 06:58

## 2023-01-30 RX ADMIN — ROSUVASTATIN CALCIUM 20 MG: 20 TABLET, FILM COATED ORAL at 19:56

## 2023-01-30 RX ADMIN — EPHEDRINE SULFATE 10 MG: 50 INJECTION INTRAVENOUS at 08:02

## 2023-01-30 RX ADMIN — CEFAZOLIN SODIUM 2 G: 2 INJECTION, SOLUTION INTRAVENOUS at 16:34

## 2023-01-30 RX ADMIN — SODIUM CHLORIDE: 9 INJECTION, SOLUTION INTRAVENOUS at 07:47

## 2023-01-30 RX ADMIN — FENTANYL CITRATE 50 MCG: 50 INJECTION, SOLUTION INTRAMUSCULAR; INTRAVENOUS at 10:46

## 2023-01-30 RX ADMIN — BUPROPION HYDROCHLORIDE 300 MG: 300 TABLET, EXTENDED RELEASE ORAL at 16:33

## 2023-01-30 RX ADMIN — ROCURONIUM BROMIDE 10 MG: 10 INJECTION, SOLUTION INTRAVENOUS at 08:01

## 2023-01-30 RX ADMIN — SODIUM CHLORIDE, POTASSIUM CHLORIDE, SODIUM LACTATE AND CALCIUM CHLORIDE 9 ML/HR: 600; 310; 30; 20 INJECTION, SOLUTION INTRAVENOUS at 06:58

## 2023-01-30 RX ADMIN — DEXTROSE MONOHYDRATE, SODIUM CHLORIDE, AND POTASSIUM CHLORIDE 100 ML/HR: 50; 4.5; 1.49 INJECTION, SOLUTION INTRAVENOUS at 16:34

## 2023-01-30 RX ADMIN — HYDROCODONE BITARTRATE AND ACETAMINOPHEN 1 TABLET: 7.5; 325 TABLET ORAL at 22:12

## 2023-01-30 RX ADMIN — EPHEDRINE SULFATE 15 MG: 50 INJECTION INTRAVENOUS at 08:52

## 2023-01-30 RX ADMIN — EPHEDRINE SULFATE 5 MG: 50 INJECTION INTRAVENOUS at 08:13

## 2023-01-30 RX ADMIN — Medication 25 MG: at 07:58

## 2023-01-30 RX ADMIN — ROCURONIUM BROMIDE 40 MG: 10 INJECTION, SOLUTION INTRAVENOUS at 07:47

## 2023-01-30 RX ADMIN — ASPIRIN 81 MG: 81 TABLET, COATED ORAL at 16:33

## 2023-01-30 RX ADMIN — ROCURONIUM BROMIDE 20 MG: 10 INJECTION, SOLUTION INTRAVENOUS at 08:41

## 2023-01-30 RX ADMIN — HEPARIN SODIUM 3000 UNITS: 1000 INJECTION INTRAVENOUS; SUBCUTANEOUS at 08:42

## 2023-01-30 RX ADMIN — PROPOFOL 160 MG: 10 INJECTION, EMULSION INTRAVENOUS at 07:47

## 2023-01-30 RX ADMIN — MIDAZOLAM 1 MG: 1 INJECTION INTRAMUSCULAR; INTRAVENOUS at 06:58

## 2023-01-30 RX ADMIN — HEPARIN SODIUM 3000 UNITS: 1000 INJECTION INTRAVENOUS; SUBCUTANEOUS at 08:27

## 2023-01-30 RX ADMIN — HEPARIN SODIUM 12000 UNITS: 1000 INJECTION INTRAVENOUS; SUBCUTANEOUS at 08:16

## 2023-01-30 RX ADMIN — LIDOCAINE HYDROCHLORIDE 100 MG: 20 INJECTION, SOLUTION INFILTRATION; PERINEURAL at 07:47

## 2023-01-30 RX ADMIN — PROTAMINE SULFATE 40 MG: 10 INJECTION, SOLUTION INTRAVENOUS at 10:44

## 2023-01-30 RX ADMIN — SUGAMMADEX 400 MG: 100 INJECTION, SOLUTION INTRAVENOUS at 10:51

## 2023-01-30 RX ADMIN — DEXAMETHASONE SODIUM PHOSPHATE 8 MG: 4 INJECTION, SOLUTION INTRAMUSCULAR; INTRAVENOUS at 08:06

## 2023-01-30 NOTE — ANESTHESIA PROCEDURE NOTES
Airway  Urgency: elective    Date/Time: 1/30/2023 7:49 AM  Airway not difficult    General Information and Staff    Patient location during procedure: OR  CRNA/CAA: Misty Broussard CRNA    Indications and Patient Condition  Indications for airway management: airway protection    Preoxygenated: yes  MILS maintained throughout  Mask difficulty assessment: 1 - vent by mask    Final Airway Details  Final airway type: endotracheal airway      Successful airway: ETT  Cuffed: yes   Successful intubation technique: direct laryngoscopy  Endotracheal tube insertion site: oral  Blade: Sharri  Blade size: 3  ETT size (mm): 7.5  Cormack-Lehane Classification: grade I - full view of glottis  Placement verified by: chest auscultation and capnometry   Measured from: lips  ETT/EBT  to lips (cm): 22  Number of attempts at approach: 1  Assessment: lips, teeth, and gum same as pre-op and atraumatic intubation    Additional Comments  Atraumatic insertion

## 2023-01-30 NOTE — ANESTHESIA PREPROCEDURE EVALUATION
Anesthesia Evaluation     Patient summary reviewed and Nursing notes reviewed   no history of anesthetic complications:  NPO Solid Status: > 8 hours  NPO Liquid Status: > 2 hours           Airway   Mallampati: II  TM distance: >3 FB  Neck ROM: full  No difficulty expected  Dental    (+) poor dentition    Pulmonary     breath sounds clear to auscultation  (+) a smoker Former, COPD,   (-) shortness of breath, recent URI  Cardiovascular   Exercise tolerance: good (4-7 METS) ([place] )    ECG reviewed  PT is on anticoagulation therapy  Patient on routine beta blocker and Beta blocker given within 24 hours of surgery  Rhythm: regular  Rate: normal    (+) hypertension, past MI  6-12 months, CAD, CABG 3-6 Months, hyperlipidemia,   (-) CHF    ROS comment: 4-2022 TTE:  · The left ventricular cavity is mildly dilated.  · Calculated left ventricular EF = 55.7% Estimated left ventricular EF was in agreement with the calculated left ventricular EF. Left ventricular systolic function is normal.  · There is severe hypokinesis of the septal apical wall. There is hypokinesis of the lateral and lateral apical walls. The inferior wall also appears to be hypokinetic  · Left ventricular diastolic function is consistent with (grade III w/high LAP) reversible restrictive pattern.  · Left atrial volume is moderately increased.  · There is mild calcification of the aortic valve mainly affecting the non-coronary and right coronary cusp(s).  · Trace to mild aortic valve regurgitation is present.  · Moderate mitral valve regurgitation is present with a centrally-directed jet noted.  · Trace to mild tricuspid valve regurgitation is present.  · Estimated right ventricular systolic pressure from tricuspid regurgitation is moderately elevated (45-55 mmHg).  · There is a trivial pericardial effusion.        Neuro/Psych  (+) psychiatric history Anxiety,    (-) seizures, CVA  GI/Hepatic/Renal/Endo    (-)  obesity, liver disease, no  renal disease, diabetes    Musculoskeletal     Abdominal    Substance History   (+) alcohol use,      OB/GYN          Other                        Anesthesia Plan    ASA 3     general       Anesthetic plan, risks, benefits, and alternatives have been provided, discussed and informed consent has been obtained with: patient.    Plan discussed with CRNA.        CODE STATUS:

## 2023-01-30 NOTE — ANESTHESIA POSTPROCEDURE EVALUATION
Patient: Osman Aparicio    Procedure Summary     Date: 01/30/23 Room / [facility]    Anesthesia Start: 0727 Anesthesia Stop: 1106    Procedure: Zenith Fenestrated Endovascular Repair Diagnosis:     Surgeons: Mariusz Kumar MD Provider: Mark Samano MD    Anesthesia Type: general ASA Status: 3          Anesthesia Type: general    Vitals  Vitals Value Taken Time   /81 01/30/23 1301   Temp 36.8 °C (98.2 °F) 01/30/23 1105   Pulse 55 01/30/23 1312   Resp 16 01/30/23 1105   SpO2 94 % 01/30/23 1312   Vitals shown include unvalidated device data.        Post Anesthesia Care and Evaluation    Patient location during evaluation: bedside  Patient participation: complete - patient participated  Level of consciousness: awake  Pain management: adequate    Airway patency: patent  Anesthetic complications: No anesthetic complications    Cardiovascular status: acceptable  Respiratory status: acceptable  Hydration status: acceptable

## 2023-01-30 NOTE — ADDENDUM NOTE
Addendum  created 01/30/23 1631 by Mark Samano MD    Attestation recorded in Intraprocedure, Intraprocedure Attestations filed

## 2023-01-30 NOTE — ANESTHESIA PROCEDURE NOTES
Arterial Line      Patient location during procedure: holding area   Line placed for hemodynamic monitoring.  Performed By   Anesthesiologist: Mark Samano MD   Preanesthetic Checklist  Completed: patient identified, IV checked, site marked, risks and benefits discussed, surgical consent, monitors and equipment checked, pre-op evaluation and timeout performed  Arterial Line Prep    Sterile Tech: gloves  Prep: ChloraPrep  Patient monitoring: blood pressure monitoring, continuous pulse oximetry and EKG  Arterial Line Procedure   Laterality:right  Location:  radial artery  Catheter size: 20 G   Guidance: ultrasound guided  PROCEDURE NOTE/ULTRASOUND INTERPRETATION.  Using ultrasound guidance the potential vascular sites for insertion of the catheter were visualized to determine the patency of the vessel to be used for vascular access.  After selecting the appropriate site for insertion, the needle was visualized under ultrasound being inserted into the radial artery, followed by ultrasound confirmation of wire and catheter placement. There were no abnormalities seen on ultrasound; an image was taken; and the patient tolerated the procedure with no complications.   Number of attempts: 1  Successful placement: yes Images: still images obtained, printed/placed on the chart  Post Assessment   Dressing Type: occlusive dressing applied, secured with tape and wrist guard applied.   Complications no  Circ/Move/Sens Assessment: normal and unchanged.   Patient Tolerance: patient tolerated the procedure well with no apparent complications  Additional Notes  Ultrasound interpretation note:  Permanent image recorded.  Under U/S guidance, vessels analyzed, vessel seen patent.  Needle, wire and catheter seen entering vessel.  No abnormalities noted.

## 2023-01-31 ENCOUNTER — READMISSION MANAGEMENT (OUTPATIENT)
Dept: CALL CENTER | Facility: HOSPITAL | Age: 68
End: 2023-01-31
Payer: MEDICARE

## 2023-01-31 VITALS
SYSTOLIC BLOOD PRESSURE: 142 MMHG | TEMPERATURE: 98.1 F | WEIGHT: 189 LBS | HEART RATE: 54 BPM | OXYGEN SATURATION: 92 % | RESPIRATION RATE: 16 BRPM | BODY MASS INDEX: 26.46 KG/M2 | DIASTOLIC BLOOD PRESSURE: 68 MMHG | HEIGHT: 71 IN

## 2023-01-31 LAB
ACT BLD: 137 SECONDS (ref 82–152)
ACT BLD: 203 SECONDS (ref 82–152)
ACT BLD: 239 SECONDS (ref 82–152)
ACT BLD: 239 SECONDS (ref 82–152)
ACT BLD: 251 SECONDS (ref 82–152)
ANION GAP SERPL CALCULATED.3IONS-SCNC: 7.5 MMOL/L (ref 5–15)
BUN SERPL-MCNC: 17 MG/DL (ref 8–23)
BUN/CREAT SERPL: 12.2 (ref 7–25)
CALCIUM SPEC-SCNC: 8.8 MG/DL (ref 8.6–10.5)
CHLORIDE SERPL-SCNC: 97 MMOL/L (ref 98–107)
CO2 SERPL-SCNC: 25.5 MMOL/L (ref 22–29)
CREAT SERPL-MCNC: 1.39 MG/DL (ref 0.76–1.27)
DEPRECATED RDW RBC AUTO: 44.5 FL (ref 37–54)
EGFRCR SERPLBLD CKD-EPI 2021: 55.6 ML/MIN/1.73
ERYTHROCYTE [DISTWIDTH] IN BLOOD BY AUTOMATED COUNT: 13.3 % (ref 12.3–15.4)
GLUCOSE SERPL-MCNC: 149 MG/DL (ref 65–99)
HCT VFR BLD AUTO: 29.1 % (ref 37.5–51)
HGB BLD-MCNC: 9.7 G/DL (ref 13–17.7)
MCH RBC QN AUTO: 31 PG (ref 26.6–33)
MCHC RBC AUTO-ENTMCNC: 33.3 G/DL (ref 31.5–35.7)
MCV RBC AUTO: 93 FL (ref 79–97)
PLATELET # BLD AUTO: 204 10*3/MM3 (ref 140–450)
PMV BLD AUTO: 10.3 FL (ref 6–12)
POTASSIUM SERPL-SCNC: 3.6 MMOL/L (ref 3.5–5.2)
RBC # BLD AUTO: 3.13 10*6/MM3 (ref 4.14–5.8)
SODIUM SERPL-SCNC: 130 MMOL/L (ref 136–145)
WBC NRBC COR # BLD: 10.58 10*3/MM3 (ref 3.4–10.8)

## 2023-01-31 PROCEDURE — 25010000002 CEFAZOLIN IN DEXTROSE 2-4 GM/100ML-% SOLUTION: Performed by: SURGERY

## 2023-01-31 PROCEDURE — 85027 COMPLETE CBC AUTOMATED: CPT | Performed by: SURGERY

## 2023-01-31 PROCEDURE — 80048 BASIC METABOLIC PNL TOTAL CA: CPT | Performed by: SURGERY

## 2023-01-31 RX ORDER — HYDROCODONE BITARTRATE AND ACETAMINOPHEN 5; 325 MG/1; MG/1
1 TABLET ORAL EVERY 6 HOURS PRN
Qty: 8 TABLET | Refills: 0 | Status: SHIPPED | OUTPATIENT
Start: 2023-01-31

## 2023-01-31 RX ADMIN — METOPROLOL SUCCINATE 100 MG: 100 TABLET, EXTENDED RELEASE ORAL at 09:13

## 2023-01-31 RX ADMIN — Medication 100 MG: at 09:13

## 2023-01-31 RX ADMIN — BUPROPION HYDROCHLORIDE 300 MG: 300 TABLET, EXTENDED RELEASE ORAL at 09:13

## 2023-01-31 RX ADMIN — ASPIRIN 81 MG: 81 TABLET, COATED ORAL at 09:13

## 2023-01-31 RX ADMIN — BUPROPION HYDROCHLORIDE 150 MG: 150 TABLET, EXTENDED RELEASE ORAL at 09:14

## 2023-01-31 RX ADMIN — CEFAZOLIN SODIUM 2 G: 2 INJECTION, SOLUTION INTRAVENOUS at 00:46

## 2023-01-31 RX ADMIN — LOSARTAN POTASSIUM 100 MG: 100 TABLET, FILM COATED ORAL at 09:13

## 2023-01-31 RX ADMIN — DEXTROSE MONOHYDRATE, SODIUM CHLORIDE, AND POTASSIUM CHLORIDE 100 ML/HR: 50; 4.5; 1.49 INJECTION, SOLUTION INTRAVENOUS at 01:47

## 2023-02-01 ENCOUNTER — TRANSITIONAL CARE MANAGEMENT TELEPHONE ENCOUNTER (OUTPATIENT)
Dept: CALL CENTER | Facility: HOSPITAL | Age: 68
End: 2023-02-01
Payer: MEDICARE

## 2023-02-01 NOTE — CASE MANAGEMENT/SOCIAL WORK
Case Management Discharge Note      Final Note: Pt discharged home.  KENDRICK Walker RN         Selected Continued Care - Discharged on 1/31/2023 Admission date: 1/30/2023 - Discharge disposition: Home or Self Care    Destination    No services have been selected for the patient.              Durable Medical Equipment    No services have been selected for the patient.              Dialysis/Infusion    No services have been selected for the patient.              Home Medical Care    No services have been selected for the patient.              Therapy    No services have been selected for the patient.              Community Resources    No services have been selected for the patient.              Community & DME    No services have been selected for the patient.                  Transportation Services  Private: Car    Final Discharge Disposition Code: 01 - home or self-care

## 2023-02-01 NOTE — OUTREACH NOTE
Call Center TCM Note    Flowsheet Row Responses   [facility] [place] operated by [facility] patient discharged from? [place]   Does the patient have one of the following disease processes/diagnoses(primary or secondary)? Other   TCM attempt successful? No   Unsuccessful attempts Attempt 1          Wendie London MA    2/1/2023, 14:37 EST

## 2023-02-01 NOTE — OUTREACH NOTE
Prep Survey    Flowsheet Row Responses   [facility] patient discharged from? [place]   Is LACE score < 7 ? No   Eligibility [facility]   Date of Admission 01/30/23   Date of Discharge 01/31/23   Discharge Disposition Home or Self Care   Discharge diagnosis Juxtarenal abdominal aortic aneurysm,   elective percutaneous endovascular repair    Does the patient have one of the following disease processes/diagnoses(primary or secondary)? Other   Does the patient have Home health ordered? No   Is there a DME ordered? No   Prep survey completed? Yes          DEE GEE - Registered Nurse

## 2023-02-01 NOTE — OUTREACH NOTE
Call Center TCM Note    Flowsheet Row Responses   [facility] patient discharged from? [place]   Does the patient have one of the following disease processes/diagnoses(primary or secondary)? Other   TCM attempt successful? Yes   Discharge diagnosis Juxtarenal abdominal aortic aneurysm,   elective percutaneous endovascular repair    Meds reviewed with patient/caregiver? Yes   Is the patient having any side effects they believe may be caused by any medication additions or changes? No   Does the patient have all medications ordered at discharge? Yes   Is the patient taking all medications as directed (includes completed medication regime)? Yes   Does the patient have an appointment with their PCP within 7 days of discharge? No   Nursing Interventions Patient desires to follow up with specialty only   Has home health visited the patient within 72 hours of discharge? N/A   Psychosocial issues? No   Did the patient receive a copy of their discharge instructions? Yes   Nursing interventions Reviewed instructions with patient   What is the patient's perception of their health status since discharge? Improving   Is the patient/caregiver able to teach back signs and symptoms related to disease process for when to call PCP? Yes   Is the patient/caregiver able to teach back signs and symptoms related to disease process for when to call 911? Yes   Is the patient/caregiver able to teach back the hierarchy of who to call/visit for symptoms/problems? PCP, Specialist, Home health nurse, Urgent Care, ED, 911 Yes   If the patient is a current smoker, are they able to teach back resources for cessation? Not a smoker   TCM call completed? Yes   Wrap up additional comments D/C DX: Juxtarenal abdominal aortic aneurysm,   elective percutaneous endovascular repair - Pt doing well, no questions. Pt will be calling to sched POST OP appt with Dr Kumar. Pt declines TCM APPT with PCP Desire Greer as he jus cassi her 01/18/2023, and will  be folloing up again in about  a month.           Wendie London MA    2/1/2023, 16:08 EST

## 2023-02-03 LAB
BH BB BLOOD EXPIRATION DATE: NORMAL
BH BB BLOOD EXPIRATION DATE: NORMAL
BH BB BLOOD TYPE BARCODE: 5100
BH BB BLOOD TYPE BARCODE: NORMAL
BH BB DISPENSE STATUS: NORMAL
BH BB DISPENSE STATUS: NORMAL
BH BB PRODUCT CODE: NORMAL
BH BB PRODUCT CODE: NORMAL
BH BB UNIT NUMBER: NORMAL
BH BB UNIT NUMBER: NORMAL
CROSSMATCH INTERPRETATION: NORMAL
CROSSMATCH INTERPRETATION: NORMAL
UNIT  ABO: NORMAL
UNIT  ABO: NORMAL
UNIT  RH: NORMAL
UNIT  RH: NORMAL

## 2023-02-15 DIAGNOSIS — F32.A ANXIETY AND DEPRESSION: ICD-10-CM

## 2023-02-15 DIAGNOSIS — F41.9 ANXIETY AND DEPRESSION: ICD-10-CM

## 2023-02-15 NOTE — TELEPHONE ENCOUNTER
Rx Refill Note  Requested Prescriptions     Pending Prescriptions Disp Refills   • buPROPion XL (WELLBUTRIN XL) 300 MG 24 hr tablet [Pharmacy Med Name: BUPROPION HCL  MG TABLET] 30 tablet 0     Sig: TAKE 1 TABLET BY MOUTH EVERY DAY TO BE TAKEN WITH 150 MG FOR TOTAL  MG      Last office visit with prescribing clinician: 1/18/2023   Last telemedicine visit with prescribing clinician: Visit date not found   Next office visit with prescribing clinician: Visit date not found                         Would you like a call back once the refill request has been completed: [] Yes [] No    If the office needs to give you a call back, can they leave a voicemail: [] Yes [] No    Bella Meredith MA  02/15/23, 18:09 EST

## 2023-02-16 ENCOUNTER — TELEPHONE (OUTPATIENT)
Dept: FAMILY MEDICINE CLINIC | Facility: CLINIC | Age: 68
End: 2023-02-16
Payer: MEDICARE

## 2023-02-16 DIAGNOSIS — F41.9 ANXIETY AND DEPRESSION: ICD-10-CM

## 2023-02-16 DIAGNOSIS — F32.A ANXIETY AND DEPRESSION: ICD-10-CM

## 2023-02-16 RX ORDER — BUPROPION HYDROCHLORIDE 150 MG/1
150 TABLET ORAL DAILY
Qty: 90 TABLET | Refills: 0 | Status: SHIPPED | OUTPATIENT
Start: 2023-02-16 | End: 2023-03-28 | Stop reason: SDUPTHER

## 2023-02-16 RX ORDER — BUPROPION HYDROCHLORIDE 300 MG/1
300 TABLET ORAL EVERY MORNING
Qty: 90 TABLET | Refills: 0 | Status: SHIPPED | OUTPATIENT
Start: 2023-02-16 | End: 2023-03-28 | Stop reason: SDUPTHER

## 2023-02-16 RX ORDER — BUPROPION HYDROCHLORIDE 300 MG/1
TABLET ORAL
Qty: 30 TABLET | Refills: 0 | Status: SHIPPED | OUTPATIENT
Start: 2023-02-16 | End: 2023-02-16 | Stop reason: SDUPTHER

## 2023-02-16 NOTE — TELEPHONE ENCOUNTER
----- Message from Velma Mendes LPN sent at 2/15/2023  9:01 PM EST -----  Regarding: FW: Bupropion  Contact: [phone]    ----- Message -----  From: Osman Aparicio  Sent: 2/15/2023   6:58 PM EST  To: [facility] Clinical Pool  Subject: Bupropion                                        I would like to try 1 more month of 450 bupropion. It Seems to be helping. Thanks

## 2023-02-17 ENCOUNTER — TRANSCRIBE ORDERS (OUTPATIENT)
Dept: ADMINISTRATIVE | Facility: HOSPITAL | Age: 68
End: 2023-02-17
Payer: MEDICARE

## 2023-02-17 DIAGNOSIS — I71.40 ABDOMINAL AORTIC ANEURYSM (AAA) WITHOUT RUPTURE, UNSPECIFIED PART: Primary | ICD-10-CM

## 2023-02-24 DIAGNOSIS — I10 ESSENTIAL HYPERTENSION: ICD-10-CM

## 2023-02-24 RX ORDER — AMLODIPINE BESYLATE 10 MG/1
10 TABLET ORAL NIGHTLY
Qty: 90 TABLET | Refills: 0
Start: 2023-02-24 | End: 2023-02-28 | Stop reason: SDUPTHER

## 2023-02-24 NOTE — TELEPHONE ENCOUNTER
Caller: Osman Aparicio    Relationship: Self    Best call back number: [phone]    Requested Prescriptions:   Requested Prescriptions     Pending Prescriptions Disp Refills   • amLODIPine (NORVASC) 10 MG tablet 90 tablet 1     Sig: Take 1 tablet by mouth Every Night.        Pharmacy where request should be sent: [id number] [place], [place] - [address] AT [address] [facility] [phone]  - [phone]      Additional details provided by patient:PATIENT HAS TWO DAYS OF MEDICATION LEFT    Does the patient have less than a 3 day supply:  [x] Yes  [] No    Would you like a call back once the refill request has been completed: [x] Yes [] No    If the office needs to give you a call back, can they leave a voicemail: [x] Yes [] No    Sola Duffy Rep   02/24/23 12:42 EST

## 2023-02-24 NOTE — TELEPHONE ENCOUNTER
Rx Refill Note  Requested Prescriptions     Pending Prescriptions Disp Refills   • amLODIPine (NORVASC) 10 MG tablet 90 tablet 0     Sig: Take 1 tablet by mouth Every Night.      Last office visit with prescribing clinician: 1/18/2023   Last telemedicine visit with prescribing clinician: Visit date not found   Next office visit with prescribing clinician: Visit date not found                         Would you like a call back once the refill request has been completed: [] Yes [] No    If the office needs to give you a call back, can they leave a voicemail: [] Yes [] No    Bella Meredith MA  02/24/23, 13:33 EST

## 2023-02-27 DIAGNOSIS — I10 ESSENTIAL HYPERTENSION: ICD-10-CM

## 2023-02-28 DIAGNOSIS — I10 ESSENTIAL HYPERTENSION: ICD-10-CM

## 2023-02-28 RX ORDER — AMLODIPINE BESYLATE 10 MG/1
10 TABLET ORAL NIGHTLY
Qty: 90 TABLET | Refills: 0
Start: 2023-02-28 | End: 2023-03-01

## 2023-02-28 RX ORDER — AMLODIPINE BESYLATE 10 MG/1
TABLET ORAL
Qty: 90 TABLET | Refills: 1 | OUTPATIENT
Start: 2023-02-28

## 2023-02-28 NOTE — TELEPHONE ENCOUNTER
Pt called in about his amLODpine 10 mg tablet. It was intended to be sent on 02/24/2023 but the class status says no print.     Pt is completely out of medication.    Please advise.

## 2023-03-01 RX ORDER — AMLODIPINE BESYLATE 10 MG/1
TABLET ORAL
Qty: 90 TABLET | Refills: 1 | Status: SHIPPED | OUTPATIENT
Start: 2023-03-01

## 2023-03-15 DIAGNOSIS — F41.9 ANXIETY: ICD-10-CM

## 2023-03-15 RX ORDER — ESCITALOPRAM OXALATE 20 MG/1
TABLET ORAL
Qty: 90 TABLET | Refills: 1 | OUTPATIENT
Start: 2023-03-15

## 2023-03-21 ENCOUNTER — TELEPHONE (OUTPATIENT)
Dept: FAMILY MEDICINE CLINIC | Facility: CLINIC | Age: 68
End: 2023-03-21

## 2023-03-21 DIAGNOSIS — F41.9 ANXIETY: ICD-10-CM

## 2023-03-22 ENCOUNTER — TELEPHONE (OUTPATIENT)
Dept: FAMILY MEDICINE CLINIC | Facility: CLINIC | Age: 68
End: 2023-03-22
Payer: MEDICARE

## 2023-03-22 RX ORDER — ESCITALOPRAM OXALATE 20 MG/1
20 TABLET ORAL NIGHTLY
Qty: 30 TABLET | Refills: 0
Start: 2023-03-22 | End: 2023-03-28 | Stop reason: SDUPTHER

## 2023-03-22 NOTE — TELEPHONE ENCOUNTER
This pt wife called about his medication, concern about who refilled pt medication. I seen that Helen Abreu refilled it today.     escitalopram (LEXAPRO) 20 MG tablet     She stating that her  must have called Helen Talavera APRN     To get a refilled on medication. Pt wife would like for you to do a follow up on this medication.

## 2023-03-23 ENCOUNTER — HOSPITAL ENCOUNTER (OUTPATIENT)
Dept: CT IMAGING | Facility: HOSPITAL | Age: 68
Discharge: HOME | End: 2023-03-23
Admitting: SURGERY
Payer: MEDICARE

## 2023-03-23 ENCOUNTER — TELEPHONE (OUTPATIENT)
Dept: CARDIOLOGY | Facility: CLINIC | Age: 68
End: 2023-03-23
Payer: MEDICARE

## 2023-03-23 DIAGNOSIS — F41.9 ANXIETY: ICD-10-CM

## 2023-03-23 DIAGNOSIS — I71.40 ABDOMINAL AORTIC ANEURYSM (AAA) WITHOUT RUPTURE, UNSPECIFIED PART: ICD-10-CM

## 2023-03-23 PROCEDURE — 74174 CTA ABD&PLVS W/CONTRAST: CPT

## 2023-03-23 PROCEDURE — 82565 ASSAY OF CREATININE: CPT

## 2023-03-23 PROCEDURE — 25510000001 IOPAMIDOL PER 1 ML: Performed by: SURGERY

## 2023-03-23 RX ORDER — ESCITALOPRAM OXALATE 20 MG/1
TABLET ORAL
Qty: 90 TABLET | Refills: 1 | OUTPATIENT
Start: 2023-03-23

## 2023-03-23 RX ADMIN — IOPAMIDOL 95 ML: 755 INJECTION, SOLUTION INTRAVENOUS at 15:41

## 2023-03-24 LAB — CREAT BLDA-MCNC: 1.5 MG/DL (ref 0.6–1.3)

## 2023-03-28 ENCOUNTER — OFFICE VISIT (OUTPATIENT)
Dept: FAMILY MEDICINE CLINIC | Facility: CLINIC | Age: 68
End: 2023-03-28
Payer: MEDICARE

## 2023-03-28 VITALS
DIASTOLIC BLOOD PRESSURE: 70 MMHG | TEMPERATURE: 97.5 F | HEIGHT: 71 IN | RESPIRATION RATE: 16 BRPM | BODY MASS INDEX: 26.74 KG/M2 | OXYGEN SATURATION: 99 % | WEIGHT: 191 LBS | HEART RATE: 50 BPM | SYSTOLIC BLOOD PRESSURE: 118 MMHG

## 2023-03-28 DIAGNOSIS — F41.9 ANXIETY AND DEPRESSION: ICD-10-CM

## 2023-03-28 DIAGNOSIS — F41.9 ANXIETY: ICD-10-CM

## 2023-03-28 DIAGNOSIS — F32.A ANXIETY AND DEPRESSION: ICD-10-CM

## 2023-03-28 PROCEDURE — 3078F DIAST BP <80 MM HG: CPT | Performed by: NURSE PRACTITIONER

## 2023-03-28 PROCEDURE — 99213 OFFICE O/P EST LOW 20 MIN: CPT | Performed by: NURSE PRACTITIONER

## 2023-03-28 PROCEDURE — 3074F SYST BP LT 130 MM HG: CPT | Performed by: NURSE PRACTITIONER

## 2023-03-28 PROCEDURE — 1159F MED LIST DOCD IN RCRD: CPT | Performed by: NURSE PRACTITIONER

## 2023-03-28 PROCEDURE — 1160F RVW MEDS BY RX/DR IN RCRD: CPT | Performed by: NURSE PRACTITIONER

## 2023-03-28 RX ORDER — BUPROPION HYDROCHLORIDE 300 MG/1
300 TABLET ORAL EVERY MORNING
Qty: 90 TABLET | Refills: 1 | Status: SHIPPED | OUTPATIENT
Start: 2023-03-28

## 2023-03-28 RX ORDER — BUPROPION HYDROCHLORIDE 150 MG/1
150 TABLET ORAL DAILY
Qty: 90 TABLET | Refills: 1 | Status: SHIPPED | OUTPATIENT
Start: 2023-03-28

## 2023-03-28 RX ORDER — ESCITALOPRAM OXALATE 20 MG/1
20 TABLET ORAL NIGHTLY
Qty: 90 TABLET | Refills: 1 | Status: SHIPPED | OUTPATIENT
Start: 2023-03-28

## 2023-03-28 NOTE — PROGRESS NOTES
Subjective   Osman Aparicio is a 67 y.o. male.     History of Present Illness    Anxiety (Thinks medications are working, feels stress free at the moment)   Depression  He feels that the combination of escitalopram and bupropion have controlled his anxiety well and he would like to continue current regimen.   The following portions of the patient's history were reviewed and updated as appropriate: allergies, current medications, past family history, past medical history, past social history, past surgical history and problem list.    Review of Systems   Constitutional: Negative for fatigue.   Respiratory: Negative for cough and shortness of breath.    Cardiovascular: Negative for chest pain and palpitations.   Skin: Negative for rash.   Psychiatric/Behavioral: Negative for dysphoric mood and sleep disturbance. The patient is not nervous/anxious.        Objective   Physical Exam  Vitals and nursing note reviewed.   Constitutional:       Appearance: Normal appearance. He is well-developed.   Cardiovascular:      Rate and Rhythm: Normal rate and regular rhythm.   Pulmonary:      Effort: Pulmonary effort is normal.      Breath sounds: Normal breath sounds.   Neurological:      Mental Status: He is alert and oriented to person, place, and time.   Psychiatric:         Mood and Affect: Mood normal.         Behavior: Behavior normal.         Thought Content: Thought content normal.         Judgment: Judgment normal.         Assessment & Plan   Problems Addressed this Visit        Mental Health    Anxiety    Relevant Medications    escitalopram (LEXAPRO) 20 MG tablet   Other Visit Diagnoses     Anxiety and depression        Relevant Medications    buPROPion XL (Wellbutrin XL) 150 MG 24 hr tablet    buPROPion XL (WELLBUTRIN XL) 300 MG 24 hr tablet    escitalopram (LEXAPRO) 20 MG tablet      Diagnoses       Codes Comments    Anxiety and depression     ICD-10-CM: F41.9, F32.A  ICD-9-CM: 300.00, 311     Anxiety     ICD-10-CM:  F41.9  ICD-9-CM: 300.00

## 2023-04-04 ENCOUNTER — APPOINTMENT (OUTPATIENT)
Dept: GENERAL RADIOLOGY | Facility: HOSPITAL | Age: 68
DRG: 194 | End: 2023-04-04
Payer: MEDICARE

## 2023-04-04 ENCOUNTER — HOSPITAL ENCOUNTER (INPATIENT)
Facility: HOSPITAL | Age: 68
LOS: 4 days | Discharge: HOME | DRG: 194 | End: 2023-04-09
Attending: EMERGENCY MEDICINE | Admitting: HOSPITALIST
Payer: MEDICARE

## 2023-04-04 DIAGNOSIS — R09.02 HYPOXIA: ICD-10-CM

## 2023-04-04 DIAGNOSIS — R50.9 FEVER, UNSPECIFIED FEVER CAUSE: ICD-10-CM

## 2023-04-04 DIAGNOSIS — J18.9 PNEUMONIA OF BOTH LOWER LOBES DUE TO INFECTIOUS ORGANISM: Primary | ICD-10-CM

## 2023-04-04 DIAGNOSIS — J44.1 COPD WITH ACUTE EXACERBATION: ICD-10-CM

## 2023-04-04 PROCEDURE — 0202U NFCT DS 22 TRGT SARS-COV-2: CPT | Performed by: EMERGENCY MEDICINE

## 2023-04-04 PROCEDURE — 93005 ELECTROCARDIOGRAM TRACING: CPT

## 2023-04-04 PROCEDURE — 87040 BLOOD CULTURE FOR BACTERIA: CPT | Performed by: EMERGENCY MEDICINE

## 2023-04-04 PROCEDURE — 85730 THROMBOPLASTIN TIME PARTIAL: CPT | Performed by: EMERGENCY MEDICINE

## 2023-04-04 PROCEDURE — 84145 PROCALCITONIN (PCT): CPT | Performed by: EMERGENCY MEDICINE

## 2023-04-04 PROCEDURE — 93005 ELECTROCARDIOGRAM TRACING: CPT | Performed by: EMERGENCY MEDICINE

## 2023-04-04 PROCEDURE — 85610 PROTHROMBIN TIME: CPT | Performed by: EMERGENCY MEDICINE

## 2023-04-04 PROCEDURE — 99285 EMERGENCY DEPT VISIT HI MDM: CPT

## 2023-04-04 PROCEDURE — 80053 COMPREHEN METABOLIC PANEL: CPT | Performed by: EMERGENCY MEDICINE

## 2023-04-04 PROCEDURE — 84484 ASSAY OF TROPONIN QUANT: CPT | Performed by: EMERGENCY MEDICINE

## 2023-04-04 PROCEDURE — 93010 ELECTROCARDIOGRAM REPORT: CPT | Performed by: INTERNAL MEDICINE

## 2023-04-04 PROCEDURE — 83735 ASSAY OF MAGNESIUM: CPT | Performed by: EMERGENCY MEDICINE

## 2023-04-04 PROCEDURE — 85025 COMPLETE CBC W/AUTO DIFF WBC: CPT | Performed by: EMERGENCY MEDICINE

## 2023-04-04 PROCEDURE — 83605 ASSAY OF LACTIC ACID: CPT | Performed by: EMERGENCY MEDICINE

## 2023-04-04 PROCEDURE — 83880 ASSAY OF NATRIURETIC PEPTIDE: CPT | Performed by: EMERGENCY MEDICINE

## 2023-04-04 PROCEDURE — 85007 BL SMEAR W/DIFF WBC COUNT: CPT | Performed by: EMERGENCY MEDICINE

## 2023-04-04 RX ORDER — ACETAMINOPHEN 500 MG
1000 TABLET ORAL ONCE
Status: COMPLETED | OUTPATIENT
Start: 2023-04-04 | End: 2023-04-05

## 2023-04-04 RX ORDER — SODIUM CHLORIDE 0.9 % (FLUSH) 0.9 %
10 SYRINGE (ML) INJECTION AS NEEDED
Status: DISCONTINUED | OUTPATIENT
Start: 2023-04-04 | End: 2023-04-09 | Stop reason: HOSPADM

## 2023-04-05 ENCOUNTER — APPOINTMENT (OUTPATIENT)
Dept: GENERAL RADIOLOGY | Facility: HOSPITAL | Age: 68
DRG: 194 | End: 2023-04-05
Payer: MEDICARE

## 2023-04-05 PROBLEM — R09.02 HYPOXIA: Status: ACTIVE | Noted: 2023-04-05

## 2023-04-05 PROBLEM — R50.9 FEVER: Status: ACTIVE | Noted: 2023-04-05

## 2023-04-05 PROBLEM — J18.9 PNEUMONIA OF BOTH LOWER LOBES DUE TO INFECTIOUS ORGANISM: Status: ACTIVE | Noted: 2023-04-05

## 2023-04-05 LAB
ALBUMIN SERPL-MCNC: 3.8 G/DL (ref 3.5–5.2)
ALBUMIN/GLOB SERPL: 1.1 G/DL
ALP SERPL-CCNC: 70 U/L (ref 39–117)
ALT SERPL W P-5'-P-CCNC: 30 U/L (ref 1–41)
ANION GAP SERPL CALCULATED.3IONS-SCNC: 12 MMOL/L (ref 5–15)
ANION GAP SERPL CALCULATED.3IONS-SCNC: 13.4 MMOL/L (ref 5–15)
ANISOCYTOSIS BLD QL: ABNORMAL
APTT PPP: 28.5 SECONDS (ref 22.7–35.4)
AST SERPL-CCNC: 23 U/L (ref 1–40)
B PARAPERT DNA SPEC QL NAA+PROBE: NOT DETECTED
B PERT DNA SPEC QL NAA+PROBE: NOT DETECTED
BACTERIA UR QL AUTO: NORMAL /HPF
BASOPHILS # BLD MANUAL: 0.2 10*3/MM3 (ref 0–0.2)
BASOPHILS NFR BLD MANUAL: 1 % (ref 0–1.5)
BILIRUB SERPL-MCNC: 0.5 MG/DL (ref 0–1.2)
BILIRUB UR QL STRIP: NEGATIVE
BUN SERPL-MCNC: 14 MG/DL (ref 8–23)
BUN SERPL-MCNC: 17 MG/DL (ref 8–23)
BUN/CREAT SERPL: 12.7 (ref 7–25)
BUN/CREAT SERPL: 14.5 (ref 7–25)
C PNEUM DNA NPH QL NAA+NON-PROBE: NOT DETECTED
CALCIUM SPEC-SCNC: 8.4 MG/DL (ref 8.6–10.5)
CALCIUM SPEC-SCNC: 9.8 MG/DL (ref 8.6–10.5)
CHLORIDE SERPL-SCNC: 92 MMOL/L (ref 98–107)
CHLORIDE SERPL-SCNC: 96 MMOL/L (ref 98–107)
CLARITY UR: CLEAR
CO2 SERPL-SCNC: 22 MMOL/L (ref 22–29)
CO2 SERPL-SCNC: 24.6 MMOL/L (ref 22–29)
COLOR UR: YELLOW
CREAT SERPL-MCNC: 1.1 MG/DL (ref 0.76–1.27)
CREAT SERPL-MCNC: 1.17 MG/DL (ref 0.76–1.27)
D-LACTATE SERPL-SCNC: 1.1 MMOL/L (ref 0.5–2)
DEPRECATED RDW RBC AUTO: 47.1 FL (ref 37–54)
DEPRECATED RDW RBC AUTO: 47.8 FL (ref 37–54)
EGFRCR SERPLBLD CKD-EPI 2021: 68.3 ML/MIN/1.73
EGFRCR SERPLBLD CKD-EPI 2021: 73.6 ML/MIN/1.73
ERYTHROCYTE [DISTWIDTH] IN BLOOD BY AUTOMATED COUNT: 14 % (ref 12.3–15.4)
ERYTHROCYTE [DISTWIDTH] IN BLOOD BY AUTOMATED COUNT: 14.1 % (ref 12.3–15.4)
FLUAV SUBTYP SPEC NAA+PROBE: NOT DETECTED
FLUBV RNA ISLT QL NAA+PROBE: NOT DETECTED
GEN 5 2HR TROPONIN T REFLEX: 21 NG/L
GLOBULIN UR ELPH-MCNC: 3.6 GM/DL
GLUCOSE SERPL-MCNC: 142 MG/DL (ref 65–99)
GLUCOSE SERPL-MCNC: 177 MG/DL (ref 65–99)
GLUCOSE UR STRIP-MCNC: NEGATIVE MG/DL
HADV DNA SPEC NAA+PROBE: NOT DETECTED
HCOV 229E RNA SPEC QL NAA+PROBE: NOT DETECTED
HCOV HKU1 RNA SPEC QL NAA+PROBE: NOT DETECTED
HCOV NL63 RNA SPEC QL NAA+PROBE: NOT DETECTED
HCOV OC43 RNA SPEC QL NAA+PROBE: NOT DETECTED
HCT VFR BLD AUTO: 26.3 % (ref 37.5–51)
HCT VFR BLD AUTO: 30.8 % (ref 37.5–51)
HEMOCCULT STL QL: NEGATIVE
HGB BLD-MCNC: 10.4 G/DL (ref 13–17.7)
HGB BLD-MCNC: 9 G/DL (ref 13–17.7)
HGB UR QL STRIP.AUTO: NEGATIVE
HMPV RNA NPH QL NAA+NON-PROBE: NOT DETECTED
HPIV1 RNA ISLT QL NAA+PROBE: NOT DETECTED
HPIV2 RNA SPEC QL NAA+PROBE: NOT DETECTED
HPIV3 RNA NPH QL NAA+PROBE: NOT DETECTED
HPIV4 P GENE NPH QL NAA+PROBE: NOT DETECTED
HYALINE CASTS UR QL AUTO: NORMAL /LPF
INR PPP: 1.08 (ref 0.9–1.1)
KETONES UR QL STRIP: NEGATIVE
L PNEUMO1 AG UR QL IA: NEGATIVE
LEUKOCYTE ESTERASE UR QL STRIP.AUTO: NEGATIVE
LYMPHOCYTES # BLD MANUAL: 0.77 10*3/MM3 (ref 0.7–3.1)
LYMPHOCYTES # BLD MANUAL: 1.96 10*3/MM3 (ref 0.7–3.1)
LYMPHOCYTES NFR BLD MANUAL: 4 % (ref 5–12)
M PNEUMO IGG SER IA-ACNC: NOT DETECTED
MAGNESIUM SERPL-MCNC: 2 MG/DL (ref 1.6–2.4)
MCH RBC QN AUTO: 31 PG (ref 26.6–33)
MCH RBC QN AUTO: 31.4 PG (ref 26.6–33)
MCHC RBC AUTO-ENTMCNC: 33.8 G/DL (ref 31.5–35.7)
MCHC RBC AUTO-ENTMCNC: 34.2 G/DL (ref 31.5–35.7)
MCV RBC AUTO: 91.6 FL (ref 79–97)
MCV RBC AUTO: 91.9 FL (ref 79–97)
MONOCYTES # BLD: 0.79 10*3/MM3 (ref 0.1–0.9)
MYELOCYTES NFR BLD MANUAL: 4 % (ref 0–0)
NEUTROPHILS # BLD AUTO: 15.91 10*3/MM3 (ref 1.7–7)
NEUTROPHILS # BLD AUTO: 25 10*3/MM3 (ref 1.7–7)
NEUTROPHILS NFR BLD MANUAL: 81 % (ref 42.7–76)
NEUTROPHILS NFR BLD MANUAL: 97 % (ref 42.7–76)
NITRITE UR QL STRIP: NEGATIVE
NRBC BLD AUTO-RTO: 0.1 /100 WBC (ref 0–0.2)
NT-PROBNP SERPL-MCNC: 2237 PG/ML (ref 0–900)
PH UR STRIP.AUTO: 5.5 [PH] (ref 5–8)
PLAT MORPH BLD: NORMAL
PLAT MORPH BLD: NORMAL
PLATELET # BLD AUTO: 281 10*3/MM3 (ref 140–450)
PLATELET # BLD AUTO: 336 10*3/MM3 (ref 140–450)
PMV BLD AUTO: 10.4 FL (ref 6–12)
PMV BLD AUTO: 10.6 FL (ref 6–12)
POLYCHROMASIA BLD QL SMEAR: ABNORMAL
POTASSIUM SERPL-SCNC: 3.6 MMOL/L (ref 3.5–5.2)
POTASSIUM SERPL-SCNC: 3.7 MMOL/L (ref 3.5–5.2)
PROCALCITONIN SERPL-MCNC: 0.18 NG/ML (ref 0–0.25)
PROT SERPL-MCNC: 7.4 G/DL (ref 6–8.5)
PROT UR QL STRIP: ABNORMAL
PROTHROMBIN TIME: 14.1 SECONDS (ref 11.7–14.2)
QT INTERVAL: 374 MS
RBC # BLD AUTO: 2.87 10*6/MM3 (ref 4.14–5.8)
RBC # BLD AUTO: 3.35 10*6/MM3 (ref 4.14–5.8)
RBC # UR STRIP: NORMAL /HPF
RBC MORPH BLD: NORMAL
REF LAB TEST METHOD: NORMAL
RHINOVIRUS RNA SPEC NAA+PROBE: NOT DETECTED
RSV RNA NPH QL NAA+NON-PROBE: NOT DETECTED
S PNEUM AG SPEC QL LA: NEGATIVE
SARS-COV-2 RNA NPH QL NAA+NON-PROBE: NOT DETECTED
SODIUM SERPL-SCNC: 130 MMOL/L (ref 136–145)
SODIUM SERPL-SCNC: 130 MMOL/L (ref 136–145)
SP GR UR STRIP: 1.02 (ref 1–1.03)
SQUAMOUS #/AREA URNS HPF: NORMAL /HPF
TROPONIN T DELTA: 0 NG/L
TROPONIN T SERPL HS-MCNC: 21 NG/L
UROBILINOGEN UR QL STRIP: ABNORMAL
VARIANT LYMPHS NFR BLD MANUAL: 10 % (ref 19.6–45.3)
VARIANT LYMPHS NFR BLD MANUAL: 3 % (ref 19.6–45.3)
WBC # UR STRIP: NORMAL /HPF
WBC MORPH BLD: NORMAL
WBC MORPH BLD: NORMAL
WBC NRBC COR # BLD: 19.64 10*3/MM3 (ref 3.4–10.8)
WBC NRBC COR # BLD: 25.77 10*3/MM3 (ref 3.4–10.8)

## 2023-04-05 PROCEDURE — 25010000002 AZITHROMYCIN PER 500 MG: Performed by: EMERGENCY MEDICINE

## 2023-04-05 PROCEDURE — 36415 COLL VENOUS BLD VENIPUNCTURE: CPT | Performed by: STUDENT IN AN ORGANIZED HEALTH CARE EDUCATION/TRAINING PROGRAM

## 2023-04-05 PROCEDURE — 87040 BLOOD CULTURE FOR BACTERIA: CPT | Performed by: EMERGENCY MEDICINE

## 2023-04-05 PROCEDURE — 85025 COMPLETE CBC W/AUTO DIFF WBC: CPT | Performed by: STUDENT IN AN ORGANIZED HEALTH CARE EDUCATION/TRAINING PROGRAM

## 2023-04-05 PROCEDURE — 25010000002 CEFTRIAXONE PER 250 MG: Performed by: EMERGENCY MEDICINE

## 2023-04-05 PROCEDURE — 25010000002 CEFTRIAXONE PER 250 MG: Performed by: NURSE PRACTITIONER

## 2023-04-05 PROCEDURE — 84484 ASSAY OF TROPONIN QUANT: CPT | Performed by: EMERGENCY MEDICINE

## 2023-04-05 PROCEDURE — 85007 BL SMEAR W/DIFF WBC COUNT: CPT | Performed by: STUDENT IN AN ORGANIZED HEALTH CARE EDUCATION/TRAINING PROGRAM

## 2023-04-05 PROCEDURE — 80048 BASIC METABOLIC PNL TOTAL CA: CPT | Performed by: STUDENT IN AN ORGANIZED HEALTH CARE EDUCATION/TRAINING PROGRAM

## 2023-04-05 PROCEDURE — 87449 NOS EACH ORGANISM AG IA: CPT | Performed by: HOSPITALIST

## 2023-04-05 PROCEDURE — 82272 OCCULT BLD FECES 1-3 TESTS: CPT | Performed by: HOSPITALIST

## 2023-04-05 PROCEDURE — 71045 X-RAY EXAM CHEST 1 VIEW: CPT

## 2023-04-05 PROCEDURE — 87899 AGENT NOS ASSAY W/OPTIC: CPT | Performed by: HOSPITALIST

## 2023-04-05 PROCEDURE — 81001 URINALYSIS AUTO W/SCOPE: CPT | Performed by: EMERGENCY MEDICINE

## 2023-04-05 RX ORDER — BUPROPION HYDROCHLORIDE 300 MG/1
300 TABLET ORAL EVERY MORNING
Status: DISCONTINUED | OUTPATIENT
Start: 2023-04-05 | End: 2023-04-09 | Stop reason: HOSPADM

## 2023-04-05 RX ORDER — ACETAMINOPHEN 160 MG/5ML
650 SOLUTION ORAL EVERY 4 HOURS PRN
Status: DISCONTINUED | OUTPATIENT
Start: 2023-04-05 | End: 2023-04-09 | Stop reason: HOSPADM

## 2023-04-05 RX ORDER — AMLODIPINE BESYLATE 10 MG/1
10 TABLET ORAL DAILY
Status: DISCONTINUED | OUTPATIENT
Start: 2023-04-05 | End: 2023-04-09 | Stop reason: HOSPADM

## 2023-04-05 RX ORDER — ESCITALOPRAM OXALATE 20 MG/1
20 TABLET ORAL NIGHTLY
Status: DISCONTINUED | OUTPATIENT
Start: 2023-04-05 | End: 2023-04-09 | Stop reason: HOSPADM

## 2023-04-05 RX ORDER — SODIUM CHLORIDE 0.9 % (FLUSH) 0.9 %
10 SYRINGE (ML) INJECTION AS NEEDED
Status: DISCONTINUED | OUTPATIENT
Start: 2023-04-05 | End: 2023-04-09 | Stop reason: HOSPADM

## 2023-04-05 RX ORDER — FOLIC ACID 1 MG/1
1 TABLET ORAL DAILY
Status: DISCONTINUED | OUTPATIENT
Start: 2023-04-05 | End: 2023-04-09 | Stop reason: HOSPADM

## 2023-04-05 RX ORDER — ONDANSETRON 4 MG/1
4 TABLET, FILM COATED ORAL EVERY 6 HOURS PRN
Status: DISCONTINUED | OUTPATIENT
Start: 2023-04-05 | End: 2023-04-09 | Stop reason: HOSPADM

## 2023-04-05 RX ORDER — METOPROLOL SUCCINATE 100 MG/1
100 TABLET, EXTENDED RELEASE ORAL
Status: DISCONTINUED | OUTPATIENT
Start: 2023-04-05 | End: 2023-04-09 | Stop reason: HOSPADM

## 2023-04-05 RX ORDER — ACETAMINOPHEN 650 MG/1
650 SUPPOSITORY RECTAL EVERY 4 HOURS PRN
Status: DISCONTINUED | OUTPATIENT
Start: 2023-04-05 | End: 2023-04-09 | Stop reason: HOSPADM

## 2023-04-05 RX ORDER — ONDANSETRON 2 MG/ML
4 INJECTION INTRAMUSCULAR; INTRAVENOUS EVERY 6 HOURS PRN
Status: DISCONTINUED | OUTPATIENT
Start: 2023-04-05 | End: 2023-04-09 | Stop reason: HOSPADM

## 2023-04-05 RX ORDER — ACETAMINOPHEN 325 MG/1
650 TABLET ORAL EVERY 4 HOURS PRN
Status: DISCONTINUED | OUTPATIENT
Start: 2023-04-05 | End: 2023-04-09 | Stop reason: HOSPADM

## 2023-04-05 RX ORDER — CALCIUM CARBONATE 200(500)MG
2 TABLET,CHEWABLE ORAL 2 TIMES DAILY PRN
Status: DISCONTINUED | OUTPATIENT
Start: 2023-04-05 | End: 2023-04-09 | Stop reason: HOSPADM

## 2023-04-05 RX ORDER — ROSUVASTATIN CALCIUM 20 MG/1
20 TABLET, COATED ORAL NIGHTLY
Status: DISCONTINUED | OUTPATIENT
Start: 2023-04-05 | End: 2023-04-09 | Stop reason: HOSPADM

## 2023-04-05 RX ORDER — ASPIRIN 81 MG/1
81 TABLET ORAL DAILY
Status: DISCONTINUED | OUTPATIENT
Start: 2023-04-05 | End: 2023-04-09 | Stop reason: HOSPADM

## 2023-04-05 RX ORDER — SODIUM CHLORIDE 9 MG/ML
40 INJECTION, SOLUTION INTRAVENOUS AS NEEDED
Status: DISCONTINUED | OUTPATIENT
Start: 2023-04-05 | End: 2023-04-09 | Stop reason: HOSPADM

## 2023-04-05 RX ORDER — SODIUM CHLORIDE 0.9 % (FLUSH) 0.9 %
10 SYRINGE (ML) INJECTION EVERY 12 HOURS SCHEDULED
Status: DISCONTINUED | OUTPATIENT
Start: 2023-04-05 | End: 2023-04-09 | Stop reason: HOSPADM

## 2023-04-05 RX ADMIN — AMLODIPINE BESYLATE 10 MG: 10 TABLET ORAL at 12:37

## 2023-04-05 RX ADMIN — ACETAMINOPHEN 1000 MG: 500 TABLET ORAL at 00:22

## 2023-04-05 RX ADMIN — Medication 100 MG: at 12:37

## 2023-04-05 RX ADMIN — ESCITALOPRAM 20 MG: 20 TABLET, FILM COATED ORAL at 22:49

## 2023-04-05 RX ADMIN — AZITHROMYCIN MONOHYDRATE 500 MG: 500 INJECTION, POWDER, LYOPHILIZED, FOR SOLUTION INTRAVENOUS at 01:29

## 2023-04-05 RX ADMIN — Medication 10 ML: at 10:04

## 2023-04-05 RX ADMIN — METOPROLOL SUCCINATE 100 MG: 100 TABLET, EXTENDED RELEASE ORAL at 12:37

## 2023-04-05 RX ADMIN — ASPIRIN 81 MG: 81 TABLET, COATED ORAL at 12:37

## 2023-04-05 RX ADMIN — Medication 10 ML: at 02:51

## 2023-04-05 RX ADMIN — BUPROPION HYDROCHLORIDE 300 MG: 300 TABLET, EXTENDED RELEASE ORAL at 12:37

## 2023-04-05 RX ADMIN — Medication 1 MG: at 12:37

## 2023-04-05 RX ADMIN — CEFTRIAXONE SODIUM 1 G: 1 INJECTION, POWDER, FOR SOLUTION INTRAMUSCULAR; INTRAVENOUS at 00:52

## 2023-04-05 RX ADMIN — ROSUVASTATIN CALCIUM 20 MG: 20 TABLET, FILM COATED ORAL at 22:49

## 2023-04-05 RX ADMIN — SODIUM CHLORIDE 500 ML: 9 INJECTION, SOLUTION INTRAVENOUS at 00:24

## 2023-04-05 RX ADMIN — Medication 10 ML: at 22:00

## 2023-04-05 RX ADMIN — CEFTRIAXONE SODIUM 1 G: 1 INJECTION, POWDER, FOR SOLUTION INTRAMUSCULAR; INTRAVENOUS at 22:49

## 2023-04-05 NOTE — PLAN OF CARE
Problem: Infection (Pneumonia)  Goal: Resolution of Infection Signs and Symptoms  Outcome: Ongoing, Progressing     Problem: Respiratory Compromise (Pneumonia)  Goal: Effective Oxygenation and Ventilation  Outcome: Ongoing, Progressing  Intervention: Optimize Oxygenation and Ventilation  Recent Flowsheet Documentation  Taken 4/5/2023 1617 by Lois Chand RN  Head of Bed (HOB) Positioning: HOB at 45 degrees  Taken 4/5/2023 1400 by Lois Chand RN  Head of Bed (HOB) Positioning: HOB at 20-30 degrees  Taken 4/5/2023 1000 by Lois Chand RN  Head of Bed (HOB) Positioning: HOB at 30-45 degrees  Taken 4/5/2023 0850 by Lois Chand RN  Head of Bed (HOB) Positioning: HOB elevated   Goal Outcome Evaluation:      Patient is alert x4. Up ad deidra. Patient on 2 l oxygen at 92-94%.  No c/o of SOB voiced at this time.  Will continue to monitor.

## 2023-04-05 NOTE — H&P
HISTORY AND PHYSICAL   [facility]        Patient Identification:  Name: Osman Aparicio  Age: 67 y.o.  Sex: male  :  1955  MRN: [id number]                     Primary Care Physician: Desire Hughes APRN ([place])    Chief Complaint: Fever chills and a cough    History of Present Illness:       The patient is a 67 y.o. male with history of abdominal aortic aneurysm, anxiety, COPD, coronary disease, hypertension, and hyperlipidemia who presents to the hospital complaining cough and feeling under the weather for the past week or so.  Patient arrives hypoxic in triage started on oxygen.  Patient denies any chest pain abdominal pain nausea vomiting.  States he has had a nonproductive cough.  Denies recent sick contacts.  The patient was evaluated in the ER and imaging studies showed changes consistent with pneumonia.  The patient was started on some broad-spectrum IV antibiotics and cultures were done.  The patient is admitted for further evaluation treatment of pneumonia.      Past Medical History:  Past Medical History:   Diagnosis Date   • AAA (abdominal aortic aneurysm)    • Abnormal glucose    • Anxiety    • COPD (chronic obstructive pulmonary disease)    • Coronary artery disease    • Encounter for special screening examination for neoplasm of prostate 10/2012   • Hematuria     JUST MONITORING   • History of COVID-2021   • Hyperlipidemia    • Hypertension    • Myocardial infarction 2022   • Radius fracture     RIGHT   • Vitamin D deficiency disease      Past Surgical History:  Past Surgical History:   Procedure Laterality Date   • ARTERIOGRAM AORTIC N/A 2023    Procedure: Zenith Fenestrated Endovascular Repair;  Surgeon: Mariusz Kumar MD;  Location: [facility] OR ;  Service: Vascular;  Laterality: N/A;   • CARDIAC CATHETERIZATION N/A 2022    Procedure: Left Heart Cath;  Surgeon: Neto Paige MD;  Location: [facility] CATH INVASIVE LOCATION;  Service:  Cardiology;  Laterality: N/A;   • CARDIAC CATHETERIZATION N/A 03/29/2022    Procedure: Coronary angiography;  Surgeon: Neto Paige MD;  Location: [facility] CATH INVASIVE LOCATION;  Service: Cardiology;  Laterality: N/A;   • CARDIAC CATHETERIZATION N/A 03/29/2022    Procedure: Left ventriculography;  Surgeon: Neto Paige MD;  Location: [facility] CATH INVASIVE LOCATION;  Service: Cardiology;  Laterality: N/A;   • COLONOSCOPY N/A 12/07/2020    Procedure: COLONOSCOPY INTO CECUM WITH HOT SNARE POLYPECTOMIES, COLD BX POLYPECTOMIES, RESOLUTION CLIPS X;  Surgeon: Regi Mercado MD;  Location: [facility] ENDOSCOPY;  Service: General;  Laterality: N/A;  PRE:  POSITIVE COLOGUARD  POST:  POLYPS   • CORONARY ARTERY BYPASS GRAFT N/A 04/01/2022    Procedure: STERNOTOMY, CORONARY ARTERY BYPASS UTILIZINGTHE RT SAPHENOUS VEIN WITH LEFT INTERNAL MAMMARY ARTERY GRAFT X3 OFF PUMP, PRP;  Surgeon: Colten Zamora MD;  Location: [facility];  Service: Cardiothoracic;  Laterality: N/A;   • ORIF WRIST FRACTURE Right 08/26/2022    Procedure: RIGHT DISTAL RADIUS FRACTURE OPEN REDUCTION INTERNAL FIXATION;  Surgeon: Bubab Mello MD;  Location: [facility] [place] [facility];  Service: Orthopedics;  Laterality: Right;   • TRANSESOPHAGEAL ECHOCARDIOGRAM (MARTHA) N/A 04/01/2022    Procedure: TRANSESOPHAGEAL ECHOCARDIOGRAM WITH ANESTHESIA;  Surgeon: Colten Zamora MD;  Location: [facility];  Service: Cardiothoracic;  Laterality: N/A;      Home Meds:  Medications Prior to Admission   Medication Sig Dispense Refill Last Dose   • amLODIPine (NORVASC) 10 MG tablet TAKE 1 TABLET BY MOUTH EVERY DAY 90 tablet 1 4/4/2023   • Ascorbic Acid (VITAMIN C PO) Take 1 tablet by mouth Daily. HOLD PRIOR TO SURG   4/4/2023   • aspirin 81 MG EC tablet Take 1 tablet by mouth Daily. 30 tablet 3 4/4/2023   • buPROPion XL (Wellbutrin XL) 150 MG 24 hr tablet Take 1 tablet by mouth Daily. To be taken with 300 mg daily for total of 450 mg daily. 90 tablet 1  4/4/2023   • buPROPion XL (WELLBUTRIN XL) 300 MG 24 hr tablet Take 1 tablet by mouth Every Morning. 90 tablet 1 4/4/2023   • escitalopram (LEXAPRO) 20 MG tablet Take 1 tablet by mouth Every Night. 90 tablet 1 4/4/2023   • Ferrous Sulfate (Iron) 28 MG tablet Take 1 tablet by mouth Daily.   4/4/2023   • folic acid (FOLVITE) 1 MG tablet Take 1 tablet by mouth Daily. 30 tablet 3 4/4/2023   • losartan (Cozaar) 100 MG tablet Take 1 tablet by mouth Daily. 30 tablet 11 4/4/2023   • metoprolol succinate XL (TOPROL-XL) 25 MG 24 hr tablet Take 4 tablets by mouth every night at bedtime.   4/4/2023   • multivitamin with minerals tablet tablet Take 1 tablet by mouth Daily. 30 tablet 3 4/4/2023   • nitroglycerin (NITROSTAT) 0.4 MG SL tablet Place 1 tablet under the tongue Every 5 (Five) Minutes As Needed for Chest Pain (Only if SBP Greater Than 100). Take no more than 3 doses in 15 minutes. 30 tablet 3    • rosuvastatin (CRESTOR) 20 MG tablet Take 1 tablet by mouth Every Night. 90 tablet 3 4/4/2023   • thiamine (VITAMIN B-1) 100 MG tablet  tablet Take 1 tablet by mouth Daily. 30 tablet 3 4/4/2023   • Trelegy Ellipta 100-62.5-25 MCG/INH inhaler Inhale 1 puff Daily.   4/4/2023   • albuterol sulfate HFA (Proventil HFA) 108 (90 Base) MCG/ACT inhaler Inhale 2 puffs Every 4 (Four) Hours As Needed for Wheezing. 18 g 0 More than a month   • HYDROcodone-acetaminophen (NORCO) 5-325 MG per tablet Take 1 tablet by mouth Every 6 (Six) Hours As Needed for Moderate Pain. 8 tablet 0      Current meds    Current Facility-Administered Medications:   •  acetaminophen (TYLENOL) tablet 650 mg, 650 mg, Oral, Q4H PRN **OR** acetaminophen (TYLENOL) 160 MG/5ML solution 650 mg, 650 mg, Oral, Q4H PRN **OR** acetaminophen (TYLENOL) suppository 650 mg, 650 mg, Rectal, Q4H PRN, Pascale Suarez APRN  •  calcium carbonate (TUMS) chewable tablet 500 mg (200 mg elemental), 2 tablet, Oral, BID PRN, Pascale Suarez APRN  •  cefTRIAXone (ROCEPHIN) 1 g in  sodium chloride 0.9 % 100 mL IVPB-VTB, 1 g, Intravenous, Q24H, Pascale Suarez APRN  •  ondansetron (ZOFRAN) tablet 4 mg, 4 mg, Oral, Q6H PRN **OR** ondansetron (ZOFRAN) injection 4 mg, 4 mg, Intravenous, Q6H PRN, Pascale Suarez APRN  •  [COMPLETED] Insert Peripheral IV, , , Once **AND** sodium chloride 0.9 % flush 10 mL, 10 mL, Intravenous, PRN, Yon Mccarty MD  •  sodium chloride 0.9 % flush 10 mL, 10 mL, Intravenous, Q12H, Pascale Suarez APRN, 10 mL at 23 1004  •  sodium chloride 0.9 % flush 10 mL, 10 mL, Intravenous, PRN, Pascale Suarez APRN  •  sodium chloride 0.9 % infusion 40 mL, 40 mL, Intravenous, PRN, Pascale Suarez APRN  Allergies:  No Known Allergies  Immunizations:  Immunization History   Administered Date(s) Administered   • COVID-19 (PFIZER) PURPLE CAP 2021, 2021     Social History:   Social History     Social History Narrative   • Not on file     Social History     Socioeconomic History   • Marital status:    Tobacco Use   • Smoking status: Former     Packs/day: 1.00     Years: 45.00     Pack years: 45.00     Types: Cigarettes     Quit date: 3/26/2022     Years since quittin.0   • Smokeless tobacco: Never   • Tobacco comments:     caffeine - 3 cups coffee daily, tea or soda occas.   Vaping Use   • Vaping Use: Never used   Substance and Sexual Activity   • Alcohol use: Yes     Comment: 2 BEERS OCCASIONAL   • Drug use: No   • Sexual activity: Defer       Family History:  Family History   Problem Relation Age of Onset   • Lung cancer Brother    • Liver cancer Brother    • Colon polyps Brother    • Malig Hyperthermia Neg Hx         Review of Systems  See history of present illness and past medical history.  Patient denies headache, dizziness, syncope, falls, trauma, change in vision, change in hearing, change in taste, changes in weight, changes in appetite, focal weakness, numbness, or paresthesia.  Patient denies chest pain,  "palpitations, dyspnea, orthopnea, PND, cough, sinus pressure, rhinorrhea, epistaxis, hemoptysis, nausea, vomiting, hematemesis, diarrhea, constipation or hematchezia.  Denies cold or heat intolerance, polydipsia, polyuria, polyphagia. Denies hematuria, pyuria, dysuria, hesitancy, frequency or urgency.  Denies fever, chills, sweats, night sweats.  Denies missing any routine medications. Remainder of ROS is negative.    Objective:  tMax 24 hrs: Temp (24hrs), Av.9 °F (37.2 °C), Min:97.5 °F (36.4 °C), Max:102.3 °F (39.1 °C)    Vitals Ranges:   Temp:  [97.5 °F (36.4 °C)-102.3 °F (39.1 °C)] 97.6 °F (36.4 °C)  Heart Rate:  [] 67  Resp:  [18-20] 18  BP: (138-184)/(60-81) 144/72      Exam:  /72 (BP Location: Right arm, Patient Position: Lying)   Pulse 67   Temp 97.6 °F (36.4 °C) (Oral)   Resp 18   Ht 180.3 cm (71\")   Wt 86.3 kg (190 lb 4.1 oz)   SpO2 92%   BMI 26.54 kg/m²     General Appearance:    Alert, cooperative, no distress, appears stated age   Head:    Normocephalic, without obvious abnormality, atraumatic   Eyes:    PERRL, conjunctivae/corneas clear, EOM's intact, both eyes   Ears:    Normal external ear canals, both ears   Nose:   Nares normal, septum midline, mucosa normal, no drainage    or sinus tenderness   Throat:   Lips, mucosa, and tongue normal   Neck:   Supple, symmetrical, trachea midline, no adenopathy;     thyroid:  no enlargement/tenderness/nodules; no carotid    bruit or JVD   Back:     Symmetric, no curvature, ROM normal, no CVA tenderness   Lungs:    Rhonchi bilaterally, respirations unlabored   Chest Wall:    No tenderness or deformity    Heart:    Regular rate and rhythm, S1 and S2 normal, no murmur, rub   or gallop   Abdomen:     Soft, nontender, bowel sounds active all four quadrants,     no masses, no hepatomegaly, no splenomegaly   Extremities:   Extremities normal, atraumatic, no cyanosis or edema   Pulses:   2+ and symmetric all extremities   Skin:   Skin color, " texture, turgor normal, no rashes or lesions   Lymph nodes:   Cervical, supraclavicular, and axillary nodes normal   Neurologic:   CNII-XII intact, normal strength, sensation intact throughout      .    Data Review:  Lab Results (last 72 hours)     Procedure Component Value Units Date/Time    High Sensitivity Troponin T 2Hr [[id number]]  (Abnormal) Collected: 04/05/23 0207    Specimen: Blood Updated: 04/05/23 0254     HS Troponin T 21 ng/L      Troponin T Delta 0 ng/L     Narrative:      High Sensitive Troponin T Reference Range:  <10.0 ng/L- Negative Female for AMI  <15.0 ng/L- Negative Male for AMI  >=10 - Abnormal Female indicating possible myocardial injury.  >=15 - Abnormal Male indicating possible myocardial injury.   Clinicians would have to utilize clinical acumen, EKG, Troponin, and serial changes to determine if it is an Acute Myocardial Infarction or myocardial injury due to an underlying chronic condition.         Blood Culture - Blood, Arm, Left [[id number]] Collected: 04/04/23 2358    Specimen: Blood from Arm, Left Updated: 04/05/23 0143    Blood Culture - Blood, Arm, Right [[id number]] Collected: 04/05/23 0001    Specimen: Blood from Arm, Right Updated: 04/05/23 0143    Respiratory Panel PCR w/COVID-19(SARS-CoV-2) ESPINOZA/MIGUEL/AUSTIN/PAD/COR/MAD/LORI In-House, NP Swab in UTM/VTM, 3-4 HR TAT - Swab, Nasopharynx [[id number]]  (Normal) Collected: 04/04/23 2353    Specimen: Swab from Nasopharynx Updated: 04/05/23 0103     ADENOVIRUS, PCR Not Detected     Coronavirus 229E Not Detected     Coronavirus HKU1 Not Detected     Coronavirus NL63 Not Detected     Coronavirus OC43 Not Detected     COVID19 Not Detected     Human Metapneumovirus Not Detected     Human Rhinovirus/Enterovirus Not Detected     Influenza A PCR Not Detected     Influenza B PCR Not Detected     Parainfluenza Virus 1 Not Detected     Parainfluenza Virus 2 Not Detected     Parainfluenza Virus 3 Not Detected     Parainfluenza Virus 4 Not Detected     RSV,  PCR Not Detected     Bordetella pertussis pcr Not Detected     Bordetella parapertussis PCR Not Detected     Chlamydophila pneumoniae PCR Not Detected     Mycoplasma pneumo by PCR Not Detected    Narrative:      In the setting of a positive respiratory panel with a viral infection PLUS a negative procalcitonin without other underlying concern for bacterial infection, consider observing off antibiotics or discontinuation of antibiotics and continue supportive care. If the respiratory panel is positive for atypical bacterial infection (Bordetella pertussis, Chlamydophila pneumoniae, or Mycoplasma pneumoniae), consider antibiotic de-escalation to target atypical bacterial infection.    Manual Differential [[id number]]  (Abnormal) Collected: 04/04/23 2350    Specimen: Blood Updated: 04/05/23 0102     Neutrophil % 81.0 %      Lymphocyte % 10.0 %      Monocyte % 4.0 %      Basophil % 1.0 %      Myelocyte % 4.0 %      Neutrophils Absolute 15.91 10*3/mm3      Lymphocytes Absolute 1.96 10*3/mm3      Monocytes Absolute 0.79 10*3/mm3      Basophils Absolute 0.20 10*3/mm3      RBC Morphology Normal     WBC Morphology Normal     Platelet Morphology Normal    Urinalysis, Microscopic Only - Urine, Clean Catch [[id number]] Collected: 04/05/23 0021    Specimen: Urine, Clean Catch Updated: 04/05/23 0043     RBC, UA 0-2 /HPF      WBC, UA 0-2 /HPF      Bacteria, UA None Seen /HPF      Squamous Epithelial Cells, UA 0-2 /HPF      Hyaline Casts, UA None Seen /LPF      Methodology Automated Microscopy    Urinalysis With Microscopic If Indicated (No Culture) - Urine, Clean Catch [[id number]]  (Abnormal) Collected: 04/05/23 0021    Specimen: Urine, Clean Catch Updated: 04/05/23 0041     Color, UA Yellow     Appearance, UA Clear     pH, UA 5.5     Specific Gravity, UA 1.019     Glucose, UA Negative     Ketones, UA Negative     Bilirubin, UA Negative     Blood, UA Negative     Protein, UA 30 mg/dL (1+)     Leuk Esterase, UA Negative     Nitrite,  "UA Negative     Urobilinogen, UA 0.2 E.U./dL    High Sensitivity Troponin T [[id number]]  (Abnormal) Collected: 04/04/23 2350    Specimen: Blood Updated: 04/05/23 0040     HS Troponin T 21 ng/L     Narrative:      High Sensitive Troponin T Reference Range:  <10.0 ng/L- Negative Female for AMI  <15.0 ng/L- Negative Male for AMI  >=10 - Abnormal Female indicating possible myocardial injury.  >=15 - Abnormal Male indicating possible myocardial injury.   Clinicians would have to utilize clinical acumen, EKG, Troponin, and serial changes to determine if it is an Acute Myocardial Infarction or myocardial injury due to an underlying chronic condition.         Procalcitonin [[id number]]  (Normal) Collected: 04/04/23 2350    Specimen: Blood Updated: 04/05/23 0040     Procalcitonin 0.18 ng/mL     Narrative:      As a Marker for Sepsis (Non-Neonates):    1. <0.5 ng/mL represents a low risk of severe sepsis and/or septic shock.  2. >2 ng/mL represents a high risk of severe sepsis and/or septic shock.    As a Marker for Lower Respiratory Tract Infections that require antibiotic therapy:    PCT on Admission    Antibiotic Therapy       6-12 Hrs later    >0.5                Strongly Recommended  >0.25 - <0.5        Recommended   0.1 - 0.25          Discouraged              Remeasure/reassess PCT  <0.1                Strongly Discouraged     Remeasure/reassess PCT    As 28 day mortality risk marker: \"Change in Procalcitonin Result\" (>80% or <=80%) if Day 0 (or Day 1) and Day 4 values are available. Refer to http://www.[facility]-pct-calculator.com    Change in PCT <=80%  A decrease of PCT levels below or equal to 80% defines a positive change in PCT test result representing a higher risk for 28-day all-cause mortality of patients diagnosed with severe sepsis for septic shock.    Change in PCT >80%  A decrease of PCT levels of more than 80% defines a negative change in PCT result representing a lower risk for 28-day all-cause mortality of " patients diagnosed with severe sepsis or septic shock.       BNP [[id number]]  (Abnormal) Collected: 04/04/23 2350    Specimen: Blood Updated: 04/05/23 0040     proBNP 2,237.0 pg/mL     Narrative:      Among patients with dyspnea, NT-proBNP is highly sensitive for the detection of acute congestive heart failure. In addition NT-proBNP of <300 pg/ml effectively rules out acute congestive heart failure with 99% negative predictive value.    Results may be falsely decreased if patient taking Biotin.      Lactic Acid, Plasma [[id number]]  (Normal) Collected: 04/04/23 2350    Specimen: Blood Updated: 04/05/23 0035     Lactate 1.1 mmol/L     Magnesium [[id number]]  (Normal) Collected: 04/04/23 2350    Specimen: Blood Updated: 04/05/23 0035     Magnesium 2.0 mg/dL     Comprehensive Metabolic Panel [[id number]]  (Abnormal) Collected: 04/04/23 2350    Specimen: Blood Updated: 04/05/23 0035     Glucose 142 mg/dL      BUN 17 mg/dL      Creatinine 1.17 mg/dL      Sodium 130 mmol/L      Potassium 3.6 mmol/L      Chloride 92 mmol/L      CO2 24.6 mmol/L      Calcium 9.8 mg/dL      Total Protein 7.4 g/dL      Albumin 3.8 g/dL      ALT (SGPT) 30 U/L      AST (SGOT) 23 U/L      Alkaline Phosphatase 70 U/L      Total Bilirubin 0.5 mg/dL      Globulin 3.6 gm/dL      A/G Ratio 1.1 g/dL      BUN/Creatinine Ratio 14.5     Anion Gap 13.4 mmol/L      eGFR 68.3 mL/min/1.73     Narrative:      GFR Normal >60  Chronic Kidney Disease <60  Kidney Failure <15      Protime-INR [[id number]]  (Normal) Collected: 04/04/23 2350    Specimen: Blood Updated: 04/05/23 0028     Protime 14.1 Seconds      INR 1.08    aPTT [[id number]]  (Normal) Collected: 04/04/23 2350    Specimen: Blood Updated: 04/05/23 0028     PTT 28.5 seconds     CBC & Differential [[id number]]  (Abnormal) Collected: 04/04/23 2350    Specimen: Blood Updated: 04/05/23 0017    Narrative:      The following orders were created for panel order CBC & Differential.  Procedure                                Abnormality         Status                     ---------                               -----------         ------                     CBC Auto Differential[[id number]]        Abnormal            Final result                 Please view results for these tests on the individual orders.    CBC Auto Differential [[id number]]  (Abnormal) Collected: 04/04/23 2350    Specimen: Blood Updated: 04/05/23 0017     WBC 19.64 10*3/mm3      RBC 3.35 10*6/mm3      Hemoglobin 10.4 g/dL      Hematocrit 30.8 %      MCV 91.9 fL      MCH 31.0 pg      MCHC 33.8 g/dL      RDW 14.0 %      RDW-SD 47.1 fl      MPV 10.4 fL      Platelets 336 10*3/mm3      nRBC 0.1 /100 WBC                    Imaging Results (All)     Procedure Component Value Units Date/Time    XR Chest 1 View [[id number]] Collected: 04/05/23 0038     Updated: 04/05/23 0042    Narrative:      SINGLE VIEW OF THE CHEST     HISTORY: Shortness of air. Cough and fever.     COMPARISON: 08/18/2022     FINDINGS:  The patient has dense consolidation at the right lung base, and to a  lesser extent, at the left lung base. Patient is status post median  sternotomy with coronary artery bypass grafting. The heart is enlarged.  No pneumothorax is seen. No definite effusion is identified. There is  extensive calcification and tortuosity of the thoracic aorta.     Changes of COPD are noted.       Impression:      Bibasilar infiltrates, right greater than left, concerning for  pneumonia.     This report was finalized on 4/5/2023 12:39 AM by Dr. Yasmin Ramos M.D.           Past Medical History:   Diagnosis Date   • AAA (abdominal aortic aneurysm)    • Abnormal glucose    • Anxiety    • COPD (chronic obstructive pulmonary disease)    • Coronary artery disease    • Encounter for special screening examination for neoplasm of prostate 10/2012   • Hematuria     JUST MONITORING   • History of COVID-19 2021   • Hyperlipidemia    • Hypertension    • Myocardial infarction 03/2022   • Radius  fracture     RIGHT   • Vitamin D deficiency disease        Assessment:  Active Hospital Problems    Diagnosis  POA   • **Pneumonia of both lower lobes due to infectious organism [J18.9]  Yes   • Hypoxia [R09.02]  Unknown   • Fever [R50.9]  Unknown   • Chronic diastolic congestive heart failure [I50.32]  Yes   • Emphysema (HCC) [J43.1]  Yes   • Primary hypertension [I10]  Yes   • Other hyperlipidemia [E78.49]  Yes   • Anxiety [F41.9]  Yes      Resolved Hospital Problems   No resolved problems to display.       Plan:  The patient is admitted to the hospital continue with broad-spectrum IV antibiotics for pneumonia.  Await results of cultures.  We will get follow-up lab studies.    Matthew Hazel MD  4/5/2023  11:35 EDT

## 2023-04-05 NOTE — CASE MANAGEMENT/SOCIAL WORK
Discharge Planning Assessment  [facility]     Patient Name: Osman Aparicio  MRN: [id number]  Today's Date: 4/5/2023    Admit Date: 4/4/2023    Plan: Home with family to transport   Discharge Needs Assessment     Row Name 04/05/23 1532       Living Environment    People in Home spouse    Name(s) of People in Home Suzanne Palacios    Current Living Arrangements home    Potentially Unsafe Housing Conditions none    Primary Care Provided by self    Provides Primary Care For no one    Family Caregiver if Needed spouse    Family Caregiver Names Spouse, Suzanne    Quality of Family Relationships helpful;involved;supportive    Able to Return to Prior Arrangements yes       Resource/Environmental Concerns    Resource/Environmental Concerns none    Transportation Concerns none       Transition Planning    Patient/Family Anticipates Transition to home with family    Patient/Family Anticipated Services at Transition none    Transportation Anticipated family or friend will provide       Discharge Needs Assessment    Equipment Currently Used at Home none    Concerns to be Addressed discharge planning               Discharge Plan     Row Name 04/05/23 1533       Plan    Plan Home with family to transport    Patient/Family in Agreement with Plan yes    Plan Comments CCP spoke with patient at bedside; CCP role explained, face sheet verified, and discharge plan discussed. Patient resides with spouse, Suzanne, in one level home with 3-4 steps to enter. Patient reports he is able to get around okay as he does not use DME and works at [employer]. Confirms pharmacy is [facility] on [address]. Patient has used HH services in the past but cannot recall name of agency. Denies use of rehab in the past. Patient anticipates no needs and declined any referrals at this time. Plan to return home with family to transport. CCP following should discharge needs arise. Miriam VALERIO LCSW              Continued Care and Services - Admitted Since 4/4/2023     Coordination has not been started for this encounter.       Expected Discharge Date and Time     Expected Discharge Date Expected Discharge Time    Apr 7, 2023          Demographic Summary     Row Name 04/05/23 1532       General Information    Admission Type inpatient    Arrived From home    Reason for Consult discharge planning    Preferred Language English               Functional Status     Row Name 04/05/23 1532       Functional Status    Usual Activity Tolerance good    Current Activity Tolerance moderate       Functional Status, IADL    Medications independent    Meal Preparation independent    Housekeeping independent    Laundry independent    Shopping independent       Mental Status    General Appearance WDL WDL               Psychosocial    No documentation.                Abuse/Neglect    No documentation.                Legal    No documentation.                Substance Abuse    No documentation.                Patient Forms    No documentation.                   Miriam Ardon

## 2023-04-05 NOTE — ED PROVIDER NOTES
EMERGENCY DEPARTMENT ENCOUNTER    Room Number:  P589/1  Date of encounter:  4/5/2023  PCP: Desire Hughes APRN ([place])  Patient Care Team:  Desire Hughes APRN ([place]) as PCP - General (Family Medicine)  Desire Hughes APRN ([place]) as Nurse Practitioner (Family Medicine)   Independent Historians: Patient    HPI:  Chief Complaint: Fever chills cough    A complete HPI/ROS/PMH/PSH/SH/FH are unobtainable due to: None    Chronic or social conditions impacting patient care (Social Determinants of Health): None  (Financial Resource Strain / Food Insecurity / Transportation Needs / Physical Activity / Stress / Social Connections / Intimate Partner Violence / Housing Stability)    Context: Osman Aparicio is a 67 y.o. male who presents to the ED c/o cough and feeling under the weather for the past week or so.  Patient arrives hypoxic in triage started on oxygen.  Patient denies any chest pain abdominal pain nausea vomiting.  States he has had a nonproductive cough.  Denies recent sick contacts    Review of prior external notes (non-ED): I reviewed patient's primary care office note from 3/28/2023    Review of prior external test results outside of this encounter: I reviewed patient's CBC and BMP from 1/31/2023      PAST MEDICAL HISTORY  Active Ambulatory Problems     Diagnosis Date Noted   • Primary hypertension 06/01/2016   • Other hyperlipidemia 06/01/2016   • Anxiety 06/01/2016   • Nocturia 07/27/2017   • Elevated PSA 04/23/2018   • Elevated blood sugar level 04/23/2018   • Positive colorectal cancer screening using Cologuard test 11/18/2020   • Shortness of breath 03/26/2022   • Chronic diastolic congestive heart failure 03/26/2022   • Leukocytosis 03/26/2022   • Positive D dimer 03/26/2022   • Anemia 03/26/2022   • COPD with acute exacerbation 03/26/2022   • Emphysema (HCC) 03/26/2022   • History of colon polyps 03/26/2022   • Juxtarenal abdominal aortic aneurysm (AAA) without rupture 03/26/2022   • Alcohol abuse  03/27/2022   • NSTEMI (non-ST elevated myocardial infarction) 03/29/2022   • Coronary artery disease involving native coronary artery of native heart without angina pectoris 03/26/2022   • Abnormal findings on diagnostic imaging of heart and coronary circulation 03/26/2022   • Closed Colles' fracture of right radius 08/25/2022     Resolved Ambulatory Problems     Diagnosis Date Noted   • Acute respiratory failure with hypoxia 03/26/2022   • Hyponatremia 03/26/2022   • Acute pulmonary edema 03/26/2022     Past Medical History:   Diagnosis Date   • AAA (abdominal aortic aneurysm)    • Abnormal glucose    • COPD (chronic obstructive pulmonary disease)    • Coronary artery disease    • Encounter for special screening examination for neoplasm of prostate 10/2012   • Hematuria    • History of COVID-19    • Hyperlipidemia    • Hypertension    • Myocardial infarction 03/2022   • Radius fracture    • Vitamin D deficiency disease            PAST SURGICAL HISTORY  Past Surgical History:   Procedure Laterality Date   • ARTERIOGRAM AORTIC N/A 01/30/2023    Procedure: Zenith Fenestrated Endovascular Repair;  Surgeon: Mariusz Kumar MD;  Location: [facility] 18/19;  Service: Vascular;  Laterality: N/A;   • CARDIAC CATHETERIZATION N/A 03/29/2022    Procedure: Left Heart Cath;  Surgeon: Neto Paige MD;  Location: [facility] INVASIVE LOCATION;  Service: Cardiology;  Laterality: N/A;   • CARDIAC CATHETERIZATION N/A 03/29/2022    Procedure: Coronary angiography;  Surgeon: Neto Paige MD;  Location: [facility] CATH INVASIVE LOCATION;  Service: Cardiology;  Laterality: N/A;   • CARDIAC CATHETERIZATION N/A 03/29/2022    Procedure: Left ventriculography;  Surgeon: Neto Paige MD;  Location: [facility] CATH INVASIVE LOCATION;  Service: Cardiology;  Laterality: N/A;   • COLONOSCOPY N/A 12/07/2020    Procedure: COLONOSCOPY INTO CECUM WITH HOT SNARE POLYPECTOMIES, COLD BX POLYPECTOMIES, RESOLUTION CLIPS  X;  Surgeon: Regi Mercado MD;  Location: [facility] ENDOSCOPY;  Service: General;  Laterality: N/A;  PRE:  POSITIVE COLOGUARD  POST:  POLYPS   • CORONARY ARTERY BYPASS GRAFT N/A 2022    Procedure: STERNOTOMY, CORONARY ARTERY BYPASS UTILIZINGTHE RT SAPHENOUS VEIN WITH LEFT INTERNAL MAMMARY ARTERY GRAFT X3 OFF PUMP, PRP;  Surgeon: Colten Zamora MD;  Location: [facility] CVOR;  Service: Cardiothoracic;  Laterality: N/A;   • ORIF WRIST FRACTURE Right 2022    Procedure: RIGHT DISTAL RADIUS FRACTURE OPEN REDUCTION INTERNAL FIXATION;  Surgeon: Bubba Mello MD;  Location[place] [facility] [place] [facility];  Service: Orthopedics;  Laterality: Right;   • TRANSESOPHAGEAL ECHOCARDIOGRAM (MARTHA) N/A 2022    Procedure: TRANSESOPHAGEAL ECHOCARDIOGRAM WITH ANESTHESIA;  Surgeon: Colten Zamora MD;  Location: [facility] CVOR;  Service: Cardiothoracic;  Laterality: N/A;         FAMILY HISTORY  Family History   Problem Relation Age of Onset   • Lung cancer Brother    • Liver cancer Brother    • Colon polyps Brother    • Malig Hyperthermia Neg Hx          SOCIAL HISTORY  Social History     Socioeconomic History   • Marital status:    Tobacco Use   • Smoking status: Former     Packs/day: 1.00     Years: 45.00     Pack years: 45.00     Types: Cigarettes     Quit date: 3/26/2022     Years since quittin.0   • Smokeless tobacco: Never   • Tobacco comments:     caffeine - 3 cups coffee daily, tea or soda occas.   Vaping Use   • Vaping Use: Never used   Substance and Sexual Activity   • Alcohol use: Yes     Comment: 2 BEERS OCCASIONAL   • Drug use: No   • Sexual activity: Defer         ALLERGIES  Patient has no known allergies.        REVIEW OF SYSTEMS  Review of Systems     All systems reviewed and negative except for those discussed in HPI.       PHYSICAL EXAM    I have reviewed the triage vital signs and nursing notes.    ED Triage Vitals   Temp Heart Rate Resp BP SpO2   23 2302 23 2302 23 230  04/04/23 2321 04/04/23 2302   (!) 102.3 °F (39.1 °C) 100 20 (!) 184/81 95 %      Temp src Heart Rate Source Patient Position BP Location FiO2 (%)   04/04/23 2302 04/04/23 2302 -- -- --   Tympanic Monitor          Physical Exam  GENERAL: alert, no acute distress  SKIN: Warm, dry  HENT: Normocephalic, atraumatic  EYES: no scleral icterus  CV: regular rhythm, regular rate  RESPIRATORY: normal effort, coarse breath sounds bilaterally  ABDOMEN: soft, nontender, nondistended  MUSCULOSKELETAL: no deformity  NEURO: alert, moves all extremities, follows commands          LAB RESULTS  Recent Results (from the past 24 hour(s))   ECG 12 Lead Dyspnea    Collection Time: 04/04/23 11:23 PM   Result Value Ref Range    QT Interval 374 ms   Comprehensive Metabolic Panel    Collection Time: 04/04/23 11:50 PM    Specimen: Blood   Result Value Ref Range    Glucose 142 (H) 65 - 99 mg/dL    BUN 17 8 - 23 mg/dL    Creatinine 1.17 0.76 - 1.27 mg/dL    Sodium 130 (L) 136 - 145 mmol/L    Potassium 3.6 3.5 - 5.2 mmol/L    Chloride 92 (L) 98 - 107 mmol/L    CO2 24.6 22.0 - 29.0 mmol/L    Calcium 9.8 8.6 - 10.5 mg/dL    Total Protein 7.4 6.0 - 8.5 g/dL    Albumin 3.8 3.5 - 5.2 g/dL    ALT (SGPT) 30 1 - 41 U/L    AST (SGOT) 23 1 - 40 U/L    Alkaline Phosphatase 70 39 - 117 U/L    Total Bilirubin 0.5 0.0 - 1.2 mg/dL    Globulin 3.6 gm/dL    A/G Ratio 1.1 g/dL    BUN/Creatinine Ratio 14.5 7.0 - 25.0    Anion Gap 13.4 5.0 - 15.0 mmol/L    eGFR 68.3 >60.0 mL/min/1.73   Protime-INR    Collection Time: 04/04/23 11:50 PM    Specimen: Blood   Result Value Ref Range    Protime 14.1 11.7 - 14.2 Seconds    INR 1.08 0.90 - 1.10   aPTT    Collection Time: 04/04/23 11:50 PM    Specimen: Blood   Result Value Ref Range    PTT 28.5 22.7 - 35.4 seconds   BNP    Collection Time: 04/04/23 11:50 PM    Specimen: Blood   Result Value Ref Range    proBNP 2,237.0 (H) 0.0 - 900.0 pg/mL   High Sensitivity Troponin T    Collection Time: 04/04/23 11:50 PM    Specimen: Blood    Result Value Ref Range    HS Troponin T 21 (H) <15 ng/L   Lactic Acid, Plasma    Collection Time: 04/04/23 11:50 PM    Specimen: Blood   Result Value Ref Range    Lactate 1.1 0.5 - 2.0 mmol/L   Procalcitonin    Collection Time: 04/04/23 11:50 PM    Specimen: Blood   Result Value Ref Range    Procalcitonin 0.18 0.00 - 0.25 ng/mL   Magnesium    Collection Time: 04/04/23 11:50 PM    Specimen: Blood   Result Value Ref Range    Magnesium 2.0 1.6 - 2.4 mg/dL   CBC Auto Differential    Collection Time: 04/04/23 11:50 PM    Specimen: Blood   Result Value Ref Range    WBC 19.64 (H) 3.40 - 10.80 10*3/mm3    RBC 3.35 (L) 4.14 - 5.80 10*6/mm3    Hemoglobin 10.4 (L) 13.0 - 17.7 g/dL    Hematocrit 30.8 (L) 37.5 - 51.0 %    MCV 91.9 79.0 - 97.0 fL    MCH 31.0 26.6 - 33.0 pg    MCHC 33.8 31.5 - 35.7 g/dL    RDW 14.0 12.3 - 15.4 %    RDW-SD 47.1 37.0 - 54.0 fl    MPV 10.4 6.0 - 12.0 fL    Platelets 336 140 - 450 10*3/mm3    nRBC 0.1 0.0 - 0.2 /100 WBC   Manual Differential    Collection Time: 04/04/23 11:50 PM    Specimen: Blood   Result Value Ref Range    Neutrophil % 81.0 (H) 42.7 - 76.0 %    Lymphocyte % 10.0 (L) 19.6 - 45.3 %    Monocyte % 4.0 (L) 5.0 - 12.0 %    Basophil % 1.0 0.0 - 1.5 %    Myelocyte % 4.0 (H) 0.0 - 0.0 %    Neutrophils Absolute 15.91 (H) 1.70 - 7.00 10*3/mm3    Lymphocytes Absolute 1.96 0.70 - 3.10 10*3/mm3    Monocytes Absolute 0.79 0.10 - 0.90 10*3/mm3    Basophils Absolute 0.20 0.00 - 0.20 10*3/mm3    RBC Morphology Normal Normal    WBC Morphology Normal Normal    Platelet Morphology Normal Normal   Respiratory Panel PCR w/COVID-19(SARS-CoV-2) ESP[facility]ZA/MIGUEL/AUSTIN/PAD/COR/MAD/LORI In-House, NP Swab in UTM/VTM, 3-4 HR TAT - Swab, Nasopharynx    Collection Time: 04/04/23 11:53 PM    Specimen: Nasopharynx; Swab   Result Value Ref Range    ADENOVIRUS, PCR Not Detected Not Detected    Coronavirus 229E Not Detected Not Detected    Coronavirus HKU1 Not Detected Not Detected    Coronavirus NL63 Not Detected Not Detected     Coronavirus OC43 Not Detected Not Detected    COVID19 Not Detected Not Detected - Ref. Range    Human Metapneumovirus Not Detected Not Detected    Human Rhinovirus/Enterovirus Not Detected Not Detected    Influenza A PCR Not Detected Not Detected    Influenza B PCR Not Detected Not Detected    Parainfluenza Virus 1 Not Detected Not Detected    Parainfluenza Virus 2 Not Detected Not Detected    Parainfluenza Virus 3 Not Detected Not Detected    Parainfluenza Virus 4 Not Detected Not Detected    RSV, PCR Not Detected Not Detected    Bordetella pertussis pcr Not Detected Not Detected    Bordetella parapertussis PCR Not Detected Not Detected    Chlamydophila pneumoniae PCR Not Detected Not Detected    Mycoplasma pneumo by PCR Not Detected Not Detected   Urinalysis With Microscopic If Indicated (No Culture) - Urine, Clean Catch    Collection Time: 04/05/23 12:21 AM    Specimen: Urine, Clean Catch   Result Value Ref Range    Color, UA Yellow Yellow, Straw    Appearance, UA Clear Clear    pH, UA 5.5 5.0 - 8.0    Specific Gravity, UA 1.019 1.005 - 1.030    Glucose, UA Negative Negative    Ketones, UA Negative Negative    Bilirubin, UA Negative Negative    Blood, UA Negative Negative    Protein, UA 30 mg/dL (1+) (A) Negative    Leuk Esterase, UA Negative Negative    Nitrite, UA Negative Negative    Urobilinogen, UA 0.2 E.U./dL 0.2 - 1.0 E.U./dL   Urinalysis, Microscopic Only - Urine, Clean Catch    Collection Time: 04/05/23 12:21 AM    Specimen: Urine, Clean Catch   Result Value Ref Range    RBC, UA 0-2 None Seen, 0-2 /HPF    WBC, UA 0-2 None Seen, 0-2 /HPF    Bacteria, UA None Seen None Seen /HPF    Squamous Epithelial Cells, UA 0-2 None Seen, 0-2 /HPF    Hyaline Casts, UA None Seen None Seen /LPF    Methodology Automated Microscopy    High Sensitivity Troponin T 2Hr    Collection Time: 04/05/23  2:07 AM    Specimen: Blood   Result Value Ref Range    HS Troponin T 21 (H) <15 ng/L    Troponin T Delta 0 >=-4 - <+4 ng/L        Ordered the above labs and independently reviewed the results.        RADIOLOGY  XR Chest 1 View    Result Date: 4/5/2023  SINGLE VIEW OF THE CHEST  HISTORY: Shortness of air. Cough and fever.  COMPARISON: 08/18/2022  FINDINGS: The patient has dense consolidation at the right lung base, and to a lesser extent, at the left lung base. Patient is status post median sternotomy with coronary artery bypass grafting. The heart is enlarged. No pneumothorax is seen. No definite effusion is identified. There is extensive calcification and tortuosity of the thoracic aorta.  Changes of COPD are noted.      Bibasilar infiltrates, right greater than left, concerning for pneumonia.  This report was finalized on 4/5/2023 12:39 AM by Dr. Yasmin Ramos M.D.        I ordered the above noted radiological studies. Reviewed by me and discussed with radiologist.  See dictation for official radiology interpretation.      PROCEDURES    Procedures      MEDICATIONS GIVEN IN ER    Medications   sodium chloride 0.9 % flush 10 mL (has no administration in time range)   sodium chloride 0.9 % flush 10 mL (10 mL Intravenous Given 4/5/23 0251)   sodium chloride 0.9 % flush 10 mL (has no administration in time range)   sodium chloride 0.9 % infusion 40 mL (has no administration in time range)   acetaminophen (TYLENOL) tablet 650 mg (has no administration in time range)     Or   acetaminophen (TYLENOL) 160 MG/5ML solution 650 mg (has no administration in time range)     Or   acetaminophen (TYLENOL) suppository 650 mg (has no administration in time range)   ondansetron (ZOFRAN) tablet 4 mg (has no administration in time range)     Or   ondansetron (ZOFRAN) injection 4 mg (has no administration in time range)   calcium carbonate (TUMS) chewable tablet 500 mg (200 mg elemental) (has no administration in time range)   cefTRIAXone (ROCEPHIN) 1 g in sodium chloride 0.9 % 100 mL IVPB-VTB (has no administration in time range)   acetaminophen  (TYLENOL) tablet 1,000 mg (1,000 mg Oral Given 4/5/23 0022)   sodium chloride 0.9 % bolus 500 mL (0 mL Intravenous Stopped 4/5/23 0206)   cefTRIAXone (ROCEPHIN) 1 g in sodium chloride 0.9 % 100 mL IVPB-VTB (0 g Intravenous Stopped 4/5/23 0206)   azithromycin (ZITHROMAX) 500 mg in sodium chloride 0.9 % 250 mL IVPB-VTB (500 mg Intravenous New Bag 4/5/23 0129)         ORDERS PLACED DURING THIS VISIT:  Orders Placed This Encounter   Procedures   • Blood Culture - Blood,   • Blood Culture - Blood,   • Respiratory Panel PCR w/COVID-19(SARS-CoV-2) [facility]/MIGUEL/[facility]/PAD/COR/MAD/[facility] In-House, NP Swab in UTM/VTM, 3-4 HR TAT - Swab, Nasopharynx   • XR Chest 1 View   • Comprehensive Metabolic Panel   • Protime-INR   • aPTT   • Urinalysis With Microscopic If Indicated (No Culture) - Urine, Clean Catch   • BNP   • High Sensitivity Troponin T   • Lactic Acid, Plasma   • Procalcitonin   • Magnesium   • CBC Auto Differential   • Urinalysis, Microscopic Only - Urine, Clean Catch   • High Sensitivity Troponin T 2Hr   • Manual Differential   • Blood Gas, Arterial -   • Basic Metabolic Panel   • CBC Auto Differential   • Diet: Regular/House Diet; Texture: Regular Texture (IDDSI 7); Fluid Consistency: Thin (IDDSI 0)   • Monitor Blood Pressure   • Cardiac Monitoring   • Pulse Oximetry, Continuous   • Vital Signs   • Intake & Output   • Weigh Patient   • Oral Care   • Saline Lock & Maintain IV Access   • Place Sequential Compression Device   • Maintain Sequential Compression Device   • Tobacco Cessation Education   • Pneumonia Education   • Notify Provider if Patient Requires Greater Than 4LPM of Oxygen   • Nursing Dysphagia Screening (Complete Prior to Giving Anything By Mouth)   • RN to Place Order SLP Consult - Eval & Treat Choosing Reason of RN Dysphagia Screen Failed   • Nurse to Call MD or Nutrition Services for Diet if Patient Passes Dysphagia Screen   • Code Status and Medical Interventions:   • LHA (on-call MD unless specified) Details    • Oxygen Therapy- Nasal Cannula; Titrate for SPO2: 90% - 95%   • Pulse Oximetry on Admit   • Incentive Spirometry   • ECG 12 Lead Dyspnea   • SCANNED - TELEMETRY     • SCANNED - TELEMETRY     • Insert Peripheral IV   • Insert Peripheral IV   • Inpatient Admission   • CBC & Differential   • CBC & Differential         PROGRESS, DATA ANALYSIS, CONSULTS, AND MEDICAL DECISION MAKING    All labs have been independently interpreted by me.  All radiology studies have been reviewed by me and discussed with radiologist dictating the report.   EKG's independently viewed and interpreted by me.  Discussion below represents my analysis of pertinent findings related to patient's condition, differential diagnosis, treatment plan and final disposition.    My differential diagnosis includes but is not limited to:  Asthma, COPD, pneumonia, pulmonary embolus, acute respiratory distress syndrome, pneumothorax, pleural effusion, pulmonary fibrosis, congestive heart failure, myocardial infarction, DKA, uremia, acidosis, sepsis, anemia, drug related, hyperventilation, CNS disease          ED Course as of 04/05/23 0650   Tue Apr 04, 2023   2326 EKG          EKG time: 2323  Rhythm/Rate: Sinus rhythm rate 82  P waves and OH: Normal  QRS, axis: Narrow regular  ST and T waves: Diffuse slight ST depressions    Interpreted Contemporaneously by me, independently viewed  No significant changed compared to prior EKG   [TJ]   Wed Apr 05, 2023   0015 I have independently reviewed patient's chest x-ray; my interpretation is right basilar infiltrate [TJ]   0018 WBC(!): 19.64 [TJ]   0045 Procalcitonin: 0.18 [TJ]   0045 Lactate: 1.1 [TJ]   0045 WBC(!): 19.64 [TJ]   0051 Labs and chest x-ray consistent with pneumonia.  In light of low O2 sats plan to give patient IV antibiotics and admit.  I have updated the patient he is agreeable with this plan. [TJ]      ED Course User Index  [TJ] Yon Mccarty MD       I interpreted the cardiac monitor  rhythm and my independent interpretation is: normal sinus rhythm.     PPE: The patient wore a mask and I wore an N95 mask throughout the entire patient encounter.       AS OF 06:50 EDT VITALS:    BP - 138/60  HR - 60  TEMP - 97.5 °F (36.4 °C) (Oral)  O2 SATS - 91%        DIAGNOSIS  Final diagnoses:   Pneumonia of both lower lobes due to infectious organism   Fever, unspecified fever cause   Hypoxia         DISPOSITION  ED Disposition     ED Disposition   Decision to Admit    Condition   --    Comment   Level of Care: Telemetry [5]   Diagnosis: Pneumonia of both lower lobes due to infectious organism [[id number]]   Admitting Physician: TALISHA PETER [[id number]]   Certification: I Certify That Inpatient Hospital Services Are Medically Necessary For Greater Than 2 Midnights                  Note Disclaimer: At [facility], we believe that sharing information builds trust and better relationships. You are receiving this note because you recently visited [facility]. It is possible you will see health information before a provider has talked with you about it. This kind of information can be easy to misunderstand. To help you fully understand what it means for your health, we urge you to discuss this note with your provider.       Yon Mccarty MD  04/05/23 1844

## 2023-04-05 NOTE — PLAN OF CARE
Goal Outcome Evaluation:      No new event since admission to unit. Pt feels better overall. Temp normalized.         Problem: Adult Inpatient Plan of Care  Goal: Plan of Care Review  Outcome: Ongoing, Progressing  Goal: Patient-Specific Goal (Individualized)  Outcome: Ongoing, Progressing  Goal: Absence of Hospital-Acquired Illness or Injury  Outcome: Ongoing, Progressing  Intervention: Identify and Manage Fall Risk  Recent Flowsheet Documentation  Taken 4/5/2023 0409 by Brandon Craven RN  Safety Promotion/Fall Prevention: safety round/check completed  Taken 4/5/2023 0255 by Brandon Craven RN  Safety Promotion/Fall Prevention:   activity supervised   fall prevention program maintained   nonskid shoes/slippers when out of bed   safety round/check completed  Intervention: Prevent Skin Injury  Recent Flowsheet Documentation  Taken 4/5/2023 0255 by Brandon Craven RN  Body Position:   position changed independently   sitting up in bed  Skin Protection:   adhesive use limited   tubing/devices free from skin contact  Intervention: Prevent and Manage VTE (Venous Thromboembolism) Risk  Recent Flowsheet Documentation  Taken 4/5/2023 0255 by Brandon Craven RN  Activity Management: ambulated in room  VTE Prevention/Management:   bilateral   sequential compression devices off  Goal: Optimal Comfort and Wellbeing  Outcome: Ongoing, Progressing  Intervention: Provide Person-Centered Care  Recent Flowsheet Documentation  Taken 4/5/2023 0255 by Brandon Craven RN  Trust Relationship/Rapport:   care explained   choices provided   thoughts/feelings acknowledged  Goal: Readiness for Transition of Care  Outcome: Ongoing, Progressing  Intervention: Mutually Develop Transition Plan  Recent Flowsheet Documentation  Taken 4/5/2023 0243 by Brandon Craven RN  Equipment Currently Used at Home: none  Transportation Anticipated: (Spouse will drive home) family or friend will provide  Transportation Concerns:  none  Patient/Family Anticipated Services at Transition: none  Patient/Family Anticipates Transition to: home with family     Problem: Fall Injury Risk  Goal: Absence of Fall and Fall-Related Injury  Outcome: Ongoing, Progressing  Intervention: Identify and Manage Contributors  Recent Flowsheet Documentation  Taken 4/5/2023 0255 by Brandon Craven RN  Medication Review/Management: medications reviewed  Intervention: Promote Injury-Free Environment  Recent Flowsheet Documentation  Taken 4/5/2023 0409 by Brandon Craven RN  Safety Promotion/Fall Prevention: safety round/check completed  Taken 4/5/2023 0255 by Brandon Craven RN  Safety Promotion/Fall Prevention:   activity supervised   fall prevention program maintained   nonskid shoes/slippers when out of bed   safety round/check completed     Problem: Hypertension Comorbidity  Goal: Blood Pressure in Desired Range  Outcome: Ongoing, Progressing  Intervention: Maintain Blood Pressure Management  Recent Flowsheet Documentation  Taken 4/5/2023 0255 by Brandon Craven RN  Medication Review/Management: medications reviewed     Problem: Infection (Pneumonia)  Goal: Resolution of Infection Signs and Symptoms  Outcome: Ongoing, Progressing     Problem: Respiratory Compromise (Pneumonia)  Goal: Effective Oxygenation and Ventilation  Outcome: Ongoing, Progressing

## 2023-04-05 NOTE — ED TRIAGE NOTES
Pt presents to the ER from home with c/o SOA x 5 days and fever.  Fever is 102.3 in triage.  O2 was 88% on room air, pt placed on 2lpm O2 via NC and O2 came up to 95%.  Pt states he has also had a cough.      This RN in appropriate PPE for all patient care interactions. Pt masked and redirected for proper mask use when necessary. Hand hygiene performed before and after all patient care interactions.

## 2023-04-05 NOTE — ED NOTES
"Nursing report ED to floor  Osman Aparicio  67 y.o.  male    HPI :   Chief Complaint   Patient presents with    Fever    Shortness of Breath       Admitting doctor:   Brennan Vanegas MD    Admitting diagnosis:   The primary encounter diagnosis was Pneumonia of both lower lobes due to infectious organism. Diagnoses of Fever, unspecified fever cause and Hypoxia were also pertinent to this visit.    Code status:   Current Code Status       Date Active Code Status Order ID Comments User Context       4/5/2023 0134 CPR (Attempt to Resuscitate) [id number]  Pascale Suarez APRN ED        Question Answer    Code Status (Patient has no pulse and is not breathing) CPR (Attempt to Resuscitate)    Medical Interventions (Patient has pulse or is breathing) Full Support                    Allergies:   Patient has no known allergies.    Isolation:   Enhanced Droplet/Contact     Intake and Output  No intake or output data in the 24 hours ending 04/05/23 0155    Weight:       04/04/23 2309   Weight: 86.2 kg (190 lb)       Most recent vitals:   Vitals:    04/04/23 2302 04/04/23 2309 04/04/23 2321 04/04/23 2356   BP:   (!) 184/81 168/78   Pulse: 100  85 79   Resp: 20   18   Temp: (!) 102.3 °F (39.1 °C)      TempSrc: Tympanic      SpO2: 95%  96% 93%   Weight:  86.2 kg (190 lb)     Height:  180.3 cm (71\")         Active LDAs/IV Access:   Lines, Drains & Airways       Active LDAs       Name Placement date Placement time Site Days    Peripheral IV 04/04/23 2348 Anterior;Distal;Left;Upper Arm 04/04/23 2348  Arm  less than 1                    Labs (abnormal labs have a star):   Labs Reviewed   COMPREHENSIVE METABOLIC PANEL - Abnormal; Notable for the following components:       Result Value    Glucose 142 (*)     Sodium 130 (*)     Chloride 92 (*)     All other components within normal limits    Narrative:     GFR Normal >60  Chronic Kidney Disease <60  Kidney Failure <15     URINALYSIS W/ MICROSCOPIC IF INDICATED (NO CULTURE) - Abnormal; " Notable for the following components:    Protein, UA 30 mg/dL (1+) (*)     All other components within normal limits   BNP (IN-HOUSE) - Abnormal; Notable for the following components:    proBNP 2,237.0 (*)     All other components within normal limits    Narrative:     Among patients with dyspnea, NT-proBNP is highly sensitive for the detection of acute congestive heart failure. In addition NT-proBNP of <300 pg/ml effectively rules out acute congestive heart failure with 99% negative predictive value.    Results may be falsely decreased if patient taking Biotin.     TROPONIN - Abnormal; Notable for the following components:    HS Troponin T 21 (*)     All other components within normal limits    Narrative:     High Sensitive Troponin T Reference Range:  <10.0 ng/L- Negative Female for AMI  <15.0 ng/L- Negative Male for AMI  >=10 - Abnormal Female indicating possible myocardial injury.  >=15 - Abnormal Male indicating possible myocardial injury.   Clinicians would have to utilize clinical acumen, EKG, Troponin, and serial changes to determine if it is an Acute Myocardial Infarction or myocardial injury due to an underlying chronic condition.        CBC WITH AUTO DIFFERENTIAL - Abnormal; Notable for the following components:    WBC 19.64 (*)     RBC 3.35 (*)     Hemoglobin 10.4 (*)     Hematocrit 30.8 (*)     All other components within normal limits   MANUAL DIFFERENTIAL - Abnormal; Notable for the following components:    Neutrophil % 81.0 (*)     Lymphocyte % 10.0 (*)     Monocyte % 4.0 (*)     Myelocyte % 4.0 (*)     Neutrophils Absolute 15.91 (*)     All other components within normal limits   RESPIRATORY PANEL PCR W/ COVID-19 (SARS-COV-2) [facility] IN-HOUSE, NP SWAB IN [facility], 3-4 HR TAT - Normal    Narrative:     In the setting of a positive respiratory panel with a viral infection PLUS a negative procalcitonin without other underlying concern for bacterial infection, consider observing off  "antibiotics or discontinuation of antibiotics and continue supportive care. If the respiratory panel is positive for atypical bacterial infection (Bordetella pertussis, Chlamydophila pneumoniae, or Mycoplasma pneumoniae), consider antibiotic de-escalation to target atypical bacterial infection.   PROTIME-INR - Normal   APTT - Normal   LACTIC ACID, PLASMA - Normal   PROCALCITONIN - Normal    Narrative:     As a Marker for Sepsis (Non-Neonates):    1. <0.5 ng/mL represents a low risk of severe sepsis and/or septic shock.  2. >2 ng/mL represents a high risk of severe sepsis and/or septic shock.    As a Marker for Lower Respiratory Tract Infections that require antibiotic therapy:    PCT on Admission    Antibiotic Therapy       6-12 Hrs later    >0.5                Strongly Recommended  >0.25 - <0.5        Recommended   0.1 - 0.25          Discouraged              Remeasure/reassess PCT  <0.1                Strongly Discouraged     Remeasure/reassess PCT    As 28 day mortality risk marker: \"Change in Procalcitonin Result\" (>80% or <=80%) if Day 0 (or Day 1) and Day 4 values are available. Refer to [email or web address]    Change in PCT <=80%  A decrease of PCT levels below or equal to 80% defines a positive change in PCT test result representing a higher risk for 28-day all-cause mortality of patients diagnosed with severe sepsis for septic shock.    Change in PCT >80%  A decrease of PCT levels of more than 80% defines a negative change in PCT result representing a lower risk for 28-day all-cause mortality of patients diagnosed with severe sepsis or septic shock.      MAGNESIUM - Normal   BLOOD CULTURE   BLOOD CULTURE   URINALYSIS, MICROSCOPIC ONLY   HIGH SENSITIVITIY TROPONIN T 2HR   BASIC METABOLIC PANEL   CBC (NO DIFF)   TROPONIN   CBC AND DIFFERENTIAL    Narrative:     The following orders were created for panel order CBC & Differential.  Procedure                               Abnormality         " Status                     ---------                               -----------         ------                     CBC Auto Differential[[id number]]        Abnormal            Final result                 Please view results for these tests on the individual orders.       EKG:   ECG 12 Lead Dyspnea   Preliminary Result   HEART RATE= 82  bpm   RR Interval= 732  ms   NC Interval= 146  ms   P Horizontal Axis= -5  deg   P Front Axis= 64  deg   QRSD Interval= 95  ms   QT Interval= 374  ms   QRS Axis= -5  deg   T Wave Axis= 84  deg   - ABNORMAL ECG -   Sinus rhythm   Probable left atrial enlargement   Nonspecific repol abnormality, diffuse leads   Baseline wander in lead(s) V4   Electronically Signed By:    Date and Time of Study: 2023-04-04 23:23:34          Meds given in ED:   Medications   sodium chloride 0.9 % flush 10 mL (has no administration in time range)   azithromycin (ZITHROMAX) 500 mg in sodium chloride 0.9 % 250 mL IVPB-VTB (500 mg Intravenous New Bag 4/5/23 0129)   sodium chloride 0.9 % flush 10 mL (has no administration in time range)   sodium chloride 0.9 % flush 10 mL (has no administration in time range)   sodium chloride 0.9 % infusion 40 mL (has no administration in time range)   acetaminophen (TYLENOL) tablet 650 mg (has no administration in time range)     Or   acetaminophen (TYLENOL) 160 MG/5ML solution 650 mg (has no administration in time range)     Or   acetaminophen (TYLENOL) suppository 650 mg (has no administration in time range)   ondansetron (ZOFRAN) tablet 4 mg (has no administration in time range)     Or   ondansetron (ZOFRAN) injection 4 mg (has no administration in time range)   calcium carbonate (TUMS) chewable tablet 500 mg (200 mg elemental) (has no administration in time range)   cefTRIAXone (ROCEPHIN) 1 g in sodium chloride 0.9 % 100 mL IVPB-VTB (has no administration in time range)   acetaminophen (TYLENOL) tablet 1,000 mg (1,000 mg Oral Given 4/5/23 0022)   sodium chloride 0.9 %  bolus 500 mL (500 mL Intravenous New Bag 23 0024)   cefTRIAXone (ROCEPHIN) 1 g in sodium chloride 0.9 % 100 mL IVPB-VTB (1 g Intravenous New Bag 23 0052)       Imaging results:  XR Chest 1 View    Result Date: 2023  Bibasilar infiltrates, right greater than left, concerning for pneumonia.  This report was finalized on 2023 12:39 AM by Dr. Yasmin Ramos M.D.       Ambulatory status:   - [facility]    Social issues:   Social History     Socioeconomic History    Marital status:    Tobacco Use    Smoking status: Former     Packs/day: 1.00     Years: 45.00     Pack years: 45.00     Types: Cigarettes     Quit date: 3/26/2022     Years since quittin.0    Smokeless tobacco: Never    Tobacco comments:     caffeine - 3 cups coffee daily, tea or soda occas.   Vaping Use    Vaping Use: Never used   Substance and Sexual Activity    Alcohol use: Yes     Comment: 2 BEERS OCCASIONAL    Drug use: No    Sexual activity: Defer       NIH Stroke Scale:         Kirk Olivares RN  23 01:55 EDT

## 2023-04-06 LAB
ANION GAP SERPL CALCULATED.3IONS-SCNC: 11.6 MMOL/L (ref 5–15)
BASOPHILS # BLD AUTO: 0.07 10*3/MM3 (ref 0–0.2)
BASOPHILS NFR BLD AUTO: 0.4 % (ref 0–1.5)
BUN SERPL-MCNC: 12 MG/DL (ref 8–23)
BUN/CREAT SERPL: 10.9 (ref 7–25)
CALCIUM SPEC-SCNC: 9.3 MG/DL (ref 8.6–10.5)
CHLORIDE SERPL-SCNC: 98 MMOL/L (ref 98–107)
CO2 SERPL-SCNC: 23.4 MMOL/L (ref 22–29)
CREAT SERPL-MCNC: 1.1 MG/DL (ref 0.76–1.27)
DEPRECATED RDW RBC AUTO: 47.3 FL (ref 37–54)
EGFRCR SERPLBLD CKD-EPI 2021: 73.6 ML/MIN/1.73
EOSINOPHIL # BLD AUTO: 0.18 10*3/MM3 (ref 0–0.4)
EOSINOPHIL NFR BLD AUTO: 1 % (ref 0.3–6.2)
ERYTHROCYTE [DISTWIDTH] IN BLOOD BY AUTOMATED COUNT: 14 % (ref 12.3–15.4)
FERRITIN SERPL-MCNC: 920 NG/ML (ref 30–400)
FOLATE SERPL-MCNC: >20 NG/ML (ref 4.78–24.2)
GLUCOSE SERPL-MCNC: 128 MG/DL (ref 65–99)
HCT VFR BLD AUTO: 29.2 % (ref 37.5–51)
HGB BLD-MCNC: 9.6 G/DL (ref 13–17.7)
IMM GRANULOCYTES # BLD AUTO: 0.65 10*3/MM3 (ref 0–0.05)
IMM GRANULOCYTES NFR BLD AUTO: 3.8 % (ref 0–0.5)
IRON 24H UR-MRATE: 17 MCG/DL (ref 59–158)
IRON SATN MFR SERPL: 9 % (ref 20–50)
LYMPHOCYTES # BLD AUTO: 2.85 10*3/MM3 (ref 0.7–3.1)
LYMPHOCYTES NFR BLD AUTO: 16.5 % (ref 19.6–45.3)
MCH RBC QN AUTO: 30.4 PG (ref 26.6–33)
MCHC RBC AUTO-ENTMCNC: 32.9 G/DL (ref 31.5–35.7)
MCV RBC AUTO: 92.4 FL (ref 79–97)
MONOCYTES # BLD AUTO: 1.1 10*3/MM3 (ref 0.1–0.9)
MONOCYTES NFR BLD AUTO: 6.4 % (ref 5–12)
NEUTROPHILS NFR BLD AUTO: 12.45 10*3/MM3 (ref 1.7–7)
NEUTROPHILS NFR BLD AUTO: 71.9 % (ref 42.7–76)
NRBC BLD AUTO-RTO: 0.1 /100 WBC (ref 0–0.2)
PLATELET # BLD AUTO: 315 10*3/MM3 (ref 140–450)
PMV BLD AUTO: 10.3 FL (ref 6–12)
POTASSIUM SERPL-SCNC: 3.7 MMOL/L (ref 3.5–5.2)
RBC # BLD AUTO: 3.16 10*6/MM3 (ref 4.14–5.8)
SODIUM SERPL-SCNC: 133 MMOL/L (ref 136–145)
TIBC SERPL-MCNC: 194 MCG/DL (ref 298–536)
TRANSFERRIN SERPL-MCNC: 130 MG/DL (ref 200–360)
VIT B12 BLD-MCNC: 1917 PG/ML (ref 211–946)
WBC NRBC COR # BLD: 17.3 10*3/MM3 (ref 3.4–10.8)

## 2023-04-06 PROCEDURE — 82607 VITAMIN B-12: CPT | Performed by: HOSPITALIST

## 2023-04-06 PROCEDURE — 84466 ASSAY OF TRANSFERRIN: CPT | Performed by: HOSPITALIST

## 2023-04-06 PROCEDURE — 25010000002 CEFTRIAXONE PER 250 MG: Performed by: NURSE PRACTITIONER

## 2023-04-06 PROCEDURE — 25010000002 NA FERRIC GLUC CPLX PER 12.5 MG: Performed by: HOSPITALIST

## 2023-04-06 PROCEDURE — 80048 BASIC METABOLIC PNL TOTAL CA: CPT | Performed by: HOSPITALIST

## 2023-04-06 PROCEDURE — 94799 UNLISTED PULMONARY SVC/PX: CPT

## 2023-04-06 PROCEDURE — 82746 ASSAY OF FOLIC ACID SERUM: CPT | Performed by: HOSPITALIST

## 2023-04-06 PROCEDURE — 94640 AIRWAY INHALATION TREATMENT: CPT

## 2023-04-06 PROCEDURE — 83540 ASSAY OF IRON: CPT | Performed by: HOSPITALIST

## 2023-04-06 PROCEDURE — 85025 COMPLETE CBC W/AUTO DIFF WBC: CPT | Performed by: HOSPITALIST

## 2023-04-06 PROCEDURE — 82728 ASSAY OF FERRITIN: CPT | Performed by: HOSPITALIST

## 2023-04-06 RX ORDER — IPRATROPIUM BROMIDE AND ALBUTEROL SULFATE 2.5; .5 MG/3ML; MG/3ML
3 SOLUTION RESPIRATORY (INHALATION)
Status: DISCONTINUED | OUTPATIENT
Start: 2023-04-06 | End: 2023-04-09 | Stop reason: HOSPADM

## 2023-04-06 RX ORDER — IPRATROPIUM BROMIDE AND ALBUTEROL SULFATE 2.5; .5 MG/3ML; MG/3ML
3 SOLUTION RESPIRATORY (INHALATION) EVERY 4 HOURS PRN
Status: DISCONTINUED | OUTPATIENT
Start: 2023-04-06 | End: 2023-04-09 | Stop reason: HOSPADM

## 2023-04-06 RX ADMIN — METOPROLOL SUCCINATE 100 MG: 100 TABLET, EXTENDED RELEASE ORAL at 08:00

## 2023-04-06 RX ADMIN — IPRATROPIUM BROMIDE AND ALBUTEROL SULFATE 3 ML: 2.5; .5 SOLUTION RESPIRATORY (INHALATION) at 15:53

## 2023-04-06 RX ADMIN — AMLODIPINE BESYLATE 10 MG: 10 TABLET ORAL at 08:00

## 2023-04-06 RX ADMIN — ESCITALOPRAM 20 MG: 20 TABLET, FILM COATED ORAL at 21:27

## 2023-04-06 RX ADMIN — BUPROPION HYDROCHLORIDE 300 MG: 300 TABLET, EXTENDED RELEASE ORAL at 07:58

## 2023-04-06 RX ADMIN — SODIUM CHLORIDE 250 MG: 9 INJECTION, SOLUTION INTRAVENOUS at 15:17

## 2023-04-06 RX ADMIN — Medication 10 ML: at 21:31

## 2023-04-06 RX ADMIN — Medication 1 MG: at 08:00

## 2023-04-06 RX ADMIN — ASPIRIN 81 MG: 81 TABLET, COATED ORAL at 08:00

## 2023-04-06 RX ADMIN — CEFTRIAXONE SODIUM 1 G: 1 INJECTION, POWDER, FOR SOLUTION INTRAMUSCULAR; INTRAVENOUS at 21:28

## 2023-04-06 RX ADMIN — Medication 100 MG: at 08:00

## 2023-04-06 RX ADMIN — ROSUVASTATIN CALCIUM 20 MG: 20 TABLET, FILM COATED ORAL at 21:27

## 2023-04-06 RX ADMIN — Medication 10 ML: at 08:00

## 2023-04-06 RX ADMIN — IPRATROPIUM BROMIDE AND ALBUTEROL SULFATE 3 ML: 2.5; .5 SOLUTION RESPIRATORY (INHALATION) at 20:22

## 2023-04-06 NOTE — PROGRESS NOTES
"DAILY PROGRESS NOTE  [facility]    Patient Identification:  Name: Osman Aparicio  Age: 67 y.o.  Sex: male  :  1955  MRN: [id number]         Primary Care Physician: Desire Hughes ([place]), TAYLOR    Subjective:  Interval History:He is coughing and short of air.    Objective:    Scheduled Meds:amLODIPine, 10 mg, Oral, Daily  aspirin, 81 mg, Oral, Daily  buPROPion XL, 300 mg, Oral, QAM  cefTRIAXone, 1 g, Intravenous, Q24H  escitalopram, 20 mg, Oral, Nightly  ferric gluconate, 250 mg, Intravenous, Daily  folic acid, 1 mg, Oral, Daily  ipratropium-albuterol, 3 mL, Nebulization, 4x Daily - RT  metoprolol succinate XL, 100 mg, Oral, Q24H  rosuvastatin, 20 mg, Oral, Nightly  sodium chloride, 10 mL, Intravenous, Q12H  thiamine, 100 mg, Oral, Daily      Continuous Infusions:     Vital signs in last 24 hours:  Temp:  [97.7 °F (36.5 °C)-99.1 °F (37.3 °C)] 97.7 °F (36.5 °C)  Heart Rate:  [61-66] 66  Resp:  [18] 18  BP: (145-162)/(64-73) 151/73    Intake/Output:    Intake/Output Summary (Last 24 hours) at 2023 1331  Last data filed at 2023 2230  Gross per 24 hour   Intake 240 ml   Output --   Net 240 ml       Exam:  /73 (BP Location: Right arm)   Pulse 66   Temp 97.7 °F (36.5 °C) (Oral)   Resp 18   Ht 180.3 cm (71\")   Wt 86.3 kg (190 lb 4.1 oz)   SpO2 93%   BMI 26.54 kg/m²     General Appearance:    Alert, cooperative, no distress   Head:    Normocephalic, without obvious abnormality, atraumatic   Eyes:       Throat:   Lips, tongue, gums normal   Neck:   Supple, symmetrical, trachea midline, no JVD   Lungs:     Clear to auscultation bilaterally, respirations unlabored   Chest Wall:    No tenderness or deformity    Heart:    Regular rate and rhythm, S1 and S2 normal, no murmur,no  Rub or gallop   Abdomen:     Soft, nontender, bowel sounds active, no masses, no organomegaly    Extremities:   Extremities normal, atraumatic, no cyanosis or edema   Pulses:      Skin:   Skin is warm and dry,  no " rashes or palpable lesions   Neurologic:   no focal deficits noted      Lab Results (last 72 hours)     Procedure Component Value Units Date/Time    Folate [[id number]]  (Normal) Collected: 04/06/23 0515    Specimen: Blood Updated: 04/06/23 0610     Folate >20.00 ng/mL     Narrative:      Results may be falsely increased if patient taking Biotin.      Vitamin B12 [[id number]]  (Abnormal) Collected: 04/06/23 0515    Specimen: Blood Updated: 04/06/23 0610     Vitamin B-12 1,917 pg/mL     Narrative:      Results may be falsely increased if patient taking Biotin.      CBC & Differential [[id number]]  (Abnormal) Collected: 04/06/23 0515    Specimen: Blood Updated: 04/06/23 0610    Narrative:      The following orders were created for panel order CBC & Differential.  Procedure                               Abnormality         Status                     ---------                               -----------         ------                     CBC Auto Differential[[id number]]        Abnormal            Final result                 Please view results for these tests on the individual orders.    CBC Auto Differential [[id number]]  (Abnormal) Collected: 04/06/23 0515    Specimen: Blood Updated: 04/06/23 0610     WBC 17.30 10*3/mm3      RBC 3.16 10*6/mm3      Hemoglobin 9.6 g/dL      Hematocrit 29.2 %      MCV 92.4 fL      MCH 30.4 pg      MCHC 32.9 g/dL      RDW 14.0 %      RDW-SD 47.3 fl      MPV 10.3 fL      Platelets 315 10*3/mm3      Neutrophil % 71.9 %      Lymphocyte % 16.5 %      Monocyte % 6.4 %      Eosinophil % 1.0 %      Basophil % 0.4 %      Immature Grans % 3.8 %      Neutrophils, Absolute 12.45 10*3/mm3      Lymphocytes, Absolute 2.85 10*3/mm3      Monocytes, Absolute 1.10 10*3/mm3      Eosinophils, Absolute 0.18 10*3/mm3      Basophils, Absolute 0.07 10*3/mm3      Immature Grans, Absolute 0.65 10*3/mm3      nRBC 0.1 /100 WBC     Ferritin [[id number]]  (Abnormal) Collected: 04/06/23 0515    Specimen: Blood Updated:  04/06/23 0559     Ferritin 920.00 ng/mL     Narrative:      Results may be falsely decreased if patient taking Biotin.      Iron Profile [[id number]]  (Abnormal) Collected: 04/06/23 0515    Specimen: Blood Updated: 04/06/23 0554     Iron 17 mcg/dL      Iron Saturation 9 %      Transferrin 130 mg/dL      TIBC 194 mcg/dL     Basic Metabolic Panel [[id number]]  (Abnormal) Collected: 04/06/23 0515    Specimen: Blood Updated: 04/06/23 0554     Glucose 128 mg/dL      BUN 12 mg/dL      Creatinine 1.10 mg/dL      Sodium 133 mmol/L      Potassium 3.7 mmol/L      Chloride 98 mmol/L      CO2 23.4 mmol/L      Calcium 9.3 mg/dL      BUN/Creatinine Ratio 10.9     Anion Gap 11.6 mmol/L      eGFR 73.6 mL/min/1.73     Narrative:      GFR Normal >60  Chronic Kidney Disease <60  Kidney Failure <15      Blood Culture - Blood, Arm, Left [[id number]]  (Normal) Collected: 04/04/23 2358    Specimen: Blood from Arm, Left Updated: 04/06/23 0145     Blood Culture No growth at 24 hours    Blood Culture - Blood, Arm, Right [[id number]]  (Normal) Collected: 04/05/23 0001    Specimen: Blood from Arm, Right Updated: 04/06/23 0145     Blood Culture No growth at 24 hours    Occult Blood X 1, Stool - Stool, Per Rectum [[id number]]  (Normal) Collected: 04/05/23 2258    Specimen: Stool from Per Rectum Updated: 04/05/23 2333     Fecal Occult Blood Negative    Manual Differential [[id number]]  (Abnormal) Collected: 04/05/23 1252    Specimen: Blood Updated: 04/05/23 1346     Neutrophil % 97.0 %      Lymphocyte % 3.0 %      Neutrophils Absolute 25.00 10*3/mm3      Lymphocytes Absolute 0.77 10*3/mm3      Anisocytosis Slight/1+     Polychromasia Mod/2+     WBC Morphology Normal     Platelet Morphology Normal    Legionella Antigen, Urine - Urine, Urine, Clean Catch [[id number]]  (Normal) Collected: 04/05/23 0021    Specimen: Urine, Clean Catch Updated: 04/05/23 1346     LEGIONELLA ANTIGEN, URINE Negative    S. Pneumo Ag Urine or CSF - Urine, Urine, Clean Catch  [[id number]]  (Normal) Collected: 04/05/23 0021    Specimen: Urine, Clean Catch Updated: 04/05/23 1346     Strep Pneumo Ag Negative    Basic Metabolic Panel [[id number]]  (Abnormal) Collected: 04/05/23 1252    Specimen: Blood Updated: 04/05/23 1329     Glucose 177 mg/dL      BUN 14 mg/dL      Creatinine 1.10 mg/dL      Sodium 130 mmol/L      Potassium 3.7 mmol/L      Chloride 96 mmol/L      CO2 22.0 mmol/L      Calcium 8.4 mg/dL      BUN/Creatinine Ratio 12.7     Anion Gap 12.0 mmol/L      eGFR 73.6 mL/min/1.73     Narrative:      GFR Normal >60  Chronic Kidney Disease <60  Kidney Failure <15      CBC & Differential [[id number]]  (Abnormal) Collected: 04/05/23 1252    Specimen: Blood Updated: 04/05/23 1318    Narrative:      The following orders were created for panel order CBC & Differential.  Procedure                               Abnormality         Status                     ---------                               -----------         ------                     CBC Auto Differential[[id number]]        Abnormal            Final result                 Please view results for these tests on the individual orders.    CBC Auto Differential [[id number]]  (Abnormal) Collected: 04/05/23 1252    Specimen: Blood Updated: 04/05/23 1318     WBC 25.77 10*3/mm3      RBC 2.87 10*6/mm3      Hemoglobin 9.0 g/dL      Hematocrit 26.3 %      MCV 91.6 fL      MCH 31.4 pg      MCHC 34.2 g/dL      RDW 14.1 %      RDW-SD 47.8 fl      MPV 10.6 fL      Platelets 281 10*3/mm3     High Sensitivity Troponin T 2Hr [[id number]]  (Abnormal) Collected: 04/05/23 0207    Specimen: Blood Updated: 04/05/23 0254     HS Troponin T 21 ng/L      Troponin T Delta 0 ng/L     Narrative:      High Sensitive Troponin T Reference Range:  <10.0 ng/L- Negative Female for AMI  <15.0 ng/L- Negative Male for AMI  >=10 - Abnormal Female indicating possible myocardial injury.  >=15 - Abnormal Male indicating possible myocardial injury.   Clinicians would have to utilize  clinical acumen, EKG, Troponin, and serial changes to determine if it is an Acute Myocardial Infarction or myocardial injury due to an underlying chronic condition.         Respiratory Panel PCR w/COVID-19(SARS-CoV-2) ESPINOZA/MIGUEL/AUSTIN/PAD/COR/MAD/LORI In-House, NP Swab in UTM/VTM, 3-4 HR TAT - Swab, Nasopharynx [[id number]]  (Normal) Collected: 04/04/23 2353    Specimen: Swab from Nasopharynx Updated: 04/05/23 0103     ADENOVIRUS, PCR Not Detected     Coronavirus 229E Not Detected     Coronavirus HKU1 Not Detected     Coronavirus NL63 Not Detected     Coronavirus OC43 Not Detected     COVID19 Not Detected     Human Metapneumovirus Not Detected     Human Rhinovirus/Enterovirus Not Detected     Influenza A PCR Not Detected     Influenza B PCR Not Detected     Parainfluenza Virus 1 Not Detected     Parainfluenza Virus 2 Not Detected     Parainfluenza Virus 3 Not Detected     Parainfluenza Virus 4 Not Detected     RSV, PCR Not Detected     Bordetella pertussis pcr Not Detected     Bordetella parapertussis PCR Not Detected     Chlamydophila pneumoniae PCR Not Detected     Mycoplasma pneumo by PCR Not Detected    Narrative:      In the setting of a positive respiratory panel with a viral infection PLUS a negative procalcitonin without other underlying concern for bacterial infection, consider observing off antibiotics or discontinuation of antibiotics and continue supportive care. If the respiratory panel is positive for atypical bacterial infection (Bordetella pertussis, Chlamydophila pneumoniae, or Mycoplasma pneumoniae), consider antibiotic de-escalation to target atypical bacterial infection.    Manual Differential [[id number]]  (Abnormal) Collected: 04/04/23 2350    Specimen: Blood Updated: 04/05/23 0102     Neutrophil % 81.0 %      Lymphocyte % 10.0 %      Monocyte % 4.0 %      Basophil % 1.0 %      Myelocyte % 4.0 %      Neutrophils Absolute 15.91 10*3/mm3      Lymphocytes Absolute 1.96 10*3/mm3      Monocytes Absolute  0.79 10*3/mm3      Basophils Absolute 0.20 10*3/mm3      RBC Morphology Normal     WBC Morphology Normal     Platelet Morphology Normal    Urinalysis, Microscopic Only - Urine, Clean Catch [[id number]] Collected: 04/05/23 0021    Specimen: Urine, Clean Catch Updated: 04/05/23 0043     RBC, UA 0-2 /HPF      WBC, UA 0-2 /HPF      Bacteria, UA None Seen /HPF      Squamous Epithelial Cells, UA 0-2 /HPF      Hyaline Casts, UA None Seen /LPF      Methodology Automated Microscopy    Urinalysis With Microscopic If Indicated (No Culture) - Urine, Clean Catch [[id number]]  (Abnormal) Collected: 04/05/23 0021    Specimen: Urine, Clean Catch Updated: 04/05/23 0041     Color, UA Yellow     Appearance, UA Clear     pH, UA 5.5     Specific Gravity, UA 1.019     Glucose, UA Negative     Ketones, UA Negative     Bilirubin, UA Negative     Blood, UA Negative     Protein, UA 30 mg/dL (1+)     Leuk Esterase, UA Negative     Nitrite, UA Negative     Urobilinogen, UA 0.2 E.U./dL    High Sensitivity Troponin T [[id number]]  (Abnormal) Collected: 04/04/23 2350    Specimen: Blood Updated: 04/05/23 0040     HS Troponin T 21 ng/L     Narrative:      High Sensitive Troponin T Reference Range:  <10.0 ng/L- Negative Female for AMI  <15.0 ng/L- Negative Male for AMI  >=10 - Abnormal Female indicating possible myocardial injury.  >=15 - Abnormal Male indicating possible myocardial injury.   Clinicians would have to utilize clinical acumen, EKG, Troponin, and serial changes to determine if it is an Acute Myocardial Infarction or myocardial injury due to an underlying chronic condition.         Procalcitonin [[id number]]  (Normal) Collected: 04/04/23 2350    Specimen: Blood Updated: 04/05/23 0040     Procalcitonin 0.18 ng/mL     Narrative:      As a Marker for Sepsis (Non-Neonates):    1. <0.5 ng/mL represents a low risk of severe sepsis and/or septic shock.  2. >2 ng/mL represents a high risk of severe sepsis and/or septic shock.    As a Marker for  "Lower Respiratory Tract Infections that require antibiotic therapy:    PCT on Admission    Antibiotic Therapy       6-12 Hrs later    >0.5                Strongly Recommended  >0.25 - <0.5        Recommended   0.1 - 0.25          Discouraged              Remeasure/reassess PCT  <0.1                Strongly Discouraged     Remeasure/reassess PCT    As 28 day mortality risk marker: \"Change in Procalcitonin Result\" (>80% or <=80%) if Day 0 (or Day 1) and Day 4 values are available. Refer to http://[email or web address] [email or web address]pct[facility]cal[facility]    Change in PCT <=80%  A decrease of PCT levels below or equal to 80% defines a positive change in PCT test result representing a higher risk for 28-day all-cause mortality of patients diagnosed with severe sepsis for septic shock.    Change in PCT >80%  A decrease of PCT levels of more than 80% defines a negative change in PCT result representing a lower risk for 28-day all-cause mortality of patients diagnosed with severe sepsis or septic shock.       BNP [[id number]]  (Abnormal) Collected: 04/04/23 2350    Specimen: Blood Updated: 04/05/23 0040     proBNP 2,237.0 pg/mL     Narrative:      Among patients with dyspnea, NT-proBNP is highly sensitive for the detection of acute congestive heart failure. In addition NT-proBNP of <300 pg/ml effectively rules out acute congestive heart failure with 99% negative predictive value.    Results may be falsely decreased if patient taking Biotin.      Lactic Acid, Plasma [[id number]]  (Normal) Collected: 04/04/23 2350    Specimen: Blood Updated: 04/05/23 0035     Lactate 1.1 mmol/L     Magnesium [[id number]]  (Normal) Collected: 04/04/23 2350    Specimen: Blood Updated: 04/05/23 0035     Magnesium 2.0 mg/dL     Comprehensive Metabolic Panel [[id number]]  (Abnormal) Collected: 04/04/23 2350    Specimen: Blood Updated: 04/05/23 0035     Glucose 142 mg/dL      BUN 17 mg/dL      Creatinine 1.17 mg/dL      Sodium 130 mmol/L      Potassium 3.6 mmol/L      Chloride 92 " mmol/L      CO2 24.6 mmol/L      Calcium 9.8 mg/dL      Total Protein 7.4 g/dL      Albumin 3.8 g/dL      ALT (SGPT) 30 U/L      AST (SGOT) 23 U/L      Alkaline Phosphatase 70 U/L      Total Bilirubin 0.5 mg/dL      Globulin 3.6 gm/dL      A/G Ratio 1.1 g/dL      BUN/Creatinine Ratio 14.5     Anion Gap 13.4 mmol/L      eGFR 68.3 mL/min/1.73     Narrative:      GFR Normal >60  Chronic Kidney Disease <60  Kidney Failure <15      Protime-INR [[id number]]  (Normal) Collected: 04/04/23 2350    Specimen: Blood Updated: 04/05/23 0028     Protime 14.1 Seconds      INR 1.08    aPTT [[id number]]  (Normal) Collected: 04/04/23 2350    Specimen: Blood Updated: 04/05/23 0028     PTT 28.5 seconds     CBC & Differential [[id number]]  (Abnormal) Collected: 04/04/23 2350    Specimen: Blood Updated: 04/05/23 0017    Narrative:      The following orders were created for panel order CBC & Differential.  Procedure                               Abnormality         Status                     ---------                               -----------         ------                     CBC Auto Differential[[id number]]        Abnormal            Final result                 Please view results for these tests on the individual orders.    CBC Auto Differential [[id number]]  (Abnormal) Collected: 04/04/23 2350    Specimen: Blood Updated: 04/05/23 0017     WBC 19.64 10*3/mm3      RBC 3.35 10*6/mm3      Hemoglobin 10.4 g/dL      Hematocrit 30.8 %      MCV 91.9 fL      MCH 31.0 pg      MCHC 33.8 g/dL      RDW 14.0 %      RDW-SD 47.1 fl      MPV 10.4 fL      Platelets 336 10*3/mm3      nRBC 0.1 /100 WBC         Data Review:  Results from last 7 days   Lab Units 04/06/23  0515 04/05/23  1252 04/04/23 2350   SODIUM mmol/L 133* 130* 130*   POTASSIUM mmol/L 3.7 3.7 3.6   CHLORIDE mmol/L 98 96* 92*   CO2 mmol/L 23.4 22.0 24.6   BUN mg/dL 12 14 17   CREATININE mg/dL 1.10 1.10 1.17   GLUCOSE mg/dL 128* 177* 142*   CALCIUM mg/dL 9.3 8.4* 9.8     Results from last 7  days   Lab Units 04/06/23  0515 04/05/23  1252 04/04/23  2350   WBC 10*3/mm3 17.30* 25.77* 19.64*   HEMOGLOBIN g/dL 9.6* 9.0* 10.4*   HEMATOCRIT % 29.2* 26.3* 30.8*   PLATELETS 10*3/mm3 315 281 336             Lab Results   Lab Value Date/Time    TROPONINT 21 (H) 04/05/2023 0207    TROPONINT 21 (H) 04/04/2023 2350    TROPONINT 0.085 (C) 03/27/2022 1550    TROPONINT 0.110 (C) 03/27/2022 1013    TROPONINT 0.023 03/26/2022 0946         Results from last 7 days   Lab Units 04/04/23  2350   ALK PHOS U/L 70   BILIRUBIN mg/dL 0.5   ALT (SGPT) U/L 30   AST (SGOT) U/L 23             No results found for: POCGLU  Results from last 7 days   Lab Units 04/04/23  2350   INR  1.08       Past Medical History:   Diagnosis Date   • AAA (abdominal aortic aneurysm)    • Abnormal glucose    • Anxiety    • COPD (chronic obstructive pulmonary disease)    • Coronary artery disease    • Encounter for special screening examination for neoplasm of prostate 10/2012   • Hematuria     JUST MONITORING   • History of COVID-19     2021   • Hyperlipidemia    • Hypertension    • Myocardial infarction 03/2022   • Radius fracture     RIGHT   • Vitamin D deficiency disease        Assessment:  Active Hospital Problems    Diagnosis  POA   • **Pneumonia of both lower lobes due to infectious organism [J18.9]  Yes   • Hypoxia [R09.02]  Unknown   • Fever [R50.9]  Unknown   • Chronic diastolic congestive heart failure [I50.32]  Yes   • Emphysema (HCC) [J43.1]  Yes   • Primary hypertension [I10]  Yes   • Other hyperlipidemia [E78.49]  Yes   • Anxiety [F41.9]  Yes      Resolved Hospital Problems   No resolved problems to display.       Plan:  Continue with antibiotics, nebs and O 2 . Follow lab and cultures.    Matthew Hazel MD  4/6/2023  13:31 EDT

## 2023-04-06 NOTE — PLAN OF CARE
Problem: Fall Injury Risk  Goal: Absence of Fall and Fall-Related Injury  Outcome: Ongoing, Progressing  Intervention: Promote Injury-Free Environment  Recent Flowsheet Documentation  Taken 4/6/2023 1600 by Lois Chand RN  Safety Promotion/Fall Prevention:   safety round/check completed   clutter free environment maintained   nonskid shoes/slippers when out of bed  Taken 4/6/2023 1400 by Lois Chand RN  Safety Promotion/Fall Prevention:   safety round/check completed   nonskid shoes/slippers when out of bed   clutter free environment maintained  Taken 4/6/2023 1220 by Lois Chand RN  Safety Promotion/Fall Prevention:   safety round/check completed   clutter free environment maintained   nonskid shoes/slippers when out of bed  Taken 4/6/2023 1000 by Lois Chand RN  Safety Promotion/Fall Prevention: safety round/check completed  Taken 4/6/2023 0750 by Lois Chand RN  Safety Promotion/Fall Prevention:   safety round/check completed   nonskid shoes/slippers when out of bed     Problem: Hypertension Comorbidity  Goal: Blood Pressure in Desired Range  Outcome: Ongoing, Progressing     Problem: Infection (Pneumonia)  Goal: Resolution of Infection Signs and Symptoms  Outcome: Ongoing, Progressing     Problem: Respiratory Compromise (Pneumonia)  Goal: Effective Oxygenation and Ventilation  Outcome: Ongoing, Progressing   Goal Outcome Evaluation:  Patient is alert x4.  On 2l NC @ 91-94%.  No c/o SOA is voiced.  Patient up ad deidra in the room.  Will continue to monitor

## 2023-04-07 LAB
ANION GAP SERPL CALCULATED.3IONS-SCNC: 11.5 MMOL/L (ref 5–15)
BASOPHILS # BLD AUTO: 0.06 10*3/MM3 (ref 0–0.2)
BASOPHILS NFR BLD AUTO: 0.5 % (ref 0–1.5)
BUN SERPL-MCNC: 11 MG/DL (ref 8–23)
BUN/CREAT SERPL: 11 (ref 7–25)
CALCIUM SPEC-SCNC: 9 MG/DL (ref 8.6–10.5)
CHLORIDE SERPL-SCNC: 97 MMOL/L (ref 98–107)
CO2 SERPL-SCNC: 22.5 MMOL/L (ref 22–29)
CREAT SERPL-MCNC: 1 MG/DL (ref 0.76–1.27)
DEPRECATED RDW RBC AUTO: 49.3 FL (ref 37–54)
EGFRCR SERPLBLD CKD-EPI 2021: 82.5 ML/MIN/1.73
EOSINOPHIL # BLD AUTO: 0.16 10*3/MM3 (ref 0–0.4)
EOSINOPHIL NFR BLD AUTO: 1.2 % (ref 0.3–6.2)
ERYTHROCYTE [DISTWIDTH] IN BLOOD BY AUTOMATED COUNT: 14.5 % (ref 12.3–15.4)
GLUCOSE SERPL-MCNC: 127 MG/DL (ref 65–99)
HCT VFR BLD AUTO: 29.1 % (ref 37.5–51)
HGB BLD-MCNC: 9.5 G/DL (ref 13–17.7)
IMM GRANULOCYTES # BLD AUTO: 0.43 10*3/MM3 (ref 0–0.05)
IMM GRANULOCYTES NFR BLD AUTO: 3.2 % (ref 0–0.5)
LYMPHOCYTES # BLD AUTO: 2.64 10*3/MM3 (ref 0.7–3.1)
LYMPHOCYTES NFR BLD AUTO: 19.8 % (ref 19.6–45.3)
MCH RBC QN AUTO: 30.7 PG (ref 26.6–33)
MCHC RBC AUTO-ENTMCNC: 32.6 G/DL (ref 31.5–35.7)
MCV RBC AUTO: 94.2 FL (ref 79–97)
MONOCYTES # BLD AUTO: 1.02 10*3/MM3 (ref 0.1–0.9)
MONOCYTES NFR BLD AUTO: 7.7 % (ref 5–12)
NEUTROPHILS NFR BLD AUTO: 67.6 % (ref 42.7–76)
NEUTROPHILS NFR BLD AUTO: 8.99 10*3/MM3 (ref 1.7–7)
NRBC BLD AUTO-RTO: 0.1 /100 WBC (ref 0–0.2)
PLATELET # BLD AUTO: 323 10*3/MM3 (ref 140–450)
PMV BLD AUTO: 10.7 FL (ref 6–12)
POTASSIUM SERPL-SCNC: 3.6 MMOL/L (ref 3.5–5.2)
RBC # BLD AUTO: 3.09 10*6/MM3 (ref 4.14–5.8)
SODIUM SERPL-SCNC: 131 MMOL/L (ref 136–145)
WBC NRBC COR # BLD: 13.3 10*3/MM3 (ref 3.4–10.8)

## 2023-04-07 PROCEDURE — 25010000002 CEFTRIAXONE PER 250 MG: Performed by: NURSE PRACTITIONER

## 2023-04-07 PROCEDURE — 94664 DEMO&/EVAL PT USE INHALER: CPT

## 2023-04-07 PROCEDURE — 25010000002 NA FERRIC GLUC CPLX PER 12.5 MG: Performed by: HOSPITALIST

## 2023-04-07 PROCEDURE — 94799 UNLISTED PULMONARY SVC/PX: CPT

## 2023-04-07 PROCEDURE — 80048 BASIC METABOLIC PNL TOTAL CA: CPT | Performed by: HOSPITALIST

## 2023-04-07 PROCEDURE — 85025 COMPLETE CBC W/AUTO DIFF WBC: CPT | Performed by: HOSPITALIST

## 2023-04-07 RX ADMIN — CEFTRIAXONE SODIUM 1 G: 1 INJECTION, POWDER, FOR SOLUTION INTRAMUSCULAR; INTRAVENOUS at 21:55

## 2023-04-07 RX ADMIN — BUPROPION HYDROCHLORIDE 300 MG: 300 TABLET, EXTENDED RELEASE ORAL at 06:41

## 2023-04-07 RX ADMIN — SODIUM CHLORIDE 250 MG: 9 INJECTION, SOLUTION INTRAVENOUS at 08:46

## 2023-04-07 RX ADMIN — IPRATROPIUM BROMIDE AND ALBUTEROL SULFATE 3 ML: 2.5; .5 SOLUTION RESPIRATORY (INHALATION) at 11:05

## 2023-04-07 RX ADMIN — AMLODIPINE BESYLATE 10 MG: 10 TABLET ORAL at 08:46

## 2023-04-07 RX ADMIN — IPRATROPIUM BROMIDE AND ALBUTEROL SULFATE 3 ML: 2.5; .5 SOLUTION RESPIRATORY (INHALATION) at 08:04

## 2023-04-07 RX ADMIN — IPRATROPIUM BROMIDE AND ALBUTEROL SULFATE 3 ML: 2.5; .5 SOLUTION RESPIRATORY (INHALATION) at 20:54

## 2023-04-07 RX ADMIN — IPRATROPIUM BROMIDE AND ALBUTEROL SULFATE 3 ML: 2.5; .5 SOLUTION RESPIRATORY (INHALATION) at 15:09

## 2023-04-07 RX ADMIN — Medication 100 MG: at 08:46

## 2023-04-07 RX ADMIN — Medication 10 ML: at 22:00

## 2023-04-07 RX ADMIN — ESCITALOPRAM 20 MG: 20 TABLET, FILM COATED ORAL at 21:51

## 2023-04-07 RX ADMIN — ASPIRIN 81 MG: 81 TABLET, COATED ORAL at 08:46

## 2023-04-07 RX ADMIN — ROSUVASTATIN CALCIUM 20 MG: 20 TABLET, FILM COATED ORAL at 21:51

## 2023-04-07 RX ADMIN — Medication 1 MG: at 08:46

## 2023-04-07 RX ADMIN — METOPROLOL SUCCINATE 100 MG: 100 TABLET, EXTENDED RELEASE ORAL at 08:46

## 2023-04-07 RX ADMIN — Medication 10 ML: at 08:46

## 2023-04-07 NOTE — PLAN OF CARE
Problem: Adult Inpatient Plan of Care  Goal: Absence of Hospital-Acquired Illness or Injury  Intervention: Prevent and Manage VTE (Venous Thromboembolism) Risk  Recent Flowsheet Documentation  Taken 4/6/2023 2131 by Selene Lee, RN  Activity Management: up ad deidra     Problem: Adult Inpatient Plan of Care  Goal: Absence of Hospital-Acquired Illness or Injury  Intervention: Prevent Infection  Recent Flowsheet Documentation  Taken 4/6/2023 2131 by Selene Lee, RN  Infection Prevention:   single patient room provided   rest/sleep promoted   environmental surveillance performed     Problem: Infection (Pneumonia)  Goal: Resolution of Infection Signs and Symptoms  Outcome: Ongoing, Progressing   Goal Outcome Evaluation:

## 2023-04-07 NOTE — CASE MANAGEMENT/SOCIAL WORK
Continued Stay Note  [facility]     Patient Name: Osman Aparicio  MRN: [id number]  Today's Date: 4/7/2023    Admit Date: 4/4/2023    Plan: Home with family to transport   Discharge Plan     Row Name 04/07/23 1037       Plan    Plan Home with family to transport    Patient/Family in Agreement with Plan yes    Plan Comments Plan remains home with family. Patient remains on supplemental O2. CCP will follow for O2 needs. Miriam VALERIO LCSW               Discharge Codes    No documentation.               Expected Discharge Date and Time     Expected Discharge Date Expected Discharge Time    Apr 7, 2023             Miriam Ardon

## 2023-04-07 NOTE — PLAN OF CARE
Goal Outcome Evaluation:     Problem: Fall Injury Risk  Goal: Absence of Fall and Fall-Related Injury  Outcome: Ongoing, Progressing  Intervention: Promote Injury-Free Environment  Recent Flowsheet Documentation  Taken 4/7/2023 1400 by Lois Chand RN  Safety Promotion/Fall Prevention: safety round/check completed  Taken 4/7/2023 1233 by Lois Chand RN  Safety Promotion/Fall Prevention:   safety round/check completed   activity supervised  Taken 4/7/2023 1000 by Lois Chand RN  Safety Promotion/Fall Prevention:   safety round/check completed   activity supervised  Taken 4/7/2023 0800 by Lois Chand RN  Safety Promotion/Fall Prevention:   safety round/check completed   nonskid shoes/slippers when out of bed   clutter free environment maintained     Problem: Hypertension Comorbidity  Goal: Blood Pressure in Desired Range  Outcome: Ongoing, Progressing     Problem: Infection (Pneumonia)  Goal: Resolution of Infection Signs and Symptoms  Outcome: Ongoing, Progressing     Problem: Respiratory Compromise (Pneumonia)  Goal: Effective Oxygenation and Ventilation  Outcome: Ongoing, Progressing  Intervention: Optimize Oxygenation and Ventilation  Recent Flowsheet Documentation  Taken 4/7/2023 1400 by Lois Chand RN  Head of Bed (HOB) Positioning: HOB at 20-30 degrees  Taken 4/7/2023 0800 by Lois Chand RN  Head of Bed (HOB) Positioning: HOB at 20-30 degrees

## 2023-04-07 NOTE — PROGRESS NOTES
"DAILY PROGRESS NOTE  [facility]    Patient Identification:  Name: Osman Aparicio  Age: 67 y.o.  Sex: male  :  1955  MRN: [id number]         Primary Care Physician: Desire Hughes ([place]), TAYLOR    Subjective:  Interval History:He is coughing and short of air.  O 2 at 2 L    Objective:    Scheduled Meds:amLODIPine, 10 mg, Oral, Daily  aspirin, 81 mg, Oral, Daily  buPROPion XL, 300 mg, Oral, QAM  cefTRIAXone, 1 g, Intravenous, Q24H  escitalopram, 20 mg, Oral, Nightly  ferric gluconate, 250 mg, Intravenous, Daily  folic acid, 1 mg, Oral, Daily  ipratropium-albuterol, 3 mL, Nebulization, 4x Daily - RT  metoprolol succinate XL, 100 mg, Oral, Q24H  rosuvastatin, 20 mg, Oral, Nightly  sodium chloride, 10 mL, Intravenous, Q12H  thiamine, 100 mg, Oral, Daily      Continuous Infusions:     Vital signs in last 24 hours:  Temp:  [97.4 °F (36.3 °C)-98.3 °F (36.8 °C)] 97.6 °F (36.4 °C)  Heart Rate:  [57-69] 60  Resp:  [16-20] 18  BP: (137-164)/(70-79) 150/74    Intake/Output:    Intake/Output Summary (Last 24 hours) at 2023 1331  Last data filed at 2023 0512  Gross per 24 hour   Intake 600 ml   Output --   Net 600 ml       Exam:  /74 (BP Location: Right arm, Patient Position: Lying)   Pulse 60   Temp 97.6 °F (36.4 °C) (Oral)   Resp 18   Ht 180.3 cm (71\")   Wt 86.3 kg (190 lb 4.1 oz)   SpO2 95%   BMI 26.54 kg/m²     General Appearance:    Alert, cooperative, no distress   Head:    Normocephalic, without obvious abnormality, atraumatic   Eyes:       Throat:   Lips, tongue, gums normal   Neck:   Supple, symmetrical, trachea midline, no JVD   Lungs:     Clear to auscultation bilaterally, respirations unlabored   Chest Wall:    No tenderness or deformity    Heart:    Regular rate and rhythm, S1 and S2 normal, no murmur,no  Rub or gallop   Abdomen:     Soft, nontender, bowel sounds active, no masses, no organomegaly    Extremities:   Extremities normal, atraumatic, no cyanosis or edema "   Pulses:      Skin:   Skin is warm and dry,  no rashes or palpable lesions   Neurologic:   no focal deficits noted      Lab Results (last 72 hours)     Procedure Component Value Units Date/Time    Folate [[id number]]  (Normal) Collected: 04/06/23 0515    Specimen: Blood Updated: 04/06/23 0610     Folate >20.00 ng/mL     Narrative:      Results may be falsely increased if patient taking Biotin.      Vitamin B12 [[id number]]  (Abnormal) Collected: 04/06/23 0515    Specimen: Blood Updated: 04/06/23 0610     Vitamin B-12 1,917 pg/mL     Narrative:      Results may be falsely increased if patient taking Biotin.      CBC & Differential [[id number]]  (Abnormal) Collected: 04/06/23 0515    Specimen: Blood Updated: 04/06/23 0610    Narrative:      The following orders were created for panel order CBC & Differential.  Procedure                               Abnormality         Status                     ---------                               -----------         ------                     CBC Auto Differential[[id number]]        Abnormal            Final result                 Please view results for these tests on the individual orders.    CBC Auto Differential [[id number]]  (Abnormal) Collected: 04/06/23 0515    Specimen: Blood Updated: 04/06/23 0610     WBC 17.30 10*3/mm3      RBC 3.16 10*6/mm3      Hemoglobin 9.6 g/dL      Hematocrit 29.2 %      MCV 92.4 fL      MCH 30.4 pg      MCHC 32.9 g/dL      RDW 14.0 %      RDW-SD 47.3 fl      MPV 10.3 fL      Platelets 315 10*3/mm3      Neutrophil % 71.9 %      Lymphocyte % 16.5 %      Monocyte % 6.4 %      Eosinophil % 1.0 %      Basophil % 0.4 %      Immature Grans % 3.8 %      Neutrophils, Absolute 12.45 10*3/mm3      Lymphocytes, Absolute 2.85 10*3/mm3      Monocytes, Absolute 1.10 10*3/mm3      Eosinophils, Absolute 0.18 10*3/mm3      Basophils, Absolute 0.07 10*3/mm3      Immature Grans, Absolute 0.65 10*3/mm3      nRBC 0.1 /100 WBC     Ferritin [[id number]]  (Abnormal)  Collected: 04/06/23 0515    Specimen: Blood Updated: 04/06/23 0559     Ferritin 920.00 ng/mL     Narrative:      Results may be falsely decreased if patient taking Biotin.      Iron Profile [[id number]]  (Abnormal) Collected: 04/06/23 0515    Specimen: Blood Updated: 04/06/23 0554     Iron 17 mcg/dL      Iron Saturation 9 %      Transferrin 130 mg/dL      TIBC 194 mcg/dL     Basic Metabolic Panel [[id number]]  (Abnormal) Collected: 04/06/23 0515    Specimen: Blood Updated: 04/06/23 0554     Glucose 128 mg/dL      BUN 12 mg/dL      Creatinine 1.10 mg/dL      Sodium 133 mmol/L      Potassium 3.7 mmol/L      Chloride 98 mmol/L      CO2 23.4 mmol/L      Calcium 9.3 mg/dL      BUN/Creatinine Ratio 10.9     Anion Gap 11.6 mmol/L      eGFR 73.6 mL/min/1.73     Narrative:      GFR Normal >60  Chronic Kidney Disease <60  Kidney Failure <15      Blood Culture - Blood, Arm, Left [[id number]]  (Normal) Collected: 04/04/23 2358    Specimen: Blood from Arm, Left Updated: 04/06/23 0145     Blood Culture No growth at 24 hours    Blood Culture - Blood, Arm, Right [[id number]]  (Normal) Collected: 04/05/23 0001    Specimen: Blood from Arm, Right Updated: 04/06/23 0145     Blood Culture No growth at 24 hours    Occult Blood X 1, Stool - Stool, Per Rectum [[id number]]  (Normal) Collected: 04/05/23 2258    Specimen: Stool from Per Rectum Updated: 04/05/23 2333     Fecal Occult Blood Negative    Manual Differential [[id number]]  (Abnormal) Collected: 04/05/23 1252    Specimen: Blood Updated: 04/05/23 1346     Neutrophil % 97.0 %      Lymphocyte % 3.0 %      Neutrophils Absolute 25.00 10*3/mm3      Lymphocytes Absolute 0.77 10*3/mm3      Anisocytosis Slight/1+     Polychromasia Mod/2+     WBC Morphology Normal     Platelet Morphology Normal    Legionella Antigen, Urine - Urine, Urine, Clean Catch [[id number]]  (Normal) Collected: 04/05/23 0021    Specimen: Urine, Clean Catch Updated: 04/05/23 1346     LEGIONELLA ANTIGEN, URINE Negative    S.  Pneumo Ag Urine or CSF - Urine, Urine, Clean Catch [[id number]]  (Normal) Collected: 04/05/23 0021    Specimen: Urine, Clean Catch Updated: 04/05/23 1346     Strep Pneumo Ag Negative    Basic Metabolic Panel [[id number]]  (Abnormal) Collected: 04/05/23 1252    Specimen: Blood Updated: 04/05/23 1329     Glucose 177 mg/dL      BUN 14 mg/dL      Creatinine 1.10 mg/dL      Sodium 130 mmol/L      Potassium 3.7 mmol/L      Chloride 96 mmol/L      CO2 22.0 mmol/L      Calcium 8.4 mg/dL      BUN/Creatinine Ratio 12.7     Anion Gap 12.0 mmol/L      eGFR 73.6 mL/min/1.73     Narrative:      GFR Normal >60  Chronic Kidney Disease <60  Kidney Failure <15      CBC & Differential [[id number]]  (Abnormal) Collected: 04/05/23 1252    Specimen: Blood Updated: 04/05/23 1318    Narrative:      The following orders were created for panel order CBC & Differential.  Procedure                               Abnormality         Status                     ---------                               -----------         ------                     CBC Auto Differential[[id number]]        Abnormal            Final result                 Please view results for these tests on the individual orders.    CBC Auto Differential [[id number]]  (Abnormal) Collected: 04/05/23 1252    Specimen: Blood Updated: 04/05/23 1318     WBC 25.77 10*3/mm3      RBC 2.87 10*6/mm3      Hemoglobin 9.0 g/dL      Hematocrit 26.3 %      MCV 91.6 fL      MCH 31.4 pg      MCHC 34.2 g/dL      RDW 14.1 %      RDW-SD 47.8 fl      MPV 10.6 fL      Platelets 281 10*3/mm3     High Sensitivity Troponin T 2Hr [[id number]]  (Abnormal) Collected: 04/05/23 0207    Specimen: Blood Updated: 04/05/23 0254     HS Troponin T 21 ng/L      Troponin T Delta 0 ng/L     Narrative:      High Sensitive Troponin T Reference Range:  <10.0 ng/L- Negative Female for AMI  <15.0 ng/L- Negative Male for AMI  >=10 - Abnormal Female indicating possible myocardial injury.  >=15 - Abnormal Male indicating possible  myocardial injury.   Clinicians would have to utilize clinical acumen, EKG, Troponin, and serial changes to determine if it is an Acute Myocardial Infarction or myocardial injury due to an underlying chronic condition.         Respiratory Panel PCR w/COVID-19(SARS-CoV-2) ESPINOZA/MIGUEL/AUSTIN/PAD/COR/MAD/LORI In-House, NP Swab in UTM/VTM, 3-4 HR TAT - Swab, Nasopharynx [[id number]]  (Normal) Collected: 04/04/23 2353    Specimen: Swab from Nasopharynx Updated: 04/05/23 0103     ADENOVIRUS, PCR Not Detected     Coronavirus 229E Not Detected     Coronavirus HKU1 Not Detected     Coronavirus NL63 Not Detected     Coronavirus OC43 Not Detected     COVID19 Not Detected     Human Metapneumovirus Not Detected     Human Rhinovirus/Enterovirus Not Detected     Influenza A PCR Not Detected     Influenza B PCR Not Detected     Parainfluenza Virus 1 Not Detected     Parainfluenza Virus 2 Not Detected     Parainfluenza Virus 3 Not Detected     Parainfluenza Virus 4 Not Detected     RSV, PCR Not Detected     Bordetella pertussis pcr Not Detected     Bordetella parapertussis PCR Not Detected     Chlamydophila pneumoniae PCR Not Detected     Mycoplasma pneumo by PCR Not Detected    Narrative:      In the setting of a positive respiratory panel with a viral infection PLUS a negative procalcitonin without other underlying concern for bacterial infection, consider observing off antibiotics or discontinuation of antibiotics and continue supportive care. If the respiratory panel is positive for atypical bacterial infection (Bordetella pertussis, Chlamydophila pneumoniae, or Mycoplasma pneumoniae), consider antibiotic de-escalation to target atypical bacterial infection.    Manual Differential [[id number]]  (Abnormal) Collected: 04/04/23 2350    Specimen: Blood Updated: 04/05/23 0102     Neutrophil % 81.0 %      Lymphocyte % 10.0 %      Monocyte % 4.0 %      Basophil % 1.0 %      Myelocyte % 4.0 %      Neutrophils Absolute 15.91 10*3/mm3       Lymphocytes Absolute 1.96 10*3/mm3      Monocytes Absolute 0.79 10*3/mm3      Basophils Absolute 0.20 10*3/mm3      RBC Morphology Normal     WBC Morphology Normal     Platelet Morphology Normal    Urinalysis, Microscopic Only - Urine, Clean Catch [[id number]] Collected: 04/05/23 0021    Specimen: Urine, Clean Catch Updated: 04/05/23 0043     RBC, UA 0-2 /HPF      WBC, UA 0-2 /HPF      Bacteria, UA None Seen /HPF      Squamous Epithelial Cells, UA 0-2 /HPF      Hyaline Casts, UA None Seen /LPF      Methodology Automated Microscopy    Urinalysis With Microscopic If Indicated (No Culture) - Urine, Clean Catch [[id number]]  (Abnormal) Collected: 04/05/23 0021    Specimen: Urine, Clean Catch Updated: 04/05/23 0041     Color, UA Yellow     Appearance, UA Clear     pH, UA 5.5     Specific Gravity, UA 1.019     Glucose, UA Negative     Ketones, UA Negative     Bilirubin, UA Negative     Blood, UA Negative     Protein, UA 30 mg/dL (1+)     Leuk Esterase, UA Negative     Nitrite, UA Negative     Urobilinogen, UA 0.2 E.U./dL    High Sensitivity Troponin T [[id number]]  (Abnormal) Collected: 04/04/23 2350    Specimen: Blood Updated: 04/05/23 0040     HS Troponin T 21 ng/L     Narrative:      High Sensitive Troponin T Reference Range:  <10.0 ng/L- Negative Female for AMI  <15.0 ng/L- Negative Male for AMI  >=10 - Abnormal Female indicating possible myocardial injury.  >=15 - Abnormal Male indicating possible myocardial injury.   Clinicians would have to utilize clinical acumen, EKG, Troponin, and serial changes to determine if it is an Acute Myocardial Infarction or myocardial injury due to an underlying chronic condition.         Procalcitonin [[id number]]  (Normal) Collected: 04/04/23 2350    Specimen: Blood Updated: 04/05/23 0040     Procalcitonin 0.18 ng/mL     Narrative:      As a Marker for Sepsis (Non-Neonates):    1. <0.5 ng/mL represents a low risk of severe sepsis and/or septic shock.  2. >2 ng/mL represents a high risk  "of severe sepsis and/or septic shock.    As a Marker for Lower Respiratory Tract Infections that require antibiotic therapy:    PCT on Admission    Antibiotic Therapy       6-12 Hrs later    >0.5                Strongly Recommended  >0.25 - <0.5        Recommended   0.1 - 0.25          Discouraged              Remeasure/reassess PCT  <0.1                Strongly Discouraged     Remeasure/reassess PCT    As 28 day mortality risk marker: \"Change in Procalcitonin Result\" (>80% or <=80%) if Day 0 (or Day 1) and Day 4 values are available. Refer to http://[email or web address]-pct-calculator.[email or web address]    Change in PCT <=80%  A decrease of PCT levels below or equal to 80% defines a positive change in PCT test result representing a higher risk for 28-day all-cause mortality of patients diagnosed with severe sepsis for septic shock.    Change in PCT >80%  A decrease of PCT levels of more than 80% defines a negative change in PCT result representing a lower risk for 28-day all-cause mortality of patients diagnosed with severe sepsis or septic shock.       BNP [[id number]]  (Abnormal) Collected: 04/04/23 2350    Specimen: Blood Updated: 04/05/23 0040     proBNP 2,237.0 pg/mL     Narrative:      Among patients with dyspnea, NT-proBNP is highly sensitive for the detection of acute congestive heart failure. In addition NT-proBNP of <300 pg/ml effectively rules out acute congestive heart failure with 99% negative predictive value.    Results may be falsely decreased if patient taking Biotin.      Lactic Acid, Plasma [[id number]]  (Normal) Collected: 04/04/23 2350    Specimen: Blood Updated: 04/05/23 0035     Lactate 1.1 mmol/L     Magnesium [[id number]]  (Normal) Collected: 04/04/23 2350    Specimen: Blood Updated: 04/05/23 0035     Magnesium 2.0 mg/dL     Comprehensive Metabolic Panel [[id number]]  (Abnormal) Collected: 04/04/23 2350    Specimen: Blood Updated: 04/05/23 0035     Glucose 142 mg/dL      BUN 17 mg/dL      Creatinine 1.17 mg/dL      " Sodium 130 mmol/L      Potassium 3.6 mmol/L      Chloride 92 mmol/L      CO2 24.6 mmol/L      Calcium 9.8 mg/dL      Total Protein 7.4 g/dL      Albumin 3.8 g/dL      ALT (SGPT) 30 U/L      AST (SGOT) 23 U/L      Alkaline Phosphatase 70 U/L      Total Bilirubin 0.5 mg/dL      Globulin 3.6 gm/dL      A/G Ratio 1.1 g/dL      BUN/Creatinine Ratio 14.5     Anion Gap 13.4 mmol/L      eGFR 68.3 mL/min/1.73     Narrative:      GFR Normal >60  Chronic Kidney Disease <60  Kidney Failure <15      Protime-INR [[id number]]  (Normal) Collected: 04/04/23 2350    Specimen: Blood Updated: 04/05/23 0028     Protime 14.1 Seconds      INR 1.08    aPTT [[id number]]  (Normal) Collected: 04/04/23 2350    Specimen: Blood Updated: 04/05/23 0028     PTT 28.5 seconds     CBC & Differential [[id number]]  (Abnormal) Collected: 04/04/23 2350    Specimen: Blood Updated: 04/05/23 0017    Narrative:      The following orders were created for panel order CBC & Differential.  Procedure                               Abnormality         Status                     ---------                               -----------         ------                     CBC Auto Differential[[id number]]        Abnormal            Final result                 Please view results for these tests on the individual orders.    CBC Auto Differential [[id number]]  (Abnormal) Collected: 04/04/23 2350    Specimen: Blood Updated: 04/05/23 0017     WBC 19.64 10*3/mm3      RBC 3.35 10*6/mm3      Hemoglobin 10.4 g/dL      Hematocrit 30.8 %      MCV 91.9 fL      MCH 31.0 pg      MCHC 33.8 g/dL      RDW 14.0 %      RDW-SD 47.1 fl      MPV 10.4 fL      Platelets 336 10*3/mm3      nRBC 0.1 /100 WBC         Data Review:  Results from last 7 days   Lab Units 04/07/23  0618 04/06/23  0515 04/05/23  1252   SODIUM mmol/L 131* 133* 130*   POTASSIUM mmol/L 3.6 3.7 3.7   CHLORIDE mmol/L 97* 98 96*   CO2 mmol/L 22.5 23.4 22.0   BUN mg/dL 11 12 14   CREATININE mg/dL 1.00 1.10 1.10   GLUCOSE mg/dL 127*  128* 177*   CALCIUM mg/dL 9.0 9.3 8.4*     Results from last 7 days   Lab Units 04/07/23  0618 04/06/23  0515 04/05/23  1252   WBC 10*3/mm3 13.30* 17.30* 25.77*   HEMOGLOBIN g/dL 9.5* 9.6* 9.0*   HEMATOCRIT % 29.1* 29.2* 26.3*   PLATELETS 10*3/mm3 323 315 281             Lab Results   Lab Value Date/Time    TROPONINT 21 (H) 04/05/2023 0207    TROPONINT 21 (H) 04/04/2023 2350    TROPONINT 0.085 (C) 03/27/2022 1550    TROPONINT 0.110 (C) 03/27/2022 1013    TROPONINT 0.023 03/26/2022 0946         Results from last 7 days   Lab Units 04/04/23  2350   ALK PHOS U/L 70   BILIRUBIN mg/dL 0.5   ALT (SGPT) U/L 30   AST (SGOT) U/L 23             No results found for: POCGLU  Results from last 7 days   Lab Units 04/04/23  2350   INR  1.08       Past Medical History:   Diagnosis Date   • AAA (abdominal aortic aneurysm)    • Abnormal glucose    • Anxiety    • COPD (chronic obstructive pulmonary disease)    • Coronary artery disease    • Encounter for special screening examination for neoplasm of prostate 10/2012   • Hematuria     JUST MONITORING   • History of COVID-19     2021   • Hyperlipidemia    • Hypertension    • Myocardial infarction 03/2022   • Radius fracture     RIGHT   • Vitamin D deficiency disease        Assessment:  Active Hospital Problems    Diagnosis  POA   • **Pneumonia of both lower lobes due to infectious organism [J18.9]  Yes   • Hypoxia [R09.02]  Unknown   • Fever [R50.9]  Unknown   • Chronic diastolic congestive heart failure [I50.32]  Yes   • Emphysema (HCC) [J43.1]  Yes   • Primary hypertension [I10]  Yes   • Other hyperlipidemia [E78.49]  Yes   • Anxiety [F41.9]  Yes      Resolved Hospital Problems   No resolved problems to display.       Plan:  Continue with antibiotics, nebs and O 2 . Follow lab and cultures.  May need home O 2 .    Matthew Hazel MD  4/7/2023  13:31 EDT

## 2023-04-08 LAB
ANION GAP SERPL CALCULATED.3IONS-SCNC: 12 MMOL/L (ref 5–15)
BUN SERPL-MCNC: 11 MG/DL (ref 8–23)
BUN/CREAT SERPL: 10.8 (ref 7–25)
CALCIUM SPEC-SCNC: 9.3 MG/DL (ref 8.6–10.5)
CHLORIDE SERPL-SCNC: 97 MMOL/L (ref 98–107)
CO2 SERPL-SCNC: 23 MMOL/L (ref 22–29)
CREAT SERPL-MCNC: 1.02 MG/DL (ref 0.76–1.27)
DEPRECATED RDW RBC AUTO: 47.6 FL (ref 37–54)
EGFRCR SERPLBLD CKD-EPI 2021: 80.6 ML/MIN/1.73
EOSINOPHIL # BLD MANUAL: 0.09 10*3/MM3 (ref 0–0.4)
EOSINOPHIL NFR BLD MANUAL: 1 % (ref 0.3–6.2)
ERYTHROCYTE [DISTWIDTH] IN BLOOD BY AUTOMATED COUNT: 14.1 % (ref 12.3–15.4)
GLUCOSE SERPL-MCNC: 108 MG/DL (ref 65–99)
HCT VFR BLD AUTO: 29.1 % (ref 37.5–51)
HGB BLD-MCNC: 9.6 G/DL (ref 13–17.7)
LYMPHOCYTES # BLD MANUAL: 2.42 10*3/MM3 (ref 0.7–3.1)
LYMPHOCYTES NFR BLD MANUAL: 6 % (ref 5–12)
MCH RBC QN AUTO: 30.4 PG (ref 26.6–33)
MCHC RBC AUTO-ENTMCNC: 33 G/DL (ref 31.5–35.7)
MCV RBC AUTO: 92.1 FL (ref 79–97)
MONOCYTES # BLD: 0.52 10*3/MM3 (ref 0.1–0.9)
MYELOCYTES NFR BLD MANUAL: 3 % (ref 0–0)
NEUTROPHILS # BLD AUTO: 5.35 10*3/MM3 (ref 1.7–7)
NEUTROPHILS NFR BLD MANUAL: 62 % (ref 42.7–76)
NRBC BLD AUTO-RTO: 0.3 /100 WBC (ref 0–0.2)
PLAT MORPH BLD: NORMAL
PLATELET # BLD AUTO: 343 10*3/MM3 (ref 140–450)
PMV BLD AUTO: 10.8 FL (ref 6–12)
POLYCHROMASIA BLD QL SMEAR: ABNORMAL
POTASSIUM SERPL-SCNC: 3.6 MMOL/L (ref 3.5–5.2)
RBC # BLD AUTO: 3.16 10*6/MM3 (ref 4.14–5.8)
SODIUM SERPL-SCNC: 132 MMOL/L (ref 136–145)
VARIANT LYMPHS NFR BLD MANUAL: 28 % (ref 19.6–45.3)
WBC MORPH BLD: NORMAL
WBC NRBC COR # BLD: 8.63 10*3/MM3 (ref 3.4–10.8)

## 2023-04-08 PROCEDURE — 80048 BASIC METABOLIC PNL TOTAL CA: CPT | Performed by: HOSPITALIST

## 2023-04-08 PROCEDURE — 94799 UNLISTED PULMONARY SVC/PX: CPT

## 2023-04-08 PROCEDURE — 85025 COMPLETE CBC W/AUTO DIFF WBC: CPT | Performed by: HOSPITALIST

## 2023-04-08 PROCEDURE — 25010000002 CEFTRIAXONE PER 250 MG: Performed by: NURSE PRACTITIONER

## 2023-04-08 PROCEDURE — 94664 DEMO&/EVAL PT USE INHALER: CPT

## 2023-04-08 PROCEDURE — 85007 BL SMEAR W/DIFF WBC COUNT: CPT | Performed by: HOSPITALIST

## 2023-04-08 PROCEDURE — 25010000002 NA FERRIC GLUC CPLX PER 12.5 MG: Performed by: HOSPITALIST

## 2023-04-08 RX ADMIN — IPRATROPIUM BROMIDE AND ALBUTEROL SULFATE 3 ML: 2.5; .5 SOLUTION RESPIRATORY (INHALATION) at 15:35

## 2023-04-08 RX ADMIN — SODIUM CHLORIDE 250 MG: 9 INJECTION, SOLUTION INTRAVENOUS at 08:13

## 2023-04-08 RX ADMIN — AMLODIPINE BESYLATE 10 MG: 10 TABLET ORAL at 08:13

## 2023-04-08 RX ADMIN — METOPROLOL SUCCINATE 100 MG: 100 TABLET, EXTENDED RELEASE ORAL at 08:13

## 2023-04-08 RX ADMIN — CEFTRIAXONE SODIUM 1 G: 1 INJECTION, POWDER, FOR SOLUTION INTRAMUSCULAR; INTRAVENOUS at 21:33

## 2023-04-08 RX ADMIN — IPRATROPIUM BROMIDE AND ALBUTEROL SULFATE 3 ML: 2.5; .5 SOLUTION RESPIRATORY (INHALATION) at 22:15

## 2023-04-08 RX ADMIN — IPRATROPIUM BROMIDE AND ALBUTEROL SULFATE 3 ML: 2.5; .5 SOLUTION RESPIRATORY (INHALATION) at 07:33

## 2023-04-08 RX ADMIN — ROSUVASTATIN CALCIUM 20 MG: 20 TABLET, FILM COATED ORAL at 20:08

## 2023-04-08 RX ADMIN — Medication 100 MG: at 08:13

## 2023-04-08 RX ADMIN — ASPIRIN 81 MG: 81 TABLET, COATED ORAL at 08:13

## 2023-04-08 RX ADMIN — Medication 1 MG: at 08:13

## 2023-04-08 RX ADMIN — IPRATROPIUM BROMIDE AND ALBUTEROL SULFATE 3 ML: 2.5; .5 SOLUTION RESPIRATORY (INHALATION) at 11:27

## 2023-04-08 RX ADMIN — ESCITALOPRAM 20 MG: 20 TABLET, FILM COATED ORAL at 20:08

## 2023-04-08 RX ADMIN — Medication 10 ML: at 08:13

## 2023-04-08 RX ADMIN — BUPROPION HYDROCHLORIDE 300 MG: 300 TABLET, EXTENDED RELEASE ORAL at 08:13

## 2023-04-08 RX ADMIN — Medication 10 ML: at 20:07

## 2023-04-08 NOTE — PLAN OF CARE
Problem: Adult Inpatient Plan of Care  Goal: Absence of Hospital-Acquired Illness or Injury  Intervention: Prevent Infection  Recent Flowsheet Documentation  Taken 4/7/2023 2155 by Selene Lee RN  Infection Prevention:   single patient room provided   rest/sleep promoted   environmental surveillance performed     Problem: Fall Injury Risk  Goal: Absence of Fall and Fall-Related Injury  Intervention: Promote Injury-Free Environment  Recent Flowsheet Documentation  Taken 4/7/2023 2155 by Selene Lee RN  Safety Promotion/Fall Prevention:   clutter free environment maintained   fall prevention program maintained   nonskid shoes/slippers when out of bed   safety round/check completed     Problem: Respiratory Compromise (Pneumonia)  Goal: Effective Oxygenation and Ventilation  Intervention: Optimize Oxygenation and Ventilation  Recent Flowsheet Documentation  Taken 4/7/2023 2155 by Selene Lee RN  Head of Bed (HOB) Positioning: HOB elevated     Problem: Infection (Pneumonia)  Goal: Resolution of Infection Signs and Symptoms  Outcome: Ongoing, Progressing     Problem: Infection (Pneumonia)  Goal: Resolution of Infection Signs and Symptoms  Outcome: Ongoing, Progressing     Problem: Infection (Pneumonia)  Goal: Resolution of Infection Signs and Symptoms  Outcome: Ongoing, Progressing   Goal Outcome Evaluation:

## 2023-04-08 NOTE — PROGRESS NOTES
"DAILY PROGRESS NOTE  [facility]    Patient Identification:  Name: Osman Aparicio  Age: 67 y.o.  Sex: male  :  1955  MRN: [id number]         Primary Care Physician: Desire Hughes ([place]), TAYLOR    Subjective:  Interval History:He is coughing and short of air.  O 2 at 2 L    Objective:    Scheduled Meds:amLODIPine, 10 mg, Oral, Daily  aspirin, 81 mg, Oral, Daily  buPROPion XL, 300 mg, Oral, QAM  cefTRIAXone, 1 g, Intravenous, Q24H  escitalopram, 20 mg, Oral, Nightly  folic acid, 1 mg, Oral, Daily  ipratropium-albuterol, 3 mL, Nebulization, 4x Daily - RT  metoprolol succinate XL, 100 mg, Oral, Q24H  rosuvastatin, 20 mg, Oral, Nightly  sodium chloride, 10 mL, Intravenous, Q12H  thiamine, 100 mg, Oral, Daily      Continuous Infusions:     Vital signs in last 24 hours:  Temp:  [97.9 °F (36.6 °C)-98.6 °F (37 °C)] 98.6 °F (37 °C)  Heart Rate:  [56-64] 60  Resp:  [16-18] 16  BP: (150-164)/(65-83) 154/83    Intake/Output:    Intake/Output Summary (Last 24 hours) at 2023 1526  Last data filed at 2023 2155  Gross per 24 hour   Intake 240 ml   Output --   Net 240 ml       Exam:  /83 (BP Location: Right arm, Patient Position: Lying)   Pulse 60   Temp 98.6 °F (37 °C) (Oral)   Resp 16   Ht 180.3 cm (71\")   Wt 86.3 kg (190 lb 4.1 oz)   SpO2 93%   BMI 26.54 kg/m²     General Appearance:    Alert, cooperative, no distress   Head:    Normocephalic, without obvious abnormality, atraumatic   Eyes:       Throat:   Lips, tongue, gums normal   Neck:   Supple, symmetrical, trachea midline, no JVD   Lungs:     Clear to auscultation bilaterally, respirations unlabored   Chest Wall:    No tenderness or deformity    Heart:    Regular rate and rhythm, S1 and S2 normal, no murmur,no  Rub or gallop   Abdomen:     Soft, nontender, bowel sounds active, no masses, no organomegaly    Extremities:   Extremities normal, atraumatic, no cyanosis or edema   Pulses:      Skin:   Skin is warm and dry,  no rashes or " palpable lesions   Neurologic:   no focal deficits noted      Lab Results (last 72 hours)     Procedure Component Value Units Date/Time    Folate [[id number]]  (Normal) Collected: 04/06/23 0515    Specimen: Blood Updated: 04/06/23 0610     Folate >20.00 ng/mL     Narrative:      Results may be falsely increased if patient taking Biotin.      Vitamin B12 [[id number]]  (Abnormal) Collected: 04/06/23 0515    Specimen: Blood Updated: 04/06/23 0610     Vitamin B-12 1,917 pg/mL     Narrative:      Results may be falsely increased if patient taking Biotin.      CBC & Differential [[id number]]  (Abnormal) Collected: 04/06/23 0515    Specimen: Blood Updated: 04/06/23 0610    Narrative:      The following orders were created for panel order CBC & Differential.  Procedure                               Abnormality         Status                     ---------                               -----------         ------                     CBC Auto Differential[[id number]]        Abnormal            Final result                 Please view results for these tests on the individual orders.    CBC Auto Differential [[id number]]  (Abnormal) Collected: 04/06/23 0515    Specimen: Blood Updated: 04/06/23 0610     WBC 17.30 10*3/mm3      RBC 3.16 10*6/mm3      Hemoglobin 9.6 g/dL      Hematocrit 29.2 %      MCV 92.4 fL      MCH 30.4 pg      MCHC 32.9 g/dL      RDW 14.0 %      RDW-SD 47.3 fl      MPV 10.3 fL      Platelets 315 10*3/mm3      Neutrophil % 71.9 %      Lymphocyte % 16.5 %      Monocyte % 6.4 %      Eosinophil % 1.0 %      Basophil % 0.4 %      Immature Grans % 3.8 %      Neutrophils, Absolute 12.45 10*3/mm3      Lymphocytes, Absolute 2.85 10*3/mm3      Monocytes, Absolute 1.10 10*3/mm3      Eosinophils, Absolute 0.18 10*3/mm3      Basophils, Absolute 0.07 10*3/mm3      Immature Grans, Absolute 0.65 10*3/mm3      nRBC 0.1 /100 WBC     Ferritin [[id number]]  (Abnormal) Collected: 04/06/23 0515    Specimen: Blood Updated: 04/06/23 0559      Ferritin 920.00 ng/mL     Narrative:      Results may be falsely decreased if patient taking Biotin.      Iron Profile [[id number]]  (Abnormal) Collected: 04/06/23 0515    Specimen: Blood Updated: 04/06/23 0554     Iron 17 mcg/dL      Iron Saturation 9 %      Transferrin 130 mg/dL      TIBC 194 mcg/dL     Basic Metabolic Panel [[id number]]  (Abnormal) Collected: 04/06/23 0515    Specimen: Blood Updated: 04/06/23 0554     Glucose 128 mg/dL      BUN 12 mg/dL      Creatinine 1.10 mg/dL      Sodium 133 mmol/L      Potassium 3.7 mmol/L      Chloride 98 mmol/L      CO2 23.4 mmol/L      Calcium 9.3 mg/dL      BUN/Creatinine Ratio 10.9     Anion Gap 11.6 mmol/L      eGFR 73.6 mL/min/1.73     Narrative:      GFR Normal >60  Chronic Kidney Disease <60  Kidney Failure <15      Blood Culture - Blood, Arm, Left [[id number]]  (Normal) Collected: 04/04/23 2358    Specimen: Blood from Arm, Left Updated: 04/06/23 0145     Blood Culture No growth at 24 hours    Blood Culture - Blood, Arm, Right [[id number]]  (Normal) Collected: 04/05/23 0001    Specimen: Blood from Arm, Right Updated: 04/06/23 0145     Blood Culture No growth at 24 hours    Occult Blood X 1, Stool - Stool, Per Rectum [[id number]]  (Normal) Collected: 04/05/23 2258    Specimen: Stool from Per Rectum Updated: 04/05/23 2333     Fecal Occult Blood Negative    Manual Differential [[id number]]  (Abnormal) Collected: 04/05/23 1252    Specimen: Blood Updated: 04/05/23 1346     Neutrophil % 97.0 %      Lymphocyte % 3.0 %      Neutrophils Absolute 25.00 10*3/mm3      Lymphocytes Absolute 0.77 10*3/mm3      Anisocytosis Slight/1+     Polychromasia Mod/2+     WBC Morphology Normal     Platelet Morphology Normal    Legionella Antigen, Urine - Urine, Urine, Clean Catch [[id number]]  (Normal) Collected: 04/05/23 0021    Specimen: Urine, Clean Catch Updated: 04/05/23 1346     LEGIONELLA ANTIGEN, URINE Negative    S. Pneumo Ag Urine or CSF - Urine, Urine, Clean Catch [[id number]]   (Normal) Collected: 04/05/23 0021    Specimen: Urine, Clean Catch Updated: 04/05/23 1346     Strep Pneumo Ag Negative    Basic Metabolic Panel [[id number]]  (Abnormal) Collected: 04/05/23 1252    Specimen: Blood Updated: 04/05/23 1329     Glucose 177 mg/dL      BUN 14 mg/dL      Creatinine 1.10 mg/dL      Sodium 130 mmol/L      Potassium 3.7 mmol/L      Chloride 96 mmol/L      CO2 22.0 mmol/L      Calcium 8.4 mg/dL      BUN/Creatinine Ratio 12.7     Anion Gap 12.0 mmol/L      eGFR 73.6 mL/min/1.73     Narrative:      GFR Normal >60  Chronic Kidney Disease <60  Kidney Failure <15      CBC & Differential [[id number]]  (Abnormal) Collected: 04/05/23 1252    Specimen: Blood Updated: 04/05/23 1318    Narrative:      The following orders were created for panel order CBC & Differential.  Procedure                               Abnormality         Status                     ---------                               -----------         ------                     CBC Auto Differential[[id number]]        Abnormal            Final result                 Please view results for these tests on the individual orders.    CBC Auto Differential [[id number]]  (Abnormal) Collected: 04/05/23 1252    Specimen: Blood Updated: 04/05/23 1318     WBC 25.77 10*3/mm3      RBC 2.87 10*6/mm3      Hemoglobin 9.0 g/dL      Hematocrit 26.3 %      MCV 91.6 fL      MCH 31.4 pg      MCHC 34.2 g/dL      RDW 14.1 %      RDW-SD 47.8 fl      MPV 10.6 fL      Platelets 281 10*3/mm3     High Sensitivity Troponin T 2Hr [[id number]]  (Abnormal) Collected: 04/05/23 0207    Specimen: Blood Updated: 04/05/23 0254     HS Troponin T 21 ng/L      Troponin T Delta 0 ng/L     Narrative:      High Sensitive Troponin T Reference Range:  <10.0 ng/L- Negative Female for AMI  <15.0 ng/L- Negative Male for AMI  >=10 - Abnormal Female indicating possible myocardial injury.  >=15 - Abnormal Male indicating possible myocardial injury.   Clinicians would have to utilize clinical  acumen, EKG, Troponin, and serial changes to determine if it is an Acute Myocardial Infarction or myocardial injury due to an underlying chronic condition.         Respiratory Panel PCR w/COVID-19(SARS-CoV-2) ESPINOZA/MIGUEL/AUSTIN/PAD/COR/MAD/LORI In-House, NP Swab in UTM/VTM, 3-4 HR TAT - Swab, Nasopharynx [[id number]]  (Normal) Collected: 04/04/23 2353    Specimen: Swab from Nasopharynx Updated: 04/05/23 0103     ADENOVIRUS, PCR Not Detected     Coronavirus 229E Not Detected     Coronavirus HKU1 Not Detected     Coronavirus NL63 Not Detected     Coronavirus OC43 Not Detected     COVID19 Not Detected     Human Metapneumovirus Not Detected     Human Rhinovirus/Enterovirus Not Detected     Influenza A PCR Not Detected     Influenza B PCR Not Detected     Parainfluenza Virus 1 Not Detected     Parainfluenza Virus 2 Not Detected     Parainfluenza Virus 3 Not Detected     Parainfluenza Virus 4 Not Detected     RSV, PCR Not Detected     Bordetella pertussis pcr Not Detected     Bordetella parapertussis PCR Not Detected     Chlamydophila pneumoniae PCR Not Detected     Mycoplasma pneumo by PCR Not Detected    Narrative:      In the setting of a positive respiratory panel with a viral infection PLUS a negative procalcitonin without other underlying concern for bacterial infection, consider observing off antibiotics or discontinuation of antibiotics and continue supportive care. If the respiratory panel is positive for atypical bacterial infection (Bordetella pertussis, Chlamydophila pneumoniae, or Mycoplasma pneumoniae), consider antibiotic de-escalation to target atypical bacterial infection.    Manual Differential [[id number]]  (Abnormal) Collected: 04/04/23 2350    Specimen: Blood Updated: 04/05/23 0102     Neutrophil % 81.0 %      Lymphocyte % 10.0 %      Monocyte % 4.0 %      Basophil % 1.0 %      Myelocyte % 4.0 %      Neutrophils Absolute 15.91 10*3/mm3      Lymphocytes Absolute 1.96 10*3/mm3      Monocytes Absolute 0.79  10*3/mm3      Basophils Absolute 0.20 10*3/mm3      RBC Morphology Normal     WBC Morphology Normal     Platelet Morphology Normal    Urinalysis, Microscopic Only - Urine, Clean Catch [[id number]] Collected: 04/05/23 0021    Specimen: Urine, Clean Catch Updated: 04/05/23 0043     RBC, UA 0-2 /HPF      WBC, UA 0-2 /HPF      Bacteria, UA None Seen /HPF      Squamous Epithelial Cells, UA 0-2 /HPF      Hyaline Casts, UA None Seen /LPF      Methodology Automated Microscopy    Urinalysis With Microscopic If Indicated (No Culture) - Urine, Clean Catch [[id number]]  (Abnormal) Collected: 04/05/23 0021    Specimen: Urine, Clean Catch Updated: 04/05/23 0041     Color, UA Yellow     Appearance, UA Clear     pH, UA 5.5     Specific Gravity, UA 1.019     Glucose, UA Negative     Ketones, UA Negative     Bilirubin, UA Negative     Blood, UA Negative     Protein, UA 30 mg/dL (1+)     Leuk Esterase, UA Negative     Nitrite, UA Negative     Urobilinogen, UA 0.2 E.U./dL    High Sensitivity Troponin T [[id number]]  (Abnormal) Collected: 04/04/23 2350    Specimen: Blood Updated: 04/05/23 0040     HS Troponin T 21 ng/L     Narrative:      High Sensitive Troponin T Reference Range:  <10.0 ng/L- Negative Female for AMI  <15.0 ng/L- Negative Male for AMI  >=10 - Abnormal Female indicating possible myocardial injury.  >=15 - Abnormal Male indicating possible myocardial injury.   Clinicians would have to utilize clinical acumen, EKG, Troponin, and serial changes to determine if it is an Acute Myocardial Infarction or myocardial injury due to an underlying chronic condition.         Procalcitonin [[id number]]  (Normal) Collected: 04/04/23 2350    Specimen: Blood Updated: 04/05/23 0040     Procalcitonin 0.18 ng/mL     Narrative:      As a Marker for Sepsis (Non-Neonates):    1. <0.5 ng/mL represents a low risk of severe sepsis and/or septic shock.  2. >2 ng/mL represents a high risk of severe sepsis and/or septic shock.    As a Marker for Lower  "Respiratory Tract Infections that require antibiotic therapy:    PCT on Admission    Antibiotic Therapy       6-12 Hrs later    >0.5                Strongly Recommended  >0.25 - <0.5        Recommended   0.1 - 0.25          Discouraged              Remeasure/reassess PCT  <0.1                Strongly Discouraged     Remeasure/reassess PCT    As 28 day mortality risk marker: \"Change in Procalcitonin Result\" (>80% or <=80%) if Day 0 (or Day 1) and Day 4 values are available. Refer to http://www.[facility]-pct-calculator.com    Change in PCT <=80%  A decrease of PCT levels below or equal to 80% defines a positive change in PCT test result representing a higher risk for 28-day all-cause mortality of patients diagnosed with severe sepsis for septic shock.    Change in PCT >80%  A decrease of PCT levels of more than 80% defines a negative change in PCT result representing a lower risk for 28-day all-cause mortality of patients diagnosed with severe sepsis or septic shock.       BNP [[id number]]  (Abnormal) Collected: 04/04/23 2350    Specimen: Blood Updated: 04/05/23 0040     proBNP 2,237.0 pg/mL     Narrative:      Among patients with dyspnea, NT-proBNP is highly sensitive for the detection of acute congestive heart failure. In addition NT-proBNP of <300 pg/ml effectively rules out acute congestive heart failure with 99% negative predictive value.    Results may be falsely decreased if patient taking Biotin.      Lactic Acid, Plasma [[id number]]  (Normal) Collected: 04/04/23 2350    Specimen: Blood Updated: 04/05/23 0035     Lactate 1.1 mmol/L     Magnesium [[id number]]  (Normal) Collected: 04/04/23 2350    Specimen: Blood Updated: 04/05/23 0035     Magnesium 2.0 mg/dL     Comprehensive Metabolic Panel [[id number]]  (Abnormal) Collected: 04/04/23 2350    Specimen: Blood Updated: 04/05/23 0035     Glucose 142 mg/dL      BUN 17 mg/dL      Creatinine 1.17 mg/dL      Sodium 130 mmol/L      Potassium 3.6 mmol/L      Chloride 92 " mmol/L      CO2 24.6 mmol/L      Calcium 9.8 mg/dL      Total Protein 7.4 g/dL      Albumin 3.8 g/dL      ALT (SGPT) 30 U/L      AST (SGOT) 23 U/L      Alkaline Phosphatase 70 U/L      Total Bilirubin 0.5 mg/dL      Globulin 3.6 gm/dL      A/G Ratio 1.1 g/dL      BUN/Creatinine Ratio 14.5     Anion Gap 13.4 mmol/L      eGFR 68.3 mL/min/1.73     Narrative:      GFR Normal >60  Chronic Kidney Disease <60  Kidney Failure <15      Protime-INR [[id number]]  (Normal) Collected: 04/04/23 2350    Specimen: Blood Updated: 04/05/23 0028     Protime 14.1 Seconds      INR 1.08    aPTT [[id number]]  (Normal) Collected: 04/04/23 2350    Specimen: Blood Updated: 04/05/23 0028     PTT 28.5 seconds     CBC & Differential [[id number]]  (Abnormal) Collected: 04/04/23 2350    Specimen: Blood Updated: 04/05/23 0017    Narrative:      The following orders were created for panel order CBC & Differential.  Procedure                               Abnormality         Status                     ---------                               -----------         ------                     CBC Auto Differential[[id number]]        Abnormal            Final result                 Please view results for these tests on the individual orders.    CBC Auto Differential [[id number]]  (Abnormal) Collected: 04/04/23 2350    Specimen: Blood Updated: 04/05/23 0017     WBC 19.64 10*3/mm3      RBC 3.35 10*6/mm3      Hemoglobin 10.4 g/dL      Hematocrit 30.8 %      MCV 91.9 fL      MCH 31.0 pg      MCHC 33.8 g/dL      RDW 14.0 %      RDW-SD 47.1 fl      MPV 10.4 fL      Platelets 336 10*3/mm3      nRBC 0.1 /100 WBC         Data Review:  Results from last 7 days   Lab Units 04/08/23  0652 04/07/23  0618 04/06/23  0515   SODIUM mmol/L 132* 131* 133*   POTASSIUM mmol/L 3.6 3.6 3.7   CHLORIDE mmol/L 97* 97* 98   CO2 mmol/L 23.0 22.5 23.4   BUN mg/dL 11 11 12   CREATININE mg/dL 1.02 1.00 1.10   GLUCOSE mg/dL 108* 127* 128*   CALCIUM mg/dL 9.3 9.0 9.3     Results from last 7  days   Lab Units 04/08/23  0652 04/07/23  0618 04/06/23  0515   WBC 10*3/mm3 8.63 13.30* 17.30*   HEMOGLOBIN g/dL 9.6* 9.5* 9.6*   HEMATOCRIT % 29.1* 29.1* 29.2*   PLATELETS 10*3/mm3 343 323 315             Lab Results   Lab Value Date/Time    TROPONINT 21 (H) 04/05/2023 0207    TROPONINT 21 (H) 04/04/2023 2350    TROPONINT 0.085 (C) 03/27/2022 1550    TROPONINT 0.110 (C) 03/27/2022 1013    TROPONINT 0.023 03/26/2022 0946         Results from last 7 days   Lab Units 04/04/23  2350   ALK PHOS U/L 70   BILIRUBIN mg/dL 0.5   ALT (SGPT) U/L 30   AST (SGOT) U/L 23             No results found for: POCGLU  Results from last 7 days   Lab Units 04/04/23  2350   INR  1.08       Past Medical History:   Diagnosis Date   • AAA (abdominal aortic aneurysm)    • Abnormal glucose    • Anxiety    • COPD (chronic obstructive pulmonary disease)    • Coronary artery disease    • Encounter for special screening examination for neoplasm of prostate 10/2012   • Hematuria     JUST MONITORING   • History of COVID-19     2021   • Hyperlipidemia    • Hypertension    • Myocardial infarction 03/2022   • Radius fracture     RIGHT   • Vitamin D deficiency disease        Assessment:  Active Hospital Problems    Diagnosis  POA   • **Pneumonia of both lower lobes due to infectious organism [J18.9]  Yes   • Hypoxia [R09.02]  Unknown   • Fever [R50.9]  Unknown   • Chronic diastolic congestive heart failure [I50.32]  Yes   • Emphysema (HCC) [J43.1]  Yes   • Primary hypertension [I10]  Yes   • Other hyperlipidemia [E78.49]  Yes   • Anxiety [F41.9]  Yes      Resolved Hospital Problems   No resolved problems to display.       Plan:  Continue with antibiotics, nebs and O 2 . Follow lab and cultures.  May need home O 2 .  We will check CT scan of chest.  May be home tomorrow and probably will need oxygen.    Matthew Hazel MD  4/8/2023  15:26 EDT

## 2023-04-09 ENCOUNTER — READMISSION MANAGEMENT (OUTPATIENT)
Dept: CALL CENTER | Facility: HOSPITAL | Age: 68
End: 2023-04-09
Payer: MEDICARE

## 2023-04-09 VITALS
TEMPERATURE: 97.5 F | HEART RATE: 66 BPM | DIASTOLIC BLOOD PRESSURE: 78 MMHG | SYSTOLIC BLOOD PRESSURE: 150 MMHG | BODY MASS INDEX: 26.64 KG/M2 | WEIGHT: 190.26 LBS | OXYGEN SATURATION: 92 % | HEIGHT: 71 IN | RESPIRATION RATE: 16 BRPM

## 2023-04-09 LAB
ANION GAP SERPL CALCULATED.3IONS-SCNC: 10.1 MMOL/L (ref 5–15)
BASOPHILS # BLD MANUAL: 0.11 10*3/MM3 (ref 0–0.2)
BASOPHILS NFR BLD MANUAL: 1 % (ref 0–1.5)
BUN SERPL-MCNC: 13 MG/DL (ref 8–23)
BUN/CREAT SERPL: 11.7 (ref 7–25)
CALCIUM SPEC-SCNC: 9.3 MG/DL (ref 8.6–10.5)
CHLORIDE SERPL-SCNC: 98 MMOL/L (ref 98–107)
CO2 SERPL-SCNC: 24.9 MMOL/L (ref 22–29)
CREAT SERPL-MCNC: 1.11 MG/DL (ref 0.76–1.27)
DEPRECATED RDW RBC AUTO: 48.3 FL (ref 37–54)
EGFRCR SERPLBLD CKD-EPI 2021: 72.8 ML/MIN/1.73
EOSINOPHIL # BLD MANUAL: 0.22 10*3/MM3 (ref 0–0.4)
EOSINOPHIL NFR BLD MANUAL: 2.1 % (ref 0.3–6.2)
ERYTHROCYTE [DISTWIDTH] IN BLOOD BY AUTOMATED COUNT: 14.3 % (ref 12.3–15.4)
GLUCOSE SERPL-MCNC: 112 MG/DL (ref 65–99)
HCT VFR BLD AUTO: 31 % (ref 37.5–51)
HGB BLD-MCNC: 10 G/DL (ref 13–17.7)
LYMPHOCYTES # BLD MANUAL: 1.42 10*3/MM3 (ref 0.7–3.1)
LYMPHOCYTES NFR BLD MANUAL: 7.2 % (ref 5–12)
MCH RBC QN AUTO: 29.9 PG (ref 26.6–33)
MCHC RBC AUTO-ENTMCNC: 32.3 G/DL (ref 31.5–35.7)
MCV RBC AUTO: 92.5 FL (ref 79–97)
METAMYELOCYTES NFR BLD MANUAL: 1 % (ref 0–0)
MONOCYTES # BLD: 0.76 10*3/MM3 (ref 0.1–0.9)
NEUTROPHILS # BLD AUTO: 7.97 10*3/MM3 (ref 1.7–7)
NEUTROPHILS NFR BLD MANUAL: 75.3 % (ref 42.7–76)
NRBC BLD AUTO-RTO: 0.1 /100 WBC (ref 0–0.2)
PLAT MORPH BLD: NORMAL
PLATELET # BLD AUTO: 402 10*3/MM3 (ref 140–450)
PMV BLD AUTO: 10.6 FL (ref 6–12)
POTASSIUM SERPL-SCNC: 3.7 MMOL/L (ref 3.5–5.2)
RBC # BLD AUTO: 3.35 10*6/MM3 (ref 4.14–5.8)
RBC MORPH BLD: NORMAL
SODIUM SERPL-SCNC: 133 MMOL/L (ref 136–145)
VARIANT LYMPHS NFR BLD MANUAL: 13.4 % (ref 19.6–45.3)
WBC MORPH BLD: NORMAL
WBC NRBC COR # BLD: 10.59 10*3/MM3 (ref 3.4–10.8)

## 2023-04-09 PROCEDURE — 85025 COMPLETE CBC W/AUTO DIFF WBC: CPT | Performed by: HOSPITALIST

## 2023-04-09 PROCEDURE — 85007 BL SMEAR W/DIFF WBC COUNT: CPT | Performed by: HOSPITALIST

## 2023-04-09 PROCEDURE — 94618 PULMONARY STRESS TESTING: CPT

## 2023-04-09 PROCEDURE — 80048 BASIC METABOLIC PNL TOTAL CA: CPT | Performed by: HOSPITALIST

## 2023-04-09 PROCEDURE — 94799 UNLISTED PULMONARY SVC/PX: CPT

## 2023-04-09 PROCEDURE — 94664 DEMO&/EVAL PT USE INHALER: CPT

## 2023-04-09 RX ORDER — CEFDINIR 300 MG/1
300 CAPSULE ORAL 2 TIMES DAILY
Qty: 6 CAPSULE | Refills: 0 | Status: SHIPPED | OUTPATIENT
Start: 2023-04-09 | End: 2023-04-12

## 2023-04-09 RX ADMIN — Medication 10 ML: at 08:58

## 2023-04-09 RX ADMIN — ASPIRIN 81 MG: 81 TABLET, COATED ORAL at 08:59

## 2023-04-09 RX ADMIN — IPRATROPIUM BROMIDE AND ALBUTEROL SULFATE 3 ML: 2.5; .5 SOLUTION RESPIRATORY (INHALATION) at 12:21

## 2023-04-09 RX ADMIN — Medication 100 MG: at 08:57

## 2023-04-09 RX ADMIN — Medication 1 MG: at 08:57

## 2023-04-09 RX ADMIN — BUPROPION HYDROCHLORIDE 300 MG: 300 TABLET, EXTENDED RELEASE ORAL at 05:50

## 2023-04-09 RX ADMIN — METOPROLOL SUCCINATE 100 MG: 100 TABLET, EXTENDED RELEASE ORAL at 08:57

## 2023-04-09 RX ADMIN — AMLODIPINE BESYLATE 10 MG: 10 TABLET ORAL at 08:57

## 2023-04-09 RX ADMIN — IPRATROPIUM BROMIDE AND ALBUTEROL SULFATE 3 ML: 2.5; .5 SOLUTION RESPIRATORY (INHALATION) at 07:37

## 2023-04-09 NOTE — PLAN OF CARE
Problem: Fall Injury Risk  Goal: Absence of Fall and Fall-Related Injury  Outcome: Ongoing, Progressing  Intervention: Identify and Manage Contributors  Recent Flowsheet Documentation  Taken 4/9/2023 0805 by Lois Chand RN  Medication Review/Management: medications reviewed  Intervention: Promote Injury-Free Environment  Recent Flowsheet Documentation  Taken 4/9/2023 0805 by Lois Chand, RN  Safety Promotion/Fall Prevention:   safety round/check completed   clutter free environment maintained     Problem: Hypertension Comorbidity  Goal: Blood Pressure in Desired Range  Outcome: Ongoing, Progressing  Intervention: Maintain Blood Pressure Management  Recent Flowsheet Documentation  Taken 4/9/2023 0805 by Lois Chand, RN  Medication Review/Management: medications reviewed     Problem: Infection (Pneumonia)  Goal: Resolution of Infection Signs and Symptoms  Outcome: Ongoing, Progressing     Problem: Respiratory Compromise (Pneumonia)  Goal: Effective Oxygenation and Ventilation  Outcome: Ongoing, Progressing   Goal Outcome Evaluation:      Patient alert x4.  Up ad deidra.  Patient on 2 L NC oxygen at 95%.  Will try patient on room.

## 2023-04-09 NOTE — PLAN OF CARE
Alert x 4. 2L NC. Needs CT. Ad deidra. IV saline locked. IV Rocephin. No complaints of pain.     Problem: Adult Inpatient Plan of Care  Goal: Absence of Hospital-Acquired Illness or Injury  Intervention: Prevent Skin Injury  Recent Flowsheet Documentation  Taken 4/9/2023 0210 by Cheyenne Hinton RN  Body Position: supine  Taken 4/8/2023 2355 by Cheyenne Hinton RN  Body Position: sitting up in bed  Taken 4/8/2023 2140 by Cheyenne Hinton RN  Body Position: sitting up in bed  Taken 4/8/2023 2010 by Cheyenne Hinton RN  Body Position: sitting up in bed  Skin Protection: adhesive use limited  Taken 4/8/2023 1920 by Cheyenne Hinton RN  Body Position: sitting up in bed   Goal Outcome Evaluation:

## 2023-04-09 NOTE — THERAPY EVALUATION
Exercise Oximetry    Patient Name:Osman Aparicio   MRN: [id number]   Date: 04/09/23             ROOM AIR BASELINE   SpO2% 95   Heart Rate 69   Blood Pressure      EXERCISE ON ROOM AIR SpO2% EXERCISE ON O2 @ 2 LPM SpO2%   1 MINUTE 92 1 MINUTE 95   2 MINUTES 90 2 MINUTES 94   3 MINUTES 90 3 MINUTES    4 MINUTES 87 4 MINUTES    5 MINUTES  5 MINUTES    6 MINUTES  6 MINUTES               Distance Walked   Distance Walked   Dyspnea (Romel Scale)   Dyspnea (Romel Scale)   Fatigue (Romel Scale)   Fatigue (Romel Scale)   SpO2% Post Exercise   SpO2% Post Exercise 93   HR Post Exercise   HR Post Exercise 63   Time to Recovery   Time to Recovery     Comments: Walked pt around 5P. Pt walked a full lap around the floor at a good pace. Dropped to 87% about 4 min in. Placed pt on 2L. O2 sats stayed at mid to low 90s for the rest of walk. Pt said he felt a little soa but overall he felt good.

## 2023-04-09 NOTE — OUTREACH NOTE
Prep Survey    Flowsheet Row Responses   [facility] [place], operated by [facility] patient discharged from? [place]   Is LACE score < 7 ? No   Eligibility [facility]   Date of Admission 04/04/23   Date of Discharge 04/09/23   Discharge Disposition Home or Self Care   Discharge diagnosis Pneumonia of both lower lobes due to infectious organism   Does the patient have one of the following disease processes/diagnoses(primary or secondary)? Pneumonia   Does the patient have Home health ordered? No   Is there a DME ordered? Yes   What DME was ordered? [facility]   O2   Prep survey completed? Yes          Yesica MOONEY - Registered Nurse

## 2023-04-09 NOTE — DISCHARGE SUMMARY
PHYSICIAN DISCHARGE SUMMARY                                                                        [facility]    Patient Identification:  Name: Osman Aparicio  Age: 67 y.o.  Sex: male  :  1955  MRN: [id number]  Primary Care Physician: Desire Hughes APRN ([place])    Admit date: 2023  Discharge date and time:2023  Discharged Condition: good    Discharge Diagnoses:  Active Hospital Problems    Diagnosis  POA   • **Pneumonia of both lower lobes due to infectious organism [J18.9]  Yes   • Hypoxia [R09.02]  Unknown   • Fever [R50.9]  Unknown   • Chronic diastolic congestive heart failure [I50.32]  Yes   • Emphysema (HCC) [J43.1]  Yes   • Primary hypertension [I10]  Yes   • Other hyperlipidemia [E78.49]  Yes   • Anxiety [F41.9]  Yes      Resolved Hospital Problems   No resolved problems to display.          PMHX:   Past Medical History:   Diagnosis Date   • AAA (abdominal aortic aneurysm)    • Abnormal glucose    • Anxiety    • COPD (chronic obstructive pulmonary disease)    • Coronary artery disease    • Encounter for special screening examination for neoplasm of prostate 10/2012   • Hematuria     JUST MONITORING   • History of COVID-19        • Hyperlipidemia    • Hypertension    • Myocardial infarction 2022   • Radius fracture     RIGHT   • Vitamin D deficiency disease      PSHX:   Past Surgical History:   Procedure Laterality Date   • ARTERIOGRAM AORTIC N/A 2023    Procedure: Zenith Fenestrated Endovascular Repair;  Surgeon: Mariusz Kumar MD;  Location: [facility] OR ;  Service: Vascular;  Laterality: N/A;   • CARDIAC CATHETERIZATION N/A 2022    Procedure: Left Heart Cath;  Surgeon: Neto Paige MD;  Location: [facility] INVASIVE LOCATION;  Service: Cardiology;  Laterality: N/A;   • CARDIAC CATHETERIZATION N/A 2022    Procedure: Coronary angiography;  Surgeon: Royal  Neto GUTIERREZ MD;  Location: [facility] CATH INVASIVE LOCATION;  Service: Cardiology;  Laterality: N/A;   • CARDIAC CATHETERIZATION N/A 03/29/2022    Procedure: Left ventriculography;  Surgeon: Neto Paige MD;  Location: [facility] CATH INVASIVE LOCATION;  Service: Cardiology;  Laterality: N/A;   • COLONOSCOPY N/A 12/07/2020    Procedure: COLONOSCOPY INTO CECUM WITH HOT SNARE POLYPECTOMIES, COLD BX POLYPECTOMIES, RESOLUTION CLIPS X;  Surgeon: Regi Mercado MD;  Location: [facility] ENDOSCOPY;  Service: General;  Laterality: N/A;  PRE:  POSITIVE COLOGUARD  POST:  POLYPS   • CORONARY ARTERY BYPASS GRAFT N/A 04/01/2022    Procedure: STERNOTOMY, CORONARY ARTERY BYPASS UTILIZINGTHE RT SAPHENOUS VEIN WITH LEFT INTERNAL MAMMARY ARTERY GRAFT X3 OFF PUMP, PRP;  Surgeon: Colten Zamora MD;  Location: [facility];  Service: Cardiothoracic;  Laterality: N/A;   • ORIF WRIST FRACTURE Right 08/26/2022    Procedure: RIGHT DISTAL RADIUS FRACTURE OPEN REDUCTION INTERNAL FIXATION;  Surgeon: Bubba Mello MD;  [place] [facility] [place] [facility];  Service: Orthopedics;  Laterality: Right;   • TRANSESOPHAGEAL ECHOCARDIOGRAM (MARTHA) N/A 04/01/2022    Procedure: TRANSESOPHAGEAL ECHOCARDIOGRAM WITH ANESTHESIA;  Surgeon: Colten Zamora MD;  Location: [facility];  Service: Cardiothoracic;  Laterality: N/A;       Hospital Course: Osman Aparicio   is a 67 y.o. male with history of abdominal aortic aneurysm, anxiety, COPD, coronary disease, hypertension, and hyperlipidemia who presents to the hospital complaining cough and feeling under the weather for the past week or so.  Patient arrives hypoxic in triage started on oxygen.  Patient denies any chest pain abdominal pain nausea vomiting.  States he has had a nonproductive cough.  Denies recent sick contacts.  The patient was evaluated in the ER and imaging studies showed changes consistent with pneumonia.  The patient was started on some broad-spectrum IV antibiotics and cultures  were done.  The patient is admitted for further evaluation treatment of pneumonia.  The patient was feeling better after being in the hospital for few days and felt well enough to go home.  He had been requiring oxygen for a few days we will do 6-minute walk and see if he needs home oxygen.  He will finish a few more days of oral antibiotics and follow-up with his primary care physician 1 week.  He will also follow-up with pulmonary.  He will need at least follow-up chest x-ray or CT scan sometime in 4 to 8 weeks to ensure that his pneumonia is clearing.  He did meet criteria for home oxygen we will arrange for oxygen at 2 L per nasal cannula continuous.      Consults:     Consults     Date and Time Order Name Status Description    4/5/2023 12:46 AM LHMATT (on-call MD unless specified) Details          Results from last 7 days   Lab Units 04/09/23  0554   WBC 10*3/mm3 10.59   HEMOGLOBIN g/dL 10.0*   HEMATOCRIT % 31.0*   PLATELETS 10*3/mm3 402     Results from last 7 days   Lab Units 04/09/23  0554   SODIUM mmol/L 133*   POTASSIUM mmol/L 3.7   CHLORIDE mmol/L 98   CO2 mmol/L 24.9   BUN mg/dL 13   CREATININE mg/dL 1.11   GLUCOSE mg/dL 112*   CALCIUM mg/dL 9.3     Significant Diagnostic Studies:   WBC   Date Value Ref Range Status   04/09/2023 10.59 3.40 - 10.80 10*3/mm3 Final     Hemoglobin   Date Value Ref Range Status   04/09/2023 10.0 (L) 13.0 - 17.7 g/dL Final     Hematocrit   Date Value Ref Range Status   04/09/2023 31.0 (L) 37.5 - 51.0 % Final     Platelets   Date Value Ref Range Status   04/09/2023 402 140 - 450 10*3/mm3 Final     Sodium   Date Value Ref Range Status   04/09/2023 133 (L) 136 - 145 mmol/L Final     Potassium   Date Value Ref Range Status   04/09/2023 3.7 3.5 - 5.2 mmol/L Final     Chloride   Date Value Ref Range Status   04/09/2023 98 98 - 107 mmol/L Final     CO2   Date Value Ref Range Status   04/09/2023 24.9 22.0 - 29.0 mmol/L Final     BUN   Date Value Ref Range Status   04/09/2023 13 8 - 23  mg/dL Final     Creatinine   Date Value Ref Range Status   04/09/2023 1.11 0.76 - 1.27 mg/dL Final     Glucose   Date Value Ref Range Status   04/09/2023 112 (H) 65 - 99 mg/dL Final     Calcium   Date Value Ref Range Status   04/09/2023 9.3 8.6 - 10.5 mg/dL Final     No results found for: AST, ALT, ALKPHOS  No results found for: APTT, INR  No results found for: COLORU, CLARITYU, SPECGRAV, PHUR, PROTEINUR, GLUCOSEU, KETONESU, BLOODU, NITRITE, LEUKOCYTESUR, BILIRUBINUR, UROBILINOGEN, RBCUA, WBCUA, BACTERIA, UACOMMENT  No results found for: TROPONINT, TROPONINI, BNP  No components found for: HGBA1C;2  No components found for: TSH;2  Imaging Results (All)     Procedure Component Value Units Date/Time    XR Chest 1 View [[id number]] Collected: 04/05/23 0038     Updated: 04/05/23 0042    Narrative:      SINGLE VIEW OF THE CHEST     HISTORY: Shortness of air. Cough and fever.     COMPARISON: 08/18/2022     FINDINGS:  The patient has dense consolidation at the right lung base, and to a  lesser extent, at the left lung base. Patient is status post median  sternotomy with coronary artery bypass grafting. The heart is enlarged.  No pneumothorax is seen. No definite effusion is identified. There is  extensive calcification and tortuosity of the thoracic aorta.     Changes of COPD are noted.       Impression:      Bibasilar infiltrates, right greater than left, concerning for  pneumonia.     This report was finalized on 4/5/2023 12:39 AM by Dr. Yasmin Ramos M.D.           Lab Results (last 7 days)     Procedure Component Value Units Date/Time    Manual Differential [[id number]]  (Abnormal) Collected: 04/09/23 0554    Specimen: Blood Updated: 04/09/23 0734     Neutrophil % 75.3 %      Lymphocyte % 13.4 %      Monocyte % 7.2 %      Eosinophil % 2.1 %      Basophil % 1.0 %      Metamyelocyte % 1.0 %      Neutrophils Absolute 7.97 10*3/mm3      Lymphocytes Absolute 1.42 10*3/mm3      Monocytes Absolute 0.76 10*3/mm3       Eosinophils Absolute 0.22 10*3/mm3      Basophils Absolute 0.11 10*3/mm3      RBC Morphology Normal     WBC Morphology Normal     Platelet Morphology Normal    CBC & Differential [[id number]]  (Abnormal) Collected: 04/09/23 0554    Specimen: Blood Updated: 04/09/23 0705    Narrative:      The following orders were created for panel order CBC & Differential.  Procedure                               Abnormality         Status                     ---------                               -----------         ------                     CBC Auto Differential[[id number]]        Abnormal            Final result                 Please view results for these tests on the individual orders.    CBC Auto Differential [[id number]]  (Abnormal) Collected: 04/09/23 0554    Specimen: Blood Updated: 04/09/23 0705     WBC 10.59 10*3/mm3      RBC 3.35 10*6/mm3      Hemoglobin 10.0 g/dL      Hematocrit 31.0 %      MCV 92.5 fL      MCH 29.9 pg      MCHC 32.3 g/dL      RDW 14.3 %      RDW-SD 48.3 fl      MPV 10.6 fL      Platelets 402 10*3/mm3      nRBC 0.1 /100 WBC     Basic Metabolic Panel [[id number]]  (Abnormal) Collected: 04/09/23 0554    Specimen: Blood Updated: 04/09/23 0650     Glucose 112 mg/dL      BUN 13 mg/dL      Creatinine 1.11 mg/dL      Sodium 133 mmol/L      Potassium 3.7 mmol/L      Chloride 98 mmol/L      CO2 24.9 mmol/L      Calcium 9.3 mg/dL      BUN/Creatinine Ratio 11.7     Anion Gap 10.1 mmol/L      eGFR 72.8 mL/min/1.73     Narrative:      GFR Normal >60  Chronic Kidney Disease <60  Kidney Failure <15      Blood Culture - Blood, Arm, Left [[id number]]  (Normal) Collected: 04/04/23 2358    Specimen: Blood from Arm, Left Updated: 04/09/23 0145     Blood Culture No growth at 4 days    Blood Culture - Blood, Arm, Right [[id number]]  (Normal) Collected: 04/05/23 0001    Specimen: Blood from Arm, Right Updated: 04/09/23 0145     Blood Culture No growth at 4 days    Manual Differential [[id number]]  (Abnormal) Collected: 04/08/23  0652    Specimen: Blood Updated: 04/08/23 0849     Neutrophil % 62.0 %      Lymphocyte % 28.0 %      Monocyte % 6.0 %      Eosinophil % 1.0 %      Myelocyte % 3.0 %      Neutrophils Absolute 5.35 10*3/mm3      Lymphocytes Absolute 2.42 10*3/mm3      Monocytes Absolute 0.52 10*3/mm3      Eosinophils Absolute 0.09 10*3/mm3      Polychromasia Slight/1+     WBC Morphology Normal     Platelet Morphology Normal    Basic Metabolic Panel [[id number]]  (Abnormal) Collected: 04/08/23 0652    Specimen: Blood Updated: 04/08/23 0843     Glucose 108 mg/dL      BUN 11 mg/dL      Creatinine 1.02 mg/dL      Sodium 132 mmol/L      Potassium 3.6 mmol/L      Chloride 97 mmol/L      CO2 23.0 mmol/L      Calcium 9.3 mg/dL      BUN/Creatinine Ratio 10.8     Anion Gap 12.0 mmol/L      eGFR 80.6 mL/min/1.73     Narrative:      GFR Normal >60  Chronic Kidney Disease <60  Kidney Failure <15      CBC & Differential [[id number]]  (Abnormal) Collected: 04/08/23 0652    Specimen: Blood Updated: 04/08/23 0828    Narrative:      The following orders were created for panel order CBC & Differential.  Procedure                               Abnormality         Status                     ---------                               -----------         ------                     CBC Auto Differential[[id number]]        Abnormal            Final result                 Please view results for these tests on the individual orders.    CBC Auto Differential [[id number]]  (Abnormal) Collected: 04/08/23 0652    Specimen: Blood Updated: 04/08/23 0828     WBC 8.63 10*3/mm3      RBC 3.16 10*6/mm3      Hemoglobin 9.6 g/dL      Hematocrit 29.1 %      MCV 92.1 fL      MCH 30.4 pg      MCHC 33.0 g/dL      RDW 14.1 %      RDW-SD 47.6 fl      MPV 10.8 fL      Platelets 343 10*3/mm3      nRBC 0.3 /100 WBC     Basic Metabolic Panel [[id number]]  (Abnormal) Collected: 04/07/23 0618    Specimen: Blood Updated: 04/07/23 0710     Glucose 127 mg/dL      BUN 11 mg/dL      Creatinine  1.00 mg/dL      Sodium 131 mmol/L      Potassium 3.6 mmol/L      Chloride 97 mmol/L      CO2 22.5 mmol/L      Calcium 9.0 mg/dL      BUN/Creatinine Ratio 11.0     Anion Gap 11.5 mmol/L      eGFR 82.5 mL/min/1.73     Narrative:      GFR Normal >60  Chronic Kidney Disease <60  Kidney Failure <15      CBC & Differential [[id number]]  (Abnormal) Collected: 04/07/23 0618    Specimen: Blood Updated: 04/07/23 0652    Narrative:      The following orders were created for panel order CBC & Differential.  Procedure                               Abnormality         Status                     ---------                               -----------         ------                     CBC Auto Differential[[id number]]        Abnormal            Final result                 Please view results for these tests on the individual orders.    CBC Auto Differential [[id number]]  (Abnormal) Collected: 04/07/23 0618    Specimen: Blood Updated: 04/07/23 0652     WBC 13.30 10*3/mm3      RBC 3.09 10*6/mm3      Hemoglobin 9.5 g/dL      Hematocrit 29.1 %      MCV 94.2 fL      MCH 30.7 pg      MCHC 32.6 g/dL      RDW 14.5 %      RDW-SD 49.3 fl      MPV 10.7 fL      Platelets 323 10*3/mm3      Neutrophil % 67.6 %      Lymphocyte % 19.8 %      Monocyte % 7.7 %      Eosinophil % 1.2 %      Basophil % 0.5 %      Immature Grans % 3.2 %      Neutrophils, Absolute 8.99 10*3/mm3      Lymphocytes, Absolute 2.64 10*3/mm3      Monocytes, Absolute 1.02 10*3/mm3      Eosinophils, Absolute 0.16 10*3/mm3      Basophils, Absolute 0.06 10*3/mm3      Immature Grans, Absolute 0.43 10*3/mm3      nRBC 0.1 /100 WBC     Folate [[id number]]  (Normal) Collected: 04/06/23 0515    Specimen: Blood Updated: 04/06/23 0610     Folate >20.00 ng/mL     Narrative:      Results may be falsely increased if patient taking Biotin.      Vitamin B12 [[id number]]  (Abnormal) Collected: 04/06/23 0515    Specimen: Blood Updated: 04/06/23 0610     Vitamin B-12 1,917 pg/mL     Narrative:       Results may be falsely increased if patient taking Biotin.      CBC & Differential [[id number]]  (Abnormal) Collected: 04/06/23 0515    Specimen: Blood Updated: 04/06/23 0610    Narrative:      The following orders were created for panel order CBC & Differential.  Procedure                               Abnormality         Status                     ---------                               -----------         ------                     CBC Auto Differential[[id number]]        Abnormal            Final result                 Please view results for these tests on the individual orders.    CBC Auto Differential [[id number]]  (Abnormal) Collected: 04/06/23 0515    Specimen: Blood Updated: 04/06/23 0610     WBC 17.30 10*3/mm3      RBC 3.16 10*6/mm3      Hemoglobin 9.6 g/dL      Hematocrit 29.2 %      MCV 92.4 fL      MCH 30.4 pg      MCHC 32.9 g/dL      RDW 14.0 %      RDW-SD 47.3 fl      MPV 10.3 fL      Platelets 315 10*3/mm3      Neutrophil % 71.9 %      Lymphocyte % 16.5 %      Monocyte % 6.4 %      Eosinophil % 1.0 %      Basophil % 0.4 %      Immature Grans % 3.8 %      Neutrophils, Absolute 12.45 10*3/mm3      Lymphocytes, Absolute 2.85 10*3/mm3      Monocytes, Absolute 1.10 10*3/mm3      Eosinophils, Absolute 0.18 10*3/mm3      Basophils, Absolute 0.07 10*3/mm3      Immature Grans, Absolute 0.65 10*3/mm3      nRBC 0.1 /100 WBC     Ferritin [[id number]]  (Abnormal) Collected: 04/06/23 0515    Specimen: Blood Updated: 04/06/23 0559     Ferritin 920.00 ng/mL     Narrative:      Results may be falsely decreased if patient taking Biotin.      Iron Profile [[id number]]  (Abnormal) Collected: 04/06/23 0515    Specimen: Blood Updated: 04/06/23 0554     Iron 17 mcg/dL      Iron Saturation 9 %      Transferrin 130 mg/dL      TIBC 194 mcg/dL     Basic Metabolic Panel [[id number]]  (Abnormal) Collected: 04/06/23 0515    Specimen: Blood Updated: 04/06/23 0554     Glucose 128 mg/dL      BUN 12 mg/dL      Creatinine 1.10 mg/dL       Sodium 133 mmol/L      Potassium 3.7 mmol/L      Chloride 98 mmol/L      CO2 23.4 mmol/L      Calcium 9.3 mg/dL      BUN/Creatinine Ratio 10.9     Anion Gap 11.6 mmol/L      eGFR 73.6 mL/min/1.73     Narrative:      GFR Normal >60  Chronic Kidney Disease <60  Kidney Failure <15      Occult Blood X 1, Stool - Stool, Per Rectum [[id number]]  (Normal) Collected: 04/05/23 2258    Specimen: Stool from Per Rectum Updated: 04/05/23 2333     Fecal Occult Blood Negative    Manual Differential [[id number]]  (Abnormal) Collected: 04/05/23 1252    Specimen: Blood Updated: 04/05/23 1346     Neutrophil % 97.0 %      Lymphocyte % 3.0 %      Neutrophils Absolute 25.00 10*3/mm3      Lymphocytes Absolute 0.77 10*3/mm3      Anisocytosis Slight/1+     Polychromasia Mod/2+     WBC Morphology Normal     Platelet Morphology Normal    Legionella Antigen, Urine - Urine, Urine, Clean Catch [[id number]]  (Normal) Collected: 04/05/23 0021    Specimen: Urine, Clean Catch Updated: 04/05/23 1346     LEGIONELLA ANTIGEN, URINE Negative    S. Pneumo Ag Urine or CSF - Urine, Urine, Clean Catch [[id number]]  (Normal) Collected: 04/05/23 0021    Specimen: Urine, Clean Catch Updated: 04/05/23 1346     Strep Pneumo Ag Negative    Basic Metabolic Panel [[id number]]  (Abnormal) Collected: 04/05/23 1252    Specimen: Blood Updated: 04/05/23 1329     Glucose 177 mg/dL      BUN 14 mg/dL      Creatinine 1.10 mg/dL      Sodium 130 mmol/L      Potassium 3.7 mmol/L      Chloride 96 mmol/L      CO2 22.0 mmol/L      Calcium 8.4 mg/dL      BUN/Creatinine Ratio 12.7     Anion Gap 12.0 mmol/L      eGFR 73.6 mL/min/1.73     Narrative:      GFR Normal >60  Chronic Kidney Disease <60  Kidney Failure <15      CBC & Differential [[id number]]  (Abnormal) Collected: 04/05/23 1252    Specimen: Blood Updated: 04/05/23 1318    Narrative:      The following orders were created for panel order CBC & Differential.  Procedure                               Abnormality         Status                      ---------                               -----------         ------                     CBC Auto Differential[[id number]]        Abnormal            Final result                 Please view results for these tests on the individual orders.    CBC Auto Differential [[id number]]  (Abnormal) Collected: 04/05/23 1252    Specimen: Blood Updated: 04/05/23 1318     WBC 25.77 10*3/mm3      RBC 2.87 10*6/mm3      Hemoglobin 9.0 g/dL      Hematocrit 26.3 %      MCV 91.6 fL      MCH 31.4 pg      MCHC 34.2 g/dL      RDW 14.1 %      RDW-SD 47.8 fl      MPV 10.6 fL      Platelets 281 10*3/mm3     High Sensitivity Troponin T 2Hr [[id number]]  (Abnormal) Collected: 04/05/23 0207    Specimen: Blood Updated: 04/05/23 0254     HS Troponin T 21 ng/L      Troponin T Delta 0 ng/L     Narrative:      High Sensitive Troponin T Reference Range:  <10.0 ng/L- Negative Female for AMI  <15.0 ng/L- Negative Male for AMI  >=10 - Abnormal Female indicating possible myocardial injury.  >=15 - Abnormal Male indicating possible myocardial injury.   Clinicians would have to utilize clinical acumen, EKG, Troponin, and serial changes to determine if it is an Acute Myocardial Infarction or myocardial injury due to an underlying chronic condition.         Respiratory Panel PCR w/COVID-19(SARS-CoV-2) ESPINOZA/MIGUEL/AUSTIN/PAD/COR/MAD/LORI In-House, NP Swab in [facility], 3-4 HR TAT - Swab, Nasopharynx [[id number]]  (Normal) Collected: 04/04/23 2353    Specimen: Swab from Nasopharynx Updated: 04/05/23 0103     ADENOVIRUS, PCR Not Detected     Coronavirus 229E Not Detected     Coronavirus HKU1 Not Detected     Coronavirus NL63 Not Detected     Coronavirus OC43 Not Detected     COVID19 Not Detected     Human Metapneumovirus Not Detected     Human Rhinovirus/Enterovirus Not Detected     Influenza A PCR Not Detected     Influenza B PCR Not Detected     Parainfluenza Virus 1 Not Detected     Parainfluenza Virus 2 Not Detected     Parainfluenza Virus 3 Not Detected      Parainfluenza Virus 4 Not Detected     RSV, PCR Not Detected     Bordetella pertussis pcr Not Detected     Bordetella parapertussis PCR Not Detected     Chlamydophila pneumoniae PCR Not Detected     Mycoplasma pneumo by PCR Not Detected    Narrative:      In the setting of a positive respiratory panel with a viral infection PLUS a negative procalcitonin without other underlying concern for bacterial infection, consider observing off antibiotics or discontinuation of antibiotics and continue supportive care. If the respiratory panel is positive for atypical bacterial infection (Bordetella pertussis, Chlamydophila pneumoniae, or Mycoplasma pneumoniae), consider antibiotic de-escalation to target atypical bacterial infection.    Manual Differential [[id number]]  (Abnormal) Collected: 04/04/23 2350    Specimen: Blood Updated: 04/05/23 0102     Neutrophil % 81.0 %      Lymphocyte % 10.0 %      Monocyte % 4.0 %      Basophil % 1.0 %      Myelocyte % 4.0 %      Neutrophils Absolute 15.91 10*3/mm3      Lymphocytes Absolute 1.96 10*3/mm3      Monocytes Absolute 0.79 10*3/mm3      Basophils Absolute 0.20 10*3/mm3      RBC Morphology Normal     WBC Morphology Normal     Platelet Morphology Normal    Urinalysis, Microscopic Only - Urine, Clean Catch [[id number]] Collected: 04/05/23 0021    Specimen: Urine, Clean Catch Updated: 04/05/23 0043     RBC, UA 0-2 /HPF      WBC, UA 0-2 /HPF      Bacteria, UA None Seen /HPF      Squamous Epithelial Cells, UA 0-2 /HPF      Hyaline Casts, UA None Seen /LPF      Methodology Automated Microscopy    Urinalysis With Microscopic If Indicated (No Culture) - Urine, Clean Catch [[id number]]  (Abnormal) Collected: 04/05/23 0021    Specimen: Urine, Clean Catch Updated: 04/05/23 0041     Color, UA Yellow     Appearance, UA Clear     pH, UA 5.5     Specific Gravity, UA 1.019     Glucose, UA Negative     Ketones, UA Negative     Bilirubin, UA Negative     Blood, UA Negative     Protein, UA 30 mg/dL (1+)  "    Leuk Esterase, UA Negative     Nitrite, UA Negative     Urobilinogen, UA 0.2 E.U./dL    High Sensitivity Troponin T [[id number]]  (Abnormal) Collected: 04/04/23 2350    Specimen: Blood Updated: 04/05/23 0040     HS Troponin T 21 ng/L     Narrative:      High Sensitive Troponin T Reference Range:  <10.0 ng/L- Negative Female for AMI  <15.0 ng/L- Negative Male for AMI  >=10 - Abnormal Female indicating possible myocardial injury.  >=15 - Abnormal Male indicating possible myocardial injury.   Clinicians would have to utilize clinical acumen, EKG, Troponin, and serial changes to determine if it is an Acute Myocardial Infarction or myocardial injury due to an underlying chronic condition.         Procalcitonin [[id number]]  (Normal) Collected: 04/04/23 2350    Specimen: Blood Updated: 04/05/23 0040     Procalcitonin 0.18 ng/mL     Narrative:      As a Marker for Sepsis (Non-Neonates):    1. <0.5 ng/mL represents a low risk of severe sepsis and/or septic shock.  2. >2 ng/mL represents a high risk of severe sepsis and/or septic shock.    As a Marker for Lower Respiratory Tract Infections that require antibiotic therapy:    PCT on Admission    Antibiotic Therapy       6-12 Hrs later    >0.5                Strongly Recommended  >0.25 - <0.5        Recommended   0.1 - 0.25          Discouraged              Remeasure/reassess PCT  <0.1                Strongly Discouraged     Remeasure/reassess PCT    As 28 day mortality risk marker: \"Change in Procalcitonin Result\" (>80% or <=80%) if Day 0 (or Day 1) and Day 4 values are available. Refer to http://www.[facility]-pct-[email or web address]    Change in PCT <=80%  A decrease of PCT levels below or equal to 80% defines a positive change in PCT test result representing a higher risk for 28-day all-cause mortality of patients diagnosed with severe sepsis for septic shock.    Change in PCT >80%  A decrease of PCT levels of more than 80% defines a negative change in PCT result representing a " lower risk for 28-day all-cause mortality of patients diagnosed with severe sepsis or septic shock.       BNP [[id number]]  (Abnormal) Collected: 04/04/23 2350    Specimen: Blood Updated: 04/05/23 0040     proBNP 2,237.0 pg/mL     Narrative:      Among patients with dyspnea, NT-proBNP is highly sensitive for the detection of acute congestive heart failure. In addition NT-proBNP of <300 pg/ml effectively rules out acute congestive heart failure with 99% negative predictive value.    Results may be falsely decreased if patient taking Biotin.      Lactic Acid, Plasma [[id number]]  (Normal) Collected: 04/04/23 2350    Specimen: Blood Updated: 04/05/23 0035     Lactate 1.1 mmol/L     Magnesium [[id number]]  (Normal) Collected: 04/04/23 2350    Specimen: Blood Updated: 04/05/23 0035     Magnesium 2.0 mg/dL     Comprehensive Metabolic Panel [[id number]]  (Abnormal) Collected: 04/04/23 2350    Specimen: Blood Updated: 04/05/23 0035     Glucose 142 mg/dL      BUN 17 mg/dL      Creatinine 1.17 mg/dL      Sodium 130 mmol/L      Potassium 3.6 mmol/L      Chloride 92 mmol/L      CO2 24.6 mmol/L      Calcium 9.8 mg/dL      Total Protein 7.4 g/dL      Albumin 3.8 g/dL      ALT (SGPT) 30 U/L      AST (SGOT) 23 U/L      Alkaline Phosphatase 70 U/L      Total Bilirubin 0.5 mg/dL      Globulin 3.6 gm/dL      A/G Ratio 1.1 g/dL      BUN/Creatinine Ratio 14.5     Anion Gap 13.4 mmol/L      eGFR 68.3 mL/min/1.73     Narrative:      GFR Normal >60  Chronic Kidney Disease <60  Kidney Failure <15      Protime-INR [[id number]]  (Normal) Collected: 04/04/23 2350    Specimen: Blood Updated: 04/05/23 0028     Protime 14.1 Seconds      INR 1.08    aPTT [[id number]]  (Normal) Collected: 04/04/23 2350    Specimen: Blood Updated: 04/05/23 0028     PTT 28.5 seconds     CBC & Differential [[id number]]  (Abnormal) Collected: 04/04/23 2350    Specimen: Blood Updated: 04/05/23 0017    Narrative:      The following orders were created for panel order CBC &  "Differential.  Procedure                               Abnormality         Status                     ---------                               -----------         ------                     CBC Auto Differential[[id number]]        Abnormal            Final result                 Please view results for these tests on the individual orders.    CBC Auto Differential [[id number]]  (Abnormal) Collected: 04/04/23 8110    Specimen: Blood Updated: 04/05/23 0017     WBC 19.64 10*3/mm3      RBC 3.35 10*6/mm3      Hemoglobin 10.4 g/dL      Hematocrit 30.8 %      MCV 91.9 fL      MCH 31.0 pg      MCHC 33.8 g/dL      RDW 14.0 %      RDW-SD 47.1 fl      MPV 10.4 fL      Platelets 336 10*3/mm3      nRBC 0.1 /100 WBC         /88 (BP Location: Right arm, Patient Position: Lying)   Pulse 68   Temp 98 °F (36.7 °C) (Oral)   Resp 18   Ht 180.3 cm (71\")   Wt 86.3 kg (190 lb 4.1 oz)   SpO2 92%   BMI 26.54 kg/m²     Discharge Exam:  General Appearance:    Alert, cooperative, no distress                          Head:    Normocephalic, without obvious abnormality, atraumatic                          Eyes:                            Throat:   Lips, tongue, gums normal                          Neck:   Supple, symmetrical, trachea midline, no JVD                        Lungs:     Clear to auscultation bilaterally, respirations unlabored                Chest Wall:    No tenderness or deformity                        Heart:    Regular rate and rhythm, S1 and S2 normal, no murmur,no  Rub  or gallop                  Abdomen:     Soft, non-tender, bowel sounds active, no masses, no organomegaly                  Extremities:   Extremities normal, atraumatic, no cyanosis or edema                             Skin:   Skin is warm and dry,  no rashes or palpable lesions                  Neurologic:   no focal deficits noted     Disposition:  Home    Activity as tolerated    Diet as tolerated  Diet Order   Procedures   • Diet: Regular/House " Diet; Texture: Regular Texture (IDDSI 7); Fluid Consistency: Thin (IDDSI 0)       Patient Instructions:      Discharge Medications      New Medications      Instructions Start Date   cefdinir 300 MG capsule  Commonly known as: OMNICEF   300 mg, Oral, 2 Times Daily         Continue These Medications      Instructions Start Date   albuterol sulfate  (90 Base) MCG/ACT inhaler  Commonly known as: Proventil HFA   2 puffs, Inhalation, Every 4 Hours PRN      amLODIPine 10 MG tablet  Commonly known as: NORVASC   TAKE 1 TABLET BY MOUTH EVERY DAY      aspirin 81 MG EC tablet   81 mg, Oral, Daily      buPROPion  MG 24 hr tablet  Commonly known as: Wellbutrin XL   150 mg, Oral, Daily, To be taken with 300 mg daily for total of 450 mg daily.      buPROPion  MG 24 hr tablet  Commonly known as: WELLBUTRIN XL   300 mg, Oral, Every Morning      escitalopram 20 MG tablet  Commonly known as: LEXAPRO   20 mg, Oral, Nightly      folic acid 1 MG tablet  Commonly known as: FOLVITE   1 mg, Oral, Daily      HYDROcodone-acetaminophen 5-325 MG per tablet  Commonly known as: NORCO   1 tablet, Oral, Every 6 Hours PRN      Iron 28 MG tablet   1 tablet, Oral, Daily      losartan 100 MG tablet  Commonly known as: Cozaar   100 mg, Oral, Daily      metoprolol succinate XL 25 MG 24 hr tablet  Commonly known as: TOPROL-XL   100 mg, Oral, Every Night at Bedtime      multivitamin with minerals tablet tablet   1 tablet, Oral, Daily      nitroglycerin 0.4 MG SL tablet  Commonly known as: NITROSTAT   0.4 mg, Sublingual, Every 5 Minutes PRN, Take no more than 3 doses in 15 minutes.      rosuvastatin 20 MG tablet  Commonly known as: CRESTOR   20 mg, Oral, Nightly      thiamine 100 MG tablet  tablet  Commonly known as: VITAMIN B-1   100 mg, Oral, Daily      Trelegy Ellipta 100-62.5-25 MCG/ACT inhaler  Generic drug: Fluticasone-Umeclidin-Vilant   1 puff, Inhalation, Daily - RT      VITAMIN C PO   1 tablet, Oral, Daily, HOLD PRIOR TO SURG            No future appointments.   Follow-up Information     Rj Thakkar MD Follow up.    Specialty: Pulmonary Disease  Contact information:  [address] [facility]  [address]  [facility] [id number] [phone]             Desire Hughes APRN ([place]) Follow up in 1 week(s).    Specialty: Family Medicine  Contact information:  [address] [facility]  [address]  [facility] [id number] [phone]                       Discharge Order (From admission, onward)     Start     Ordered    04/09/23 1150  Discharge patient  Once        Expected Discharge Date: 04/09/23    Discharge Disposition: Home or Self Care    Physician of Record for Attribution - Please select from Treatment Team: ROHAN HAZEL [[id number]]    Review needed by CMO to determine Physician of Record: No       Question Answer Comment   Physician of Record for Attribution - Please select from Treatment Team ROHAN HAZEL    Review needed by CMO to determine Physician of Record No        04/09/23 1151                Total time spent discharging patient including evaluation,post hospitalization follow up,  medication and post hospitalization instructions and education total time exceeds 30 minutes.    Signed:  Rohan Hazel MD  4/9/2023  12:01 EDT

## 2023-04-09 NOTE — CASE MANAGEMENT/SOCIAL WORK
Continued Stay Note  [facility]     Patient Name: Osman Aparicio  MRN: [id number]  Today's Date: 4/9/2023    Admit Date: 4/4/2023    Plan: Home with family to transport, oxygen/[employer]   Discharge Plan     Row Name 04/09/23 6988       Plan    Plan Home with family to transport, oxygen/[employer]    Patient/Family in Agreement with Plan yes    Plan Comments Inbound call received from RN notifying CCP that pt has DC orders and needs home oxygen setup. Called pt in room, introduced self and explained CCP role. Pt has no preference on DME company. Referral placed to [employer] includes the [facility], spoke with [facility] to order, pt just needs to call when he gets home to complete o2 setup. Portable tank delivered to pt's room and instructed pt to call [employer] includes the [facility] and select option 1 as soon as he gets home to complete oxygen setup. No other needs voiced. Johnny [facility]               Discharge Codes    No documentation.               Expected Discharge Date and Time     Expected Discharge Date Expected Discharge Time    Apr 9, 2023             Johnny Zaldivar RN

## 2023-04-09 NOTE — DISCHARGE PLACEMENT REQUEST
"Osman Garcia (67 y.o. Male)     Date of Birth   1955    Social Security Number       Address   [address] [place] [address]    Home Phone   [phone]    MRN   [id number]       [facility]    Marital Status                               Admission Date   4/4/23    Admission Type   Emergency    Admitting Provider   Matthew Hazel MD    Attending Provider   Matthew Hazel MD    Department, Room/Bed   [address], P589/1       Discharge Date       Discharge Disposition   Home or Self Care    Discharge Destination                               Attending Provider: Matthew Hazel MD    Allergies: No Known Allergies    Isolation: None   Infection: None   Code Status: CPR    Ht: 180.3 cm (71\")   Wt: 86.3 kg (190 lb 4.1 oz)    Admission Cmt: None   Principal Problem: Pneumonia of both lower lobes due to infectious organism [J18.9]                 Active Insurance as of 4/4/2023     Primary Coverage     Payor Plan Insurance Group Employer/Plan Group    MEDICARE MEDICARE A & B      Payor Plan Address Payor Plan Phone Number Payor Plan Fax Number Effective Dates    [address] [phone]  8/1/2020 - None Entered    [facility] [id number]       Subscriber Name Subscriber Birth Date Member ID       OSMAN GARCIA 1955 [id number]           Secondary Coverage     Payor Plan Insurance Group Employer/Plan Group    [id number]     Payor Plan Address Payor Plan Phone Number Payor Plan Fax Number Effective Dates    [address]   8/1/2020 - None Entered    [facility] [id number]       Subscriber Name Subscriber Birth Date Member ID       OSMAN GARCIA 1955 [id number]                 Emergency Contacts      (Rel.) Home Phone Work Phone Mobile Phone    MarkusSuzanne (Spouse) [phone] -- [phone]              "

## 2023-04-10 ENCOUNTER — TRANSITIONAL CARE MANAGEMENT TELEPHONE ENCOUNTER (OUTPATIENT)
Dept: CALL CENTER | Facility: HOSPITAL | Age: 68
End: 2023-04-10
Payer: MEDICARE

## 2023-04-10 LAB
BACTERIA SPEC AEROBE CULT: NORMAL
BACTERIA SPEC AEROBE CULT: NORMAL

## 2023-04-10 NOTE — OUTREACH NOTE
Call Center TCM Note    Flowsheet Row Responses   [facility] patient discharged from? [place]   Does the patient have one of the following disease processes/diagnoses(primary or secondary)? Pneumonia   TCM attempt successful? No   Unsuccessful attempts Attempt 1          Wendie London MA    4/10/2023, 14:35 EDT

## 2023-04-10 NOTE — OUTREACH NOTE
Call Center TCM Note    Flowsheet Row Responses   [facility] patient discharged from? [place]   Does the patient have one of the following disease processes/diagnoses(primary or secondary)? Pneumonia   TCM attempt successful? Yes   Call start time 1617   Call end time 1619   Discharge diagnosis Pneumonia of both lower lobes due to infectious organism   Meds reviewed with patient/caregiver? Yes   Is the patient having any side effects they believe may be caused by any medication additions or changes? No   Does the patient have all medications ordered at discharge? Yes   Is the patient taking all medications as directed (includes completed medication regime)? Yes   Does the patient have an appointment with their PCP within 7 days of discharge? No   Nursing Interventions Patient declined scheduling/rescheduling appointment at this time   Has home health visited the patient within 72 hours of discharge? N/A   Has all DME been delivered? Yes   Pulse Ox monitoring Intermittent   Pulse Ox device source Patient   O2 Sat comments O2 sats 93% on RA   Psychosocial issues? No   Did the patient receive a copy of their discharge instructions? Yes   Nursing interventions Reviewed instructions with patient   What is the patient's perception of their health status since discharge? Improving   Nursing Interventions Nurse provided patient education   Is the patient/caregiver able to teach back the hierarchy of who to call/visit for symptoms/problems? PCP, Specialist, Home health nurse, Urgent Care, ED, 911 Yes   Is the patient/caregiver able to teach back signs and symptoms of worsening condition: Fever/chills, Shortness of breath, Chest pain   Is the patient/caregiver able to teach back importance of completing antibiotic course of treatment? Yes   TCM call completed? Yes   Call end time 1619   Would this patient benefit from a Referral to [facility] Social Work? No   Is the patient interested in additional calls from an ambulatory  ?  NOTE:  applies to high risk patients requiring additional follow-up. No          Maria G Nieto RN    4/10/2023, 16:20 EDT

## 2023-04-19 ENCOUNTER — READMISSION MANAGEMENT (OUTPATIENT)
Dept: CALL CENTER | Facility: HOSPITAL | Age: 68
End: 2023-04-19
Payer: MEDICARE

## 2023-04-19 NOTE — OUTREACH NOTE
COPD/PN Week 2 Survey    Flowsheet Row Responses   [facility] patient discharged from? [place]   Does the patient have one of the following disease processes/diagnoses(primary or secondary)? Pneumonia   Week 2 attempt successful? Yes   Call start time 1406   Call end time 1413   Discharge diagnosis Pneumonia of both lower lobes due to infectious organism   Person spoke with today (if not patient) and relationship Patient   Meds reviewed with patient/caregiver? Yes   Does the patient have all medications ordered at discharge? Yes   Is the patient taking all medications as directed (includes completed medication regime)? Yes   Comments regarding appointments patient reports that he has f/u in place with pulmonary DR.    Does the patient have a primary care provider?  Yes   Comments regarding PCP Follow up with TAYLOR Silveira ([place]) PCP,  Patient has not seen PCP since discharge.    Has the patient kept scheduled appointments due by today? N/A   Has home health visited the patient within 72 hours of discharge? N/A   Has all DME been delivered? Yes   Pulse Ox monitoring Intermittent   Pulse Ox device source Patient   O2 Sat comments High 90's on room air   O2 Sat: education provided Sat levels, Monitoring frequency   Psychosocial issues? No   Did the patient receive a copy of their discharge instructions? Yes   Nursing interventions Reviewed instructions with patient   What is the patient's perception of their health status since discharge? Improving   If the patient is a current smoker, are they able to teach back resources for cessation? Not a smoker   Is the patient/caregiver able to teach back the hierarchy of who to call/visit for symptoms/problems? PCP, Specialist, Home health nurse, Urgent Care, ED, 911 Yes   Is the patient/caregiver able to teach back signs and symptoms of worsening condition: Shortness of breath  [Denies worsening symptoms. Reports that he does not feel chest congestion, states  no rattle. ]   Is the patient/caregiver able to teach back importance of completing antibiotic course of treatment? Yes  [antibiotic completed. ]   Week 2 call completed? Yes   Is the patient interested in additional calls from an ambulatory ?  NOTE:  applies to high risk patients requiring additional follow-up. No          ABDULAZIZ MORRIS - Registered Nurse

## 2023-04-20 RX ORDER — ROSUVASTATIN CALCIUM 20 MG/1
20 TABLET, COATED ORAL NIGHTLY
Qty: 90 TABLET | Refills: 3 | Status: SHIPPED | OUTPATIENT
Start: 2023-04-20

## 2023-04-20 NOTE — TELEPHONE ENCOUNTER
Failed protocol    NOV-not scheduled  LOV-10/03/22-MM    Labs: 03/28/22-Lipid    Assessment and Plan      Assessment:  1. Coronary artery disease involving native coronary artery of native heart without angina pectoris    2. Nonrheumatic mitral valve regurgitation    3. Abdominal aortic aneurysm (AAA) without rupture, unspecified part    4. Essential hypertension    5. Other hyperlipidemia    6. Alcohol abuse          1. Coronary artery disease: Strong family history with 2 brothers who had MI in their 40s.  Cardiac catheterization in March 2022 showed multivessel coronary artery disease.  He had CABG x3 on April 1, 2022 with LIMA to LAD, SVG to OM1, SVG to OM 2.  He denies chest pain.  His medical therapy includes 10 mg amlodipine daily, 81 mg aspirin daily, 75 mg Plavix daily, 100 mg Toprol nightly, 100 mg losartan daily, and 20 mg rosuvastatin nightly.  2. Mitral valve insufficiency: This was graded as moderate on echocardiogram from March 2022.  He remains compensated by exam today.  3. Abdominal aortic aneurysm: Noted during hospitalization in March to April 2022, measuring 5.5 cm.  There are plans for AAA repair with Dr. Kumar once his aspirin and Plavix can be held.  Per Dr. Mercedes, she would like for him to be on aspirin and Plavix for 6 months; this would be the beginning of October 2022. He denies abdominal or back pain.  4. Hypertension: Better controlled on current regimen.  He has occasional lightheadedness  5. Hyperlipidemia: He is treated with 20 mg rosuvastatin nightly.  6. COPD: No evidence of exacerbation today.  He continues to abstain from cigarettes; he stopped smoking in March 2022.  7. Alcohol abuse: He has reduced his daily alcohol intake to 2 beers per day.        Plan:  1. I made no medication changes today.  2. I will discuss with Dr. Claudio if aspirin and plavix can be stopped prior to vascular surgery and if he needs to be seen again prior to surgery.         Follow Up:  No  follow-ups on file.

## 2023-04-25 ENCOUNTER — TRANSCRIBE ORDERS (OUTPATIENT)
Dept: ADMINISTRATIVE | Facility: HOSPITAL | Age: 68
End: 2023-04-25
Payer: MEDICARE

## 2023-04-25 DIAGNOSIS — Z87.891 PERSONAL HISTORY OF TOBACCO USE, PRESENTING HAZARDS TO HEALTH: Primary | ICD-10-CM

## 2023-08-29 ENCOUNTER — TELEPHONE (OUTPATIENT)
Dept: CARDIOLOGY | Facility: CLINIC | Age: 68
End: 2023-08-29
Payer: MEDICARE

## 2023-08-29 RX ORDER — NITROGLYCERIN 0.4 MG/1
0.4 TABLET SUBLINGUAL
Qty: 30 TABLET | Refills: 3 | Status: SHIPPED | OUTPATIENT
Start: 2023-08-29

## 2023-08-29 NOTE — TELEPHONE ENCOUNTER
Caller: Suzanne Aparicio    Relationship: Emergency Contact    Best call back number: [phone]     Requested Prescriptions:   Requested Prescriptions     Pending Prescriptions Disp Refills    nitroglycerin (NITROSTAT) 0.4 MG SL tablet 30 tablet 3     Sig: Place 1 tablet under the tongue Every 5 (Five) Minutes As Needed for Chest Pain (Only if SBP Greater Than 100). Take no more than 3 doses in 15 minutes.        Pharmacy where request should be sent: [facility] #[id number] - [place], [place] [address] [facility] [phone] [facility] [phone] FX     Last office visit with prescribing clinician: 5/25/2022   Last telemedicine visit with prescribing clinician: Visit date not found   Next office visit with prescribing clinician: Visit date not found     Additional details provided by patient: PT STATES HIS MEDICINE HAS TURNED TO POWDER AND HE ISNT SURE IF HE STILL NEEDS THEM BUT HE IS OUT - PT WIFE ALSO WANTING PT TO BE SEEN AS HE HASN'T BEEN SEEN IN OFFICE SINCE 10/2022 - NO AVAILABILITY SHOWING     Does the patient have less than a 3 day supply:  [x] Yes  [] No    Would you like a call back once the refill request has been completed: [] Yes [] No    If the office needs to give you a call back, can they leave a voicemail: [] Yes [] No    Sola Allison Rep   08/29/23 11:53 EDT

## 2023-08-30 NOTE — TELEPHONE ENCOUNTER
Called and left a message on voicemail to call back and schedule appointment with next available MARY Jameson

## 2023-09-07 NOTE — TELEPHONE ENCOUNTER
09.07.23  Called and left voicemail for patient to return call to schedule appointment with first available APRN.     Thanks   Mini

## 2023-09-20 ENCOUNTER — OFFICE VISIT (OUTPATIENT)
Dept: CARDIOLOGY | Facility: CLINIC | Age: 68
End: 2023-09-20
Payer: MEDICARE

## 2023-09-20 VITALS
HEART RATE: 52 BPM | SYSTOLIC BLOOD PRESSURE: 138 MMHG | BODY MASS INDEX: 26.32 KG/M2 | DIASTOLIC BLOOD PRESSURE: 76 MMHG | WEIGHT: 188 LBS | HEIGHT: 71 IN | OXYGEN SATURATION: 97 %

## 2023-09-20 DIAGNOSIS — I50.32 CHRONIC DIASTOLIC CONGESTIVE HEART FAILURE: ICD-10-CM

## 2023-09-20 DIAGNOSIS — I10 PRIMARY HYPERTENSION: Primary | ICD-10-CM

## 2023-09-20 DIAGNOSIS — I25.10 CORONARY ARTERY DISEASE INVOLVING NATIVE CORONARY ARTERY OF NATIVE HEART WITHOUT ANGINA PECTORIS: ICD-10-CM

## 2023-09-20 DIAGNOSIS — E78.49 OTHER HYPERLIPIDEMIA: ICD-10-CM

## 2023-09-20 NOTE — PROGRESS NOTES
Date of Office Visit: 2023  Encounter Provider: TAYLOR Mann  Place of Service: [facility] CARDIOLOGY  Patient Name: Osman Aparicio  :1955    Chief Complaint   Patient presents with    Coronary Artery Disease   :     HPI: Osman Aparicio is a 68 y.o. male who is a patient of  Dr. Claudio and is new to me today. He has a history of hyperlipidemia, hypertension, tobacco abuse, alcohol abuse and COPD. Her cardiac history includes diastolic CHF, abdominal aortic aneurysm and CAD. He had a MI in 3/2022 and had multivessel CAD on cardiac cath. He underwent a CABG with LIMA to the LAD, SVG to OM1, SVG to OM2. Las measurement of of the abdominal aorta was 5.5cm and he is seeing Dr. Kumar for surgical repair. Prior to heart surgery his carotids revealed a mild stenosis. ON echo her EF 56%, grade 3 restrictive diastolic dysfunction, mild AI and TR.    He was last in the office October of last year. He was having issues with anxiety and was started previously on Wellbutrin and Lexapro which helped. He is here for follow up today.    He in January of this year he underwent abdominal juxtarenal endovascular repair of his aneurysm.  He has done well since then.  He has not smoked since his bypass surgery a year ago.  He works part-time at a golf course and is fairly active with walking regularly.  Initial blood pressure was little elevated in the office today at 152/84 recheck performed in May he is 138/76.  Denies any chest pain, shortness of breath, palpitations or syncope/near syncope.    Previous testing and notes have been reviewed by me.   Past Medical History:   Diagnosis Date    AAA (abdominal aortic aneurysm)     Abnormal glucose     Anxiety     COPD (chronic obstructive pulmonary disease)     Coronary artery disease     Encounter for special screening examination for neoplasm of prostate 10/2012    Hematuria     JUST MONITORING    History of COVID-19          Hyperlipidemia     Hypertension     Myocardial infarction 03/2022    Radius fracture     RIGHT    Vitamin D deficiency disease        Past Surgical History:   Procedure Laterality Date    ARTERIOGRAM AORTIC N/A 01/30/2023    Procedure: Zenith Fenestrated Endovascular Repair;  Surgeon: Mariusz Kumar MD;  Location: [facility] HYBRID OR 18/19;  Service: Vascular;  Laterality: N/A;    CARDIAC CATHETERIZATION N/A 03/29/2022    Procedure: Left Heart Cath;  Surgeon: Neto Paige MD;  Location: [facility] CATH INVASIVE LOCATION;  Service: Cardiology;  Laterality: N/A;    CARDIAC CATHETERIZATION N/A 03/29/2022    Procedure: Coronary angiography;  Surgeon: Neto Paige MD;  Location: [facility] CATH INVASIVE LOCATION;  Service: Cardiology;  Laterality: N/A;    CARDIAC CATHETERIZATION N/A 03/29/2022    Procedure: Left ventriculography;  Surgeon: Neto Paige MD;  Location: [facility] CATH INVASIVE LOCATION;  Service: Cardiology;  Laterality: N/A;    COLONOSCOPY N/A 12/07/2020    Procedure: COLONOSCOPY INTO CECUM WITH HOT SNARE POLYPECTOMIES, COLD BX POLYPECTOMIES, RESOLUTION CLIPS X;  Surgeon: Regi Mercado MD;  Location: [facility] ENDOSCOPY;  Service: General;  Laterality: N/A;  PRE:  POSITIVE COLOGUARD  POST:  POLYPS    CORONARY ARTERY BYPASS GRAFT N/A 04/01/2022    Procedure: STERNOTOMY, CORONARY ARTERY BYPASS UTILIZINGTHE RT SAPHENOUS VEIN WITH LEFT INTERNAL MAMMARY ARTERY GRAFT X3 OFF PUMP, PRP;  Surgeon: Colten Zamora MD;  Location: [facility] CVOR;  Service: Cardiothoracic;  Laterality: N/A;    ORIF WRIST FRACTURE Right 08/26/2022    Procedure: RIGHT DISTAL RADIUS FRACTURE OPEN REDUCTION INTERNAL FIXATION;  Surgeon: Bubba Mello MD;  Location: [facility] OR [facility];  Service: Orthopedics;  Laterality: Right;    TRANSESOPHAGEAL ECHOCARDIOGRAM (MARTHA) N/A 04/01/2022    Procedure: TRANSESOPHAGEAL ECHOCARDIOGRAM WITH ANESTHESIA;  Surgeon: Colten Zamora MD;  Location: [facility];   Service: Cardiothoracic;  Laterality: N/A;       Social History     Socioeconomic History    Marital status:    Tobacco Use    Smoking status: Former     Packs/day: 1.00     Years: 45.00     Pack years: 45.00     Types: Cigarettes     Quit date: 3/26/2022     Years since quittin.4    Smokeless tobacco: Never    Tobacco comments:     caffeine - 3 cups coffee daily, tea or soda occas.   Vaping Use    Vaping Use: Never used   Substance and Sexual Activity    Alcohol use: Yes     Comment: 2 BEERS OCCASIONAL    Drug use: No    Sexual activity: Defer       Family History   Problem Relation Age of Onset    Lung cancer Brother     Liver cancer Brother     Colon polyps Brother     Malig Hyperthermia Neg Hx        Review of Systems   Constitutional: Negative for diaphoresis and malaise/fatigue.   Cardiovascular:  Negative for chest pain, claudication, dyspnea on exertion, irregular heartbeat, leg swelling, near-syncope, orthopnea, palpitations, paroxysmal nocturnal dyspnea and syncope.   Respiratory:  Negative for cough, shortness of breath and sleep disturbances due to breathing.    Musculoskeletal:  Negative for falls.   Neurological:  Negative for dizziness and weakness.   Psychiatric/Behavioral:  Negative for altered mental status and substance abuse.      No Known Allergies      Current Outpatient Medications:     albuterol sulfate HFA (Proventil HFA) 108 (90 Base) MCG/ACT inhaler, Inhale 2 puffs Every 4 (Four) Hours As Needed for Wheezing., Disp: 18 g, Rfl: 0    amLODIPine (NORVASC) 10 MG tablet, TAKE 1 TABLET BY MOUTH EVERY DAY, Disp: 90 tablet, Rfl: 0    Ascorbic Acid (VITAMIN C PO), Take 1 tablet by mouth Daily. HOLD PRIOR TO SURG, Disp: , Rfl:     aspirin 81 MG EC tablet, Take 1 tablet by mouth Daily., Disp: 30 tablet, Rfl: 3    buPROPion XL (Wellbutrin XL) 150 MG 24 hr tablet, Take 1 tablet by mouth Daily. To be taken with 300 mg daily for total of 450 mg daily., Disp: 90 tablet, Rfl: 1    buPROPion XL  "(WELLBUTRIN XL) 300 MG 24 hr tablet, Take 1 tablet by mouth Every Morning., Disp: 90 tablet, Rfl: 1    escitalopram (LEXAPRO) 20 MG tablet, Take 1 tablet by mouth Every Night., Disp: 90 tablet, Rfl: 1    Ferrous Sulfate (Iron) 28 MG tablet, Take 1 tablet by mouth Daily., Disp: , Rfl:     folic acid (FOLVITE) 1 MG tablet, Take 1 tablet by mouth Daily., Disp: 30 tablet, Rfl: 3    losartan (Cozaar) 100 MG tablet, Take 1 tablet by mouth Daily., Disp: 30 tablet, Rfl: 11    metoprolol succinate XL (TOPROL-XL) 25 MG 24 hr tablet, TAKE 1 TABLET BY MOUTH EVERY DAY AT NIGHT, Disp: 90 tablet, Rfl: 0    multivitamin with minerals tablet tablet, Take 1 tablet by mouth Daily., Disp: 30 tablet, Rfl: 3    nitroglycerin (NITROSTAT) 0.4 MG SL tablet, Place 1 tablet under the tongue Every 5 (Five) Minutes As Needed for Chest Pain (Only if SBP Greater Than 100). Take no more than 3 doses in 15 minutes., Disp: 30 tablet, Rfl: 3    rosuvastatin (CRESTOR) 20 MG tablet, TAKE 1 TABLET BY MOUTH EVERY NIGHT, Disp: 90 tablet, Rfl: 3    thiamine (VITAMIN B-1) 100 MG tablet  tablet, Take 1 tablet by mouth Daily., Disp: 30 tablet, Rfl: 3    Trelegy Ellipta 100-62.5-25 MCG/INH inhaler, Inhale 1 puff Daily., Disp: , Rfl:     HYDROcodone-acetaminophen (NORCO) 5-325 MG per tablet, Take 1 tablet by mouth Every 6 (Six) Hours As Needed for Moderate Pain. (Patient not taking: Reported on 9/20/2023), Disp: 8 tablet, Rfl: 0      Objective:     Vitals:    09/20/23 1305   BP: 152/84   Pulse: 52   SpO2: 97%   Weight: 85.3 kg (188 lb)   Height: 180.3 cm (71\")     Body mass index is 26.22 kg/m².    PHYSICAL EXAM:    Constitutional:       General: Not in acute distress.     Appearance: Normal appearance. Well-developed.   Eyes:      Pupils: Pupils are equal, round, and reactive to light.   HENT:      Head: Normocephalic.   Neck:      Vascular: No carotid bruit or JVD.   Pulmonary:      Effort: Pulmonary effort is normal. No tachypnea.      Breath sounds: Normal " breath sounds. No wheezing. No rales.   Cardiovascular:      Normal rate. Regular rhythm.      No gallop.    Pulses:     Intact distal pulses.   Edema:     Peripheral edema absent.   Abdominal:      General: Bowel sounds are normal.      Palpations: Abdomen is soft.      Tenderness: There is no abdominal tenderness.   Musculoskeletal: Normal range of motion.      Cervical back: Normal range of motion and neck supple. No edema. Skin:     General: Skin is warm and dry.   Neurological:      Mental Status: Alert and oriented to person, place, and time.         ECG 12 Lead    Date/Time: 9/20/2023 1:11 PM  Performed by: Alayna Shetty APRN  Authorized by: Alayna Shetty APRN   Comparison: compared with previous ECG from 4/4/2023  Similar to previous ECG  Rhythm: sinus bradycardia  Rate: bradycardic  BPM: 47  QRS axis: normal    Clinical impression: non-specific ECG          Assessment:       Diagnosis Plan   1. Primary hypertension     Initial blood pressure elevated recheck stable going to have him check his blood pressure at home a few days a week and to call us if it is elevated.  But he says when he checks it at home its been stable.   2. Other hyperlipidemia     Last lipid panel at goal remains on statin  Plan for follow-up with primary in check of cholesterol.   3. Coronary artery disease involving native coronary artery of native heart without angina pectoris     No chest pain symptoms status post CABG   4. Chronic diastolic congestive heart failure     Euvolemic on exam    5.  Abdominal aortic aneurysm status post juxtarenal aortic aneurysm endovascular repair     No orders of the defined types were placed in this encounter.         Plan:       Follow-up in 6 months         Your medication list            Accurate as of September 20, 2023  1:10 PM. If you have any questions, ask your nurse or doctor.                CONTINUE taking these medications        Instructions Last Dose Given Next Dose Due    albuterol sulfate  (90 Base) MCG/ACT inhaler  Commonly known as: Proventil HFA      Inhale 2 puffs Every 4 (Four) Hours As Needed for Wheezing.       amLODIPine 10 MG tablet  Commonly known as: NORVASC      TAKE 1 TABLET BY MOUTH EVERY DAY       aspirin 81 MG EC tablet      Take 1 tablet by mouth Daily.       buPROPion  MG 24 hr tablet  Commonly known as: Wellbutrin XL      Take 1 tablet by mouth Daily. To be taken with 300 mg daily for total of 450 mg daily.       buPROPion  MG 24 hr tablet  Commonly known as: WELLBUTRIN XL      Take 1 tablet by mouth Every Morning.       escitalopram 20 MG tablet  Commonly known as: LEXAPRO      Take 1 tablet by mouth Every Night.       folic acid 1 MG tablet  Commonly known as: FOLVITE      Take 1 tablet by mouth Daily.       HYDROcodone-acetaminophen 5-325 MG per tablet  Commonly known as: NORCO      Take 1 tablet by mouth Every 6 (Six) Hours As Needed for Moderate Pain.       Iron 28 MG tablet      Take 1 tablet by mouth Daily.       losartan 100 MG tablet  Commonly known as: Cozaar      Take 1 tablet by mouth Daily.       metoprolol succinate XL 25 MG 24 hr tablet  Commonly known as: TOPROL-XL      TAKE 1 TABLET BY MOUTH EVERY DAY AT NIGHT       multivitamin with minerals tablet tablet      Take 1 tablet by mouth Daily.       nitroglycerin 0.4 MG SL tablet  Commonly known as: NITROSTAT      Place 1 tablet under the tongue Every 5 (Five) Minutes As Needed for Chest Pain (Only if SBP Greater Than 100). Take no more than 3 doses in 15 minutes.       rosuvastatin 20 MG tablet  Commonly known as: CRESTOR      TAKE 1 TABLET BY MOUTH EVERY NIGHT       thiamine 100 MG tablet  tablet  Commonly known as: VITAMIN B-1      Take 1 tablet by mouth Daily.       Trelegy Ellipta 100-62.5-25 MCG/ACT inhaler  Generic drug: Fluticasone-Umeclidin-Vilant      Inhale 1 puff Daily.       VITAMIN C PO      Take 1 tablet by mouth Daily. HOLD PRIOR TO SURG                  As  always, it has been a pleasure to participate in your patient's care.      Sincerely,     Alayna VAZQUEZ

## 2023-09-21 ENCOUNTER — HOSPITAL ENCOUNTER (OUTPATIENT)
Dept: CT IMAGING | Facility: HOSPITAL | Age: 68
Discharge: HOME | End: 2023-09-21
Admitting: INTERNAL MEDICINE
Payer: MEDICARE

## 2023-09-21 DIAGNOSIS — Z87.891 PERSONAL HISTORY OF TOBACCO USE, PRESENTING HAZARDS TO HEALTH: ICD-10-CM

## 2023-09-21 PROCEDURE — 71271 CT THORAX LUNG CANCER SCR C-: CPT

## 2023-10-05 DIAGNOSIS — F41.9 ANXIETY: ICD-10-CM

## 2023-10-05 DIAGNOSIS — F41.9 ANXIETY AND DEPRESSION: ICD-10-CM

## 2023-10-05 DIAGNOSIS — F32.A ANXIETY AND DEPRESSION: ICD-10-CM

## 2023-10-06 RX ORDER — ESCITALOPRAM OXALATE 20 MG/1
TABLET ORAL
Qty: 90 TABLET | Refills: 1 | Status: SHIPPED | OUTPATIENT
Start: 2023-10-06

## 2023-10-26 ENCOUNTER — OFFICE VISIT (OUTPATIENT)
Dept: FAMILY MEDICINE CLINIC | Facility: CLINIC | Age: 68
End: 2023-10-26
Payer: MEDICARE

## 2023-10-26 VITALS
BODY MASS INDEX: 26.04 KG/M2 | HEART RATE: 57 BPM | WEIGHT: 186 LBS | OXYGEN SATURATION: 100 % | DIASTOLIC BLOOD PRESSURE: 70 MMHG | RESPIRATION RATE: 16 BRPM | SYSTOLIC BLOOD PRESSURE: 150 MMHG | HEIGHT: 71 IN

## 2023-10-26 DIAGNOSIS — I10 ESSENTIAL HYPERTENSION: ICD-10-CM

## 2023-10-26 DIAGNOSIS — F32.A ANXIETY AND DEPRESSION: ICD-10-CM

## 2023-10-26 DIAGNOSIS — F41.9 ANXIETY AND DEPRESSION: ICD-10-CM

## 2023-10-26 PROCEDURE — 3077F SYST BP >= 140 MM HG: CPT | Performed by: NURSE PRACTITIONER

## 2023-10-26 PROCEDURE — 3078F DIAST BP <80 MM HG: CPT | Performed by: NURSE PRACTITIONER

## 2023-10-26 RX ORDER — BUPROPION HYDROCHLORIDE 150 MG/1
150 TABLET ORAL DAILY
Qty: 90 TABLET | Refills: 1 | Status: SHIPPED | OUTPATIENT
Start: 2023-10-26

## 2023-10-26 RX ORDER — AMLODIPINE BESYLATE 10 MG/1
10 TABLET ORAL DAILY
Qty: 90 TABLET | Refills: 1 | Status: SHIPPED | OUTPATIENT
Start: 2023-10-26

## 2023-10-26 RX ORDER — BUPROPION HYDROCHLORIDE 300 MG/1
300 TABLET ORAL EVERY MORNING
Qty: 90 TABLET | Refills: 1 | Status: SHIPPED | OUTPATIENT
Start: 2023-10-26

## 2023-10-26 RX ORDER — FLUOXETINE HYDROCHLORIDE 20 MG/1
20 CAPSULE ORAL DAILY
Qty: 30 CAPSULE | Refills: 0 | Status: SHIPPED | OUTPATIENT
Start: 2023-10-26

## 2023-10-26 NOTE — PROGRESS NOTES
"Subjective   Osman Aparicio is a 68 y.o. male.     History of Present Illness    Since the last visit, he has overall felt depressed.  He has Primary Hypertension and well controlled on current medication, CAD and remains under care of their Cardiologist for mangement, and depression which he does not feel is controlled on Wellbutrin and Lexapro which he has been on for a while .  he has been compliant with current medications have reviewed them.  The patient denies medication side effects.  Will refill medications. /70   Pulse 57   Resp 16   Ht 180.3 cm (71\")   Wt 84.4 kg (186 lb)   SpO2 100%   BMI 25.94 kg/m²     Results for orders placed or performed during the hospital encounter of 04/04/23   Blood Culture - Blood, Arm, Left    Specimen: Arm, Left; Blood   Result Value Ref Range    Blood Culture No growth at 5 days    Blood Culture - Blood, Arm, Right    Specimen: Arm, Right; Blood   Result Value Ref Range    Blood Culture No growth at 5 days    Respiratory Panel PCR w/COVID-19(SARS-CoV-2) [facility]UEL/AUSTIN/[facility] In-House, NP Swab in UTM/VTM, 3-4 HR TAT - Swab, Nasopharynx    Specimen: Nasopharynx; Swab   Result Value Ref Range    ADENOVIRUS, PCR Not Detected Not Detected    Coronavirus 229E Not Detected Not Detected    Coronavirus HKU1 Not Detected Not Detected    Coronavirus NL63 Not Detected Not Detected    Coronavirus OC43 Not Detected Not Detected    COVID19 Not Detected Not Detected - Ref. Range    Human Metapneumovirus Not Detected Not Detected    Human Rhinovirus/Enterovirus Not Detected Not Detected    Influenza A PCR Not Detected Not Detected    Influenza B PCR Not Detected Not Detected    Parainfluenza Virus 1 Not Detected Not Detected    Parainfluenza Virus 2 Not Detected Not Detected    Parainfluenza Virus 3 Not Detected Not Detected    Parainfluenza Virus 4 Not Detected Not Detected    RSV, PCR Not Detected Not Detected    Bordetella pertussis pcr Not Detected Not Detected    " Bordetella parapertussis PCR Not Detected Not Detected    Chlamydophila pneumoniae PCR Not Detected Not Detected    Mycoplasma pneumo by PCR Not Detected Not Detected   S. Pneumo Ag Urine or CSF - Urine, Urine, Clean Catch    Specimen: Urine, Clean Catch   Result Value Ref Range    Strep Pneumo Ag Negative Negative   Legionella Antigen, Urine - Urine, Urine, Clean Catch    Specimen: Urine, Clean Catch   Result Value Ref Range    LEGIONELLA ANTIGEN, URINE Negative Negative   Comprehensive Metabolic Panel    Specimen: Blood   Result Value Ref Range    Glucose 142 (H) 65 - 99 mg/dL    BUN 17 8 - 23 mg/dL    Creatinine 1.17 0.76 - 1.27 mg/dL    Sodium 130 (L) 136 - 145 mmol/L    Potassium 3.6 3.5 - 5.2 mmol/L    Chloride 92 (L) 98 - 107 mmol/L    CO2 24.6 22.0 - 29.0 mmol/L    Calcium 9.8 8.6 - 10.5 mg/dL    Total Protein 7.4 6.0 - 8.5 g/dL    Albumin 3.8 3.5 - 5.2 g/dL    ALT (SGPT) 30 1 - 41 U/L    AST (SGOT) 23 1 - 40 U/L    Alkaline Phosphatase 70 39 - 117 U/L    Total Bilirubin 0.5 0.0 - 1.2 mg/dL    Globulin 3.6 gm/dL    A/G Ratio 1.1 g/dL    BUN/Creatinine Ratio 14.5 7.0 - 25.0    Anion Gap 13.4 5.0 - 15.0 mmol/L    eGFR 68.3 >60.0 mL/min/1.73   Protime-INR    Specimen: Blood   Result Value Ref Range    Protime 14.1 11.7 - 14.2 Seconds    INR 1.08 0.90 - 1.10   aPTT    Specimen: Blood   Result Value Ref Range    PTT 28.5 22.7 - 35.4 seconds   Urinalysis With Microscopic If Indicated (No Culture) - Urine, Clean Catch    Specimen: Urine, Clean Catch   Result Value Ref Range    Color, UA Yellow Yellow, Straw    Appearance, UA Clear Clear    pH, UA 5.5 5.0 - 8.0    Specific Gravity, UA 1.019 1.005 - 1.030    Glucose, UA Negative Negative    Ketones, UA Negative Negative    Bilirubin, UA Negative Negative    Blood, UA Negative Negative    Protein, UA 30 mg/dL (1+) (A) Negative    Leuk Esterase, UA Negative Negative    Nitrite, UA Negative Negative    Urobilinogen, UA 0.2 E.U./dL 0.2 - 1.0 E.U./dL   BNP    Specimen:  Blood   Result Value Ref Range    proBNP 2,237.0 (H) 0.0 - 900.0 pg/mL   High Sensitivity Troponin T    Specimen: Blood   Result Value Ref Range    HS Troponin T 21 (H) <15 ng/L   Lactic Acid, Plasma    Specimen: Blood   Result Value Ref Range    Lactate 1.1 0.5 - 2.0 mmol/L   Procalcitonin    Specimen: Blood   Result Value Ref Range    Procalcitonin 0.18 0.00 - 0.25 ng/mL   Magnesium    Specimen: Blood   Result Value Ref Range    Magnesium 2.0 1.6 - 2.4 mg/dL   CBC Auto Differential    Specimen: Blood   Result Value Ref Range    WBC 19.64 (H) 3.40 - 10.80 10*3/mm3    RBC 3.35 (L) 4.14 - 5.80 10*6/mm3    Hemoglobin 10.4 (L) 13.0 - 17.7 g/dL    Hematocrit 30.8 (L) 37.5 - 51.0 %    MCV 91.9 79.0 - 97.0 fL    MCH 31.0 26.6 - 33.0 pg    MCHC 33.8 31.5 - 35.7 g/dL    RDW 14.0 12.3 - 15.4 %    RDW-SD 47.1 37.0 - 54.0 fl    MPV 10.4 6.0 - 12.0 fL    Platelets 336 140 - 450 10*3/mm3    nRBC 0.1 0.0 - 0.2 /100 WBC   Urinalysis, Microscopic Only - Urine, Clean Catch    Specimen: Urine, Clean Catch   Result Value Ref Range    RBC, UA 0-2 None Seen, 0-2 /HPF    WBC, UA 0-2 None Seen, 0-2 /HPF    Bacteria, UA None Seen None Seen /HPF    Squamous Epithelial Cells, UA 0-2 None Seen, 0-2 /HPF    Hyaline Casts, UA None Seen None Seen /LPF    Methodology Automated Microscopy    High Sensitivity Troponin T 2Hr    Specimen: Blood   Result Value Ref Range    HS Troponin T 21 (H) <15 ng/L    Troponin T Delta 0 >=-4 - <+4 ng/L   Manual Differential    Specimen: Blood   Result Value Ref Range    Neutrophil % 81.0 (H) 42.7 - 76.0 %    Lymphocyte % 10.0 (L) 19.6 - 45.3 %    Monocyte % 4.0 (L) 5.0 - 12.0 %    Basophil % 1.0 0.0 - 1.5 %    Myelocyte % 4.0 (H) 0.0 - 0.0 %    Neutrophils Absolute 15.91 (H) 1.70 - 7.00 10*3/mm3    Lymphocytes Absolute 1.96 0.70 - 3.10 10*3/mm3    Monocytes Absolute 0.79 0.10 - 0.90 10*3/mm3    Basophils Absolute 0.20 0.00 - 0.20 10*3/mm3    RBC Morphology Normal Normal    WBC Morphology Normal Normal    Platelet  Morphology Normal Normal   Basic Metabolic Panel    Specimen: Blood   Result Value Ref Range    Glucose 177 (H) 65 - 99 mg/dL    BUN 14 8 - 23 mg/dL    Creatinine 1.10 0.76 - 1.27 mg/dL    Sodium 130 (L) 136 - 145 mmol/L    Potassium 3.7 3.5 - 5.2 mmol/L    Chloride 96 (L) 98 - 107 mmol/L    CO2 22.0 22.0 - 29.0 mmol/L    Calcium 8.4 (L) 8.6 - 10.5 mg/dL    BUN/Creatinine Ratio 12.7 7.0 - 25.0    Anion Gap 12.0 5.0 - 15.0 mmol/L    eGFR 73.6 >60.0 mL/min/1.73   CBC Auto Differential    Specimen: Blood   Result Value Ref Range    WBC 25.77 (H) 3.40 - 10.80 10*3/mm3    RBC 2.87 (L) 4.14 - 5.80 10*6/mm3    Hemoglobin 9.0 (L) 13.0 - 17.7 g/dL    Hematocrit 26.3 (L) 37.5 - 51.0 %    MCV 91.6 79.0 - 97.0 fL    MCH 31.4 26.6 - 33.0 pg    MCHC 34.2 31.5 - 35.7 g/dL    RDW 14.1 12.3 - 15.4 %    RDW-SD 47.8 37.0 - 54.0 fl    MPV 10.6 6.0 - 12.0 fL    Platelets 281 140 - 450 10*3/mm3   Manual Differential    Specimen: Blood   Result Value Ref Range    Neutrophil % 97.0 (H) 42.7 - 76.0 %    Lymphocyte % 3.0 (L) 19.6 - 45.3 %    Neutrophils Absolute 25.00 (H) 1.70 - 7.00 10*3/mm3    Lymphocytes Absolute 0.77 0.70 - 3.10 10*3/mm3    Anisocytosis Slight/1+ None Seen    Polychromasia Mod/2+ None Seen    WBC Morphology Normal Normal    Platelet Morphology Normal Normal   Occult Blood X 1, Stool - Stool, Per Rectum    Specimen: Per Rectum; Stool   Result Value Ref Range    Fecal Occult Blood Negative Negative   Folate    Specimen: Blood   Result Value Ref Range    Folate >20.00 4.78 - 24.20 ng/mL   Ferritin    Specimen: Blood   Result Value Ref Range    Ferritin 920.00 (H) 30.00 - 400.00 ng/mL   Iron Profile    Specimen: Blood   Result Value Ref Range    Iron 17 (L) 59 - 158 mcg/dL    Iron Saturation (TSAT) 9 (L) 20 - 50 %    Transferrin 130 (L) 200 - 360 mg/dL    TIBC 194 (L) 298 - 536 mcg/dL   Vitamin B12    Specimen: Blood   Result Value Ref Range    Vitamin B-12 1,917 (H) 211 - 946 pg/mL   Basic Metabolic Panel    Specimen: Blood    Result Value Ref Range    Glucose 128 (H) 65 - 99 mg/dL    BUN 12 8 - 23 mg/dL    Creatinine 1.10 0.76 - 1.27 mg/dL    Sodium 133 (L) 136 - 145 mmol/L    Potassium 3.7 3.5 - 5.2 mmol/L    Chloride 98 98 - 107 mmol/L    CO2 23.4 22.0 - 29.0 mmol/L    Calcium 9.3 8.6 - 10.5 mg/dL    BUN/Creatinine Ratio 10.9 7.0 - 25.0    Anion Gap 11.6 5.0 - 15.0 mmol/L    eGFR 73.6 >60.0 mL/min/1.73   CBC Auto Differential    Specimen: Blood   Result Value Ref Range    WBC 17.30 (H) 3.40 - 10.80 10*3/mm3    RBC 3.16 (L) 4.14 - 5.80 10*6/mm3    Hemoglobin 9.6 (L) 13.0 - 17.7 g/dL    Hematocrit 29.2 (L) 37.5 - 51.0 %    MCV 92.4 79.0 - 97.0 fL    MCH 30.4 26.6 - 33.0 pg    MCHC 32.9 31.5 - 35.7 g/dL    RDW 14.0 12.3 - 15.4 %    RDW-SD 47.3 37.0 - 54.0 fl    MPV 10.3 6.0 - 12.0 fL    Platelets 315 140 - 450 10*3/mm3    Neutrophil % 71.9 42.7 - 76.0 %    Lymphocyte % 16.5 (L) 19.6 - 45.3 %    Monocyte % 6.4 5.0 - 12.0 %    Eosinophil % 1.0 0.3 - 6.2 %    Basophil % 0.4 0.0 - 1.5 %    Immature Grans % 3.8 (H) 0.0 - 0.5 %    Neutrophils, Absolute 12.45 (H) 1.70 - 7.00 10*3/mm3    Lymphocytes, Absolute 2.85 0.70 - 3.10 10*3/mm3    Monocytes, Absolute 1.10 (H) 0.10 - 0.90 10*3/mm3    Eosinophils, Absolute 0.18 0.00 - 0.40 10*3/mm3    Basophils, Absolute 0.07 0.00 - 0.20 10*3/mm3    Immature Grans, Absolute 0.65 (H) 0.00 - 0.05 10*3/mm3    nRBC 0.1 0.0 - 0.2 /100 WBC   Basic Metabolic Panel    Specimen: Blood   Result Value Ref Range    Glucose 127 (H) 65 - 99 mg/dL    BUN 11 8 - 23 mg/dL    Creatinine 1.00 0.76 - 1.27 mg/dL    Sodium 131 (L) 136 - 145 mmol/L    Potassium 3.6 3.5 - 5.2 mmol/L    Chloride 97 (L) 98 - 107 mmol/L    CO2 22.5 22.0 - 29.0 mmol/L    Calcium 9.0 8.6 - 10.5 mg/dL    BUN/Creatinine Ratio 11.0 7.0 - 25.0    Anion Gap 11.5 5.0 - 15.0 mmol/L    eGFR 82.5 >60.0 mL/min/1.73   CBC Auto Differential    Specimen: Blood   Result Value Ref Range    WBC 13.30 (H) 3.40 - 10.80 10*3/mm3    RBC 3.09 (L) 4.14 - 5.80 10*6/mm3     Hemoglobin 9.5 (L) 13.0 - 17.7 g/dL    Hematocrit 29.1 (L) 37.5 - 51.0 %    MCV 94.2 79.0 - 97.0 fL    MCH 30.7 26.6 - 33.0 pg    MCHC 32.6 31.5 - 35.7 g/dL    RDW 14.5 12.3 - 15.4 %    RDW-SD 49.3 37.0 - 54.0 fl    MPV 10.7 6.0 - 12.0 fL    Platelets 323 140 - 450 10*3/mm3    Neutrophil % 67.6 42.7 - 76.0 %    Lymphocyte % 19.8 19.6 - 45.3 %    Monocyte % 7.7 5.0 - 12.0 %    Eosinophil % 1.2 0.3 - 6.2 %    Basophil % 0.5 0.0 - 1.5 %    Immature Grans % 3.2 (H) 0.0 - 0.5 %    Neutrophils, Absolute 8.99 (H) 1.70 - 7.00 10*3/mm3    Lymphocytes, Absolute 2.64 0.70 - 3.10 10*3/mm3    Monocytes, Absolute 1.02 (H) 0.10 - 0.90 10*3/mm3    Eosinophils, Absolute 0.16 0.00 - 0.40 10*3/mm3    Basophils, Absolute 0.06 0.00 - 0.20 10*3/mm3    Immature Grans, Absolute 0.43 (H) 0.00 - 0.05 10*3/mm3    nRBC 0.1 0.0 - 0.2 /100 WBC   Basic Metabolic Panel    Specimen: Blood   Result Value Ref Range    Glucose 108 (H) 65 - 99 mg/dL    BUN 11 8 - 23 mg/dL    Creatinine 1.02 0.76 - 1.27 mg/dL    Sodium 132 (L) 136 - 145 mmol/L    Potassium 3.6 3.5 - 5.2 mmol/L    Chloride 97 (L) 98 - 107 mmol/L    CO2 23.0 22.0 - 29.0 mmol/L    Calcium 9.3 8.6 - 10.5 mg/dL    BUN/Creatinine Ratio 10.8 7.0 - 25.0    Anion Gap 12.0 5.0 - 15.0 mmol/L    eGFR 80.6 >60.0 mL/min/1.73   CBC Auto Differential    Specimen: Blood   Result Value Ref Range    WBC 8.63 3.40 - 10.80 10*3/mm3    RBC 3.16 (L) 4.14 - 5.80 10*6/mm3    Hemoglobin 9.6 (L) 13.0 - 17.7 g/dL    Hematocrit 29.1 (L) 37.5 - 51.0 %    MCV 92.1 79.0 - 97.0 fL    MCH 30.4 26.6 - 33.0 pg    MCHC 33.0 31.5 - 35.7 g/dL    RDW 14.1 12.3 - 15.4 %    RDW-SD 47.6 37.0 - 54.0 fl    MPV 10.8 6.0 - 12.0 fL    Platelets 343 140 - 450 10*3/mm3    nRBC 0.3 (H) 0.0 - 0.2 /100 WBC   Manual Differential    Specimen: Blood   Result Value Ref Range    Neutrophil % 62.0 42.7 - 76.0 %    Lymphocyte % 28.0 19.6 - 45.3 %    Monocyte % 6.0 5.0 - 12.0 %    Eosinophil % 1.0 0.3 - 6.2 %    Myelocyte % 3.0 (H) 0.0 - 0.0 %     Neutrophils Absolute 5.35 1.70 - 7.00 10*3/mm3    Lymphocytes Absolute 2.42 0.70 - 3.10 10*3/mm3    Monocytes Absolute 0.52 0.10 - 0.90 10*3/mm3    Eosinophils Absolute 0.09 0.00 - 0.40 10*3/mm3    Polychromasia Slight/1+ None Seen    WBC Morphology Normal Normal    Platelet Morphology Normal Normal   Basic Metabolic Panel    Specimen: Blood   Result Value Ref Range    Glucose 112 (H) 65 - 99 mg/dL    BUN 13 8 - 23 mg/dL    Creatinine 1.11 0.76 - 1.27 mg/dL    Sodium 133 (L) 136 - 145 mmol/L    Potassium 3.7 3.5 - 5.2 mmol/L    Chloride 98 98 - 107 mmol/L    CO2 24.9 22.0 - 29.0 mmol/L    Calcium 9.3 8.6 - 10.5 mg/dL    BUN/Creatinine Ratio 11.7 7.0 - 25.0    Anion Gap 10.1 5.0 - 15.0 mmol/L    eGFR 72.8 >60.0 mL/min/1.73   CBC Auto Differential    Specimen: Blood   Result Value Ref Range    WBC 10.59 3.40 - 10.80 10*3/mm3    RBC 3.35 (L) 4.14 - 5.80 10*6/mm3    Hemoglobin 10.0 (L) 13.0 - 17.7 g/dL    Hematocrit 31.0 (L) 37.5 - 51.0 %    MCV 92.5 79.0 - 97.0 fL    MCH 29.9 26.6 - 33.0 pg    MCHC 32.3 31.5 - 35.7 g/dL    RDW 14.3 12.3 - 15.4 %    RDW-SD 48.3 37.0 - 54.0 fl    MPV 10.6 6.0 - 12.0 fL    Platelets 402 140 - 450 10*3/mm3    nRBC 0.1 0.0 - 0.2 /100 WBC   Manual Differential    Specimen: Blood   Result Value Ref Range    Neutrophil % 75.3 42.7 - 76.0 %    Lymphocyte % 13.4 (L) 19.6 - 45.3 %    Monocyte % 7.2 5.0 - 12.0 %    Eosinophil % 2.1 0.3 - 6.2 %    Basophil % 1.0 0.0 - 1.5 %    Metamyelocyte % 1.0 (H) 0.0 - 0.0 %    Neutrophils Absolute 7.97 (H) 1.70 - 7.00 10*3/mm3    Lymphocytes Absolute 1.42 0.70 - 3.10 10*3/mm3    Monocytes Absolute 0.76 0.10 - 0.90 10*3/mm3    Eosinophils Absolute 0.22 0.00 - 0.40 10*3/mm3    Basophils Absolute 0.11 0.00 - 0.20 10*3/mm3    RBC Morphology Normal Normal    WBC Morphology Normal Normal    Platelet Morphology Normal Normal   ECG 12 Lead Dyspnea   Result Value Ref Range    QT Interval 374 ms         The following portions of the patient's history were reviewed and  updated as appropriate: allergies, current medications, past family history, past medical history, past social history, past surgical history, and problem list.    Review of Systems    Objective   Physical Exam    Assessment & Plan   There are no diagnoses linked to this encounter.

## 2023-11-06 RX ORDER — FLUOXETINE HYDROCHLORIDE 20 MG/1
20 CAPSULE ORAL DAILY
Qty: 90 CAPSULE | Refills: 0 | Status: SHIPPED | OUTPATIENT
Start: 2023-11-06

## 2023-11-20 ENCOUNTER — TELEPHONE (OUTPATIENT)
Dept: FAMILY MEDICINE CLINIC | Facility: CLINIC | Age: 68
End: 2023-11-20
Payer: MEDICARE

## 2023-11-20 RX ORDER — FLUOXETINE HYDROCHLORIDE 40 MG/1
40 CAPSULE ORAL DAILY
Qty: 90 CAPSULE | Refills: 0 | Status: SHIPPED | OUTPATIENT
Start: 2023-11-20

## 2023-11-20 RX ORDER — METOPROLOL SUCCINATE 25 MG/1
25 TABLET, EXTENDED RELEASE ORAL
Qty: 90 TABLET | Refills: 3 | Status: SHIPPED | OUTPATIENT
Start: 2023-11-20 | End: 2023-11-20 | Stop reason: SDUPTHER

## 2023-11-20 RX ORDER — METOPROLOL SUCCINATE 100 MG/1
100 TABLET, EXTENDED RELEASE ORAL
Qty: 90 TABLET | Refills: 3 | Status: SHIPPED | OUTPATIENT
Start: 2023-11-20

## 2023-11-20 NOTE — TELEPHONE ENCOUNTER
Lov:9/20/23  Nov:3/20/24  Patient's wife left message for you that  needs refill for metoprolol states he has been out of it for awhile & pharmacy has tried to get in contact to get filled but no success

## 2023-11-20 NOTE — TELEPHONE ENCOUNTER
Patient is totally out of  metoprolol succinate XL (TOPROL-XL) 25 MG 24 hr tablet (06/23/2023) he had had his heart doc prescribe, but now it is supposed to be [facility].  And he says it is supposed to be 100mg.  Please call back

## 2023-11-20 NOTE — TELEPHONE ENCOUNTER
PATIENTS WIFE CALLED IN TO [facility] THAT THE METOPROLOL THE PATIENT HAS BEEN TAKING IS 100MG DAILY, PLEASE MAKE SURE THAT THE PRESCRIPTION REFLECTS THIS, AS THE CURRENT WRITTEN PRESCRIPTION IS LISTED AS 25MG DAILY.

## 2023-11-27 RX ORDER — LOSARTAN POTASSIUM 100 MG/1
100 TABLET ORAL DAILY
Qty: 90 TABLET | Refills: 3 | Status: SHIPPED | OUTPATIENT
Start: 2023-11-27

## 2023-12-04 RX ORDER — FLUOXETINE HYDROCHLORIDE 40 MG/1
40 CAPSULE ORAL DAILY
Qty: 90 CAPSULE | Refills: 0 | OUTPATIENT
Start: 2023-12-04

## 2024-02-15 DIAGNOSIS — I10 ESSENTIAL HYPERTENSION: ICD-10-CM

## 2024-02-15 RX ORDER — AMLODIPINE BESYLATE 10 MG/1
10 TABLET ORAL DAILY
Qty: 90 TABLET | Refills: 1 | OUTPATIENT
Start: 2024-02-15

## 2024-02-15 RX ORDER — FLUOXETINE HYDROCHLORIDE 20 MG/1
20 CAPSULE ORAL DAILY
Qty: 90 CAPSULE | Refills: 0 | OUTPATIENT
Start: 2024-02-15

## 2024-02-15 NOTE — HOME HEALTH
"OT EVALUATION       02/15/24 1130   OT Last Visit   OT Visit Date 02/15/24   Note Type   Note type Evaluation   Pain Assessment   Pain Assessment Tool 0-10   Pain Score No Pain   Restrictions/Precautions   Other Precautions Chair Alarm;Cognitive;Bed Alarm;Fall Risk   Home Living   Type of Home Assisted living  ([facility])   Bathroom Shower/Tub Walk-in shower   Bathroom Equipment Shower chair   Home Equipment Walker;Wheelchair-manual   Additional Comments per pt, only transfers, non-ambulatory and thinks he requires assist for ADLS.  Patient is a questionable historian   Prior Function   Level of [place] Needs assistance with functional mobility;Needs assistance with ADLs;Needs assistance with IADLS   Lives With Facility staff   Receives Help From Personal care attendant   IADLs Family/Friend/Other provides medication management;Family/Friend/Other provides transportation;Family/Friend/Other provides meals   Lifestyle   Reciprocal Relationships has a lady friend he is close with at [place]   Service to Others moss   Intrinsic Gratification tools which he had to sell when he went into assisted living   Subjective   Subjective \"I'm weak\"   ADL   Eating Assistance 5  Supervision/Setup   Grooming Assistance 4  Minimal Assistance   UB Bathing Assistance 3  Moderate Assistance   LB Bathing Assistance 2  Maximal Assistance   UB Dressing Assistance 3  Moderate Assistance   LB Dressing Assistance 2  Maximal Assistance   Toileting Assistance  2  Maximal Assistance   Bed Mobility   Supine to Sit 3  Moderate assistance   Additional items Assist x 2;Verbal cues   Sit to Supine 3  Moderate assistance   Additional items Assist x 2;Verbal cues;LE management   Transfers   Sit to Stand 3  Moderate assistance   Additional items Assist x 2;Verbal cues   Stand to Sit 3  Moderate assistance   Additional items Assist x 2;Verbal cues   Functional Mobility   Functional Mobility 3  Moderate assistance   Additional Comments few steps " Recent hospitalization for: COPD, NSTEM, Alcohol abuse, Acute diastolic congestive heart failure, Leukocytosis, Positive D dimer, Anemia, AAA, Coronary artery disease, Abnormal findings on diagnostic imaging of heart and coronary circulation , Essential hypertension, hyperlipidemia        PT REPORTED HAVING INCREASED SOA. CALLED AND WENT TO HOSPITAL. PT DIAGNOSED PULMOARY EDEMA AND RESPIRATORY FAILURE. CARDIAC CATH WAS DONE AND UNDER WENT A 3 VESSEL CABBAGE. COPD EXACERBATION ALSO NOTED. STARTED NEW ON INHALERS AND TO FOLLOW UP WITH DR JORDAN. PATIENT WITH ANEMIA ON ADMISSION MACROCYTOSIS SECONDARY TO ALCOHOL. NOTED TO HAVING AAA TO FOLLOW UP WITH VASCULAR. HX OF ALCOHOL AND TOBACCO ABUSE. CESSATION WITH NO DIFFICULTIES SO FAR. MOST MEDICATIONS ARE NEW. WILL NEED A LOT OF EDUCATION. OTHERWISE PATIENT IS DOING WELL.      DID NOT HAVE VITAMIN B 1. PLEASE FOLLOW UP. NON AVAILABLE AT PHARMACY      HAS LIST OF FOLLOW UP APPOINTMENTS IN HOME. PLEASE FOLLOW UP WITH PT THAT ALL HAVE BEEN MADE to head, assist of 2 with RW   Balance   Static Sitting Fair   Dynamic Sitting Fair   Static Standing Poor   Dynamic Standing Poor   Activity Tolerance   Activity Tolerance Patient limited by fatigue   RUE Assessment   RUE Assessment WFL   LUE Assessment   LUE Assessment WFL   Cognition   Overall Cognitive Status Impaired   Arousal/Participation Cooperative   Attention Attends with cues to redirect   Orientation Level Oriented to person;Disoriented to time;Disoriented to situation;Disoriented to place   Following Commands Follows one step commands with increased time or repetition   Assessment   Limitation Decreased ADL status;Decreased UE strength;Decreased Safe judgement during ADL;Decreased cognition;Decreased endurance;Decreased high-level ADLs;Decreased self-care trans  (decreased balance and mobility)   Prognosis Good   Assessment Patient evaluated by Occupational Therapy.  Patient admitted with Acute respiratory failure with hypoxia (HCC).  The patients occupational profile, medical and therapy history includes a extensive additional review of physical, cognitive, or psychosocial history related to current functional performance.  Comorbidities affecting functional mobility and ADLS include: cancer and hypertension.  Prior to admission, patient was requiring assist for ADLS, requiring assist for IADLS, and an assisted living resident.  The evaluation identifies the following performance deficits: weakness, impaired balance, decreased endurance, increased fall risk, new onset of impairment of functional mobility, decreased ADLS, decreased IADLS, decreased activity tolerance, decreased safety awareness, impaired judgement, decreased cognition, and decreased strength, that result in activity limitations and/or participation restrictions. This evaluation requires clinical decision making of high complexity, because the patient presents with comorbidites that affect occupational performance and required  significant modification of tasks or assistance with consideration of multiple treatment options.  The Barthel Index was used as a functional outcome tool presenting with a score of Barthel Index Score: 30, indicating marked limitations of functional mobility and ADLS.  The patient's raw score on the -PAC Daily Activity Inpatient Short Form is 12. A raw score of less than 19 suggests the patient may benefit from discharge to post-acute rehabilitation services. Please refer to the recommendation of the Occupational Therapist for safe discharge planning.  Patient will benefit from skilled Occupational Therapy services to address above deficits and facilitate a safe return to prior level of function.   Goals   Patient Goals to get stronger   STG Time Frame   (1-7 days)   Short Term Goal  Goals established to promote Patient Goals: to get stronger:  Eating: supervision; Grooming: supervision seated; Bathing: mod assist; Upper Body Dressing min assist; Lower Body Dressing: mod assist; Toileting: mod assist; Patient will increase ambulatory commode transfer to mod assist with rolling walker to increase performance and safety with ADLS and functional mobility; Patient will increase standing tolerance to 3 minutes during ADL task to decrease assistance level and decrease fall risk; Patient will increase bed mobility to mod assist in preparation for ADLS and transfers;  Patient will tolerate 5 minutes of UE ROM/strengthening to increase general activity tolerance and performance in ADLS/IADLS; Patient will improve functional activity tolerance to 10 minutes of sustained functional tasks to increase participation in basic self-care and decrease assistance level; Patient will increase dynamic sitting balance to fair+ to improve the ability to sit at edge of bed or on a chair for ADLS;  Patient will increase dynamic standing balance to poor+ to improve postural stability and decrease fall risk during standing ADLS and  transfers.   LTG Time Frame   (8-14 days)   Long Term Goal Eating: independent; Grooming: independent seated; Bathing: min assist; Upper Body Dressing supervision; Lower Body Dressing: min assist; Toileting: min assist; Patient will increase ambulatory commode transfer to min assist with rolling walker to increase performance and safety with ADLS and functional mobility; Patient will increase standing tolerance to 6 minutes during ADL task to decrease assistance level and decrease fall risk; Patient will increase bed mobility to min assist in preparation for ADLS and transfers;  Patient will tolerate 10 minutes of UE ROM/strengthening to increase general activity tolerance and performance in ADLS/IADLS; Patient will improve functional activity tolerance to 20 minutes of sustained functional tasks to increase participation in basic self-care and decrease assistance level; Patient will increase dynamic sitting balance to good to improve the ability to sit at edge of bed or on a chair for ADLS;  Patient will increase dynamic standing balance to fair-to improve postural stability and decrease fall risk during standing ADLS and transfers.   Pt will score >/= 16/24 on AM-PAC Daily Activity Inpatient scale to promote safe independence with ADLs and functional mobility; Pt will score >/= 60/100 on Barthel Index in order to decrease caregiver assistance needed and increase ability to perform ADLs and functional mobility.   Plan   Treatment Interventions ADL retraining;Functional transfer training;UE strengthening/ROM;Endurance training;Patient/family training;Equipment evaluation/education;Activityengagement;Compensatory technique education;Cognitive reorientation   Goal Expiration Date 02/29/24   OT Frequency 3-5x/wk   Discharge Recommendation   Rehab Resource Intensity Level, OT II (Moderate Resource Intensity)   AM-PAC Daily Activity Inpatient   Lower Body Dressing 1   Bathing 2   Toileting 1   Upper Body Dressing 2    Grooming 3   Eating 3   Daily Activity Raw Score 12   Daily Activity Standardized Score (Calc for Raw Score >=11) 30.6   AM-PAC Applied Cognition Inpatient   Following a Speech/Presentation 2   Understanding Ordinary Conversation 4   Taking Medications 1   Remembering Where Things Are Placed or Put Away 1   Remembering List of 4-5 Errands 1   Taking Care of Complicated Tasks 1   Applied Cognition Raw Score 10   Applied Cognition Standardized Score 24.98   Barthel Index   Feeding 5   Bathing 0   Grooming Score 0   Dressing Score 5   Bladder Score 0   Bowels Score 10   Toilet Use Score 5   Transfers (Bed/Chair) Score 5   Mobility (Level Surface) Score 0   Stairs Score 0   Barthel Index Score 30   Licensure   [place] License Number  Lizzie Aldana MS OTR/L [id number]

## 2024-02-19 RX ORDER — FLUOXETINE HYDROCHLORIDE 40 MG/1
40 CAPSULE ORAL DAILY
Qty: 90 CAPSULE | Refills: 0 | Status: SHIPPED | OUTPATIENT
Start: 2024-02-19

## 2024-02-19 NOTE — TELEPHONE ENCOUNTER
Caller: Suzanne Aparicio    Relationship: Emergency Contact    Best call back number: [phone]     Requested Prescriptions:   Requested Prescriptions     Pending Prescriptions Disp Refills    FLUoxetine (PROzac) 40 MG capsule [Pharmacy Med Name: FLUOXETINE HCL 40 MG CAPSULE] 90 capsule 0     Sig: TAKE 1 CAPSULE BY MOUTH EVERY DAY        Pharmacy where request should be sent: [facility]ACY #[id number] - [facility] [place] - [address] AT [facility] [phone] [facility] [phone] FX     Last office visit with prescribing clinician: 10/26/2023   Last telemedicine visit with prescribing clinician: Visit date not found   Next office visit with prescribing clinician: 3/11/2024     Additional details provided by patient: PATIENTS SPOUSE CALLING WANTING TO KNOW IF HE CAN GET ENOUGH MEDICATION UNTIL HIS APPOINTMENT     Does the patient have less than a 3 day supply:  [x] Yes  [] No    Would you like a call back once the refill request has been completed: [] Yes [x] No    If the office needs to give you a call back, can they leave a voicemail: [] Yes [x] No    Sola Streeter Rep   02/19/24 14:30 EST

## 2024-03-11 ENCOUNTER — OFFICE VISIT (OUTPATIENT)
Dept: FAMILY MEDICINE CLINIC | Facility: CLINIC | Age: 69
End: 2024-03-11
Payer: MEDICARE

## 2024-03-11 VITALS
TEMPERATURE: 98.7 F | HEIGHT: 71 IN | HEART RATE: 65 BPM | DIASTOLIC BLOOD PRESSURE: 76 MMHG | SYSTOLIC BLOOD PRESSURE: 132 MMHG | BODY MASS INDEX: 26.6 KG/M2 | OXYGEN SATURATION: 98 % | WEIGHT: 190 LBS

## 2024-03-11 DIAGNOSIS — F32.A ANXIETY AND DEPRESSION: Primary | ICD-10-CM

## 2024-03-11 DIAGNOSIS — F41.9 ANXIETY AND DEPRESSION: Primary | ICD-10-CM

## 2024-03-11 DIAGNOSIS — Z12.5 SCREENING FOR PROSTATE CANCER: ICD-10-CM

## 2024-03-11 DIAGNOSIS — I10 ESSENTIAL HYPERTENSION: ICD-10-CM

## 2024-03-11 PROCEDURE — 99214 OFFICE O/P EST MOD 30 MIN: CPT | Performed by: NURSE PRACTITIONER

## 2024-03-11 PROCEDURE — 1160F RVW MEDS BY RX/DR IN RCRD: CPT | Performed by: NURSE PRACTITIONER

## 2024-03-11 PROCEDURE — 1159F MED LIST DOCD IN RCRD: CPT | Performed by: NURSE PRACTITIONER

## 2024-03-11 PROCEDURE — 3078F DIAST BP <80 MM HG: CPT | Performed by: NURSE PRACTITIONER

## 2024-03-11 PROCEDURE — 3075F SYST BP GE 130 - 139MM HG: CPT | Performed by: NURSE PRACTITIONER

## 2024-03-11 RX ORDER — FLUOXETINE HYDROCHLORIDE 40 MG/1
80 CAPSULE ORAL DAILY
Qty: 180 CAPSULE | Refills: 1 | Status: SHIPPED | OUTPATIENT
Start: 2024-03-11

## 2024-03-11 RX ORDER — BUPROPION HYDROCHLORIDE 150 MG/1
150 TABLET ORAL DAILY
Qty: 90 TABLET | Refills: 1 | Status: SHIPPED | OUTPATIENT
Start: 2024-03-11

## 2024-03-11 RX ORDER — AMLODIPINE BESYLATE 10 MG/1
10 TABLET ORAL DAILY
Qty: 90 TABLET | Refills: 1 | Status: SHIPPED | OUTPATIENT
Start: 2024-03-11

## 2024-03-11 NOTE — PROGRESS NOTES
"Subjective   Osman Aparicio is a 68 y.o. male.     History of Present Illness   Answers submitted by the patient for this visit:  Primary Reason for Visit (Submitted on 3/4/2024)  What is the primary reason for your visit?: Other  Other (Submitted on 3/4/2024)  Please describe your symptoms.: Depression anxiety  Have you had these symptoms before?: Yes  How long have you been having these symptoms?: Greater than 2 weeks   Since the last visit, he has overall felt anxious.  He does not feel that the fluoxetine and Wellbutrin are controlling his symptoms.  He did notice improvement initially with the fluoxetine dose increase but just does not feel like it is doing enough.  He is not sure how much the Wellbutrin has helped.  He is not using it for smoking cessation since he quit 2 years ago.  He is willing to wean down the dose of the Wellbutrin.  Blood pressure is doing well on current regimen.  Needs amlodipine refilled.  He has upcoming appointment with cardiology on the 20th.  he has been compliant with current medications have reviewed them.  The patient denies medication side effects.  Will refill medications. /76 (BP Location: Right arm, Patient Position: Sitting, Cuff Size: Adult)   Pulse 65   Temp 98.7 °F (37.1 °C) (Oral)   Ht 180.3 cm (71\")   Wt 86.2 kg (190 lb)   SpO2 98%   BMI 26.50 kg/m²     Results for orders placed or performed during the hospital encounter of 04/04/23   Blood Culture - Blood, Arm, Left    Specimen: Arm, Left; Blood   Result Value Ref Range    Blood Culture No growth at 5 days    Blood Culture - Blood, Arm, Right    Specimen: Arm, Right; Blood   Result Value Ref Range    Blood Culture No growth at 5 days    Respiratory Panel PCR w/COVID-19(SARS-CoV-2) ESPINOZA/MIGUEL/AUSTIN/PAD/COR/MAD/LORI In-House, NP Swab in [facility], 3-4 HR TAT - Swab, Nasopharynx    Specimen: Nasopharynx; Swab   Result Value Ref Range    ADENOVIRUS, PCR Not Detected Not Detected    Coronavirus 229E Not Detected Not " Detected    Coronavirus HKU1 Not Detected Not Detected    Coronavirus NL63 Not Detected Not Detected    Coronavirus OC43 Not Detected Not Detected    COVID19 Not Detected Not Detected - Ref. Range    Human Metapneumovirus Not Detected Not Detected    Human Rhinovirus/Enterovirus Not Detected Not Detected    Influenza A PCR Not Detected Not Detected    Influenza B PCR Not Detected Not Detected    Parainfluenza Virus 1 Not Detected Not Detected    Parainfluenza Virus 2 Not Detected Not Detected    Parainfluenza Virus 3 Not Detected Not Detected    Parainfluenza Virus 4 Not Detected Not Detected    RSV, PCR Not Detected Not Detected    Bordetella pertussis pcr Not Detected Not Detected    Bordetella parapertussis PCR Not Detected Not Detected    Chlamydophila pneumoniae PCR Not Detected Not Detected    Mycoplasma pneumo by PCR Not Detected Not Detected   S. Pneumo Ag Urine or CSF - Urine, Urine, Clean Catch    Specimen: Urine, Clean Catch   Result Value Ref Range    Strep Pneumo Ag Negative Negative   Legionella Antigen, Urine - Urine, Urine, Clean Catch    Specimen: Urine, Clean Catch   Result Value Ref Range    LEGIONELLA ANTIGEN, URINE Negative Negative   Comprehensive Metabolic Panel    Specimen: Blood   Result Value Ref Range    Glucose 142 (H) 65 - 99 mg/dL    BUN 17 8 - 23 mg/dL    Creatinine 1.17 0.76 - 1.27 mg/dL    Sodium 130 (L) 136 - 145 mmol/L    Potassium 3.6 3.5 - 5.2 mmol/L    Chloride 92 (L) 98 - 107 mmol/L    CO2 24.6 22.0 - 29.0 mmol/L    Calcium 9.8 8.6 - 10.5 mg/dL    Total Protein 7.4 6.0 - 8.5 g/dL    Albumin 3.8 3.5 - 5.2 g/dL    ALT (SGPT) 30 1 - 41 U/L    AST (SGOT) 23 1 - 40 U/L    Alkaline Phosphatase 70 39 - 117 U/L    Total Bilirubin 0.5 0.0 - 1.2 mg/dL    Globulin 3.6 gm/dL    A/G Ratio 1.1 g/dL    BUN/Creatinine Ratio 14.5 7.0 - 25.0    Anion Gap 13.4 5.0 - 15.0 mmol/L    eGFR 68.3 >60.0 mL/min/1.73   Protime-INR    Specimen: Blood   Result Value Ref Range    Protime 14.1 11.7 - 14.2  Seconds    INR 1.08 0.90 - 1.10   aPTT    Specimen: Blood   Result Value Ref Range    PTT 28.5 22.7 - 35.4 seconds   Urinalysis With Microscopic If Indicated (No Culture) - Urine, Clean Catch    Specimen: Urine, Clean Catch   Result Value Ref Range    Color, UA Yellow Yellow, Straw    Appearance, UA Clear Clear    pH, UA 5.5 5.0 - 8.0    Specific Gravity, UA 1.019 1.005 - 1.030    Glucose, UA Negative Negative    Ketones, UA Negative Negative    Bilirubin, UA Negative Negative    Blood, UA Negative Negative    Protein, UA 30 mg/dL (1+) (A) Negative    Leuk Esterase, UA Negative Negative    Nitrite, UA Negative Negative    Urobilinogen, UA 0.2 E.U./dL 0.2 - 1.0 E.U./dL   BNP    Specimen: Blood   Result Value Ref Range    proBNP 2,237.0 (H) 0.0 - 900.0 pg/mL   High Sensitivity Troponin T    Specimen: Blood   Result Value Ref Range    HS Troponin T 21 (H) <15 ng/L   Lactic Acid, Plasma    Specimen: Blood   Result Value Ref Range    Lactate 1.1 0.5 - 2.0 mmol/L   Procalcitonin    Specimen: Blood   Result Value Ref Range    Procalcitonin 0.18 0.00 - 0.25 ng/mL   Magnesium    Specimen: Blood   Result Value Ref Range    Magnesium 2.0 1.6 - 2.4 mg/dL   CBC Auto Differential    Specimen: Blood   Result Value Ref Range    WBC 19.64 (H) 3.40 - 10.80 10*3/mm3    RBC 3.35 (L) 4.14 - 5.80 10*6/mm3    Hemoglobin 10.4 (L) 13.0 - 17.7 g/dL    Hematocrit 30.8 (L) 37.5 - 51.0 %    MCV 91.9 79.0 - 97.0 fL    MCH 31.0 26.6 - 33.0 pg    MCHC 33.8 31.5 - 35.7 g/dL    RDW 14.0 12.3 - 15.4 %    RDW-SD 47.1 37.0 - 54.0 fl    MPV 10.4 6.0 - 12.0 fL    Platelets 336 140 - 450 10*3/mm3    nRBC 0.1 0.0 - 0.2 /100 WBC   Urinalysis, Microscopic Only - Urine, Clean Catch    Specimen: Urine, Clean Catch   Result Value Ref Range    RBC, UA 0-2 None Seen, 0-2 /HPF    WBC, UA 0-2 None Seen, 0-2 /HPF    Bacteria, UA None Seen None Seen /HPF    Squamous Epithelial Cells, UA 0-2 None Seen, 0-2 /HPF    Hyaline Casts, UA None Seen None Seen /LPF     Methodology Automated Microscopy    High Sensitivity Troponin T 2Hr    Specimen: Blood   Result Value Ref Range    HS Troponin T 21 (H) <15 ng/L    Troponin T Delta 0 >=-4 - <+4 ng/L   Manual Differential    Specimen: Blood   Result Value Ref Range    Neutrophil % 81.0 (H) 42.7 - 76.0 %    Lymphocyte % 10.0 (L) 19.6 - 45.3 %    Monocyte % 4.0 (L) 5.0 - 12.0 %    Basophil % 1.0 0.0 - 1.5 %    Myelocyte % 4.0 (H) 0.0 - 0.0 %    Neutrophils Absolute 15.91 (H) 1.70 - 7.00 10*3/mm3    Lymphocytes Absolute 1.96 0.70 - 3.10 10*3/mm3    Monocytes Absolute 0.79 0.10 - 0.90 10*3/mm3    Basophils Absolute 0.20 0.00 - 0.20 10*3/mm3    RBC Morphology Normal Normal    WBC Morphology Normal Normal    Platelet Morphology Normal Normal   Basic Metabolic Panel    Specimen: Blood   Result Value Ref Range    Glucose 177 (H) 65 - 99 mg/dL    BUN 14 8 - 23 mg/dL    Creatinine 1.10 0.76 - 1.27 mg/dL    Sodium 130 (L) 136 - 145 mmol/L    Potassium 3.7 3.5 - 5.2 mmol/L    Chloride 96 (L) 98 - 107 mmol/L    CO2 22.0 22.0 - 29.0 mmol/L    Calcium 8.4 (L) 8.6 - 10.5 mg/dL    BUN/Creatinine Ratio 12.7 7.0 - 25.0    Anion Gap 12.0 5.0 - 15.0 mmol/L    eGFR 73.6 >60.0 mL/min/1.73   CBC Auto Differential    Specimen: Blood   Result Value Ref Range    WBC 25.77 (H) 3.40 - 10.80 10*3/mm3    RBC 2.87 (L) 4.14 - 5.80 10*6/mm3    Hemoglobin 9.0 (L) 13.0 - 17.7 g/dL    Hematocrit 26.3 (L) 37.5 - 51.0 %    MCV 91.6 79.0 - 97.0 fL    MCH 31.4 26.6 - 33.0 pg    MCHC 34.2 31.5 - 35.7 g/dL    RDW 14.1 12.3 - 15.4 %    RDW-SD 47.8 37.0 - 54.0 fl    MPV 10.6 6.0 - 12.0 fL    Platelets 281 140 - 450 10*3/mm3   Manual Differential    Specimen: Blood   Result Value Ref Range    Neutrophil % 97.0 (H) 42.7 - 76.0 %    Lymphocyte % 3.0 (L) 19.6 - 45.3 %    Neutrophils Absolute 25.00 (H) 1.70 - 7.00 10*3/mm3    Lymphocytes Absolute 0.77 0.70 - 3.10 10*3/mm3    Anisocytosis Slight/1+ None Seen    Polychromasia Mod/2+ None Seen    WBC Morphology Normal Normal     Platelet Morphology Normal Normal   Occult Blood X 1, Stool - Stool, Per Rectum    Specimen: Per Rectum; Stool   Result Value Ref Range    Fecal Occult Blood Negative Negative   Folate    Specimen: Blood   Result Value Ref Range    Folate >20.00 4.78 - 24.20 ng/mL   Ferritin    Specimen: Blood   Result Value Ref Range    Ferritin 920.00 (H) 30.00 - 400.00 ng/mL   Iron Profile    Specimen: Blood   Result Value Ref Range    Iron 17 (L) 59 - 158 mcg/dL    Iron Saturation (TSAT) 9 (L) 20 - 50 %    Transferrin 130 (L) 200 - 360 mg/dL    TIBC 194 (L) 298 - 536 mcg/dL   Vitamin B12    Specimen: Blood   Result Value Ref Range    Vitamin B-12 1,917 (H) 211 - 946 pg/mL   Basic Metabolic Panel    Specimen: Blood   Result Value Ref Range    Glucose 128 (H) 65 - 99 mg/dL    BUN 12 8 - 23 mg/dL    Creatinine 1.10 0.76 - 1.27 mg/dL    Sodium 133 (L) 136 - 145 mmol/L    Potassium 3.7 3.5 - 5.2 mmol/L    Chloride 98 98 - 107 mmol/L    CO2 23.4 22.0 - 29.0 mmol/L    Calcium 9.3 8.6 - 10.5 mg/dL    BUN/Creatinine Ratio 10.9 7.0 - 25.0    Anion Gap 11.6 5.0 - 15.0 mmol/L    eGFR 73.6 >60.0 mL/min/1.73   CBC Auto Differential    Specimen: Blood   Result Value Ref Range    WBC 17.30 (H) 3.40 - 10.80 10*3/mm3    RBC 3.16 (L) 4.14 - 5.80 10*6/mm3    Hemoglobin 9.6 (L) 13.0 - 17.7 g/dL    Hematocrit 29.2 (L) 37.5 - 51.0 %    MCV 92.4 79.0 - 97.0 fL    MCH 30.4 26.6 - 33.0 pg    MCHC 32.9 31.5 - 35.7 g/dL    RDW 14.0 12.3 - 15.4 %    RDW-SD 47.3 37.0 - 54.0 fl    MPV 10.3 6.0 - 12.0 fL    Platelets 315 140 - 450 10*3/mm3    Neutrophil % 71.9 42.7 - 76.0 %    Lymphocyte % 16.5 (L) 19.6 - 45.3 %    Monocyte % 6.4 5.0 - 12.0 %    Eosinophil % 1.0 0.3 - 6.2 %    Basophil % 0.4 0.0 - 1.5 %    Immature Grans % 3.8 (H) 0.0 - 0.5 %    Neutrophils, Absolute 12.45 (H) 1.70 - 7.00 10*3/mm3    Lymphocytes, Absolute 2.85 0.70 - 3.10 10*3/mm3    Monocytes, Absolute 1.10 (H) 0.10 - 0.90 10*3/mm3    Eosinophils, Absolute 0.18 0.00 - 0.40 10*3/mm3     Basophils, Absolute 0.07 0.00 - 0.20 10*3/mm3    Immature Grans, Absolute 0.65 (H) 0.00 - 0.05 10*3/mm3    nRBC 0.1 0.0 - 0.2 /100 WBC   Basic Metabolic Panel    Specimen: Blood   Result Value Ref Range    Glucose 127 (H) 65 - 99 mg/dL    BUN 11 8 - 23 mg/dL    Creatinine 1.00 0.76 - 1.27 mg/dL    Sodium 131 (L) 136 - 145 mmol/L    Potassium 3.6 3.5 - 5.2 mmol/L    Chloride 97 (L) 98 - 107 mmol/L    CO2 22.5 22.0 - 29.0 mmol/L    Calcium 9.0 8.6 - 10.5 mg/dL    BUN/Creatinine Ratio 11.0 7.0 - 25.0    Anion Gap 11.5 5.0 - 15.0 mmol/L    eGFR 82.5 >60.0 mL/min/1.73   CBC Auto Differential    Specimen: Blood   Result Value Ref Range    WBC 13.30 (H) 3.40 - 10.80 10*3/mm3    RBC 3.09 (L) 4.14 - 5.80 10*6/mm3    Hemoglobin 9.5 (L) 13.0 - 17.7 g/dL    Hematocrit 29.1 (L) 37.5 - 51.0 %    MCV 94.2 79.0 - 97.0 fL    MCH 30.7 26.6 - 33.0 pg    MCHC 32.6 31.5 - 35.7 g/dL    RDW 14.5 12.3 - 15.4 %    RDW-SD 49.3 37.0 - 54.0 fl    MPV 10.7 6.0 - 12.0 fL    Platelets 323 140 - 450 10*3/mm3    Neutrophil % 67.6 42.7 - 76.0 %    Lymphocyte % 19.8 19.6 - 45.3 %    Monocyte % 7.7 5.0 - 12.0 %    Eosinophil % 1.2 0.3 - 6.2 %    Basophil % 0.5 0.0 - 1.5 %    Immature Grans % 3.2 (H) 0.0 - 0.5 %    Neutrophils, Absolute 8.99 (H) 1.70 - 7.00 10*3/mm3    Lymphocytes, Absolute 2.64 0.70 - 3.10 10*3/mm3    Monocytes, Absolute 1.02 (H) 0.10 - 0.90 10*3/mm3    Eosinophils, Absolute 0.16 0.00 - 0.40 10*3/mm3    Basophils, Absolute 0.06 0.00 - 0.20 10*3/mm3    Immature Grans, Absolute 0.43 (H) 0.00 - 0.05 10*3/mm3    nRBC 0.1 0.0 - 0.2 /100 WBC   Basic Metabolic Panel    Specimen: Blood   Result Value Ref Range    Glucose 108 (H) 65 - 99 mg/dL    BUN 11 8 - 23 mg/dL    Creatinine 1.02 0.76 - 1.27 mg/dL    Sodium 132 (L) 136 - 145 mmol/L    Potassium 3.6 3.5 - 5.2 mmol/L    Chloride 97 (L) 98 - 107 mmol/L    CO2 23.0 22.0 - 29.0 mmol/L    Calcium 9.3 8.6 - 10.5 mg/dL    BUN/Creatinine Ratio 10.8 7.0 - 25.0    Anion Gap 12.0 5.0 - 15.0 mmol/L     eGFR 80.6 >60.0 mL/min/1.73   CBC Auto Differential    Specimen: Blood   Result Value Ref Range    WBC 8.63 3.40 - 10.80 10*3/mm3    RBC 3.16 (L) 4.14 - 5.80 10*6/mm3    Hemoglobin 9.6 (L) 13.0 - 17.7 g/dL    Hematocrit 29.1 (L) 37.5 - 51.0 %    MCV 92.1 79.0 - 97.0 fL    MCH 30.4 26.6 - 33.0 pg    MCHC 33.0 31.5 - 35.7 g/dL    RDW 14.1 12.3 - 15.4 %    RDW-SD 47.6 37.0 - 54.0 fl    MPV 10.8 6.0 - 12.0 fL    Platelets 343 140 - 450 10*3/mm3    nRBC 0.3 (H) 0.0 - 0.2 /100 WBC   Manual Differential    Specimen: Blood   Result Value Ref Range    Neutrophil % 62.0 42.7 - 76.0 %    Lymphocyte % 28.0 19.6 - 45.3 %    Monocyte % 6.0 5.0 - 12.0 %    Eosinophil % 1.0 0.3 - 6.2 %    Myelocyte % 3.0 (H) 0.0 - 0.0 %    Neutrophils Absolute 5.35 1.70 - 7.00 10*3/mm3    Lymphocytes Absolute 2.42 0.70 - 3.10 10*3/mm3    Monocytes Absolute 0.52 0.10 - 0.90 10*3/mm3    Eosinophils Absolute 0.09 0.00 - 0.40 10*3/mm3    Polychromasia Slight/1+ None Seen    WBC Morphology Normal Normal    Platelet Morphology Normal Normal   Basic Metabolic Panel    Specimen: Blood   Result Value Ref Range    Glucose 112 (H) 65 - 99 mg/dL    BUN 13 8 - 23 mg/dL    Creatinine 1.11 0.76 - 1.27 mg/dL    Sodium 133 (L) 136 - 145 mmol/L    Potassium 3.7 3.5 - 5.2 mmol/L    Chloride 98 98 - 107 mmol/L    CO2 24.9 22.0 - 29.0 mmol/L    Calcium 9.3 8.6 - 10.5 mg/dL    BUN/Creatinine Ratio 11.7 7.0 - 25.0    Anion Gap 10.1 5.0 - 15.0 mmol/L    eGFR 72.8 >60.0 mL/min/1.73   CBC Auto Differential    Specimen: Blood   Result Value Ref Range    WBC 10.59 3.40 - 10.80 10*3/mm3    RBC 3.35 (L) 4.14 - 5.80 10*6/mm3    Hemoglobin 10.0 (L) 13.0 - 17.7 g/dL    Hematocrit 31.0 (L) 37.5 - 51.0 %    MCV 92.5 79.0 - 97.0 fL    MCH 29.9 26.6 - 33.0 pg    MCHC 32.3 31.5 - 35.7 g/dL    RDW 14.3 12.3 - 15.4 %    RDW-SD 48.3 37.0 - 54.0 fl    MPV 10.6 6.0 - 12.0 fL    Platelets 402 140 - 450 10*3/mm3    nRBC 0.1 0.0 - 0.2 /100 WBC   Manual Differential    Specimen: Blood   Result  Value Ref Range    Neutrophil % 75.3 42.7 - 76.0 %    Lymphocyte % 13.4 (L) 19.6 - 45.3 %    Monocyte % 7.2 5.0 - 12.0 %    Eosinophil % 2.1 0.3 - 6.2 %    Basophil % 1.0 0.0 - 1.5 %    Metamyelocyte % 1.0 (H) 0.0 - 0.0 %    Neutrophils Absolute 7.97 (H) 1.70 - 7.00 10*3/mm3    Lymphocytes Absolute 1.42 0.70 - 3.10 10*3/mm3    Monocytes Absolute 0.76 0.10 - 0.90 10*3/mm3    Eosinophils Absolute 0.22 0.00 - 0.40 10*3/mm3    Basophils Absolute 0.11 0.00 - 0.20 10*3/mm3    RBC Morphology Normal Normal    WBC Morphology Normal Normal    Platelet Morphology Normal Normal   ECG 12 Lead Dyspnea   Result Value Ref Range    QT Interval 374 ms         The following portions of the patient's history were reviewed and updated as appropriate: allergies, current medications, past family history, past medical history, past social history, past surgical history, and problem list.    Review of Systems   Constitutional:  Negative for fatigue.   Respiratory:  Negative for cough and shortness of breath.    Cardiovascular:  Negative for chest pain and palpitations.   Skin:  Negative for rash.   Psychiatric/Behavioral:  Positive for dysphoric mood. The patient is nervous/anxious.        Objective   Physical Exam  Vitals and nursing note reviewed.   Constitutional:       Appearance: Normal appearance. He is well-developed.   Neck:      Vascular: No carotid bruit.   Cardiovascular:      Rate and Rhythm: Normal rate and regular rhythm.   Pulmonary:      Effort: Pulmonary effort is normal.      Breath sounds: Normal breath sounds.   Neurological:      Mental Status: He is alert and oriented to person, place, and time.   Psychiatric:         Mood and Affect: Mood normal.         Behavior: Behavior normal.         Thought Content: Thought content normal.         Judgment: Judgment normal.         Assessment & Plan   Diagnoses and all orders for this visit:    1. Anxiety and depression (Primary)  -     buPROPion XL (Wellbutrin XL) 150 MG 24 hr  tablet; Take 1 tablet by mouth Daily.  Dispense: 90 tablet; Refill: 1  -     Comprehensive metabolic panel  -     Lipid panel  -     CBC and Differential  -     TSH  -     PSA Screen    2. Essential hypertension  -     amLODIPine (NORVASC) 10 MG tablet; Take 1 tablet by mouth Daily.  Dispense: 90 tablet; Refill: 1  -     Comprehensive metabolic panel  -     Lipid panel  -     CBC and Differential  -     TSH  -     PSA Screen    3. Screening for prostate cancer  -     PSA Screen    Other orders  -     FLUoxetine (PROzac) 40 MG capsule; Take 2 capsules by mouth Daily.  Dispense: 180 capsule; Refill: 1          Patient has plenty of the 300 mg Wellbutrin at home and will decrease to just 300 mg daily for 2 weeks and then start just 150 mg daily ongoing.  Fluoxetine dose increased to 80 mg.  Patient instructed to wait 4 weeks and then notify of treatment effectiveness.  He is to contact me sooner if there are any issues.

## 2024-03-12 LAB
ALBUMIN SERPL-MCNC: 4.6 G/DL (ref 3.9–4.9)
ALBUMIN/GLOB SERPL: 1.4 {RATIO} (ref 1.2–2.2)
ALP SERPL-CCNC: 95 IU/L (ref 44–121)
ALT SERPL-CCNC: 11 IU/L (ref 0–44)
AST SERPL-CCNC: 14 IU/L (ref 0–40)
BASOPHILS # BLD AUTO: 0 X10E3/UL (ref 0–0.2)
BASOPHILS NFR BLD AUTO: 0 %
BILIRUB SERPL-MCNC: 0.5 MG/DL (ref 0–1.2)
BUN SERPL-MCNC: 20 MG/DL (ref 8–27)
BUN/CREAT SERPL: 18 (ref 10–24)
CALCIUM SERPL-MCNC: 9.7 MG/DL (ref 8.6–10.2)
CHLORIDE SERPL-SCNC: 100 MMOL/L (ref 96–106)
CHOLEST SERPL-MCNC: 151 MG/DL (ref 100–199)
CO2 SERPL-SCNC: 22 MMOL/L (ref 20–29)
CREAT SERPL-MCNC: 1.14 MG/DL (ref 0.76–1.27)
EGFRCR SERPLBLD CKD-EPI 2021: 70 ML/MIN/1.73
EOSINOPHIL # BLD AUTO: 0 X10E3/UL (ref 0–0.4)
EOSINOPHIL NFR BLD AUTO: 0 %
ERYTHROCYTE [DISTWIDTH] IN BLOOD BY AUTOMATED COUNT: 13.6 % (ref 11.6–15.4)
GLOBULIN SER CALC-MCNC: 3.4 G/DL (ref 1.5–4.5)
GLUCOSE SERPL-MCNC: 109 MG/DL (ref 70–99)
HCT VFR BLD AUTO: 37.9 % (ref 37.5–51)
HDLC SERPL-MCNC: 48 MG/DL
HGB BLD-MCNC: 12.5 G/DL (ref 13–17.7)
IMMATURE CELLS: ABNORMAL
LDLC SERPL CALC-MCNC: 88 MG/DL (ref 0–99)
LYMPHOCYTES # BLD AUTO: 2.7 X10E3/UL (ref 0.7–3.1)
LYMPHOCYTES NFR BLD AUTO: 33 %
MCH RBC QN AUTO: 29.8 PG (ref 26.6–33)
MCHC RBC AUTO-ENTMCNC: 33 G/DL (ref 31.5–35.7)
MCV RBC AUTO: 91 FL (ref 79–97)
MONOCYTES # BLD AUTO: 1.2 X10E3/UL (ref 0.1–0.9)
MONOCYTES NFR BLD AUTO: 14 %
MORPHOLOGY BLD-IMP: ABNORMAL
MYELOCYTES NFR BLD: 4 % (ref 0–0)
NEUTROPHILS # BLD AUTO: 4.1 X10E3/UL (ref 1.4–7)
NEUTROPHILS NFR BLD AUTO: 49 %
PLATELET # BLD AUTO: 336 X10E3/UL (ref 150–450)
POTASSIUM SERPL-SCNC: 4.8 MMOL/L (ref 3.5–5.2)
PROT SERPL-MCNC: 8 G/DL (ref 6–8.5)
PSA SERPL-MCNC: 5.9 NG/ML (ref 0–4)
RBC # BLD AUTO: 4.19 X10E6/UL (ref 4.14–5.8)
SODIUM SERPL-SCNC: 135 MMOL/L (ref 134–144)
TRIGL SERPL-MCNC: 77 MG/DL (ref 0–149)
TSH SERPL DL<=0.005 MIU/L-ACNC: 1.49 UIU/ML (ref 0.45–4.5)
VLDLC SERPL CALC-MCNC: 15 MG/DL (ref 5–40)
WBC # BLD AUTO: 8.3 X10E3/UL (ref 3.4–10.8)

## 2024-03-14 DIAGNOSIS — D72.9 ABNORMAL WBC COUNT: Primary | ICD-10-CM

## 2024-03-20 ENCOUNTER — OFFICE VISIT (OUTPATIENT)
Dept: CARDIOLOGY | Facility: CLINIC | Age: 69
End: 2024-03-20
Payer: MEDICARE

## 2024-03-20 VITALS
DIASTOLIC BLOOD PRESSURE: 80 MMHG | HEART RATE: 50 BPM | WEIGHT: 190 LBS | BODY MASS INDEX: 26.6 KG/M2 | SYSTOLIC BLOOD PRESSURE: 130 MMHG | HEIGHT: 71 IN

## 2024-03-20 DIAGNOSIS — I10 PRIMARY HYPERTENSION: ICD-10-CM

## 2024-03-20 DIAGNOSIS — I71.40 ABDOMINAL AORTIC ANEURYSM (AAA) WITHOUT RUPTURE, UNSPECIFIED PART: ICD-10-CM

## 2024-03-20 DIAGNOSIS — I25.10 CORONARY ARTERY DISEASE INVOLVING NATIVE CORONARY ARTERY OF NATIVE HEART WITHOUT ANGINA PECTORIS: Primary | ICD-10-CM

## 2024-03-20 DIAGNOSIS — I10 ESSENTIAL HYPERTENSION: ICD-10-CM

## 2024-03-20 PROCEDURE — 1159F MED LIST DOCD IN RCRD: CPT | Performed by: INTERNAL MEDICINE

## 2024-03-20 PROCEDURE — 3075F SYST BP GE 130 - 139MM HG: CPT | Performed by: INTERNAL MEDICINE

## 2024-03-20 PROCEDURE — 1160F RVW MEDS BY RX/DR IN RCRD: CPT | Performed by: INTERNAL MEDICINE

## 2024-03-20 PROCEDURE — 93000 ELECTROCARDIOGRAM COMPLETE: CPT | Performed by: INTERNAL MEDICINE

## 2024-03-20 PROCEDURE — 99214 OFFICE O/P EST MOD 30 MIN: CPT | Performed by: INTERNAL MEDICINE

## 2024-03-20 PROCEDURE — 3079F DIAST BP 80-89 MM HG: CPT | Performed by: INTERNAL MEDICINE

## 2024-03-20 NOTE — PROGRESS NOTES
Date of Office Visit: 2022  Encounter Provider: Susan Claudio MD  Place of Service: [facility] CARDIOLOGY  Patient Name: Osman Aparicio  :1955      Patient ID:  Osman Aparicio is a 68 y.o. male is here for  followup for CAD.          History of Present Illness    He has a past medical history of hyperlipidemia, hypertension, current tobacco use (>40 year smoking history), alcohol abuse, COPD and impaired fasting glucose.  He is here for CAD, status post CABG, diastolic dysfunction, abdominal aortic aneurysm- s/p endovascular graft 2023.     He presented to the emergency department with complaints of shortness of air 3/26/2022. His shortness of breath began days ago, but progressively worsened last night and he called EMS. In route to the hospital, he received IV Solumedrol and a duoneb and arrived to [facility] ER on 5LNC. Per patient, his shortness of breath was greatly improved with oxygen and the medications given.    He also admits to orthopnea and PND as well some mild chest pain.  He ruled in for myocardial infarction.    He has 2 brothers with myocardial infarction in their 40s.       He had an echo done done 3/26/22 showing mild left ventricular dilation, ejection fraction 56%, grade 3 reversible restrictive diastolic dysfunction, moderate left atrial lodgment, moderate mitral insufficiency, trace to mild aortic and tricuspid insufficiency, severe septal apical hypokinesis, RVSP 52 mmHg.  He had a CT of the chest which showed mild pulmonary edema, abdominal aortic aneurysm, mild bilateral pleural effusions but no pulmonary embolism.  Cath done 3/29/2022 showed calcified left main without stenosis, chronically occluded ostial to mid LAD, 89% first OM stenosis, 95% second OM stenosis, 50% distal RCA stenosis.  The LAD filled via left to left collaterals.  He went on for CABG done 2022 with receiving LIMA to LAD, SVG to OM1, SVG to OM 2.  He also had an  abdominal aortic ultrasound which showed an abdominal aortic aneurysm measuring 5.5 cm.  Vascular surgery saw him and arrange for outpatient follow-up - Dr. Kumar.  Carotid duplex showed mild bilateral internal carotid artery stenosis.     1/30/2023, he had a percutaneous endovascular repair of a 5.7 cm juxtarenal abdominal aortic aneurysm using a custom Cook Zenith fenestrated aortic endograft.  He has done well with this.  They did note severe right renal artery stenosis with atrophic right kidney, no iliac aneurysm.    Labs on 3/11/2024 show glucose 109 with otherwise normal CMP, normal TSH, total cholesterol 151, HDL 48, LDL 88, triglycerides 77, normal CBC.      He has no chest pain, dizziness, dyspnea, tachycardia.  He feels really good and is taking his medications.  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       Past Medical History:   Diagnosis Date    AAA (abdominal aortic aneurysm)     Abnormal glucose     Anxiety     COPD (chronic obstructive pulmonary disease)     Coronary artery disease     Encounter for special screening examination for neoplasm of prostate 10/2012    Hematuria     JUST MONITORING    History of COVID-19 2021    Hyperlipidemia     Hypertension     Myocardial infarction 03/2022    Radius fracture     RIGHT    Vitamin D deficiency disease          Past Surgical History:   Procedure Laterality Date    ARTERIOGRAM AORTIC N/A 01/30/2023    Procedure: Zenith Fenestrated Endovascular Repair;  Surgeon: Mariusz Kumar MD;  Location: [facility] OR 18/19;  Service: Vascular;  Laterality: N/A;    CARDIAC CATHETERIZATION N/A 03/29/2022    Procedure: Left Heart Cath;  Surgeon: Neto Paige MD;  Location: [facility] CATH INVASIVE LOCATION;  Service: Cardiology;  Laterality: N/A;    CARDIAC CATHETERIZATION N/A 03/29/2022    Procedure: Coronary angiography;  Surgeon: Neto Paige MD;  Location: [facility] CATH INVASIVE LOCATION;  Service:  Cardiology;  Laterality: N/A;    CARDIAC CATHETERIZATION N/A 03/29/2022    Procedure: Left ventriculography;  Surgeon: Neto Paige MD;  Location: [facility] CATH INVASIVE LOCATION;  Service: Cardiology;  Laterality: N/A;    COLONOSCOPY N/A 12/07/2020    Procedure: COLONOSCOPY INTO CECUM WITH HOT SNARE POLYPECTOMIES, COLD BX POLYPECTOMIES, RESOLUTION CLIPS X;  Surgeon: Regi Mercado MD;  Location: [facility] ENDOSCOPY;  Service: General;  Laterality: N/A;  PRE:  POSITIVE COLOGUARD  POST:  POLYPS    CORONARY ARTERY BYPASS GRAFT N/A 04/01/2022    Procedure: STERNOTOMY, CORONARY ARTERY BYPASS UTILIZINGTHE RT SAPHENOUS VEIN WITH LEFT INTERNAL MAMMARY ARTERY GRAFT X3 OFF PUMP, PRP;  Surgeon: Colten Zamora MD;  Location: [facility];  Service: Cardiothoracic;  Laterality: N/A;    ORIF WRIST FRACTURE Right 08/26/2022    Procedure: RIGHT DISTAL RADIUS FRACTURE OPEN REDUCTION INTERNAL FIXATION;  Surgeon: Bubba Mello MD;  Location: [facility] OR [facility];  Service: Orthopedics;  Laterality: Right;    TRANSESOPHAGEAL ECHOCARDIOGRAM (MARTHA) N/A 04/01/2022    Procedure: TRANSESOPHAGEAL ECHOCARDIOGRAM WITH ANESTHESIA;  Surgeon: Colten Zamora MD;  Location: [facility];  Service: Cardiothoracic;  Laterality: N/A;       Current Outpatient Medications on File Prior to Visit   Medication Sig Dispense Refill    albuterol sulfate HFA (Proventil HFA) 108 (90 Base) MCG/ACT inhaler Inhale 2 puffs Every 4 (Four) Hours As Needed for Wheezing. 18 g 0    amLODIPine (NORVASC) 10 MG tablet Take 1 tablet by mouth Daily. 90 tablet 1    Ascorbic Acid (VITAMIN C PO) Take 1 tablet by mouth Daily. HOLD PRIOR TO SURG      aspirin 81 MG EC tablet Take 1 tablet by mouth Daily. 30 tablet 3    buPROPion XL (Wellbutrin XL) 150 MG 24 hr tablet Take 1 tablet by mouth Daily. 90 tablet 1    Ferrous Sulfate (Iron) 28 MG tablet Take 1 tablet by mouth Daily.      FLUoxetine (PROzac) 40 MG capsule Take 2 capsules by mouth Daily. 180 capsule 1  "   folic acid (FOLVITE) 1 MG tablet Take 1 tablet by mouth Daily. 30 tablet 3    losartan (COZAAR) 100 MG tablet TAKE 1 TABLET BY MOUTH EVERY DAY 90 tablet 3    metoprolol succinate XL (TOPROL-XL) 100 MG 24 hr tablet Take 1 tablet by mouth every night at bedtime. 90 tablet 3    multivitamin with minerals tablet tablet Take 1 tablet by mouth Daily. 30 tablet 3    nitroglycerin (NITROSTAT) 0.4 MG SL tablet Place 1 tablet under the tongue Every 5 (Five) Minutes As Needed for Chest Pain (Only if SBP Greater Than 100). Take no more than 3 doses in 15 minutes. 30 tablet 3    rosuvastatin (CRESTOR) 20 MG tablet TAKE 1 TABLET BY MOUTH EVERY NIGHT 90 tablet 3    thiamine (VITAMIN B-1) 100 MG tablet  tablet Take 1 tablet by mouth Daily. 30 tablet 3    Trelegy Ellipta 100-62.5-25 MCG/INH inhaler Inhale 1 puff Daily.       No current facility-administered medications on file prior to visit.       Social History     Socioeconomic History    Marital status:    Tobacco Use    Smoking status: Former     Current packs/day: 0.00     Average packs/day: 1 pack/day for 45.0 years (45.0 ttl pk-yrs)     Types: Cigarettes     Start date: 3/26/1977     Quit date: 3/26/2022     Years since quittin.9     Passive exposure: Past    Smokeless tobacco: Never    Tobacco comments:     caffeine - 3 cups coffee daily, tea or soda occas.   Vaping Use    Vaping status: Never Used   Substance and Sexual Activity    Alcohol use: Yes     Comment: 2 BEERS OCCASIONAL    Drug use: No    Sexual activity: Defer           ROS    Procedures    ECG 12 Lead    Date/Time: 3/20/2024 3:28 PM  Performed by: Susan Claudio MD    Authorized by: Susan Claudio MD  Comparison: compared with previous ECG   Similar to previous ECG  Rhythm: sinus rhythm    Clinical impression: normal ECG              Objective:      Vitals:    24 1441   BP: 130/80   Pulse: 50   Weight: 86.2 kg (190 lb)   Height: 180.3 cm (71\")     Body mass index is 26.5 " kg/m².    Vitals reviewed.   Constitutional:       General: Not in acute distress.     Appearance: Well-developed. Not diaphoretic.   Eyes:      General: No scleral icterus.     Conjunctiva/sclera: Conjunctivae normal.   HENT:      Head: Normocephalic and atraumatic.   Neck:      Thyroid: No thyromegaly.      Vascular: No carotid bruit or JVD.      Lymphadenopathy: No cervical adenopathy.   Pulmonary:      Effort: Pulmonary effort is normal. No respiratory distress.      Breath sounds: Normal breath sounds. No wheezing. No rhonchi. No rales.   Chest:      Chest wall: Not tender to palpatation.   Cardiovascular:      Normal rate. Regular rhythm.      Murmurs: There is no murmur.      No gallop.    Pulses:     Intact distal pulses.   Edema:     Peripheral edema absent.   Abdominal:      General: Bowel sounds are normal. There is no distension or abdominal bruit.      Palpations: Abdomen is soft. There is no abdominal mass.      Tenderness: There is no abdominal tenderness.   Musculoskeletal:         General: No deformity.      Extremities: No clubbing present.     Cervical back: Neck supple. Skin:     General: Skin is warm and dry. There is no cyanosis.      Coloration: Skin is not pale.      Findings: No rash.   Neurological:      Mental Status: Alert and oriented to person, place, and time.      Cranial Nerves: No cranial nerve deficit.   Psychiatric:         Judgment: Judgment normal.         Lab Review:       Assessment:      Diagnosis Plan   1. Coronary artery disease involving native coronary artery of native heart without angina pectoris        2. Abdominal aortic aneurysm (AAA) without rupture, unspecified part        3. Essential hypertension        4. Primary hypertension            CAD, status post CABG, on aspirin, no angina, no heart failure.  Abdominal aortic aneurysm, seeing Dr. Kumar- s/p endovascular repair 1/2023.   Hypertension, goal less than 120/80, add losartan 25 mg daily.  Mild bilateral  carotid artery stenosis  COPD  Diastolic dysfunction  Anemia  Hyperlipidemia, on rosuvastatin 20 mg nightly.     Plan:       See aprn in 1 year.  No med changes.

## 2024-03-21 DIAGNOSIS — R97.20 ELEVATED PSA: Primary | ICD-10-CM

## 2024-03-26 DIAGNOSIS — I70.1 ATHEROSCLEROSIS OF RENAL ARTERY: Primary | ICD-10-CM

## 2024-03-26 DIAGNOSIS — I72.4 ANEURYSM OF ARTERY OF LOWER EXTREMITY: ICD-10-CM

## 2024-04-15 ENCOUNTER — TRANSCRIBE ORDERS (OUTPATIENT)
Dept: ADMINISTRATIVE | Facility: HOSPITAL | Age: 69
End: 2024-04-15
Payer: MEDICARE

## 2024-04-15 DIAGNOSIS — Z12.31 VISIT FOR SCREENING MAMMOGRAM: Primary | ICD-10-CM

## 2024-05-13 RX ORDER — ROSUVASTATIN CALCIUM 20 MG/1
20 TABLET, COATED ORAL NIGHTLY
Qty: 90 TABLET | Refills: 3 | Status: SHIPPED | OUTPATIENT
Start: 2024-05-13

## 2024-06-26 RX ORDER — FLUOXETINE HYDROCHLORIDE 20 MG/1
20 CAPSULE ORAL DAILY
Qty: 90 CAPSULE | Refills: 0 | OUTPATIENT
Start: 2024-06-26

## 2024-08-01 RX ORDER — FLUOXETINE HYDROCHLORIDE 20 MG/1
20 CAPSULE ORAL DAILY
Qty: 90 CAPSULE | Refills: 0 | OUTPATIENT
Start: 2024-08-01

## 2024-08-01 RX ORDER — FLUOXETINE HYDROCHLORIDE 40 MG/1
80 CAPSULE ORAL DAILY
Qty: 180 CAPSULE | Refills: 1 | OUTPATIENT
Start: 2024-08-01

## 2024-08-01 NOTE — TELEPHONE ENCOUNTER
Rx Refill Note  Requested Prescriptions     Pending Prescriptions Disp Refills    FLUoxetine (PROzac) 20 MG capsule [Pharmacy Med Name: FLUOXETINE HCL 20 MG CAPSULE] 90 capsule 0     Sig: TAKE 1 CAPSULE BY MOUTH EVERY DAY    FLUoxetine (PROzac) 40 MG capsule [Pharmacy Med Name: FLUOXETINE HCL 40 MG CAPSULE] 180 capsule 1     Sig: TAKE 2 CAPSULES BY MOUTH EVERY DAY      Last office visit with prescribing clinician: 3/11/2024   Last telemedicine visit with prescribing clinician: Visit date not found   Next office visit with prescribing clinician: Visit date not found                         Would you like a call back once the refill request has been completed: [] Yes [] No    If the office needs to give you a call back, can they leave a voicemail: [] Yes [] No    Katelyn Combs MA  08/01/24, 10:50 EDT

## 2024-08-20 ENCOUNTER — APPOINTMENT (OUTPATIENT)
Dept: CT IMAGING | Facility: HOSPITAL | Age: 69
DRG: 204 | End: 2024-08-20
Payer: MEDICARE

## 2024-08-20 ENCOUNTER — HOSPITAL ENCOUNTER (INPATIENT)
Facility: HOSPITAL | Age: 69
LOS: 1 days | Discharge: HOME | DRG: 204 | End: 2024-08-23
Attending: EMERGENCY MEDICINE | Admitting: INTERNAL MEDICINE
Payer: MEDICARE

## 2024-08-20 ENCOUNTER — APPOINTMENT (OUTPATIENT)
Dept: GENERAL RADIOLOGY | Facility: HOSPITAL | Age: 69
DRG: 204 | End: 2024-08-20
Payer: MEDICARE

## 2024-08-20 DIAGNOSIS — J44.9 CHRONIC OBSTRUCTIVE PULMONARY DISEASE, UNSPECIFIED COPD TYPE: ICD-10-CM

## 2024-08-20 DIAGNOSIS — I10 PRIMARY HYPERTENSION: ICD-10-CM

## 2024-08-20 DIAGNOSIS — R06.09 EXERTIONAL DYSPNEA: Primary | ICD-10-CM

## 2024-08-20 DIAGNOSIS — I50.32 CHRONIC DIASTOLIC CONGESTIVE HEART FAILURE: ICD-10-CM

## 2024-08-20 DIAGNOSIS — I25.10 CORONARY ARTERY DISEASE INVOLVING NATIVE CORONARY ARTERY OF NATIVE HEART WITHOUT ANGINA PECTORIS: ICD-10-CM

## 2024-08-20 DIAGNOSIS — R93.1 ABNORMAL FINDINGS ON DIAGNOSTIC IMAGING OF HEART AND CORONARY CIRCULATION: ICD-10-CM

## 2024-08-20 LAB
ALBUMIN SERPL-MCNC: 4.5 G/DL (ref 3.5–5.2)
ALBUMIN/GLOB SERPL: 1.1 G/DL
ALP SERPL-CCNC: 99 U/L (ref 39–117)
ALT SERPL W P-5'-P-CCNC: 15 U/L (ref 1–41)
ANION GAP SERPL CALCULATED.3IONS-SCNC: 13.4 MMOL/L (ref 5–15)
AST SERPL-CCNC: 28 U/L (ref 1–40)
B PARAPERT DNA SPEC QL NAA+PROBE: NOT DETECTED
B PERT DNA SPEC QL NAA+PROBE: NOT DETECTED
BASOPHILS # BLD MANUAL: 0.1 10*3/MM3 (ref 0–0.2)
BASOPHILS NFR BLD MANUAL: 1 % (ref 0–1.5)
BILIRUB SERPL-MCNC: 0.6 MG/DL (ref 0–1.2)
BUN SERPL-MCNC: 16 MG/DL (ref 8–23)
BUN/CREAT SERPL: 12.5 (ref 7–25)
C PNEUM DNA NPH QL NAA+NON-PROBE: NOT DETECTED
CALCIUM SPEC-SCNC: 10.2 MG/DL (ref 8.6–10.5)
CHLORIDE SERPL-SCNC: 96 MMOL/L (ref 98–107)
CHOLEST SERPL-MCNC: 133 MG/DL (ref 0–200)
CO2 SERPL-SCNC: 23.6 MMOL/L (ref 22–29)
CREAT SERPL-MCNC: 1.28 MG/DL (ref 0.76–1.27)
D DIMER PPP FEU-MCNC: 1.93 MCGFEU/ML (ref 0–0.69)
DEPRECATED RDW RBC AUTO: 45.4 FL (ref 37–54)
EGFRCR SERPLBLD CKD-EPI 2021: 60.6 ML/MIN/1.73
EOSINOPHIL # BLD MANUAL: 0 10*3/MM3 (ref 0–0.4)
EOSINOPHIL NFR BLD MANUAL: 0 % (ref 0.3–6.2)
ERYTHROCYTE [DISTWIDTH] IN BLOOD BY AUTOMATED COUNT: 13.6 % (ref 12.3–15.4)
FLUAV SUBTYP SPEC NAA+PROBE: NOT DETECTED
FLUBV RNA ISLT QL NAA+PROBE: NOT DETECTED
GLOBULIN UR ELPH-MCNC: 4.2 GM/DL
GLUCOSE SERPL-MCNC: 113 MG/DL (ref 65–99)
HADV DNA SPEC NAA+PROBE: NOT DETECTED
HCOV 229E RNA SPEC QL NAA+PROBE: NOT DETECTED
HCOV HKU1 RNA SPEC QL NAA+PROBE: NOT DETECTED
HCOV NL63 RNA SPEC QL NAA+PROBE: NOT DETECTED
HCOV OC43 RNA SPEC QL NAA+PROBE: NOT DETECTED
HCT VFR BLD AUTO: 38 % (ref 37.5–51)
HDLC SERPL-MCNC: 43 MG/DL (ref 40–60)
HGB BLD-MCNC: 12.5 G/DL (ref 13–17.7)
HMPV RNA NPH QL NAA+NON-PROBE: NOT DETECTED
HOLD SPECIMEN: NORMAL
HOLD SPECIMEN: NORMAL
HPIV1 RNA ISLT QL NAA+PROBE: NOT DETECTED
HPIV2 RNA SPEC QL NAA+PROBE: NOT DETECTED
HPIV3 RNA NPH QL NAA+PROBE: NOT DETECTED
HPIV4 P GENE NPH QL NAA+PROBE: NOT DETECTED
LDLC SERPL CALC-MCNC: 71 MG/DL (ref 0–100)
LDLC/HDLC SERPL: 1.62 {RATIO}
LYMPHOCYTES # BLD MANUAL: 3.76 10*3/MM3 (ref 0.7–3.1)
LYMPHOCYTES NFR BLD MANUAL: 14.6 % (ref 5–12)
M PNEUMO IGG SER IA-ACNC: NOT DETECTED
MCH RBC QN AUTO: 30.2 PG (ref 26.6–33)
MCHC RBC AUTO-ENTMCNC: 32.9 G/DL (ref 31.5–35.7)
MCV RBC AUTO: 91.8 FL (ref 79–97)
MONOCYTES # BLD: 1.43 10*3/MM3 (ref 0.1–0.9)
NEUTROPHILS # BLD AUTO: 4.47 10*3/MM3 (ref 1.7–7)
NEUTROPHILS NFR BLD MANUAL: 45.8 % (ref 42.7–76)
NT-PROBNP SERPL-MCNC: 1816 PG/ML (ref 0–900)
PLAT MORPH BLD: NORMAL
PLATELET # BLD AUTO: 371 10*3/MM3 (ref 140–450)
PMV BLD AUTO: 9.4 FL (ref 6–12)
POTASSIUM SERPL-SCNC: 4 MMOL/L (ref 3.5–5.2)
PROT SERPL-MCNC: 8.7 G/DL (ref 6–8.5)
RBC # BLD AUTO: 4.14 10*6/MM3 (ref 4.14–5.8)
RBC MORPH BLD: NORMAL
RHINOVIRUS RNA SPEC NAA+PROBE: NOT DETECTED
RSV RNA NPH QL NAA+NON-PROBE: NOT DETECTED
SARS-COV-2 RNA NPH QL NAA+NON-PROBE: NOT DETECTED
SODIUM SERPL-SCNC: 133 MMOL/L (ref 136–145)
TRIGL SERPL-MCNC: 102 MG/DL (ref 0–150)
TROPONIN T SERPL HS-MCNC: 14 NG/L
TROPONIN T SERPL HS-MCNC: 15 NG/L
VARIANT LYMPHS NFR BLD MANUAL: 38.5 % (ref 19.6–45.3)
VLDLC SERPL-MCNC: 19 MG/DL (ref 5–40)
WBC MORPH BLD: NORMAL
WBC NRBC COR # BLD AUTO: 9.77 10*3/MM3 (ref 3.4–10.8)
WHOLE BLOOD HOLD COAG: NORMAL
WHOLE BLOOD HOLD SPECIMEN: NORMAL

## 2024-08-20 PROCEDURE — 80053 COMPREHEN METABOLIC PANEL: CPT | Performed by: EMERGENCY MEDICINE

## 2024-08-20 PROCEDURE — G0378 HOSPITAL OBSERVATION PER HR: HCPCS

## 2024-08-20 PROCEDURE — 94799 UNLISTED PULMONARY SVC/PX: CPT

## 2024-08-20 PROCEDURE — 25510000001 IOPAMIDOL PER 1 ML: Performed by: EMERGENCY MEDICINE

## 2024-08-20 PROCEDURE — 71045 X-RAY EXAM CHEST 1 VIEW: CPT

## 2024-08-20 PROCEDURE — 0202U NFCT DS 22 TRGT SARS-COV-2: CPT | Performed by: EMERGENCY MEDICINE

## 2024-08-20 PROCEDURE — 99285 EMERGENCY DEPT VISIT HI MDM: CPT

## 2024-08-20 PROCEDURE — 36415 COLL VENOUS BLD VENIPUNCTURE: CPT | Performed by: EMERGENCY MEDICINE

## 2024-08-20 PROCEDURE — 94640 AIRWAY INHALATION TREATMENT: CPT

## 2024-08-20 PROCEDURE — 93005 ELECTROCARDIOGRAM TRACING: CPT

## 2024-08-20 PROCEDURE — 83880 ASSAY OF NATRIURETIC PEPTIDE: CPT | Performed by: EMERGENCY MEDICINE

## 2024-08-20 PROCEDURE — 85025 COMPLETE CBC W/AUTO DIFF WBC: CPT | Performed by: EMERGENCY MEDICINE

## 2024-08-20 PROCEDURE — 94761 N-INVAS EAR/PLS OXIMETRY MLT: CPT

## 2024-08-20 PROCEDURE — 25810000003 SODIUM CHLORIDE 0.9 % SOLUTION

## 2024-08-20 PROCEDURE — 93005 ELECTROCARDIOGRAM TRACING: CPT | Performed by: EMERGENCY MEDICINE

## 2024-08-20 PROCEDURE — 80061 LIPID PANEL: CPT | Performed by: EMERGENCY MEDICINE

## 2024-08-20 PROCEDURE — 84484 ASSAY OF TROPONIN QUANT: CPT | Performed by: EMERGENCY MEDICINE

## 2024-08-20 PROCEDURE — 85007 BL SMEAR W/DIFF WBC COUNT: CPT | Performed by: EMERGENCY MEDICINE

## 2024-08-20 PROCEDURE — 94760 N-INVAS EAR/PLS OXIMETRY 1: CPT

## 2024-08-20 PROCEDURE — 93010 ELECTROCARDIOGRAM REPORT: CPT | Performed by: STUDENT IN AN ORGANIZED HEALTH CARE EDUCATION/TRAINING PROGRAM

## 2024-08-20 PROCEDURE — 85379 FIBRIN DEGRADATION QUANT: CPT | Performed by: EMERGENCY MEDICINE

## 2024-08-20 PROCEDURE — 71275 CT ANGIOGRAPHY CHEST: CPT

## 2024-08-20 RX ORDER — SODIUM CHLORIDE 0.9 % (FLUSH) 0.9 %
10 SYRINGE (ML) INJECTION AS NEEDED
Status: DISCONTINUED | OUTPATIENT
Start: 2024-08-20 | End: 2024-08-23 | Stop reason: HOSPADM

## 2024-08-20 RX ORDER — POLYETHYLENE GLYCOL 3350 17 G/17G
17 POWDER, FOR SOLUTION ORAL DAILY PRN
Status: DISCONTINUED | OUTPATIENT
Start: 2024-08-20 | End: 2024-08-23 | Stop reason: HOSPADM

## 2024-08-20 RX ORDER — AMOXICILLIN 250 MG
2 CAPSULE ORAL 2 TIMES DAILY
Status: DISCONTINUED | OUTPATIENT
Start: 2024-08-20 | End: 2024-08-23 | Stop reason: HOSPADM

## 2024-08-20 RX ORDER — IOPAMIDOL 755 MG/ML
100 INJECTION, SOLUTION INTRAVASCULAR
Status: COMPLETED | OUTPATIENT
Start: 2024-08-20 | End: 2024-08-20

## 2024-08-20 RX ORDER — AMLODIPINE BESYLATE 10 MG/1
10 TABLET ORAL NIGHTLY
Status: DISCONTINUED | OUTPATIENT
Start: 2024-08-20 | End: 2024-08-23 | Stop reason: HOSPADM

## 2024-08-20 RX ORDER — ALBUTEROL SULFATE 0.83 MG/ML
2.5 SOLUTION RESPIRATORY (INHALATION) EVERY 6 HOURS PRN
Status: DISCONTINUED | OUTPATIENT
Start: 2024-08-20 | End: 2024-08-23 | Stop reason: HOSPADM

## 2024-08-20 RX ORDER — LOSARTAN POTASSIUM 100 MG/1
100 TABLET ORAL DAILY
Status: DISCONTINUED | OUTPATIENT
Start: 2024-08-21 | End: 2024-08-23 | Stop reason: HOSPADM

## 2024-08-20 RX ORDER — BUPROPION HYDROCHLORIDE 150 MG/1
150 TABLET ORAL NIGHTLY
Status: DISCONTINUED | OUTPATIENT
Start: 2024-08-20 | End: 2024-08-23 | Stop reason: HOSPADM

## 2024-08-20 RX ORDER — FAMOTIDINE 20 MG/1
20 TABLET, FILM COATED ORAL DAILY
Status: DISCONTINUED | OUTPATIENT
Start: 2024-08-20 | End: 2024-08-23 | Stop reason: HOSPADM

## 2024-08-20 RX ORDER — BISACODYL 5 MG/1
5 TABLET, DELAYED RELEASE ORAL DAILY PRN
Status: DISCONTINUED | OUTPATIENT
Start: 2024-08-20 | End: 2024-08-23 | Stop reason: HOSPADM

## 2024-08-20 RX ORDER — SODIUM CHLORIDE 9 MG/ML
40 INJECTION, SOLUTION INTRAVENOUS AS NEEDED
Status: DISCONTINUED | OUTPATIENT
Start: 2024-08-20 | End: 2024-08-23 | Stop reason: HOSPADM

## 2024-08-20 RX ORDER — ASPIRIN 81 MG/1
81 TABLET ORAL NIGHTLY
Status: DISCONTINUED | OUTPATIENT
Start: 2024-08-20 | End: 2024-08-23 | Stop reason: HOSPADM

## 2024-08-20 RX ORDER — ROSUVASTATIN CALCIUM 20 MG/1
20 TABLET, COATED ORAL NIGHTLY
Status: DISCONTINUED | OUTPATIENT
Start: 2024-08-20 | End: 2024-08-23 | Stop reason: HOSPADM

## 2024-08-20 RX ORDER — FAMOTIDINE 20 MG/1
20 TABLET, FILM COATED ORAL DAILY
COMMUNITY

## 2024-08-20 RX ORDER — IPRATROPIUM BROMIDE AND ALBUTEROL SULFATE 2.5; .5 MG/3ML; MG/3ML
3 SOLUTION RESPIRATORY (INHALATION)
Status: DISCONTINUED | OUTPATIENT
Start: 2024-08-20 | End: 2024-08-23 | Stop reason: HOSPADM

## 2024-08-20 RX ORDER — SODIUM CHLORIDE 9 MG/ML
75 INJECTION, SOLUTION INTRAVENOUS CONTINUOUS
Status: DISCONTINUED | OUTPATIENT
Start: 2024-08-20 | End: 2024-08-20

## 2024-08-20 RX ORDER — METOPROLOL SUCCINATE 100 MG/1
100 TABLET, EXTENDED RELEASE ORAL
Status: DISCONTINUED | OUTPATIENT
Start: 2024-08-20 | End: 2024-08-23 | Stop reason: HOSPADM

## 2024-08-20 RX ORDER — SODIUM CHLORIDE 0.9 % (FLUSH) 0.9 %
10 SYRINGE (ML) INJECTION EVERY 12 HOURS SCHEDULED
Status: DISCONTINUED | OUTPATIENT
Start: 2024-08-20 | End: 2024-08-23 | Stop reason: HOSPADM

## 2024-08-20 RX ORDER — NITROGLYCERIN 0.4 MG/1
0.4 TABLET SUBLINGUAL
Status: DISCONTINUED | OUTPATIENT
Start: 2024-08-20 | End: 2024-08-23 | Stop reason: HOSPADM

## 2024-08-20 RX ORDER — BUDESONIDE AND FORMOTEROL FUMARATE DIHYDRATE 160; 4.5 UG/1; UG/1
2 AEROSOL RESPIRATORY (INHALATION)
Status: DISCONTINUED | OUTPATIENT
Start: 2024-08-20 | End: 2024-08-23 | Stop reason: HOSPADM

## 2024-08-20 RX ORDER — BISACODYL 10 MG
10 SUPPOSITORY, RECTAL RECTAL DAILY PRN
Status: DISCONTINUED | OUTPATIENT
Start: 2024-08-20 | End: 2024-08-23 | Stop reason: HOSPADM

## 2024-08-20 RX ADMIN — BUPROPION HYDROCHLORIDE 150 MG: 150 TABLET, EXTENDED RELEASE ORAL at 20:36

## 2024-08-20 RX ADMIN — IOPAMIDOL 95 ML: 755 INJECTION, SOLUTION INTRAVENOUS at 21:52

## 2024-08-20 RX ADMIN — SODIUM CHLORIDE 75 ML/HR: 9 INJECTION, SOLUTION INTRAVENOUS at 21:28

## 2024-08-20 RX ADMIN — ASPIRIN 81 MG: 81 TABLET, COATED ORAL at 20:37

## 2024-08-20 RX ADMIN — AMLODIPINE BESYLATE 10 MG: 10 TABLET ORAL at 21:18

## 2024-08-20 RX ADMIN — Medication 10 ML: at 20:37

## 2024-08-20 RX ADMIN — IPRATROPIUM BROMIDE AND ALBUTEROL SULFATE 3 ML: .5; 3 SOLUTION RESPIRATORY (INHALATION) at 20:12

## 2024-08-20 RX ADMIN — METOPROLOL SUCCINATE 100 MG: 100 TABLET, EXTENDED RELEASE ORAL at 20:35

## 2024-08-20 RX ADMIN — ROSUVASTATIN CALCIUM 20 MG: 20 TABLET, FILM COATED ORAL at 20:38

## 2024-08-20 NOTE — Clinical Note
Hemostasis started on the left radial artery. R-Band was used in achieving hemostasis. Radial compression device applied to vessel. Hemostasis achieved successfully. Closure device additional comment: 12cc air

## 2024-08-20 NOTE — Clinical Note
Manual pressure applied to vessel. Manual pressure was held by angelito vera. Manual pressure was held for 10 min. Hemostasis achieved successfully.

## 2024-08-20 NOTE — PROGRESS NOTES
Clinical Pharmacy Services: Medication History    Osman Aparicio is a 69 y.o. male presenting to [facility] for   Chief Complaint   Patient presents with    Shortness of Breath       He  has a past medical history of AAA (abdominal aortic aneurysm), Abnormal glucose, Anxiety, COPD (chronic obstructive pulmonary disease), Coronary artery disease, Encounter for special screening examination for neoplasm of prostate (10/2012), Hematuria, History of COVID-19, Hyperlipidemia, Hypertension, Myocardial infarction (03/2022), Radius fracture, and Vitamin D deficiency disease.    Allergies as of 08/20/2024    (No Known Allergies)       Medication information was obtained from: Patient   Pharmacy and Phone Number:     Prior to Admission Medications       Prescriptions Last Dose Informant Patient Reported? Taking?    albuterol sulfate HFA (Proventil HFA) 108 (90 Base) MCG/ACT inhaler  Self No Yes    Inhale 2 puffs Every 4 (Four) Hours As Needed for Wheezing.    amLODIPine (NORVASC) 10 MG tablet 8/19/2024 Self No Yes    Take 1 tablet by mouth Daily.    Ascorbic Acid (VITAMIN C PO)  Self Yes Yes    Take 1 tablet by mouth Daily.    aspirin 81 MG EC tablet 8/20/2024 Self No Yes    Take 1 tablet by mouth Daily.    buPROPion XL (Wellbutrin XL) 150 MG 24 hr tablet 8/19/2024 Self No Yes    Take 1 tablet by mouth Daily.    Ferrous Sulfate (Iron) 28 MG tablet  Self Yes Yes    Take 1 tablet by mouth Daily.    FLUoxetine (PROzac) 40 MG capsule 8/20/2024 Self No Yes    Take 2 capsules by mouth Daily.    folic acid (FOLVITE) 1 MG tablet  Self No Yes    Take 1 tablet by mouth Daily.    losartan (COZAAR) 100 MG tablet 8/20/2024 Self No Yes    TAKE 1 TABLET BY MOUTH EVERY DAY    metoprolol succinate XL (TOPROL-XL) 100 MG 24 hr tablet 8/19/2024 Self No Yes    Take 1 tablet by mouth every night at bedtime.    multivitamin with minerals tablet tablet  Self No Yes    Take 1 tablet by mouth Daily.    nitroglycerin (NITROSTAT) 0.4 MG SL  tablet  Self No Yes    Place 1 tablet under the tongue Every 5 (Five) Minutes As Needed for Chest Pain (Only if SBP Greater Than 100). Take no more than 3 doses in 15 minutes.    rosuvastatin (CRESTOR) 20 MG tablet 8/19/2024 Self No Yes    TAKE 1 TABLET BY MOUTH EVERY NIGHT    thiamine (VITAMIN B-1) 100 MG tablet  tablet  Self No Yes    Take 1 tablet by mouth Daily.    Trelegy Ellipta 100-62.5-25 MCG/INH inhaler 8/20/2024 Self Yes Yes    Inhale 1 puff Daily.              Medication notes:     This medication list is complete to the best of my knowledge as of 8/20/2024    Please call if questions.    Pedro Luis Sr  Medication History Technician   [phone]    8/20/2024 18:46 EDT

## 2024-08-20 NOTE — H&P
[facility]   HISTORY AND PHYSICAL    Patient Name: Osman Aparicio  : 1955  MRN: [id number]  Primary Care Physician:  Desire Hughes APRN ([place])  Date of admission: 2024    Subjective   Subjective     Chief Complaint:   Chief Complaint   Patient presents with    Shortness of Breath         HPI:    Osman Aparicio is a 69 y.o. male, with a past medical history including, but not limited to, hypertension, hyperlipidemia, coronary artery disease, COPD, anxiety, AAA with repair, and status post CABG, presented to the emergency department with a 4-day history of exertional shortness of breath.  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  He denies any chest pain and he denies any wheezing or increased chest congestion.  He states that his COPD has been stable since he started taking his maintenance inhalers.  He denies any orthopnea, fever, chills.  He states that he has no symptoms if he is sitting or not doing any activity.  He states he does monitor his oxygen level with a pulse oximeter at home.  He states with any exertion his pulse ox will drop into the 80s but recovers quickly once he is still.  He denies using oxygen at home or ever being prescribed oxygen for home.  Cardiology has been consulted to see the patient in the AM.    Review of Systems   All systems were reviewed and negative except for: What was mentioned above in the HPI.    Personal History     Past Medical History:   Diagnosis Date    AAA (abdominal aortic aneurysm)     Abnormal glucose     Anxiety     COPD (chronic obstructive pulmonary disease)     Coronary artery disease     Encounter for special screening examination for neoplasm of prostate 10/2012    Hematuria     JUST MONITORING    History of COVID-19         Hyperlipidemia     Hypertension     Myocardial infarction 2022    [facility]  fracture     RIGHT    Vitamin D deficiency disease        Past Surgical History:   Procedure Laterality Date    ARTERIOGRAM AORTIC N/A 01/30/2023    Procedure: Zenith Fenestrated Endovascular Repair;  Surgeon: Mariusz Kumar MD;  Location: [facility] HYBRID OR 18/19;  Service: Vascular;  Laterality: N/A;    CARDIAC CATHETERIZATION N/A 03/29/2022    Procedure: Left Heart Cath;  Surgeon: Neto Paige MD;  Location: [facility] CATH INVASIVE LOCATION;  Service: Cardiology;  Laterality: N/A;    CARDIAC CATHETERIZATION N/A 03/29/2022    Procedure: Coronary angiography;  Surgeon: Neto Paige MD;  Location: [facility] CATH INVASIVE LOCATION;  Service: Cardiology;  Laterality: N/A;    CARDIAC CATHETERIZATION N/A 03/29/2022    Procedure: Left ventriculography;  Surgeon: Neto Paige MD;  Location: [facility] CATH INVASIVE LOCATION;  Service: Cardiology;  Laterality: N/A;    COLONOSCOPY N/A 12/07/2020    Procedure: COLONOSCOPY INTO CECUM WITH HOT SNARE POLYPECTOMIES, COLD BX POLYPECTOMIES, RESOLUTION CLIPS X;  Surgeon: Regi Mercado MD;  Location: [facility] ENDOSCOPY;  Service: General;  Laterality: N/A;  PRE:  POSITIVE COLOGUARD  POST:  POLYPS    CORONARY ARTERY BYPASS GRAFT N/A 04/01/2022    Procedure: STERNOTOMY, CORONARY ARTERY BYPASS UTILIZINGTHE RT SAPHENOUS VEIN WITH LEFT INTERNAL MAMMARY ARTERY GRAFT X3 OFF PUMP, PRP;  Surgeon: Colten Zamora MD;  Location: [facility];  Service: Cardiothoracic;  Laterality: N/A;    ORIF WRIST FRACTURE Right 08/26/2022    Procedure: RIGHT DISTAL RADIUS FRACTURE OPEN REDUCTION INTERNAL FIXATION;  Surgeon: Bubba Mello MD;  Location: [facility] OR [facility];  Service: Orthopedics;  Laterality: Right;    TRANSESOPHAGEAL ECHOCARDIOGRAM (MARTHA) N/A 04/01/2022    Procedure: TRANSESOPHAGEAL ECHOCARDIOGRAM WITH ANESTHESIA;  Surgeon: Colten Zamora MD;  Location: [facility];  Service: Cardiothoracic;  Laterality: N/A;       Family History: family history  includes Colon polyps in his brother; Liver cancer in his brother; Lung cancer in his brother. Otherwise pertinent FHx was reviewed and not pertinent to current issue.    Social History:  reports that he quit smoking about 2 years ago. His smoking use included cigarettes. He started smoking about 47 years ago. He has a 45 pack-year smoking history. He has been exposed to tobacco smoke. He has never used smokeless tobacco. He reports current alcohol use of about 2.0 standard drinks of alcohol per week. He reports that he does not use drugs.    Home Medications:  Ascorbic Acid, FLUoxetine, Fluticasone-Umeclidin-Vilant, Iron, albuterol sulfate HFA, amLODIPine, aspirin, buPROPion XL, famotidine, folic acid, losartan, metoprolol succinate XL, multivitamin with minerals, nitroglycerin, rosuvastatin, and thiamine    Allergies:  No Known Allergies    Objective   Objective     Vitals:   Temp:  [97.2 °F (36.2 °C)-97.5 °F (36.4 °C)] 97.5 °F (36.4 °C)  Heart Rate:  [51-57] 57  Resp:  [18-24] 24  BP: (129-167)/(73-89) 167/80  Physical Exam   Constitutional: Awake, alert   Eyes: PERRLA   HENT: NCAT, mucous membranes moist   Neck: Supple   Respiratory: Clear to auscultation bilaterally, nonlabored respirations    Cardiovascular: regular rate, palpable pedal pulses bilaterally   Gastrointestinal: Positive bowel sounds, soft, nontender, nondistended   Musculoskeletal: No bilateral ankle edema   Psychiatric: Appropriate affect, cooperative   Neurologic: Oriented x 3, speech clear   Skin: No rashes       Result Review    Result Review:  I have personally reviewed the results from the time of this admission to 8/20/2024 22:35 EDT and agree with these findings:  [x]  Laboratory list / accordion  []  Microbiology  [x]  Radiology  [x]  EKG/Telemetry   []  Cardiology/Vascular   []  Pathology  [x]  Old records  []  Other:    Initial workup in the emergency department shows high-sensitivity troponin of 15, 14, proBNP 1816.0, sodium 133,  creatinine 1.28, glucose 113, D-dimer 1.93, all of the lab work is at baseline for the patient.  EKG shows sinus bradycardia rate of 50, chest x-ray shows left lower lobe linear atelectasis/scarring.  No new focal consolidation.  No pleural effusion or pneumothorax.  Normal-sized cardiac silhouette and postsurgical changes of CABG.  Nondisplaced medial sternotomy wires.      Assessment & Plan   Assessment / Plan     Brief Patient Summary:  Osman Aparicio is a 69 y.o. male who was admitted to the observation unit for further evaluation and treatment of his exertional dyspnea.    Active Hospital Problems:  Active Hospital Problems    Diagnosis     **Exertional dyspnea      Plan:   Exertional Dyspnea   -Cardiac monitoring  -Vital signs every 4 hours   -Cardiology consult   -High-sensitivity troponin 15, 14, trend  -EKG-sinus bradycardia, rate of 50  -D-dimer 1.93  -CTA chest-pending  -N.p.o. after midnight       COPD  -Continuous pulse ox  -Continue home dose Symbicort, Spiriva  -As needed albuterol    Hypertension  -Monitor blood pressure  -Continue home dose amlodipine      VTE Prophylaxis:  Mechanical VTE prophylaxis orders are present.        CODE STATUS:       Admission Status:  I believe this patient meets observation status.    78 minutes have been spent by [facility] providers in the care of this patient while under observation status.      Appropriate PPE worn during patient encounter.  Hand hygeine performed before and after seeing the patient.      Electronically signed by TAYLOR Akbar, 08/20/24, 7:30 PM EDT.

## 2024-08-20 NOTE — PROGRESS NOTES
MD ATTESTATION NOTE    The KAVON and I have discussed this patient's history, physical exam, and treatment plan.  I have reviewed the documentation and personally had a face to face interaction with the patient. I affirm the documentation and agree with the treatment and plan.  The attached note describes my personal findings.      I provided a substantive portion of the care of the patient.  I personally performed the physical exam in its entirety, and below are my findings.      Brief HPI: This is a 69-year-old male that had does have a history of coronary artery disease with previous stents in the past history of COPD.  He is a former smoker.  He has noticed some shortness of breath that occurs with exertion for the past 3 days.  It started Saturday night.  He has had a chronic cough but he feels like the cough might be a little bit worse and he feels as if the phlegm is a little thicker.  Does not really report any increased wheezing or chest congestion.  Denies any chest pain at all.  He has no chest pain with exertion or at rest.  His shortness of breath at rest to 100% resolves.  He has had no increased swelling to his lower extremities.  He has no pain to his lower extremities.  He denies any orthopnea.  Denies any fevers or chills.  He has a pulse oximeter and when he he is at home at rest he gets a reading of 90 to 93% and then when he is exerts himself it is gotten down to the low 80 percentile range in the upper 70 percentile range.  Currently right now when I see him in bed 35 in the ED he is asymptomatic.    General : Friendly pleasant elderly male.  Patient is awake alert and oriented.  On my exam he has mildly elevated blood pressure he is afebrile.  Oxygen saturation is 95% on room air.  HEENT: NCAT  CV: Heart is regular rate and rhythm  Respiratory: No respiratory distress.  Patient has a very slight wheeze on exam.  He has no bronchospasm.  He does have an occasional very mild dry cough on my  history and exam.  Abd: Soft and nontender  Ext: No acute abnormalities.  No edema.  Intact distal pulses to upper and lower extremities that are equal strong and symmetric.  Skin: No rash  Neuro: Cranial nerves II through XII grossly intact as tested.  No acute lateralizing deficits.  Psych: Normal mood and affect    I have reviewed the patient's vital signs, lab work, EKG and imaging.    Plan: Exertional dyspnea: This is a patient that does have a history of COPD and coronary artery disease.  He is not having any chest pain whatsoever at rest or with exertion.  Patient's BNP is mildly elevated at 1800 which is nonspecific.  Patient's troponin are normal.  Patient has some chronic renal insufficiency which is at baseline.  His EKG shows no acute process.  Chest x-ray is unremarkable.  I reviewed his echo from March 2022 his left ventricular ejection fraction was 55% he did have moderate mitral valve regurg and some hypokinesis of the septal wall and inferior portion of his heart.  He is followed by [facility].  He does have a slight wheeze.  We are going to give him a breathing treatment to see if that improves of symptoms.  He is also had a slight increase in cough as well as increase in the sputum of his cough.  Will check a viral respiratory panel.  Will also check a CT angiogram of his chest as well as an echo.  Cardiology will be consulted.  Discussed the plan with the patient and the initial results of the test.  All questions answered.  Currently right now he is asymptomatic when he is at rest and still.        Note Disclaimer: At [facility], we believe that sharing information builds trust and better relationships. You are receiving this note because you recently visited [facility]. It is possible you will see health information before a provider has talked with you about it. This kind of information can be easy to misunderstand. To help you fully understand what it means for your health,  we urge you to discuss this note with your provider.

## 2024-08-20 NOTE — Clinical Note
Chief Complaint   Patient presents with   • Chest Pain     Right side for may 22 2018       History of Present Illness   Cheri Mendoza is a 54 y.o. female presents for acute care. She fell 2 weeks ago. She fell from a kneeling position onto her right side with an outstretched right arm. She has had continued pain from that time. She is not taking anything for pain at this time. She is having difficulty sleeping related to pain. As well as some discomfort with deep breathing.       The following portions of the patient's history were reviewed and updated as appropriate: allergies, current medications, past family history, past medical history, past social history, past surgical history and problem list.  Current Outpatient Medications on File Prior to Visit   Medication Sig Dispense Refill   • Ascorbic Acid (VITAMIN C PO) Take 1,000 mg by mouth Daily.     • B Complex Vitamins (VITAMIN B COMPLEX PO) Take 200 mcg by mouth Daily.     • baclofen (LIORESAL) 10 MG tablet Take 10 mg by mouth 3 (Three) Times a Day As Needed.     • Cholecalciferol (VITAMIN D3) 5000 units capsule capsule Take 2,000 Units by mouth Daily.     • cycloSPORINE (RESTASIS) 0.05 % ophthalmic emulsion 1 drop 2 (Two) Times a Day.     • DHEA 10 MG capsule Take  by mouth Daily.     • diclofenac (VOLTAREN) 50 MG EC tablet Take 1 tablet by mouth 2 (Two) Times a Day. 60 tablet 1   • DULoxetine (CYMBALTA) 60 MG capsule Take 1 capsule by mouth Daily. 30 capsule 5   • estradiol (VIVELLE-DOT) 0.0375 MG/24HR Place 1 patch on the skin 2 (Two) Times a Week.     • ibuprofen (ADVIL,MOTRIN) 800 MG tablet Take 800 mg by mouth 2 (Two) Times a Day.     • losartan (COZAAR) 50 MG tablet Take 1 tablet by mouth Daily. 90 tablet 3   • Magnesium 100 MG tablet Take 500 mg by mouth Every Night.     • Magnesium-Potassium (CHANDRAKANT MAGNESIUM-POTASSIUM) 250-100 MG tablet Take  by mouth Daily.     • Multiple Vitamins-Minerals (PRESERVISION AREDS PO) Take  by mouth Daily.     •  No in lab complications Omega-3 Fatty Acids (FISH OIL PO) Take 1,170 mg by mouth Daily.     • Probiotic Product (PROBIOTIC DAILY PO) Take  by mouth Daily.     • progesterone (PROMETRIUM) 100 MG capsule Take 100 mg by mouth Daily.     • SYNTHROID 75 MCG tablet TAKE ONE TABLET BY MOUTH DAILY 90 tablet 2   • traMADol (ULTRAM) 50 MG tablet Take 50 mg by mouth Every 6 (Six) Hours As Needed for Moderate Pain .     • Triamcinolone Acetonide 0.05 % ointment Apply  topically.       No current facility-administered medications on file prior to visit.      Review of Systems   Constitutional: Positive for fatigue.   HENT: Negative.    Eyes: Negative.    Respiratory: Negative.    Cardiovascular: Negative.    Gastrointestinal: Negative.    Endocrine: Negative.    Genitourinary: Negative.    Musculoskeletal:        Right rib pain   Skin: Negative.    Allergic/Immunologic: Negative.    Neurological: Negative.    Hematological: Negative.    Psychiatric/Behavioral: Negative.        Objective   Physical Exam   Constitutional: She is oriented to person, place, and time. She appears well-developed and well-nourished.   HENT:   Head: Normocephalic and atraumatic.   Right Ear: Hearing, tympanic membrane and external ear normal.   Left Ear: Hearing, tympanic membrane and external ear normal.   Nose: Nose normal.   Mouth/Throat: Oropharynx is clear and moist.   Eyes: Conjunctivae and EOM are normal. Pupils are equal, round, and reactive to light.   Neck: Normal range of motion. Neck supple. No thyromegaly present.   Cardiovascular: Normal rate, regular rhythm and normal heart sounds.   No murmur heard.  Pulmonary/Chest: Effort normal and breath sounds normal. Right breast exhibits no mass. Left breast exhibits no mass.   Abdominal: Soft. She exhibits no distension. There is no hepatosplenomegaly. There is no tenderness.   Genitourinary: No breast tenderness.   Musculoskeletal:   Pain to palpation right side.    Lymphadenopathy:     She has no cervical adenopathy.    Neurological: She is alert and oriented to person, place, and time.   Skin: Skin is warm and dry.   Psychiatric: She has a normal mood and affect. Her speech is normal and behavior is normal. Judgment and thought content normal. Cognition and memory are normal.   Nursing note and vitals reviewed.     Right rib films for right side pain. No acute fracture or pneumothorax noted.     /70   Pulse 69   Wt 67.6 kg (149 lb)   SpO2 95%   BMI 26.33 kg/m²     Assessment/Plan   Diagnoses and all orders for this visit:    Fall, initial encounter  -     XR Ribs Right With PA Chest    Other orders  -     HYDROcodone-acetaminophen (NORCO) 5-325 MG per tablet; Take 1 tablet by mouth Every 6 (Six) Hours As Needed for Moderate Pain .  -     ondansetron (ZOFRAN) 8 MG tablet; Take 1 tablet by mouth Every 8 (Eight) Hours As Needed for Nausea or Vomiting.      Patient w/ right sided pain after trauma to the chest wall. X ray without any evidence of fracture or pneumothorax. Will send for over read. She will try hydrocodone for pain at night and will take zofran with this. She will f/u if needed.

## 2024-08-20 NOTE — ED PROVIDER NOTES
EMERGENCY DEPARTMENT ENCOUNTER    Room Number:  35/35  PCP: Desire Hughes APRN ([place])  Historian: Patient      HPI:  Chief Complaint: Shortness of breath  A complete HPI/ROS/PMH/PSH/SH/FH are unobtainable due to: None  Context: Osman Aparicio is a 69 y.o. male who presents to the ED c/o fairly sudden onset exertional shortness of breath that has been present and worsening now over the last couple of days.  He does have a history of COPD however states that it is normally very well-controlled and is able to walk without any difficulty.  Over the last couple of days, he reports that even the most minimal exertion causes significant dyspnea.  He does describe a productive cough with it but denies chest pain, back pain, lower extremity swelling, abdominal pain, nausea/vomiting, or fever/chills.            PAST MEDICAL HISTORY  Active Ambulatory Problems     Diagnosis Date Noted    Primary hypertension 06/01/2016    Other hyperlipidemia 06/01/2016    Anxiety 06/01/2016    Nocturia 07/27/2017    Elevated PSA 04/23/2018    Elevated blood sugar level 04/23/2018    Positive colorectal cancer screening using Cologuard test 11/18/2020    Shortness of breath 03/26/2022    Chronic diastolic congestive heart failure 03/26/2022    Leukocytosis 03/26/2022    Positive D dimer 03/26/2022    Anemia 03/26/2022    COPD with acute exacerbation 03/26/2022    Panlobular emphysema 03/26/2022    History of colon polyps 03/26/2022    Juxtarenal abdominal aortic aneurysm (AAA) without rupture 03/26/2022    Alcohol abuse 03/27/2022    NSTEMI (non-ST elevated myocardial infarction) 03/29/2022    Coronary artery disease involving native coronary artery of native heart without angina pectoris 03/26/2022    Abnormal findings on diagnostic imaging of heart and coronary circulation 03/26/2022    Closed Colles' fracture of right radius 08/25/2022    Pneumonia of both lower lobes due to infectious organism 04/05/2023    Hypoxia 04/05/2023    Fever  04/05/2023     Resolved Ambulatory Problems     Diagnosis Date Noted    Acute respiratory failure with hypoxia 03/26/2022    Hyponatremia 03/26/2022    Acute pulmonary edema 03/26/2022     Past Medical History:   Diagnosis Date    AAA (abdominal aortic aneurysm)     Abnormal glucose     COPD (chronic obstructive pulmonary disease)     Coronary artery disease     Encounter for special screening examination for neoplasm of prostate 10/2012    Hematuria     History of COVID-19     Hyperlipidemia     Hypertension     Myocardial infarction 03/2022    Radius fracture     Vitamin D deficiency disease          PAST SURGICAL HISTORY  Past Surgical History:   Procedure Laterality Date    ARTERIOGRAM AORTIC N/A 01/30/2023    Procedure: Zenith Fenestrated Endovascular Repair;  Surgeon: Mariusz Kumar MD;  Location: [facility] HYBRID OR 18/19;  Service: Vascular;  Laterality: N/A;    CARDIAC CATHETERIZATION N/A 03/29/2022    Procedure: Left Heart Cath;  Surgeon: Neto Paige MD;  Location: [facility] CATH INVASIVE LOCATION;  Service: Cardiology;  Laterality: N/A;    CARDIAC CATHETERIZATION N/A 03/29/2022    Procedure: Coronary angiography;  Surgeon: Neto Paige MD;  Location: [facility] CATH INVASIVE LOCATION;  Service: Cardiology;  Laterality: N/A;    CARDIAC CATHETERIZATION N/A 03/29/2022    Procedure: Left ventriculography;  Surgeon: Neto Paige MD;  Location: [facility] CATH INVASIVE LOCATION;  Service: Cardiology;  Laterality: N/A;    COLONOSCOPY N/A 12/07/2020    Procedure: COLONOSCOPY INTO CECUM WITH HOT SNARE POLYPECTOMIES, COLD BX POLYPECTOMIES, RESOLUTION CLIPS X;  Surgeon: Regi Mercado MD;  Location: [facility] ENDOSCOPY;  Service: General;  Laterality: N/A;  PRE:  POSITIVE COLOGUARD  POST:  POLYPS    CORONARY ARTERY BYPASS GRAFT N/A 04/01/2022    Procedure: STERNOTOMY, CORONARY ARTERY BYPASS UTILIZINGTHE RT SAPHENOUS VEIN WITH LEFT INTERNAL MAMMARY ARTERY GRAFT X3 OFF PUMP, PRP;  Surgeon:  Colten Zamora MD;  Location: [facility];  Service: Cardiothoracic;  Laterality: N/A;    ORIF WRIST FRACTURE Right 2022    Procedure: RIGHT DISTAL RADIUS FRACTURE OPEN REDUCTION INTERNAL FIXATION;  Surgeon: Bubba Mello MD;  [place] [facility];  Service: Orthopedics;  Laterality: Right;    TRANSESOPHAGEAL ECHOCARDIOGRAM (MARTHA) N/A 2022    Procedure: TRANSESOPHAGEAL ECHOCARDIOGRAM WITH ANESTHESIA;  Surgeon: Colten Zamora MD;  Location: [facility];  Service: Cardiothoracic;  Laterality: N/A;         FAMILY HISTORY  Family History   Problem Relation Age of Onset    Lung cancer Brother     Liver cancer Brother     Colon polyps Brother     Malig Hyperthermia Neg Hx          SOCIAL HISTORY  Social History     Socioeconomic History    Marital status:      Spouse name: Suzanne   Tobacco Use    Smoking status: Former     Current packs/day: 0.00     Average packs/day: 1 pack/day for 45.0 years (45.0 ttl pk-yrs)     Types: Cigarettes     Start date: 3/26/1977     Quit date: 3/26/2022     Years since quittin.4     Passive exposure: Past    Smokeless tobacco: Never    Tobacco comments:     caffeine - 3 cups coffee daily, tea or soda occas.   Vaping Use    Vaping status: Never Used   Substance and Sexual Activity    Alcohol use: Yes     Comment: 2 BEERS OCCASIONAL    Drug use: No    Sexual activity: Defer         ALLERGIES  Patient has no known allergies.        REVIEW OF SYSTEMS  Review of Systems   Constitutional:  Negative for activity change, appetite change and fever.   HENT:  Negative for congestion and sore throat.    Eyes: Negative.    Respiratory:  Positive for cough and shortness of breath.    Cardiovascular:  Negative for chest pain and leg swelling.   Gastrointestinal:  Negative for abdominal pain, diarrhea and vomiting.   Endocrine: Negative.    Genitourinary:  Negative for decreased urine volume and dysuria.   Musculoskeletal:  Negative for neck pain.   Skin:   Negative for rash and wound.   Allergic/Immunologic: Negative.    Neurological:  Negative for weakness, numbness and headaches.   Hematological: Negative.    Psychiatric/Behavioral: Negative.     All other systems reviewed and are negative.         PHYSICAL EXAM  ED Triage Vitals   Temp Heart Rate Resp BP SpO2   08/20/24 1511 08/20/24 1511 08/20/24 1511 08/20/24 1521 08/20/24 1511   97.2 °F (36.2 °C) 55 18 150/81 94 %      Temp src Heart Rate Source Patient Position BP Location FiO2 (%)   08/20/24 1511 -- 08/20/24 1521 08/20/24 1521 --   Tympanic  Sitting Right arm        Physical Exam  Constitutional:       General: He is not in acute distress.     Appearance: Normal appearance. He is not ill-appearing or toxic-appearing.   HENT:      Head: Normocephalic and atraumatic.   Eyes:      Extraocular Movements: Extraocular movements intact.      Pupils: Pupils are equal, round, and reactive to light.   Cardiovascular:      Rate and Rhythm: Normal rate and regular rhythm.      Heart sounds: No murmur heard.     No friction rub. No gallop.   Pulmonary:      Effort: Pulmonary effort is normal.      Breath sounds: Examination of the right-lower field reveals rhonchi. Examination of the left-lower field reveals rhonchi. Wheezing and rhonchi present.   Abdominal:      General: Abdomen is flat. There is no distension.      Palpations: Abdomen is soft.      Tenderness: There is no abdominal tenderness.   Musculoskeletal:         General: No swelling or tenderness. Normal range of motion.      Cervical back: Normal range of motion and neck supple.   Skin:     General: Skin is warm and dry.   Neurological:      General: No focal deficit present.      Mental Status: He is alert and oriented to person, place, and time.      Sensory: No sensory deficit.      Motor: No weakness.   Psychiatric:         Mood and Affect: Mood normal.         Behavior: Behavior normal.           Vital signs and nursing notes reviewed.          LAB  RESULTS  Recent Results (from the past 24 hour(s))   ECG 12 Lead ED Triage Standing Order; SOA    Collection Time: 08/20/24  3:16 PM   Result Value Ref Range    QT Interval 468 ms    QTC Interval 427 ms   Comprehensive Metabolic Panel    Collection Time: 08/20/24  3:28 PM    Specimen: Blood   Result Value Ref Range    Glucose 113 (H) 65 - 99 mg/dL    BUN 16 8 - 23 mg/dL    Creatinine 1.28 (H) 0.76 - 1.27 mg/dL    Sodium 133 (L) 136 - 145 mmol/L    Potassium 4.0 3.5 - 5.2 mmol/L    Chloride 96 (L) 98 - 107 mmol/L    CO2 23.6 22.0 - 29.0 mmol/L    Calcium 10.2 8.6 - 10.5 mg/dL    Total Protein 8.7 (H) 6.0 - 8.5 g/dL    Albumin 4.5 3.5 - 5.2 g/dL    ALT (SGPT) 15 1 - 41 U/L    AST (SGOT) 28 1 - 40 U/L    Alkaline Phosphatase 99 39 - 117 U/L    Total Bilirubin 0.6 0.0 - 1.2 mg/dL    Globulin 4.2 gm/dL    A/G Ratio 1.1 g/dL    BUN/Creatinine Ratio 12.5 7.0 - 25.0    Anion Gap 13.4 5.0 - 15.0 mmol/L    eGFR 60.6 >60.0 mL/min/1.73   BNP    Collection Time: 08/20/24  3:28 PM    Specimen: Blood   Result Value Ref Range    proBNP 1,816.0 (H) 0.0 - 900.0 pg/mL   Single High Sensitivity Troponin T    Collection Time: 08/20/24  3:28 PM    Specimen: Blood   Result Value Ref Range    HS Troponin T 15 <22 ng/L   Green Top (Gel)    Collection Time: 08/20/24  3:28 PM   Result Value Ref Range    Extra Tube Hold for add-ons.    Lavender Top    Collection Time: 08/20/24  3:28 PM   Result Value Ref Range    Extra Tube hold for add-on    Gold Top - SST    Collection Time: 08/20/24  3:28 PM   Result Value Ref Range    Extra Tube Hold for add-ons.    Light Blue Top    Collection Time: 08/20/24  3:28 PM   Result Value Ref Range    Extra Tube Hold for add-ons.    CBC Auto Differential    Collection Time: 08/20/24  3:28 PM    Specimen: Blood   Result Value Ref Range    WBC 9.77 3.40 - 10.80 10*3/mm3    RBC 4.14 4.14 - 5.80 10*6/mm3    Hemoglobin 12.5 (L) 13.0 - 17.7 g/dL    Hematocrit 38.0 37.5 - 51.0 %    MCV 91.8 79.0 - 97.0 fL    MCH 30.2  26.6 - 33.0 pg    MCHC 32.9 31.5 - 35.7 g/dL    RDW 13.6 12.3 - 15.4 %    RDW-SD 45.4 37.0 - 54.0 fl    MPV 9.4 6.0 - 12.0 fL    Platelets 371 140 - 450 10*3/mm3   Manual Differential    Collection Time: 08/20/24  3:28 PM    Specimen: Blood   Result Value Ref Range    Neutrophil % 45.8 42.7 - 76.0 %    Lymphocyte % 38.5 19.6 - 45.3 %    Monocyte % 14.6 (H) 5.0 - 12.0 %    Eosinophil % 0.0 (L) 0.3 - 6.2 %    Basophil % 1.0 0.0 - 1.5 %    Neutrophils Absolute 4.47 1.70 - 7.00 10*3/mm3    Lymphocytes Absolute 3.76 (H) 0.70 - 3.10 10*3/mm3    Monocytes Absolute 1.43 (H) 0.10 - 0.90 10*3/mm3    Eosinophils Absolute 0.00 0.00 - 0.40 10*3/mm3    Basophils Absolute 0.10 0.00 - 0.20 10*3/mm3    RBC Morphology Normal Normal    WBC Morphology Normal Normal    Platelet Morphology Normal Normal   Single High Sensitivity Troponin T    Collection Time: 08/20/24  5:26 PM    Specimen: Arm, Left; Blood   Result Value Ref Range    HS Troponin T 14 <22 ng/L       Ordered the above labs and reviewed the results.        RADIOLOGY  XR Chest 1 View    Result Date: 8/20/2024  XR CHEST 1 VW-  INDICATION: Shortness of breath  COMPARISON: CT chest 9/21/2023 and chest radiographs dating back to 3/26/2022      Left lower lobe linear atelectasis/scarring. No new focal consolidation. No pleural effusion or pneumothorax. Normal size cardiac silhouette with postsurgical changes of CABG. Nondisplaced median sternotomy wires.   This report was finalized on 8/20/2024 3:47 PM by Dr. Ehsan Ventura M.D on Workstation: [id number]       Ordered the above noted radiological studies. Reviewed by me in PACS.            PROCEDURES  Procedures    EKG independently interpreted by myself as follows:    EKG          EKG time: 1516  Rhythm/Rate: NSR, 50  P waves and NV: nml  QRS, axis: nml, nml   ST and T waves: nml     Interpreted Contemporaneously by me, independently viewed  unchanged compared to prior 4/2/22            MEDICATIONS GIVEN IN ER  Medications    sodium chloride 0.9 % flush 10 mL (has no administration in time range)                   MEDICAL DECISION MAKING, PROGRESS, and CONSULTS    All labs have been independently reviewed by me.  All radiology studies have been reviewed by me and I have also reviewed the radiology report.   EKG's independently viewed and interpreted by me.  Discussion below represents my analysis of pertinent findings related to patient's condition, differential diagnosis, treatment plan and final disposition.      Additional sources:  - Discussed/ obtained information from independent historians: History obtained from the patient himself at bedside.    - External (non-ED) record review: Upon medical records review, the patient was last seen and evaluated in the outpatient office of cardiology on 3/20/2024 in follow-up for his known coronary artery disease as well as chronic hypertension.    - Chronic or social conditions impacting care: Hypertension, CAD    - Shared decision making: Patient decision based on shared conversations have between myself, the patient and family at bedside, as well as Joy Robins PA-C.        Orders placed during this visit:  Orders Placed This Encounter   Procedures    XR Chest 1 View    [place] Draw    Comprehensive Metabolic Panel    BNP    Single High Sensitivity Troponin T    CBC Auto Differential    Manual Differential    Single High Sensitivity Troponin T    D-dimer, Quantitative    NPO Diet NPO Type: Strict NPO    Undress & Gown    Continuous Pulse Oximetry    Vital Signs    Oxygen Therapy- Nasal Cannula; Titrate 1-6 LPM Per SpO2; 90 - 95%    ECG 12 Lead ED Triage Standing Order; SOA    Insert Peripheral IV    Initiate ED Observation Status    CBC & Differential    Green Top (Gel)    Lavender Top    Gold Top - SST    Light Blue Top             Differential diagnosis includes but is not limited to:    COPD with acute exacerbation, acute coronary syndrome, pneumonia, pneumothorax, pulmonary  embolism, CHF with pulmonary edema, or acute anemia      Independent interpretation of labs, radiology studies, and discussions with consultants:    Chest x-ray independently interpreted by myself with my interpretation showing no cardiomegaly nor area of infiltrate.  There does appear to be some mild increased interstitial edema bilaterally.        ED Course as of 08/20/24 1813   Tue Aug 20, 2024   1807 On reevaluation, the patient is resting comfortably with stable vital signs.  I did inform them that the workup thus far is fairly unremarkable so I do not have a good explanation for his exertional dyspnea at this point.  Would like to admit him today to the observation unit for further management and treatment.  He and his family agree with that plan and all questions answered. [BM]   1813 The patient's presentation, workup, as well as diagnosis and treatment plan was discussed at length with Joy Robins PA-C.  She agrees to admit the patient to the observation unit today with Dr. Anderson. [BM]      ED Course User Index  [BM] Hayder Victor MD           DIAGNOSIS  Final diagnoses:   Exertional dyspnea   Chronic obstructive pulmonary disease, unspecified COPD type         DISPOSITION  ADMISSION    Discussed treatment plan and reason for admission with pt/family and admitting physician.  Pt/family voiced understanding of the plan for admission for further testing/treatment as needed.               Latest Documented Vital Signs:  As of 18:13 EDT  BP- 155/73 HR- 54 Temp- 97.2 °F (36.2 °C) (Tympanic) O2 sat- 95%              --    Please note that portions of this were completed with a voice recognition program.       Note Disclaimer: At [facility], we believe that sharing information builds trust and better relationships. You are receiving this note because you are receiving care at [facility] or recently visited. It is possible you will see health information before a provider has talked with you  about it. This kind of information can be easy to misunderstand. To help you fully understand what it means for your health, we urge you to discuss this note with your provider.             Hayder Victor MD  08/20/24 [id number]

## 2024-08-20 NOTE — ED NOTES
Nursing report ED to floor  Osman Aparicio  69 y.o.  male    HPI :  HPI (Adult)  Stated Reason for Visit: see note  History Obtained From: patient    Chief Complaint  Chief Complaint   Patient presents with    Shortness of Breath       Admitting doctor:   Latrell Anderson MD    Admitting diagnosis:   The primary encounter diagnosis was Exertional dyspnea. A diagnosis of Chronic obstructive pulmonary disease, unspecified COPD type was also pertinent to this visit.    Code status:   Current Code Status       Date Active Code Status Order ID Comments User Context       Prior            Allergies:   Patient has no known allergies.    Isolation:   No active isolations    Intake and Output  No intake or output data in the 24 hours ending 08/20/24 1824    Weight:   There were no vitals filed for this visit.    Most recent vitals:   Vitals:    08/20/24 1731 08/20/24 1733 08/20/24 1802 08/20/24 1808   BP: 155/73      BP Location:       Patient Position:       Pulse: 53 53 54 54   Resp:       Temp:       TempSrc:       SpO2:  96% 93% 95%       Active LDAs/IV Access:   Lines, Drains & Airways       Active LDAs       Name Placement date Placement time Site Days    Peripheral IV 08/20/24 1531 Right Antecubital 08/20/24  1531  Antecubital  less than 1                    Labs (abnormal labs have a star):   Labs Reviewed   COMPREHENSIVE METABOLIC PANEL - Abnormal; Notable for the following components:       Result Value    Glucose 113 (*)     Creatinine 1.28 (*)     Sodium 133 (*)     Chloride 96 (*)     Total Protein 8.7 (*)     All other components within normal limits    Narrative:     GFR Normal >60  Chronic Kidney Disease <60  Kidney Failure <15     BNP (IN-HOUSE) - Abnormal; Notable for the following components:    proBNP 1,816.0 (*)     All other components within normal limits    Narrative:     This assay is used as an aid in the diagnosis of individuals suspected of having heart failure. It can be used as an aid in the  diagnosis of acute decompensated heart failure (ADHF) in patients presenting with signs and symptoms of ADHF to the emergency department (ED). In addition, NT-proBNP of <300 pg/mL indicates ADHF is not likely.    Age Range Result Interpretation  NT-proBNP Concentration (pg/mL:      <50             Positive            >450                   Gray                 300-450                    Negative             <300    50-75           Positive            >900                  Gray                300-900                  Negative            <300      >75             Positive            >1800                  Gray                300-1800                  Negative            <300   CBC WITH AUTO DIFFERENTIAL - Abnormal; Notable for the following components:    Hemoglobin 12.5 (*)     All other components within normal limits   MANUAL DIFFERENTIAL - Abnormal; Notable for the following components:    Monocyte % 14.6 (*)     Eosinophil % 0.0 (*)     Lymphocytes Absolute 3.76 (*)     Monocytes Absolute 1.43 (*)     All other components within normal limits   SINGLE HS TROPONIN T - Normal    Narrative:     High Sensitive Troponin T Reference Range:  <14.0 ng/L- Negative Female for AMI  <22.0 ng/L- Negative Male for AMI  >=14 - Abnormal Female indicating possible myocardial injury.  >=22 - Abnormal Male indicating possible myocardial injury.   Clinicians would have to utilize clinical acumen, EKG, Troponin, and serial changes to determine if it is an Acute Myocardial Infarction or myocardial injury due to an underlying chronic condition.        SINGLE HS TROPONIN T - Normal    Narrative:     High Sensitive Troponin T Reference Range:  <14.0 ng/L- Negative Female for AMI  <22.0 ng/L- Negative Male for AMI  >=14 - Abnormal Female indicating possible myocardial injury.  >=22 - Abnormal Male indicating possible myocardial injury.   Clinicians would have to utilize clinical acumen, EKG, Troponin, and serial changes to determine if it  is an Acute Myocardial Infarction or myocardial injury due to an underlying chronic condition.        RAINBOW DRAW    Narrative:     The following orders were created for panel order Wall Draw.  Procedure                               Abnormality         Status                     ---------                               -----------         ------                     Green Top (Gel)[[id number]]                                  Final result               Lavender Top[[id number]]                                     Final result               Gold Top - SST[[id number]]                                   Final result               Light Blue Top[[id number]]                                   Final result                 Please view results for these tests on the individual orders.   D-DIMER, QUANTITATIVE   LIPID PANEL   CBC AND DIFFERENTIAL    Narrative:     The following orders were created for panel order CBC & Differential.  Procedure                               Abnormality         Status                     ---------                               -----------         ------                     CBC Auto Differential[[id number]]        Abnormal            Final result                 Please view results for these tests on the individual orders.   GREEN TOP   LAVENDER TOP   GOLD TOP - SST   LIGHT BLUE TOP       EKG:   ECG 12 Lead ED Triage Standing Order; SOA   Preliminary Result   HEART RATE=50  bpm   RR Dwkyebia=8882  ms   TX Xspdkevx=595  ms   P Horizontal Axis=17  deg   P Front Axis=9  deg   QRSD Vwxsblle=694  ms   QT Eqylrood=232  ms   VSxQ=883  ms   QRS Axis=-11  deg   T Wave Axis=68  deg   - ABNORMAL ECG -   Sinus rhythm   Probable left atrial enlargement   Left ventricular hypertrophy   Date and Time of Study:2024-08-20 15:16:06          Meds given in ED:   Medications   sodium chloride 0.9 % flush 10 mL (has no administration in time range)   nitroglycerin (NITROSTAT) SL tablet 0.4 mg (has no administration in  time range)   sodium chloride 0.9 % flush 10 mL (has no administration in time range)   sodium chloride 0.9 % flush 10 mL (has no administration in time range)   sodium chloride 0.9 % infusion 40 mL (has no administration in time range)   sennosides-docusate (PERICOLACE) 8.6-50 MG per tablet 2 tablet (has no administration in time range)     And   polyethylene glycol (MIRALAX) packet 17 g (has no administration in time range)     And   bisacodyl (DULCOLAX) EC tablet 5 mg (has no administration in time range)     And   bisacodyl (DULCOLAX) suppository 10 mg (has no administration in time range)       Imaging results:  XR Chest 1 View    Result Date: 2024  Left lower lobe linear atelectasis/scarring. No new focal consolidation. No pleural effusion or pneumothorax. Normal size cardiac silhouette with postsurgical changes of CABG. Nondisplaced median sternotomy wires.   This report was finalized on 2024 3:47 PM by Dr. Ehsan Ventura M.D on Workstation: [id number]       Ambulatory status:   - ad deidra    Social issues:   Social History     Socioeconomic History    Marital status:      Spouse name: Suzanne   Tobacco Use    Smoking status: Former     Current packs/day: 0.00     Average packs/day: 1 pack/day for 45.0 years (45.0 ttl pk-yrs)     Types: Cigarettes     Start date: 3/26/1977     Quit date: 3/26/2022     Years since quittin.4     Passive exposure: Past    Smokeless tobacco: Never    Tobacco comments:     caffeine - 3 cups coffee daily, tea or soda occas.   Vaping Use    Vaping status: Never Used   Substance and Sexual Activity    Alcohol use: Yes     Comment: 2 BEERS OCCASIONAL    Drug use: No    Sexual activity: Defer       Peripheral Neurovascular  Peripheral Neurovascular (Adult)  Peripheral Neurovascular WDL: WDL    Neuro Cognitive  Neuro Cognitive (Adult)  Cognitive/Neuro/Behavioral WDL: WDL, orientation, speech, level of consciousness  Level of Consciousness: Alert  Orientation:  oriented x 4  Speech: clear, spontaneous, logical    Learning  Learning Assessment (Adult)  Learning Readiness and Ability: no barriers identified  Education Provided  Person Taught: patient  Teaching Method: verbal instruction  Teaching Focus: symptom/problem overview, diagnostic test  Education Outcome Evaluation: verbalizes understanding, acceptance expressed    Respiratory  Respiratory WDL  Respiratory WDL: .WDL except, rhythm/pattern, cough  Rhythm/Pattern, Respiratory: shortness of breath  Cough Frequency: frequent  Cough Type: productive    Abdominal Pain       Pain Assessments  Pain (Adult)  (0-10) Pain Rating: Rest: 0    NIH Stroke Scale       Kerrie CHANCE Hall  08/20/24 18:24 EDT

## 2024-08-21 ENCOUNTER — APPOINTMENT (OUTPATIENT)
Dept: CARDIOLOGY | Facility: HOSPITAL | Age: 69
DRG: 204 | End: 2024-08-21
Payer: MEDICARE

## 2024-08-21 LAB
ANION GAP SERPL CALCULATED.3IONS-SCNC: 12.2 MMOL/L (ref 5–15)
ASCENDING AORTA: 2.9 CM
BH CV ECHO MEAS - ACS: 1.68 CM
BH CV ECHO MEAS - AI P1/2T: 610.4 MSEC
BH CV ECHO MEAS - AO MAX PG: 14.1 MMHG
BH CV ECHO MEAS - AO MEAN PG: 7.2 MMHG
BH CV ECHO MEAS - AO ROOT DIAM: 4.1 CM
BH CV ECHO MEAS - AO V2 MAX: 187.6 CM/SEC
BH CV ECHO MEAS - AO V2 VTI: 43.4 CM
BH CV ECHO MEAS - AVA(I,D): 1.79 CM2
BH CV ECHO MEAS - EDV(CUBED): 140 ML
BH CV ECHO MEAS - EDV(MOD-SP2): 125 ML
BH CV ECHO MEAS - EDV(MOD-SP4): 121 ML
BH CV ECHO MEAS - EF(MOD-BP): 61.5 %
BH CV ECHO MEAS - EF(MOD-SP2): 65.6 %
BH CV ECHO MEAS - EF(MOD-SP4): 57.9 %
BH CV ECHO MEAS - ESV(CUBED): 44.8 ML
BH CV ECHO MEAS - ESV(MOD-SP2): 43 ML
BH CV ECHO MEAS - ESV(MOD-SP4): 51 ML
BH CV ECHO MEAS - FS: 31.6 %
BH CV ECHO MEAS - IVS/LVPW: 1.09 CM
BH CV ECHO MEAS - IVSD: 1.15 CM
BH CV ECHO MEAS - LAT PEAK E' VEL: 10.7 CM/SEC
BH CV ECHO MEAS - LV MASS(C)D: 220.8 GRAMS
BH CV ECHO MEAS - LV MAX PG: 4.8 MMHG
BH CV ECHO MEAS - LV MEAN PG: 2.45 MMHG
BH CV ECHO MEAS - LV V1 MAX: 109.1 CM/SEC
BH CV ECHO MEAS - LV V1 VTI: 25.3 CM
BH CV ECHO MEAS - LVIDD: 5.2 CM
BH CV ECHO MEAS - LVIDS: 3.6 CM
BH CV ECHO MEAS - LVOT AREA: 3.1 CM2
BH CV ECHO MEAS - LVOT DIAM: 1.98 CM
BH CV ECHO MEAS - LVPWD: 1.05 CM
BH CV ECHO MEAS - MED PEAK E' VEL: 7.1 CM/SEC
BH CV ECHO MEAS - MV A DUR: 0.16 SEC
BH CV ECHO MEAS - MV A MAX VEL: 80.9 CM/SEC
BH CV ECHO MEAS - MV DEC SLOPE: 525 CM/SEC2
BH CV ECHO MEAS - MV DEC TIME: 0.25 SEC
BH CV ECHO MEAS - MV E MAX VEL: 82.3 CM/SEC
BH CV ECHO MEAS - MV E/A: 1.02
BH CV ECHO MEAS - MV MAX PG: 4.3 MMHG
BH CV ECHO MEAS - MV MEAN PG: 1.38 MMHG
BH CV ECHO MEAS - MV P1/2T: 54.1 MSEC
BH CV ECHO MEAS - MV V2 VTI: 36.7 CM
BH CV ECHO MEAS - MVA(P1/2T): 4.1 CM2
BH CV ECHO MEAS - MVA(VTI): 2.12 CM2
BH CV ECHO MEAS - PA ACC TIME: 0.16 SEC
BH CV ECHO MEAS - PA V2 MAX: 102.7 CM/SEC
BH CV ECHO MEAS - RAP SYSTOLE: 3 MMHG
BH CV ECHO MEAS - RV MAX PG: 1.31 MMHG
BH CV ECHO MEAS - RV V1 MAX: 57.1 CM/SEC
BH CV ECHO MEAS - RV V1 VTI: 13.9 CM
BH CV ECHO MEAS - RVSP: 38 MMHG
BH CV ECHO MEAS - SV(LVOT): 77.6 ML
BH CV ECHO MEAS - SV(MOD-SP2): 82 ML
BH CV ECHO MEAS - SV(MOD-SP4): 70 ML
BH CV ECHO MEAS - TAPSE (>1.6): 2.5 CM
BH CV ECHO MEAS - TR MAX PG: 35.2 MMHG
BH CV ECHO MEAS - TR MAX VEL: 296.6 CM/SEC
BH CV ECHO MEASUREMENTS AVERAGE E/E' RATIO: 9.25
BH CV XLRA - RV BASE: 3.7 CM
BH CV XLRA - RV LENGTH: 6.5 CM
BH CV XLRA - RV MID: 3.1 CM
BH CV XLRA - TDI S': 12.1 CM/SEC
BUN SERPL-MCNC: 18 MG/DL (ref 8–23)
BUN/CREAT SERPL: 14.8 (ref 7–25)
CALCIUM SPEC-SCNC: 9.4 MG/DL (ref 8.6–10.5)
CHLORIDE SERPL-SCNC: 99 MMOL/L (ref 98–107)
CO2 SERPL-SCNC: 20.8 MMOL/L (ref 22–29)
CREAT SERPL-MCNC: 1.22 MG/DL (ref 0.76–1.27)
DEPRECATED RDW RBC AUTO: 46.3 FL (ref 37–54)
EGFRCR SERPLBLD CKD-EPI 2021: 64.2 ML/MIN/1.73
ERYTHROCYTE [DISTWIDTH] IN BLOOD BY AUTOMATED COUNT: 13.8 % (ref 12.3–15.4)
GLUCOSE SERPL-MCNC: 104 MG/DL (ref 65–99)
HCT VFR BLD AUTO: 33.6 % (ref 37.5–51)
HGB BLD-MCNC: 11.1 G/DL (ref 13–17.7)
LEFT ATRIUM VOLUME INDEX: 33.6 ML/M2
MAGNESIUM SERPL-MCNC: 2.2 MG/DL (ref 1.6–2.4)
MCH RBC QN AUTO: 30.4 PG (ref 26.6–33)
MCHC RBC AUTO-ENTMCNC: 33 G/DL (ref 31.5–35.7)
MCV RBC AUTO: 92.1 FL (ref 79–97)
PLATELET # BLD AUTO: 325 10*3/MM3 (ref 140–450)
PMV BLD AUTO: 9.8 FL (ref 6–12)
POTASSIUM SERPL-SCNC: 3.8 MMOL/L (ref 3.5–5.2)
QT INTERVAL: 468 MS
QTC INTERVAL: 427 MS
RBC # BLD AUTO: 3.65 10*6/MM3 (ref 4.14–5.8)
SINUS: 3 CM
SODIUM SERPL-SCNC: 132 MMOL/L (ref 136–145)
STJ: 2.7 CM
WBC NRBC COR # BLD AUTO: 9.04 10*3/MM3 (ref 3.4–10.8)

## 2024-08-21 PROCEDURE — 93306 TTE W/DOPPLER COMPLETE: CPT

## 2024-08-21 PROCEDURE — 94664 DEMO&/EVAL PT USE INHALER: CPT

## 2024-08-21 PROCEDURE — 85027 COMPLETE CBC AUTOMATED: CPT | Performed by: EMERGENCY MEDICINE

## 2024-08-21 PROCEDURE — 93306 TTE W/DOPPLER COMPLETE: CPT | Performed by: INTERNAL MEDICINE

## 2024-08-21 PROCEDURE — 99214 OFFICE O/P EST MOD 30 MIN: CPT | Performed by: STUDENT IN AN ORGANIZED HEALTH CARE EDUCATION/TRAINING PROGRAM

## 2024-08-21 PROCEDURE — 94799 UNLISTED PULMONARY SVC/PX: CPT

## 2024-08-21 PROCEDURE — 83735 ASSAY OF MAGNESIUM: CPT | Performed by: EMERGENCY MEDICINE

## 2024-08-21 PROCEDURE — 25010000002 FUROSEMIDE PER 20 MG: Performed by: NURSE PRACTITIONER

## 2024-08-21 PROCEDURE — G0378 HOSPITAL OBSERVATION PER HR: HCPCS

## 2024-08-21 PROCEDURE — 80048 BASIC METABOLIC PNL TOTAL CA: CPT | Performed by: EMERGENCY MEDICINE

## 2024-08-21 PROCEDURE — 94761 N-INVAS EAR/PLS OXIMETRY MLT: CPT

## 2024-08-21 RX ORDER — FUROSEMIDE 10 MG/ML
40 INJECTION INTRAMUSCULAR; INTRAVENOUS ONCE
Status: COMPLETED | OUTPATIENT
Start: 2024-08-21 | End: 2024-08-21

## 2024-08-21 RX ADMIN — Medication 10 ML: at 20:51

## 2024-08-21 RX ADMIN — LOSARTAN POTASSIUM 100 MG: 100 TABLET, FILM COATED ORAL at 10:52

## 2024-08-21 RX ADMIN — BUDESONIDE AND FORMOTEROL FUMARATE DIHYDRATE 2 PUFF: 160; 4.5 AEROSOL RESPIRATORY (INHALATION) at 21:07

## 2024-08-21 RX ADMIN — FLUOXETINE HYDROCHLORIDE 80 MG: 20 CAPSULE ORAL at 10:52

## 2024-08-21 RX ADMIN — BUPROPION HYDROCHLORIDE 150 MG: 150 TABLET, EXTENDED RELEASE ORAL at 20:51

## 2024-08-21 RX ADMIN — IPRATROPIUM BROMIDE AND ALBUTEROL SULFATE 3 ML: .5; 3 SOLUTION RESPIRATORY (INHALATION) at 06:44

## 2024-08-21 RX ADMIN — Medication 10 ML: at 10:53

## 2024-08-21 RX ADMIN — FUROSEMIDE 40 MG: 10 INJECTION, SOLUTION INTRAMUSCULAR; INTRAVENOUS at 18:37

## 2024-08-21 RX ADMIN — IPRATROPIUM BROMIDE AND ALBUTEROL SULFATE 3 ML: .5; 3 SOLUTION RESPIRATORY (INHALATION) at 21:01

## 2024-08-21 RX ADMIN — ROSUVASTATIN CALCIUM 20 MG: 20 TABLET, FILM COATED ORAL at 20:51

## 2024-08-21 RX ADMIN — METOPROLOL SUCCINATE 100 MG: 100 TABLET, EXTENDED RELEASE ORAL at 10:52

## 2024-08-21 RX ADMIN — AMLODIPINE BESYLATE 10 MG: 10 TABLET ORAL at 20:51

## 2024-08-21 RX ADMIN — ASPIRIN 81 MG: 81 TABLET, COATED ORAL at 20:51

## 2024-08-21 RX ADMIN — FAMOTIDINE 20 MG: 20 TABLET, FILM COATED ORAL at 10:53

## 2024-08-21 NOTE — PROGRESS NOTES
I saw this gentleman yesterday and I have reevaluated him today.  Throughout the night events of an uneventful.  He has had no shortness of breath he has had no chest pain.  Has had no fevers chills.  He does have the occasional cough but really has not been coughing much.  He was placed upon some oxygen for dropping of his oxygen saturation in the middle of the night.  But he denied any shortness of breath.  He has been able to lay flat here in the observation unit and denies any orthopnea.  On exam he is alert and oriented no acute distress.  Vital signs have been reviewed.  Cardiovascular exam he is regular rate and rhythm  Respiratory exam he is in no respiratory distress I do not hear any wheezing or rales.  Abdomen is soft and nontender extremity exam he has no edema with intact distal pulses  Neurologic exam he has no focal motor or sensory changes  Assessment and plan #1 exertional dyspnea.  Patient's troponin has remained flat and normal.  Lab work has been reviewed viral respiratory panel is negative.  He does have some mild anemia.  His CT angiogram of his chest showed no pulmonary embolism patient had a nonspecific reticular nodular pattern inferior aspect of his right middle lobe that needs follow-up.  There was some signs suggestive of possible right heart failure on the CT angiogram of the chest.  Currently is echo is pending as well as cardiology consult.  Informed of the results of the test.  All questions answered at this time.

## 2024-08-21 NOTE — PLAN OF CARE
Goal Outcome Evaluation:  Plan of Care Reviewed With: patient        Progress: no change     Patient admitted to observation unit for further evaluation and treatment of exertional dyspnea x 4 days. Orientation to unit provided. No signs of acute distress. Sinus corinne on monitor. Placed on O2 2L/NC for hypoxia. Vital signs stable. Cardiology consulted. Will closely monitor.

## 2024-08-21 NOTE — PLAN OF CARE
Goal Outcome Evaluation:              Outcome Evaluation: pt. 69 yr old AOX4 and able to verbalized needs, Pt to stay overnight for further testing. Pulmonology consulted.

## 2024-08-21 NOTE — CONSULTS
[facility]        Patient Name: Osman Aparicio  Age/Sex: 69 y.o. male  : 1955  MRN: [id number]    Date of Admission: 2024  Date of Encounter Visit: 24  Encounter Provider: Inderjit Teixeira MD  Referring Provider: Latrell Anderson MD  Place of Service: [facility] CARDIOLOGY  Patient Care Team:  Desire Hughes APRN ([place]) as PCP - General (Family Medicine)  Desire Hughes APRN ([place]) as Nurse Practitioner (Family Medicine)  Jose Sanchez MD as Consulting Physician (Hematology and Oncology)  Desire Hughes)TAYLOR as Referring Physician (Family Medicine)    Subjective:     Chief Complaint: Shortness of breath    Reason for consult: Same    History of Present Illness:  Osman Aparicio is a 69 y.o. male who presents for further evaluation of shortness of breath.  He has a past medical history of CAD status post CABG, COPD, AAA status post endovascular repair, and he presents with worsening shortness of breath.  He does golf further frequently and he has been able to maintain his normal activities due to shortness of breath.  He does have a pulse oximeter and notes that some of his oxygen levels have been in the 80s.    My evaluation today reports that his symptoms are stable.  He reports that it has been slowly worsening over the last week or so.  He has had a slight productiveness to his cough, but otherwise no other constitutional symptoms like fevers or chills.    I directly reviewed and interpreted the patient's CT angiogram of the chest that was obtained on 2024.  Cardiac silhouette appears grossly normal.  There is native CAD with extensive calcified disease.  RV overall looks normal in size, there is some IVC contrast reflux.  No evidence of pulmonary embolism.  Emphysema is noted.   He was noted to have a slight wheeze as well as an increase in cough with more productive sputum.  CT findings could suggest some reticular nodular  opacities that could be infectious.      Prior Cardiac Testing:  Reviewed office note from Dr. Claudio including his prior cardiac anatomy with LIMA-LAD, SVG-OM1, SVG-OM 2 from CABG April 1, 2022      Past Medical History:  Past Medical History:   Diagnosis Date    AAA (abdominal aortic aneurysm)     Abnormal glucose     Anxiety     COPD (chronic obstructive pulmonary disease)     Coronary artery disease     Encounter for special screening examination for neoplasm of prostate 10/2012    Hematuria     JUST MONITORING    History of COVID-19 2021    Hyperlipidemia     Hypertension     Myocardial infarction 03/2022    Radius fracture     RIGHT    Vitamin D deficiency disease        Past Surgical History:   Procedure Laterality Date    ARTERIOGRAM AORTIC N/A 01/30/2023    Procedure: Zenith Fenestrated Endovascular Repair;  Surgeon: Mariusz Kumar MD;  Location: [facility] HYBRID OR 18/19;  Service: Vascular;  Laterality: N/A;    CARDIAC CATHETERIZATION N/A 03/29/2022    Procedure: Left Heart Cath;  Surgeon: Neto Paige MD;  Location: [facility] CATH INVASIVE LOCATION;  Service: Cardiology;  Laterality: N/A;    CARDIAC CATHETERIZATION N/A 03/29/2022    Procedure: Coronary angiography;  Surgeon: Neto Paige MD;  Location: [facility] CATH INVASIVE LOCATION;  Service: Cardiology;  Laterality: N/A;    CARDIAC CATHETERIZATION N/A 03/29/2022    Procedure: Left ventriculography;  Surgeon: Neto Paige MD;  Location: [facility] CATH INVASIVE LOCATION;  Service: Cardiology;  Laterality: N/A;    COLONOSCOPY N/A 12/07/2020    Procedure: COLONOSCOPY INTO CECUM WITH HOT SNARE POLYPECTOMIES, COLD BX POLYPECTOMIES, RESOLUTION CLIPS X;  Surgeon: Regi Mercado MD;  Location: [facility] ENDOSCOPY;  Service: General;  Laterality: N/A;  PRE:  POSITIVE COLOGUARD  POST:  POLYPS    CORONARY ARTERY BYPASS GRAFT N/A 04/01/2022    Procedure: STERNOTOMY, CORONARY ARTERY BYPASS UTILIZINGTHE RT SAPHENOUS VEIN WITH LEFT  INTERNAL MAMMARY ARTERY GRAFT X3 OFF PUMP, PRP;  Surgeon: Colten Zamora MD;  Location: [facility];  Service: Cardiothoracic;  Laterality: N/A;    ORIF WRIST FRACTURE Right 08/26/2022    Procedure: RIGHT DISTAL RADIUS FRACTURE OPEN REDUCTION INTERNAL FIXATION;  Surgeon: Bubba Mello MD;  Location: [facility];  Service: Orthopedics;  Laterality: Right;    TRANSESOPHAGEAL ECHOCARDIOGRAM (MARTHA) N/A 04/01/2022    Procedure: TRANSESOPHAGEAL ECHOCARDIOGRAM WITH ANESTHESIA;  Surgeon: Colten Zamora MD;  [facility];  Service: Cardiothoracic;  Laterality: N/A;       Home Medications:   Medications Prior to Admission   Medication Sig Dispense Refill Last Dose    albuterol sulfate HFA (Proventil HFA) 108 (90 Base) MCG/ACT inhaler Inhale 2 puffs Every 4 (Four) Hours As Needed for Wheezing. 18 g 0 8/20/2024 at 1300    amLODIPine (NORVASC) 10 MG tablet Take 1 tablet by mouth Daily. (Patient taking differently: Take 1 tablet by mouth Every Night.) 90 tablet 1 8/19/2024 at 2300    Ascorbic Acid (VITAMIN C PO) Take 1 tablet by mouth Daily.   8/20/2024 at 1100    aspirin 81 MG EC tablet Take 1 tablet by mouth Daily. (Patient taking differently: Take 1 tablet by mouth Every Night.) 30 tablet 3 8/19/2024 at 2300    buPROPion XL (Wellbutrin XL) 150 MG 24 hr tablet Take 1 tablet by mouth Daily. (Patient taking differently: Take 1 tablet by mouth Every Night.) 90 tablet 1 8/19/2024 at 2300    famotidine (PEPCID) 20 MG tablet Take 1 tablet by mouth Daily.   8/19/2024 at 2000    Ferrous Sulfate (Iron) 28 MG tablet Take 1 tablet by mouth Daily.   8/20/2024 at 1100    FLUoxetine (PROzac) 40 MG capsule Take 2 capsules by mouth Daily. 180 capsule 1 8/20/2024 at 1100    folic acid (FOLVITE) 1 MG tablet Take 1 tablet by mouth Daily. 30 tablet 3 8/20/2024 at 1100    losartan (COZAAR) 100 MG tablet TAKE 1 TABLET BY MOUTH EVERY DAY 90 tablet 3 8/20/2024 at 1100    metoprolol succinate XL (TOPROL-XL) 100 MG 24  hr tablet Take 1 tablet by mouth every night at bedtime. 90 tablet 3 2024 at 2300    multivitamin with minerals tablet tablet Take 1 tablet by mouth Daily. 30 tablet 3 2024 at 1100    rosuvastatin (CRESTOR) 20 MG tablet TAKE 1 TABLET BY MOUTH EVERY NIGHT 90 tablet 3 2024 at 2300    thiamine (VITAMIN B-1) 100 MG tablet  tablet Take 1 tablet by mouth Daily. 30 tablet 3 2024 at 1100    Trelegy Ellipta 100-62.5-25 MCG/INH inhaler Inhale 1 puff Daily.   2024 at 1100    nitroglycerin (NITROSTAT) 0.4 MG SL tablet Place 1 tablet under the tongue Every 5 (Five) Minutes As Needed for Chest Pain (Only if SBP Greater Than 100). Take no more than 3 doses in 15 minutes. 30 tablet 3 Unknown       Allergies:  No Known Allergies    Past Social History:  Social History     Socioeconomic History    Marital status:      Spouse name: Suzanne   Tobacco Use    Smoking status: Former     Current packs/day: 0.00     Average packs/day: 1 pack/day for 45.0 years (45.0 ttl pk-yrs)     Types: Cigarettes     Start date: 3/26/1977     Quit date: 3/26/2022     Years since quittin.4     Passive exposure: Past    Smokeless tobacco: Never    Tobacco comments:     caffeine - 3 cups coffee daily, tea or soda occas.   Vaping Use    Vaping status: Never Used   Substance and Sexual Activity    Alcohol use: Yes     Alcohol/week: 2.0 standard drinks of alcohol     Types: 2 Cans of beer per week    Drug use: No    Sexual activity: Defer       Past Family History:  Family History   Problem Relation Age of Onset    Lung cancer Brother     Liver cancer Brother     Colon polyps Brother     Malig Hyperthermia Neg Hx        REVIEW OF SYSTEMS:   14 point ROS was performed and is negative except as outlined in HPI          Objective:   Temp:  [97.2 °F (36.2 °C)-98.2 °F (36.8 °C)] 97.6 °F (36.4 °C)  Heart Rate:  [51-57] 55  Resp:  [18-24] 18  BP: (129-174)/(66-89) 174/79     Intake/Output Summary (Last 24 hours) at 2024  0736  Last data filed at 8/21/2024 0600  Gross per 24 hour   Intake 300 ml   Output --   Net 300 ml     Body mass index is 25.83 kg/m².      08/20/24  1903   Weight: 84 kg (185 lb 3.2 oz)     Weight change:     General Appearance:    Alert, cooperative, in no acute distress   Throat:   oral mucosa moist   Neck:   supple, trachea midline, no thyromegaly, no carotid bruit, no JVD   Lungs:   Trace rales at right midlung, no overt wheezing.    Heart:    Regular rhythm and normal rate, normal S1 and S2, no murmur, no gallop, no rub, no click   Chest Wall:    No abnormalities observed   Abdomen:     nondistended, no guarding, no rebound  tenderness   Extremities:   Moves all extremities well, no significant edema, no cyanosis, no redness   Pulses:   Pulses palpable and equal bilaterally. Normal radial, carotid, femoral, dorsalis pedis and posterior tibial pulses bilaterally.    Skin:  Psychiatric:   No bleeding, bruising or rash    Alert and oriented x 3, normal mood and affect     Lab Review:   Results from last 7 days   Lab Units 08/21/24  0427 08/20/24  1528   SODIUM mmol/L 132* 133*   POTASSIUM mmol/L 3.8 4.0   CHLORIDE mmol/L 99 96*   CO2 mmol/L 20.8* 23.6   BUN mg/dL 18 16   CREATININE mg/dL 1.22 1.28*   GLUCOSE mg/dL 104* 113*   CALCIUM mg/dL 9.4 10.2   AST (SGOT) U/L  --  28   ALT (SGPT) U/L  --  15     Results from last 7 days   Lab Units 08/20/24  1726 08/20/24  1528   HSTROP T ng/L 14 15     Results from last 7 days   Lab Units 08/21/24  0427 08/20/24  1528   WBC 10*3/mm3 9.04 9.77   HEMOGLOBIN g/dL 11.1* 12.5*   HEMATOCRIT % 33.6* 38.0   PLATELETS 10*3/mm3 325 371         Results from last 7 days   Lab Units 08/21/24  0427   MAGNESIUM mg/dL 2.2     Results from last 7 days   Lab Units 08/20/24  1726   CHOLESTEROL mg/dL 133   TRIGLYCERIDES mg/dL 102   HDL CHOL mg/dL 43     Results from last 7 days   Lab Units 08/20/24  1528   PROBNP pg/mL 1,816.0*           Echo EF Estimated  No results found for:  "\"ECHOEFEST\"    EKG:   I personally viewed and interpreted the patient's EKG .  The ECG is unchanged from prior EKGs.  It shows sinus bradycardia with LVH.  There are no acute ischemic changes.  This is unchanged from the ECG obtained in March 2024.    Imaging:  Imaging Results (Most Recent)       Procedure Component Value Units Date/Time    CT Angiogram Chest [[id number]] Collected: 08/20/24 2302     Updated: 08/20/24 2313    Narrative:      CT ANGIOGRAM OF THE CHEST     HISTORY: Exertional dyspnea     COMPARISON: September 21, 2023     TECHNIQUE: Axial CT imaging was obtained through the thorax. IV contrast  was administered. Three-D reformatted images were obtained.     FINDINGS:  No acute pulmonary thromboembolus is seen. There is dilatation of the  proximal descending aorta, measuring up to 3.1 cm. The aorta is somewhat  tortuous in its course. No obvious dissection is seen. Distal descending  aorta measures 2.8 cm. The patient is status post median sternotomy with  coronary artery bypass grafting. There is some dilatation of the main  pulmonary artery, which can be seen in setting of pulmonary arterial  hypertension. There are coronary artery calcifications. The thyroid  gland and trachea appear within normal limits. There is a small hiatal  hernia. Mediastinal lymph nodes do not appear pathologically enlarged.  There are advanced background emphysematous changes. There are some  reticulonodular infiltrates which are noted within the right middle  lobe. These may be infectious or inflammatory, but short-term CT  follow-up in 3 months is recommended. Chronic scarring is also noted at  the right lung base. There is an additional nodule which is noted within  the right middle lobe, adjacent to the fissure. It was present on the  exam from September 2023, and is favored to be benign. Consider CT  follow-up in September 2025 to document a full 2 years of stability.  There is some further scarring noted within the " lingula. Images through  the upper abdomen demonstrate right renal atrophy. There are changes of  aortic stent grafting. Visualized aorta measures up to 3.5 cm. This  appears similar to the exam from March 2023. There is cholelithiasis.  There is reflux of contrast material into the hepatic veins and inferior  vena cava, which can be seen in setting of right-sided heart failure. No  acute osseous abnormalities are seen.       Impression:         1. No acute pulmonary thromboembolus.  2. Nonspecific reticulonodular infiltrates seen within the right middle  lobe. Short-term CT follow-up in 3 months is recommended.  3. There is reflux of contrast material into the hepatic veins, which  can be seen in the setting of right-sided heart failure.     Radiation dose reduction techniques were utilized, including automated  exposure control and exposure modulation based on body size.        This report was finalized on 8/20/2024 11:10 PM by Dr. Yasmin Ramos M.D on Workstation: [id number]       XR Chest 1 View [[id number]] Collected: 08/20/24 1544     Updated: 08/20/24 1550    Narrative:      XR CHEST 1 VW-     INDICATION: Shortness of breath     COMPARISON: CT chest 9/21/2023 and chest radiographs dating back to  3/26/2022       Impression:      Left lower lobe linear atelectasis/scarring. No new focal  consolidation. No pleural effusion or pneumothorax. Normal size cardiac  silhouette with postsurgical changes of CABG. Nondisplaced median  sternotomy wires.       This report was finalized on 8/20/2024 3:47 PM by Dr. Ehsan Ventura M.D on Workstation: [id number]                   Assessment:     Active Hospital Problems    Diagnosis  POA    **Exertional dyspnea [R06.09]  Yes      Resolved Hospital Problems   No resolved problems to display.          Plan:     Shortness of breath: Worsening over 4-day duration.  His BNP is less than it has been in the past, his ECG is nonischemic.  His high-sensitivity troponins  are unremarkable, and in fact normal.  I directly reviewed the patient's echocardiogram.  The IVC is completely collapsed, the right atrial pressure is normal.  I do not believe the patient would benefit from diuretics.  He has no JVD on exam either  We can consider outpatient stress testing if otherwise the workup is unremarkable, his troponins are flat and this is not consistent with acute coronary syndrome.  Also cardiac ischemia with normal troponins would not explain hypoxia at rest  Emphysema, shortness of breath: I directly reviewed his CT.  Emphysema is pretty pronounced.  There are reticular nodular infiltrates noted in the right middle lobe as described, with the patient's increased cough, sputum production, this is suspicious for pneumonia which could be causing his hypoxia.  His echo was good quality, the right atrial pressure is normal so I find it less likely there is a fluid overload component or significant CHF to his current hypoxia  Pulmonary hypertension.  Mild on echo.    Coronary artery disease: Status post CABG March 2022.  Lipid panel Adequate, LDL 71, high-sensitivity troponins unremarkable today.  Peripheral vascular disease.  Status post endovascular aortic stenting January 2023.  Continue antiplatelets   Hypertension.  Suboptimal on presentation, better now.  Continue home dose metoprolol succinate 100 XL daily, losartan 100, amlodipine 10    Thank you for allowing me to participate in the care of Osman Aparicio. .      ADDENDUM:    Reviewed note from pulmonary, they feel pneumonia less likely. I discussed with findings patient.     Although the echo appears normal aside from mildly elevated pulmonary pressures, given the discordant findings of his echo, and his previous history of significant pulmonary hypertension, we discussed additional testings.  He reports he continues to have exertional hypoxia with his O2 sat dipping into the 80s with exertion.  It is normal at rest.  Since it appears  the suspicion for pulmonary hypertension remains high, we discussed the possibility of a cardiac catheterization for definitive diagnosis.  We discussed the alternative would be a trial of diuretics but, again, he has no signs of ongoing volume overload.    Given his exertional hypoxia, concerns for previous pulmonary hypertension and discordant echo findings, will arrange for a right and left heart catheterization tomorrow for definitive diagnosis.  The right heart catheterization would be to investigate for any significant volume overload given his exertional hypoxia and previous significant pulmonary hypertension, and left heart catheterization to investigate for any new, worsening, coronary disease or graft dysfunction the may constitute a higher risk etiology if that is causing exertional hypoxia.  With his troponin trend I do find this less likely but since he already has an indication for right heart catheterization I do feel this comprehensive exam is appropriate given that a pulmonary source of his complaints is felt to be less likely.    Will make n.p.o. midnight for left and right heart catheterization tomorrow,    Inderjit Teixeira MD  [facility]  08/21/24  07:36 EDT      Part of this note may be an electronic transcription/translation of spoken language to printed text using the Dragon Dictation System.

## 2024-08-21 NOTE — CASE MANAGEMENT/SOCIAL WORK
Discharge Planning Assessment  [facility]     Patient Name: Osman Aparicio  MRN: [id number]  Today's Date: 8/21/2024    Admit Date: 8/20/2024    Plan: Home with family   Discharge Needs Assessment       Row Name 08/21/24 1333       Living Environment    People in Home spouse    Current Living Arrangements home    Primary Care Provided by self    Provides Primary Care For no one    Family Caregiver if Needed spouse    Family Caregiver Names Suzanne [phone]    Able to Return to Prior Arrangements yes       Resource/Environmental Concerns    Resource/Environmental Concerns none       Transition Planning    Patient/Family Anticipates Transition to home with family    Patient/Family Anticipated Services at Transition none    Transportation Anticipated family or friend will provide       Discharge Needs Assessment    Readmission Within the Last 30 Days no previous admission in last 30 days    Equipment Currently Used at Home none    Concerns to be Addressed denies needs/concerns at this time    Anticipated Changes Related to Illness none    Equipment Needed After Discharge none                   Discharge Plan       Row Name 08/21/24 1335       Plan    Plan Home with family    Patient/Family in Agreement with Plan yes    Plan Comments MOON letter checked. Met with patient at bedside, introduced self and explained CCP role. Verified facesheet and PCP-Desire Hughes. Patient lives at home with spouse-Suzanne [phone]. Home has ramp to enter. Patient has used [facility] in the past and denies h/o SNF. Patient was ind with ADLS and driving prior to admission. Patient has no DME. Patient uses [facility] pharmacy in [facility] and reports no difficulty affording medications. Family will transport at [place]. CCP to follow. Jose VAZQUEZ RN                  Continued Care and Services - Admitted Since 8/20/2024    No active coordination exists for this encounter.          Demographic Summary       Row Name 08/21/24 1337       General Information     Admission Type observation    Referral Source admission list    Reason for Consult discharge planning    Preferred Language English                   Functional Status       Row Name 08/21/24 1330       Functional Status    Usual Activity Tolerance good    Current Activity Tolerance good       Functional Status, IADL    Medications independent    Meal Preparation independent    Housekeeping independent    Laundry independent    Shopping independent       Mental Status    General Appearance WDL WDL                   Psychosocial    No documentation.                  Abuse/Neglect    No documentation.                  Legal    No documentation.                  Substance Abuse    No documentation.                  Patient Forms    No documentation.                     Jose Mckeon RN

## 2024-08-21 NOTE — PROGRESS NOTES
ED OBSERVATION PROGRESS/DISCHARGE SUMMARY    Date of Admission: 8/20/2024   LOS: 0 days   PCP: Desire Hughes APRN ([place])        Subjective     Hospital Outcome:   69-year-old male was seen and examined at bedside following admission to the observation unit secondary to exertional dyspnea.  Patient with a history of COPD however for the past few days patient developed shortness of breath with minimal exertion.  Initial evaluation in the ED included BNP 1816, creatinine 1.28 that improved down to 1.22 after receiving IVF.  Negative respiratory panel and EKG nonischemic.  D-dimer is elevated 1.93 however CTA chest shows no evidence of PE.  There is nonspecific reticulonodular infiltrate within the right middle lobe and recommend CT follow-up in 3 months.  There is also reflux of contrast material into the hepatic veins which is usually seen with right-sided heart failure.    Echocardiogram shows EF 61.5% normal systolic and diastolic function.  Estimated RV systolic pressure from tricuspid regurgitation is mildly elevated and there is mild pulmonary hypertension..  Cardiology evaluated patient and plan on left and right heart catheterization.    ROS:  General: no fevers, chills  Respiratory: no cough, dyspnea  Cardiovascular: no chest pain, palpitations  Abdomen: No abdominal pain, nausea, vomiting, or diarrhea  Neurologic: No focal weakness    Objective   Physical Exam:  I have reviewed the vital signs.  Temp:  [97.2 °F (36.2 °C)-98.2 °F (36.8 °C)] 98.2 °F (36.8 °C)  Heart Rate:  [51-57] 57  Resp:  [18-24] 18  BP: (129-167)/(66-89) 140/66  General Appearance:    Alert, cooperative, no distress  Head:    Normocephalic, atraumatic  Eyes:    Sclerae anicteric  Neck:   Supple, no mass  Lungs: Clear to auscultation bilaterally, respirations unlabored  Heart: Regular rate and rhythm, S1 and S2 normal, no murmur, rub or gallop  Abdomen:  Soft, nontender, bowel sounds active, nondistended  Extremities: No clubbing,  cyanosis, or edema to lower extremities  Pulses:  2+ and symmetric in distal lower extremities  Skin: No rashes   Neurologic: Oriented x3, Normal strength to extremities    Results Review:    I have reviewed the labs, radiology results and diagnostic studies.    Results from last 7 days   Lab Units 08/21/24  0427   WBC 10*3/mm3 9.04   HEMOGLOBIN g/dL 11.1*   HEMATOCRIT % 33.6*   PLATELETS 10*3/mm3 325     Results from last 7 days   Lab Units 08/21/24  0427 08/20/24  1528   SODIUM mmol/L 132* 133*   POTASSIUM mmol/L 3.8 4.0   CHLORIDE mmol/L 99 96*   CO2 mmol/L 20.8* 23.6   BUN mg/dL 18 16   CREATININE mg/dL 1.22 1.28*   CALCIUM mg/dL 9.4 10.2   BILIRUBIN mg/dL  --  0.6   ALK PHOS U/L  --  99   ALT (SGPT) U/L  --  15   AST (SGOT) U/L  --  28   GLUCOSE mg/dL 104* 113*     Imaging Results (Last 24 Hours)       Procedure Component Value Units Date/Time    CT Angiogram Chest [[id number]] Collected: 08/20/24 2302     Updated: 08/20/24 2313    Narrative:      CT ANGIOGRAM OF THE CHEST     HISTORY: Exertional dyspnea     COMPARISON: September 21, 2023     TECHNIQUE: Axial CT imaging was obtained through the thorax. IV contrast  was administered. Three-D reformatted images were obtained.     FINDINGS:  No acute pulmonary thromboembolus is seen. There is dilatation of the  proximal descending aorta, measuring up to 3.1 cm. The aorta is somewhat  tortuous in its course. No obvious dissection is seen. Distal descending  aorta measures 2.8 cm. The patient is status post median sternotomy with  coronary artery bypass grafting. There is some dilatation of the main  pulmonary artery, which can be seen in setting of pulmonary arterial  hypertension. There are coronary artery calcifications. The thyroid  gland and trachea appear within normal limits. There is a small hiatal  hernia. Mediastinal lymph nodes do not appear pathologically enlarged.  There are advanced background emphysematous changes. There are some  reticulonodular  infiltrates which are noted within the right middle  lobe. These may be infectious or inflammatory, but short-term CT  follow-up in 3 months is recommended. Chronic scarring is also noted at  the right lung base. There is an additional nodule which is noted within  the right middle lobe, adjacent to the fissure. It was present on the  exam from September 2023, and is favored to be benign. Consider CT  follow-up in September 2025 to document a full 2 years of stability.  There is some further scarring noted within the lingula. Images through  the upper abdomen demonstrate right renal atrophy. There are changes of  aortic stent grafting. Visualized aorta measures up to 3.5 cm. This  appears similar to the exam from March 2023. There is cholelithiasis.  There is reflux of contrast material into the hepatic veins and inferior  vena cava, which can be seen in setting of right-sided heart failure. No  acute osseous abnormalities are seen.       Impression:         1. No acute pulmonary thromboembolus.  2. Nonspecific reticulonodular infiltrates seen within the right middle  lobe. Short-term CT follow-up in 3 months is recommended.  3. There is reflux of contrast material into the hepatic veins, which  can be seen in the setting of right-sided heart failure.     Radiation dose reduction techniques were utilized, including automated  exposure control and exposure modulation based on body size.        This report was finalized on 8/20/2024 11:10 PM by Dr. Yasmin Ramos M.D on Workstation: [id number]       XR Chest 1 View [[id number]] Collected: 08/20/24 1544     Updated: 08/20/24 1550    Narrative:      XR CHEST 1 VW-     INDICATION: Shortness of breath     COMPARISON: CT chest 9/21/2023 and chest radiographs dating back to  3/26/2022       Impression:      Left lower lobe linear atelectasis/scarring. No new focal  consolidation. No pleural effusion or pneumothorax. Normal size cardiac  silhouette with postsurgical  changes of CABG. Nondisplaced median  sternotomy wires.       This report was finalized on 8/20/2024 3:47 PM by Dr. Ehsan Ventura M.D on Workstation: [id number]               I have reviewed the medications.  ---------------------------------------------------------------------------------------------  Assessment & Plan   Assessment/Problem List    Exertional dyspnea      Plan:  Exertional Dyspnea   -Cardiac monitoring  -Vital signs every 4 hours   -Cardiology consult   -High-sensitivity troponin 15, 14, trend  -EKG-sinus bradycardia, rate of 50  -D-dimer 1.93  -CTA chest shows no evidence of PE  -Echocardiogram pending        COPD  -Continuous pulse ox  -Continue home dose Symbicort, Spiriva  -As needed albuterol     Hypertension  -Monitor blood pressure  -Continue home dose amlodipine      This note will serve as a progress note    Barb Chavarria, TAYLOR 08/21/24 07:22 EDT    I have worn appropriate PPE during this patient encounter, sanitized my hands both with entering and exiting patient's room.      45 minutes has been spent by [facility] providers in the care of this patient while under observation status

## 2024-08-21 NOTE — CONSULTS
Patient Identification:  Osman Aparicio  69 y.o.  male  1955  [id number]          LOS 0    Requesting physician: Dr Anderson    Reason for Consultation:  hypoxemia with exertional dyspnea    History of Present Illness:   69-year-old male presents with shortness of breath.  Has a history of COPD and coronary disease status post CABG.  Symptoms started with exertional dyspnea about 4 days prior to presentation.  Unable to do normal daily activities secondary increased shortness of breath in this timeframe.  He is taking his medications as prescribed.  No productive cough but has had a nonproductive cough associated with his shortness of breath.  Has noted that his oxygen at times will drop down to the 80s but recovers quickly.  Does not use oxygen at home.    Past Medical History:  Past Medical History:   Diagnosis Date    AAA (abdominal aortic aneurysm)     Abnormal glucose     Anxiety     COPD (chronic obstructive pulmonary disease)     Coronary artery disease     Encounter for special screening examination for neoplasm of prostate 10/2012    Hematuria     JUST MONITORING    History of COVID-19 2021    Hyperlipidemia     Hypertension     Myocardial infarction 03/2022    Radius fracture     RIGHT    Vitamin D deficiency disease        Past Surgical History:  Past Surgical History:   Procedure Laterality Date    ARTERIOGRAM AORTIC N/A 01/30/2023    Procedure: Zenith Fenestrated Endovascular Repair;  Surgeon: Mariusz Kumar MD;  Location: [facility] OR 18/19;  Service: Vascular;  Laterality: N/A;    CARDIAC CATHETERIZATION N/A 03/29/2022    Procedure: Left Heart Cath;  Surgeon: Neto Paige MD;  Location: [facility] CATH INVASIVE LOCATION;  Service: Cardiology;  Laterality: N/A;    CARDIAC CATHETERIZATION N/A 03/29/2022    Procedure: Coronary angiography;  Surgeon: Neto Paige MD;  Location: [facility] CATH INVASIVE LOCATION;  Service: Cardiology;  Laterality: N/A;    CARDIAC  CATHETERIZATION N/A 03/29/2022    Procedure: Left ventriculography;  Surgeon: Neto Paige MD;  Location: [facility] CATH INVASIVE LOCATION;  Service: Cardiology;  Laterality: N/A;    COLONOSCOPY N/A 12/07/2020    Procedure: COLONOSCOPY INTO CECUM WITH HOT SNARE POLYPECTOMIES, COLD BX POLYPECTOMIES, RESOLUTION CLIPS X;  Surgeon: Regi Mercado MD;  Location: [facility] ENDOSCOPY;  Service: General;  Laterality: N/A;  PRE:  POSITIVE COLOGUARD  POST:  POLYPS    CORONARY ARTERY BYPASS GRAFT N/A 04/01/2022    Procedure: STERNOTOMY, CORONARY ARTERY BYPASS UTILIZINGTHE RT SAPHENOUS VEIN WITH LEFT INTERNAL MAMMARY ARTERY GRAFT X3 OFF PUMP, PRP;  Surgeon: Colten Zamora MD;  Location: [facility] CVOR;  Service: Cardiothoracic;  Laterality: N/A;    ORIF WRIST FRACTURE Right 08/26/2022    Procedure: RIGHT DISTAL RADIUS FRACTURE OPEN REDUCTION INTERNAL FIXATION;  Surgeon: Bubba Mello MD;  [place] [facility] [place] [facility]  Service: Orthopedics;  Laterality: Right;    TRANSESOPHAGEAL ECHOCARDIOGRAM (MARTHA) N/A 04/01/2022    Procedure: TRANSESOPHAGEAL ECHOCARDIOGRAM WITH ANESTHESIA;  Surgeon: Colten Zamora MD;  Location: [facility] CVOR;  Service: Cardiothoracic;  Laterality: N/A;        Home Meds:  Medications Prior to Admission   Medication Sig Dispense Refill Last Dose    albuterol sulfate HFA (Proventil HFA) 108 (90 Base) MCG/ACT inhaler Inhale 2 puffs Every 4 (Four) Hours As Needed for Wheezing. 18 g 0 8/20/2024 at 1300    amLODIPine (NORVASC) 10 MG tablet Take 1 tablet by mouth Daily. (Patient taking differently: Take 1 tablet by mouth Every Night.) 90 tablet 1 8/19/2024 at 2300    Ascorbic Acid (VITAMIN C PO) Take 1 tablet by mouth Daily.   8/20/2024 at 1100    aspirin 81 MG EC tablet Take 1 tablet by mouth Daily. (Patient taking differently: Take 1 tablet by mouth Every Night.) 30 tablet 3 8/19/2024 at 2300    buPROPion XL (Wellbutrin XL) 150 MG 24 hr tablet Take 1 tablet by mouth Daily. (Patient taking  differently: Take 1 tablet by mouth Every Night.) 90 tablet 1 2024 at 2300    famotidine (PEPCID) 20 MG tablet Take 1 tablet by mouth Daily.   2024 at 2000    Ferrous Sulfate (Iron) 28 MG tablet Take 1 tablet by mouth Daily.   2024 at 1100    FLUoxetine (PROzac) 40 MG capsule Take 2 capsules by mouth Daily. 180 capsule 1 2024 at 1100    folic acid (FOLVITE) 1 MG tablet Take 1 tablet by mouth Daily. 30 tablet 3 2024 at 1100    losartan (COZAAR) 100 MG tablet TAKE 1 TABLET BY MOUTH EVERY DAY 90 tablet 3 2024 at 1100    metoprolol succinate XL (TOPROL-XL) 100 MG 24 hr tablet Take 1 tablet by mouth every night at bedtime. 90 tablet 3 2024 at 2300    multivitamin with minerals tablet tablet Take 1 tablet by mouth Daily. 30 tablet 3 2024 at 1100    rosuvastatin (CRESTOR) 20 MG tablet TAKE 1 TABLET BY MOUTH EVERY NIGHT 90 tablet 3 2024 at 2300    thiamine (VITAMIN B-1) 100 MG tablet  tablet Take 1 tablet by mouth Daily. 30 tablet 3 2024 at 1100    Trelegy Ellipta 100-62.5-25 MCG/INH inhaler Inhale 1 puff Daily.   2024 at 1100    nitroglycerin (NITROSTAT) 0.4 MG SL tablet Place 1 tablet under the tongue Every 5 (Five) Minutes As Needed for Chest Pain (Only if SBP Greater Than 100). Take no more than 3 doses in 15 minutes. 30 tablet 3 Unknown         Allergies:  No Known Allergies    Social History:   Social History     Socioeconomic History    Marital status:      Spouse name: Suzanne   Tobacco Use    Smoking status: Former     Current packs/day: 0.00     Average packs/day: 1 pack/day for 45.0 years (45.0 ttl pk-yrs)     Types: Cigarettes     Start date: 3/26/1977     Quit date: 3/26/2022     Years since quittin.4     Passive exposure: Past    Smokeless tobacco: Never    Tobacco comments:     caffeine - 3 cups coffee daily, tea or soda occas.   Vaping Use    Vaping status: Never Used   Substance and Sexual Activity    Alcohol use: Yes     Alcohol/week: 2.0  "standard drinks of alcohol     Types: 2 Cans of beer per week    Drug use: No    Sexual activity: Defer       Family History:  Family History   Problem Relation Age of Onset    Lung cancer Brother     Liver cancer Brother     Colon polyps Brother     Malig Hyperthermia Neg Hx        Review of Systems:  Denies fevers or chills  Denies nausea or vomiting  No new vision or hearing changes  No chest pain  shortness of breath  No diarrhea, hematemesis or hematochezia, no dysuria or frequency  No musculoskeletal complaints  No heat or cold intolerance  No skin rashes  No dizziness or confusion.  No seizure activity  No new anxiety or depression  12 system review of systems performed and all else negative    Objective:    PHYSICAL EXAM:    /79   Pulse 55   Temp 97.6 °F (36.4 °C) (Oral)   Resp 18   Ht 180.3 cm (71\")   Wt 83.9 kg (185 lb)   SpO2 97%   BMI 25.80 kg/m²  Body mass index is 25.8 kg/m². 97% 83.9 kg (185 lb)    GENERAL APPEARANCE:   Well developed  Well nourished  No acute distress   EYES:    PERRL                                                                           Conjunctivae normal  Sclerae nonicteric.  HENT:   Atraumatic, normocephalic  External ears and nose normal  Moist mucous membranes and no ulcers  NECK:  Thyroid not enlarged  Trachea midline   RESPIRATORY:    Nonlabored breathing   Normal breath sounds  No rales. No wheezing  No dullness  CARDIOVASCULAR:    RRR  Normal S1, S2  No murmur  Lower extremity edema: none    GI:   Bowel sounds normal  Abdomen soft , nondistended, nontender  No abdominal masses  MUSCULOSKELETAL:  Normal movement of extremities  No tenderness, no deformities  No clubbing or cyanosis   Skin:    No visible rashes  No palpable nodules  Cap refill normal.  No mottling.   PSYCHIATRIC:  Speech and behavior appropriate  Normal mood and affect  Oriented to person, place and time  NEUROLOGIC:  Cranial nerves II through XII grossly intact.  Sensation intact.      Lab " Review:   Results from last 7 days   Lab Units 08/21/24  0427 08/20/24  1528   WBC 10*3/mm3 9.04 9.77   HEMOGLOBIN g/dL 11.1* 12.5*   HEMATOCRIT % 33.6* 38.0   PLATELETS 10*3/mm3 325 371     Results from last 7 days   Lab Units 08/21/24  0427 08/20/24  1528   SODIUM mmol/L 132* 133*   POTASSIUM mmol/L 3.8 4.0   CHLORIDE mmol/L 99 96*   CO2 mmol/L 20.8* 23.6   BUN mg/dL 18 16   CREATININE mg/dL 1.22 1.28*   CALCIUM mg/dL 9.4 10.2   BILIRUBIN mg/dL  --  0.6   ALK PHOS U/L  --  99   ALT (SGPT) U/L  --  15   AST (SGOT) U/L  --  28   GLUCOSE mg/dL 104* 113*             Procalitonin Results:                    Imaging reviewed  CT chest reviewed.  No acute PE.  Nonspecific reticulonodular infiltrate right middle lobe.  Reflux of contrast material into hepatic veins which can be seen in right sided heart failure.       2D echo reviewed: EF 61.5%.  Normal diastolic function.  Mildly increased left atrial volume.  RVSP mildly elevated at 38 mmHg.        Assessment:  Exertional dyspnea and hypoxemia  COPD with emphysema  Pulmonary hypertension  ED with history of CABG  Former smoker: Quit 2022  Anemia  Mild hyponatremia  Suspect THONY      Recommendations:  Schedule as needed bronchodilators will continue maintenance inhaled therapy.  Does not appear to be in an exacerbation of his COPD.  Weaning oxygen as able.  Cardiology evaluation pending.  Pulmonary hypertension may be contributing factor to dyspnea and may benefit from right heart cath.  Polysomnography as out patient for eval THONY.  Repeat CT chest in 3 months for f/u of irregular infiltrate RML.  No objection to discharge.          Latrell Barrera MD  [facility], [facility]  Pulmonary and Critical Care Medicine    8/21/2024  10:47 EDT

## 2024-08-22 PROBLEM — I10 HYPERTENSION: Status: ACTIVE | Noted: 2024-08-22

## 2024-08-22 LAB
ANION GAP SERPL CALCULATED.3IONS-SCNC: 13 MMOL/L (ref 5–15)
BUN SERPL-MCNC: 19 MG/DL (ref 8–23)
BUN/CREAT SERPL: 14 (ref 7–25)
CALCIUM SPEC-SCNC: 9.4 MG/DL (ref 8.6–10.5)
CHLORIDE SERPL-SCNC: 101 MMOL/L (ref 98–107)
CO2 SERPL-SCNC: 22 MMOL/L (ref 22–29)
CREAT SERPL-MCNC: 1.36 MG/DL (ref 0.76–1.27)
DEPRECATED RDW RBC AUTO: 45.4 FL (ref 37–54)
EGFRCR SERPLBLD CKD-EPI 2021: 56.3 ML/MIN/1.73
ERYTHROCYTE [DISTWIDTH] IN BLOOD BY AUTOMATED COUNT: 13.8 % (ref 12.3–15.4)
GEN 5 2HR TROPONIN T REFLEX: 13 NG/L
GLUCOSE SERPL-MCNC: 107 MG/DL (ref 65–99)
HCT VFR BLD AUTO: 34.3 % (ref 37.5–51)
HGB BLD-MCNC: 11.4 G/DL (ref 13–17.7)
MCH RBC QN AUTO: 30.3 PG (ref 26.6–33)
MCHC RBC AUTO-ENTMCNC: 33.2 G/DL (ref 31.5–35.7)
MCV RBC AUTO: 91.2 FL (ref 79–97)
PLATELET # BLD AUTO: 321 10*3/MM3 (ref 140–450)
PMV BLD AUTO: 10.1 FL (ref 6–12)
POTASSIUM SERPL-SCNC: 3.4 MMOL/L (ref 3.5–5.2)
POTASSIUM SERPL-SCNC: 4.3 MMOL/L (ref 3.5–5.2)
RBC # BLD AUTO: 3.76 10*6/MM3 (ref 4.14–5.8)
SODIUM SERPL-SCNC: 136 MMOL/L (ref 136–145)
TROPONIN T DELTA: 2 NG/L
TROPONIN T SERPL HS-MCNC: 11 NG/L
WBC NRBC COR # BLD AUTO: 8.99 10*3/MM3 (ref 3.4–10.8)

## 2024-08-22 PROCEDURE — B2111ZZ FLUOROSCOPY OF MULTIPLE CORONARY ARTERIES USING LOW OSMOLAR CONTRAST: ICD-10-PCS | Performed by: STUDENT IN AN ORGANIZED HEALTH CARE EDUCATION/TRAINING PROGRAM

## 2024-08-22 PROCEDURE — B2131ZZ FLUOROSCOPY OF MULTIPLE CORONARY ARTERY BYPASS GRAFTS USING LOW OSMOLAR CONTRAST: ICD-10-PCS | Performed by: STUDENT IN AN ORGANIZED HEALTH CARE EDUCATION/TRAINING PROGRAM

## 2024-08-22 PROCEDURE — 80048 BASIC METABOLIC PNL TOTAL CA: CPT | Performed by: PHYSICIAN ASSISTANT

## 2024-08-22 PROCEDURE — 4A023N8 MEASUREMENT OF CARDIAC SAMPLING AND PRESSURE, BILATERAL, PERCUTANEOUS APPROACH: ICD-10-PCS | Performed by: STUDENT IN AN ORGANIZED HEALTH CARE EDUCATION/TRAINING PROGRAM

## 2024-08-22 PROCEDURE — 84484 ASSAY OF TROPONIN QUANT: CPT | Performed by: PHYSICIAN ASSISTANT

## 2024-08-22 PROCEDURE — 25010000002 HEPARIN (PORCINE) PER 1000 UNITS: Performed by: STUDENT IN AN ORGANIZED HEALTH CARE EDUCATION/TRAINING PROGRAM

## 2024-08-22 PROCEDURE — 25010000002 MIDAZOLAM PER 1 MG: Performed by: STUDENT IN AN ORGANIZED HEALTH CARE EDUCATION/TRAINING PROGRAM

## 2024-08-22 PROCEDURE — 25510000001 IOPAMIDOL PER 1 ML: Performed by: STUDENT IN AN ORGANIZED HEALTH CARE EDUCATION/TRAINING PROGRAM

## 2024-08-22 PROCEDURE — C1769 GUIDE WIRE: HCPCS | Performed by: STUDENT IN AN ORGANIZED HEALTH CARE EDUCATION/TRAINING PROGRAM

## 2024-08-22 PROCEDURE — 84132 ASSAY OF SERUM POTASSIUM: CPT | Performed by: PHYSICIAN ASSISTANT

## 2024-08-22 PROCEDURE — 99232 SBSQ HOSP IP/OBS MODERATE 35: CPT | Performed by: STUDENT IN AN ORGANIZED HEALTH CARE EDUCATION/TRAINING PROGRAM

## 2024-08-22 PROCEDURE — 85027 COMPLETE CBC AUTOMATED: CPT | Performed by: PHYSICIAN ASSISTANT

## 2024-08-22 PROCEDURE — C1894 INTRO/SHEATH, NON-LASER: HCPCS | Performed by: STUDENT IN AN ORGANIZED HEALTH CARE EDUCATION/TRAINING PROGRAM

## 2024-08-22 PROCEDURE — 93461 R&L HRT ART/VENTRICLE ANGIO: CPT | Performed by: STUDENT IN AN ORGANIZED HEALTH CARE EDUCATION/TRAINING PROGRAM

## 2024-08-22 PROCEDURE — 94799 UNLISTED PULMONARY SVC/PX: CPT

## 2024-08-22 PROCEDURE — 94761 N-INVAS EAR/PLS OXIMETRY MLT: CPT

## 2024-08-22 PROCEDURE — 25010000002 FENTANYL CITRATE (PF) 50 MCG/ML SOLUTION: Performed by: STUDENT IN AN ORGANIZED HEALTH CARE EDUCATION/TRAINING PROGRAM

## 2024-08-22 PROCEDURE — 94664 DEMO&/EVAL PT USE INHALER: CPT

## 2024-08-22 PROCEDURE — B2181ZZ FLUOROSCOPY OF LEFT INTERNAL MAMMARY BYPASS GRAFT USING LOW OSMOLAR CONTRAST: ICD-10-PCS | Performed by: STUDENT IN AN ORGANIZED HEALTH CARE EDUCATION/TRAINING PROGRAM

## 2024-08-22 RX ORDER — VERAPAMIL HYDROCHLORIDE 2.5 MG/ML
INJECTION, SOLUTION INTRAVENOUS
Status: DISCONTINUED | OUTPATIENT
Start: 2024-08-22 | End: 2024-08-22 | Stop reason: HOSPADM

## 2024-08-22 RX ORDER — IOPAMIDOL 755 MG/ML
INJECTION, SOLUTION INTRAVASCULAR
Status: DISCONTINUED | OUTPATIENT
Start: 2024-08-22 | End: 2024-08-22 | Stop reason: HOSPADM

## 2024-08-22 RX ORDER — POTASSIUM CHLORIDE 750 MG/1
40 TABLET, FILM COATED, EXTENDED RELEASE ORAL EVERY 4 HOURS
Status: DISPENSED | OUTPATIENT
Start: 2024-08-22 | End: 2024-08-22

## 2024-08-22 RX ORDER — MIDAZOLAM HYDROCHLORIDE 1 MG/ML
INJECTION INTRAMUSCULAR; INTRAVENOUS
Status: DISCONTINUED | OUTPATIENT
Start: 2024-08-22 | End: 2024-08-22 | Stop reason: HOSPADM

## 2024-08-22 RX ORDER — SODIUM CHLORIDE 9 MG/ML
INJECTION, SOLUTION INTRAVENOUS
Status: COMPLETED | OUTPATIENT
Start: 2024-08-22 | End: 2024-08-22

## 2024-08-22 RX ORDER — FENTANYL CITRATE 50 UG/ML
INJECTION, SOLUTION INTRAMUSCULAR; INTRAVENOUS
Status: DISCONTINUED | OUTPATIENT
Start: 2024-08-22 | End: 2024-08-22 | Stop reason: HOSPADM

## 2024-08-22 RX ORDER — LIDOCAINE HYDROCHLORIDE 20 MG/ML
INJECTION, SOLUTION INFILTRATION; PERINEURAL
Status: DISCONTINUED | OUTPATIENT
Start: 2024-08-22 | End: 2024-08-22 | Stop reason: HOSPADM

## 2024-08-22 RX ORDER — HEPARIN SODIUM 1000 [USP'U]/ML
INJECTION, SOLUTION INTRAVENOUS; SUBCUTANEOUS
Status: DISCONTINUED | OUTPATIENT
Start: 2024-08-22 | End: 2024-08-22 | Stop reason: HOSPADM

## 2024-08-22 RX ORDER — ACETAMINOPHEN 325 MG/1
650 TABLET ORAL EVERY 4 HOURS PRN
Status: DISCONTINUED | OUTPATIENT
Start: 2024-08-22 | End: 2024-08-23 | Stop reason: HOSPADM

## 2024-08-22 RX ADMIN — ASPIRIN 81 MG: 81 TABLET, COATED ORAL at 22:42

## 2024-08-22 RX ADMIN — FLUOXETINE HYDROCHLORIDE 80 MG: 20 CAPSULE ORAL at 09:15

## 2024-08-22 RX ADMIN — LOSARTAN POTASSIUM 100 MG: 100 TABLET, FILM COATED ORAL at 09:15

## 2024-08-22 RX ADMIN — METOPROLOL SUCCINATE 100 MG: 100 TABLET, EXTENDED RELEASE ORAL at 09:15

## 2024-08-22 RX ADMIN — Medication 10 ML: at 22:47

## 2024-08-22 RX ADMIN — POTASSIUM CHLORIDE 40 MEQ: 750 TABLET, EXTENDED RELEASE ORAL at 09:15

## 2024-08-22 RX ADMIN — FAMOTIDINE 20 MG: 20 TABLET, FILM COATED ORAL at 09:15

## 2024-08-22 RX ADMIN — IPRATROPIUM BROMIDE AND ALBUTEROL SULFATE 3 ML: .5; 3 SOLUTION RESPIRATORY (INHALATION) at 11:03

## 2024-08-22 RX ADMIN — AMLODIPINE BESYLATE 10 MG: 10 TABLET ORAL at 22:42

## 2024-08-22 RX ADMIN — BUDESONIDE AND FORMOTEROL FUMARATE DIHYDRATE 2 PUFF: 160; 4.5 AEROSOL RESPIRATORY (INHALATION) at 06:39

## 2024-08-22 RX ADMIN — TIOTROPIUM BROMIDE INHALATION SPRAY 2 PUFF: 3.12 SPRAY, METERED RESPIRATORY (INHALATION) at 06:40

## 2024-08-22 RX ADMIN — IPRATROPIUM BROMIDE AND ALBUTEROL SULFATE 3 ML: .5; 3 SOLUTION RESPIRATORY (INHALATION) at 06:38

## 2024-08-22 RX ADMIN — IPRATROPIUM BROMIDE AND ALBUTEROL SULFATE 3 ML: .5; 3 SOLUTION RESPIRATORY (INHALATION) at 14:20

## 2024-08-22 RX ADMIN — Medication 10 ML: at 09:15

## 2024-08-22 NOTE — PROGRESS NOTES
ED OBSERVATION PROGRESS/DISCHARGE SUMMARY    Date of Admission: 8/20/2024   LOS: 0 days   PCP: Desire Hughes ([place])TAYLOR    Final Diagnosis exertional dyspnea      Subjective     Hospital Outcome:     69-year-old male was seen and examined at bedside following admission to the observation unit secondary to exertional dyspnea. Patient with a history of COPD however for the past few days patient developed shortness of breath with minimal exertion. Initial evaluation in the ED included BNP 1816, creatinine 1.28 that improved down to 1.22 after receiving IVF. Negative respiratory panel and EKG nonischemic. D-dimer is elevated 1.93 however CTA chest shows no evidence of PE. There is nonspecific reticulonodular infiltrate within the right middle lobe and recommend CT follow-up in 3 months. There is also reflux of contrast material into the hepatic veins which is usually seen with right-sided heart failure.     8/22/2024: Pulmonology saw and evaluated the patient for dyspnea and recommended a right heart cath to evaluate for pulmonary hypertension and recommended outpatient sleep study evaluation and started as needed bronchodilators for the patient.  Cardiology saw and evaluated the patient and are planning for cardiac cath today of both left and right side.  If cardiac cath is normal cardiology recommends that patient stay inpatient for further pulmonary evaluation.  Case discussed with hospitalist who has graciously accepted the patient for further inpatient management.    ROS:  General: no fevers, chills  Respiratory: no cough, dyspnea  Cardiovascular: no chest pain, palpitations  Abdomen: No abdominal pain, nausea, vomiting, or diarrhea  Neurologic: No focal weakness    Objective   Physical Exam:  I have reviewed the vital signs.  Temp:  [97.2 °F (36.2 °C)-97.9 °F (36.6 °C)] 97.8 °F (36.6 °C)  Heart Rate:  [52-71] 61  Resp:  [18-20] 19  BP: (142-174)/(65-87) 145/73  General Appearance:    Alert, cooperative, no  distress, chronically ill-appearing male  Head:    Normocephalic, atraumatic, normal hearing  Eyes:    Sclerae anicteric, EOMI  Neck:   Supple, nontender  Lungs: Clear to auscultation bilaterally, respirations unlabored  Heart: Regular rate and rhythm, S1 and S2 normal, no murmur  Abdomen:  Soft, nontender, bowel sounds active, nondistended  Extremities: No clubbing, cyanosis, or edema to lower extremities  Pulses:  2+ and symmetric in distal lower extremities  Skin: No rashes   Neurologic: Oriented x3, Normal strength to extremities    Results Review:    I have reviewed the labs, radiology results and diagnostic studies.    Results from last 7 days   Lab Units 08/22/24  0307   WBC 10*3/mm3 8.99   HEMOGLOBIN g/dL 11.4*   HEMATOCRIT % 34.3*   PLATELETS 10*3/mm3 321     Results from last 7 days   Lab Units 08/22/24  0307 08/21/24  0427 08/20/24  1528   SODIUM mmol/L 136 132* 133*   POTASSIUM mmol/L 3.4* 3.8 4.0   CHLORIDE mmol/L 101 99 96*   CO2 mmol/L 22.0 20.8* 23.6   BUN mg/dL 19 18 16   CREATININE mg/dL 1.36* 1.22 1.28*   CALCIUM mg/dL 9.4 9.4 10.2   BILIRUBIN mg/dL  --   --  0.6   ALK PHOS U/L  --   --  99   ALT (SGPT) U/L  --   --  15   AST (SGOT) U/L  --   --  28   GLUCOSE mg/dL 107* 104* 113*     Imaging Results (Last 24 Hours)       ** No results found for the last 24 hours. **            I have reviewed the medications.  ---------------------------------------------------------------------------------------------  Assessment & Plan   Assessment/Problem List    Exertional dyspnea      Plan:    Exertional Dyspnea   -Cardiac monitoring  -Vital signs every 4 hours   -Pulmonology saw and evaluated the patient and recommended as needed bronchodilators, and possible right heart cath to evaluate pulmonary hypertension  -High-sensitivity troponin 15, 14, trend  -EKG-sinus bradycardia, rate of 50  -D-dimer 1.93  -CTA chest shows no evidence of PE  -Echo shows LVEF of 61.5%.  Mild pulmonary hypertension.  -plan for  heart cath today; if no cardiac intervention needed cardiology recommends that the patient have neurology reassessed patient for pulmonary etiology of exertional dyspnea  -Case discussed with hospitalist who is graciously accepted the patient for inpatient admission at this time.        COPD  -Continuous pulse ox  -Continue home dose Symbicort, Spiriva  -As needed albuterol     Hypertension  -Monitor blood pressure  -Continue home dose amlodipine    Disposition: Likely discharge in 1 to 2 days    Follow-up after Discharge: Dependent on hospital course    This note will serve as a progress note    Joy Robins PA-C 08/22/24 05:49 EDT    I have worn appropriate PPE during this patient encounter, sanitized my hands both with entering and exiting patient's room.      57 minutes has been spent by [facility] providers in the care of this patient while under observation status

## 2024-08-22 NOTE — PLAN OF CARE
Goal Outcome Evaluation:  Plan of Care Reviewed With: patient           Outcome Evaluation: oriented, no c/o pain, refused the bed alarm earlier in the shift, has been NPO w/ a pulm and cardiology consult.

## 2024-08-22 NOTE — PROGRESS NOTES
MD ATTESTATION NOTE - Observation progress    The KAVON and I have discussed this patient's history, physical exam, and treatment plan.  I have reviewed the documentation and personally had a face to face interaction with the patient. I affirm the documentation and agree with the treatment and plan.  The attached note describes my personal findings.        SHARED APC FACE TO FACE: I performed a substantive part of the MDM during the patient's E/M visit. I personally evaluated and examined the patient. I personally made or approved the documented management plan and acknowledge its risk of complications.      Brief HPI: Patient admitted for exertional dyspnea.  Patient states he did have a good night.  Patient had no chest pain pressure tightness.  Patient apparently waiting for heart cath today.        GENERAL: no acute distress  HENT: nares patent  EYES: no scleral icterus  CV: regular rhythm, normal rate  RESPIRATORY: normal effort  ABDOMEN: soft  MUSCULOSKELETAL: no deformity  NEURO: alert, moves all extremities, follows commands  PSYCH:  calm, cooperative  SKIN: warm, dry    Vital signs and nursing notes reviewed.        Plan: Cardiac catheterization today

## 2024-08-22 NOTE — PROGRESS NOTES
LOS: 0 days   Patient Care Team:  Desire Hughes APRN ([place]) as PCP - General (Family Medicine)  Desire Hughes APRN ([place]) as Nurse Practitioner (Family Medicine)  Jose Sanchez MD as Consulting Physician (Hematology and Oncology)  Desire Hughes)TAYLOR as Referring Physician (Family Medicine)    Chief Complaint:  Dyspnea on exertion    Interval History:   Still with intermittent O2 requirement.  Ambulation.  No complaints today awaiting heart cath.    Objective   Vital Signs  Temp:  [97.6 °F (36.4 °C)-98 °F (36.7 °C)] 97.6 °F (36.4 °C)  Heart Rate:  [52-71] 54  Resp:  [18-20] 18  BP: (143-162)/(68-87) 162/80  No intake or output data in the 24 hours ending 08/22/24 1305    Comfortable NAD resting in bed  PERRL, conjunctivae clear  Neck supple, no JVD or thyromegaly appreciated  S1/S2 RRR, no m/r/g  Coarse breath sounds no overt wheezes  Abdomen S/NT/ND (+) BS, no HSM appreciated  Extremities warm, no clubbing, cyanosis, no significant edema   No visible or palpable skin lesions  A/O x 3, mood and affect appropriate    Results Review:      Results from last 7 days   Lab Units 08/22/24 0307 08/21/24  0427 08/20/24  1528   SODIUM mmol/L 136 132* 133*   POTASSIUM mmol/L 3.4* 3.8 4.0   CHLORIDE mmol/L 101 99 96*   CO2 mmol/L 22.0 20.8* 23.6   BUN mg/dL 19 18 16   CREATININE mg/dL 1.36* 1.22 1.28*   GLUCOSE mg/dL 107* 104* 113*   CALCIUM mg/dL 9.4 9.4 10.2     Results from last 7 days   Lab Units 08/22/24  0307 08/20/24  1726 08/20/24  1528   HSTROP T ng/L 11 14 15     Results from last 7 days   Lab Units 08/22/24  0307 08/21/24  0427 08/20/24  1528   WBC 10*3/mm3 8.99 9.04 9.77   HEMOGLOBIN g/dL 11.4* 11.1* 12.5*   HEMATOCRIT % 34.3* 33.6* 38.0   PLATELETS 10*3/mm3 321 325 371         Results from last 7 days   Lab Units 08/20/24  1726   CHOLESTEROL mg/dL 133     Results from last 7 days   Lab Units 08/21/24  0427   MAGNESIUM mg/dL 2.2     Results from last 7 days   Lab Units 08/20/24  1726    CHOLESTEROL mg/dL 133   TRIGLYCERIDES mg/dL 102   HDL CHOL mg/dL 43   LDL CHOL mg/dL 71       I reviewed the patient's new clinical results.  I personally viewed and interpreted the patient's EKG/Telemetry data        Medication Review:   amLODIPine, 10 mg, Oral, Nightly  aspirin, 81 mg, Oral, Nightly  budesonide-formoterol, 2 puff, Inhalation, BID - RT   And  tiotropium bromide monohydrate, 2 puff, Inhalation, Daily - RT  buPROPion XL, 150 mg, Oral, Nightly  famotidine, 20 mg, Oral, Daily  FLUoxetine, 80 mg, Oral, Daily  ipratropium-albuterol, 3 mL, Nebulization, 4x Daily - RT  losartan, 100 mg, Oral, Daily  metoprolol succinate XL, 100 mg, Oral, Q24H  potassium chloride ER, 40 mEq, Oral, Q4H  rosuvastatin, 20 mg, Oral, Nightly  senna-docusate sodium, 2 tablet, Oral, BID  sodium chloride, 10 mL, Intravenous, Q12H             Assessment & Plan       Exertional dyspnea    Primary hypertension    Chronic diastolic congestive heart failure    Coronary artery disease involving native coronary artery of native heart without angina pectoris    Abnormal findings on diagnostic imaging of heart and coronary circulation    Exertional dyspnea.  Multifactorial, could be a worsening anginal equivalent given his known existing multivessel coronary disease with prior bypass surgery.  Thankfully troponins are flat which is ruled out ACS, however the findings are concerning for possible worsening CAD and crescendo angina especially given this hypoxia was his initial presentation for his bypass surgery in 2022  History of pulmonary hypertension, unclear group  Echo was unremarkable, however per my discussion yesterday and evaluation by pulmonary, we will proceed with a left and right heart catheterization to delineate his ongoing hypoxia given his otherwise benign CT and exam.  Keep n.p.o. for left and right heart cath  Coronary artery disease.  Continue home medications and high intensity statin.  Peripheral vascular disease with  endovascular aortic stenting January 2023.  He is on aspirin.  Hypertension.  Better controlled he is dyspnea persist.  Cardiac catheterization per above    Await left heart catheterization results, if unremarkable will need to rediscuss with pulmonary team his exertional hypoxia.    Inderjit Teixeira MD  08/22/24  13:05 EDT      Part of this note may be an electronic transcription/translation of spoken language to printed text using the Dragon Dictation System.

## 2024-08-23 ENCOUNTER — TELEPHONE (OUTPATIENT)
Dept: FAMILY MEDICINE CLINIC | Facility: CLINIC | Age: 69
End: 2024-08-23
Payer: MEDICARE

## 2024-08-23 ENCOUNTER — READMISSION MANAGEMENT (OUTPATIENT)
Dept: CALL CENTER | Facility: HOSPITAL | Age: 69
End: 2024-08-23
Payer: MEDICARE

## 2024-08-23 VITALS
TEMPERATURE: 98.1 F | BODY MASS INDEX: 25.9 KG/M2 | HEART RATE: 59 BPM | SYSTOLIC BLOOD PRESSURE: 149 MMHG | HEIGHT: 71 IN | RESPIRATION RATE: 18 BRPM | OXYGEN SATURATION: 95 % | DIASTOLIC BLOOD PRESSURE: 68 MMHG | WEIGHT: 185 LBS

## 2024-08-23 LAB
ANION GAP SERPL CALCULATED.3IONS-SCNC: 13.4 MMOL/L (ref 5–15)
BUN SERPL-MCNC: 23 MG/DL (ref 8–23)
BUN/CREAT SERPL: 18.7 (ref 7–25)
CALCIUM SPEC-SCNC: 9.4 MG/DL (ref 8.6–10.5)
CHLORIDE SERPL-SCNC: 98 MMOL/L (ref 98–107)
CO2 SERPL-SCNC: 20.6 MMOL/L (ref 22–29)
CREAT SERPL-MCNC: 1.23 MG/DL (ref 0.76–1.27)
DEPRECATED RDW RBC AUTO: 45.9 FL (ref 37–54)
EGFRCR SERPLBLD CKD-EPI 2021: 63.6 ML/MIN/1.73
ERYTHROCYTE [DISTWIDTH] IN BLOOD BY AUTOMATED COUNT: 13.7 % (ref 12.3–15.4)
GLUCOSE SERPL-MCNC: 104 MG/DL (ref 65–99)
HCT VFR BLD AUTO: 34.6 % (ref 37.5–51)
HGB BLD-MCNC: 11.5 G/DL (ref 13–17.7)
MCH RBC QN AUTO: 30.7 PG (ref 26.6–33)
MCHC RBC AUTO-ENTMCNC: 33.2 G/DL (ref 31.5–35.7)
MCV RBC AUTO: 92.5 FL (ref 79–97)
PLATELET # BLD AUTO: 294 10*3/MM3 (ref 140–450)
PMV BLD AUTO: 9.5 FL (ref 6–12)
POTASSIUM SERPL-SCNC: 3.9 MMOL/L (ref 3.5–5.2)
RBC # BLD AUTO: 3.74 10*6/MM3 (ref 4.14–5.8)
SODIUM SERPL-SCNC: 132 MMOL/L (ref 136–145)
TSH SERPL DL<=0.05 MIU/L-ACNC: 1.14 UIU/ML (ref 0.27–4.2)
WBC NRBC COR # BLD AUTO: 8.45 10*3/MM3 (ref 3.4–10.8)

## 2024-08-23 PROCEDURE — 84443 ASSAY THYROID STIM HORMONE: CPT

## 2024-08-23 PROCEDURE — 94618 PULMONARY STRESS TESTING: CPT

## 2024-08-23 PROCEDURE — 99232 SBSQ HOSP IP/OBS MODERATE 35: CPT | Performed by: STUDENT IN AN ORGANIZED HEALTH CARE EDUCATION/TRAINING PROGRAM

## 2024-08-23 PROCEDURE — 80048 BASIC METABOLIC PNL TOTAL CA: CPT | Performed by: STUDENT IN AN ORGANIZED HEALTH CARE EDUCATION/TRAINING PROGRAM

## 2024-08-23 PROCEDURE — 85027 COMPLETE CBC AUTOMATED: CPT | Performed by: STUDENT IN AN ORGANIZED HEALTH CARE EDUCATION/TRAINING PROGRAM

## 2024-08-23 RX ADMIN — BUPROPION HYDROCHLORIDE 150 MG: 150 TABLET, EXTENDED RELEASE ORAL at 02:29

## 2024-08-23 RX ADMIN — SENNOSIDES AND DOCUSATE SODIUM 2 TABLET: 50; 8.6 TABLET ORAL at 08:20

## 2024-08-23 RX ADMIN — ROSUVASTATIN CALCIUM 20 MG: 20 TABLET, FILM COATED ORAL at 02:29

## 2024-08-23 RX ADMIN — METOPROLOL SUCCINATE 100 MG: 100 TABLET, EXTENDED RELEASE ORAL at 08:20

## 2024-08-23 RX ADMIN — LOSARTAN POTASSIUM 100 MG: 100 TABLET, FILM COATED ORAL at 08:20

## 2024-08-23 RX ADMIN — Medication 10 ML: at 08:21

## 2024-08-23 RX ADMIN — FLUOXETINE HYDROCHLORIDE 80 MG: 20 CAPSULE ORAL at 08:20

## 2024-08-23 RX ADMIN — FAMOTIDINE 20 MG: 20 TABLET, FILM COATED ORAL at 08:20

## 2024-08-23 NOTE — CASE MANAGEMENT/SOCIAL WORK
Continued Stay Note  [facility]     Patient Name: Osman Aparicio  MRN: [id number]  Today's Date: 8/23/2024    Admit Date: 8/20/2024    Plan: Home w/ [place] providing oxygen on exertion (eval being worked on right now).   Discharge Plan       Row Name 08/23/24 1333       Plan    Plan Home w/ [place] providing oxygen on exertion (eval being worked on right now).                   Discharge Codes    No documentation.                 Expected Discharge Date and Time       Expected Discharge Date Expected Discharge Time    Aug 23, 2024               Janna Dutta RN

## 2024-08-23 NOTE — PROGRESS NOTES
Exercise Oximetry    Patient Name:Osman Aparicio   MRN: [id number]   Date: 08/23/24             ROOM AIR BASELINE   SpO2% 93   Heart Rate    Blood Pressure      EXERCISE ON ROOM AIR SpO2% EXERCISE ON O2 @ 2 LPM SpO2%   1 MINUTE 92 1 MINUTE 98   2 MINUTES 90 2 MINUTES 95   3 MINUTES 89 3 MINUTES 95   4 MINUTES 88 4 MINUTES 94   5 MINUTES 87 5 MINUTES 95   6 MINUTES 85 6 MINUTES 94              Distance Walked   Distance Walked   Dyspnea (Romel Scale)   Dyspnea (Romel Scale)   Fatigue (Romel Scale)   Fatigue (Romel Scale)   SpO2% Post Exercise  SpO2% Post Exercise 98   HR Post Exercise   HR Post Exercise   Time to Recovery   Time to Recovery     Comments:

## 2024-08-23 NOTE — PROGRESS NOTES
[facility]PC INPATIENT PROGRESS NOTE         [facility] CVI    2024      PATIENT IDENTIFICATION:  Name: Osman Aparicio ADMIT: 2024   : 1955  PCP: Desire Hughes APRN ([place])    MRN: [id number] LOS: 1 days   AGE/SEX: 69 y.o. male  ROOM: [facility]                     LOS 1    Reason for visit: Exertional dyspnea      SUBJECTIVE:      No shortness of breath at rest, productive cough or wheezing.    Objective   OBJECTIVE:    Vital Sign Min/Max for last 24 hours  Temp  Min: 97.3 °F (36.3 °C)  Max: 98.1 °F (36.7 °C)   BP  Min: 142/65  Max: 162/80   Pulse  Min: 53  Max: 59   Resp  Min: 12  Max: 20   SpO2  Min: 88 %  Max: 98 %   No data recorded   No data recorded    Vitals:    24 2245 24 2353 24 0355 24 0806   BP: 155/70 156/65 145/80 149/68   BP Location:  Left arm Left arm Left arm   Patient Position:  Lying Lying Lying   Pulse: 54 53 55 59   Resp:  16 16 18   Temp:  97.4 °F (36.3 °C) 97.9 °F (36.6 °C) 98.1 °F (36.7 °C)   TempSrc:  Oral Oral Oral   SpO2: 92% 95% 98% 95%   Weight:       Height:                24  1903 24  0921   Weight: 84 kg (185 lb 3.2 oz) 83.9 kg (185 lb)       Body mass index is 25.8 kg/m².                          Body mass index is 25.8 kg/m².    Intake/Output Summary (Last 24 hours) at 2024 0843  Last data filed at 2024 0355  Gross per 24 hour   Intake 290 ml   Output 350 ml   Net -60 ml         Exam:  GEN:  No distress, appears stated age  EYES:   PERRL, anicteric sclerae  ENT:    External ears/nose normal, OP clear  NECK:  No adenopathy, midline trachea  LUNGS: Normal chest on inspection, palpation and auscultation  CV:  Normal S1S2, without murmur  ABD:  Nontender, nondistended, no hepatosplenomegaly, +BS  EXT:  No edema.  No cyanosis or clubbing.  No mottling and normal cap refill.    Assessment     Scheduled meds:  amLODIPine, 10 mg, Oral, Nightly  aspirin, 81 mg, Oral, Nightly  budesonide-formoterol, 2 puff, Inhalation, BID  "- RT   And  tiotropium bromide monohydrate, 2 puff, Inhalation, Daily - RT  buPROPion XL, 150 mg, Oral, Nightly  famotidine, 20 mg, Oral, Daily  FLUoxetine, 80 mg, Oral, Daily  ipratropium-albuterol, 3 mL, Nebulization, 4x Daily - RT  losartan, 100 mg, Oral, Daily  metoprolol succinate XL, 100 mg, Oral, Q24H  rosuvastatin, 20 mg, Oral, Nightly  senna-docusate sodium, 2 tablet, Oral, BID  sodium chloride, 10 mL, Intravenous, Q12H      IV meds:                         Data Review:  Results from last 7 days   Lab Units 08/23/24  0336 08/22/24  1557 08/22/24  0307 08/21/24 0427 08/20/24  1528   SODIUM mmol/L 132*  --  136 132* 133*   POTASSIUM mmol/L 3.9 4.3 3.4* 3.8 4.0   CHLORIDE mmol/L 98  --  101 99 96*   CO2 mmol/L 20.6*  --  22.0 20.8* 23.6   BUN mg/dL 23  --  19 18 16   CREATININE mg/dL 1.23  --  1.36* 1.22 1.28*   GLUCOSE mg/dL 104*  --  107* 104* 113*   CALCIUM mg/dL 9.4  --  9.4 9.4 10.2         Estimated Creatinine Clearance: 67.3 mL/min (by C-G formula based on SCr of 1.23 mg/dL).  Results from last 7 days   Lab Units 08/23/24  0336 08/22/24  0307 08/21/24  0427 08/20/24  1528   WBC 10*3/mm3 8.45 8.99 9.04 9.77   HEMOGLOBIN g/dL 11.5* 11.4* 11.1* 12.5*   PLATELETS 10*3/mm3 294 321 325 371         Results from last 7 days   Lab Units 08/20/24  1528   ALT (SGPT) U/L 15   AST (SGOT) U/L 28                 No results found for: \"HGBA1C\", \"POCGLU\"      Imaging reviewed      Microbiology reviewed            Active Hospital Problems    Diagnosis  POA    **Exertional dyspnea [R06.09]  Yes    Hypertension [I10]  Yes    Coronary artery disease involving native coronary artery of native heart without angina pectoris [I25.10]  Unknown    Abnormal findings on diagnostic imaging of heart and coronary circulation [R93.1]  Unknown    Chronic diastolic congestive heart failure [I50.32]  Unknown    Primary hypertension [I10]  Unknown      Resolved Hospital Problems   No resolved problems to display. "         ASSESSMENT:  Exertional dyspnea and hypoxemia  COPD with emphysema  Pulmonary hypertension  ED with history of CABG  Former smoker: Quit 2022  Anemia  Mild hyponatremia  Suspect THONY      PLAN:  Cardiac cath today      Latrell Barrera MD  Pulmonary and Critical Care Medicine  [facility], [facility]

## 2024-08-23 NOTE — TELEPHONE ENCOUNTER
Called PT regarding his hospital follow up appointment and PT stating that he is not ready to setup appointment yet due to not knowing when his other upcoming appointments are scheduled and he will callback when he is ready to schedule.     HUB TO RELAY

## 2024-08-23 NOTE — DISCHARGE PLACEMENT REQUEST
"Osman Aparicio (69 y.o. Male)       Date of Birth   1955    Social Security Number       Address   [address] [place] [address]    Home Phone   [phone]    MRN   [id number]       [facility]    Marital Status                               Admission Date   8/20/24    Admission Type   Emergency    Admitting Provider   Virgie Julio MD    Attending Provider   Ludwig Maldonado MD    Department, Room/Bed   [facility], 3115/1       Discharge Date       Discharge Disposition   Home or Self Care    Discharge Destination                                 Attending Provider: Ludwig Maldonado MD    Allergies: No Known Allergies    Isolation: None   Infection: None   Code Status: CPR    Ht: 180.3 cm (71\")   Wt: 83.9 kg (185 lb)    Admission Cmt: None   Principal Problem: Exertional dyspnea [R06.09]                   Active Insurance as of 8/20/2024       Primary Coverage       Payor Plan Insurance Group Employer/Plan Group    MEDICARE MEDICARE A & B        Payor Plan Address Payor Plan Phone Number Payor Plan Fax Number Effective Dates    [address] [phone]  8/1/2020 - None Entered    [facility] [id number]         Subscriber Name Subscriber Birth Date Member ID       OSMAN APARICIO 1955 [id number]               Secondary Coverage       Payor Plan Insurance Group Employer/Plan Group    ANTH BLUE CROSS [facility] SUPP [id number]       Payor Plan Address Payor Plan Phone Number Payor Plan Fax Number Effective Dates    [address]   8/1/2020 - None Entered    [facility] [id number]         Subscriber Name Subscriber Birth Date Member ID       OSMAN APARICIO NICOLAS 1955 [id number]                     Emergency Contacts        (Rel.) Home Phone Work Phone Mobile Phone    MarkusSuzanne (Spouse) [phone] -- [phone]                "

## 2024-08-23 NOTE — OUTREACH NOTE
Prep Survey      Flowsheet Row Responses   [facility] patient discharged from? [place]   Is LACE score < 7 ? No   Eligibility [facility]   Date of Admission 08/20/24   Date of Discharge 08/23/24   Discharge Disposition Home or Self Care   Discharge diagnosis Exertional dyspnea   Does the patient have one of the following disease processes/diagnoses(primary or secondary)? Other   Does the patient have Home health ordered? No   Is there a DME ordered? Yes   What DME was ordered? [employer] will deliver O2 tank to room   Prep survey completed? Yes            Bina VAZQUEZ - Registered Nurse

## 2024-08-23 NOTE — TELEPHONE ENCOUNTER
[facility] staff attempted to follow warm transfer process and was unsuccessful     [facility]    [facility]hip to [facility]: Other    Best call back number: [phone]     Patient is needing: [facility] WAS CALLING TO SCHEDULE A HOSPITAL FOLLOW UP FOR PATIENT. PATIENT IS DISCHARGING TODAY. [facility] ATTEMPTED TO SCHEDULE, BUT THERE WAS NO AVAILABILITY WITHIN 7 DAYS OF DISCHARGE. PLEASE CALL PATIENT TO SCHEDULE.

## 2024-08-23 NOTE — CASE MANAGEMENT/SOCIAL WORK
Case Management Discharge Note      Final Note: Pt discharged home.  [place] provided oxygen on exertion tank...... ..[facility] [place]         Selected Continued Care - Discharged on 8/23/2024 Admission date: 8/20/2024 - Discharge disposition: Home or Self Care      Destination    No services have been selected for the patient.                Durable Medical Equipment Coordination complete.      Service Provider Selected Services Address Phone Fax Patient Preferred    [facility] [address] [address] [phone] --       Internal Comment last updated by Janna Dutta, RN 8/23/2024 1331    Oxygen on exertion                           Dialysis/Infusion    No services have been selected for the patient.                Home Medical Care    No services have been selected for the patient.                Therapy    No services have been selected for the patient.                Community Resources    No services have been selected for the patient.                Community & DME    No services have been selected for the patient.                         Final Discharge Disposition Code: 01 - home or self-care

## 2024-08-23 NOTE — PROGRESS NOTES
LOS: 1 day   Patient Care Team:  Desire Hughes APRN ([place]) as PCP - General (Family Medicine)  Desire Hughes APRN ([place]) as Nurse Practitioner (Family Medicine)  Jose Sanchez MD as Consulting Physician (Hematology and Oncology)  Desire Hughes)TAYLOR as Referring Physician (Family Medicine)    Chief Complaint:  Dyspnea on exertion    Interval History:   Underwent left and right heart catheterization yesterday which revealed no new coronary disease, all grafts are patent, and there is no evidence of pulmonary hypertension.  He remains with shortness of breath.    Objective   Vital Signs  Temp:  [97.3 °F (36.3 °C)-98.1 °F (36.7 °C)] 98.1 °F (36.7 °C)  Heart Rate:  [53-59] 59  Resp:  [12-20] 18  BP: (142-162)/(65-80) 149/68    Intake/Output Summary (Last 24 hours) at 8/23/2024 0830  Last data filed at 8/23/2024 0355  Gross per 24 hour   Intake 290 ml   Output 350 ml   Net -60 ml       Comfortable NAD resting in bed  PERRL, conjunctivae clear  Neck supple, no JVD or thyromegaly appreciated  S1/S2 RRR, no m/r/g  Coarse breath sounds no overt wheezes  Abdomen S/NT/ND (+) BS, no HSM appreciated  Extremities warm, no clubbing, cyanosis, no significant edema   No visible or palpable skin lesions  A/O x 3, mood and affect appropriate    Results Review:      Results from last 7 days   Lab Units 08/23/24  0336 08/22/24  1557 08/22/24  0307 08/21/24  0427   SODIUM mmol/L 132*  --  136 132*   POTASSIUM mmol/L 3.9 4.3 3.4* 3.8   CHLORIDE mmol/L 98  --  101 99   CO2 mmol/L 20.6*  --  22.0 20.8*   BUN mg/dL 23  --  19 18   CREATININE mg/dL 1.23  --  1.36* 1.22   GLUCOSE mg/dL 104*  --  107* 104*   CALCIUM mg/dL 9.4  --  9.4 9.4     Results from last 7 days   Lab Units 08/22/24  1557 08/22/24  0307 08/20/24  1726   HSTROP T ng/L 13 11 14     Results from last 7 days   Lab Units 08/23/24  0336 08/22/24  0307 08/21/24  0427   WBC 10*3/mm3 8.45 8.99 9.04   HEMOGLOBIN g/dL 11.5* 11.4* 11.1*   HEMATOCRIT % 34.6*  34.3* 33.6*   PLATELETS 10*3/mm3 294 321 325         Results from last 7 days   Lab Units 08/20/24  1726   CHOLESTEROL mg/dL 133     Results from last 7 days   Lab Units 08/21/24  0427   MAGNESIUM mg/dL 2.2     Results from last 7 days   Lab Units 08/20/24  1726   CHOLESTEROL mg/dL 133   TRIGLYCERIDES mg/dL 102   HDL CHOL mg/dL 43   LDL CHOL mg/dL 71       I reviewed the patient's new clinical results.  I personally viewed and interpreted the patient's EKG/Telemetry data    I reviewed his cardiac catheterization including the angiography films from his procedure yesterday August 22, 2024:  Conclusions:  Severe native vessel disease with patent mid LAD, SVG to OM1, and SVG to OM 2.  Normal right and left-sided filling pressures.        Recommendations:   Continue aggressive factor modification.  Continue workup for noncardiac causes of dyspnea.           Medication Review:   amLODIPine, 10 mg, Oral, Nightly  aspirin, 81 mg, Oral, Nightly  budesonide-formoterol, 2 puff, Inhalation, BID - RT   And  tiotropium bromide monohydrate, 2 puff, Inhalation, Daily - RT  buPROPion XL, 150 mg, Oral, Nightly  famotidine, 20 mg, Oral, Daily  FLUoxetine, 80 mg, Oral, Daily  ipratropium-albuterol, 3 mL, Nebulization, 4x Daily - RT  losartan, 100 mg, Oral, Daily  metoprolol succinate XL, 100 mg, Oral, Q24H  rosuvastatin, 20 mg, Oral, Nightly  senna-docusate sodium, 2 tablet, Oral, BID  sodium chloride, 10 mL, Intravenous, Q12H             Assessment & Plan       Exertional dyspnea    Primary hypertension    Chronic diastolic congestive heart failure    Coronary artery disease involving native coronary artery of native heart without angina pectoris    Abnormal findings on diagnostic imaging of heart and coronary circulation    Hypertension    Exertional dyspnea.  Multifactorial, however there does not appear to be a significant cardiac cause.  He underwent left and right heart catheterization yesterday which revealed all patent grafts,  no evidence of any significant myocardial dysfunction, and normal right atrial and right ventricular pressures ruling out pulmonary hypertension.  This was in congruence with the echocardiogram findings.    Does remain with intermittent O2 requirement, will defer further workup this to pulmonology.    Would continue his home cardiac medications Including his amlodipine 10, aspirin 81, metoprolol 100 succinate daily, losartan 100.  If he remains hypertensive can consider low-dose hydrochlorothiazide 12.5 daily   History of pulmonary hypertension, unclear group  Resolved post revascularization.  No further evidence of pulm hypertension on his left and right heart catheterization    Coronary artery disease.  Continue home medications and high intensity statin.  Peripheral vascular disease with endovascular aortic stenting January 2023.  He is on aspirin.  Hypertension.  Better controlled he is dyspnea persist.  Cardiac catheterization per above    No further workup required, his hypoxia is not appear to be of cardiac origin.  We will sign off and see as needed.  Please reach out any questions or concerns    Inderjit Teixeira MD  08/23/24  08:30 EDT      Part of this note may be an electronic transcription/translation of spoken language to printed text using the Dragon Dictation System.

## 2024-08-23 NOTE — CASE MANAGEMENT/SOCIAL WORK
Continued Stay Note  [facility]     Patient Name: Osman Aparicio  MRN: [id number]  Today's Date: 8/23/2024    Admit Date: 8/20/2024    Plan: Awaiting walking oximetry to be completed then patient will d/c per MD.  Please let CCP know asap if oxygen needed at d/c.   Discharge Plan       Row Name 08/23/24 0959       Plan    Plan Awaiting walking oximetry to be completed then patient will d/c per MD.  Please let CCP know asap if oxygen needed at d/c.                   Discharge Codes    No documentation.                       Janna Dutta RN

## 2024-08-23 NOTE — DISCHARGE SUMMARY
Patient Name: Osman Aparicio  : 1955  MRN: [id number]    Date of Admission: 2024  Date of Discharge:  2024  Primary Care Physician: Desire Hughes (Percy), TAYLOR      Chief Complaint:   Shortness of Breath      Discharge Diagnoses     Active Hospital Problems    Diagnosis  POA    **Exertional dyspnea [R06.09]  Yes    Coronary artery disease involving native coronary artery of native heart without angina pectoris [I25.10]  Yes    Abnormal findings on diagnostic imaging of heart and coronary circulation [R93.1]  Yes    Chronic diastolic congestive heart failure [I50.32]  Yes    Primary hypertension [I10]  Yes      Resolved Hospital Problems   No resolved problems to display.        Hospital Course     Very pleasant 70yo gentleman with HTN, chronic diastolic CHF, CAD/CABG, COPD, anxiety, AAA repair, and PVD, who presented to ER with 4 day h/o exertional dyspnea. He was admitted to OBS Unit and we were asked to assume care when he transitioned out.    Pt was seen by both Debra and Eliecer here. Workup including CTA Chest and left/right heart cath has been unrevealing. Debra is okay with dc. Eliecer has seen today and recommends pt f/u with his regular pulmonologist, Dr. Rj Thakkar, in office to complete pulmonary workup as outpt. Walking oximetry performed this AM and pt does drop into mid-80's with exertion. Eliecer has arranged home O2 for dc.     D/w Dr. Teixeira, Dr. Barrera, and CCP.    F/u with PCP next week  F/u with Dr. Thakkar ([facility]) in 2 weeks    Day of Discharge     Subjective:  Feeling fine today. No SOA at rest or with exertion. No CP or palp. Tolerating diet. Voiding well. Eager to go home.    Physical Exam:  Temp:  [97.3 °F (36.3 °C)-98.1 °F (36.7 °C)] 98.1 °F (36.7 °C)  Heart Rate:  [53-59] 59  Resp:  [12-20] 18  BP: (142-162)/(65-80) 149/68  Body mass index is 25.8 kg/m².  Physical Exam  Vitals and nursing note reviewed.   Constitutional:       General: He is not in acute distress.      Appearance: He is ill-appearing (chronically). He is not toxic-appearing.   HENT:      Head: Normocephalic.      Nose: Nose normal.      Mouth/Throat:      Mouth: Mucous membranes are moist.      Pharynx: Oropharynx is clear.   Eyes:      General: No scleral icterus.        Right eye: No discharge.         Left eye: No discharge.      Conjunctiva/sclera: Conjunctivae normal.   Cardiovascular:      Rate and Rhythm: Normal rate and regular rhythm.      Pulses: Normal pulses.   Pulmonary:      Effort: Pulmonary effort is normal. No respiratory distress.      Breath sounds: Normal breath sounds. No wheezing or rales.   Abdominal:      General: Bowel sounds are normal. There is no distension.      Palpations: Abdomen is soft.      Tenderness: There is no abdominal tenderness.   Musculoskeletal:         General: No swelling.      Cervical back: Neck supple.   Skin:     General: Skin is warm and dry.      Capillary Refill: Capillary refill takes less than 2 seconds.   Neurological:      General: No focal deficit present.      Mental Status: He is alert. Mental status is at baseline.   Psychiatric:         Mood and Affect: Mood normal.         Behavior: Behavior normal.         Thought Content: Thought content normal.         Consultants     Consult Orders (all) (From admission, onward)       Start     Ordered    08/22/24 2149  Inpatient Pulmonology Consult  Once        Specialty:  Pulmonary Disease  Provider:  Latrell Barrera MD    08/22/24 2148    08/22/24 1341  Inpatient Hospitalist Consult  Once        Specialty:  Hospitalist  Provider:  Virgie Julio MD    08/22/24 1341    08/21/24 1106  Inpatient Pulmonology Consult  Once,   Status:  Canceled        Specialty:  Pulmonary Disease  Provider:  Rj Thakkar MD    08/21/24 1106    08/21/24 0942  Inpatient Pulmonology Consult  Once        Specialty:  Pulmonary Disease  Provider:  Latrell Barrera MD    08/21/24 0942    08/20/24 1816  Inpatient  Cardiology Consult  Once        Specialty:  Cardiology  Provider:  Neto Paige MD    08/20/24 1817                  Procedures     Right and Left Heart Cath, Coronary angiography, Saphenous Vein Graft, Native mammary injection    Imaging Results (All)       Procedure Component Value Units Date/Time    CT Angiogram Chest [[id number]] Collected: 08/20/24 2302     Updated: 08/20/24 2313    Narrative:      CT ANGIOGRAM OF THE CHEST     HISTORY: Exertional dyspnea     COMPARISON: September 21, 2023     TECHNIQUE: Axial CT imaging was obtained through the thorax. IV contrast  was administered. Three-D reformatted images were obtained.     FINDINGS:  No acute pulmonary thromboembolus is seen. There is dilatation of the  proximal descending aorta, measuring up to 3.1 cm. The aorta is somewhat  tortuous in its course. No obvious dissection is seen. Distal descending  aorta measures 2.8 cm. The patient is status post median sternotomy with  coronary artery bypass grafting. There is some dilatation of the main  pulmonary artery, which can be seen in setting of pulmonary arterial  hypertension. There are coronary artery calcifications. The thyroid  gland and trachea appear within normal limits. There is a small hiatal  hernia. Mediastinal lymph nodes do not appear pathologically enlarged.  There are advanced background emphysematous changes. There are some  reticulonodular infiltrates which are noted within the right middle  lobe. These may be infectious or inflammatory, but short-term CT  follow-up in 3 months is recommended. Chronic scarring is also noted at  the right lung base. There is an additional nodule which is noted within  the right middle lobe, adjacent to the fissure. It was present on the  exam from September 2023, and is favored to be benign. Consider CT  follow-up in September 2025 to document a full 2 years of stability.  There is some further scarring noted within the lingula. Images through  the  upper abdomen demonstrate right renal atrophy. There are changes of  aortic stent grafting. Visualized aorta measures up to 3.5 cm. This  appears similar to the exam from March 2023. There is cholelithiasis.  There is reflux of contrast material into the hepatic veins and inferior  vena cava, which can be seen in setting of right-sided heart failure. No  acute osseous abnormalities are seen.       Impression:         1. No acute pulmonary thromboembolus.  2. Nonspecific reticulonodular infiltrates seen within the right middle  lobe. Short-term CT follow-up in 3 months is recommended.  3. There is reflux of contrast material into the hepatic veins, which  can be seen in the setting of right-sided heart failure.     Radiation dose reduction techniques were utilized, including automated  exposure control and exposure modulation based on body size.        This report was finalized on 8/20/2024 11:10 PM by Dr. Yasmin Ramos M.D on Workstation: [id number]       XR Chest 1 View [[id number]] Collected: 08/20/24 1544     Updated: 08/20/24 1550    Narrative:      XR CHEST 1 VW-     INDICATION: Shortness of breath     COMPARISON: CT chest 9/21/2023 and chest radiographs dating back to  3/26/2022       Impression:      Left lower lobe linear atelectasis/scarring. No new focal  consolidation. No pleural effusion or pneumothorax. Normal size cardiac  silhouette with postsurgical changes of CABG. Nondisplaced median  sternotomy wires.       This report was finalized on 8/20/2024 3:47 PM by Dr. Ehsan Ventura M.D on Workstation: [id number]             Results for orders placed during the hospital encounter of 03/26/22    Duplex Vein Mapping Lower Extremity - Bilateral CAR    Interpretation Summary  · The right greater saphenous vein is patent and of adequate size in the thigh.  · The right greater saphenous vein is patent and of adequate size in the calf.  · The left greater saphenous vein is patent and of adequate size  in the thigh.  · The left greater saphenous vein is patent and of adequate size in the calf.    Results for orders placed during the hospital encounter of 08/20/24    Adult Transthoracic Echo Complete W/ Cont if Necessary Per Protocol    Interpretation Summary    Left ventricular systolic function is normal. Calculated left ventricular EF = 61.5%    Left ventricular wall thickness is consistent with mild concentric hypertrophy.    Left ventricular diastolic function was normal.    Left atrial volume is mildly increased.    Estimated right ventricular systolic pressure from tricuspid regurgitation is mildly elevated (35-45 mmHg). Calculated right ventricular systolic pressure from tricuspid regurgitation is 38 mmHg.    Mild pulmonary hypertension is present.    The aortic root measures 4.1 cm.    Pertinent Labs     Results from last 7 days   Lab Units 08/23/24  0336 08/22/24  0307 08/21/24 0427 08/20/24  1528   WBC 10*3/mm3 8.45 8.99 9.04 9.77   HEMOGLOBIN g/dL 11.5* 11.4* 11.1* 12.5*   PLATELETS 10*3/mm3 294 321 325 371     Results from last 7 days   Lab Units 08/23/24  0336 08/22/24  1557 08/22/24  0307 08/21/24 0427 08/20/24  1528   SODIUM mmol/L 132*  --  136 132* 133*   POTASSIUM mmol/L 3.9 4.3 3.4* 3.8 4.0   CHLORIDE mmol/L 98  --  101 99 96*   CO2 mmol/L 20.6*  --  22.0 20.8* 23.6   BUN mg/dL 23  --  19 18 16   CREATININE mg/dL 1.23  --  1.36* 1.22 1.28*   GLUCOSE mg/dL 104*  --  107* 104* 113*   EGFR mL/min/1.73 63.6  --  56.3* 64.2 60.6     Results from last 7 days   Lab Units 08/20/24  1528   ALBUMIN g/dL 4.5   BILIRUBIN mg/dL 0.6   ALK PHOS U/L 99   AST (SGOT) U/L 28   ALT (SGPT) U/L 15     Results from last 7 days   Lab Units 08/23/24  0336 08/22/24  0307 08/21/24  0427 08/20/24  1528   CALCIUM mg/dL 9.4 9.4 9.4 10.2   ALBUMIN g/dL  --   --   --  4.5   MAGNESIUM mg/dL  --   --  2.2  --        Results from last 7 days   Lab Units 08/22/24  1557 08/22/24  0307 08/20/24  1726 08/20/24  1528   HSTROP T ng/L  13 11 14 15   PROBNP pg/mL  --   --   --  1,816.0*   D DIMER QUANT MCGFEU/mL  --   --   --  1.93*       Results from last 7 days   Lab Units 08/20/24  1726   CHOLESTEROL mg/dL 133   TRIGLYCERIDES mg/dL 102   HDL CHOL mg/dL 43   LDL CHOL mg/dL 71         Results from last 7 days   Lab Units 08/20/24  1842   COVID19  Not Detected       Test Results Pending at Discharge       Discharge Details        Discharge Medications        Changes to Medications        Instructions Start Date   amLODIPine 10 MG tablet  Commonly known as: NORVASC  What changed: when to take this   10 mg, Oral, Daily      aspirin 81 MG EC tablet  What changed: when to take this   81 mg, Oral, Daily      buPROPion  MG 24 hr tablet  Commonly known as: Wellbutrin XL  What changed: when to take this   150 mg, Oral, Daily             Continue These Medications        Instructions Start Date   albuterol sulfate  (90 Base) MCG/ACT inhaler  Commonly known as: Proventil HFA   2 puffs, Inhalation, Every 4 Hours PRN      famotidine 20 MG tablet  Commonly known as: PEPCID   20 mg, Oral, Daily      FLUoxetine 40 MG capsule  Commonly known as: PROzac   80 mg, Oral, Daily      folic acid 1 MG tablet  Commonly known as: FOLVITE   1 mg, Oral, Daily      Iron 28 MG tablet   1 tablet, Oral, Daily      losartan 100 MG tablet  Commonly known as: COZAAR   100 mg, Oral, Daily      metoprolol succinate  MG 24 hr tablet  Commonly known as: TOPROL-XL   100 mg, Oral, Every Night at Bedtime      multivitamin with minerals tablet tablet   1 tablet, Oral, Daily      nitroglycerin 0.4 MG SL tablet  Commonly known as: NITROSTAT   0.4 mg, Sublingual, Every 5 Minutes PRN, Take no more than 3 doses in 15 minutes.      rosuvastatin 20 MG tablet  Commonly known as: CRESTOR   20 mg, Oral, Nightly      thiamine 100 MG tablet  tablet  Commonly known as: VITAMIN B-1   100 mg, Oral, Daily      Trelegy Ellipta 100-62.5-25 MCG/INH inhaler  Generic drug:  Fluticasone-Umeclidin-Vilant   1 puff, Inhalation, Daily - RT      VITAMIN C PO   1 tablet, Oral, Daily               No Known Allergies    Discharge Disposition:  Home or Self Care      Discharge Diet:  Diet Order   Procedures    Diet: Cardiac; Healthy Heart (2-3 Na+); Fluid Consistency: Thin (IDDSI 0)       Discharge Activity:   As tolerated, use NC O2 with activity    CODE STATUS:    Code Status and Medical Interventions: CPR (Attempt to Resuscitate); Full Support   Ordered at: 08/21/24 0942     Level Of Support Discussed With:    Patient     Code Status (Patient has no pulse and is not breathing):    CPR (Attempt to Resuscitate)     Medical Interventions (Patient has pulse or is breathing):    Full Support       Future Appointments   Date Time Provider Department Center   9/18/2024  1:30 PM [facility] CT 1 BH [facility] [place]   10/18/2024 10:00 AM [facility] OP VAS RM 3 BH [facility]   10/18/2024 11:30 AM Shira Walsh APRN [facility]   3/26/2025  1:00 PM Mary Lafleur APRN [facility] CD [facility]     Additional Instructions for the Follow-ups that You Need to Schedule       Discharge Follow-up with PCP   As directed       Currently Documented PCP:    Desire Hughes APRN ([place])    PCP Phone Number:    [phone]     Follow Up Details: Ellen FRANCO (PCP) at next available appt        Discharge Follow-up with Specified Provider: Dr. Rj Thakkar ([facility]); 2 Weeks   As directed      To: Dr. Rj Thakkar (Pulm)   Follow Up: 2 Weeks               Follow-up Information       Desire Hughes APRN ([place]) .    Specialty: Family Medicine  Why: Ellen FRANCO (PCP) at next available appt  Contact information:  [address] [address] [address] [phone]                             Additional Instructions for the Follow-ups that You Need to Schedule       Discharge Follow-up with PCP   As directed       Currently Documented PCP:    Desire Hughes APRN ([place])    PCP Phone Number:     [phone]     Follow Up Details: Ellen FRANCO (PCP) at next available appt        Discharge Follow-up with Specified Provider: Dr. Rj Thakkar ([facility]); 2 Weeks   As directed      To: Dr. Rj Thakkar (Pulm)   Follow Up: 2 Weeks            Time Spent on Discharge:  Greater than 30 minutes      Ludwig Maldonado MD  [facility]  08/23/24  11:23 EDT

## 2024-08-23 NOTE — PROGRESS NOTES
TAYLOR Rodney    [facility]    8/23/2024    Patient ID:  Name:  Osman Aparicio  MRN:  [id number]  1955  69 y.o.  male            CC/Reason for visit:  Shortness of breath, hypoxia with exertional dyspnea    HPI:  Presented to the hospital on 8/20/2024 with complaints of dyspnea (worse with exertion) and hypoxemia with exertion.  Patient was evaluated by Dr. Barrera on 8/21/2024-assessment did not appear to be an exacerbation of his COPD.  Patient was started on his maintenance inhaled therapy and as needed bronchodilators.  He was evaluated by cardiology.      On 8/22/2024, patient underwent left heart catheterization and right heart catheterization.  Left heart catheterization did not show any evidence of acute ischemia, CABG grafts remain patent.  Right heart catheterization showed a right atrium mean pressure of 4 mmHg, right ventricular pressure 28/2, pulmonary artery 20/4/12.  Cardiac index 1.6 L/min/mm², cardiac output 3.26 L/min.    Given normal cardiac evaluation, pulmonary was asked to see patient once again to evaluate for noncardiac causes of his dyspnea.    ROS:  CONSTITUTIONAL:  Denies fevers or chills  EYE:  No new vision changes  EAR:  No change in hearing  CARDIAC:  No chest pain  PULMONARY:  No productive cough, + increasing shortness of breath with exertion  GI:  No diarrhea, hematemesis or hematochezia,  RENAL:  No dysuria or urinary frequency  MUSCULOSKELETAL:  No musculoskeletal complaints  ENDOCRINE:  No heat or cold intolerance  INTEGUMENTARY: No skin rashes  NEUROLOGICAL:  No dizziness or confusion.  No seizure activity  PSYCHIATRIC:  No new anxiety or depression  12 system review of systems performed and all else negative     Vitals:  Vitals:    08/22/24 1954 08/22/24 2242 08/22/24 2245 08/22/24 2353   BP: 142/65 155/70 155/70 156/65   BP Location: Left arm   Left arm   Patient Position: Lying   Lying   Pulse: 54 53 54 53   Resp: 16   16   Temp: 97.3 °F (36.3 °C)    97.4 °F (36.3 °C)   TempSrc: Oral   Oral   SpO2: 97%  92% 95%   Weight:       Height:               Body mass index is 25.8 kg/m².    Intake/Output Summary (Last 24 hours) at 8/23/2024 0336  Last data filed at 8/22/2024 2020  Gross per 24 hour   Intake 290 ml   Output --   Net 290 ml       Exam:  Constitutional: Middle aged pt in bed, No acute respiratory distress, no accessory muscle use, snoring while sleeping  Head: - NCAT  Eyes: No pallor, Anicteric conjunctiva, EOMI.  ENMT:  Mallampati 3, no oral thrush. Dry MM.   NECK: Trachea midline, No thyromegaly, no palpable cervical LNpathy  Heart: RRR, no murmur. No pedal edema   Lungs: MOSES +, No wheezes/ crackles heard    Abdomen: Soft. No tenderness, guarding or rigidity. No palpable masses  Extremities: Extremities warm and well perfused. No cyanosis/ clubbing  Neuro: Conscious, answers appropriately, no gross focal neuro deficits  Psych: Mood and affect appropriate    PPE recommended per [facility] infectious disease Isolation protocol for the current clinical scenario(as mentioned below) was followed.      Scheduled meds:  amLODIPine, 10 mg, Oral, Nightly  aspirin, 81 mg, Oral, Nightly  budesonide-formoterol, 2 puff, Inhalation, BID - RT   And  tiotropium bromide monohydrate, 2 puff, Inhalation, Daily - RT  buPROPion XL, 150 mg, Oral, Nightly  famotidine, 20 mg, Oral, Daily  FLUoxetine, 80 mg, Oral, Daily  ipratropium-albuterol, 3 mL, Nebulization, 4x Daily - RT  losartan, 100 mg, Oral, Daily  metoprolol succinate XL, 100 mg, Oral, Q24H  rosuvastatin, 20 mg, Oral, Nightly  senna-docusate sodium, 2 tablet, Oral, BID  sodium chloride, 10 mL, Intravenous, Q12H      IV meds:                           Data Review:   I reviewed the patient's medications and new clinical results.  Lab Results   Component Value Date    CALCIUM 9.4 08/22/2024    PHOS 4.1 04/02/2022    MG 2.2 08/21/2024    MG 2.0 04/04/2023    MG 2.1 05/16/2022     Results from last 7 days   Lab Units  08/22/24  1557 08/22/24  0307 08/21/24  0427 08/20/24  1528   SODIUM mmol/L  --  136 132* 133*   POTASSIUM mmol/L 4.3 3.4* 3.8 4.0   CHLORIDE mmol/L  --  101 99 96*   CO2 mmol/L  --  22.0 20.8* 23.6   BUN mg/dL  --  19 18 16   CREATININE mg/dL  --  1.36* 1.22 1.28*   CALCIUM mg/dL  --  9.4 9.4 10.2   BILIRUBIN mg/dL  --   --   --  0.6   ALK PHOS U/L  --   --   --  99   ALT (SGPT) U/L  --   --   --  15   AST (SGOT) U/L  --   --   --  28   GLUCOSE mg/dL  --  107* 104* 113*   MAGNESIUM mg/dL  --   --  2.2  --    WBC 10*3/mm3  --  8.99 9.04 9.77   HEMOGLOBIN g/dL  --  11.4* 11.1* 12.5*   PLATELETS 10*3/mm3  --  321 325 371   PROBNP pg/mL  --   --   --  1,816.0*         Microbiology Results (last 10 days)       Procedure Component Value - Date/Time    Respiratory Panel PCR w/COVID-19(SARS-CoV-2) [facility]/MIGUEL/AUSTIN/PAD/COR/LORI In-House, NP Swab in [facility], 2 HR TAT - Swab, Nasopharynx [[id number]]  (Normal) Collected: 08/20/24 1842    Lab Status: Final result Specimen: Swab from Nasopharynx Updated: 08/20/24 1941     ADENOVIRUS, PCR Not Detected     Coronavirus 229E Not Detected     Coronavirus HKU1 Not Detected     Coronavirus NL63 Not Detected     Coronavirus OC43 Not Detected     COVID19 Not Detected     Human Metapneumovirus Not Detected     Human Rhinovirus/Enterovirus Not Detected     Influenza A PCR Not Detected     Influenza B PCR Not Detected     Parainfluenza Virus 1 Not Detected     Parainfluenza Virus 2 Not Detected     Parainfluenza Virus 3 Not Detected     Parainfluenza Virus 4 Not Detected     RSV, PCR Not Detected     Bordetella pertussis pcr Not Detected     Bordetella parapertussis PCR Not Detected     Chlamydophila pneumoniae PCR Not Detected     Mycoplasma pneumo by PCR Not Detected    Narrative:      In the setting of a positive respiratory panel with a viral infection PLUS a negative procalcitonin without other underlying concern for bacterial infection, consider observing off antibiotics or discontinuation  of antibiotics and continue supportive care. If the respiratory panel is positive for atypical bacterial infection (Bordetella pertussis, Chlamydophila pneumoniae, or Mycoplasma pneumoniae), consider antibiotic de-escalation to target atypical bacterial infection.            Results from last 7 days   Lab Units 08/22/24  1557 08/22/24  0307 08/20/24  1726   HSTROP T ng/L 13 11 14           Estimated Creatinine Clearance: 60.8 mL/min (A) (by C-G formula based on SCr of 1.36 mg/dL (H)).      ASSESSMENT:   Exertional dyspnea and hypoxemia  COPD with emphysema  Pulmonary hypertension  CAD with history of CABG  Former smoker: Quit 2022  Anemia  Mild hyponatremia  Suspect THONY    PLAN:  COPD maintenance inhalers continued - PRN bronchodilators available.  No evidence of COPD exacerbation, no indication for systemic steroids at this time.  Noted results of left and right heart catheterization- RV pressures and pulmonary artery pressures within normal limits.   Recommend further evaluation of dyspnea causes- will likely need PFTs, polysomnography for THONY evaluation.  Repeat CT chest in 3 months to follow up RML infiltrate.    While I was in the room and during my examination of the patient I were gown, gloves, mask, eye protection and washed my hands before and after the encounter.  Proper enhanced droplet precautions precautions and isolation precautions were taken.    Elvia Murdock, APRN  8/23/2024

## 2024-08-23 NOTE — PROGRESS NOTES
LPC INPATIENT PROGRESS NOTE         [facility] CVI    2024      PATIENT IDENTIFICATION:  Name: Osman Aparicio ADMIT: 2024   : 1955  PCP: Desire Hughes APRN ([place])    MRN: [id number] LOS: 1 days   AGE/SEX: 69 y.o. male  ROOM: [facility]                     LOS 1    Reason for visit: Exertional dyspnea      SUBJECTIVE:      Resting comfortably.  No distress.  Saturations 98% on 2 L at time of visit.  Further workup of dyspnea from pulmonary standpoint will be as outpatient.  No objection to discharge.  Will check exercise oximetry to evaluate for oxygen need at home.  Follow-up with Dr Thakkar in our office who sees him regularly within the next couple weeks.    Objective   OBJECTIVE:    Vital Sign Min/Max for last 24 hours  Temp  Min: 97.3 °F (36.3 °C)  Max: 98.1 °F (36.7 °C)   BP  Min: 142/65  Max: 162/80   Pulse  Min: 53  Max: 59   Resp  Min: 12  Max: 20   SpO2  Min: 88 %  Max: 98 %   No data recorded   No data recorded    Vitals:    24 2245 24 2353 24 0355 24 0806   BP: 155/70 156/65 145/80 149/68   BP Location:  Left arm Left arm Left arm   Patient Position:  Lying Lying Lying   Pulse: 54 53 55 59   Resp:  16 16 18   Temp:  97.4 °F (36.3 °C) 97.9 °F (36.6 °C) 98.1 °F (36.7 °C)   TempSrc:  Oral Oral Oral   SpO2: 92% 95% 98% 95%   Weight:       Height:                24  1903 24  0921   Weight: 84 kg (185 lb 3.2 oz) 83.9 kg (185 lb)       Body mass index is 25.8 kg/m².                          Body mass index is 25.8 kg/m².    Intake/Output Summary (Last 24 hours) at 2024 0847  Last data filed at 2024 0355  Gross per 24 hour   Intake 290 ml   Output 350 ml   Net -60 ml         Exam:  GEN:  No distress, appears stated age  EYES:   PERRL, anicteric sclerae  ENT:    External ears/nose normal, OP clear  NECK:  No adenopathy, midline trachea  LUNGS: Normal chest on inspection, palpation and auscultation  CV:  Normal S1S2, without  "murmur  ABD:  Nontender, nondistended, no hepatosplenomegaly, +BS  EXT:  No edema.  No cyanosis or clubbing.  No mottling and normal cap refill.    Assessment     Scheduled meds:  amLODIPine, 10 mg, Oral, Nightly  aspirin, 81 mg, Oral, Nightly  budesonide-formoterol, 2 puff, Inhalation, BID - RT   And  tiotropium bromide monohydrate, 2 puff, Inhalation, Daily - RT  buPROPion XL, 150 mg, Oral, Nightly  famotidine, 20 mg, Oral, Daily  FLUoxetine, 80 mg, Oral, Daily  ipratropium-albuterol, 3 mL, Nebulization, 4x Daily - RT  losartan, 100 mg, Oral, Daily  metoprolol succinate XL, 100 mg, Oral, Q24H  rosuvastatin, 20 mg, Oral, Nightly  senna-docusate sodium, 2 tablet, Oral, BID  sodium chloride, 10 mL, Intravenous, Q12H      IV meds:                         Data Review:  Results from last 7 days   Lab Units 08/23/24  0336 08/22/24  1557 08/22/24 0307 08/21/24 0427 08/20/24  1528   SODIUM mmol/L 132*  --  136 132* 133*   POTASSIUM mmol/L 3.9 4.3 3.4* 3.8 4.0   CHLORIDE mmol/L 98  --  101 99 96*   CO2 mmol/L 20.6*  --  22.0 20.8* 23.6   BUN mg/dL 23  --  19 18 16   CREATININE mg/dL 1.23  --  1.36* 1.22 1.28*   GLUCOSE mg/dL 104*  --  107* 104* 113*   CALCIUM mg/dL 9.4  --  9.4 9.4 10.2         Estimated Creatinine Clearance: 67.3 mL/min (by C-G formula based on SCr of 1.23 mg/dL).  Results from last 7 days   Lab Units 08/23/24  0336 08/22/24  0307 08/21/24 0427 08/20/24  1528   WBC 10*3/mm3 8.45 8.99 9.04 9.77   HEMOGLOBIN g/dL 11.5* 11.4* 11.1* 12.5*   PLATELETS 10*3/mm3 294 321 325 371         Results from last 7 days   Lab Units 08/20/24  1528   ALT (SGPT) U/L 15   AST (SGOT) U/L 28                 No results found for: \"HGBA1C\", \"POCGLU\"      Imaging reviewed  2D echo 8/21 reviewed    CT chest 8/20 reviewed    Cardiac catheterization report reviewed      Microbiology reviewed  RVP negative          Active Hospital Problems    Diagnosis  POA    **Exertional dyspnea [R06.09]  Yes    Hypertension [I10]  Yes    Coronary " artery disease involving native coronary artery of native heart without angina pectoris [I25.10]  Unknown    Abnormal findings on diagnostic imaging of heart and coronary circulation [R93.1]  Unknown    Chronic diastolic congestive heart failure [I50.32]  Unknown    Primary hypertension [I10]  Unknown      Resolved Hospital Problems   No resolved problems to display.         ASSESSMENT:  Exertional dyspnea and hypoxemia  COPD with emphysema  Pulmonary hypertension  ED with history of CABG  Former smoker: Quit 2022  Anemia  Mild hyponatremia  Suspect THONY      PLAN:  Cardiac catheterization results reviewed.  Exercise oximetry to evaluate for oxygen need at discharge.  Follow-up with Dr Thakkar in our office within the next couple weeks.  He sees him in our office regularly.  Further workup of exertional dyspnea from pulmonary standpoint will be done as an outpatient.  No objection to discharge.      Latrell Barrera MD  Pulmonary and Critical Care Medicine  [facility], [facility]    Addendum:  Meets criteria for exertional oxygen in setting up with 2 L/min with exertion and as needed.    Electronically signed by Latrell Barrera MD, 08/23/24, 11:17 AM EDT.

## 2024-08-23 NOTE — CASE MANAGEMENT/SOCIAL WORK
Continued Stay Note  [facility]     Patient Name: Osman Aparicio  MRN: [id number]  Today's Date: 8/23/2024    Admit Date: 8/20/2024    Plan: Home w/ [place] for oxygen on exertion   Discharge Plan       Row Name 08/23/24 1528       Plan    Plan Home w/ [place] for oxygen on exertion    Plan Comments S/W patient at bedside earlier and he chose [place] as his oxygen provider.  Referral called to Alan and portable tank has been delivered to patient at bedside.............Janna MESA/SAMMY      Row Name 08/23/24 1333       Plan    Plan Home w/ [place] providing oxygen on exertion (eval being worked on right now).                   Discharge Codes    No documentation.                 Expected Discharge Date and Time       Expected Discharge Date Expected Discharge Time    Aug 23, 2024               Janna Dutta RN

## 2024-08-23 NOTE — CONSULTS
CONSULT NOTE    INTERNAL MEDICINE   [facility]       Patient Identification:  Name: Osman Aparicio  Age: 69 y.o.  Sex: male  :  1955  MRN: [id number]             Date of Consultation:  24          Primary Care Physician: Desire Hughes APRN ([place])                               Requesting Physician: dr cruz  Reason for Consultation:assuming care    Chief Complaint:  69 year old gentleman presented to the emergency room with shortness of breath worse with exertion; he has a history of cad and cabg as well as copd; symptoms started two days ago; he has had a cough; no chest pain; he has been seen by cardiology and pulmonary; a cardiac cath was done earlier today; further workup for shortness of breath is anticipated    History of Present Illness:   As above      Past Medical History:  Past Medical History:   Diagnosis Date    AAA (abdominal aortic aneurysm)     Abnormal glucose     Anxiety     COPD (chronic obstructive pulmonary disease)     Coronary artery disease     Encounter for special screening examination for neoplasm of prostate 10/2012    Hematuria     JUST MONITORING    History of COVID-2021    Hyperlipidemia     Hypertension     Myocardial infarction 2022    Radius fracture     RIGHT    Vitamin D deficiency disease      Past Surgical History:  Past Surgical History:   Procedure Laterality Date    ARTERIOGRAM AORTIC N/A 2023    Procedure: Zenith Fenestrated Endovascular Repair;  Surgeon: Mariusz Kumar MD;  Location: [facility] OR ;  Service: Vascular;  Laterality: N/A;    CARDIAC CATHETERIZATION N/A 2022    Procedure: Left Heart Cath;  Surgeon: Neto Paige MD;  Location: [facility] CATH INVASIVE LOCATION;  Service: Cardiology;  Laterality: N/A;    CARDIAC CATHETERIZATION N/A 2022    Procedure: Coronary angiography;  Surgeon: Neto Paige MD;  Location: [facility] CATH INVASIVE LOCATION;  Service: Cardiology;   Laterality: N/A;    CARDIAC CATHETERIZATION N/A 03/29/2022    Procedure: Left ventriculography;  Surgeon: Neto Paige MD;  Location: [facility] CATH INVASIVE LOCATION;  Service: Cardiology;  Laterality: N/A;    COLONOSCOPY N/A 12/07/2020    Procedure: COLONOSCOPY INTO CECUM WITH HOT SNARE POLYPECTOMIES, COLD BX POLYPECTOMIES, RESOLUTION CLIPS X;  Surgeon: Regi Mercado MD;  Location: [facility] ENDOSCOPY;  Service: General;  Laterality: N/A;  PRE:  POSITIVE COLOGUARD  POST:  POLYPS    CORONARY ARTERY BYPASS GRAFT N/A 04/01/2022    Procedure: STERNOTOMY, CORONARY ARTERY BYPASS UTILIZINGTHE RT SAPHENOUS VEIN WITH LEFT INTERNAL MAMMARY ARTERY GRAFT X3 OFF PUMP, PRP;  Surgeon: Colten Zamora MD;  Location: [facility] CVOR;  Service: Cardiothoracic;  Laterality: N/A;    ORIF WRIST FRACTURE Right 08/26/2022    Procedure: RIGHT DISTAL RADIUS FRACTURE OPEN REDUCTION INTERNAL FIXATION;  Surgeon: Bubba Mello MD;  Location: [facility] OR [facility];  Service: Orthopedics;  Laterality: Right;    TRANSESOPHAGEAL ECHOCARDIOGRAM (MARTHA) N/A 04/01/2022    Procedure: TRANSESOPHAGEAL ECHOCARDIOGRAM WITH ANESTHESIA;  Surgeon: Colten Zamora MD;  Location: [facility];  Service: Cardiothoracic;  Laterality: N/A;      Home Meds:  Medications Prior to Admission   Medication Sig Dispense Refill Last Dose    albuterol sulfate HFA (Proventil HFA) 108 (90 Base) MCG/ACT inhaler Inhale 2 puffs Every 4 (Four) Hours As Needed for Wheezing. 18 g 0 8/20/2024 at 1300    amLODIPine (NORVASC) 10 MG tablet Take 1 tablet by mouth Daily. (Patient taking differently: Take 1 tablet by mouth Every Night.) 90 tablet 1 8/19/2024 at 2300    Ascorbic Acid (VITAMIN C PO) Take 1 tablet by mouth Daily.   8/20/2024 at 1100    aspirin 81 MG EC tablet Take 1 tablet by mouth Daily. (Patient taking differently: Take 1 tablet by mouth Every Night.) 30 tablet 3 8/19/2024 at 2300    buPROPion XL (Wellbutrin XL) 150 MG 24 hr tablet Take 1 tablet by mouth  Daily. (Patient taking differently: Take 1 tablet by mouth Every Night.) 90 tablet 1 8/19/2024 at 2300    famotidine (PEPCID) 20 MG tablet Take 1 tablet by mouth Daily.   8/19/2024 at 2000    Ferrous Sulfate (Iron) 28 MG tablet Take 1 tablet by mouth Daily.   8/20/2024 at 1100    FLUoxetine (PROzac) 40 MG capsule Take 2 capsules by mouth Daily. 180 capsule 1 8/20/2024 at 1100    folic acid (FOLVITE) 1 MG tablet Take 1 tablet by mouth Daily. 30 tablet 3 8/20/2024 at 1100    losartan (COZAAR) 100 MG tablet TAKE 1 TABLET BY MOUTH EVERY DAY 90 tablet 3 8/20/2024 at 1100    metoprolol succinate XL (TOPROL-XL) 100 MG 24 hr tablet Take 1 tablet by mouth every night at bedtime. 90 tablet 3 8/19/2024 at 2300    multivitamin with minerals tablet tablet Take 1 tablet by mouth Daily. 30 tablet 3 8/20/2024 at 1100    rosuvastatin (CRESTOR) 20 MG tablet TAKE 1 TABLET BY MOUTH EVERY NIGHT 90 tablet 3 8/19/2024 at 2300    thiamine (VITAMIN B-1) 100 MG tablet  tablet Take 1 tablet by mouth Daily. 30 tablet 3 8/20/2024 at 1100    Trelegy Ellipta 100-62.5-25 MCG/INH inhaler Inhale 1 puff Daily.   8/20/2024 at 1100    nitroglycerin (NITROSTAT) 0.4 MG SL tablet Place 1 tablet under the tongue Every 5 (Five) Minutes As Needed for Chest Pain (Only if SBP Greater Than 100). Take no more than 3 doses in 15 minutes. 30 tablet 3 Unknown     Current Meds:     Current Facility-Administered Medications:     acetaminophen (TYLENOL) tablet 650 mg, 650 mg, Oral, Q4H PRN, Jarrod Jasso MD    albuterol (PROVENTIL) nebulizer solution 0.083% 2.5 mg/3mL, 2.5 mg, Nebulization, Q6H PRN, Jarrod Jasso MD    amLODIPine (NORVASC) tablet 10 mg, 10 mg, Oral, Nightly, Jarrod Jasso MD, 10 mg at 08/21/24 2051    aspirin EC tablet 81 mg, 81 mg, Oral, Nightly, Jarrod Jasso MD, 81 mg at 08/21/24 2051    sennosides-docusate (PERICOLACE) 8.6-50 MG per tablet 2 tablet, 2 tablet, Oral, BID **AND** polyethylene glycol (MIRALAX) packet 17 g, 17 g, Oral, Daily PRN **AND**  bisacodyl (DULCOLAX) EC tablet 5 mg, 5 mg, Oral, Daily PRN **AND** bisacodyl (DULCOLAX) suppository 10 mg, 10 mg, Rectal, Daily PRN, Jarrod Jasso MD    budesonide-formoterol (SYMBICORT) 160-4.5 MCG/ACT inhaler 2 puff, 2 puff, Inhalation, BID - RT, 2 puff at 08/22/24 0639 **AND** tiotropium (SPIRIVA RESPIMAT) 2.5 mcg/act aerosol solution inhaler, 2 puff, Inhalation, Daily - RT, Jarrod Jasso MD, 2 puff at 08/22/24 0640    buPROPion XL (WELLBUTRIN XL) 24 hr tablet 150 mg, 150 mg, Oral, Nightly, Jarrod Jasso MD, 150 mg at 08/21/24 2051    Calcium Replacement - Follow Nurse / BPA Driven Protocol, , Does not apply, PRN, Jarrod Jasso MD    famotidine (PEPCID) tablet 20 mg, 20 mg, Oral, Daily, Jarrod Jasso MD, 20 mg at 08/22/24 0915    FLUoxetine (PROzac) capsule 80 mg, 80 mg, Oral, Daily, Jarrod Jasso MD, 80 mg at 08/22/24 0915    ipratropium-albuterol (DUO-NEB) nebulizer solution 3 mL, 3 mL, Nebulization, 4x Daily - RT, Jarrod Jasso MD, 3 mL at 08/22/24 1420    losartan (COZAAR) tablet 100 mg, 100 mg, Oral, Daily, Jarrod Jasso MD, 100 mg at 08/22/24 0915    Magnesium Standard Dose Replacement - Follow Nurse / BPA Driven Protocol, , Does not apply, PRN, Jarrod Jasso MD    metoprolol succinate XL (TOPROL-XL) 24 hr tablet 100 mg, 100 mg, Oral, Q24H, Jarrod Jasso MD, 100 mg at 08/22/24 0915    nitroglycerin (NITROSTAT) SL tablet 0.4 mg, 0.4 mg, Sublingual, Q5 Min PRN, Jarrod Jasso MD    Phosphorus Replacement - Follow Nurse / BPA Driven Protocol, , Does not apply, PRN, Jasso, Jarrod, MD    Potassium Replacement - Follow Nurse / BPA Driven Protocol, , Does not apply, Romero RAGLAND Ivan, MD    rosuvastatin (CRESTOR) tablet 20 mg, 20 mg, Oral, Nightly, Jarrod Jasso MD, 20 mg at 08/21/24 2051    sodium chloride 0.9 % flush 10 mL, 10 mL, Intravenous, PRN, Jarrod Jasso MD    sodium chloride 0.9 % flush 10 mL, 10 mL, Intravenous, Q12H, Jarrod Jasso MD, 10 mL at 08/22/24 0915    sodium chloride 0.9 % flush 10 mL, 10 mL, Intravenous, PRN, Jarrod Jasso,  "MD    sodium chloride 0.9 % infusion 40 mL, 40 mL, Intravenous, PRN, Jarrod Jasso MD  Allergies:  No Known Allergies  Social History:   Social History     Socioeconomic History    Marital status:      Spouse name: Suzanne   Tobacco Use    Smoking status: Former     Current packs/day: 0.00     Average packs/day: 1 pack/day for 45.0 years (45.0 ttl pk-yrs)     Types: Cigarettes     Start date: 3/26/1977     Quit date: 3/26/2022     Years since quittin.4     Passive exposure: Past    Smokeless tobacco: Never    Tobacco comments:     caffeine - 3 cups coffee daily, tea or soda occas.   Vaping Use    Vaping status: Never Used   Substance and Sexual Activity    Alcohol use: Yes     Alcohol/week: 2.0 standard drinks of alcohol     Types: 2 Cans of beer per week    Drug use: No    Sexual activity: Defer     Family History:  Family History   Problem Relation Age of Onset    Lung cancer Brother     Liver cancer Brother     Colon polyps Brother     Malig Hyperthermia Neg Hx           Review of Systems  See history of present illness and past medical history.  Patient denies headache, dizziness, syncope, falls, trauma, change in vision, change in hearing, change in taste, changes in weight, changes in appetite, focal weakness, numbness, or paresthesia.  Patient denies chest pain, palpitations  orthopnea, PND sinus pressure, rhinorrhea, epistaxis, hemoptysis, nausea, vomiting,hematemesis, diarrhea, constipation or hematochezia. Denies cold or heat intolerance, polydipsia, polyuria, polyphagia. Denies hematuria, pyuria, dysuria, hesitancy, frequency or urgency. Denies consumption of raw and under cooked meats foods or change in water source.  Denies fever, chills, sweats, night sweats.  Denies missing any routine medications.       Vitals:   /65 (BP Location: Left arm, Patient Position: Lying)   Pulse 54   Temp 97.3 °F (36.3 °C) (Oral)   Resp 16   Ht 180.3 cm (71\")   Wt 83.9 kg (185 lb)   SpO2 97%   BMI 25.80 " kg/m²   I/O:   Intake/Output Summary (Last 24 hours) at 8/22/2024 2139  Last data filed at 8/22/2024 2020  Gross per 24 hour   Intake 290 ml   Output --   Net 290 ml     Exam:  General Appearance:    Alert, cooperative, no distress, appears stated age   Head:    Normocephalic, without obvious abnormality, atraumatic   Eyes:    PERRL, conjunctivae/corneas clear, EOM's intact, both eyes   Ears:    Normal external ear canals, both ears   Nose:   Nares normal, septum midline, mucosa normal, no drainage    or sinus tenderness   Throat:   Lips, tongue, gums normal; oral mucosa pink and moist   Neck:   Supple, symmetrical, trachea midline, no adenopathy;     thyroid:  no enlargement/tenderness/nodules; no carotid    bruit or JVD   Back:     Symmetric, no curvature, ROM normal, no CVA tenderness   Lungs:     Decreased breath sounds bilaterally, respirations unlabored   Chest Wall:    No tenderness or deformity    Heart:    Regular rate and rhythm, S1 and S2 normal, no murmur, rub   or gallop   Abdomen:     Soft, nontender, bowel sounds active all four quadrants,     no masses, no hepatomegaly, no splenomegaly   Extremities:   Extremities normal, atraumatic, no cyanosis or edema                Data Review:  Labs in chart were reviewed.  WBC   Date Value Ref Range Status   08/22/2024 8.99 3.40 - 10.80 10*3/mm3 Final     Hemoglobin   Date Value Ref Range Status   08/22/2024 11.4 (L) 13.0 - 17.7 g/dL Final     Hematocrit   Date Value Ref Range Status   08/22/2024 34.3 (L) 37.5 - 51.0 % Final     Platelets   Date Value Ref Range Status   08/22/2024 321 140 - 450 10*3/mm3 Final     Sodium   Date Value Ref Range Status   08/22/2024 136 136 - 145 mmol/L Final     Potassium   Date Value Ref Range Status   08/22/2024 4.3 3.5 - 5.2 mmol/L Final     Chloride   Date Value Ref Range Status   08/22/2024 101 98 - 107 mmol/L Final     CO2   Date Value Ref Range Status   08/22/2024 22.0 22.0 - 29.0 mmol/L Final     BUN   Date Value Ref Range  Status   08/22/2024 19 8 - 23 mg/dL Final     Creatinine   Date Value Ref Range Status   08/22/2024 1.36 (H) 0.76 - 1.27 mg/dL Final     Glucose   Date Value Ref Range Status   08/22/2024 107 (H) 65 - 99 mg/dL Final     Calcium   Date Value Ref Range Status   08/22/2024 9.4 8.6 - 10.5 mg/dL Final     Magnesium   Date Value Ref Range Status   08/21/2024 2.2 1.6 - 2.4 mg/dL Final     AST (SGOT)   Date Value Ref Range Status   08/20/2024 28 1 - 40 U/L Final     ALT (SGPT)   Date Value Ref Range Status   08/20/2024 15 1 - 41 U/L Final     Alkaline Phosphatase   Date Value Ref Range Status   08/20/2024 99 39 - 117 U/L Final               Imaging Results (Last 7 Days)       Procedure Component Value Units Date/Time    CT Angiogram Chest [[id number]] Collected: 08/20/24 2302     Updated: 08/20/24 2313    Narrative:      CT ANGIOGRAM OF THE CHEST     HISTORY: Exertional dyspnea     COMPARISON: September 21, 2023     TECHNIQUE: Axial CT imaging was obtained through the thorax. IV contrast  was administered. Three-D reformatted images were obtained.     FINDINGS:  No acute pulmonary thromboembolus is seen. There is dilatation of the  proximal descending aorta, measuring up to 3.1 cm. The aorta is somewhat  tortuous in its course. No obvious dissection is seen. Distal descending  aorta measures 2.8 cm. The patient is status post median sternotomy with  coronary artery bypass grafting. There is some dilatation of the main  pulmonary artery, which can be seen in setting of pulmonary arterial  hypertension. There are coronary artery calcifications. The thyroid  gland and trachea appear within normal limits. There is a small hiatal  hernia. Mediastinal lymph nodes do not appear pathologically enlarged.  There are advanced background emphysematous changes. There are some  reticulonodular infiltrates which are noted within the right middle  lobe. These may be infectious or inflammatory, but short-term CT  follow-up in 3 months is  recommended. Chronic scarring is also noted at  the right lung base. There is an additional nodule which is noted within  the right middle lobe, adjacent to the fissure. It was present on the  exam from September 2023, and is favored to be benign. Consider CT  follow-up in September 2025 to document a full 2 years of stability.  There is some further scarring noted within the lingula. Images through  the upper abdomen demonstrate right renal atrophy. There are changes of  aortic stent grafting. Visualized aorta measures up to 3.5 cm. This  appears similar to the exam from March 2023. There is cholelithiasis.  There is reflux of contrast material into the hepatic veins and inferior  vena cava, which can be seen in setting of right-sided heart failure. No  acute osseous abnormalities are seen.       Impression:         1. No acute pulmonary thromboembolus.  2. Nonspecific reticulonodular infiltrates seen within the right middle  lobe. Short-term CT follow-up in 3 months is recommended.  3. There is reflux of contrast material into the hepatic veins, which  can be seen in the setting of right-sided heart failure.     Radiation dose reduction techniques were utilized, including automated  exposure control and exposure modulation based on body size.        This report was finalized on 8/20/2024 11:10 PM by Dr. Yasmin Ramos M.D on Workstation: [id number]       XR Chest 1 View [[id number]] Collected: 08/20/24 1544     Updated: 08/20/24 1550    Narrative:      XR CHEST 1 VW-     INDICATION: Shortness of breath     COMPARISON: CT chest 9/21/2023 and chest radiographs dating back to  3/26/2022       Impression:      Left lower lobe linear atelectasis/scarring. No new focal  consolidation. No pleural effusion or pneumothorax. Normal size cardiac  silhouette with postsurgical changes of CABG. Nondisplaced median  sternotomy wires.       This report was finalized on 8/20/2024 3:47 PM by Dr. Ehsan Ventura M.D on  Workstation: [id number]             Past Medical History:   Diagnosis Date    AAA (abdominal aortic aneurysm)     Abnormal glucose     Anxiety     COPD (chronic obstructive pulmonary disease)     Coronary artery disease     Encounter for special screening examination for neoplasm of prostate 10/2012    Hematuria     JUST MONITORING    History of COVID-19     2021    Hyperlipidemia     Hypertension     Myocardial infarction 03/2022    Radius fracture     RIGHT    Vitamin D deficiency disease        Assessment:  Active Hospital Problems    Diagnosis  POA    **Exertional dyspnea [R06.09]  Yes    Hypertension [I10]  Yes    Coronary artery disease involving native coronary artery of native heart without angina pectoris [I25.10]  Unknown    Abnormal findings on diagnostic imaging of heart and coronary circulation [R93.1]  Unknown    Chronic diastolic congestive heart failure [I50.32]  Unknown    Primary hypertension [I10]  Unknown      Resolved Hospital Problems   No resolved problems to display.   Copd  Acute hypoxic respiratory failure  Chronic diastolic chf  Pulmonary hypertension  Pad      Plan:  Will continue to monitor  Ask pulmonary to see him again  Left heart cath with no significant disease  Trend labs  Dw patient and ed provider  Virgie Julio MD   8/22/2024  21:39 EDT

## 2024-08-26 ENCOUNTER — TRANSITIONAL CARE MANAGEMENT TELEPHONE ENCOUNTER (OUTPATIENT)
Dept: CALL CENTER | Facility: HOSPITAL | Age: 69
End: 2024-08-26
Payer: MEDICARE

## 2024-08-26 NOTE — OUTREACH NOTE
Call Center TCM Note      Flowsheet Row Responses   [facility] patient discharged from? [place]   Does the patient have one of the following disease processes/diagnoses(primary or secondary)? Other   TCM attempt successful? No   Unsuccessful attempts Attempt 1            Maria G Nieto RN    8/26/2024, 12:07 EDT

## 2024-08-26 NOTE — OUTREACH NOTE
Call Center TCM Note      Flowsheet Row Responses   [facility] patient discharged from? [facility]   Does the patient have one of the following disease processes/diagnoses(primary or secondary)? Other   TCM attempt successful? Yes   Call start time 1533   Call end time 1540   Discharge diagnosis Exertional dyspnea   Meds reviewed with patient/caregiver? Yes   Is the patient having any side effects they believe may be caused by any medication additions or changes? No   Does the patient have all medications ordered at discharge? N/A   Is the patient taking all medications as directed (includes completed medication regime)? Yes   Does the patient have an appointment with their PCP within 7-14 days of discharge? No   Nursing Interventions Patient declined scheduling/rescheduling appointment at this time  [will schedule after he sees his other Dr's]   Has home health visited the patient within 72 hours of discharge? N/A   What DME was ordered? [facility] will deliver O2 tank to room   Has all DME been delivered? Yes   DME comments wears O2 at night, sats in mid 90's on RA   Psychosocial issues? No   Did the patient receive a copy of their discharge instructions? Yes   Nursing interventions Reviewed instructions with patient   What is the patient's perception of their health status since discharge? Improving  [sob with exertion]   Is the patient/caregiver able to teach back the hierarchy of who to call/visit for symptoms/problems? PCP, Specialist, Home health nurse, Urgent Care, ED, 911 Yes   TCM call completed? Yes   Call end time 1540   Would this patient benefit from a Referral to Amb Social Work? No   Is the patient interested in additional calls from an ambulatory ? No            Maria G Nieto RN    8/26/2024, 15:41 EDT

## 2024-08-30 ENCOUNTER — TRANSCRIBE ORDERS (OUTPATIENT)
Dept: ADMINISTRATIVE | Facility: HOSPITAL | Age: 69
End: 2024-08-30
Payer: MEDICARE

## 2024-08-30 DIAGNOSIS — Z87.891 SMOKING HISTORY: ICD-10-CM

## 2024-08-30 DIAGNOSIS — R91.1 LUNG NODULE: Primary | ICD-10-CM

## 2024-08-30 DIAGNOSIS — R91.8 PULMONARY INFILTRATE: ICD-10-CM

## 2024-09-05 LAB
HCT VFR BLDA CALC: 34 % (ref 38–51)
HCT VFR BLDA CALC: 34 % (ref 38–51)
HCT VFR BLDA CALC: 36 % (ref 38–51)
HGB BLDA-MCNC: 11.6 G/DL (ref 12–17)
HGB BLDA-MCNC: 11.6 G/DL (ref 12–17)
HGB BLDA-MCNC: 12.2 G/DL (ref 12–17)
SAO2 % BLDA: 46 % (ref 95–98)
SAO2 % BLDA: 50 % (ref 95–98)
SAO2 % BLDA: 91 % (ref 95–98)

## 2024-09-18 ENCOUNTER — HOSPITAL ENCOUNTER (OUTPATIENT)
Dept: CT IMAGING | Facility: HOSPITAL | Age: 69
Discharge: HOME | End: 2024-09-18
Admitting: INTERNAL MEDICINE
Payer: MEDICARE

## 2024-09-18 DIAGNOSIS — R91.1 LUNG NODULE: ICD-10-CM

## 2024-09-18 DIAGNOSIS — Z87.891 SMOKING HISTORY: ICD-10-CM

## 2024-09-18 PROCEDURE — 71250 CT THORAX DX C-: CPT

## 2024-09-26 ENCOUNTER — TRANSCRIBE ORDERS (OUTPATIENT)
Dept: SLEEP MEDICINE | Facility: HOSPITAL | Age: 69
End: 2024-09-26
Payer: MEDICARE

## 2024-09-26 DIAGNOSIS — R06.81 WITNESSED EPISODE OF APNEA: ICD-10-CM

## 2024-09-26 DIAGNOSIS — G47.34 NOCTURNAL HYPOXIA: ICD-10-CM

## 2024-09-26 DIAGNOSIS — R06.83 SNORING: Primary | ICD-10-CM

## 2024-09-30 ENCOUNTER — HOSPITAL ENCOUNTER (OUTPATIENT)
Dept: SLEEP MEDICINE | Facility: HOSPITAL | Age: 69
Discharge: HOME | End: 2024-09-30
Admitting: INTERNAL MEDICINE
Payer: MEDICARE

## 2024-09-30 DIAGNOSIS — G47.34 NOCTURNAL HYPOXIA: ICD-10-CM

## 2024-09-30 DIAGNOSIS — R06.83 SNORING: ICD-10-CM

## 2024-09-30 DIAGNOSIS — R06.81 WITNESSED EPISODE OF APNEA: ICD-10-CM

## 2024-09-30 PROCEDURE — 95810 POLYSOM 6/> YRS 4/> PARAM: CPT

## 2024-10-08 ENCOUNTER — TELEPHONE (OUTPATIENT)
Dept: SLEEP MEDICINE | Facility: HOSPITAL | Age: 69
End: 2024-10-08
Payer: MEDICARE

## 2024-10-12 DIAGNOSIS — I10 ESSENTIAL HYPERTENSION: ICD-10-CM

## 2024-10-15 NOTE — TELEPHONE ENCOUNTER
Caller: Suzanne Aparicio    Relationship: Emergency Contact    Best call back number: [phone]*    Requested Prescriptions:   Requested Prescriptions     Pending Prescriptions Disp Refills    FLUoxetine (PROzac) 40 MG capsule 180 capsule 1     Sig: Take 2 capsules by mouth Daily.        Pharmacy where request should be sent: [facility] #[id number] - [place], [place] - [address] AT [facility] [phone]  - [phone] FX     Last office visit with prescribing clinician: 3/11/2024   Last telemedicine visit with prescribing clinician: Visit date not found   Next office visit with prescribing clinician: Visit date not found     Additional details provided by patient: PATIENT HAS 3 DAYS OF MEDICATION REMAINING    Does the patient have less than a 3 day supply:  [] Yes  [x] No    Would you like a call back once the refill request has been completed: [] Yes [x] No    If the office needs to give you a call back, can they leave a voicemail: [] Yes [x] No    Renzo Leonardo   10/15/24 10:52 EDT

## 2024-10-16 RX ORDER — AMLODIPINE BESYLATE 10 MG/1
10 TABLET ORAL DAILY
Qty: 90 TABLET | Refills: 1 | OUTPATIENT
Start: 2024-10-16

## 2024-10-16 RX ORDER — FLUOXETINE 40 MG/1
80 CAPSULE ORAL DAILY
Qty: 180 CAPSULE | Refills: 1 | OUTPATIENT
Start: 2024-10-16

## 2024-10-16 NOTE — TELEPHONE ENCOUNTER
Rx Refill Note  Requested Prescriptions     Pending Prescriptions Disp Refills    FLUoxetine (PROzac) 40 MG capsule 180 capsule 1     Sig: Take 2 capsules by mouth Daily.      Last office visit with prescribing clinician: 3/11/2024   Last telemedicine visit with prescribing clinician: Visit date not found   Next office visit with prescribing clinician: 10/17/2024                         Would you like a call back once the refill request has been completed: [] Yes [] No    If the office needs to give you a call back, can they leave a voicemail: [] Yes [] No    Katelyn Combs MA  10/16/24, 13:24 EDT

## 2024-10-17 ENCOUNTER — OFFICE VISIT (OUTPATIENT)
Dept: FAMILY MEDICINE CLINIC | Facility: CLINIC | Age: 69
End: 2024-10-17
Payer: MEDICARE

## 2024-10-17 VITALS
WEIGHT: 186 LBS | RESPIRATION RATE: 16 BRPM | SYSTOLIC BLOOD PRESSURE: 150 MMHG | HEART RATE: 52 BPM | TEMPERATURE: 97.4 F | BODY MASS INDEX: 26.04 KG/M2 | DIASTOLIC BLOOD PRESSURE: 84 MMHG | OXYGEN SATURATION: 96 % | HEIGHT: 71 IN

## 2024-10-17 DIAGNOSIS — F32.A ANXIETY AND DEPRESSION: Primary | ICD-10-CM

## 2024-10-17 DIAGNOSIS — J43.1 PANLOBULAR EMPHYSEMA: ICD-10-CM

## 2024-10-17 DIAGNOSIS — F41.9 ANXIETY AND DEPRESSION: Primary | ICD-10-CM

## 2024-10-17 DIAGNOSIS — I25.10 CORONARY ARTERY DISEASE INVOLVING NATIVE CORONARY ARTERY OF NATIVE HEART WITHOUT ANGINA PECTORIS: ICD-10-CM

## 2024-10-17 DIAGNOSIS — I10 ESSENTIAL HYPERTENSION: ICD-10-CM

## 2024-10-17 PROCEDURE — 1159F MED LIST DOCD IN RCRD: CPT | Performed by: NURSE PRACTITIONER

## 2024-10-17 PROCEDURE — 1126F AMNT PAIN NOTED NONE PRSNT: CPT | Performed by: NURSE PRACTITIONER

## 2024-10-17 PROCEDURE — 1160F RVW MEDS BY RX/DR IN RCRD: CPT | Performed by: NURSE PRACTITIONER

## 2024-10-17 PROCEDURE — 3077F SYST BP >= 140 MM HG: CPT | Performed by: NURSE PRACTITIONER

## 2024-10-17 PROCEDURE — 3079F DIAST BP 80-89 MM HG: CPT | Performed by: NURSE PRACTITIONER

## 2024-10-17 PROCEDURE — 99214 OFFICE O/P EST MOD 30 MIN: CPT | Performed by: NURSE PRACTITIONER

## 2024-10-17 RX ORDER — FLUOXETINE 40 MG/1
80 CAPSULE ORAL DAILY
Qty: 180 CAPSULE | Refills: 0 | Status: SHIPPED | OUTPATIENT
Start: 2024-10-17 | End: 2024-10-17 | Stop reason: SDUPTHER

## 2024-10-17 RX ORDER — FLUOXETINE 40 MG/1
80 CAPSULE ORAL DAILY
Qty: 180 CAPSULE | Refills: 1 | Status: SHIPPED | OUTPATIENT
Start: 2024-10-17

## 2024-10-17 RX ORDER — AMLODIPINE BESYLATE 10 MG/1
10 TABLET ORAL DAILY
Qty: 90 TABLET | Refills: 1 | Status: SHIPPED | OUTPATIENT
Start: 2024-10-17

## 2024-10-17 RX ORDER — BUPROPION HYDROCHLORIDE 300 MG/1
300 TABLET ORAL DAILY
Qty: 90 TABLET | Refills: 1 | Status: SHIPPED | OUTPATIENT
Start: 2024-10-17

## 2024-10-17 NOTE — PROGRESS NOTES
"Subjective   Osman Aparicio is a 69 y.o. male.     History of Present Illness   Chief Complaint     Anxiety  Depression (Thinks he needs more for depression, anxiety seems to be doing ok)  Hypertension       Since the last visit, he has overall felt depressed.  He has CAD and remains under care of their Cardiologist for mangement, CAD and remains on medication regimen, and HTN and remains on same medication regimen.  He has emphysema and sleep apnea which is managed by pulmonologist.  He takes fluoxetine and wellbutrin for anxiety and depression.  He feels that anxiety is doing well but does not have much desire to do anything .  he has been compliant with current medications have reviewed them.  The patient denies medication side effects.  Will refill medications. /84   Pulse 52   Temp 97.4 °F (36.3 °C)   Resp 16   Ht 180.3 cm (71\")   Wt 84.4 kg (186 lb)   SpO2 96%   BMI 25.94 kg/m² .        Results for orders placed or performed during the hospital encounter of 08/20/24   ECG 12 Lead ED Triage Standing Order; SOA    Collection Time: 08/20/24  3:16 PM   Result Value Ref Range    QT Interval 468 ms    QTC Interval 427 ms   Comprehensive Metabolic Panel    Collection Time: 08/20/24  3:28 PM    Specimen: Blood   Result Value Ref Range    Glucose 113 (H) 65 - 99 mg/dL    BUN 16 8 - 23 mg/dL    Creatinine 1.28 (H) 0.76 - 1.27 mg/dL    Sodium 133 (L) 136 - 145 mmol/L    Potassium 4.0 3.5 - 5.2 mmol/L    Chloride 96 (L) 98 - 107 mmol/L    CO2 23.6 22.0 - 29.0 mmol/L    Calcium 10.2 8.6 - 10.5 mg/dL    Total Protein 8.7 (H) 6.0 - 8.5 g/dL    Albumin 4.5 3.5 - 5.2 g/dL    ALT (SGPT) 15 1 - 41 U/L    AST (SGOT) 28 1 - 40 U/L    Alkaline Phosphatase 99 39 - 117 U/L    Total Bilirubin 0.6 0.0 - 1.2 mg/dL    Globulin 4.2 gm/dL    A/G Ratio 1.1 g/dL    BUN/Creatinine Ratio 12.5 7.0 - 25.0    Anion Gap 13.4 5.0 - 15.0 mmol/L    eGFR 60.6 >60.0 mL/min/1.73   BNP    Collection Time: 08/20/24  3:28 PM    Specimen: Blood "   Result Value Ref Range    proBNP 1,816.0 (H) 0.0 - 900.0 pg/mL   Single High Sensitivity Troponin T    Collection Time: 08/20/24  3:28 PM    Specimen: Blood   Result Value Ref Range    HS Troponin T 15 <22 ng/L   CBC Auto Differential    Collection Time: 08/20/24  3:28 PM    Specimen: Blood   Result Value Ref Range    WBC 9.77 3.40 - 10.80 10*3/mm3    RBC 4.14 4.14 - 5.80 10*6/mm3    Hemoglobin 12.5 (L) 13.0 - 17.7 g/dL    Hematocrit 38.0 37.5 - 51.0 %    MCV 91.8 79.0 - 97.0 fL    MCH 30.2 26.6 - 33.0 pg    MCHC 32.9 31.5 - 35.7 g/dL    RDW 13.6 12.3 - 15.4 %    RDW-SD 45.4 37.0 - 54.0 fl    MPV 9.4 6.0 - 12.0 fL    Platelets 371 140 - 450 10*3/mm3   Manual Differential    Collection Time: 08/20/24  3:28 PM    Specimen: Blood   Result Value Ref Range    Neutrophil % 45.8 42.7 - 76.0 %    Lymphocyte % 38.5 19.6 - 45.3 %    Monocyte % 14.6 (H) 5.0 - 12.0 %    Eosinophil % 0.0 (L) 0.3 - 6.2 %    Basophil % 1.0 0.0 - 1.5 %    Neutrophils Absolute 4.47 1.70 - 7.00 10*3/mm3    Lymphocytes Absolute 3.76 (H) 0.70 - 3.10 10*3/mm3    Monocytes Absolute 1.43 (H) 0.10 - 0.90 10*3/mm3    Eosinophils Absolute 0.00 0.00 - 0.40 10*3/mm3    Basophils Absolute 0.10 0.00 - 0.20 10*3/mm3    RBC Morphology Normal Normal    WBC Morphology Normal Normal    Platelet Morphology Normal Normal   D-dimer, Quantitative    Collection Time: 08/20/24  3:28 PM    Specimen: Blood   Result Value Ref Range    D-Dimer, Quantitative 1.93 (H) 0.00 - 0.69 MCGFEU/mL   Green Top (Gel)    Collection Time: 08/20/24  3:28 PM   Result Value Ref Range    Extra Tube Hold for add-ons.    Lavender Top    Collection Time: 08/20/24  3:28 PM   Result Value Ref Range    Extra Tube hold for add-on    Gold Top - SST    Collection Time: 08/20/24  3:28 PM   Result Value Ref Range    Extra Tube Hold for add-ons.    Light Blue Top    Collection Time: 08/20/24  3:28 PM   Result Value Ref Range    Extra Tube Hold for add-ons.    Single High Sensitivity Troponin T     Collection Time: 08/20/24  5:26 PM    Specimen: Arm, Left; Blood   Result Value Ref Range    HS Troponin T 14 <22 ng/L   Lipid Panel    Collection Time: 08/20/24  5:26 PM    Specimen: Arm, Left; Blood   Result Value Ref Range    Total Cholesterol 133 0 - 200 mg/dL    Triglycerides 102 0 - 150 mg/dL    HDL Cholesterol 43 40 - 60 mg/dL    LDL Cholesterol  71 0 - 100 mg/dL    VLDL Cholesterol 19 5 - 40 mg/dL    LDL/HDL Ratio 1.62    Respiratory Panel PCR w/COVID-19(SARS-CoV-2) [facility]/MIGUEL/A[facility]IN/[facility]OI In-House, NP Swab in UTM/VTM, 2 HR TAT - Swab, Nasopharynx    Collection Time: 08/20/24  6:42 PM    Specimen: Nasopharynx; Swab   Result Value Ref Range    ADENOVIRUS, PCR Not Detected Not Detected    Coronavirus 229E Not Detected Not Detected    Coronavirus HKU1 Not Detected Not Detected    Coronavirus NL63 Not Detected Not Detected    Coronavirus OC43 Not Detected Not Detected    COVID19 Not Detected Not Detected - Ref. Range    Human Metapneumovirus Not Detected Not Detected    Human Rhinovirus/Enterovirus Not Detected Not Detected    Influenza A PCR Not Detected Not Detected    Influenza B PCR Not Detected Not Detected    Parainfluenza Virus 1 Not Detected Not Detected    Parainfluenza Virus 2 Not Detected Not Detected    Parainfluenza Virus 3 Not Detected Not Detected    Parainfluenza Virus 4 Not Detected Not Detected    RSV, PCR Not Detected Not Detected    Bordetella pertussis pcr Not Detected Not Detected    Bordetella parapertussis PCR Not Detected Not Detected    Chlamydophila pneumoniae PCR Not Detected Not Detected    Mycoplasma pneumo by PCR Not Detected Not Detected   Basic Metabolic Panel    Collection Time: 08/21/24  4:27 AM    Specimen: Blood   Result Value Ref Range    Glucose 104 (H) 65 - 99 mg/dL    BUN 18 8 - 23 mg/dL    Creatinine 1.22 0.76 - 1.27 mg/dL    Sodium 132 (L) 136 - 145 mmol/L    Potassium 3.8 3.5 - 5.2 mmol/L    Chloride 99 98 - 107 mmol/L    CO2 20.8 (L) 22.0 - 29.0 mmol/L    Calcium 9.4  8.6 - 10.5 mg/dL    BUN/Creatinine Ratio 14.8 7.0 - 25.0    Anion Gap 12.2 5.0 - 15.0 mmol/L    eGFR 64.2 >60.0 mL/min/1.73   CBC (No Diff)    Collection Time: 08/21/24  4:27 AM    Specimen: Blood   Result Value Ref Range    WBC 9.04 3.40 - 10.80 10*3/mm3    RBC 3.65 (L) 4.14 - 5.80 10*6/mm3    Hemoglobin 11.1 (L) 13.0 - 17.7 g/dL    Hematocrit 33.6 (L) 37.5 - 51.0 %    MCV 92.1 79.0 - 97.0 fL    MCH 30.4 26.6 - 33.0 pg    MCHC 33.0 31.5 - 35.7 g/dL    RDW 13.8 12.3 - 15.4 %    RDW-SD 46.3 37.0 - 54.0 fl    MPV 9.8 6.0 - 12.0 fL    Platelets 325 140 - 450 10*3/mm3   Magnesium    Collection Time: 08/21/24  4:27 AM    Specimen: Blood   Result Value Ref Range    Magnesium 2.2 1.6 - 2.4 mg/dL   Adult Transthoracic Echo Complete W/ Cont if Necessary Per Protocol    Collection Time: 08/21/24  9:21 AM   Result Value Ref Range    EF(MOD-bp) 61.5 %    LVIDd 5.2 cm    LVIDs 3.6 cm    IVSd 1.15 cm    LVPWd 1.05 cm    FS 31.6 %    IVS/LVPW 1.09 cm    ESV(cubed) 44.8 ml    EDV(cubed) 140.0 ml    LV mass(C)d 220.8 grams    LVOT area 3.1 cm2    LVOT diam 1.98 cm    EDV(MOD-sp2) 125.0 ml    EDV(MOD-sp4) 121.0 ml    ESV(MOD-sp2) 43.0 ml    ESV(MOD-sp4) 51.0 ml    SV(MOD-sp2) 82.0 ml    SV(MOD-sp4) 70.0 ml    EF(MOD-sp2) 65.6 %    EF(MOD-sp4) 57.9 %    MV E max jameel 82.3 cm/sec    MV A max jameel 80.9 cm/sec    MV dec time 0.25 sec    MV E/A 1.02     MV A dur 0.16 sec    LA ESV Index (BP) 33.6 ml/m2    Med Peak E' Jameel 7.1 cm/sec    Lat Peak E' Jameel 10.7 cm/sec    TR max jameel 296.6 cm/sec    Avg E/e' ratio 9.25     SV(LVOT) 77.6 ml    RV Base 3.7 cm    RV Mid 3.1 cm    RV Length 6.5 cm    TAPSE (>1.6) 2.5 cm    RV S' 12.1 cm/sec    LV V1 max 109.1 cm/sec    LV V1 max PG 4.8 mmHg    LV V1 mean PG 2.45 mmHg    LV V1 VTI 25.3 cm    Ao pk jameel 187.6 cm/sec    Ao max PG 14.1 mmHg    Ao mean PG 7.2 mmHg    Ao V2 VTI 43.4 cm    SOLEDAD(I,D) 1.79 cm2    AI P1/2t 610.4 msec    MV max PG 4.3 mmHg    MV mean PG 1.38 mmHg    MV V2 VTI 36.7 cm    MV P1/2t  54.1 msec    MVA(P1/2t) 4.1 cm2    MVA(VTI) 2.12 cm2    MV dec slope 525.0 cm/sec2    TR max PG 35.2 mmHg    RV V1 max PG 1.31 mmHg    RV V1 max 57.1 cm/sec    RV V1 VTI 13.9 cm    PA V2 max 102.7 cm/sec    PA acc time 0.16 sec    Ao root diam 4.1 cm    ACS 1.68 cm    Sinus 3.0 cm    STJ 2.7 cm    RVSP(TR) 38 mmHg    RAP systole 3 mmHg    Ascending aorta 2.9 cm   CBC (No Diff)    Collection Time: 08/22/24  3:07 AM    Specimen: Blood   Result Value Ref Range    WBC 8.99 3.40 - 10.80 10*3/mm3    RBC 3.76 (L) 4.14 - 5.80 10*6/mm3    Hemoglobin 11.4 (L) 13.0 - 17.7 g/dL    Hematocrit 34.3 (L) 37.5 - 51.0 %    MCV 91.2 79.0 - 97.0 fL    MCH 30.3 26.6 - 33.0 pg    MCHC 33.2 31.5 - 35.7 g/dL    RDW 13.8 12.3 - 15.4 %    RDW-SD 45.4 37.0 - 54.0 fl    MPV 10.1 6.0 - 12.0 fL    Platelets 321 140 - 450 10*3/mm3   Basic Metabolic Panel    Collection Time: 08/22/24  3:07 AM    Specimen: Blood   Result Value Ref Range    Glucose 107 (H) 65 - 99 mg/dL    BUN 19 8 - 23 mg/dL    Creatinine 1.36 (H) 0.76 - 1.27 mg/dL    Sodium 136 136 - 145 mmol/L    Potassium 3.4 (L) 3.5 - 5.2 mmol/L    Chloride 101 98 - 107 mmol/L    CO2 22.0 22.0 - 29.0 mmol/L    Calcium 9.4 8.6 - 10.5 mg/dL    BUN/Creatinine Ratio 14.0 7.0 - 25.0    Anion Gap 13.0 5.0 - 15.0 mmol/L    eGFR 56.3 (L) >60.0 mL/min/1.73   High Sensitivity Troponin T    Collection Time: 08/22/24  3:07 AM    Specimen: Blood   Result Value Ref Range    HS Troponin T 11 <22 ng/L   High Sensitivity Troponin T 2Hr    Collection Time: 08/22/24  3:57 PM    Specimen: Blood   Result Value Ref Range    HS Troponin T 13 <22 ng/L    Troponin T Delta 2 >=-4 - <+4 ng/L   Potassium    Collection Time: 08/22/24  3:57 PM    Specimen: Blood   Result Value Ref Range    Potassium 4.3 3.5 - 5.2 mmol/L   POC Panel 9, ISTAT    Collection Time: 08/22/24  5:12 PM    Specimen: Blood   Result Value Ref Range    Hemoglobin 12.2 12.0 - 17.0 g/dL    Hematocrit 36 (L) 38 - 51 %    O2 Saturation, Arterial 91 (L) 95  - 98 %   POC Panel 9, ISTAT    Collection Time: 08/22/24  5:16 PM    Specimen: Blood   Result Value Ref Range    Hemoglobin 11.6 (L) 12.0 - 17.0 g/dL    Hematocrit 34 (L) 38 - 51 %    O2 Saturation, Arterial 46 (L) 95 - 98 %   POC Panel 9, ISTAT    Collection Time: 08/22/24  5:29 PM    Specimen: Blood   Result Value Ref Range    Hemoglobin 11.6 (L) 12.0 - 17.0 g/dL    Hematocrit 34 (L) 38 - 51 %    O2 Saturation, Arterial 50 (L) 95 - 98 %   CBC (No Diff)    Collection Time: 08/23/24  3:36 AM    Specimen: Blood   Result Value Ref Range    WBC 8.45 3.40 - 10.80 10*3/mm3    RBC 3.74 (L) 4.14 - 5.80 10*6/mm3    Hemoglobin 11.5 (L) 13.0 - 17.7 g/dL    Hematocrit 34.6 (L) 37.5 - 51.0 %    MCV 92.5 79.0 - 97.0 fL    MCH 30.7 26.6 - 33.0 pg    MCHC 33.2 31.5 - 35.7 g/dL    RDW 13.7 12.3 - 15.4 %    RDW-SD 45.9 37.0 - 54.0 fl    MPV 9.5 6.0 - 12.0 fL    Platelets 294 140 - 450 10*3/mm3   Basic Metabolic Panel    Collection Time: 08/23/24  3:36 AM    Specimen: Blood   Result Value Ref Range    Glucose 104 (H) 65 - 99 mg/dL    BUN 23 8 - 23 mg/dL    Creatinine 1.23 0.76 - 1.27 mg/dL    Sodium 132 (L) 136 - 145 mmol/L    Potassium 3.9 3.5 - 5.2 mmol/L    Chloride 98 98 - 107 mmol/L    CO2 20.6 (L) 22.0 - 29.0 mmol/L    Calcium 9.4 8.6 - 10.5 mg/dL    BUN/Creatinine Ratio 18.7 7.0 - 25.0    Anion Gap 13.4 5.0 - 15.0 mmol/L    eGFR 63.6 >60.0 mL/min/1.73   TSH Rfx On Abnormal To Free T4    Collection Time: 08/23/24  3:36 AM    Specimen: Blood   Result Value Ref Range    TSH 1.140 0.270 - 4.200 uIU/mL           The following portions of the patient's history were reviewed and updated as appropriate: allergies, current medications, past family history, past medical history, past social history, past surgical history, and problem list.    Review of Systems   Constitutional:  Positive for fatigue.   Respiratory:  Negative for cough.    Cardiovascular:  Negative for chest pain and palpitations.   Skin:  Negative for rash.    Psychiatric/Behavioral:  Positive for dysphoric mood. Negative for self-injury and suicidal ideas. The patient is not nervous/anxious.        Objective   Physical Exam  Vitals and nursing note reviewed.   Constitutional:       Appearance: Normal appearance. He is well-developed.   Neck:      Vascular: No carotid bruit.   Cardiovascular:      Rate and Rhythm: Normal rate and regular rhythm.   Pulmonary:      Effort: Pulmonary effort is normal.      Breath sounds: Normal breath sounds.   Neurological:      Mental Status: He is alert and oriented to person, place, and time.   Psychiatric:         Mood and Affect: Mood normal.         Behavior: Behavior normal.         Thought Content: Thought content normal.         Judgment: Judgment normal.         Assessment & Plan   Diagnoses and all orders for this visit:    1. Anxiety and depression (Primary)  -     buPROPion XL (Wellbutrin XL) 300 MG 24 hr tablet; Take 1 tablet by mouth Daily.  Dispense: 90 tablet; Refill: 1  -     FLUoxetine (PROzac) 40 MG capsule; Take 2 capsules by mouth Daily.  Dispense: 180 capsule; Refill: 1    2. Essential hypertension  -     amLODIPine (NORVASC) 10 MG tablet; Take 1 tablet by mouth Daily.  Dispense: 90 tablet; Refill: 1    3. Panlobular emphysema    4. Coronary artery disease involving native coronary artery of native heart without angina pectoris    Other orders  -     Discontinue: FLUoxetine (PROzac) 40 MG capsule; Take 2 capsules by mouth Daily.  Dispense: 180 capsule; Refill: 0

## 2024-10-18 ENCOUNTER — OFFICE VISIT (OUTPATIENT)
Age: 69
End: 2024-10-18
Payer: MEDICARE

## 2024-10-18 ENCOUNTER — HOSPITAL ENCOUNTER (OUTPATIENT)
Facility: HOSPITAL | Age: 69
Discharge: HOME | End: 2024-10-18
Payer: MEDICARE

## 2024-10-18 VITALS
BODY MASS INDEX: 26.04 KG/M2 | DIASTOLIC BLOOD PRESSURE: 80 MMHG | SYSTOLIC BLOOD PRESSURE: 145 MMHG | WEIGHT: 186 LBS | HEIGHT: 71 IN

## 2024-10-18 DIAGNOSIS — N26.1 ATROPHIC KIDNEY: ICD-10-CM

## 2024-10-18 DIAGNOSIS — Z98.890 STATUS POST ENDOVASCULAR ANEURYSM REPAIR (EVAR): ICD-10-CM

## 2024-10-18 DIAGNOSIS — Z98.890 HISTORY OF STENT INSERTION OF RENAL ARTERY: ICD-10-CM

## 2024-10-18 DIAGNOSIS — I71.42 JUXTARENAL ABDOMINAL AORTIC ANEURYSM, WITHOUT RUPTURE: ICD-10-CM

## 2024-10-18 DIAGNOSIS — I72.4 ANEURYSM OF ARTERY OF LOWER EXTREMITY: ICD-10-CM

## 2024-10-18 DIAGNOSIS — I70.1 ATHEROSCLEROSIS OF RENAL ARTERY: ICD-10-CM

## 2024-10-18 DIAGNOSIS — Z86.79 STATUS POST ENDOVASCULAR ANEURYSM REPAIR (EVAR): ICD-10-CM

## 2024-10-18 DIAGNOSIS — I71.42 JUXTARENAL ABDOMINAL AORTIC ANEURYSM (AAA) WITHOUT RUPTURE: Primary | ICD-10-CM

## 2024-10-18 LAB
ABDOMINAL AORTA AORTIC GRAFT BODY PSV: 34 CM/S
ABDOMINAL AORTA LEFT DIST ANASTOMOSIS PSV: 125 CM/S
ABDOMINAL AORTA LEFT LIMB GRAFT PSV: 107 CM/S
ABDOMINAL AORTA RIGHT DIST ANASTOMOSIS PSV: 233 CM/S
ABDOMINAL AORTA RIGHT LIMB GRAFT PSV: 123 CM/S
ABDOMINAL LT COM ILIAC AP: 1.8 CM
ABDOMINAL MID AORTA AP: 4.6 CM
ABDOMINAL MID AORTA TRANS: 4.5 CM
ABDOMINAL PROX AORTA VEL: 56 CM/S
ABDOMINAL RT COM ILIAC AP: 1.8 CM
BH CV ECHO MEAS - DIST REN A EDV LEFT: 15.5 CM/S
BH CV ECHO MEAS - DIST REN A PSV LEFT: 107.6 CM/S
BH CV ECHO MEAS - MID REN A EDV LEFT: 30 CM/S
BH CV ECHO MEAS - MID REN A PSV LEFT: 235 CM/S
BH CV ECHO MEAS - PROX REN A EDV LEFT: -26 CM/S
BH CV ECHO MEAS - PROX REN A PSV LEFT: -212.4 CM/S
BH CV VAS ABDOMINAL AORTA DISTAL LEFT LIMB GRAFT PSV: 75 CM/S
BH CV VAS ABDOMINAL AORTA DISTAL RIGHT LIMB GRAFT PSV: 115 CM/S
BH CV VAS BP LEFT ARM: NORMAL MMHG
BH CV VAS BP RIGHT ARM: NORMAL MMHG
BH CV VAS RENAL AORTIC MID EDV: 4 CM/S
BH CV VAS RENAL AORTIC MID PSV: 79 CM/S
BH CV VAS RENAL HILUM LEFT EDV: 11.4 CM/S
BH CV VAS RENAL HILUM LEFT PSV: 63.8 CM/S
BH CV VAS RENAL HILUM RIGHT PSV: 0 CM/S
BH CV XLRA MEAS - KID L LEFT: 14.3 CM
BH CV XLRA MEAS DIST REN A PSV RIGHT: 0 CM/S
BH CV XLRA MEAS KID L RIGHT: 7.8 CM
BH CV XLRA MEAS KID W RIGHT: 3.5 CM
BH CV XLRA MEAS MID REN A PSV RIGHT: 0 CM/S
BH CV XLRA MEAS PROX REN A PSV RIGHT: 0 CM/S
BH CV XLRA MEAS RAR LEFT: 3.25
BH CV XLRA MEAS RENAL A ORG EDV LEFT: 26 CM/S
BH CV XLRA MEAS RENAL A ORG PSV LEFT: 257 CM/S
BH CV XLRA MEAS RENAL A ORG PSV RIGHT: 0 CM/S
LEFT ACCESSORY RENAL DIST DIAS: 9 CM/S
LEFT ACCESSORY RENAL DIST SYS: 64 CM/S
LEFT ACCESSORY RENAL MID DIAS: 15 CM/S
LEFT ACCESSORY RENAL MID SYS: 79 CM/S
LEFT KIDNEY WIDTH: 5.7 CM
LEFT RENAL UPPER PARENCHYMA MAX: 22 CM/S
LEFT RENAL UPPER PARENCHYMA MIN: 3 CM/S
LEFT RENAL UPPER PARENCHYMA RI: 0.86
RIGHT RENAL UPPER PARENCHYMA MAX: 0 CM/S

## 2024-10-18 PROCEDURE — 93978 VASCULAR STUDY: CPT

## 2024-10-18 PROCEDURE — 93975 VASCULAR STUDY: CPT

## 2024-10-18 NOTE — PROGRESS NOTES
"Chief Complaint  Aortic Aneurysm and Renal Artery Stenosis    Subjective      HPI: Osman Aparicio is a 69 y.o. 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  Recent emergency room visit for bronchitis associated with shortness of breath and hypoxemia, resolved.  Otherwise overall health stable.    Objective   Vital Signs:  /80   Ht 180.3 cm (71\")   Wt 84.4 kg (186 lb)   BMI 25.94 kg/m²   Estimated body mass index is 25.94 kg/m² as calculated from the following:    Height as of this encounter: 180.3 cm (71\").    Weight as of this encounter: 84.4 kg (186 lb).   BMI is >= 25 and <30. (Overweight) The following options were offered after discussion;: none (medical contraindication)   Osman Aparicio  reports that he quit smoking about 2 years ago. His smoking use included cigarettes. He started smoking about 47 years ago. He has a 45 pack-year smoking history. He has been exposed to tobacco smoke. He has never used smokeless tobacco..     Exam: Abdomen nondistended, soft, nontender.  Aneurysm not palpated.  No lower extremity edema.  Palpable posterior tibial artery pulses.    Personal review of data: Images and tracings of aortoiliac sonogram and renal artery duplex scan performed today.  Images of upper abdomen as demonstrated on CT angiogram of the chest 8/20/2024, demonstrating right and left kidneys and left renal artery stent.     Assessment and Plan     Diagnoses and all orders for this visit:    1. Juxtarenal abdominal aortic aneurysm (AAA) without rupture (Primary)  -     Duplex Aorta IVC Iliac Graft Complete CAR; Future    2. Status post endovascular aneurysm repair (EVAR)  -     Duplex Aorta IVC Iliac Graft Complete CAR; Future    3. Atrophic kidney  -     Duplex Aorta IVC Iliac Graft Complete CAR; Future  -     Duplex Renal Artery - Bilateral Complete CAR; Future    4. History of left renal stent  -     Duplex Aorta " IVC Iliac Graft Complete CAR; Future  -     Duplex Renal Artery - Bilateral Complete CAR; Future    5. 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  Returns for follow-up.  Having no abdominal or lower extremity complaints.  Aorta iliac and renal artery duplex scans demonstrate AAA 4.6 cm post endovascular repair, with patent graft limbs and no endoleak.  Known right renal artery occlusion with atrophic right kidney.  Demonstrates patent left renal artery stent graft with flow acceleration suggesting stenosis less than 60%.  Large left kidney with [id number] length 14.3 cm.  Doing well post Z fen EVAR.  Repeat studies 1 year.    Follow Up     Return in about 1 year (around 10/18/2025).  Patient was given instructions and counseling regarding his condition or for health maintenance advice. Please see specific information pulled into the AVS if appropriate.

## 2024-11-05 ENCOUNTER — APPOINTMENT (OUTPATIENT)
Dept: GENERAL RADIOLOGY | Facility: HOSPITAL | Age: 69
DRG: 291 | End: 2024-11-05
Payer: MEDICARE

## 2024-11-05 ENCOUNTER — HOSPITAL ENCOUNTER (INPATIENT)
Facility: HOSPITAL | Age: 69
LOS: 3 days | Discharge: HOME | DRG: 291 | End: 2024-11-08
Attending: EMERGENCY MEDICINE | Admitting: STUDENT IN AN ORGANIZED HEALTH CARE EDUCATION/TRAINING PROGRAM
Payer: MEDICARE

## 2024-11-05 DIAGNOSIS — I50.9 ACUTE ON CHRONIC CONGESTIVE HEART FAILURE, UNSPECIFIED HEART FAILURE TYPE: ICD-10-CM

## 2024-11-05 DIAGNOSIS — J81.0 ACUTE PULMONARY EDEMA: ICD-10-CM

## 2024-11-05 DIAGNOSIS — L03.113 CELLULITIS OF RIGHT ELBOW: ICD-10-CM

## 2024-11-05 DIAGNOSIS — I73.9 CLAUDICATION: ICD-10-CM

## 2024-11-05 DIAGNOSIS — I10 ESSENTIAL HYPERTENSION: ICD-10-CM

## 2024-11-05 DIAGNOSIS — J96.11 HYPOXEMIC RESPIRATORY FAILURE, CHRONIC: Primary | ICD-10-CM

## 2024-11-05 PROBLEM — J96.01 ACUTE HYPOXEMIC RESPIRATORY FAILURE: Status: ACTIVE | Noted: 2024-11-05

## 2024-11-05 LAB
ALBUMIN SERPL-MCNC: 4.1 G/DL (ref 3.5–5.2)
ALBUMIN/GLOB SERPL: 1 G/DL
ALP SERPL-CCNC: 93 U/L (ref 39–117)
ALT SERPL W P-5'-P-CCNC: 9 U/L (ref 1–41)
ANION GAP SERPL CALCULATED.3IONS-SCNC: 14.6 MMOL/L (ref 5–15)
ANISOCYTOSIS BLD QL: ABNORMAL
AST SERPL-CCNC: 20 U/L (ref 1–40)
B PARAPERT DNA SPEC QL NAA+PROBE: NOT DETECTED
B PERT DNA SPEC QL NAA+PROBE: NOT DETECTED
BASOPHILS # BLD MANUAL: 0.21 10*3/MM3 (ref 0–0.2)
BASOPHILS NFR BLD MANUAL: 1 % (ref 0–1.5)
BILIRUB SERPL-MCNC: 0.8 MG/DL (ref 0–1.2)
BUN SERPL-MCNC: 21 MG/DL (ref 8–23)
BUN/CREAT SERPL: 14.1 (ref 7–25)
C PNEUM DNA NPH QL NAA+NON-PROBE: NOT DETECTED
CALCIUM SPEC-SCNC: 9.4 MG/DL (ref 8.6–10.5)
CHLORIDE SERPL-SCNC: 96 MMOL/L (ref 98–107)
CO2 SERPL-SCNC: 21.4 MMOL/L (ref 22–29)
CREAT SERPL-MCNC: 1.49 MG/DL (ref 0.76–1.27)
D-LACTATE SERPL-SCNC: 1 MMOL/L (ref 0.5–2)
DEPRECATED RDW RBC AUTO: 48.6 FL (ref 37–54)
EGFRCR SERPLBLD CKD-EPI 2021: 50.5 ML/MIN/1.73
EOSINOPHIL # BLD MANUAL: 0 10*3/MM3 (ref 0–0.4)
EOSINOPHIL NFR BLD MANUAL: 0 % (ref 0.3–6.2)
ERYTHROCYTE [DISTWIDTH] IN BLOOD BY AUTOMATED COUNT: 14.4 % (ref 12.3–15.4)
ETHANOL BLD-MCNC: <10 MG/DL (ref 0–10)
ETHANOL UR QL: <0.01 %
FLUAV SUBTYP SPEC NAA+PROBE: NOT DETECTED
FLUBV RNA ISLT QL NAA+PROBE: NOT DETECTED
GEN 5 2HR TROPONIN T REFLEX: 14 NG/L
GLOBULIN UR ELPH-MCNC: 4 GM/DL
GLUCOSE SERPL-MCNC: 117 MG/DL (ref 65–99)
HADV DNA SPEC NAA+PROBE: NOT DETECTED
HCOV 229E RNA SPEC QL NAA+PROBE: NOT DETECTED
HCOV HKU1 RNA SPEC QL NAA+PROBE: NOT DETECTED
HCOV NL63 RNA SPEC QL NAA+PROBE: NOT DETECTED
HCOV OC43 RNA SPEC QL NAA+PROBE: NOT DETECTED
HCT VFR BLD AUTO: 31.8 % (ref 37.5–51)
HGB BLD-MCNC: 10.6 G/DL (ref 13–17.7)
HMPV RNA NPH QL NAA+NON-PROBE: NOT DETECTED
HOLD SPECIMEN: NORMAL
HOLD SPECIMEN: NORMAL
HPIV1 RNA ISLT QL NAA+PROBE: NOT DETECTED
HPIV2 RNA SPEC QL NAA+PROBE: NOT DETECTED
HPIV3 RNA NPH QL NAA+PROBE: NOT DETECTED
HPIV4 P GENE NPH QL NAA+PROBE: NOT DETECTED
LYMPHOCYTES # BLD MANUAL: 0.65 10*3/MM3 (ref 0.7–3.1)
LYMPHOCYTES NFR BLD MANUAL: 5.1 % (ref 5–12)
M PNEUMO IGG SER IA-ACNC: NOT DETECTED
MCH RBC QN AUTO: 31.2 PG (ref 26.6–33)
MCHC RBC AUTO-ENTMCNC: 33.3 G/DL (ref 31.5–35.7)
MCV RBC AUTO: 93.5 FL (ref 79–97)
MICROCYTES BLD QL: ABNORMAL
MONOCYTES # BLD: 1.07 10*3/MM3 (ref 0.1–0.9)
NEUTROPHILS # BLD AUTO: 19.04 10*3/MM3 (ref 1.7–7)
NEUTROPHILS NFR BLD MANUAL: 90.8 % (ref 42.7–76)
NRBC BLD AUTO-RTO: 0.2 /100 WBC (ref 0–0.2)
NRBC SPEC MANUAL: 1 /100 WBC (ref 0–0.2)
NT-PROBNP SERPL-MCNC: 5202 PG/ML (ref 0–900)
PLAT MORPH BLD: NORMAL
PLATELET # BLD AUTO: 318 10*3/MM3 (ref 140–450)
PMV BLD AUTO: 9.9 FL (ref 6–12)
POTASSIUM SERPL-SCNC: 4 MMOL/L (ref 3.5–5.2)
PROCALCITONIN SERPL-MCNC: 0.17 NG/ML (ref 0–0.25)
PROT SERPL-MCNC: 8.1 G/DL (ref 6–8.5)
QT INTERVAL: 441 MS
QTC INTERVAL: 463 MS
RBC # BLD AUTO: 3.4 10*6/MM3 (ref 4.14–5.8)
RHINOVIRUS RNA SPEC NAA+PROBE: NOT DETECTED
RSV RNA NPH QL NAA+NON-PROBE: NOT DETECTED
SARS-COV-2 RNA NPH QL NAA+NON-PROBE: NOT DETECTED
SODIUM SERPL-SCNC: 132 MMOL/L (ref 136–145)
STOMATOCYTES BLD QL SMEAR: ABNORMAL
TROPONIN T DELTA: 1 NG/L
TROPONIN T SERPL HS-MCNC: 13 NG/L
TROPONIN T SERPL HS-MCNC: 13 NG/L
VARIANT LYMPHS NFR BLD MANUAL: 3.1 % (ref 19.6–45.3)
WBC MORPH BLD: NORMAL
WBC NRBC COR # BLD AUTO: 20.98 10*3/MM3 (ref 3.4–10.8)
WHOLE BLOOD HOLD COAG: NORMAL
WHOLE BLOOD HOLD SPECIMEN: NORMAL

## 2024-11-05 PROCEDURE — 85007 BL SMEAR W/DIFF WBC COUNT: CPT | Performed by: EMERGENCY MEDICINE

## 2024-11-05 PROCEDURE — 25010000002 HEPARIN (PORCINE) PER 1000 UNITS: Performed by: STUDENT IN AN ORGANIZED HEALTH CARE EDUCATION/TRAINING PROGRAM

## 2024-11-05 PROCEDURE — 25010000002 FUROSEMIDE PER 20 MG: Performed by: EMERGENCY MEDICINE

## 2024-11-05 PROCEDURE — 82077 ASSAY SPEC XCP UR&BREATH IA: CPT | Performed by: EMERGENCY MEDICINE

## 2024-11-05 PROCEDURE — 85025 COMPLETE CBC W/AUTO DIFF WBC: CPT | Performed by: EMERGENCY MEDICINE

## 2024-11-05 PROCEDURE — 83880 ASSAY OF NATRIURETIC PEPTIDE: CPT | Performed by: EMERGENCY MEDICINE

## 2024-11-05 PROCEDURE — 99285 EMERGENCY DEPT VISIT HI MDM: CPT

## 2024-11-05 PROCEDURE — 25010000002 CEFTRIAXONE PER 250 MG: Performed by: EMERGENCY MEDICINE

## 2024-11-05 PROCEDURE — 36415 COLL VENOUS BLD VENIPUNCTURE: CPT

## 2024-11-05 PROCEDURE — 84484 ASSAY OF TROPONIN QUANT: CPT | Performed by: STUDENT IN AN ORGANIZED HEALTH CARE EDUCATION/TRAINING PROGRAM

## 2024-11-05 PROCEDURE — 84145 PROCALCITONIN (PCT): CPT | Performed by: EMERGENCY MEDICINE

## 2024-11-05 PROCEDURE — 83605 ASSAY OF LACTIC ACID: CPT | Performed by: EMERGENCY MEDICINE

## 2024-11-05 PROCEDURE — 87040 BLOOD CULTURE FOR BACTERIA: CPT | Performed by: EMERGENCY MEDICINE

## 2024-11-05 PROCEDURE — 0202U NFCT DS 22 TRGT SARS-COV-2: CPT | Performed by: EMERGENCY MEDICINE

## 2024-11-05 PROCEDURE — 93005 ELECTROCARDIOGRAM TRACING: CPT | Performed by: EMERGENCY MEDICINE

## 2024-11-05 PROCEDURE — 71045 X-RAY EXAM CHEST 1 VIEW: CPT

## 2024-11-05 PROCEDURE — 25010000002 CEFTRIAXONE PER 250 MG: Performed by: STUDENT IN AN ORGANIZED HEALTH CARE EDUCATION/TRAINING PROGRAM

## 2024-11-05 PROCEDURE — 84484 ASSAY OF TROPONIN QUANT: CPT | Performed by: EMERGENCY MEDICINE

## 2024-11-05 PROCEDURE — 93010 ELECTROCARDIOGRAM REPORT: CPT | Performed by: INTERNAL MEDICINE

## 2024-11-05 PROCEDURE — 80053 COMPREHEN METABOLIC PANEL: CPT | Performed by: EMERGENCY MEDICINE

## 2024-11-05 PROCEDURE — 36415 COLL VENOUS BLD VENIPUNCTURE: CPT | Performed by: EMERGENCY MEDICINE

## 2024-11-05 PROCEDURE — 73070 X-RAY EXAM OF ELBOW: CPT

## 2024-11-05 RX ORDER — ACETAMINOPHEN 500 MG
1000 TABLET ORAL ONCE
Status: COMPLETED | OUTPATIENT
Start: 2024-11-05 | End: 2024-11-05

## 2024-11-05 RX ORDER — POLYETHYLENE GLYCOL 3350 17 G/17G
17 POWDER, FOR SOLUTION ORAL DAILY PRN
Status: DISCONTINUED | OUTPATIENT
Start: 2024-11-05 | End: 2024-11-08 | Stop reason: HOSPADM

## 2024-11-05 RX ORDER — PANTOPRAZOLE SODIUM 20 MG/1
20 TABLET, DELAYED RELEASE ORAL DAILY
COMMUNITY

## 2024-11-05 RX ORDER — SODIUM CHLORIDE 0.9 % (FLUSH) 0.9 %
10 SYRINGE (ML) INJECTION AS NEEDED
Status: DISCONTINUED | OUTPATIENT
Start: 2024-11-05 | End: 2024-11-08 | Stop reason: HOSPADM

## 2024-11-05 RX ORDER — BISACODYL 10 MG
10 SUPPOSITORY, RECTAL RECTAL DAILY PRN
Status: DISCONTINUED | OUTPATIENT
Start: 2024-11-05 | End: 2024-11-08 | Stop reason: HOSPADM

## 2024-11-05 RX ORDER — HEPARIN SODIUM 5000 [USP'U]/ML
5000 INJECTION, SOLUTION INTRAVENOUS; SUBCUTANEOUS EVERY 8 HOURS SCHEDULED
Status: DISCONTINUED | OUTPATIENT
Start: 2024-11-05 | End: 2024-11-08 | Stop reason: HOSPADM

## 2024-11-05 RX ORDER — AMOXICILLIN 250 MG
2 CAPSULE ORAL 2 TIMES DAILY PRN
Status: DISCONTINUED | OUTPATIENT
Start: 2024-11-05 | End: 2024-11-08 | Stop reason: HOSPADM

## 2024-11-05 RX ORDER — SODIUM CHLORIDE 9 MG/ML
40 INJECTION, SOLUTION INTRAVENOUS AS NEEDED
Status: DISCONTINUED | OUTPATIENT
Start: 2024-11-05 | End: 2024-11-08 | Stop reason: HOSPADM

## 2024-11-05 RX ORDER — BUDESONIDE AND FORMOTEROL FUMARATE DIHYDRATE 160; 4.5 UG/1; UG/1
2 AEROSOL RESPIRATORY (INHALATION)
Status: DISCONTINUED | OUTPATIENT
Start: 2024-11-05 | End: 2024-11-08 | Stop reason: HOSPADM

## 2024-11-05 RX ORDER — NITROGLYCERIN 0.4 MG/1
0.4 TABLET SUBLINGUAL
Status: DISCONTINUED | OUTPATIENT
Start: 2024-11-05 | End: 2024-11-08 | Stop reason: HOSPADM

## 2024-11-05 RX ORDER — FUROSEMIDE 10 MG/ML
40 INJECTION INTRAMUSCULAR; INTRAVENOUS EVERY 12 HOURS
Status: DISCONTINUED | OUTPATIENT
Start: 2024-11-05 | End: 2024-11-07

## 2024-11-05 RX ORDER — BISACODYL 5 MG/1
5 TABLET, DELAYED RELEASE ORAL DAILY PRN
Status: DISCONTINUED | OUTPATIENT
Start: 2024-11-05 | End: 2024-11-08 | Stop reason: HOSPADM

## 2024-11-05 RX ORDER — FUROSEMIDE 10 MG/ML
80 INJECTION INTRAMUSCULAR; INTRAVENOUS ONCE
Status: COMPLETED | OUTPATIENT
Start: 2024-11-05 | End: 2024-11-05

## 2024-11-05 RX ORDER — SODIUM CHLORIDE 0.9 % (FLUSH) 0.9 %
10 SYRINGE (ML) INJECTION EVERY 12 HOURS SCHEDULED
Status: DISCONTINUED | OUTPATIENT
Start: 2024-11-05 | End: 2024-11-08 | Stop reason: HOSPADM

## 2024-11-05 RX ORDER — IPRATROPIUM BROMIDE AND ALBUTEROL SULFATE 2.5; .5 MG/3ML; MG/3ML
3 SOLUTION RESPIRATORY (INHALATION) EVERY 6 HOURS PRN
Status: DISCONTINUED | OUTPATIENT
Start: 2024-11-05 | End: 2024-11-08 | Stop reason: HOSPADM

## 2024-11-05 RX ADMIN — HEPARIN SODIUM 5000 UNITS: 5000 INJECTION INTRAVENOUS; SUBCUTANEOUS at 21:28

## 2024-11-05 RX ADMIN — FUROSEMIDE 80 MG: 10 INJECTION, SOLUTION INTRAMUSCULAR; INTRAVENOUS at 19:16

## 2024-11-05 RX ADMIN — CLINDAMYCIN HYDROCHLORIDE 300 MG: 150 CAPSULE ORAL at 23:13

## 2024-11-05 RX ADMIN — Medication 10 ML: at 20:33

## 2024-11-05 RX ADMIN — ACETAMINOPHEN 1000 MG: 500 TABLET ORAL at 17:54

## 2024-11-05 RX ADMIN — CEFTRIAXONE 2000 MG: 2 INJECTION, POWDER, FOR SOLUTION INTRAMUSCULAR; INTRAVENOUS at 19:18

## 2024-11-05 RX ADMIN — CEFTRIAXONE 2000 MG: 2 INJECTION, POWDER, FOR SOLUTION INTRAMUSCULAR; INTRAVENOUS at 23:14

## 2024-11-05 NOTE — ED NOTES
[place] [facility] for [place]. Pt denies hitting head or LOC. Pt has dried blood on chin. Pt has skin tear on right elbow from fall yesterday. Pt has no complaints at this time.

## 2024-11-05 NOTE — ED PROVIDER NOTES
EMERGENCY DEPARTMENT ENCOUNTER  Room Number:  31/31  PCP: Desire Hughes APRN ([place])  Independent Historians: Patient      HPI:  Chief Complaint: Dyspnea    A complete HPI/ROS/PMH/PSH/SH/FH are unobtainable due to: None    Chronic or social conditions impacting patient care (Social Determinants of Health): None      Context: Osman Aparicio is a 69 y.o. male with a medical history of hypertension, chronic CHF, CAD status post CABG, COPD, AAA s/p repair and peripheral vascular disease who presents to the ED c/o acute dyspnea began while he was playing golf today.  Patient denies any chest pain.  Rare cough.  Not aware of any fevers or chills.  Patient apparently fell while golfing today and that triggered EMS to come.  The patient also reports that he had a fall yesterday where he injured his right elbow.  He was helping a gentleman look for his car keys in a creek when he slipped and fell.  He denies any other injuries other than the elbow from that.  Patient does report that he has supplemental oxygen that he wears at night but does not require it during the day.  Patient was noted to be hypoxemic in triage though it is not documented I was told that he was in the 70s.    Review of prior external notes (non-ED) -and- Review of prior external test results outside of this encounter:  Full discharge summary 8/23/2024 reviewed: Patient admitted for exertional dyspnea.      PAST MEDICAL HISTORY  Active Ambulatory Problems     Diagnosis Date Noted    Primary hypertension 06/01/2016    Other hyperlipidemia 06/01/2016    Anxiety 06/01/2016    Nocturia 07/27/2017    Elevated PSA 04/23/2018    Elevated blood sugar level 04/23/2018    Positive colorectal cancer screening using Cologuard test 11/18/2020    Shortness of breath 03/26/2022    Chronic diastolic congestive heart failure 03/26/2022    Leukocytosis 03/26/2022    Positive D dimer 03/26/2022    Anemia 03/26/2022    COPD with acute exacerbation 03/26/2022    Panlobular  emphysema 03/26/2022    History of colon polyps 03/26/2022    Juxtarenal abdominal aortic aneurysm (AAA) without rupture 03/26/2022    Alcohol abuse 03/27/2022    NSTEMI (non-ST elevated myocardial infarction) 03/29/2022    Coronary artery disease involving native coronary artery of native heart without angina pectoris 03/26/2022    Abnormal findings on diagnostic imaging of heart and coronary circulation 03/26/2022    Closed Colles' fracture of right radius 08/25/2022    Pneumonia of both lower lobes due to infectious organism 04/05/2023    Hypoxia 04/05/2023    Fever 04/05/2023    Exertional dyspnea 08/20/2024    Status post endovascular aneurysm repair (EVAR) 10/18/2024    Atrophic kidney 10/18/2024    History of left renal stent 10/18/2024     Resolved Ambulatory Problems     Diagnosis Date Noted    Acute respiratory failure with hypoxia 03/26/2022    Hyponatremia 03/26/2022    Acute pulmonary edema 03/26/2022     Past Medical History:   Diagnosis Date    AAA (abdominal aortic aneurysm)     Abnormal glucose     COPD (chronic obstructive pulmonary disease)     Coronary artery disease     Encounter for special screening examination for neoplasm of prostate 10/2012    Hematuria     History of COVID-19     Hyperlipidemia     Hypertension     Myocardial infarction 03/2022    Radius fracture     Vitamin D deficiency disease          PAST SURGICAL HISTORY  Past Surgical History:   Procedure Laterality Date    ARTERIOGRAM AORTIC N/A 01/30/2023    Procedure: Zenith Fenestrated Endovascular Repair;  Surgeon: Mariusz Kumar MD;  Location: [facility] OR 18/19;  Service: Vascular;  Laterality: N/A;    CARDIAC CATHETERIZATION N/A 03/29/2022    Procedure: Left Heart Cath;  Surgeon: Neto Paige MD;  Location: [facility] CATH INVASIVE LOCATION;  Service: Cardiology;  Laterality: N/A;    CARDIAC CATHETERIZATION N/A 03/29/2022    Procedure: Coronary angiography;  Surgeon: Neto Paige MD;  Location:   [facility] CATH INVASIVE LOCATION;  Service: Cardiology;  Laterality: N/A;    CARDIAC CATHETERIZATION N/A 03/29/2022    Procedure: Left ventriculography;  Surgeon: Neto Paige MD;  Location: [facility] CATH INVASIVE LOCATION;  Service: Cardiology;  Laterality: N/A;    CARDIAC CATHETERIZATION N/A 8/22/2024    Procedure: Right and Left Heart Cath;  Surgeon: Jarrod Jasso MD;  Location: [facility] CATH INVASIVE LOCATION;  Service: Cardiovascular;  Laterality: N/A;  History of pulmonary hypertension, worsening dyspnea on exertion, evaluate for worsening CAD or PHTN    CARDIAC CATHETERIZATION N/A 8/22/2024    Procedure: Coronary angiography;  Surgeon: Jarrod Jasso MD;  Location: [facility] CATH INVASIVE LOCATION;  Service: Cardiovascular;  Laterality: N/A;    CARDIAC CATHETERIZATION  8/22/2024    Procedure: Saphenous Vein Graft;  Surgeon: Jarrod Jasso MD;  Location: [facility] CATH INVASIVE LOCATION;  Service: Cardiovascular;;    CARDIAC CATHETERIZATION N/A 8/22/2024    Procedure: Native mammary injection;  Surgeon: Jarrod Jasso MD;  Location: [facility] CATH INVASIVE LOCATION;  Service: Cardiovascular;  Laterality: N/A;    COLONOSCOPY N/A 12/07/2020    Procedure: COLONOSCOPY INTO CECUM WITH HOT SNARE POLYPECTOMIES, COLD BX POLYPECTOMIES, RESOLUTION CLIPS X;  Surgeon: Regi Mercado MD;  Location: [facility] ENDOSCOPY;  Service: General;  Laterality: N/A;  PRE:  POSITIVE COLOGUARD  POST:  POLYPS    CORONARY ARTERY BYPASS GRAFT N/A 04/01/2022    Procedure: STERNOTOMY, CORONARY ARTERY BYPASS UTILIZINGTHE RT SAPHENOUS VEIN WITH LEFT INTERNAL MAMMARY ARTERY GRAFT X3 OFF PUMP, PRP;  Surgeon: Colten Zamora MD;  Location: [facility] CVOR;  Service: Cardiothoracic;  Laterality: N/A;    ORIF WRIST FRACTURE Right 08/26/2022    Procedure: RIGHT DISTAL RADIUS FRACTURE OPEN REDUCTION INTERNAL FIXATION;  Surgeon: Bubba Mello MD;  [place] [facility] [place] [facility]  Service: Orthopedics;  Laterality: Right;    TRANSESOPHAGEAL ECHOCARDIOGRAM (MARTHA) N/A  2022    Procedure: TRANSESOPHAGEAL ECHOCARDIOGRAM WITH ANESTHESIA;  Surgeon: Colten Zamora MD;  Location: [facility];  Service: Cardiothoracic;  Laterality: N/A;         FAMILY HISTORY  Family History   Problem Relation Age of Onset    Lung cancer Brother     Liver cancer Brother     Colon polyps Brother     Malchencho Hyperthermia Neg Hx          SOCIAL HISTORY  Social History     Socioeconomic History    Marital status:      Spouse name: Suzanne   Tobacco Use    Smoking status: Former     Current packs/day: 0.00     Average packs/day: 1 pack/day for 45.0 years (45.0 ttl pk-yrs)     Types: Cigarettes     Start date: 3/26/1977     Quit date: 3/26/2022     Years since quittin.6     Passive exposure: Past    Smokeless tobacco: Never    Tobacco comments:     caffeine - 3 cups coffee daily, tea or soda occas.   Vaping Use    Vaping status: Never Used   Substance and Sexual Activity    Alcohol use: Yes     Alcohol/week: 2.0 standard drinks of alcohol     Types: 2 Cans of beer per week    Drug use: No    Sexual activity: Defer         ALLERGIES  Patient has no known allergies.      REVIEW OF SYSTEMS  Review of Systems  Included in HPI  All systems reviewed and negative except for those discussed in HPI.      PHYSICAL EXAM    I have reviewed the triage vital signs and nursing notes.    ED Triage Vitals   Temp Heart Rate Resp BP SpO2   24 1539 24 1537 24 1537 24 1537 24 1537   (!) 100.6 °F (38.1 °C) 70 18 152/64 92 %      Temp src Heart Rate Source Patient Position BP Location FiO2 (%)   24 1539 -- -- -- --   Tympanic           Physical Exam    Physical Exam   Constitutional: No distress but tremulous and ill-appearing  HENT:  Head: Normocephalic.  Dried blood on chin.  Denies malocclusion  Oropharynx: Mucous membranes are moist.   Eyes: . No scleral icterus. No conjunctival pallor.  Neck: Normal range of motion. Neck supple.   Cardiovascular: Pink warm and well  perfused throughout.  Regular  Pulmonary/Chest: No significant tachypnea.  Patient does have crackles right greater than left.  Abdominal: Soft. There is no tenderness. There is no rebound and no guarding.   Musculoskeletal: Moves all extremities equally.  No calf tenderness or erythema.  Patient with multiple abrasions to right elbow with associated swelling.  This appears subacute.  There is 1 open lesion with some purulent discharge and overlying erythema without severe induration.  I have concern for cellulitis or even potentially small draining abscess in this area.  Neurological: Alert and oriented.  No acute focal deficit appreciated.  Skin: Skin is pink, warm, and dry.   Psychiatric: Mood and affect normal.   Nursing note and vitals reviewed.             LAB RESULTS  Recent Results (from the past 24 hours)   ECG 12 Lead ED Triage Standing Order; SOA    Collection Time: 11/05/24  4:50 PM   Result Value Ref Range    QT Interval 441 ms    QTC Interval 463 ms   Comprehensive Metabolic Panel    Collection Time: 11/05/24  4:55 PM    Specimen: Arm, Left; Blood   Result Value Ref Range    Glucose 117 (H) 65 - 99 mg/dL    BUN 21 8 - 23 mg/dL    Creatinine 1.49 (H) 0.76 - 1.27 mg/dL    Sodium 132 (L) 136 - 145 mmol/L    Potassium 4.0 3.5 - 5.2 mmol/L    Chloride 96 (L) 98 - 107 mmol/L    CO2 21.4 (L) 22.0 - 29.0 mmol/L    Calcium 9.4 8.6 - 10.5 mg/dL    Total Protein 8.1 6.0 - 8.5 g/dL    Albumin 4.1 3.5 - 5.2 g/dL    ALT (SGPT) 9 1 - 41 U/L    AST (SGOT) 20 1 - 40 U/L    Alkaline Phosphatase 93 39 - 117 U/L    Total Bilirubin 0.8 0.0 - 1.2 mg/dL    Globulin 4.0 gm/dL    A/G Ratio 1.0 g/dL    BUN/Creatinine Ratio 14.1 7.0 - 25.0    Anion Gap 14.6 5.0 - 15.0 mmol/L    eGFR 50.5 (L) >60.0 mL/min/1.73   BNP    Collection Time: 11/05/24  4:55 PM    Specimen: Arm, Left; Blood   Result Value Ref Range    proBNP 5,202.0 (H) 0.0 - 900.0 pg/mL   Single High Sensitivity Troponin T    Collection Time: 11/05/24  4:55 PM     Specimen: Arm, Left; Blood   Result Value Ref Range    HS Troponin T 13 <22 ng/L   Green Top (Gel)    Collection Time: 11/05/24  4:55 PM   Result Value Ref Range    Extra Tube Hold for add-ons.    Lavender Top    Collection Time: 11/05/24  4:55 PM   Result Value Ref Range    Extra Tube hold for add-on    Gold Top - SST    Collection Time: 11/05/24  4:55 PM   Result Value Ref Range    Extra Tube Hold for add-ons.    Light Blue Top    Collection Time: 11/05/24  4:55 PM   Result Value Ref Range    Extra Tube Hold for add-ons.    CBC Auto Differential    Collection Time: 11/05/24  4:55 PM    Specimen: Arm, Left; Blood   Result Value Ref Range    WBC 20.98 (H) 3.40 - 10.80 10*3/mm3    RBC 3.40 (L) 4.14 - 5.80 10*6/mm3    Hemoglobin 10.6 (L) 13.0 - 17.7 g/dL    Hematocrit 31.8 (L) 37.5 - 51.0 %    MCV 93.5 79.0 - 97.0 fL    MCH 31.2 26.6 - 33.0 pg    MCHC 33.3 31.5 - 35.7 g/dL    RDW 14.4 12.3 - 15.4 %    RDW-SD 48.6 37.0 - 54.0 fl    MPV 9.9 6.0 - 12.0 fL    Platelets 318 140 - 450 10*3/mm3    nRBC 0.2 0.0 - 0.2 /100 WBC   Procalcitonin    Collection Time: 11/05/24  4:55 PM    Specimen: Arm, Left; Blood   Result Value Ref Range    Procalcitonin 0.17 0.00 - 0.25 ng/mL   Ethanol    Collection Time: 11/05/24  4:55 PM    Specimen: Arm, Left; Blood   Result Value Ref Range    Ethanol <10 0 - 10 mg/dL    Ethanol % <0.010 %   Manual Differential    Collection Time: 11/05/24  4:55 PM    Specimen: Arm, Left; Blood   Result Value Ref Range    Neutrophil % 90.8 (H) 42.7 - 76.0 %    Lymphocyte % 3.1 (L) 19.6 - 45.3 %    Monocyte % 5.1 5.0 - 12.0 %    Eosinophil % 0.0 (L) 0.3 - 6.2 %    Basophil % 1.0 0.0 - 1.5 %    Neutrophils Absolute 19.04 (H) 1.70 - 7.00 10*3/mm3    Lymphocytes Absolute 0.65 (L) 0.70 - 3.10 10*3/mm3    Monocytes Absolute 1.07 (H) 0.10 - 0.90 10*3/mm3    Eosinophils Absolute 0.00 0.00 - 0.40 10*3/mm3    Basophils Absolute 0.21 (H) 0.00 - 0.20 10*3/mm3    nRBC 1.0 (H) 0.0 - 0.2 /100 WBC    Anisocytosis Slight/1+  None Seen    Microcytes Slight/1+ None Seen    Stomatocytes Slight/1+ None Seen    WBC Morphology Normal Normal    Platelet Morphology Normal Normal   Lactic Acid, Plasma    Collection Time: 11/05/24  4:56 PM    Specimen: Blood   Result Value Ref Range    Lactate 1.0 0.5 - 2.0 mmol/L   Respiratory Panel PCR w/COVID-19(SARS-CoV-2) [facility] In-House, NP Swab in UTM/VTM, 2 HR TAT - Swab, Nasopharynx    Collection Time: 11/05/24  4:56 PM    Specimen: Nasopharynx; Swab   Result Value Ref Range    ADENOVIRUS, PCR Not Detected Not Detected    Coronavirus 229E Not Detected Not Detected    Coronavirus HKU1 Not Detected Not Detected    Coronavirus NL63 Not Detected Not Detected    Coronavirus OC43 Not Detected Not Detected    COVID19 Not Detected Not Detected - Ref. Range    Human Metapneumovirus Not Detected Not Detected    Human Rhinovirus/Enterovirus Not Detected Not Detected    Influenza A PCR Not Detected Not Detected    Influenza B PCR Not Detected Not Detected    Parainfluenza Virus 1 Not Detected Not Detected    Parainfluenza Virus 2 Not Detected Not Detected    Parainfluenza Virus 3 Not Detected Not Detected    Parainfluenza Virus 4 Not Detected Not Detected    RSV, PCR Not Detected Not Detected    Bordetella pertussis pcr Not Detected Not Detected    Bordetella parapertussis PCR Not Detected Not Detected    Chlamydophila pneumoniae PCR Not Detected Not Detected    Mycoplasma pneumo by PCR Not Detected Not Detected         RADIOLOGY  XR Elbow 2 View Right    Result Date: 11/5/2024  XR ELBOW 2 VW RIGHT-  DATE OF EXAM: 11/5/2024 5:28 PM  INDICATION: Trauma and swelling. Fell and hit elbow on rock yesterday. Puncture wound with probable infection.  COMPARISON: None available.  TECHNIQUE: 2 views of the elbow were obtained.  FINDINGS: No evidence of acute fracture or dislocation. Tiny medial and lateral epicondyle enthesophytes. Mild DJD at the ulnotrochlear compartment of the elbow. No significant elbow  joint effusion. Posterior soft tissue swelling with questionable soft tissue wound posteriorly.      Posterior soft tissue swelling and questionable posterior soft tissue wound, without radiographic evidence of acute fracture or dislocation.  This report was finalized on 11/5/2024 5:38 PM by Orville Bee MD on Workstation: [id number]      XR Chest 1 View    Result Date: 11/5/2024  XR CHEST 1 VW-  DATE OF EXAM: 11/5/2024 5:03 PM  INDICATION: SOA Triage Protocol.  COMPARISON: Radiographs 8/20/2024, 4/5/2023, and 5/16/2022. CT 9/18/2024 and 8/20/2024.  TECHNIQUE: A single portable AP view of the chest was obtained.  FINDINGS: Lordotic positioning. Overlying artifacts. Stable sternotomy wires and postoperative changes from CABG. Low lung volumes. Similar-appearing diffuse patchy groundglass opacities and interstitial thickening throughout both lungs. Superimposed apical predominant chronic emphysematous changes and nodular right apical pleural-parenchymal scarring. No pneumothorax. Unchanged cardiac and mediastinal contours. Calcified atherosclerotic disease in the thoracic aorta. No acute osseous abnormality is identified.      Similar-appearing chronic diffuse patchy groundglass opacities and interstitial thickening throughout both lungs, which could represent chronic lung disease, atypical pneumonia, or pulmonary edema, with superimposed apical predominant chronic emphysematous changes and nodular biapical pleural/parenchymal scarring.  This report was finalized on 11/5/2024 5:25 PM by Orville Bee MD on Workstation: [id number]         MEDICATIONS GIVEN IN ER  Medications   sodium chloride 0.9 % flush 10 mL (has no administration in time range)   furosemide (LASIX) injection 80 mg (has no administration in time range)   cefTRIAXone (ROCEPHIN) 2,000 mg in sodium chloride 0.9 % 100 mL MBP (has no administration in time range)   acetaminophen (TYLENOL) tablet 1,000 mg (1,000 mg Oral Given 11/5/24 9874)          ORDERS PLACED DURING THIS VISIT:  Orders Placed This Encounter   Procedures    Blood Culture - Blood,    Blood Culture - Blood,    Respiratory Panel PCR w/COVID-19(SARS-CoV-2) [facility] In-House, NP Swab in UTM/VTM, 2 HR TAT - Swab, Nasopharynx    XR Chest 1 View    XR Elbow 2 View Right    [place] Draw    Comprehensive Metabolic Panel    BNP    Single High Sensitivity Troponin T    CBC Auto Differential    Lactic Acid, Plasma    Procalcitonin    Ethanol    Manual Differential    NPO Diet NPO Type: Strict NPO    Undress & Gown    Continuous Pulse Oximetry    Vital Signs    Clinical [place] Withdrawal Assessment    Irrigate wound    LHA (on-call MD unless specified) Details    LCG (on-call MD unless specified)    Oxygen Therapy- Nasal Cannula; Titrate 1-6 LPM Per SpO2; 90 - 95%    ECG 12 Lead ED Triage Standing Order; SOA    Insert Peripheral IV    Inpatient Admission    CBC & Differential    Green Top (Gel)    Lavender Top    Gold Top - SST    Light Blue Top         OUTPATIENT MEDICATION MANAGEMENT:  Current Facility-Administered Medications Ordered in Epic   Medication Dose Route Frequency Provider Last Rate Last Admin    cefTRIAXone (ROCEPHIN) 2,000 mg in sodium chloride 0.9 % 100 mL MBP  2,000 mg Intravenous Once Robbie iWlson MD        furosemide (LASIX) injection 80 mg  80 mg Intravenous Once Robbie Wilson MD        sodium chloride 0.9 % flush 10 mL  10 mL Intravenous PRN Robbie Wilson MD         Current Outpatient Medications Ordered in Epic   Medication Sig Dispense Refill    albuterol sulfate HFA (Proventil HFA) 108 (90 Base) MCG/ACT inhaler Inhale 2 puffs Every 4 (Four) Hours As Needed for Wheezing. 18 g 0    amLODIPine (NORVASC) 10 MG tablet Take 1 tablet by mouth Daily. 90 tablet 1    Ascorbic Acid (VITAMIN C PO) Take 1 tablet by mouth Daily.      aspirin 81 MG EC tablet Take 1 tablet by mouth Daily. (Patient taking differently: Take 1 tablet by mouth Every Night.) 30 tablet 3     buPROPion XL (Wellbutrin XL) 300 MG 24 hr tablet Take 1 tablet by mouth Daily. 90 tablet 1    famotidine (PEPCID) 20 MG tablet Take 1 tablet by mouth Daily.      Ferrous Sulfate (Iron) 28 MG tablet Take 1 tablet by mouth Daily.      FLUoxetine (PROzac) 40 MG capsule Take 2 capsules by mouth Daily. 180 capsule 1    folic acid (FOLVITE) 1 MG tablet Take 1 tablet by mouth Daily. 30 tablet 3    losartan (COZAAR) 100 MG tablet TAKE 1 TABLET BY MOUTH EVERY DAY 90 tablet 3    metoprolol succinate XL (TOPROL-XL) 100 MG 24 hr tablet Take 1 tablet by mouth every night at bedtime. 90 tablet 3    multivitamin with minerals tablet tablet Take 1 tablet by mouth Daily. 30 tablet 3    nitroglycerin (NITROSTAT) 0.4 MG SL tablet Place 1 tablet under the tongue Every 5 (Five) Minutes As Needed for Chest Pain (Only if SBP Greater Than 100). Take no more than 3 doses in 15 minutes. 30 tablet 3    rosuvastatin (CRESTOR) 20 MG tablet TAKE 1 TABLET BY MOUTH EVERY NIGHT 90 tablet 3    thiamine (VITAMIN B-1) 100 MG tablet  tablet Take 1 tablet by mouth Daily. 30 tablet 3    Trelegy Ellipta 100-62.5-25 MCG/INH inhaler Inhale 1 puff Daily.           PROCEDURES  Procedures      Total critical care time: Approximately 35 minutes    Due to a high probability of clinically significant, life threatening deterioration, the patient required my highest level of preparedness to intervene emergently and I personally spent this critical care time directly and personally managing the patient. This critical care time included obtaining a history; examining the patient; vital sign monitoring; ordering and review of studies; arranging urgent treatment with development of a management plan; evaluation of patient's response to treatment; frequent reassessment; and, discussions with other providers.    This critical care time was performed to assess and manage the high probability of imminent, life-threatening deterioration that could result in multi-organ  failure. It was exclusive of separately billable procedures and treating other patients and teaching time.    Please see MDM section and the rest of the note for further information on patient assessment and treatment.        PROGRESS, DATA ANALYSIS, CONSULTS, AND MEDICAL DECISION MAKING  All labs have been independently interpreted by me.  All radiology studies have been reviewed by me. All EKG's have been independently viewed and interpreted by me.  Discussion below represents my analysis of pertinent findings related to patient's condition, differential diagnosis, treatment plan and final disposition.    Differential diagnosis:   My differential diagnosis for dyspnea includes but is not limited to:  Asthma, COPD, pneumonia, pulmonary embolus, acute respiratory distress syndrome, pneumothorax, pleural effusion, pulmonary fibrosis, congestive heart failure, myocardial infarction, DKA, uremia, acidosis, sepsis, anemia, drug related, hyperventilation, CNS disease      Clinical Scores:                  ED Course as of 11/05/24 1912 Tue Nov 05, 2024 1655 EKG           EKG time: 1650  Rhythm/Rate: Sinus, 65  P waves and WV: JASMIN within normal limits  QRS, axis: Narrow complex  ST and T waves: No STEMI; inverted T waves 1 and aVL    Interpreted Contemporaneously by me, independently viewed  Comparison: 8/20/2024-similar ST/T wave abnormalities in 1 and aVL   [RS]   1656 Based on the patient's presentation and concerned about both the possibility of pneumonia causing sepsis as well as possible alcohol withdrawal.  The patient tells me that he drinks on the about 3 beers daily. [RS]   1844 HS Troponin T: 13 [RS]   1846 Procalcitonin: 0.17 [RS]   1846 COVID19: Not Detected [RS]   1846 Influenza A PCR: Not Detected [RS]   1846 Influenza B PCR: Not Detected [RS]   1846 Lactate: 1.0 [RS]   1846 proBNP(!): 5,202.0 [RS]   1846 Hemoglobin(!): 10.6  Somewhat chronic anemia slightly decreased today [RS]   1846 WBC(!): 20.98 [RS]    1846 Neutrophils Absolute(!): 19.04 [RS]   1846 Creatinine(!): 1.49  Worsened as compared to prior [RS]   1846 RADIOLOGY      Study: Single view chest  Findings: Prior sternotomy.  Increase in social pattern and bilateral lower lung fields suggesting decompensated CHF  I independently viewed and interpreted these images contemporaneously with treatment.    [RS]   1849 Patient appears to have cellulitis right elbow from fall and injury with a small draining abscess.  He has a bit of [place] today and on exam consistent with fluid volume overload.  Will initiate Lasix, ceftriaxone, and recommend admission.  Patient agreeable. [RS]   1856 I have updated the patient and spouse all findings recommendation for admission.  The patient spouse very concerned that he has been having claudication quite severely recently and that is leading to his falls.  Patient is known vasculopath and very likely has severe disease.  He is already established with vascular surgery, Dr. Kumar.  I recommend that we tackle his acute congestive heart failure and then consider following up with vascular surgery. [RS]   1909 CONSULT        Provider: Dr. Brunner - [facility]    Discussion: Reviewed patient history, ED presentation evaluation as well as therapies initiated the ED.  Agreeable to accept patient for admission.    Agreeable c treatment and planned disposition.         [RS]   1911 CONSULT        Provider: Dr. Giordano - [facility] [place]    Discussion: Reviewed patient history, ED presentation and evaluation as well as therapies initiated the ED.  Agreeable to consult.    Agreeable c treatment and planned disposition.         [RS]      ED Course User Index  [RS] Robbie Wilson MD         Prescription drug monitoring program review:     AS OF 19:12 EST VITALS:    BP - 148/85  HR - 66  TEMP - 99.4 °F (37.4 °C) (Oral)  O2 SATS - 93%    COMPLEXITY OF CARE  The patient requires admission.      DIAGNOSIS  Final diagnoses:   Hypoxemic respiratory failure, chronic    Acute pulmonary edema   Acute on chronic congestive heart failure, unspecified heart failure type   Cellulitis of right elbow   Claudication         DISPOSITION  ED Disposition       ED Disposition   Decision to Admit    Condition   --    Comment   Level of Care: Telemetry [5]   Diagnosis: Acute hypoxemic respiratory failure [[id number]]   Admitting Physician: GENESIS CONN [[id number]]   Certification: I Certify That Inpatient Hospital Services Are Medically Necessary For Greater Than 2 Midnights                    ADMISSION    Discussed treatment plan and reason for admission with pt/family and admitting physician.  Pt/family voiced understanding of the plan for admission for further testing/treatment as needed.       Please note that portions of this document were completed with a voice recognition program.    Note Disclaimer: At [facility], we believe that sharing information builds trust and better relationships. You are receiving this note because you recently visited [facility]. It is possible you will see health information before a provider has talked with you about it. This kind of information can be easy to misunderstand. To help you fully understand what it means for your health, we urge you to discuss this note with your provider.         Robbie Wilson MD  11/05/24 1909       Robbie Wilson MD  11/05/24 1912

## 2024-11-06 ENCOUNTER — APPOINTMENT (OUTPATIENT)
Dept: CARDIOLOGY | Facility: HOSPITAL | Age: 69
DRG: 291 | End: 2024-11-06
Payer: MEDICARE

## 2024-11-06 PROBLEM — L03.90 CELLULITIS: Status: ACTIVE | Noted: 2024-11-06

## 2024-11-06 PROBLEM — I50.33 ACUTE ON CHRONIC HEART FAILURE WITH PRESERVED EJECTION FRACTION (HFPEF): Status: ACTIVE | Noted: 2024-11-06

## 2024-11-06 PROBLEM — J44.9 COPD (CHRONIC OBSTRUCTIVE PULMONARY DISEASE): Status: ACTIVE | Noted: 2022-03-26
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
## 2024-11-06 LAB
ANION GAP SERPL CALCULATED.3IONS-SCNC: 13 MMOL/L (ref 5–15)
BH CV LOWER ARTERIAL LEFT ABI RATIO: 1.13
BH CV LOWER ARTERIAL LEFT DORSALIS PEDIS SYS MAX: 191
BH CV LOWER ARTERIAL LEFT GREAT TOE SYS MAX: 121
BH CV LOWER ARTERIAL LEFT POST TIBIAL SYS MAX: 158
BH CV LOWER ARTERIAL LEFT TBI RATIO: 0.72
BH CV LOWER ARTERIAL RIGHT ABI RATIO: 1.14
BH CV LOWER ARTERIAL RIGHT DORSALIS PEDIS SYS MAX: 190
BH CV LOWER ARTERIAL RIGHT GREAT TOE SYS MAX: 134
BH CV LOWER ARTERIAL RIGHT POST TIBIAL SYS MAX: 192
BH CV LOWER ARTERIAL RIGHT TBI RATIO: 0.79
BUN SERPL-MCNC: 22 MG/DL (ref 8–23)
BUN/CREAT SERPL: 15.9 (ref 7–25)
CALCIUM SPEC-SCNC: 8.6 MG/DL (ref 8.6–10.5)
CHLORIDE SERPL-SCNC: 102 MMOL/L (ref 98–107)
CO2 SERPL-SCNC: 21 MMOL/L (ref 22–29)
CREAT SERPL-MCNC: 1.38 MG/DL (ref 0.76–1.27)
DEPRECATED RDW RBC AUTO: 47.6 FL (ref 37–54)
EGFRCR SERPLBLD CKD-EPI 2021: 55.4 ML/MIN/1.73
ERYTHROCYTE [DISTWIDTH] IN BLOOD BY AUTOMATED COUNT: 14.4 % (ref 12.3–15.4)
GLUCOSE SERPL-MCNC: 141 MG/DL (ref 65–99)
HCT VFR BLD AUTO: 30.6 % (ref 37.5–51)
HGB BLD-MCNC: 10.1 G/DL (ref 13–17.7)
MAGNESIUM SERPL-MCNC: 2.2 MG/DL (ref 1.6–2.4)
MCH RBC QN AUTO: 30.2 PG (ref 26.6–33)
MCHC RBC AUTO-ENTMCNC: 33 G/DL (ref 31.5–35.7)
MCV RBC AUTO: 91.6 FL (ref 79–97)
PHOSPHATE SERPL-MCNC: 2.6 MG/DL (ref 2.5–4.5)
PLATELET # BLD AUTO: 276 10*3/MM3 (ref 140–450)
PMV BLD AUTO: 9.9 FL (ref 6–12)
POTASSIUM SERPL-SCNC: 3.1 MMOL/L (ref 3.5–5.2)
POTASSIUM SERPL-SCNC: 3.8 MMOL/L (ref 3.5–5.2)
RBC # BLD AUTO: 3.34 10*6/MM3 (ref 4.14–5.8)
SODIUM SERPL-SCNC: 136 MMOL/L (ref 136–145)
UPPER ARTERIAL LEFT ARM BRACHIAL SYS MAX: 155
UPPER ARTERIAL RIGHT ARM BRACHIAL SYS MAX: 169
WBC NRBC COR # BLD AUTO: 14.16 10*3/MM3 (ref 3.4–10.8)

## 2024-11-06 PROCEDURE — 94799 UNLISTED PULMONARY SVC/PX: CPT

## 2024-11-06 PROCEDURE — 93922 UPR/L XTREMITY ART 2 LEVELS: CPT

## 2024-11-06 PROCEDURE — 84132 ASSAY OF SERUM POTASSIUM: CPT | Performed by: STUDENT IN AN ORGANIZED HEALTH CARE EDUCATION/TRAINING PROGRAM

## 2024-11-06 PROCEDURE — 25010000002 FUROSEMIDE PER 20 MG: Performed by: STUDENT IN AN ORGANIZED HEALTH CARE EDUCATION/TRAINING PROGRAM

## 2024-11-06 PROCEDURE — 80048 BASIC METABOLIC PNL TOTAL CA: CPT | Performed by: STUDENT IN AN ORGANIZED HEALTH CARE EDUCATION/TRAINING PROGRAM

## 2024-11-06 PROCEDURE — 99232 SBSQ HOSP IP/OBS MODERATE 35: CPT | Performed by: NURSE PRACTITIONER

## 2024-11-06 PROCEDURE — 25010000002 CEFTRIAXONE PER 250 MG: Performed by: STUDENT IN AN ORGANIZED HEALTH CARE EDUCATION/TRAINING PROGRAM

## 2024-11-06 PROCEDURE — 94640 AIRWAY INHALATION TREATMENT: CPT

## 2024-11-06 PROCEDURE — 84100 ASSAY OF PHOSPHORUS: CPT | Performed by: STUDENT IN AN ORGANIZED HEALTH CARE EDUCATION/TRAINING PROGRAM

## 2024-11-06 PROCEDURE — 85027 COMPLETE CBC AUTOMATED: CPT | Performed by: STUDENT IN AN ORGANIZED HEALTH CARE EDUCATION/TRAINING PROGRAM

## 2024-11-06 PROCEDURE — 25010000002 HEPARIN (PORCINE) PER 1000 UNITS: Performed by: STUDENT IN AN ORGANIZED HEALTH CARE EDUCATION/TRAINING PROGRAM

## 2024-11-06 PROCEDURE — 83735 ASSAY OF MAGNESIUM: CPT | Performed by: STUDENT IN AN ORGANIZED HEALTH CARE EDUCATION/TRAINING PROGRAM

## 2024-11-06 PROCEDURE — 93922 UPR/L XTREMITY ART 2 LEVELS: CPT | Performed by: SURGERY

## 2024-11-06 RX ORDER — POTASSIUM CHLORIDE 750 MG/1
40 TABLET, FILM COATED, EXTENDED RELEASE ORAL EVERY 4 HOURS
Status: COMPLETED | OUTPATIENT
Start: 2024-11-06 | End: 2024-11-06

## 2024-11-06 RX ORDER — IPRATROPIUM BROMIDE AND ALBUTEROL SULFATE 2.5; .5 MG/3ML; MG/3ML
3 SOLUTION RESPIRATORY (INHALATION)
Status: DISCONTINUED | OUTPATIENT
Start: 2024-11-06 | End: 2024-11-08 | Stop reason: HOSPADM

## 2024-11-06 RX ADMIN — Medication 10 ML: at 20:19

## 2024-11-06 RX ADMIN — Medication 10 ML: at 08:08

## 2024-11-06 RX ADMIN — HEPARIN SODIUM 5000 UNITS: 5000 INJECTION INTRAVENOUS; SUBCUTANEOUS at 14:29

## 2024-11-06 RX ADMIN — IPRATROPIUM BROMIDE AND ALBUTEROL SULFATE 3 ML: 2.5; .5 SOLUTION RESPIRATORY (INHALATION) at 20:34

## 2024-11-06 RX ADMIN — CLINDAMYCIN HYDROCHLORIDE 450 MG: 150 CAPSULE ORAL at 23:12

## 2024-11-06 RX ADMIN — CLINDAMYCIN HYDROCHLORIDE 450 MG: 150 CAPSULE ORAL at 14:25

## 2024-11-06 RX ADMIN — CLINDAMYCIN HYDROCHLORIDE 450 MG: 150 CAPSULE ORAL at 05:26

## 2024-11-06 RX ADMIN — POTASSIUM CHLORIDE 40 MEQ: 750 TABLET, EXTENDED RELEASE ORAL at 18:00

## 2024-11-06 RX ADMIN — POTASSIUM CHLORIDE 40 MEQ: 750 TABLET, EXTENDED RELEASE ORAL at 08:37

## 2024-11-06 RX ADMIN — CEFTRIAXONE 2000 MG: 2 INJECTION, POWDER, FOR SOLUTION INTRAMUSCULAR; INTRAVENOUS at 23:12

## 2024-11-06 RX ADMIN — POTASSIUM CHLORIDE 40 MEQ: 750 TABLET, EXTENDED RELEASE ORAL at 14:24

## 2024-11-06 RX ADMIN — HEPARIN SODIUM 5000 UNITS: 5000 INJECTION INTRAVENOUS; SUBCUTANEOUS at 05:26

## 2024-11-06 RX ADMIN — FUROSEMIDE 40 MG: 10 INJECTION, SOLUTION INTRAMUSCULAR; INTRAVENOUS at 20:19

## 2024-11-06 RX ADMIN — FUROSEMIDE 40 MG: 10 INJECTION, SOLUTION INTRAMUSCULAR; INTRAVENOUS at 08:33

## 2024-11-06 RX ADMIN — HEPARIN SODIUM 5000 UNITS: 5000 INJECTION INTRAVENOUS; SUBCUTANEOUS at 23:12

## 2024-11-06 NOTE — ED NOTES
Nursing report ED to floor  Osman Aparicio  69 y.o.  male      Osman Aparicio is a 69 y.o. male with a medical history of hypertension, chronic CHF, CAD status post CABG, COPD, AAA s/p repair and peripheral vascular disease who presents to the ED c/o acute dyspnea began while he was playing golf today.  Patient denies any chest pain.  Rare cough.  Not aware of any fevers or chills.  Patient apparently fell while golfing today and that triggered EMS to come.  The patient also reports that he had a fall yesterday where he injured his right elbow.  He was helping a gentleman look for his car keys in a [place] when he slipped and fell.  He denies any other injuries other than the elbow from that.  Patient does report that he has supplemental oxygen that he wears at night but does not require it during the day.  Patient was noted to be hypoxemic in triage though it is not documented I was told that he was in the 70s.  HPI :  HPI  Stated Reason for Visit: fall  History Obtained From: EMS    Chief Complaint  Chief Complaint   Patient presents with    Fall       Admitting doctor:   Betzy Brunner DO    Admitting diagnosis:   The primary encounter diagnosis was Hypoxemic respiratory failure, chronic. Diagnoses of Acute pulmonary edema, Acute on chronic congestive heart failure, unspecified heart failure type, Cellulitis of right elbow, and Claudication were also pertinent to this visit.    Code status:   Current Code Status       Date Active Code Status Order ID Comments User Context       Prior            Allergies:   Patient has no known allergies.    Isolation:   No active isolations    Intake and Output  No intake or output data in the 24 hours ending 11/05/24 1918    Weight:   There were no vitals filed for this visit.    Most recent vitals:   Vitals:    11/05/24 1728 11/05/24 1731 11/05/24 1801 11/05/24 1831   BP:  148/72 146/65 148/85   Pulse:  66 66 66   Resp:       Temp: 99.4 °F (37.4 °C)      TempSrc: Oral      SpO2:  92%  92% 93%       Active LDAs/IV Access:   Lines, Drains & Airways       Active LDAs       Name Placement date Placement time Site Days    Peripheral IV 11/05/24 1916 Anterior;Distal;Left;Upper Arm 11/05/24 1916  Arm  less than 1                    Labs (abnormal labs have a star):   Labs Reviewed   COMPREHENSIVE METABOLIC PANEL - Abnormal; Notable for the following components:       Result Value    Glucose 117 (*)     Creatinine 1.49 (*)     Sodium 132 (*)     Chloride 96 (*)     CO2 21.4 (*)     eGFR 50.5 (*)     All other components within normal limits    Narrative:     GFR Normal >60  Chronic Kidney Disease <60  Kidney Failure <15     BNP (IN-HOUSE) - Abnormal; Notable for the following components:    proBNP 5,202.0 (*)     All other components within normal limits    Narrative:     This assay is used as an aid in the diagnosis of individuals suspected of having heart failure. It can be used as an aid in the diagnosis of acute decompensated heart failure (ADHF) in patients presenting with signs and symptoms of ADHF to the emergency department (ED). In addition, NT-proBNP of <300 pg/mL indicates ADHF is not likely.    Age Range Result Interpretation  NT-proBNP Concentration (pg/mL:      <50             Positive            >450                   Gray                 300-450                    Negative             <300    50-75           Positive            >900                  Gray                300-900                  Negative            <300      >75             Positive            >1800                  Gray                300-1800                  Negative            <300   CBC WITH AUTO DIFFERENTIAL - Abnormal; Notable for the following components:    WBC 20.98 (*)     RBC 3.40 (*)     Hemoglobin 10.6 (*)     Hematocrit 31.8 (*)     All other components within normal limits   MANUAL DIFFERENTIAL - Abnormal; Notable for the following components:    Neutrophil % 90.8 (*)     Lymphocyte % 3.1 (*)     Eosinophil  % 0.0 (*)     Neutrophils Absolute 19.04 (*)     Lymphocytes Absolute 0.65 (*)     Monocytes Absolute 1.07 (*)     Basophils Absolute 0.21 (*)     nRBC 1.0 (*)     All other components within normal limits   RESPIRATORY PANEL PCR W/ COVID-19 (SARS-COV-2), NP SWAB IN UTM/VTP, 2 HR TAT - Normal    Narrative:     In the setting of a positive respiratory panel with a viral infection PLUS a negative procalcitonin without other underlying concern for bacterial infection, consider observing off antibiotics or discontinuation of antibiotics and continue supportive care. If the respiratory panel is positive for atypical bacterial infection (Bordetella pertussis, Chlamydophila pneumoniae, or Mycoplasma pneumoniae), consider antibiotic de-escalation to target atypical bacterial infection.   SINGLE HS TROPONIN T - Normal    Narrative:     High Sensitive Troponin T Reference Range:  <14.0 ng/L- Negative Female for AMI  <22.0 ng/L- Negative Male for AMI  >=14 - Abnormal Female indicating possible myocardial injury.  >=22 - Abnormal Male indicating possible myocardial injury.   Clinicians would have to utilize clinical acumen, EKG, Troponin, and serial changes to determine if it is an Acute Myocardial Infarction or myocardial injury due to an underlying chronic condition.        LACTIC ACID, PLASMA - Normal   PROCALCITONIN - Normal    Narrative:     As a Marker for Sepsis (Non-Neonates):    1. <0.5 ng/mL represents a low risk of severe sepsis and/or septic shock.  2. >2 ng/mL represents a high risk of severe sepsis and/or septic shock.    As a Marker for Lower Respiratory Tract Infections that require antibiotic therapy:    PCT on Admission    Antibiotic Therapy       6-12 Hrs later    >0.5                Strongly Recommended  >0.25 - <0.5        Recommended   0.1 - 0.25          Discouraged              Remeasure/reassess PCT  <0.1                Strongly Discouraged     Remeasure/reassess PCT    As 28 day mortality risk marker:  "\"Change in Procalcitonin Result\" (>80% or <=80%) if Day 0 (or Day 1) and Day 4 values are available. Refer to http://[email or web address]-pct-calculator.com    Change in PCT <=80%  A decrease of PCT levels below or equal to 80% defines a positive change in PCT test result representing a higher risk for 28-day all-cause mortality of patients diagnosed with severe sepsis for septic shock.    Change in PCT >80%  A decrease of PCT levels of more than 80% defines a negative change in PCT result representing a lower risk for 28-day all-cause mortality of patients diagnosed with severe sepsis or septic shock.      BLOOD CULTURE   BLOOD CULTURE   RAINBOW DRAW    Narrative:     The following orders were created for panel order [place] Draw.  Procedure                               Abnormality         Status                     ---------                               -----------         ------                     Green Top (Gel)[[id number]]                                  Final result               Lavender Top[[id number]]                                     Final result               Gold Top - SST[[id number]]                                   Final result               Light Blue Top[[id number]]                                   Final result                 Please view results for these tests on the individual orders.   ETHANOL   CBC AND DIFFERENTIAL    Narrative:     The following orders were created for panel order CBC & Differential.  Procedure                               Abnormality         Status                     ---------                               -----------         ------                     CBC Auto Differential[[id number]]        Abnormal            Final result                 Please view results for these tests on the individual orders.   GREEN TOP   LAVENDER TOP   GOLD TOP - SST   LIGHT BLUE TOP       EKG:   ECG 12 Lead ED Triage Standing Order; SOA   Final Result   HEART RATE=66  bpm   RR Tkxmxdcv=826  ms   AL " Ikdozger=169  ms   P Horizontal Axis=10  deg   P Front Axis=59  deg   QRSD Oprbagsv=739  ms   QT Ngfbpmmn=591  ms   ZPaP=646  ms   QRS Axis=-16  deg   T Wave Axis=101  deg   - ABNORMAL ECG -   Sinus rhythm   Ventricular premature complex   Left atrial enlargement   Abnormal R-wave progression, early transition   LVH with secondary repolarization abnormality   When compared with ECG of 20-Aug-2024 15:16:06,   No significant change   Electronically Signed By: Hakeem Mckee ([facility]) 2024-11-05 18:44:04   Date and Time of Study:2024-11-05 16:50:10          Meds given in ED:   Medications   sodium chloride 0.9 % flush 10 mL (has no administration in time range)   cefTRIAXone (ROCEPHIN) 2,000 mg in sodium chloride 0.9 % 100 mL MBP (2,000 mg Intravenous New Bag 11/5/24 1918)   acetaminophen (TYLENOL) tablet 1,000 mg (1,000 mg Oral Given 11/5/24 1754)   furosemide (LASIX) injection 80 mg (80 mg Intravenous Given 11/5/24 1916)       Imaging results:  XR Elbow 2 View Right    Result Date: 11/5/2024  Posterior soft tissue swelling and questionable posterior soft tissue wound, without radiographic evidence of acute fracture or dislocation.  This report was finalized on 11/5/2024 5:38 PM by Orville Bee MD on Workstation: [id number]      XR Chest 1 View    Result Date: 11/5/2024  Similar-appearing chronic diffuse patchy groundglass opacities and interstitial thickening throughout both lungs, which could represent chronic lung disease, atypical pneumonia, or pulmonary edema, with superimposed apical predominant chronic emphysematous changes and nodular biapical pleural/parenchymal scarring.  This report was finalized on 11/5/2024 5:25 PM by Orville Bee MD on Workstation: [id number]       Ambulatory status:   -     Social issues:   Social History     Socioeconomic History    Marital status:      Spouse name: Suzanne   Tobacco Use    Smoking status: Former     Current packs/day: 0.00     Average packs/day: 1 pack/day for  45.0 years (45.0 ttl pk-yrs)     Types: Cigarettes     Start date: 3/26/1977     Quit date: 3/26/2022     Years since quittin.6     Passive exposure: Past    Smokeless tobacco: Never    Tobacco comments:     caffeine - 3 cups coffee daily, tea or soda occas.   Vaping Use    Vaping status: Never Used   Substance and Sexual Activity    Alcohol use: Yes     Alcohol/week: 2.0 standard drinks of alcohol     Types: 2 Cans of beer per week    Drug use: No    Sexual activity: Defer       Peripheral Neurovascular  Peripheral Neurovascular (Adult)  Peripheral Neurovascular WDL: WDL, neurovascular assessment upper, neurovascular assessment lower, pulse assessment  Pulse Assessment: radial  LUE Neurovascular Assessment  Temperature LUE: warm  Color LUE: no discoloration  Sensation LUE: no tenderness, no tingling, no numbness  RUE Neurovascular Assessment  Temperature RUE: warm  Color RUE: no discoloration  Sensation RUE: no numbness, no tenderness, no tingling  LLE Neurovascular Assessment  Temperature LLE: warm  Color LLE: no discoloration  Sensation LLE: no numbness, no tenderness, no tingling  RLE Neurovascular Assessment  Temperature RLE: warm  Color RLE: no discoloration  Sensation RLE: no numbness, no tenderness, no tingling    Neuro Cognitive  Neuro Cognitive (Adult)  Cognitive/Neuro/Behavioral WDL: WDL, orientation  Orientation: oriented x 4  Pupils  Pupil PERRLA: yes    Learning  Learning Assessment  Learning Readiness and Ability: no barriers identified  Education Provided  Person Taught: patient  Teaching Method: verbal instruction  Teaching Focus: symptom/problem overview  Education Outcome Evaluation: eager to learn, acceptance expressed, verbalizes understanding    Respiratory  Respiratory WDL  Respiratory WDL: .WDL except, rhythm/pattern  Rhythm/Pattern, Respiratory: shortness of breath, tachypneic    Abdominal Pain       Pain Assessments  Pain (Adult)  (0-10) Pain Rating: Rest: 0    NIH Stroke Scale        Nelson Quinn RN  11/05/24 19:18 EST

## 2024-11-06 NOTE — NURSING NOTE
11/06/24 1011   Wound 11/05/24 1658 Right lower arm skin tear   Placement Date/Time: 11/05/24 1658   Side: Right  Orientation: lower  Location: arm  Primary Wound Type: Skin Tear  Type: skin tear  Present on Original Admission: Yes   Dressing Appearance dry;intact   Base dry;purple;red;scab   Periwound   (bruising, purple)   Edges irregular   Drainage Amount none   Care, Wound cleansed with;sterile normal saline   Dressing Care   (xeroform gauze, roll gauze, secured with tape)     WOCN: [place]. No drainage noted, scabs appearing, Wound care performed. Please change every several days. Does not complain of pain.

## 2024-11-06 NOTE — PROGRESS NOTES
Patient Name: Osman Aparicio  :1955  69 y.o.    Date of Admission: 2024  Date of Consultation:  24  Encounter Provider: TAYLOR Johnson  Place of Service: [facility] CARDIOLOGY  Referring Provider: Robbie Wilson MD  Patient Care Team:  Desire Hughes)TAYLOR as PCP - General (Family Medicine)  Desire Hughes APRN ([place]) as Nurse Practitioner (Family Medicine)  Jose Sanchez MD as Consulting Physician (Hematology and Oncology)  Desire Hughes)TAYLOR as Referring Physician (Family Medicine)      Chief complaint: SOA    History of Present Illness: Mr. Aparicio is a 69 year old gentleman followed by Dr. Claudio for coronary artery disease status post CABG, hypertension, hyperlipidemia and tobacco use. He had AAA status post endovascular graft .     He was admitted in August for exertional dyspnea as well as some hypoxia with exertion. He had a left and right heart catheterization that showed no evidence of heart failure, patent grafts, no pulmonary hypertension. He was discharged with exertional oxygen and plans for continued outpatient follow up with pulmonology. He was treated with azithromycin as an outpatient and seemed to do better.     He has had worsening dyspnea on exertion over the past few days. He was able to play golf on Monday. On Tuesday - he had to stop in the middle of the first hole due to severe shortness of breath. He has not been using exertional oxygen. Only at night.     He stopped smoking in .      He presented to the emergency room for evaluation of SOA.      HS troponin 13 then 14. proBNP 5200 (previous 1800). WBC 14K. Hgb 10 (near baseline). ER felt CXR was consistent with pulmonary edema and treated with IV diuretics. He is also on abx for right elbow cellulitis - suffered a fall.      1125 UOP recorded. Weight is stable from previous. If anything - he thinks his weight is down a little.     He does report he  has probably gotten into more sodium than normal. He denies chest pain or pressure. Denies palpitations.     Cardiac cath 8/22/24  Findings:  1. Coronary Artery Anatomy:  Dominance: Codominant  Left Main: Large-caliber vessel that bifurcates into a large caliber LAD and large caliber circumflex.  This vessel is free of disease.  Left Anterior Descending: Large-caliber vessel that is occluded at the ostium.  It is supplied via the LIMA.  Circumflex Artery: Large-caliber vessel with a 20% ostial occlusion.  There is a 30% occlusion in the midsegment.  Gives rise to a medium caliber OM1 that has severe diffuse disease.  There is a large caliber OM 2 with a 90% proximal stenosis.  Distally the vessel gives rise to a large caliber LPDA that is free of disease.  Right Coronary Artery:  Large-caliber vessel with a 40% stenosis in the midsegment.  It bifurcates distally to medium caliber RPL and RPDA, both of which have minor luminal irregularities.        2.  Coronary bypass graft anatomy:  LIMA to LAD: Large-caliber vessel that is tortuous but free of disease.  It anastomosis in the mid LAD.  The LAD distal to this is of medium caliber, has a long area of mild disease in the midsegment.  Distally it is free of disease.  SVG to OM1: Large-caliber vessel with mild disease.  SVG to OM 2: Large-caliber vessel with a 30% stenosis in the proximal segment.  The middle third of the vessel is free of disease.  There is a 20% stenosis in the distal third.  It anastomosis in the mid OM2.                3. Hemodynamics:  Right Atrium: Mean of 4 mmHg  Right Ventricle: 28/2 mmHg  Pulmonary Artery: 20/4/12 mmHg  Pulmonary Wedge: Mean of 5 mmHg  Left Ventricle: 175/15 mmHg  Aorta: 175/59/105 mmHg  Cardiac Output/Index: 3.26 L/min 1.6 L/min/m2  PA Sat: 50%  AO Sat: 91%        Conclusions:  Severe native vessel disease with patent mid LAD, SVG to OM1, and SVG to OM 2.  Normal right and left-sided filling pressures.     Echo 8/21/24    Left  ventricular systolic function is normal. Calculated left ventricular EF = 61.5%    Left ventricular wall thickness is consistent with mild concentric hypertrophy.    Left ventricular diastolic function was normal.    Left atrial volume is mildly increased.    Estimated right ventricular systolic pressure from tricuspid regurgitation is mildly elevated (35-45 mmHg). Calculated right ventricular systolic pressure from tricuspid regurgitation is 38 mmHg.    Mild pulmonary hypertension is present.    The aortic root measures 4.1 cm.    Past Medical History:   Diagnosis Date    AAA (abdominal aortic aneurysm)     Abnormal glucose     Anxiety     CHF (congestive heart failure)     COPD (chronic obstructive pulmonary disease)     Coronary artery disease     Encounter for special screening examination for neoplasm of prostate 10/2012    Hematuria     JUST MONITORING    History of COVID-19     2021    Hyperlipidemia     Hypertension     Myocardial infarction 03/2022    Radius fracture     RIGHT    Vitamin D deficiency disease        Past Surgical History:   Procedure Laterality Date    ARTERIOGRAM AORTIC N/A 01/30/2023    Procedure: Zenith Fenestrated Endovascular Repair;  Surgeon: Mariusz Kumar MD;  Location: [facility] OR 18/19;  Service: Vascular;  Laterality: N/A;    CARDIAC CATHETERIZATION N/A 03/29/2022    Procedure: Left Heart Cath;  Surgeon: Neto Paige MD;  Location: [facility] INVASIVE LOCATION;  Service: Cardiology;  Laterality: N/A;    CARDIAC CATHETERIZATION N/A 03/29/2022    Procedure: Coronary angiography;  Surgeon: Nteo Paige MD;  Location: [facility] INVASIVE LOCATION;  Service: Cardiology;  Laterality: N/A;    CARDIAC CATHETERIZATION N/A 03/29/2022    Procedure: Left ventriculography;  Surgeon: Neto Paige MD;  Location: [facility] CATH INVASIVE LOCATION;  Service: Cardiology;  Laterality: N/A;    CARDIAC CATHETERIZATION N/A 8/22/2024    Procedure: Right and Left  Heart Cath;  Surgeon: Jarrod Jasso MD;  Location: [facility] CATH INVASIVE LOCATION;  Service: Cardiovascular;  Laterality: N/A;  History of pulmonary hypertension, worsening dyspnea on exertion, evaluate for worsening CAD or PHTN    CARDIAC CATHETERIZATION N/A 8/22/2024    Procedure: Coronary angiography;  Surgeon: Jarrod Jasso MD;  Location: [facility] CATH INVASIVE LOCATION;  Service: Cardiovascular;  Laterality: N/A;    CARDIAC CATHETERIZATION  8/22/2024    Procedure: Saphenous Vein Graft;  Surgeon: Jarrod Jasso MD;  Location: [facility] CATH INVASIVE LOCATION;  Service: Cardiovascular;;    CARDIAC CATHETERIZATION N/A 8/22/2024    Procedure: Native mammary injection;  Surgeon: Jarrod Jasso MD;  Location: [facility] CATH INVASIVE LOCATION;  Service: Cardiovascular;  Laterality: N/A;    COLONOSCOPY N/A 12/07/2020    Procedure: COLONOSCOPY INTO CECUM WITH HOT SNARE POLYPECTOMIES, COLD BX POLYPECTOMIES, RESOLUTION CLIPS X;  Surgeon: Regi Mercado MD;  Location: [facility] ENDOSCOPY;  Service: General;  Laterality: N/A;  PRE:  POSITIVE COLOGUARD  POST:  POLYPS    CORONARY ARTERY BYPASS GRAFT N/A 04/01/2022    Procedure: STERNOTOMY, CORONARY ARTERY BYPASS UTILIZINGTHE RT SAPHENOUS VEIN WITH LEFT INTERNAL MAMMARY ARTERY GRAFT X3 OFF PUMP, PRP;  Surgeon: Colten Zamora MD;  Location: [facility];  Service: Cardiothoracic;  Laterality: N/A;    ORIF WRIST FRACTURE Right 08/26/2022    Procedure: RIGHT DISTAL RADIUS FRACTURE OPEN REDUCTION INTERNAL FIXATION;  Surgeon: Bubba Mello MD;  Location[facility] [place] [facility];  Service: Orthopedics;  Laterality: Right;    TRANSESOPHAGEAL ECHOCARDIOGRAM (MARTHA) N/A 04/01/2022    Procedure: TRANSESOPHAGEAL ECHOCARDIOGRAM WITH ANESTHESIA;  Surgeon: Colten Zamora MD;  Location: [facility];  Service: Cardiothoracic;  Laterality: N/A;         Prior to Admission medications    Medication Sig Start Date End Date Taking? Authorizing Provider   albuterol sulfate HFA (Proventil HFA) 108 (90 Base)  MCG/ACT inhaler Inhale 2 puffs Every 4 (Four) Hours As Needed for Wheezing. 4/5/22  Yes Saima Yancey MD   amLODIPine (NORVASC) 10 MG tablet Take 1 tablet by mouth Daily. 10/17/24  Yes Desire HughesPercy)TAYLOR   Ascorbic Acid (VITAMIN C PO) Take 1 tablet by mouth Daily.   Yes ProviderPrincess MD   aspirin 81 MG EC tablet Take 1 tablet by mouth Daily.  Patient taking differently: Take 1 tablet by mouth Every Night. 8/30/22  Yes Helen Talavera APRN   buPROPion XL (Wellbutrin XL) 300 MG 24 hr tablet Take 1 tablet by mouth Daily. 10/17/24  Yes Desire HughesPercy)TAYLOR   famotidine (PEPCID) 20 MG tablet Take 1 tablet by mouth Daily.   Yes ProviderPrincess MD   Ferrous Sulfate (Iron) 28 MG tablet Take 1 tablet by mouth Daily.   Yes ProviderPrincess MD   FLUoxetine (PROzac) 40 MG capsule Take 2 capsules by mouth Daily. 10/17/24  Yes Desire Hughes (PercyTAYLOR Benson   losartan (COZAAR) 100 MG tablet TAKE 1 TABLET BY MOUTH EVERY DAY 11/27/23  Yes Helen Talavera APRN   metoprolol succinate XL (TOPROL-XL) 100 MG 24 hr tablet Take 1 tablet by mouth every night at bedtime. 11/20/23  Yes Helen Talavera APRN   multivitamin with minerals tablet tablet Take 1 tablet by mouth Daily. 8/30/22  Yes Helen Talavera APRN   pantoprazole (PROTONIX) 20 MG EC tablet Take 1 tablet by mouth Daily.   Yes ProviderPrincess MD   rosuvastatin (CRESTOR) 20 MG tablet TAKE 1 TABLET BY MOUTH EVERY NIGHT 5/13/24  Yes Helen Talavera APRN   Trelegy Ellipta 100-62.5-25 MCG/INH inhaler Inhale 1 puff Daily.   Yes ProviderPrincess MD   folic acid (FOLVITE) 1 MG tablet Take 1 tablet by mouth Daily. 4/6/22   Saima Yancey MD   nitroglycerin (NITROSTAT) 0.4 MG SL tablet Place 1 tablet under the tongue Every 5 (Five) Minutes As Needed for Chest Pain (Only if SBP Greater Than 100). Take no more than 3 doses in 15 minutes. 8/29/23   Helen Talavera APRN    thiamine (VITAMIN B-1) 100 MG tablet  tablet Take 1 tablet by mouth Daily. 22   Desire Hughes ([place]), APRN       No Known Allergies    Social History     Socioeconomic History    Marital status:      Spouse name: Suzanne   Tobacco Use    Smoking status: Former     Current packs/day: 0.00     Average packs/day: 1 pack/day for 45.0 years (45.0 ttl pk-yrs)     Types: Cigarettes     Start date: 3/26/1977     Quit date: 3/26/2022     Years since quittin.6     Passive exposure: Past    Smokeless tobacco: Never    Tobacco comments:     caffeine - 3 cups coffee daily, tea or soda occas.   Vaping Use    Vaping status: Never Used   Substance and Sexual Activity    Alcohol use: Yes     Alcohol/week: 2.0 standard drinks of alcohol     Types: 2 Cans of beer per week    Drug use: No    Sexual activity: Defer       Family History   Problem Relation Age of Onset    Lung cancer Brother     Liver cancer Brother     Colon polyps Brother     Malig Hyperthermia Neg Hx        REVIEW OF SYSTEMS:   All systems reviewed.  Pertinent positives identified in HPI.  All other systems are negative.      Objective:     Vitals:    24 2105 24 2150 24 0106 24 0644   BP: 148/62  125/57    BP Location: Left arm  Right arm    Patient Position: Lying  Lying    Pulse: 62  58    Resp: 18  18    Temp: 98.2 °F (36.8 °C)  98.4 °F (36.9 °C)    TempSrc: Oral  Oral    SpO2: 96%  94%    Weight: 84.1 kg (185 lb 6.5 oz) 82.3 kg (181 lb 6.4 oz)  82.3 kg (181 lb 8 oz)     Body mass index is 25.31 kg/m².    Physical Exam:  Constitutional: He is oriented to person, place, and time. He appears well-developed. He does not appear ill.   HENT:   Head: Normocephalic and atraumatic. Head is without contusion.   Right Ear: Hearing normal. No drainage.   Left Ear: Hearing normal. No drainage.   Nose: No nasal deformity. No epistaxis.   Eyes: Lids are normal. Right eye exhibits no exudate. Left eye exhibits no exudate.  Neck: No JVD  present. Carotid bruit is not present. No tracheal deviation present. No thyroid mass and no thyromegaly present.   Cardiovascular: Normal rate, regular rhythm and normal heart sounds.    Pulses:       Posterior tibial pulses are 2+ on the right side, and 2+ on the left side.   Pulmonary/Chest: Effort normal and breath sounds normal.   Abdominal: Soft. Normal appearance and bowel sounds are normal. There is no tenderness.   Musculoskeletal: Normal range of motion.        Right shoulder: He exhibits no deformity.        Left shoulder: He exhibits no deformity.   Neurological: He is alert and oriented to person, place, and time. He has normal strength.   Skin: Skin is warm, dry and intact. No rash noted.   Psychiatric: He has a normal mood and affect. His behavior is normal. Thought content normal.   Vitals reviewed      Lab Review:     Results from last 7 days   Lab Units 11/06/24  0513 11/05/24  1655   SODIUM mmol/L 136 132*   POTASSIUM mmol/L 3.1* 4.0   CHLORIDE mmol/L 102 96*   CO2 mmol/L 21.0* 21.4*   BUN mg/dL 22 21   CREATININE mg/dL 1.38* 1.49*   CALCIUM mg/dL 8.6 9.4   BILIRUBIN mg/dL  --  0.8   ALK PHOS U/L  --  93   ALT (SGPT) U/L  --  9   AST (SGOT) U/L  --  20   GLUCOSE mg/dL 141* 117*     Results from last 7 days   Lab Units 11/05/24  2227 11/05/24 2011 11/05/24  1655   HSTROP T ng/L 14 13 13     Results from last 7 days   Lab Units 11/06/24  0513   WBC 10*3/mm3 14.16*   HEMOGLOBIN g/dL 10.1*   HEMATOCRIT % 30.6*   PLATELETS 10*3/mm3 276         Results from last 7 days   Lab Units 11/06/24  0513   MAGNESIUM mg/dL 2.2                 Current Facility-Administered Medications:     sennosides-docusate (PERICOLACE) 8.6-50 MG per tablet 2 tablet, 2 tablet, Oral, BID PRN **AND** polyethylene glycol (MIRALAX) packet 17 g, 17 g, Oral, Daily PRN **AND** bisacodyl (DULCOLAX) EC tablet 5 mg, 5 mg, Oral, Daily PRN **AND** bisacodyl (DULCOLAX) suppository 10 mg, 10 mg, Rectal, Daily PRN, Betzy Brunner, DO     budesonide-formoterol (SYMBICORT) 160-4.5 MCG/ACT inhaler 2 puff, 2 puff, Inhalation, BID - RT, Betzy Brunner DO    Calcium Replacement - Follow Nurse / BPA Driven Protocol, , Does not apply, PRN, Betzy Brunner DO    cefTRIAXone (ROCEPHIN) 2,000 mg in sodium chloride 0.9 % 100 mL MBP, 2,000 mg, Intravenous, Q24H, Betzy Brunner DO, Last Rate: 200 mL/hr at 11/05/24 2314, 2,000 mg at 11/05/24 2314    clindamycin (CLEOCIN) capsule 450 mg, 450 mg, Oral, Q8H, Betzy Brunner DO, 450 mg at 11/06/24 0526    furosemide (LASIX) injection 40 mg, 40 mg, Intravenous, Q12H, Betzy Brunner DO    heparin (porcine) 5000 UNIT/ML injection 5,000 Units, 5,000 Units, Subcutaneous, Q8H, Betzy Brunner DO, 5,000 Units at 11/06/24 0526    ipratropium-albuterol (DUO-NEB) nebulizer solution 3 mL, 3 mL, Nebulization, Q6H PRN, Betzy Brunner DO    Magnesium Standard Dose Replacement - Follow Nurse / BPA Driven Protocol, , Does not apply, PRMyesha JANE Waitman, DO    nitroglycerin (NITROSTAT) SL tablet 0.4 mg, 0.4 mg, Sublingual, Q5 Min PRN, Betzy Brunner DO    Phosphorus Replacement - Follow Nurse / BPA Driven Protocol, , Does not apply, PRNMyesha Waitman, DO    Potassium Replacement - Follow Nurse / BPA Driven Protocol, , Does not apply, PRNMyehsa Waitman, DO    sodium chloride 0.9 % flush 10 mL, 10 mL, Intravenous, PRN, Robbie Wilson MD    sodium chloride 0.9 % flush 10 mL, 10 mL, Intravenous, Q12H, Betzy Brunner DO, 10 mL at 11/05/24 2033    sodium chloride 0.9 % flush 10 mL, 10 mL, Intravenous, PRN, Betzy Brunner DO    sodium chloride 0.9 % infusion 40 mL, 40 mL, Intravenous, PRN, Betzy Brunner DO    Assessment and Plan:       Active Hospital Problems    Diagnosis  POA    **Acute hypoxemic respiratory failure [J96.01]  Yes    Fall [W19.XXXA]  Unknown    Acute on chronic heart failure with preserved ejection fraction (HFpEF) [I50.33]  Unknown    Cellulitis [L03.90]  Unknown    Hypoxia [R09.02]  Yes    COPD  (chronic obstructive pulmonary disease) [J44.9]  Unknown    Coronary artery disease involving native coronary artery of native heart without angina pectoris [I25.10]  Yes      Resolved Hospital Problems   No resolved problems to display.     Dyspnea on exertion, extensive cardiac evaluation including R/LHC in August was pretty unrevealing for cardiac etiology. proBNP is higher. Increased salt intake. Will treat for HFpEF. Continue IV lasix today.  This is likely multifactorial.   Coronary artery disease status post CABG (2022) patent grafts by cath August 2024.  Claudication, CORRY ordered  AAA status post endovascular aortic stenting January 2023  Hypertension   COPD  Former tobacco use , quit 2022.   Left arm abrasion / cellulitis (mechanical fall) . On abx.      Shayy Salinas, APRN  11/06/24  08:04 EST

## 2024-11-06 NOTE — H&P
Patient Name:  Osman Aparicio  YOB: 1955  MRN:  [id number]  Admit Date:  11/5/2024  Patient Care Team:  Desire Hughes)TAYLOR as PCP - General (Family Medicine)  Desire Hughes)TAYLOR as Nurse Practitioner (Family Medicine)  Jose Sanchez MD as Consulting Physician (Hematology and Oncology)  Desire Hughes)TAYLOR as Referring Physician (Family Medicine)      Subjective   History Present Illness     Chief Complaint   Patient presents with    Fall       Mr. Aparicio is a 69 y.o. with PMHx of HTN, HLD, CAD, AAA s/p surgery, COPD, HF who presents due to dyspnea.  Patient admits to dyspnea that started [place]  Also Admits to fall yesterday while looking for car key in a stream when he fell and injured his right elbow.  States the dyspnea has progressed to at rest.  Denies fevers, chest pain, productive cough, vomiting diarrhea.  Presented to [facility] for evaluation.    In the ER, vitals 99.4, HR 66m /85, RR 18, 93% RA.  Labs notable for HS troponin 13, proBNP 5202, sodium 132, Cl 96, Cr 1.49, glucose 117, procal 0.17, wbc 20.98, hgb 10.6.  CXR concerning for pulmonary edema.  Right elbow x-ray with soft tissue swelling.  He is to be admitted for acute on chronic HFpEF complicated by suspected right elbow cellulitis.       History of Present Illness    Review of Systems   12 point ROS reviewed and negative except as mentioned above      Personal History     Past Medical History:   Diagnosis Date    AAA (abdominal aortic aneurysm)     Abnormal glucose     Anxiety     COPD (chronic obstructive pulmonary disease)     Coronary artery disease     Encounter for special screening examination for neoplasm of prostate 10/2012    Hematuria     JUST MONITORING    History of COVID-19     2021    Hyperlipidemia     Hypertension     Myocardial infarction 03/2022    Radius fracture     RIGHT    Vitamin D deficiency disease      Past Surgical History:    Procedure Laterality Date    ARTERIOGRAM AORTIC N/A 01/30/2023    Procedure: Zenith Fenestrated Endovascular Repair;  Surgeon: Mariusz Kumar MD;  Location: [facility] 18/19;  Service: Vascular;  Laterality: N/A;    CARDIAC CATHETERIZATION N/A 03/29/2022    Procedure: Left Heart Cath;  Surgeon: Neto Paige MD;  Location: [facility] CATH INVASIVE LOCATION;  Service: Cardiology;  Laterality: N/A;    CARDIAC CATHETERIZATION N/A 03/29/2022    Procedure: Coronary angiography;  Surgeon: Neto Paige MD;  Location: [facility] CATH INVASIVE LOCATION;  Service: Cardiology;  Laterality: N/A;    CARDIAC CATHETERIZATION N/A 03/29/2022    Procedure: Left ventriculography;  Surgeon: Neto Paige MD;  Location: [facility] INVASIVE LOCATION;  Service: Cardiology;  Laterality: N/A;    CARDIAC CATHETERIZATION N/A 8/22/2024    Procedure: Right and Left Heart Cath;  Surgeon: Jarrod Jasso MD;  Location: [facility] INVASIVE LOCATION;  Service: Cardiovascular;  Laterality: N/A;  History of pulmonary hypertension, worsening dyspnea on exertion, evaluate for worsening CAD or PHTN    CARDIAC CATHETERIZATION N/A 8/22/2024    Procedure: Coronary angiography;  Surgeon: Jarrod Jasso MD;  Location: [facility] INVASIVE LOCATION;  Service: Cardiovascular;  Laterality: N/A;    CARDIAC CATHETERIZATION  8/22/2024    Procedure: Saphenous Vein Graft;  Surgeon: Jarrod Jasso MD;  Location: [facility] INVASIVE LOCATION;  Service: Cardiovascular;;    CARDIAC CATHETERIZATION N/A 8/22/2024    Procedure: Native mammary injection;  Surgeon: Jarrod Jasso MD;  Location: [facility] CATH INVASIVE LOCATION;  Service: Cardiovascular;  Laterality: N/A;    COLONOSCOPY N/A 12/07/2020    Procedure: COLONOSCOPY INTO CECUM WITH HOT SNARE POLYPECTOMIES, COLD BX POLYPECTOMIES, RESOLUTION CLIPS X;  Surgeon: Regi Mercado MD;  Location: [facility] ENDOSCOPY;  Service: General;  Laterality: N/A;  PRE:  POSITIVE COLOGUARD  POST:  POLYPS     CORONARY ARTERY BYPASS GRAFT N/A 2022    Procedure: STERNOTOMY, CORONARY ARTERY BYPASS UTILIZINGTHE RT SAPHENOUS VEIN WITH LEFT INTERNAL MAMMARY ARTERY GRAFT X3 OFF PUMP, PRP;  Surgeon: Colten Zamora MD;  Location: [facility];  Service: Cardiothoracic;  Laterality: N/A;    ORIF WRIST FRACTURE Right 2022    Procedure: RIGHT DISTAL RADIUS FRACTURE OPEN REDUCTION INTERNAL FIXATION;  Surgeon: Bubba Mello MD;  Location: [facility];  Service: Orthopedics;  Laterality: Right;    TRANSESOPHAGEAL ECHOCARDIOGRAM (MARTHA) N/A 2022    Procedure: TRANSESOPHAGEAL ECHOCARDIOGRAM WITH ANESTHESIA;  Surgeon: Colten Zamora MD;  Location: [facility];  Service: Cardiothoracic;  Laterality: N/A;     Family History   Problem Relation Age of Onset    Lung cancer Brother     Liver cancer Brother     Colon polyps Brother     Malig Hyperthermia Neg Hx      Social History     Tobacco Use    Smoking status: Former     Current packs/day: 0.00     Average packs/day: 1 pack/day for 45.0 years (45.0 ttl pk-yrs)     Types: Cigarettes     Start date: 3/26/1977     Quit date: 3/26/2022     Years since quittin.6     Passive exposure: Past    Smokeless tobacco: Never    Tobacco comments:     caffeine - 3 cups coffee daily, tea or soda occas.   Vaping Use    Vaping status: Never Used   Substance Use Topics    Alcohol use: Yes     Alcohol/week: 2.0 standard drinks of alcohol     Types: 2 Cans of beer per week    Drug use: No     No current facility-administered medications on file prior to encounter.     Current Outpatient Medications on File Prior to Encounter   Medication Sig Dispense Refill    albuterol sulfate HFA (Proventil HFA) 108 (90 Base) MCG/ACT inhaler Inhale 2 puffs Every 4 (Four) Hours As Needed for Wheezing. 18 g 0    amLODIPine (NORVASC) 10 MG tablet Take 1 tablet by mouth Daily. 90 tablet 1    Ascorbic Acid (VITAMIN C PO) Take 1 tablet by mouth Daily.      aspirin 81 MG EC tablet Take 1  tablet by mouth Daily. (Patient taking differently: Take 1 tablet by mouth Every Night.) 30 tablet 3    buPROPion XL (Wellbutrin XL) 300 MG 24 hr tablet Take 1 tablet by mouth Daily. 90 tablet 1    famotidine (PEPCID) 20 MG tablet Take 1 tablet by mouth Daily.      Ferrous Sulfate (Iron) 28 MG tablet Take 1 tablet by mouth Daily.      FLUoxetine (PROzac) 40 MG capsule Take 2 capsules by mouth Daily. 180 capsule 1    folic acid (FOLVITE) 1 MG tablet Take 1 tablet by mouth Daily. 30 tablet 3    losartan (COZAAR) 100 MG tablet TAKE 1 TABLET BY MOUTH EVERY DAY 90 tablet 3    metoprolol succinate XL (TOPROL-XL) 100 MG 24 hr tablet Take 1 tablet by mouth every night at bedtime. 90 tablet 3    multivitamin with minerals tablet tablet Take 1 tablet by mouth Daily. 30 tablet 3    nitroglycerin (NITROSTAT) 0.4 MG SL tablet Place 1 tablet under the tongue Every 5 (Five) Minutes As Needed for Chest Pain (Only if SBP Greater Than 100). Take no more than 3 doses in 15 minutes. 30 tablet 3    rosuvastatin (CRESTOR) 20 MG tablet TAKE 1 TABLET BY MOUTH EVERY NIGHT 90 tablet 3    thiamine (VITAMIN B-1) 100 MG tablet  tablet Take 1 tablet by mouth Daily. 30 tablet 3    Trelegy Ellipta 100-62.5-25 MCG/INH inhaler Inhale 1 puff Daily.       No Known Allergies    Objective    Objective     Vital Signs  Temp:  [99.4 °F (37.4 °C)-100.6 °F (38.1 °C)] 99.4 °F (37.4 °C)  Heart Rate:  [66-70] 66  Resp:  [18] 18  BP: (141-154)/(62-85) 148/85  SpO2:  [92 %-93 %] 93 %  on  Flow (L/min) (Oxygen Therapy):  [2] 2;   Device (Oxygen Therapy): nasal cannula  There is no height or weight on file to calculate BMI.    Physical Exam  Constitutional:       General: He is not in acute distress.     Appearance: Normal appearance.   HENT:      Head: Normocephalic and atraumatic.      Nose: Nose normal. No congestion.      Mouth/Throat:      Pharynx: Oropharynx is clear. No oropharyngeal exudate.   Eyes:      General: No scleral icterus.  Cardiovascular:       Rate and Rhythm: Normal rate and regular rhythm.      Heart sounds: No murmur heard.     No friction rub. No gallop.   Pulmonary:      Effort: No respiratory distress.      Breath sounds: Rhonchi present. No wheezing.   Abdominal:      General: There is no distension.      Tenderness: There is no abdominal tenderness. There is no guarding.   Musculoskeletal:         General: No swelling, deformity or signs of injury.      Cervical back: Normal range of motion. No rigidity.   Skin:     Coloration: Skin is not jaundiced.      Findings: No bruising or lesion.   Neurological:      General: No focal deficit present.      Mental Status: He is alert and oriented to person, place, and time.      Motor: No weakness.         Results Review:  I reviewed the patient's new clinical results.  I reviewed the patient's new imaging results and agree with the interpretation.  I reviewed the patient's other test results and agree with the interpretation  I personally viewed and interpreted the patient's EKG/Telemetry data  Discussed with ED provider.    Lab Results (last 24 hours)       Procedure Component Value Units Date/Time    CBC & Differential [[id number]]  (Abnormal) Collected: 11/05/24 1655    Specimen: Blood from Arm, Left Updated: 11/05/24 1708    Narrative:      The following orders were created for panel order CBC & Differential.  Procedure                               Abnormality         Status                     ---------                               -----------         ------                     CBC Auto Differential[[id number]]        Abnormal            Final result                 Please view results for these tests on the individual orders.    Comprehensive Metabolic Panel [[id number]]  (Abnormal) Collected: 11/05/24 1655    Specimen: Blood from Arm, Left Updated: 11/05/24 1805     Glucose 117 mg/dL      BUN 21 mg/dL      Creatinine 1.49 mg/dL      Sodium 132 mmol/L      Potassium 4.0 mmol/L      Chloride 96 mmol/L       CO2 21.4 mmol/L      Calcium 9.4 mg/dL      Total Protein 8.1 g/dL      Albumin 4.1 g/dL      ALT (SGPT) 9 U/L      AST (SGOT) 20 U/L      Alkaline Phosphatase 93 U/L      Total Bilirubin 0.8 mg/dL      Globulin 4.0 gm/dL      A/G Ratio 1.0 g/dL      BUN/Creatinine Ratio 14.1     Anion Gap 14.6 mmol/L      eGFR 50.5 mL/min/1.73     Narrative:      GFR Normal >60  Chronic Kidney Disease <60  Kidney Failure <15      BNP [[id number]]  (Abnormal) Collected: 11/05/24 1655    Specimen: Blood from Arm, Left Updated: 11/05/24 1805     proBNP 5,202.0 pg/mL     Narrative:      This assay is used as an aid in the diagnosis of individuals suspected of having heart failure. It can be used as an aid in the diagnosis of acute decompensated heart failure (ADHF) in patients presenting with signs and symptoms of ADHF to the emergency department (ED). In addition, NT-proBNP of <300 pg/mL indicates ADHF is not likely.    Age Range Result Interpretation  NT-proBNP Concentration (pg/mL:      <50             Positive            >450                   Gray                 300-450                    Negative             <300    50-75           Positive            >900                  Gray                300-900                  Negative            <300      >75             Positive            >1800                  Gray                300-1800                  Negative            <300    Single High Sensitivity Troponin T [[id number]]  (Normal) Collected: 11/05/24 1655    Specimen: Blood from Arm, Left Updated: 11/05/24 1805     HS Troponin T 13 ng/L     Narrative:      High Sensitive Troponin T Reference Range:  <14.0 ng/L- Negative Female for AMI  <22.0 ng/L- Negative Male for AMI  >=14 - Abnormal Female indicating possible myocardial injury.  >=22 - Abnormal Male indicating possible myocardial injury.   Clinicians would have to utilize clinical acumen, EKG, Troponin, and serial changes to determine if it is an Acute Myocardial  "Infarction or myocardial injury due to an underlying chronic condition.         CBC Auto Differential [[id number]]  (Abnormal) Collected: 11/05/24 1655    Specimen: Blood from Arm, Left Updated: 11/05/24 1708     WBC 20.98 10*3/mm3      RBC 3.40 10*6/mm3      Hemoglobin 10.6 g/dL      Hematocrit 31.8 %      MCV 93.5 fL      MCH 31.2 pg      MCHC 33.3 g/dL      RDW 14.4 %      RDW-SD 48.6 fl      MPV 9.9 fL      Platelets 318 10*3/mm3      nRBC 0.2 /100 WBC     Procalcitonin [[id number]]  (Normal) Collected: 11/05/24 1655    Specimen: Blood from Arm, Left Updated: 11/05/24 1742     Procalcitonin 0.17 ng/mL     Narrative:      As a Marker for Sepsis (Non-Neonates):    1. <0.5 ng/mL represents a low risk of severe sepsis and/or septic shock.  2. >2 ng/mL represents a high risk of severe sepsis and/or septic shock.    As a Marker for Lower Respiratory Tract Infections that require antibiotic therapy:    PCT on Admission    Antibiotic Therapy       6-12 Hrs later    >0.5                Strongly Recommended  >0.25 - <0.5        Recommended   0.1 - 0.25          Discouraged              Remeasure/reassess PCT  <0.1                Strongly Discouraged     Remeasure/reassess PCT    As 28 day mortality risk marker: \"Change in Procalcitonin Result\" (>80% or <=80%) if Day 0 (or Day 1) and Day 4 values are available. Refer to http://[email or web address]-pct-calculator.com    Change in PCT <=80%  A decrease of PCT levels below or equal to 80% defines a positive change in PCT test result representing a higher risk for 28-day all-cause mortality of patients diagnosed with severe sepsis for septic shock.    Change in PCT >80%  A decrease of PCT levels of more than 80% defines a negative change in PCT result representing a lower risk for 28-day all-cause mortality of patients diagnosed with severe sepsis or septic shock.       Ethanol [[id number]] Collected: 11/05/24 1655    Specimen: Blood from Arm, Left Updated: 11/05/24 1805     Ethanol <10 " mg/dL      Ethanol % <0.010 %     Manual Differential [[id number]]  (Abnormal) Collected: 11/05/24 1655    Specimen: Blood from Arm, Left Updated: 11/05/24 1738     Neutrophil % 90.8 %      Lymphocyte % 3.1 %      Monocyte % 5.1 %      Eosinophil % 0.0 %      Basophil % 1.0 %      Neutrophils Absolute 19.04 10*3/mm3      Lymphocytes Absolute 0.65 10*3/mm3      Monocytes Absolute 1.07 10*3/mm3      Eosinophils Absolute 0.00 10*3/mm3      Basophils Absolute 0.21 10*3/mm3      nRBC 1.0 /100 WBC      Anisocytosis Slight/1+     Microcytes Slight/1+     Stomatocytes Slight/1+     WBC Morphology Normal     Platelet Morphology Normal    Lactic Acid, Plasma [[id number]]  (Normal) Collected: 11/05/24 1656    Specimen: Blood Updated: 11/05/24 1745     Lactate 1.0 mmol/L     Respiratory Panel PCR w/COVID-19(SARS-CoV-2) ESPINOZA/MIGUEL/AUSTIN/PAD/COR/LORI In-House, NP Swab in UTM/VTM, 2 HR TAT - Swab, Nasopharynx [[id number]]  (Normal) Collected: 11/05/24 1656    Specimen: Swab from Nasopharynx Updated: 11/05/24 1757     ADENOVIRUS, PCR Not Detected     Coronavirus 229E Not Detected     Coronavirus HKU1 Not Detected     Coronavirus NL63 Not Detected     Coronavirus OC43 Not Detected     COVID19 Not Detected     Human Metapneumovirus Not Detected     Human Rhinovirus/Enterovirus Not Detected     Influenza A PCR Not Detected     Influenza B PCR Not Detected     Parainfluenza Virus 1 Not Detected     Parainfluenza Virus 2 Not Detected     Parainfluenza Virus 3 Not Detected     Parainfluenza Virus 4 Not Detected     RSV, PCR Not Detected     Bordetella pertussis pcr Not Detected     Bordetella parapertussis PCR Not Detected     Chlamydophila pneumoniae PCR Not Detected     Mycoplasma pneumo by PCR Not Detected    Narrative:      In the setting of a positive respiratory panel with a viral infection PLUS a negative procalcitonin without other underlying concern for bacterial infection, consider observing off antibiotics or discontinuation of  antibiotics and continue supportive care. If the respiratory panel is positive for atypical bacterial infection (Bordetella pertussis, Chlamydophila pneumoniae, or Mycoplasma pneumoniae), consider antibiotic de-escalation to target atypical bacterial infection.    Blood Culture - Blood, Arm, Left [[id number]] Collected: 11/05/24 1712    Specimen: Blood from Arm, Left Updated: 11/05/24 1724    Blood Culture - Blood, Arm, Right [[id number]] Collected: 11/05/24 1749    Specimen: Blood from Arm, Right Updated: 11/05/24 1757            Imaging Results (Last 24 Hours)       Procedure Component Value Units Date/Time    XR Elbow 2 View Right [[id number]] Collected: 11/05/24 1736     Updated: 11/05/24 1741    Narrative:      XR ELBOW 2 VW RIGHT-     DATE OF EXAM: 11/5/2024 5:28 PM     INDICATION: Trauma and swelling. Fell and hit elbow on rock yesterday.  Puncture wound with probable infection.     COMPARISON: None available.     TECHNIQUE: 2 views of the elbow were obtained.     FINDINGS:  No evidence of acute fracture or dislocation. Tiny medial and lateral  epicondyle enthesophytes. Mild DJD at the ulnotrochlear compartment of  the elbow. No significant elbow joint effusion. Posterior soft tissue  swelling with questionable soft tissue wound posteriorly.       Impression:      Posterior soft tissue swelling and questionable posterior soft tissue  wound, without radiographic evidence of acute fracture or dislocation.     This report was finalized on 11/5/2024 5:38 PM by Orville Bee MD on  Workstation: [id number]       XR Chest 1 View [[id number]] Collected: 11/05/24 1723     Updated: 11/05/24 1728    Narrative:      XR CHEST 1 VW-     DATE OF EXAM: 11/5/2024 5:03 PM     INDICATION: SOA Triage Protocol.     COMPARISON: Radiographs 8/20/2024, 4/5/2023, and 5/16/2022. CT 9/18/2024  and 8/20/2024.     TECHNIQUE: A single portable AP view of the chest was obtained.     FINDINGS:  Lordotic positioning. Overlying artifacts.  Stable sternotomy wires and  postoperative changes from CABG. Low lung volumes. Similar-appearing  diffuse patchy groundglass opacities and interstitial thickening  throughout both lungs. Superimposed apical predominant chronic  emphysematous changes and nodular right apical pleural-parenchymal  scarring. No pneumothorax. Unchanged cardiac and mediastinal contours.  Calcified atherosclerotic disease in the thoracic aorta. No acute  osseous abnormality is identified.       Impression:      Similar-appearing chronic diffuse patchy groundglass opacities and  interstitial thickening throughout both lungs, which could represent  chronic lung disease, atypical pneumonia, or pulmonary edema, with  superimposed apical predominant chronic emphysematous changes and  nodular biapical pleural/parenchymal scarring.     This report was finalized on 11/5/2024 5:25 PM by Orville Bee MD on  Workstation: [id number]               Results for orders placed during the hospital encounter of 08/20/24    Adult Transthoracic Echo Complete W/ Cont if Necessary Per Protocol    Interpretation Summary    Left ventricular systolic function is normal. Calculated left ventricular EF = 61.5%    Left ventricular wall thickness is consistent with mild concentric hypertrophy.    Left ventricular diastolic function was normal.    Left atrial volume is mildly increased.    Estimated right ventricular systolic pressure from tricuspid regurgitation is mildly elevated (35-45 mmHg). Calculated right ventricular systolic pressure from tricuspid regurgitation is 38 mmHg.    Mild pulmonary hypertension is present.    The aortic root measures 4.1 cm.      ECG 12 Lead ED Triage Standing Order; SOA   Final Result   HEART RATE=66  bpm   RR Fvuwzspv=833  ms   MT Mtczwtyn=870  ms   P Horizontal Axis=10  deg   P Front Axis=59  deg   QRSD Lrqmtkhe=888  ms   QT Tnlasarw=022  ms   BZuV=871  ms   QRS Axis=-16  deg   T Wave Axis=101  deg   - ABNORMAL ECG -   Sinus  rhythm   Ventricular premature complex   Left atrial enlargement   Abnormal R-wave progression, early transition   LVH with secondary repolarization abnormality   When compared with ECG of 20-Aug-2024 15:16:06,   No significant change   Electronically Signed By: Hakeem Mckee ([facility]) 2024-11-05 18:44:04   Date and Time of Study:2024-11-05 16:50:10           Assessment/Plan   There are no hospital problems to display for this patient.      #Recurrent Fall    - suspect recurrent falls due to Heart failure complicated by multiple comorbidities    - pt/ot    #Acute on Chronic Hypoxic Respiratory Failure    - Patient was 78% on RA in Triage per Ed, currently on RA at rest     - Most likely 2/2 HF, treat as below      #Acute on Chronic HFpEF    - proBNP 5202    - HS troponin 13, trend    - EKG NSR with PVCs, no gross ischemia    - echo on 8/21/24 with EF 61.5% and TR    - CXR reviewed    - Lasix 40mg Iv BID    - intake and output    - weight daily    - cardiology    - pt/ot    - CM    #Cellulitis  #Leukocytosis    - appears to have right elbow cellulitis from previous falls    - wound culture    - blood cultures    - MRSA swab     - Right elbow x-ray shows soft tissue swelling    - Rocephin 2g Iv daily    - Clindamycin 450mg PO TID    - wbc 20.98, trend    - 100.6F in ED, tylenol PRN    #COPD    - doubt COPD exacerbation, monitor    - Duoneb PRN    - Symbicort BID    #CAD    - s/p cath on 8/22/24    #PVD  #Claudication  #AAA    - s/p AAA endovascular repair    - check CORRY's    #[place]  #Hyponatremia    - Cr 1.49 on admission, base around 1.0    - monitor response closely with diuresis    - sodium 132 with Cl 96 on admissoin, suspect hypervolemic and will improve with diuresis    #Anemia    - Hgb 10.6 on admission, base around 12.5    - no overt bleeding    - suspect will improve with diuresis    #HTN    - resume home Norvasc    #HLD    - resume home statin        I discussed the patient's findings and my recommendations with  patient, spouse, and ED provider.    VTE Prophylaxis - SCDs.  Code Status - Full code.       Betzy Brunner DO  [facility]  11/05/24  19:00 EST

## 2024-11-06 NOTE — CASE MANAGEMENT/SOCIAL WORK
Discharge Planning Assessment  [facility]     Patient Name: Osman Aparicio  MRN: [id number]  Today's Date: 11/6/2024    Admit Date: 11/5/2024    Plan: Home with spouse   Discharge Needs Assessment       Row Name 11/06/24 1437       Living Environment    People in Home spouse    Current Living Arrangements home    Potentially Unsafe Housing Conditions none    Primary Care Provided by self    Provides Primary Care For no one    Family Caregiver if Needed none    Quality of Family Relationships helpful;involved;supportive       Resource/Environmental Concerns    Resource/Environmental Concerns none    Transportation Concerns none       Transition Planning    Patient/Family Anticipates Transition to home with family    Patient/Family Anticipated Services at Transition none    Transportation Anticipated family or friend will provide       Discharge Needs Assessment    Readmission Within the Last 30 Days no previous admission in last 30 days    Equipment Currently Used at Home cpap    Anticipated Changes Related to Illness none    Equipment Needed After Discharge none    Provided Post Acute Provider List? N/A    Provided Post Acute Provider Quality & Resource List? N/A                   Discharge Plan       Row Name 11/06/24 1438       Plan    Plan Home with spouse    Patient/Family in Agreement with Plan yes    Plan Comments CCP met with patient at bedside. Introduced self and explained role of CCP. Patient confirmed the information on his face sheet is accurate. Patients PCP is Desire Hughes. Patient is enrolled into [facility]. Patient lives at home with spouse. Patient states he is independent at home. Patient us up ad deidra. Patient has no HH or SNF history. No DME is used. Patient plans home. Spouse to transport.                  Continued Care and Services - Admitted Since 11/5/2024    No active coordination exists for this encounter.       Selected Continued Care - Prior Encounters Includes continued care and service  providers with selected services from prior encounters from 8/7/2024 to 11/6/2024      Discharged on 8/23/2024 Admission date: 8/20/2024 - Discharge disposition: Home or Self Care      Durable Medical Equipment       Service Provider Services Address Phone Fax Patient Preferred    [facility] [employer] [employer] [facility] [employer] [id number], [facility] [id number] [phone] --       Internal Comment last updated by Janna Dutta RN 8/23/2024 1331    Oxygen on exertion                                          Demographic Summary       Row Name 11/06/24 1435       General Information    Admission Type inpatient    Arrived From emergency department    Referral Source admission list    Reason for Consult discharge planning    Preferred Language English                   Functional Status       Row Name 11/06/24 1435       Functional Status    Usual Activity Tolerance good    Current Activity Tolerance good       Functional Status, IADL    Medications independent    Meal Preparation independent    Housekeeping independent    Laundry independent    Shopping independent       Mental Status    General Appearance WDL WDL       Mental Status Summary    Recent Changes in Mental Status/Cognitive Functioning no changes       Employment/    Employment Status retired                   Psychosocial    No documentation.                  Abuse/Neglect    No documentation.                  Legal    No documentation.                  Substance Abuse    No documentation.                  Patient Forms    No documentation.

## 2024-11-06 NOTE — SIGNIFICANT NOTE
11/06/24 0906   OTHER   Discipline physical therapist   Therapy Assessment/Plan (PT)   Criteria for Skilled Interventions Met (PT) no problems identified which require skilled intervention  (pt is up adlib per nsg notes. No indication for acute skilled PT. will s/o.)

## 2024-11-06 NOTE — PLAN OF CARE
Patient had no complaints of pain, A&Ox4, up as deidra, patient stated that cultures had already been collected on wound, did not collect.  Consulted wound care and cardiology.  IV Lasix given, recorded I&O's.     Problem: Adult Inpatient Plan of Care  Goal: Absence of Hospital-Acquired Illness or Injury  Intervention: Identify and Manage Fall Risk  Recent Flowsheet Documentation  Taken 11/6/2024 0400 by Lorelei Matamoros RN  Safety Promotion/Fall Prevention: safety round/check completed  Taken 11/6/2024 0200 by Lorelei Matamoros RN  Safety Promotion/Fall Prevention: safety round/check completed  Taken 11/6/2024 0000 by Lorelei Matamoros RN  Safety Promotion/Fall Prevention: safety round/check completed  Taken 11/5/2024 2200 by Lorelei Matamoros RN  Safety Promotion/Fall Prevention: safety round/check completed  Taken 11/5/2024 2117 by Lorelei Matamoros RN  Safety Promotion/Fall Prevention:   clutter free environment maintained   fall prevention program maintained   nonskid shoes/slippers when out of bed   room organization consistent   safety round/check completed  Intervention: Prevent Skin Injury  Recent Flowsheet Documentation  Taken 11/6/2024 0400 by Lorelei Matamoros RN  Body Position: position changed independently  Taken 11/6/2024 0200 by Lorelei Matamoros RN  Body Position: position changed independently  Taken 11/6/2024 0000 by Lorelei Matamoros RN  Body Position: position changed independently  Taken 11/5/2024 2200 by Lorelei Matamoros RN  Body Position: position changed independently  Taken 11/5/2024 2117 by Lorelei Matamoros RN  Body Position: position changed independently  Intervention: Prevent Infection  Recent Flowsheet Documentation  Taken 11/5/2024 2117 by Lorelei Matamoros RN  Infection Prevention: rest/sleep promoted  Goal: Optimal Comfort and Wellbeing  Intervention: Provide Person-Centered Care  Recent Flowsheet Documentation  Taken 11/5/2024 2117 by Saturnino  Lorelei, RN  Trust Relationship/Rapport: care explained  Goal: Readiness for Transition of Care  Intervention: Mutually Develop Transition Plan  Recent Flowsheet Documentation  Taken 11/5/2024 2119 by Lorelei Matamoros, RN  Transportation Anticipated: family or friend will provide  Patient/Family Anticipated Services at Transition: none  Patient/Family Anticipates Transition to: home with family  Taken 11/5/2024 2115 by Lorelei Matamoros, RN  Equipment Currently Used at Home:   cpap   oxygen   Goal Outcome Evaluation:

## 2024-11-06 NOTE — PROGRESS NOTES
Name: Osman Aparicio ADMIT: 2024   : 1955  PCP: Desire Hughes APRN ([place])    MRN: [id number] LOS: 1 days   AGE/SEX: 69 y.o. male  ROOM: [facility]     Subjective   Subjective   Patient lying in bed.  Reports he is feeling much better.  Shortness of breath improved.  Denies fevers, chills.  No chest palpitations.    Review of Systems   As above  Objective   Objective   Vital Signs  Temp:  [97.7 °F (36.5 °C)-98.4 °F (36.9 °C)] 97.7 °F (36.5 °C)  Heart Rate:  [57-66] 65  Resp:  [16-18] 16  BP: (125-149)/(57-94) 149/67  SpO2:  [92 %-96 %] 92 %  on  Flow (L/min) (Oxygen Therapy):  [2-3] 2;   Device (Oxygen Therapy): room air  Body mass index is 25.31 kg/m².  Physical Exam    General: Alert, laying in bed,sitting up in the bed  HEENT: Normocephalic, atraumatic  CV: Regular rate and rhythm, no murmurs rubs or gallops  Lungs: Diminished, bilateral rhonchi, no wheezing, nonlabored breathing  Abdomen: Soft, nontender, nondistended  Extremities: No significant peripheral edema , right elbow dressing in place    Results Review     I reviewed the patient's new clinical results.  Results from last 7 days   Lab Units 24  0513 24  1655   WBC 10*3/mm3 14.16* 20.98*   HEMOGLOBIN g/dL 10.1* 10.6*   PLATELETS 10*3/mm3 276 318     Results from last 7 days   Lab Units 24  0513 24  1655   SODIUM mmol/L 136 132*   POTASSIUM mmol/L 3.1* 4.0   CHLORIDE mmol/L 102 96*   CO2 mmol/L 21.0* 21.4*   BUN mg/dL 22 21   CREATININE mg/dL 1.38* 1.49*   GLUCOSE mg/dL 141* 117*   Estimated Creatinine Clearance: 58.8 mL/min (A) (by C-G formula based on SCr of 1.38 mg/dL (H)).  Results from last 7 days   Lab Units 24  1655   ALBUMIN g/dL 4.1   BILIRUBIN mg/dL 0.8   ALK PHOS U/L 93   AST (SGOT) U/L 20   ALT (SGPT) U/L 9     Results from last 7 days   Lab Units 24  0513 24  1655   CALCIUM mg/dL 8.6 9.4   ALBUMIN g/dL  --  4.1   MAGNESIUM mg/dL 2.2  --    PHOSPHORUS mg/dL 2.6  --      Results from last 7  "days   Lab Units 11/05/24 1656 11/05/24  1655   PROCALCITONIN ng/mL  --  0.17   LACTATE mmol/L 1.0  --      COVID19   Date Value Ref Range Status   11/05/2024 Not Detected Not Detected - Ref. Range Final   08/20/2024 Not Detected Not Detected - Ref. Range Final     No results found for: \"HGBA1C\", \"POCGLU\"        Doppler Ankle Brachial Index Single Level CAR    Right Conclusion: The right CORRY is normal. Normal digital pressures.    Left Conclusion: The left CORRY is normal. Normal digital pressures.    Scheduled Medications  budesonide-formoterol, 2 puff, Inhalation, BID - RT  cefTRIAXone, 2,000 mg, Intravenous, Q24H  clindamycin, 450 mg, Oral, Q8H  furosemide, 40 mg, Intravenous, Q12H  heparin (porcine), 5,000 Units, Subcutaneous, Q8H  sodium chloride, 10 mL, Intravenous, Q12H    Infusions   Diet  Diet: Cardiac, Diabetic; Healthy Heart (2-3 Na+); Consistent Carbohydrate; Fluid Consistency: Thin (IDDSI 0)    I have personally reviewed     [x]  Laboratory   [x]  Microbiology   [x]  Radiology   [x]  EKG/Telemetry  [x]  Cardiology/Vascular   []  Pathology    []  Records       Assessment/Plan     Active Hospital Problems    Diagnosis  POA    **Acute hypoxemic respiratory failure [J96.01]  Yes    Fall [W19.XXXA]  Unknown    Acute on chronic heart failure with preserved ejection fraction (HFpEF) [I50.33]  Unknown    Cellulitis [L03.90]  Unknown    Hypoxia [R09.02]  Yes    COPD (chronic obstructive pulmonary disease) [J44.9]  Unknown    Coronary artery disease involving native coronary artery of native heart without angina pectoris [I25.10]  Yes      Resolved Hospital Problems   No resolved problems to display.       Mr. Aparicio is a 69 y.o. with PMHx of HTN, HLD, CAD, AAA s/p surgery, COPD, HF who presents due to dyspnea.  Patient admits to dyspnea that started [place].  Also In addition one day prior to admission  while looking for car key in a stream  he fell and injured his " right elbow.     Recurrent Fall   - suspect recurrent falls due to Heart failure complicated by multiple comorbidities   - pt/ot     Acute on Chronic Hypoxic Respiratory Failure  COPD  - Patient was 78% on RA in Triage per Ed,   -X-ray imilar-appearing chronic diffuse patchy groundglass opacities and interstitial thickening throughout both lungs,   - Most likely 2/2 HF as below  -No wheezing on exam  -Scheduled DuoNebs, continue Symbicort  -Incentive spirometer  -Wean off O2 as able, O2 saturation goal 88% and above        #Acute on Chronic HFpEF  CAD  -proBNP 5202- HS troponin 13, trend EKG NSR with PVCs, no gross ischemia  - echo on 8/21/24 with EF 61.5% and TR  -Chest x-ray with questionable edema  -On IV Lasix  -Cardiology following       #Cellulitis  #Leukocytosis  - appears to have right elbow cellulitis from previous falls  - Right elbow x-ray shows soft tissue swelling  - Blood cultures pending  -Continue Rocephin 2g Iv daily,Clindamycin 450mg PO TID  - wbc 20.98 on admission, improving - 100.6F in ED,  -Wound care consulted    #PVD  #Claudication  #AAA    - s/p AAA endovascular repair  -CORRY 11/6/2024- Right Conclusion: The right CORRY is normal. Normal digital pressures.  Left Conclusion: The left CORRY is normal. Normal digital pressures.        CKD stage II/IIIa  #Hyponatremia   - Cr 1.49 on admission, most recent creatinine in 08/2024 between 1.22-1.36   - sodium 132 on admission  -    #Anemia   -Hgb 10.6 on admission, hemoglobin between 9.5-12.5 over the last 1 year  - no overt bleeding  -Iron panel 04/2023 was normal to keep consistent with anemia chronic disease   -hemoglobin 10.1,, recheck iron panel , b12 folate        #HTN  Continue Norvasc     #HLD  Continue statin    SCDs for DVT prophylaxis.  Full code.  Discussed with patient and care team on multidisciplinary rounds.  Expected Discharge Date: 11/8/2024; Expected Discharge Time:        Copied text in this note has been reviewed and is accurate as  of 11/06/24.         Dictated utilizing Dragon dictation        Pedro Ramos MD  [facility]  11/06/24  18:17 EST

## 2024-11-06 NOTE — SIGNIFICANT NOTE
11/06/24 1106   OTHER   Discipline occupational therapist   Rehab Time/Intention   Session Not Performed other (see comments)  (Pt is up Ad deidra to BR w/o assist per RN. No indication for acute skilled OT. will S/o.)   Therapy Assessment/Plan (PT)   Criteria for Skilled Interventions Met (PT) no problems identified which require skilled intervention

## 2024-11-07 ENCOUNTER — APPOINTMENT (OUTPATIENT)
Dept: CT IMAGING | Facility: HOSPITAL | Age: 69
DRG: 291 | End: 2024-11-07
Payer: MEDICARE

## 2024-11-07 LAB
ANION GAP SERPL CALCULATED.3IONS-SCNC: 12.1 MMOL/L (ref 5–15)
BUN SERPL-MCNC: 15 MG/DL (ref 8–23)
BUN/CREAT SERPL: 12 (ref 7–25)
CALCIUM SPEC-SCNC: 9.1 MG/DL (ref 8.6–10.5)
CHLORIDE SERPL-SCNC: 100 MMOL/L (ref 98–107)
CO2 SERPL-SCNC: 22.9 MMOL/L (ref 22–29)
CREAT SERPL-MCNC: 1.25 MG/DL (ref 0.76–1.27)
CRP SERPL-MCNC: 18.33 MG/DL (ref 0–0.5)
DEPRECATED RDW RBC AUTO: 47.1 FL (ref 37–54)
EGFRCR SERPLBLD CKD-EPI 2021: 62.3 ML/MIN/1.73
ERYTHROCYTE [DISTWIDTH] IN BLOOD BY AUTOMATED COUNT: 14.3 % (ref 12.3–15.4)
GLUCOSE SERPL-MCNC: 135 MG/DL (ref 65–99)
HCT VFR BLD AUTO: 34.4 % (ref 37.5–51)
HGB BLD-MCNC: 11.4 G/DL (ref 13–17.7)
MAGNESIUM SERPL-MCNC: 2.2 MG/DL (ref 1.6–2.4)
MCH RBC QN AUTO: 30.6 PG (ref 26.6–33)
MCHC RBC AUTO-ENTMCNC: 33.1 G/DL (ref 31.5–35.7)
MCV RBC AUTO: 92.2 FL (ref 79–97)
PHOSPHATE SERPL-MCNC: 2.9 MG/DL (ref 2.5–4.5)
PLATELET # BLD AUTO: 277 10*3/MM3 (ref 140–450)
PMV BLD AUTO: 9.6 FL (ref 6–12)
POTASSIUM SERPL-SCNC: 3.5 MMOL/L (ref 3.5–5.2)
RBC # BLD AUTO: 3.73 10*6/MM3 (ref 4.14–5.8)
SODIUM SERPL-SCNC: 135 MMOL/L (ref 136–145)
WBC NRBC COR # BLD AUTO: 10.08 10*3/MM3 (ref 3.4–10.8)

## 2024-11-07 PROCEDURE — 25010000002 FUROSEMIDE PER 20 MG: Performed by: STUDENT IN AN ORGANIZED HEALTH CARE EDUCATION/TRAINING PROGRAM

## 2024-11-07 PROCEDURE — 80048 BASIC METABOLIC PNL TOTAL CA: CPT | Performed by: STUDENT IN AN ORGANIZED HEALTH CARE EDUCATION/TRAINING PROGRAM

## 2024-11-07 PROCEDURE — 94761 N-INVAS EAR/PLS OXIMETRY MLT: CPT

## 2024-11-07 PROCEDURE — 25010000002 HEPARIN (PORCINE) PER 1000 UNITS: Performed by: STUDENT IN AN ORGANIZED HEALTH CARE EDUCATION/TRAINING PROGRAM

## 2024-11-07 PROCEDURE — 94760 N-INVAS EAR/PLS OXIMETRY 1: CPT

## 2024-11-07 PROCEDURE — 94799 UNLISTED PULMONARY SVC/PX: CPT

## 2024-11-07 PROCEDURE — 94664 DEMO&/EVAL PT USE INHALER: CPT

## 2024-11-07 PROCEDURE — 36415 COLL VENOUS BLD VENIPUNCTURE: CPT | Performed by: STUDENT IN AN ORGANIZED HEALTH CARE EDUCATION/TRAINING PROGRAM

## 2024-11-07 PROCEDURE — 99223 1ST HOSP IP/OBS HIGH 75: CPT | Performed by: INTERNAL MEDICINE

## 2024-11-07 PROCEDURE — 73201 CT UPPER EXTREMITY W/DYE: CPT

## 2024-11-07 PROCEDURE — 84100 ASSAY OF PHOSPHORUS: CPT | Performed by: STUDENT IN AN ORGANIZED HEALTH CARE EDUCATION/TRAINING PROGRAM

## 2024-11-07 PROCEDURE — 83735 ASSAY OF MAGNESIUM: CPT | Performed by: STUDENT IN AN ORGANIZED HEALTH CARE EDUCATION/TRAINING PROGRAM

## 2024-11-07 PROCEDURE — 25510000001 IOPAMIDOL 61 % SOLUTION: Performed by: HOSPITALIST

## 2024-11-07 PROCEDURE — 86140 C-REACTIVE PROTEIN: CPT | Performed by: INTERNAL MEDICINE

## 2024-11-07 PROCEDURE — 99232 SBSQ HOSP IP/OBS MODERATE 35: CPT | Performed by: STUDENT IN AN ORGANIZED HEALTH CARE EDUCATION/TRAINING PROGRAM

## 2024-11-07 PROCEDURE — 85027 COMPLETE CBC AUTOMATED: CPT | Performed by: STUDENT IN AN ORGANIZED HEALTH CARE EDUCATION/TRAINING PROGRAM

## 2024-11-07 RX ORDER — METOPROLOL SUCCINATE 25 MG/1
12.5 TABLET, EXTENDED RELEASE ORAL
Status: DISCONTINUED | OUTPATIENT
Start: 2024-11-07 | End: 2024-11-07

## 2024-11-07 RX ORDER — AMLODIPINE BESYLATE 5 MG/1
5 TABLET ORAL ONCE
Status: COMPLETED | OUTPATIENT
Start: 2024-11-07 | End: 2024-11-07

## 2024-11-07 RX ORDER — BUPROPION HYDROCHLORIDE 150 MG/1
150 TABLET ORAL DAILY
Status: DISCONTINUED | OUTPATIENT
Start: 2024-11-07 | End: 2024-11-08

## 2024-11-07 RX ORDER — IOPAMIDOL 612 MG/ML
85 INJECTION, SOLUTION INTRAVASCULAR
Status: COMPLETED | OUTPATIENT
Start: 2024-11-07 | End: 2024-11-07

## 2024-11-07 RX ORDER — METOPROLOL SUCCINATE 25 MG/1
25 TABLET, EXTENDED RELEASE ORAL
Status: DISCONTINUED | OUTPATIENT
Start: 2024-11-07 | End: 2024-11-08

## 2024-11-07 RX ORDER — CEPHALEXIN 500 MG/1
1000 CAPSULE ORAL EVERY 8 HOURS SCHEDULED
Status: DISCONTINUED | OUTPATIENT
Start: 2024-11-07 | End: 2024-11-08 | Stop reason: HOSPADM

## 2024-11-07 RX ORDER — LEVOFLOXACIN 750 MG/1
750 TABLET, FILM COATED ORAL EVERY 24 HOURS
Status: DISCONTINUED | OUTPATIENT
Start: 2024-11-07 | End: 2024-11-08

## 2024-11-07 RX ORDER — AMLODIPINE BESYLATE 5 MG/1
5 TABLET ORAL
Status: DISCONTINUED | OUTPATIENT
Start: 2024-11-07 | End: 2024-11-07

## 2024-11-07 RX ADMIN — HEPARIN SODIUM 5000 UNITS: 5000 INJECTION INTRAVENOUS; SUBCUTANEOUS at 07:38

## 2024-11-07 RX ADMIN — BUPROPION HYDROCHLORIDE 150 MG: 150 TABLET, EXTENDED RELEASE ORAL at 22:23

## 2024-11-07 RX ADMIN — METOPROLOL SUCCINATE 25 MG: 25 TABLET, EXTENDED RELEASE ORAL at 20:13

## 2024-11-07 RX ADMIN — CLINDAMYCIN HYDROCHLORIDE 450 MG: 150 CAPSULE ORAL at 07:38

## 2024-11-07 RX ADMIN — FUROSEMIDE 40 MG: 10 INJECTION, SOLUTION INTRAMUSCULAR; INTRAVENOUS at 07:38

## 2024-11-07 RX ADMIN — HEPARIN SODIUM 5000 UNITS: 5000 INJECTION INTRAVENOUS; SUBCUTANEOUS at 13:03

## 2024-11-07 RX ADMIN — IPRATROPIUM BROMIDE AND ALBUTEROL SULFATE 3 ML: 2.5; .5 SOLUTION RESPIRATORY (INHALATION) at 19:39

## 2024-11-07 RX ADMIN — Medication 10 ML: at 09:05

## 2024-11-07 RX ADMIN — LEVOFLOXACIN 750 MG: 750 TABLET, FILM COATED ORAL at 17:09

## 2024-11-07 RX ADMIN — FLUOXETINE HYDROCHLORIDE 40 MG: 20 CAPSULE ORAL at 20:13

## 2024-11-07 RX ADMIN — CLINDAMYCIN HYDROCHLORIDE 450 MG: 150 CAPSULE ORAL at 13:03

## 2024-11-07 RX ADMIN — AMLODIPINE BESYLATE 5 MG: 5 TABLET ORAL at 20:42

## 2024-11-07 RX ADMIN — IPRATROPIUM BROMIDE AND ALBUTEROL SULFATE 3 ML: 2.5; .5 SOLUTION RESPIRATORY (INHALATION) at 07:58

## 2024-11-07 RX ADMIN — IPRATROPIUM BROMIDE AND ALBUTEROL SULFATE 3 ML: 2.5; .5 SOLUTION RESPIRATORY (INHALATION) at 13:17

## 2024-11-07 RX ADMIN — CEPHALEXIN 1000 MG: 500 CAPSULE ORAL at 17:09

## 2024-11-07 RX ADMIN — IPRATROPIUM BROMIDE AND ALBUTEROL SULFATE 3 ML: 2.5; .5 SOLUTION RESPIRATORY (INHALATION) at 15:57

## 2024-11-07 RX ADMIN — CEPHALEXIN 1000 MG: 500 CAPSULE ORAL at 22:23

## 2024-11-07 RX ADMIN — HEPARIN SODIUM 5000 UNITS: 5000 INJECTION INTRAVENOUS; SUBCUTANEOUS at 22:23

## 2024-11-07 RX ADMIN — Medication 10 ML: at 22:30

## 2024-11-07 RX ADMIN — IOPAMIDOL 85 ML: 612 INJECTION, SOLUTION INTRAVENOUS at 16:42

## 2024-11-07 NOTE — PLAN OF CARE
Goal Outcome Evaluation:  Plan of Care Reviewed With: patient        Progress: improving  Outcome Evaluation: vss. ra. ambulates ad deidra. ra during the day - 2L nightly. denies pain this shift. reg diet - meds whole with thins. CT scan ordered this day. educated on bp monitoring. plan to dc home when appropriate. con't plan of care.

## 2024-11-07 NOTE — PLAN OF CARE
Problem: Adult Inpatient Plan of Care  Goal: Absence of Hospital-Acquired Illness or Injury  Intervention: Identify and Manage Fall Risk  Recent Flowsheet Documentation  Taken 11/7/2024 0200 by Lorelei Matamoros RN  Safety Promotion/Fall Prevention: safety round/check completed  Taken 11/7/2024 0000 by Lorelei Matamoros RN  Safety Promotion/Fall Prevention: safety round/check completed  Taken 11/6/2024 2200 by Lorelei Matamoros RN  Safety Promotion/Fall Prevention: safety round/check completed  Taken 11/6/2024 2000 by Lorelei Matamoros RN  Safety Promotion/Fall Prevention:   clutter free environment maintained   muscle strengthening facilitated   nonskid shoes/slippers when out of bed   room organization consistent   safety round/check completed  Intervention: Prevent Skin Injury  Recent Flowsheet Documentation  Taken 11/7/2024 0200 by Lorelei Matamoros RN  Body Position: position changed independently  Taken 11/7/2024 0000 by Lorelei Matamoros RN  Body Position: position changed independently  Taken 11/6/2024 2200 by Lorelei Matamoros RN  Body Position: position changed independently  Taken 11/6/2024 2000 by Lorelei Matamoros RN  Body Position: position changed independently   Goal Outcome Evaluation:  Plan of Care Reviewed With: patient        Progress: improving

## 2024-11-07 NOTE — CONSULTS
Referring Provider: Dr Goodrich    Reason for Consultation: cellulitis    History of present illness:  Osman Aparicio is a 69 y.o. who I am asked to evaluate and give opinion for cellulitis. History is obtained from the patient, his wife, and review of the old medical records which I summarize/synthesize as follows: He presented to the emergency room on 11/5/2024 with shortness of breath worse with exertion.  [place] at which time he developed a wound on his right arm.  He was not having any fevers, chills, or sweats.    Labs were notable for WBC 21, procalcitonin 0.1, creatinine 1.5, sodium 132, RPP negative, and lactate 1.  He had an x-ray of the chest that showed mostly chronic changes.  He had an XR of the right elbow that showed soft tissue swelling and a wound.  He was diagnosed with an exacerbation of his heart failure with preserved ejection fraction and right elbow cellulitis.  He was given ceftriaxone and clindamycin.  He has been given diuretics for the heart failure.  Cardiology note today says that he is improved with diuretics and that he is now euvolemic and they have given furosemide dosing instructions for discharge.  Wound care RN team has seen the patient and performed wound care and gave wound care recommendations.    He is afebrile.  His WBC is normalized.  His blood cultures are negative.  ID has been asked to evaluate.    Past Medical History:   Diagnosis Date    AAA (abdominal aortic aneurysm)     Abnormal glucose     Anxiety     CHF (congestive heart failure)     COPD (chronic obstructive pulmonary disease)     Coronary artery disease     Encounter for special screening examination for neoplasm of prostate 10/2012    Hematuria     JUST MONITORING    History of COVID-19     2021    Hyperlipidemia     Hypertension     Myocardial infarction 03/2022    Radius fracture     RIGHT    Vitamin D deficiency disease        Past Surgical History:   Procedure  Laterality Date    ARTERIOGRAM AORTIC N/A 01/30/2023    Procedure: Zenith Fenestrated Endovascular Repair;  Surgeon: Mariusz Kumar MD;  Location: [facility] OR 18/19;  Service: Vascular;  Laterality: N/A;    CARDIAC CATHETERIZATION N/A 03/29/2022    Procedure: Left Heart Cath;  Surgeon: Neto Paige MD;  Location: [facility] CATH INVASIVE LOCATION;  Service: Cardiology;  Laterality: N/A;    CARDIAC CATHETERIZATION N/A 03/29/2022    Procedure: Coronary angiography;  Surgeon: Neto Paige MD;  Location: [facility] CATH INVASIVE LOCATION;  Service: Cardiology;  Laterality: N/A;    CARDIAC CATHETERIZATION N/A 03/29/2022    Procedure: Left ventriculography;  Surgeon: Neto Paige MD;  Location: [facility] CATH INVASIVE LOCATION;  Service: Cardiology;  Laterality: N/A;    CARDIAC CATHETERIZATION N/A 8/22/2024    Procedure: Right and Left Heart Cath;  Surgeon: Jarrod Jasso MD;  Location: [facility] INVASIVE LOCATION;  Service: Cardiovascular;  Laterality: N/A;  History of pulmonary hypertension, worsening dyspnea on exertion, evaluate for worsening CAD or PHTN    CARDIAC CATHETERIZATION N/A 8/22/2024    Procedure: Coronary angiography;  Surgeon: Jarrod Jasso MD;  Location: [facility] INVASIVE LOCATION;  Service: Cardiovascular;  Laterality: N/A;    CARDIAC CATHETERIZATION  8/22/2024    Procedure: Saphenous Vein Graft;  Surgeon: Jarrod Jasso MD;  Location: [facility] CATH INVASIVE LOCATION;  Service: Cardiovascular;;    CARDIAC CATHETERIZATION N/A 8/22/2024    Procedure: Native mammary injection;  Surgeon: Jarrod Jasso MD;  Location: [facility] CATH INVASIVE LOCATION;  Service: Cardiovascular;  Laterality: N/A;    COLONOSCOPY N/A 12/07/2020    Procedure: COLONOSCOPY INTO CECUM WITH HOT SNARE POLYPECTOMIES, COLD BX POLYPECTOMIES, RESOLUTION CLIPS X;  Surgeon: Regi Mercado MD;  Location: [facility] ENDOSCOPY;  Service: General;  Laterality: N/A;  PRE:  POSITIVE COLOGUARD  POST:  POLYPS    CORONARY ARTERY  BYPASS GRAFT N/A 04/01/2022    Procedure: STERNOTOMY, CORONARY ARTERY BYPASS UTILIZINGTHE RT SAPHENOUS VEIN WITH LEFT INTERNAL MAMMARY ARTERY GRAFT X3 OFF PUMP, PRP;  Surgeon: Colten Zamora MD;  Location: [facility];  Service: Cardiothoracic;  Laterality: N/A;    ORIF WRIST FRACTURE Right 08/26/2022    Procedure: RIGHT DISTAL RADIUS FRACTURE OPEN REDUCTION INTERNAL FIXATION;  Surgeon: Bubba Mello MD;  [facility] OR [facility];  Service: Orthopedics;  Laterality: Right;    TRANSESOPHAGEAL ECHOCARDIOGRAM (MARTHA) N/A 04/01/2022    Procedure: TRANSESOPHAGEAL ECHOCARDIOGRAM WITH ANESTHESIA;  Surgeon: Colten Zamora MD;  Location: [facility];  Service: Cardiothoracic;  Laterality: N/A;       Social History:  Lives in [place], [place]    Retired    Antibiotic allergies and intolerances:  None    Medications:    Current Facility-Administered Medications:     sennosides-docusate (PERICOLACE) 8.6-50 MG per tablet 2 tablet, 2 tablet, Oral, BID PRN **AND** polyethylene glycol (MIRALAX) packet 17 g, 17 g, Oral, Daily PRN **AND** bisacodyl (DULCOLAX) EC tablet 5 mg, 5 mg, Oral, Daily PRN **AND** bisacodyl (DULCOLAX) suppository 10 mg, 10 mg, Rectal, Daily PRN, Betzy Brunner DO    budesonide-formoterol (SYMBICORT) 160-4.5 MCG/ACT inhaler 2 puff, 2 puff, Inhalation, BID - RT, Betzy Brunner DO    Calcium Replacement - Follow Nurse / BPA Driven Protocol, , Does not apply, PRN, Betzy Brunner DO    cefTRIAXone (ROCEPHIN) 2,000 mg in sodium chloride 0.9 % 100 mL MBP, 2,000 mg, Intravenous, Q24H, Betzy Brunner DO, Last Rate: 200 mL/hr at 11/06/24 2312, 2,000 mg at 11/06/24 2312    clindamycin (CLEOCIN) capsule 450 mg, 450 mg, Oral, Q8H, Betzy Brunner DO, 450 mg at 11/07/24 1303    heparin (porcine) 5000 UNIT/ML injection 5,000 Units, 5,000 Units, Subcutaneous, Q8H, Betzy Brunner, DO, 5,000 Units at 11/07/24 1303    ipratropium-albuterol (DUO-NEB) nebulizer solution 3 mL, 3 mL,  Nebulization, Q6H PRN, Betzy Brunner DO    ipratropium-albuterol (DUO-NEB) nebulizer solution 3 mL, 3 mL, Nebulization, 4x Daily - RT, Pedro Ramos MD, 3 mL at 11/07/24 1317    Magnesium Standard Dose Replacement - Follow Nurse / BPA Driven Protocol, , Does not apply, PRNMyesha Waitman, DO    nitroglycerin (NITROSTAT) SL tablet 0.4 mg, 0.4 mg, Sublingual, Q5 Min PRN, Betzy Brunner DO    Phosphorus Replacement - Follow Nurse / BPA Driven Protocol, , Does not apply, PRN, Betzy Brunner DO    Potassium Replacement - Follow Nurse / BPA Driven Protocol, , Does not apply, PRMyesha JANE Waitman, DO    sodium chloride 0.9 % flush 10 mL, 10 mL, Intravenous, PRN, Robbie Wilson MD    sodium chloride 0.9 % flush 10 mL, 10 mL, Intravenous, Q12H, Betzy Brunner DO, 10 mL at 11/07/24 0905    sodium chloride 0.9 % flush 10 mL, 10 mL, Intravenous, PRN, Betzy Brunner DO    sodium chloride 0.9 % infusion 40 mL, 40 mL, Intravenous, PRN, Betzy Brunner DO      Objective   Vital Signs   Temp:  [97.5 °F (36.4 °C)-98.6 °F (37 °C)] 97.9 °F (36.6 °C)  Heart Rate:  [66-82] 79  Resp:  [14-18] 16  BP: (149-165)/(68-81) 149/81    Physical Exam:   General: awake, alert, NAD, very nice, sitting up in bed  Eyes: no scleral icterus  ENT: no thrush  Cardiovascular: NR  Respiratory: normal work of breathing on ambient air  GI: Abdomen is soft, not tender, + bowel sounds in all four quadrants  :  no Philpi catheter  Skin: Right arm with swelling above and below the elbow; extensive bruising present,, shallow open wounds; no drainage  Neurological: Alert and oriented x 3  Psychiatric: Normal mood and affect   Vasc: PIV w/o erythema    Labs:     Lab Results   Component Value Date    WBC 10.08 11/07/2024    HGB 11.4 (L) 11/07/2024    HCT 34.4 (L) 11/07/2024    MCV 92.2 11/07/2024     11/07/2024       Lab Results   Component Value Date    GLUCOSE 135 (H) 11/07/2024    BUN 15 11/07/2024    CREATININE 1.25 11/07/2024    BCR  "12.0 11/07/2024    CO2 22.9 11/07/2024    CALCIUM 9.1 11/07/2024    PROTENTOTREF 8.0 03/11/2024    ALBUMIN 4.1 11/05/2024    LABIL2 1.4 03/11/2024    AST 20 11/05/2024    ALT 9 11/05/2024   No results found for: \"CRP\"    Lactate 1  BNP 5202  Procalcitonin 0.17    Microbiology:  11/5 RPP: negative  11/5 BCx: NGTD    Radiology:  CORRY shows normal digital pressures    XR right elbow shows soft tissue swelling without fracture    CXR with \"similar-appearing chronic diffuse patchy groundglass opacities and interstitial thickening throughout both lungs which could represent chronic lung disease, atypical pneumonia, or pulmonary edema    ASSESSMENT/PLAN:  Right arm wound with infection present following freshwater exposure  Leukocytosis-resolved  Acute on chronic heart failure with preserved ejection fraction  Acute hypoxic respiratory failure present on admission  COPD    He appears to be improving.  His fever and leukocytosis at admission are both now resolved.  His blood cultures are negative to date.    For cellulitis with a freshwater exposure, I recommend that we adjust his antibiotics to cephalexin 1 g p.o. every 8 hours and levofloxacin 750 mg p.o. every 24 hours.    Giving swelling of the right elbow, I will get a CT of the elbow to make sure there is not any joint involvement.    Continue local wound care as outlined by wound care team.    Check baseline CRP.  Check CBC and BMP in the a.m. for close monitoring.    If any worsening or concerning findings on his imaging, would ask orthopedics to evaluate    ID will follow.     "

## 2024-11-07 NOTE — PROGRESS NOTES
[facility]     LOS: 2 days     Subjective:    CC:Fall    DIET:  Diet Order   Procedures    Diet: Cardiac, Diabetic; Healthy Heart (2-3 Na+); Consistent Carbohydrate; Fluid Consistency: Thin (IDDSI 0)       Objective:    Vital Signs:  Temp:  [97.5 °F (36.4 °C)-98.6 °F (37 °C)] 97.9 °F (36.6 °C)  Heart Rate:  [66-97] 86  Resp:  [14-18] 18  BP: (149-165)/(68-81) 149/81    SpO2:  [90 %-100 %] 100 %  on  Flow (L/min) (Oxygen Therapy):  [2] 2;   Device (Oxygen Therapy): room air  Body mass index is 24.8 kg/m².    Physical Exam  Constitutional:       General: He is not in acute distress.  Pulmonary:      Effort: Pulmonary effort is normal.      Breath sounds: Normal breath sounds.   Abdominal:      General: There is no distension.      Palpations: Abdomen is soft.      Tenderness: There is no abdominal tenderness.   Musculoskeletal:      Comments: Right elbow still edematous very mild increased temperature versus his left elbow, still with surrounding erythema   Skin:     General: Skin is warm and dry.   Neurological:      General: No focal deficit present.      Mental Status: He is alert.   Psychiatric:         Mood and Affect: Mood normal.         Behavior: Behavior normal.         Results Review:    Glucose   Date Value Ref Range Status   11/07/2024 135 (H) 65 - 99 mg/dL Final   11/06/2024 141 (H) 65 - 99 mg/dL Final   11/05/2024 117 (H) 65 - 99 mg/dL Final     Results from last 7 days   Lab Units 11/07/24  0613   WBC 10*3/mm3 10.08   HEMOGLOBIN g/dL 11.4*   HEMATOCRIT % 34.4*   PLATELETS 10*3/mm3 277     Results from last 7 days   Lab Units 11/07/24  0612 11/06/24  0513 11/05/24  1655   SODIUM mmol/L 135*   < > 132*   POTASSIUM mmol/L 3.5   < > 4.0   CHLORIDE mmol/L 100   < > 96*   CO2 mmol/L 22.9   < > 21.4*   BUN mg/dL 15   < > 21   CREATININE mg/dL 1.25   < > 1.49*   CALCIUM mg/dL 9.1   < > 9.4   BILIRUBIN mg/dL  --   --  0.8   ALK PHOS U/L  --   --  93   ALT (SGPT) U/L  --   --  9    AST (SGOT) U/L  --   --  20   GLUCOSE mg/dL 135*   < > 117*    < > = values in this interval not displayed.         Results from last 7 days   Lab Units 11/07/24  0612   MAGNESIUM mg/dL 2.2     Results from last 7 days   Lab Units 11/05/24  2227 11/05/24 2011 11/05/24  1655   HSTROP T ng/L 14 13 13     Cultures:  Blood Culture   Date Value Ref Range Status   11/05/2024 No growth at 24 hours  Preliminary   11/05/2024 No growth at 24 hours  Preliminary       I have reviewed daily medications and changes in CPOE    Scheduled meds  budesonide-formoterol, 2 puff, Inhalation, BID - RT  cephalexin, 1,000 mg, Oral, Q8H  heparin (porcine), 5,000 Units, Subcutaneous, Q8H  ipratropium-albuterol, 3 mL, Nebulization, 4x Daily - RT  levoFLOXacin, 750 mg, Oral, Q24H  sodium chloride, 10 mL, Intravenous, Q12H           PRN meds    senna-docusate sodium **AND** polyethylene glycol **AND** bisacodyl **AND** bisacodyl    Calcium Replacement - Follow Nurse / BPA Driven Protocol    ipratropium-albuterol    Magnesium Standard Dose Replacement - Follow Nurse / BPA Driven Protocol    nitroglycerin    Phosphorus Replacement - Follow Nurse / BPA Driven Protocol    Potassium Replacement - Follow Nurse / BPA Driven Protocol    sodium chloride    sodium chloride    sodium chloride        Acute hypoxemic respiratory failure    COPD (chronic obstructive pulmonary disease)    Coronary artery disease involving native coronary artery of native heart without angina pectoris    Hypoxia    Fall    Acute on chronic heart failure with preserved ejection fraction (HFpEF)    Cellulitis        Assessment/Plan:      Mr. Aparicio is a 69 y.o. with PMHx of HTN, HLD, CAD, AAA s/p surgery, COPD, HF who presents due to dyspnea.  Patient admits to dyspnea that started [place]  Also In addition one day prior to admission  while looking for car key in a stream  he fell and injured his right elbow.       #Cellulitis  #Leukocytosis  - Blood cultures pending  - wbc 20.98 on admission, improving - 100.6F in ED,  -Wound care consulted  -Patient still with significant edema of the left elbow, skin tear with exposure to stream   -Infectious diseases changed to Keflex and Levaquin and given swelling over the elbow checking a CT of the elbow    Recurrent Fall  -PT is signed out     Acute on Chronic Hypoxic Respiratory Failure  COPD  - Patient was 78% on RA in Triage per Ed  -X-ray similar-appearing chronic diffuse patchy groundglass opacities and interstitial thickening throughout both lungs,   - Most likely 2/2 HF as below  -Exam is clear  -Scheduled DuoNebs, continue Symbicort  -Incentive spirometer  -Wean off O2 as able, O2 saturation goal 88% and above     #Acute on Chronic HFpEF  CAD  -proBNP 5202- HS troponin 13, trend EKG NSR with PVCs, no gross ischemia  - echo on 8/21/24 with EF 61.5% and TR  -Chest x-ray with questionable edema  -Oral Lasix  -Cardiology given approval from their standpoint for discharge     #PVD  #Claudication  #AAA    - s/p AAA endovascular repair  -CORRY 11/6/2024- Right Conclusion: The right CORRY is normal. Normal digital pressures.  Left Conclusion: The left CORRY is normal. Normal digital pressures.  And good pulses felt on exam  -Leg pain could be related to statin use.  I have advised the patient to cut his Crestor from 20 mg down to either 10 mg every other day or 5 mg daily.     CKD stage II/IIIa  #Hyponatremia   - Cr 1.49 on admission, most recent creatinine in 08/2024 between 1.22-1.36   - sodium 132 on admission     #Anemia   -Hgb 10.6 on admission, hemoglobin between 9.5-12.5 over the last 1 year  - no overt bleeding  -Iron panel 04/2023 was normal to keep consistent with anemia chronic disease   -hemoglobin 10.1, recheck iron panel , b12 folate      #HTN  restart metroprolol at lower dose, hold on losartan and norvasc     #HLD  Hold on statin     Depression  -restart wellbutrin and  prozac      Possible discharge tomorrow    Bong Goodrich MD  11/07/24  16:55 EST

## 2024-11-07 NOTE — PROGRESS NOTES
Hospital Follow Up    LOS:  LOS: 2 days   Patient Name: Osman Aparicio  Age/Sex: 69 y.o. male  : 1955  MRN: [id number]    Day of Service: 24   Length of Stay: 2  Encounter Provider: Matthew Giordano MD  Place of Service: [facility] CARDIOLOGY  Patient Care Team:  Desire Hughes APRN ([place]) as PCP - General (Family Medicine)  Desire Hughes APRN ([place]) as Nurse Practitioner (Family Medicine)  Jose Sanchez MD as Consulting Physician (Hematology and Oncology)  Desire Hughes)[place] as Referring Physician (Family Medicine)    Subjective:     Chief Complaint: ayala    Interval History: good uop overnight. Feels better today. Long discussion on dietary compliance.     Objective:     Objective:  Temp:  [97.5 °F (36.4 °C)-98.6 °F (37 °C)] 97.9 °F (36.6 °C)  Heart Rate:  [65-78] 78  Resp:  [14-18] 18  BP: (149-165)/(67-72) 160/68     Intake/Output Summary (Last 24 hours) at 2024 0954  Last data filed at 2024 0908  Gross per 24 hour   Intake 520 ml   Output 1800 ml   Net -1280 ml     Body mass index is 24.8 kg/m².      24  2150 24  0644 24  0543   Weight: 82.3 kg (181 lb 6.4 oz) 82.3 kg (181 lb 8 oz) 80.6 kg (177 lb 12.8 oz)     Weight change: -3.45 kg (-7 lb 9.7 oz)      Physical Exam:   General : Alert, cooperative, in no acute distress.  Neuro: Alert,cooperative and oriented.  Lungs: CTAB. Normal respiratory effort and rate.  CV: Regular rate and rhythm, normal S1 and S2, no murmurs, gallops or rubs.  ABD: Soft, nontender, nondistended. Positive bowel sounds.  Extr: No edema or cyanosis, moves all extremities.    Lab Review:   Results from last 7 days   Lab Units 24  0612 24  2233 24  0513 24  1655   SODIUM mmol/L 135*  --  136 132*   POTASSIUM mmol/L 3.5 3.8 3.1* 4.0   CHLORIDE mmol/L 100  --  102 96*   CO2 mmol/L 22.9  --  21.0* 21.4*   BUN mg/dL 15  --  22 21   CREATININE mg/dL 1.25  --  1.38* 1.49*   GLUCOSE  "mg/dL 135*  --  141* 117*   CALCIUM mg/dL 9.1  --  8.6 9.4   AST (SGOT) U/L  --   --   --  20   ALT (SGPT) U/L  --   --   --  9     Results from last 7 days   Lab Units 11/05/24  2227 11/05/24 2011 11/05/24  1655   HSTROP T ng/L 14 13 13     Results from last 7 days   Lab Units 11/07/24  0613 11/06/24  0513   WBC 10*3/mm3 10.08 14.16*   HEMOGLOBIN g/dL 11.4* 10.1*   HEMATOCRIT % 34.4* 30.6*   PLATELETS 10*3/mm3 277 276         Results from last 7 days   Lab Units 11/07/24  0612 11/06/24  0513   MAGNESIUM mg/dL 2.2 2.2           Invalid input(s): \"LDLCALC\"  Results from last 7 days   Lab Units 11/05/24  1655   PROBNP pg/mL 5,202.0*         I reviewed the patient's new clinical results.  I personally viewed and interpreted the patient's EKG  Current Medications:   Scheduled Meds:budesonide-formoterol, 2 puff, Inhalation, BID - RT  cefTRIAXone, 2,000 mg, Intravenous, Q24H  clindamycin, 450 mg, Oral, Q8H  furosemide, 40 mg, Intravenous, Q12H  heparin (porcine), 5,000 Units, Subcutaneous, Q8H  ipratropium-albuterol, 3 mL, Nebulization, 4x Daily - RT  sodium chloride, 10 mL, Intravenous, Q12H      Continuous Infusions:     Allergies:  No Known Allergies    Assessment:       Acute hypoxemic respiratory failure    COPD (chronic obstructive pulmonary disease)    Coronary artery disease involving native coronary artery of native heart without angina pectoris    Hypoxia    Fall    Acute on chronic heart failure with preserved ejection fraction (HFpEF)    Cellulitis        Plan:   Dyspnea on exertion, extensive cardiac evaluation including R/LHC in August was pretty unrevealing for cardiac etiology. proBNP is higher. Increased salt intake. Treated as HFpEF. This is likely multifactorial.   Coronary artery disease status post CABG (2022) patent grafts by cath August 2024.  Claudication, CORRY normal  AAA status post endovascular aortic stenting January 2023  Hypertension   COPD  Former tobacco use , quit 2022.   Left arm abrasion " / cellulitis (mechanical fall) . On abx.          On RA this am. Says O2 ok with ambulation. Received am lasix dose. Overall he feels improved after diuresis. Looks euvolemic today. Think he can be discharged with lasix 40mg daily with close f/u which we will arrange.     Matthew Giordano MD  11/07/24  09:54 EST

## 2024-11-08 ENCOUNTER — READMISSION MANAGEMENT (OUTPATIENT)
Dept: CALL CENTER | Facility: HOSPITAL | Age: 69
End: 2024-11-08
Payer: MEDICARE

## 2024-11-08 ENCOUNTER — TELEPHONE (OUTPATIENT)
Dept: CARDIOLOGY | Facility: CLINIC | Age: 69
End: 2024-11-08
Payer: MEDICARE

## 2024-11-08 VITALS
WEIGHT: 177 LBS | BODY MASS INDEX: 24.69 KG/M2 | DIASTOLIC BLOOD PRESSURE: 86 MMHG | TEMPERATURE: 97.7 F | RESPIRATION RATE: 18 BRPM | OXYGEN SATURATION: 96 % | SYSTOLIC BLOOD PRESSURE: 153 MMHG | HEART RATE: 69 BPM

## 2024-11-08 DIAGNOSIS — I10 PRIMARY HYPERTENSION: Primary | ICD-10-CM

## 2024-11-08 PROBLEM — I50.32 CHRONIC HEART FAILURE WITH PRESERVED EJECTION FRACTION (HFPEF): Status: ACTIVE | Noted: 2024-11-08

## 2024-11-08 LAB
ANION GAP SERPL CALCULATED.3IONS-SCNC: 13.6 MMOL/L (ref 5–15)
BUN SERPL-MCNC: 15 MG/DL (ref 8–23)
BUN/CREAT SERPL: 12.3 (ref 7–25)
CALCIUM SPEC-SCNC: 9.5 MG/DL (ref 8.6–10.5)
CHLORIDE SERPL-SCNC: 97 MMOL/L (ref 98–107)
CO2 SERPL-SCNC: 23.4 MMOL/L (ref 22–29)
CREAT SERPL-MCNC: 1.22 MG/DL (ref 0.76–1.27)
DEPRECATED RDW RBC AUTO: 47.5 FL (ref 37–54)
EGFRCR SERPLBLD CKD-EPI 2021: 64.2 ML/MIN/1.73
ERYTHROCYTE [DISTWIDTH] IN BLOOD BY AUTOMATED COUNT: 14.3 % (ref 12.3–15.4)
GLUCOSE SERPL-MCNC: 120 MG/DL (ref 65–99)
HCT VFR BLD AUTO: 34.9 % (ref 37.5–51)
HGB BLD-MCNC: 11.7 G/DL (ref 13–17.7)
IRON 24H UR-MRATE: 59 MCG/DL (ref 59–158)
IRON SATN MFR SERPL: 23 % (ref 20–50)
MAGNESIUM SERPL-MCNC: 2.2 MG/DL (ref 1.6–2.4)
MCH RBC QN AUTO: 31 PG (ref 26.6–33)
MCHC RBC AUTO-ENTMCNC: 33.5 G/DL (ref 31.5–35.7)
MCV RBC AUTO: 92.6 FL (ref 79–97)
PHOSPHATE SERPL-MCNC: 3.3 MG/DL (ref 2.5–4.5)
PLATELET # BLD AUTO: 331 10*3/MM3 (ref 140–450)
PMV BLD AUTO: 9.6 FL (ref 6–12)
POTASSIUM SERPL-SCNC: 3.4 MMOL/L (ref 3.5–5.2)
RBC # BLD AUTO: 3.77 10*6/MM3 (ref 4.14–5.8)
SODIUM SERPL-SCNC: 134 MMOL/L (ref 136–145)
TIBC SERPL-MCNC: 256 MCG/DL (ref 298–536)
TRANSFERRIN SERPL-MCNC: 172 MG/DL (ref 200–360)
VIT B12 BLD-MCNC: >2000 PG/ML (ref 211–946)
WBC NRBC COR # BLD AUTO: 9.54 10*3/MM3 (ref 3.4–10.8)

## 2024-11-08 PROCEDURE — 83735 ASSAY OF MAGNESIUM: CPT | Performed by: STUDENT IN AN ORGANIZED HEALTH CARE EDUCATION/TRAINING PROGRAM

## 2024-11-08 PROCEDURE — 83540 ASSAY OF IRON: CPT | Performed by: HOSPITALIST

## 2024-11-08 PROCEDURE — 82747 ASSAY OF FOLIC ACID RBC: CPT | Performed by: HOSPITALIST

## 2024-11-08 PROCEDURE — 80048 BASIC METABOLIC PNL TOTAL CA: CPT | Performed by: STUDENT IN AN ORGANIZED HEALTH CARE EDUCATION/TRAINING PROGRAM

## 2024-11-08 PROCEDURE — 85014 HEMATOCRIT: CPT | Performed by: HOSPITALIST

## 2024-11-08 PROCEDURE — 94761 N-INVAS EAR/PLS OXIMETRY MLT: CPT

## 2024-11-08 PROCEDURE — 84100 ASSAY OF PHOSPHORUS: CPT | Performed by: STUDENT IN AN ORGANIZED HEALTH CARE EDUCATION/TRAINING PROGRAM

## 2024-11-08 PROCEDURE — 94664 DEMO&/EVAL PT USE INHALER: CPT

## 2024-11-08 PROCEDURE — 94760 N-INVAS EAR/PLS OXIMETRY 1: CPT

## 2024-11-08 PROCEDURE — 85027 COMPLETE CBC AUTOMATED: CPT | Performed by: STUDENT IN AN ORGANIZED HEALTH CARE EDUCATION/TRAINING PROGRAM

## 2024-11-08 PROCEDURE — 25010000002 HEPARIN (PORCINE) PER 1000 UNITS: Performed by: STUDENT IN AN ORGANIZED HEALTH CARE EDUCATION/TRAINING PROGRAM

## 2024-11-08 PROCEDURE — 82607 VITAMIN B-12: CPT | Performed by: HOSPITALIST

## 2024-11-08 PROCEDURE — 99232 SBSQ HOSP IP/OBS MODERATE 35: CPT | Performed by: INTERNAL MEDICINE

## 2024-11-08 PROCEDURE — 36415 COLL VENOUS BLD VENIPUNCTURE: CPT | Performed by: STUDENT IN AN ORGANIZED HEALTH CARE EDUCATION/TRAINING PROGRAM

## 2024-11-08 PROCEDURE — 94799 UNLISTED PULMONARY SVC/PX: CPT

## 2024-11-08 PROCEDURE — 84466 ASSAY OF TRANSFERRIN: CPT | Performed by: HOSPITALIST

## 2024-11-08 RX ORDER — FUROSEMIDE 40 MG/1
40 TABLET ORAL DAILY
Qty: 30 TABLET | Refills: 0 | Status: SHIPPED | OUTPATIENT
Start: 2024-11-08

## 2024-11-08 RX ORDER — POTASSIUM CHLORIDE 750 MG/1
10 CAPSULE, EXTENDED RELEASE ORAL 2 TIMES DAILY
Qty: 30 CAPSULE | Refills: 0 | Status: SHIPPED | OUTPATIENT
Start: 2024-11-08

## 2024-11-08 RX ORDER — METOPROLOL SUCCINATE 50 MG/1
50 TABLET, EXTENDED RELEASE ORAL
Status: DISCONTINUED | OUTPATIENT
Start: 2024-11-09 | End: 2024-11-08 | Stop reason: HOSPADM

## 2024-11-08 RX ORDER — LEVOFLOXACIN 750 MG/1
750 TABLET, FILM COATED ORAL EVERY 24 HOURS
Status: DISCONTINUED | OUTPATIENT
Start: 2024-11-08 | End: 2024-11-08 | Stop reason: HOSPADM

## 2024-11-08 RX ORDER — ROSUVASTATIN CALCIUM 5 MG/1
5 TABLET, COATED ORAL DAILY
Qty: 30 TABLET | Refills: 0 | Status: SHIPPED | OUTPATIENT
Start: 2024-11-08

## 2024-11-08 RX ORDER — CEPHALEXIN 500 MG/1
1000 CAPSULE ORAL EVERY 8 HOURS SCHEDULED
Qty: 46 CAPSULE | Refills: 0 | Status: SHIPPED | OUTPATIENT
Start: 2024-11-08 | End: 2024-11-16

## 2024-11-08 RX ORDER — LEVOFLOXACIN 750 MG/1
750 TABLET, FILM COATED ORAL EVERY 24 HOURS
Qty: 8 TABLET | Refills: 0 | Status: SHIPPED | OUTPATIENT
Start: 2024-11-08 | End: 2024-11-16

## 2024-11-08 RX ORDER — POTASSIUM CHLORIDE 750 MG/1
40 TABLET, FILM COATED, EXTENDED RELEASE ORAL EVERY 4 HOURS
Status: COMPLETED | OUTPATIENT
Start: 2024-11-08 | End: 2024-11-08

## 2024-11-08 RX ORDER — AMLODIPINE BESYLATE 10 MG/1
10 TABLET ORAL DAILY
Qty: 90 TABLET | Refills: 1 | Status: SHIPPED | OUTPATIENT
Start: 2024-11-08

## 2024-11-08 RX ORDER — BUPROPION HYDROCHLORIDE 300 MG/1
300 TABLET ORAL NIGHTLY
Status: DISCONTINUED | OUTPATIENT
Start: 2024-11-08 | End: 2024-11-08 | Stop reason: HOSPADM

## 2024-11-08 RX ORDER — METOPROLOL SUCCINATE 50 MG/1
50 TABLET, EXTENDED RELEASE ORAL
Qty: 90 TABLET | Refills: 3 | Status: SHIPPED | OUTPATIENT
Start: 2024-11-08 | End: 2024-11-13 | Stop reason: SDUPTHER

## 2024-11-08 RX ORDER — METOPROLOL SUCCINATE 50 MG/1
50 TABLET, EXTENDED RELEASE ORAL
Status: DISCONTINUED | OUTPATIENT
Start: 2024-11-08 | End: 2024-11-08

## 2024-11-08 RX ORDER — AMLODIPINE BESYLATE 5 MG/1
5 TABLET ORAL NIGHTLY
Status: DISCONTINUED | OUTPATIENT
Start: 2024-11-08 | End: 2024-11-08 | Stop reason: HOSPADM

## 2024-11-08 RX ADMIN — POTASSIUM CHLORIDE 40 MEQ: 750 TABLET, EXTENDED RELEASE ORAL at 05:23

## 2024-11-08 RX ADMIN — LEVOFLOXACIN 750 MG: 750 TABLET, FILM COATED ORAL at 11:50

## 2024-11-08 RX ADMIN — BUPROPION HYDROCHLORIDE 150 MG: 150 TABLET, EXTENDED RELEASE ORAL at 07:21

## 2024-11-08 RX ADMIN — LEVOFLOXACIN 750 MG: 750 TABLET, FILM COATED ORAL at 07:21

## 2024-11-08 RX ADMIN — CEPHALEXIN 1000 MG: 500 CAPSULE ORAL at 05:22

## 2024-11-08 RX ADMIN — Medication 10 ML: at 08:10

## 2024-11-08 RX ADMIN — IPRATROPIUM BROMIDE AND ALBUTEROL SULFATE 3 ML: 2.5; .5 SOLUTION RESPIRATORY (INHALATION) at 07:36

## 2024-11-08 RX ADMIN — HEPARIN SODIUM 5000 UNITS: 5000 INJECTION INTRAVENOUS; SUBCUTANEOUS at 05:23

## 2024-11-08 RX ADMIN — IPRATROPIUM BROMIDE AND ALBUTEROL SULFATE 3 ML: 2.5; .5 SOLUTION RESPIRATORY (INHALATION) at 10:27

## 2024-11-08 RX ADMIN — METOPROLOL SUCCINATE 25 MG: 25 TABLET, EXTENDED RELEASE ORAL at 07:21

## 2024-11-08 RX ADMIN — FLUOXETINE HYDROCHLORIDE 40 MG: 20 CAPSULE ORAL at 07:21

## 2024-11-08 RX ADMIN — POTASSIUM CHLORIDE 40 MEQ: 750 TABLET, EXTENDED RELEASE ORAL at 08:57

## 2024-11-08 NOTE — PROGRESS NOTES
ID NOTE    CC: f/u R arm wound w/ freshwater exposure    Subj: He remains afebrile with a normal WBC.  He says his pain is improved today.  Swelling is slightly better.  He is tolerating cephalexin and levofloxacin without rash or diarrhea.      Medications:    Current Facility-Administered Medications:     amLODIPine (NORVASC) tablet 5 mg, 5 mg, Oral, Nightly, Bong Goodrich MD    sennosides-docusate (PERICOLACE) 8.6-50 MG per tablet 2 tablet, 2 tablet, Oral, BID PRN **AND** polyethylene glycol (MIRALAX) packet 17 g, 17 g, Oral, Daily PRN **AND** bisacodyl (DULCOLAX) EC tablet 5 mg, 5 mg, Oral, Daily PRN **AND** bisacodyl (DULCOLAX) suppository 10 mg, 10 mg, Rectal, Daily PRN, Betzy Brunner DO    budesonide-formoterol (SYMBICORT) 160-4.5 MCG/ACT inhaler 2 puff, 2 puff, Inhalation, BID - RT, Betzy Brunner DO    buPROPion XL (WELLBUTRIN XL) 24 hr tablet 300 mg, 300 mg, Oral, Nightly, Bong Goodrich MD    Calcium Replacement - Follow Nurse / BPA Driven Protocol, , Does not apply, PRN, Betzy Brunner DO    cephalexin (KEFLEX) capsule 1,000 mg, 1,000 mg, Oral, Q8H, Matthew Guerrero MD, 1,000 mg at 11/08/24 0522    FLUoxetine (PROzac) capsule 40 mg, 40 mg, Oral, Daily, Bong Goodrich MD, 40 mg at 11/08/24 0721    heparin (porcine) 5000 UNIT/ML injection 5,000 Units, 5,000 Units, Subcutaneous, Q8H, Betzy Brunner DO, 5,000 Units at 11/08/24 0523    ipratropium-albuterol (DUO-NEB) nebulizer solution 3 mL, 3 mL, Nebulization, Q6H PRN, Betzy Brunner DO    ipratropium-albuterol (DUO-NEB) nebulizer solution 3 mL, 3 mL, Nebulization, 4x Daily - RT, Pedro Ramos MD, 3 mL at 11/08/24 0736    levoFLOXacin (LEVAQUIN) tablet 750 mg, 750 mg, Oral, Q24H, Matthew Guerrero MD, 750 mg at 11/08/24 0721    Magnesium Standard Dose Replacement - Follow Nurse / BPA Driven Protocol, , Does not apply, PRN, Betzy Brunner, DO    [START ON 11/9/2024] metoprolol succinate XL (TOPROL-XL) 24 hr  tablet 50 mg, 50 mg, Oral, Q24H, Bong Goodrich MD    nitroglycerin (NITROSTAT) SL tablet 0.4 mg, 0.4 mg, Sublingual, Q5 Min PRN, Betzy Brunner DO    Phosphorus Replacement - Follow Nurse / BPA Driven Protocol, , Does not apply, PRNMyesha Waitman, DO    potassium chloride (K-DUR,KLOR-CON) ER tablet 40 mEq, 40 mEq, Oral, Q4H, Bong Goodrich MD, 40 mEq at 11/08/24 0523    Potassium Replacement - Follow Nurse / BPA Driven Protocol, , Does not apply, PRN, Betzy Brunner DO    sodium chloride 0.9 % flush 10 mL, 10 mL, Intravenous, PRN, Robbie Wilson MD    sodium chloride 0.9 % flush 10 mL, 10 mL, Intravenous, Q12H, Betzy Brunner DO, 10 mL at 11/08/24 0810    sodium chloride 0.9 % flush 10 mL, 10 mL, Intravenous, PRN, Betzy Brunner DO    sodium chloride 0.9 % infusion 40 mL, 40 mL, Intravenous, PRN, Betzy Brunner DO      Objective   Vital Signs   Temp:  [97.2 °F (36.2 °C)-97.9 °F (36.6 °C)] 97.7 °F (36.5 °C)  Heart Rate:  [67-97] 67  Resp:  [16-24] 20  BP: (149-197)/(81-92) 173/81    Physical Exam:   General: awake, alert, NAD, very nice, sitting up in bed  Eyes: no scleral icterus  Cardiovascular: Normal rate  Respiratory: normal work of breathing without wheezing; using supplemental oxygen  GI: Abdomen is soft, not tender  :  no Philip catheter  Skin: Right arm with swelling above and below the elbow; extensive bruising present,, shallow open wounds; no drainage  Neurological: Alert and oriented x 3  Psychiatric: Normal mood and affect     Labs:   CBC, BMP, CRP, and blood cultures reviewed today  Lab Results   Component Value Date    WBC 9.54 11/08/2024    HGB 11.7 (L) 11/08/2024    HCT 34.9 (L) 11/08/2024    MCV 92.6 11/08/2024     11/08/2024     Lab Results   Component Value Date    GLUCOSE 120 (H) 11/08/2024    CALCIUM 9.5 11/08/2024     (L) 11/08/2024    K 3.4 (L) 11/08/2024    CO2 23.4 11/08/2024    CL 97 (L) 11/08/2024    BUN 15 11/08/2024    CREATININE 1.22 11/08/2024     EGFRRESULT 70 03/11/2024    EGFR 64.2 11/08/2024    BCR 12.3 11/08/2024    ANIONGAP 13.6 11/08/2024     Lab Results   Component Value Date    CRP 18.33 (H) 11/07/2024     Lactate 1  BNP 5,202  Procalcitonin 0.17    Microbiology:  11/5 RPP: negative  11/5 BCx: NGTD    Radiology:  CT right upper extremity negative for abscess, gas, and joint effusion    ASSESSMENT/PLAN:  Right arm wound with infection present following freshwater exposure  Leukocytosis-resolved  Acute on chronic heart failure with preserved ejection fraction  Acute hypoxic respiratory failure present on admission  COPD    He appears to be improving.  His fever and leukocytosis at admission are both now resolved.  His blood cultures are negative to date.  He says his pain and swelling are both improved today.  CT did not show any evidence of a deep infection that would need surgical intervention.    For cellulitis with a freshwater exposure, I recommend that he continue cephalexin 1 g p.o. every 8 hours and levofloxacin 750 mg p.o. every 24 hours through 11/15/2024.    Thank you for allowing me to be involved in the care of this patient. Infectious diseases will sign off at this time with antibiotics plan in place, but please call me at [phone] if any further ID questions or new ID concerns.    D/W RN regarding antibiotic plan.

## 2024-11-08 NOTE — NURSING NOTE
AVS printed and reviewed with pt at bedside. IV removed per order. Educated on discharge instructions, pt verbalized understanding and is stable at time of discharge. Awaiting meds from meds to beds.

## 2024-11-08 NOTE — TELEPHONE ENCOUNTER
Let patient know Dr. Giordano would like BMP drawn next Tuesday for follow up.   
17-Jul-2022 23:00
17-Jul-2022 23:01
17-Jul-2022 23:03

## 2024-11-08 NOTE — DISCHARGE SUMMARY
[facility]  [phone]    DISCHARGE SUMMARY  [facility]    Patient Identification:  Name: Osman Aparicio  Age: 69 y.o.  Sex: male  :  1955  MRN: [id number]  Primary Care Physician: Desire Hughes), TAYLOR    Admit date: 2024  Discharge date and time: 2024     Discharge Diagnoses:  Acute hypoxemic respiratory failure    COPD (chronic obstructive pulmonary disease)    Coronary artery disease involving native coronary artery of native heart without angina pectoris    Hypoxia    Fall    Acute on chronic heart failure with preserved ejection fraction (HFpEF)    Cellulitis    Chronic heart failure with preserved ejection fraction (HFpEF)       History of present illness from H&P:      Mr. Aparicio is a 69 y.o. with PMHx of HTN, HLD, CAD, AAA s/p surgery, COPD, HF who presents due to dyspnea.  Patient admits to dyspnea that started [place]  Also Admits to fall yesterday while looking for car key in a stream when he fell and injured his right elbow.  States the dyspnea has progressed to at rest.  Denies fevers, chest pain, productive cough, vomiting diarrhea.  Presented to [facility] for evaluation.     In the ER, vitals 99.4, HR 66m /85, RR 18, 93% RA.  Labs notable for HS troponin 13, proBNP 5202, sodium 132, Cl 96, Cr 1.49, glucose 117, procal 0.17, wbc 20.98, hgb 10.6.  CXR concerning for pulmonary edema.  Right elbow x-ray with soft tissue swelling.  He is to be admitted for acute on chronic HFpEF complicated by suspected right elbow cellulitis.     Hospital Course:     Patient was admitted to hospital after fall at a golf [place] looking for his car keys and fell injuring his right elbow.  His elbow was exposed to [place].  Patient was admitted placed on Rocephin and clinda.  The cellulitis improved white count improved from 20,000 to normal.  He had fevers in the emergency room which have resolved.  He was  seen by infectious disease recommended Keflex and Levaquin for approximately 8 more days on discharge.  CT scan of the elbow was unremarkable for any sort of abscess.  Blood cultures were negative.  Patient will continue with Vaseline gauze dressing daily.  And then after about 5 days when wound is healed up he can transition to thin layer bacitracin.  If he experiences any worsening of redness swelling he will need to contact his primary care doctor or come back to the emergency room.     Acute on Chronic Hypoxic Respiratory Failure  COPD  - Patient was 78% on RA in Triage per Ed and patient is now 96% on room air.     Hypertension   His blood pressure was 153/86.  He missed a couple doses of his blood pressure medications during hospital stay.  We have placed him back on his metoprolol and his Norvasc which she will restart full dose tomorrow.  He had some mild acute kidney injury on admission so therefore holding on his Cozaar until he follows up with his primary care doctor.  I have asked the patient to monitor blood pressures at home and follow-up with his primary care doctor in a week.  -He is to restart his inhalers as he was previously taking at home, which include Trelegy and albuterol.     #Acute on Chronic HFpEF  CAD  -proBNP 5202- HS troponin 13, trend EKG NSR with PVCs, no gross ischemia  - echo on 8/21/24 with EF 61.5% and TR  -Chest x-ray with questionable edema  -Cardiology would like for the patient to remain on oral Lasix on discharge. Daily weights.  And I discussed that the patient would need to call if his weights by more than 3 pounds in 24 hours or if he notices weight is down.  I have also added a prescription for potassium.  Will try to arrange for the patient to have electrolyte check next week with cardiology especially given slightly low potassium and being on Levaquin.       #PVD  #Claudication  #AAA    - s/p AAA endovascular repair  -CORRY 11/6/2024- Right Conclusion: The right CORRY is  normal. Normal digital pressures.  Left Conclusion: The left CORRY is normal. Normal digital pressures.  And good pulses felt on exam  -Leg pain could be related to statin use.  I have advised the patient to cut his Crestor from 20 mg down to 5 mg daily to see if this helps     CKD stage II/IIIa  #Hyponatremia   - Cr 1.49 on admission, most recent creatinine in 08/2024 between 1.22-1.36   - sodium 132 on admission     #Anemia   -Hgb 10.6 on admission, hemoglobin between 9.5-12.5 over the last 1 year  - no overt bleeding  -Iron panel 04/2023 was normal to keep consistent with anemia chronic disease   -hemoglobin 10.1     Depression  -restart wellbutrin and prozac         The patient was seen and examined on the day of discharge.    Consults:   Consults       Date and Time Order Name Status Description    11/7/2024  2:45 PM Inpatient Infectious Diseases Consult Completed     11/5/2024  7:42 PM Inpatient Cardiology Consult      11/5/2024  6:52 PM [facility] (on-call MD unless specified)      11/5/2024  6:51 PM [facility] (on-call MD unless specified) Details              Results from last 7 days   Lab Units 11/08/24  0345   WBC 10*3/mm3 9.54   HEMOGLOBIN g/dL 11.7*   HEMATOCRIT % 34.9*   PLATELETS 10*3/mm3 331       Results from last 7 days   Lab Units 11/08/24  0345   SODIUM mmol/L 134*   POTASSIUM mmol/L 3.4*   CHLORIDE mmol/L 97*   CO2 mmol/L 23.4   BUN mg/dL 15   CREATININE mg/dL 1.22   GLUCOSE mg/dL 120*   CALCIUM mg/dL 9.5       Significant Diagnostic Studies:   WBC   Date Value Ref Range Status   11/08/2024 9.54 3.40 - 10.80 10*3/mm3 Final     Hemoglobin   Date Value Ref Range Status   11/08/2024 11.7 (L) 13.0 - 17.7 g/dL Final     Hematocrit   Date Value Ref Range Status   11/08/2024 34.9 (L) 37.5 - 51.0 % Final     Platelets   Date Value Ref Range Status   11/08/2024 331 140 - 450 10*3/mm3 Final     Sodium   Date Value Ref Range Status   11/08/2024 134 (L) 136 - 145 mmol/L Final     Potassium   Date Value Ref Range Status  "  11/08/2024 3.4 (L) 3.5 - 5.2 mmol/L Final     Chloride   Date Value Ref Range Status   11/08/2024 97 (L) 98 - 107 mmol/L Final     CO2   Date Value Ref Range Status   11/08/2024 23.4 22.0 - 29.0 mmol/L Final     BUN   Date Value Ref Range Status   11/08/2024 15 8 - 23 mg/dL Final     Creatinine   Date Value Ref Range Status   11/08/2024 1.22 0.76 - 1.27 mg/dL Final     Glucose   Date Value Ref Range Status   11/08/2024 120 (H) 65 - 99 mg/dL Final     Calcium   Date Value Ref Range Status   11/08/2024 9.5 8.6 - 10.5 mg/dL Final     Magnesium   Date Value Ref Range Status   11/08/2024 2.2 1.6 - 2.4 mg/dL Final     Phosphorus   Date Value Ref Range Status   11/08/2024 3.3 2.5 - 4.5 mg/dL Final     AST (SGOT)   Date Value Ref Range Status   11/05/2024 20 1 - 40 U/L Final     ALT (SGPT)   Date Value Ref Range Status   11/05/2024 9 1 - 41 U/L Final     Alkaline Phosphatase   Date Value Ref Range Status   11/05/2024 93 39 - 117 U/L Final     No results found for: \"APTT\", \"INR\"  No results found for: \"COLORU\", \"CLARITYU\", \"SPECGRAV\", \"PHUR\", \"PROTEINUR\", \"GLUCOSEU\", \"KETONESU\", \"BLOODU\", \"NITRITE\", \"LEUKOCYTESUR\", \"BILIRUBINUR\", \"UROBILINOGEN\", \"RBCUA\", \"WBCUA\", \"BACTERIA\", \"UACOMMENT\"  HS Troponin T   Date Value Ref Range Status   11/05/2024 14 <22 ng/L Final   11/05/2024 13 <22 ng/L Final     No components found for: \"HGBA1C;2\"  No components found for: \"TSH;2\"    Imaging Results (All)       Procedure Component Value Units Date/Time    CT Upper Extremity Right With Contrast [[id number]] Collected: 11/07/24 1718     Updated: 11/07/24 1729    Narrative:      CT UPPER EXTREMITY RIGHT W CONTRAST-     DATE OF EXAM: 11/7/2024 4:24 PM     INDICATION: evaluate for abscess and elbow joint infection. Redness and  swelling at the mid humerus and mid forearm. Fell and injured right  elbow.     COMPARISON: Radiographs 11/5/2024.     TECHNIQUE: Multiple contiguous axial images were acquired through the  right upper extremity from " above the right shoulder through the humerus,  elbow, and forearm through the base of the radial styloid following the  intravenous administration of 85 mL of Isovue-300. Reformatted coronal  and sagittal sequences were also reviewed. Radiation dose reduction  techniques were utilized, including automated exposure control and  exposure modulation based on body size.     FINDINGS:  Evaluation is limited as imaging was performed with the upper extremity  at the side of the patient's body.     Nonspecific mild subcutaneous fat stranding extending from the posterior  distal upper arm through the level of the elbow to the ulnar aspect of  the mid to distal forearm. No well-formed or drainable fluid collection  is seen definitive for abscess. No soft tissue air. No cystic or solid  soft tissue mass. No nodular or masslike enhancement. No pathologically  enlarged lymph nodes are identified.     No acute fracture. Partially imaged old healed fracture deformity of the  distal radius with volar plate and screw fixation hardware in place. No  evidence of hardware complications. No lytic or destructive osseous  changes to suggest osteomyelitis.     Mild DJD at the ulnotrochlear compartment of the elbow. No evidence of  an elbow joint effusion. No discrete intra-articular body is identified.  Mild superior subluxation of the humeral head in relation to the  glenoid, which could reflect some rotator cuff pathology. Mild to  moderate glenohumeral joint DJD. Mild age-appropriate hypertrophic  degenerative changes at the acromioclavicular joint. No concerning  osseous lesion.     No evidence of a massive rotator cuff tear. The distal triceps tendon is  grossly intact. The distal biceps and brachialis tendons are grossly  intact. The common flexor and common extensor tendons are grossly  intact. No significant muscular fatty atrophy.       Impression:         1. Nonspecific mild subcutaneous fat stranding extending from  the  posterior distal upper arm through the level of the elbow to the ulnar  aspect of the mid to distal forearm. No well-formed or drainable fluid  collection definitive for abscess. No soft tissue air.  2. Mild DJD at the ulnotrochlear compartment of the elbow, without  evidence of a significant elbow joint effusion.  3. No acute fracture.  4. Old healed fracture deformity of the distal radius with fixation  hardware in place. No evidence of hardware complications.  5. Mild to moderate DJD at the glenohumeral joint with mild superior  subluxation of the humeral head in relation to the glenoid that could  reflect some rotator cuff pathology.     This report was finalized on 11/7/2024 5:26 PM by Orville Bee MD on  Workstation: [id number]       XR Elbow 2 View Right [[id number]] Collected: 11/05/24 1736     Updated: 11/05/24 1741    Narrative:      XR ELBOW 2 VW RIGHT-     DATE OF EXAM: 11/5/2024 5:28 PM     INDICATION: Trauma and swelling. Fell and hit elbow on rock yesterday.  Puncture wound with probable infection.     COMPARISON: None available.     TECHNIQUE: 2 views of the elbow were obtained.     FINDINGS:  No evidence of acute fracture or dislocation. Tiny medial and lateral  epicondyle enthesophytes. Mild DJD at the ulnotrochlear compartment of  the elbow. No significant elbow joint effusion. Posterior soft tissue  swelling with questionable soft tissue wound posteriorly.       Impression:      Posterior soft tissue swelling and questionable posterior soft tissue  wound, without radiographic evidence of acute fracture or dislocation.     This report was finalized on 11/5/2024 5:38 PM by Orville Bee MD on  Workstation: [id number]       XR Chest 1 View [[id number]] Collected: 11/05/24 1723     Updated: 11/05/24 1728    Narrative:      XR CHEST 1 VW-     DATE OF EXAM: 11/5/2024 5:03 PM     INDICATION: SOA Triage Protocol.     COMPARISON: Radiographs 8/20/2024, 4/5/2023, and 5/16/2022. CT 9/18/2024  and  "8/20/2024.     TECHNIQUE: A single portable AP view of the chest was obtained.     FINDINGS:  Lordotic positioning. Overlying artifacts. Stable sternotomy wires and  postoperative changes from CABG. Low lung volumes. Similar-appearing  diffuse patchy groundglass opacities and interstitial thickening  throughout both lungs. Superimposed apical predominant chronic  emphysematous changes and nodular right apical pleural-parenchymal  scarring. No pneumothorax. Unchanged cardiac and mediastinal contours.  Calcified atherosclerotic disease in the thoracic aorta. No acute  osseous abnormality is identified.       Impression:      Similar-appearing chronic diffuse patchy groundglass opacities and  interstitial thickening throughout both lungs, which could represent  chronic lung disease, atypical pneumonia, or pulmonary edema, with  superimposed apical predominant chronic emphysematous changes and  nodular biapical pleural/parenchymal scarring.     This report was finalized on 11/5/2024 5:25 PM by Orville Bee MD on  Workstation: [id number]           No results found for: \"SITE\", \"ALLENTEST\", \"PHART\", \"YXX5HBN\", \"PO2ART\", \"IMH4ZGT\", \"BASEEXCESS\", \"D8FRPMCR\", \"HGBBG\", \"HCTABG\", \"OXYHEMOGLOBI\", \"METHHGBN\", \"CARBOXYHGB\", \"CO2CT\", \"BAROMETRIC\", \"MODALITY\", \"FIO2\"       Discharge Medications        New Medications        Instructions Start Date   cephalexin 500 MG capsule  Commonly known as: KEFLEX   1,000 mg, Oral, Every 8 Hours Scheduled      furosemide 40 MG tablet  Commonly known as: Lasix   40 mg, Oral, Daily      levoFLOXacin 750 MG tablet  Commonly known as: LEVAQUIN   750 mg, Oral, Every 24 Hours      potassium chloride 10 MEQ CR capsule  Commonly known as: MICRO-K   10 mEq, Oral, 2 Times Daily             Changes to Medications        Instructions Start Date   aspirin 81 MG EC tablet  What changed: when to take this   81 mg, Oral, Daily      metoprolol succinate XL 50 MG 24 hr tablet  Commonly known as: " TOPROL-XL  What changed:   medication strength  how much to take   50 mg, Oral, Every Night at Bedtime      rosuvastatin 5 MG tablet  Commonly known as: CRESTOR  What changed:   medication strength  how much to take  when to take this   5 mg, Oral, Daily             Continue These Medications        Instructions Start Date   albuterol sulfate  (90 Base) MCG/ACT inhaler  Commonly known as: Proventil HFA   2 puffs, Inhalation, Every 4 Hours PRN      amLODIPine 10 MG tablet  Commonly known as: NORVASC   10 mg, Oral, Daily      buPROPion  MG 24 hr tablet  Commonly known as: Wellbutrin XL   300 mg, Oral, Daily      FLUoxetine 40 MG capsule  Commonly known as: PROzac   80 mg, Oral, Daily      Iron 28 MG tablet   1 tablet, Daily      multivitamin with minerals tablet tablet   1 tablet, Oral, Daily      nitroglycerin 0.4 MG SL tablet  Commonly known as: NITROSTAT   0.4 mg, Sublingual, Every 5 Minutes PRN, Take no more than 3 doses in 15 minutes.      pantoprazole 20 MG EC tablet  Commonly known as: PROTONIX   20 mg, Daily      Trelegy Ellipta 100-62.5-25 MCG/INH inhaler  Generic drug: Fluticasone-Umeclidin-Vilant   1 puff, Daily - RT      VITAMIN C PO   1 tablet, Daily             Stop These Medications      famotidine 20 MG tablet  Commonly known as: PEPCID     folic acid 1 MG tablet  Commonly known as: FOLVITE     losartan 100 MG tablet  Commonly known as: COZAAR     thiamine 100 MG tablet  tablet  Commonly known as: VITAMIN B-1                Patient Instructions:       Future Appointments   Date Time Provider Department Center   11/19/2024  3:00 PM Mary Lafleur APRN [facility] [place]   3/26/2025  1:00 PM Mary Lafleur APRN [facility] [place]         Follow-up Information       Desire Hughes APRN. ([place]) Schedule an appointment as soon as possible for a visit in 1 week(s).    Specialty: Family Medicine  Contact information:  [address] [address] [phone]                Susan Claudio MD Follow up in 1 week(s).    Specialty: Cardiology  Contact information:  [address]  [place] [address]  [phone]                             Discharge Order (From admission, onward)       Start     Ordered    11/08/24 1038  Discharge patient  Once        Expected Discharge Date: 11/08/24   Discharge Disposition: Home or Self Care   Physician of Record for Attribution - Please select from Treatment Team: BONG GOODRICH [[id number]]   Review needed by CMO to determine Physician of Record: No      Question Answer Comment   Physician of Record for Attribution - Please select from Treatment Team BONG GOODRICH    Review needed by CMO to determine Physician of Record No        11/08/24 1044                    Diet Order   Procedures    Diet: Cardiac, Diabetic; Healthy Heart (2-3 Na+); Consistent Carbohydrate; Fluid Consistency: Thin (IDDSI 0)       TEST  RESULTS PENDING AT DISCHARGE  Pending Labs       Order Current Status    Folate RBC In process    Blood Culture - Blood, Arm, Left Preliminary result    Blood Culture - Blood, Arm, Right Preliminary result              Discharge instructions:  Follow up with your primary care provider in 1 week with a cbc and cmp         Total time spent discharging patient including evaluation, post hospitalization follow up,  medication and post hospitalization instructions and education, total time exceeds 30 minutes.    Signed:  Bong Goodrich MD  11/8/2024  14:43 EST

## 2024-11-08 NOTE — PLAN OF CARE
Problem: Adult Inpatient Plan of Care  Goal: Absence of Hospital-Acquired Illness or Injury  Intervention: Identify and Manage Fall Risk  Recent Flowsheet Documentation  Taken 11/8/2024 0400 by Lorelei Matamoros RN  Safety Promotion/Fall Prevention: safety round/check completed  Taken 11/8/2024 0200 by Lorelei Matamoros RN  Safety Promotion/Fall Prevention: safety round/check completed  Taken 11/8/2024 0000 by Lorelei Matamoros RN  Safety Promotion/Fall Prevention: safety round/check completed  Taken 11/7/2024 2200 by Lorelei Matamoros RN  Safety Promotion/Fall Prevention: safety round/check completed  Taken 11/7/2024 2000 by Lorelei Matamoros RN  Safety Promotion/Fall Prevention:   clutter free environment maintained   nonskid shoes/slippers when out of bed   room organization consistent   safety round/check completed  Intervention: Prevent Skin Injury  Recent Flowsheet Documentation  Taken 11/8/2024 0400 by Lorelei Matamoros RN  Body Position: position changed independently  Taken 11/8/2024 0200 by Lorelei Matamoros RN  Body Position: position changed independently  Taken 11/8/2024 0000 by Lorelei Matamoros RN  Body Position: position changed independently  Taken 11/7/2024 2200 by Lorelei Matamoros RN  Body Position: position changed independently  Taken 11/7/2024 2000 by Lorelei Matamoros RN  Body Position: position changed independently     Problem: Fall Injury Risk  Goal: Absence of Fall and Fall-Related Injury  Intervention: Promote Injury-Free Environment  Recent Flowsheet Documentation  Taken 11/8/2024 0400 by Lorelei Matamoros RN  Safety Promotion/Fall Prevention: safety round/check completed  Taken 11/8/2024 0200 by Lorelei Matamoros RN  Safety Promotion/Fall Prevention: safety round/check completed  Taken 11/8/2024 0000 by Lorelei Matamoros RN  Safety Promotion/Fall Prevention: safety round/check completed  Taken 11/7/2024 2200 by Lorelei Matamoros RN  Safety  Promotion/Fall Prevention: safety round/check completed  Taken 11/7/2024 2000 by Lorelei Matamoros RN  Safety Promotion/Fall Prevention:   clutter free environment maintained   nonskid shoes/slippers when out of bed   room organization consistent   safety round/check completed   Goal Outcome Evaluation:  Plan of Care Reviewed With: patient        Progress: improving

## 2024-11-08 NOTE — OUTREACH NOTE
Prep Survey      Flowsheet Row Responses   [facility] patient discharged from? [place]   Is LACE score < 7 ? No   Eligibility [facility]   Date of Admission 11/05/24   Date of Discharge 11/08/24   Discharge diagnosis Acute on Chronic Hypoxic Respiratory Failure  COPD   Does the patient have one of the following disease processes/diagnoses(primary or secondary)? COPD   Prep survey completed? Yes            Latonya VALENZUELA - Registered Nurse

## 2024-11-10 LAB
BACTERIA SPEC AEROBE CULT: NORMAL
BACTERIA SPEC AEROBE CULT: NORMAL

## 2024-11-11 ENCOUNTER — TRANSITIONAL CARE MANAGEMENT TELEPHONE ENCOUNTER (OUTPATIENT)
Dept: CALL CENTER | Facility: HOSPITAL | Age: 69
End: 2024-11-11
Payer: MEDICARE

## 2024-11-11 ENCOUNTER — LAB (OUTPATIENT)
Dept: LAB | Facility: HOSPITAL | Age: 69
End: 2024-11-11
Payer: MEDICARE

## 2024-11-11 DIAGNOSIS — I10 PRIMARY HYPERTENSION: ICD-10-CM

## 2024-11-11 LAB
ANION GAP SERPL CALCULATED.3IONS-SCNC: 13.2 MMOL/L (ref 5–15)
BUN SERPL-MCNC: 36 MG/DL (ref 8–23)
BUN/CREAT SERPL: 21.7 (ref 7–25)
CALCIUM SPEC-SCNC: 9.9 MG/DL (ref 8.6–10.5)
CHLORIDE SERPL-SCNC: 101 MMOL/L (ref 98–107)
CO2 SERPL-SCNC: 21.8 MMOL/L (ref 22–29)
CREAT SERPL-MCNC: 1.66 MG/DL (ref 0.76–1.27)
EGFRCR SERPLBLD CKD-EPI 2021: 44.3 ML/MIN/1.73
FOLATE BLD-MCNC: 514 NG/ML
FOLATE RBC-MCNC: 1408 NG/ML
GLUCOSE SERPL-MCNC: 112 MG/DL (ref 65–99)
HCT VFR BLD AUTO: 36.5 % (ref 37.5–51)
POTASSIUM SERPL-SCNC: 4.6 MMOL/L (ref 3.5–5.2)
SODIUM SERPL-SCNC: 136 MMOL/L (ref 136–145)

## 2024-11-11 PROCEDURE — 36415 COLL VENOUS BLD VENIPUNCTURE: CPT

## 2024-11-11 PROCEDURE — 80048 BASIC METABOLIC PNL TOTAL CA: CPT

## 2024-11-11 NOTE — OUTREACH NOTE
Call Center TCM Note      Flowsheet Row Responses   [facility] patient discharged from? [place]   Does the patient have one of the following disease processes/diagnoses(primary or secondary)? COPD   TCM attempt successful? Yes   Call start time 1355   Call end time 1358   Discharge diagnosis Acute on Chronic Hypoxic Respiratory Failure  COPD   Meds reviewed with patient/caregiver? Yes   Is the patient having any side effects they believe may be caused by any medication additions or changes? No   Does the patient have all medications ordered at discharge? Yes   Prescription comments New rx's Cephalexin, Levofloxacin and updated Metoprolol succinate XL, Rosucastatin in place and changes understood   Is the patient taking all medications as directed (includes completed medication regime)? Yes   Comments Pt declines TCM APPT with PCP TAYLOR Hughes for now, pt will call to schedule in near future.   Does the patient have an appointment with their PCP within 7-14 days of discharge? No   Nursing Interventions Patient desires to follow up with specialty only, Routed TCM call to PCP office, Patient declined scheduling/rescheduling appointment at this time   Has home health visited the patient within 72 hours of discharge? N/A   Pulse Ox monitoring Intermittent   Psychosocial issues? No   Did the patient receive a copy of their discharge instructions? Yes   Nursing interventions Reviewed instructions with patient   What is the patient's perception of their health status since discharge? Improving   Nursing Interventions Nurse provided patient education   If the patient is a current smoker, are they able to teach back resources for cessation? Not a smoker   Is the patient/caregiver able to teach back the hierarchy of who to call/visit for symptoms/problems? PCP, Specialist, Home health nurse, Urgent Care, ED, 911 Yes   TCM call completed? Yes   Wrap up additional comments D/C DX: a/c Hypoxic Respiratory Failure  COPD - Pt is feeling much better. BP numbers at baseline. Elbow seems better. No questions at this time. Pt declines TCM APPT with PCP TAYLOR Hughes for now, pt will call to schedule in near future.   Call end time [id number]            Wendie London MA    11/11/2024, 14:01 EST

## 2024-11-11 NOTE — CASE MANAGEMENT/SOCIAL WORK
Case Management Discharge Note      Final Note: Dc home    Provided Post Acute Provider List?: N/A  Provided Post Acute Provider Quality & Resource List?: N/A    Selected Continued Care - Discharged on 11/8/2024 Admission date: 11/5/2024 - Discharge disposition: Home or Self Care      Destination    No services have been selected for the patient.                Durable Medical Equipment    No services have been selected for the patient.                Dialysis/Infusion    No services have been selected for the patient.                Home Medical Care    No services have been selected for the patient.                Therapy    No services have been selected for the patient.                Community Resources    No services have been selected for the patient.                Community & DME    No services have been selected for the patient.                    Selected Continued Care - Prior Encounters Includes continued care and service providers with selected services from prior encounters from 8/7/2024 to 11/8/2024      Discharged on 8/23/2024 Admission date: 8/20/2024 - Discharge disposition: Home or Self Care      Durable Medical Equipment       Service Provider Services Address Phone Fax Patient Preferred    [facility] [place] [facility] Medical [facility] [address] [address] [phone] --       Internal Comment last updated by Janna Dutta RN 8/23/2024 1331    Oxygen on exertion                                            Final Discharge Disposition Code: 01 - home or self-care

## 2024-11-12 ENCOUNTER — TELEPHONE (OUTPATIENT)
Dept: CARDIOLOGY | Facility: CLINIC | Age: 69
End: 2024-11-12
Payer: MEDICARE

## 2024-11-12 NOTE — TELEPHONE ENCOUNTER
----- Message from Matthew Giordano sent at 11/12/2024  8:10 AM EST -----  Please notify Mr. Aparicio that his kidney numbers were a little up from last week. He can go to [facility] and get repeat labs prior to next weeks appointment with Mary Lafleur. Thank you

## 2024-11-12 NOTE — TELEPHONE ENCOUNTER
Called and left a VM. Will continue to try to reach pt.    Holly Steven RN  Triage [facility]  11/12/24 08:44 EST

## 2024-11-12 NOTE — TELEPHONE ENCOUNTER
Notified pt of results and recommendations. Pt verbalized understanding.    Thank you,    Holly Steven, RN  Triage [facility]  11/12/24 12:31 EST

## 2024-11-12 NOTE — PROGRESS NOTES
Please notify Mr. Aparicio that his kidney numbers were a little up from last week. He can go to [facility] and get repeat labs prior to next weeks appointment with Mary Lafleur. Thank you

## 2024-11-13 RX ORDER — METOPROLOL SUCCINATE 50 MG/1
50 TABLET, EXTENDED RELEASE ORAL
Qty: 90 TABLET | Refills: 0 | Status: SHIPPED | OUTPATIENT
Start: 2024-11-13

## 2024-11-13 NOTE — PROGRESS NOTES
"Enter Query Response Below      Query Response: Acute on chronic respiratory failure based on the low o2 sat in the ER of 78%             If applicable, please update the problem list.     Patient: Osman Aparicio        : 1955  Account: [id number]           Admit Date: 2024        How to Respond to this query:       a. Click New Note     b. Answer query within the yellow box.                c. Update the Problem List, if applicable.      If you have any questions about this query contact me at: [email or web address]     Dr. Goodrich:    Patient admitted - with CHF.  ED note \"Patient was noted to be hypoxemic in triage though it is not documented I was told that he was in the 70s.\" \"Patient does report that he has supplemental oxygen that he wears at night but does not require it during the day.\"  ED exam \"Pulmonary/Chest: No significant tachypnea.  Patient does have crackles right greater than left.\"   H&P- \"Acute on Chronic Hypoxic Respiratory Failure, Patient was 78% on RA in Triage per Ed, currently on RA at rest.\"   Patient maintained 02 saturation above 90% on 2LNC and room air during the admission.  Nurse's note  \"ra during the day - 2L nightly.\"  Discharge diagnoses include Acute on chronic hypoxic respiratory failure.     After study, was acute on chronic respiratory failure clinically supported during this admission?    Acute on chronic respiratory failure was supported with additional clinical indicators:____________  Acute on chronic respiratory failure was not supported   Other- specify_____________  Unable to determine      By submitting this query, we are merely seeking further clarification of documentation to accurately reflect all conditions that you are monitoring, evaluating, treating or that extend the hospitalization or utilize additional resources of care. Please utilize your independent clinical judgment when addressing the question(s) above.     This query and your " response, once completed, will be entered into the legal medical record.    Sincerely,  Mere Figueroa RN, [facility]  Clinical Documentation Integrity Program

## 2024-11-14 ENCOUNTER — TELEPHONE (OUTPATIENT)
Age: 69
End: 2024-11-14
Payer: MEDICARE

## 2024-11-16 ENCOUNTER — TRANSCRIBE ORDERS (OUTPATIENT)
Dept: ADMINISTRATIVE | Facility: HOSPITAL | Age: 69
End: 2024-11-16
Payer: MEDICARE

## 2024-11-16 DIAGNOSIS — R91.1 LUNG NODULE: Primary | ICD-10-CM

## 2024-11-19 ENCOUNTER — READMISSION MANAGEMENT (OUTPATIENT)
Dept: CALL CENTER | Facility: HOSPITAL | Age: 69
End: 2024-11-19
Payer: MEDICARE

## 2024-11-19 ENCOUNTER — TELEPHONE (OUTPATIENT)
Age: 69
End: 2024-11-19
Payer: MEDICARE

## 2024-11-19 ENCOUNTER — OFFICE VISIT (OUTPATIENT)
Dept: CARDIOLOGY | Facility: CLINIC | Age: 69
End: 2024-11-19
Payer: MEDICARE

## 2024-11-19 VITALS
SYSTOLIC BLOOD PRESSURE: 120 MMHG | DIASTOLIC BLOOD PRESSURE: 72 MMHG | OXYGEN SATURATION: 96 % | HEIGHT: 71 IN | HEART RATE: 60 BPM | BODY MASS INDEX: 25.7 KG/M2 | WEIGHT: 183.6 LBS

## 2024-11-19 DIAGNOSIS — G47.33 OSA ON CPAP: ICD-10-CM

## 2024-11-19 DIAGNOSIS — I70.1 RIGHT RENAL ARTERY STENOSIS: ICD-10-CM

## 2024-11-19 DIAGNOSIS — I27.20 PULMONARY HYPERTENSION: ICD-10-CM

## 2024-11-19 DIAGNOSIS — Z98.890 STATUS POST ENDOVASCULAR ANEURYSM REPAIR (EVAR): ICD-10-CM

## 2024-11-19 DIAGNOSIS — I25.10 CORONARY ARTERY DISEASE INVOLVING NATIVE CORONARY ARTERY OF NATIVE HEART WITHOUT ANGINA PECTORIS: ICD-10-CM

## 2024-11-19 DIAGNOSIS — Z86.79 STATUS POST ENDOVASCULAR ANEURYSM REPAIR (EVAR): ICD-10-CM

## 2024-11-19 DIAGNOSIS — I49.3 PVCS (PREMATURE VENTRICULAR CONTRACTIONS): ICD-10-CM

## 2024-11-19 DIAGNOSIS — I77.810 AORTIC ROOT DILATION: ICD-10-CM

## 2024-11-19 DIAGNOSIS — N17.9 AKI (ACUTE KIDNEY INJURY): ICD-10-CM

## 2024-11-19 DIAGNOSIS — I50.33 ACUTE ON CHRONIC HEART FAILURE WITH PRESERVED EJECTION FRACTION (HFPEF): Primary | ICD-10-CM

## 2024-11-19 DIAGNOSIS — J96.01 ACUTE HYPOXEMIC RESPIRATORY FAILURE: ICD-10-CM

## 2024-11-19 DIAGNOSIS — I10 PRIMARY HYPERTENSION: ICD-10-CM

## 2024-11-19 PROCEDURE — 3078F DIAST BP <80 MM HG: CPT | Performed by: NURSE PRACTITIONER

## 2024-11-19 PROCEDURE — 1160F RVW MEDS BY RX/DR IN RCRD: CPT | Performed by: NURSE PRACTITIONER

## 2024-11-19 PROCEDURE — 99214 OFFICE O/P EST MOD 30 MIN: CPT | Performed by: NURSE PRACTITIONER

## 2024-11-19 PROCEDURE — 93000 ELECTROCARDIOGRAM COMPLETE: CPT | Performed by: NURSE PRACTITIONER

## 2024-11-19 PROCEDURE — 1159F MED LIST DOCD IN RCRD: CPT | Performed by: NURSE PRACTITIONER

## 2024-11-19 PROCEDURE — 3074F SYST BP LT 130 MM HG: CPT | Performed by: NURSE PRACTITIONER

## 2024-11-19 NOTE — TELEPHONE ENCOUNTER
I saw patient today in the office.  Losartan was stopped in the hospital, but he was not aware of that so he has been taking losartan 100 mg daily.  We will stop the losartan today.

## 2024-11-19 NOTE — PROGRESS NOTES
Date of Office Visit: 2024  Encounter Provider: TAYLOR Alex  Place of Service: [facility] CARDIOLOGY  Patient Name: Osman Aparicio  :1955  Primary Cardiologist: Dr. Susan Claudio      Chief Complaint   Patient presents with    HFpEF    Hospital Follow Up Visit   :     HPI: Osman Aparicio is a 69 y.o. male who presents today for cardiac follow-up visit.  He is a new patient to me and I reviewed his medical records.    He has been diagnosed with hypertension, hyperlipidemia, COPD, and obstructive sleep apnea on CPAP.  He was a longtime cigarette smoker and quit in 2022.    In 2022, he developed acute shortness of breath and was diagnosed with non-STEMI.  Echocardiogram showed LVEF 56%, grade 3 reversible restrictive diastolic dysfunction, moderate left atrial enlargement, moderate mitral insufficiency, trace to mild aortic and tricuspid insufficiency, severe septal apical hypokinesis, and moderate pulmonary hypertension.  CTA of the chest was negative for PE.  He was noted to have multivessel CAD per cath and underwent CABG x 3 (LIMA to LAD, SVG to OM1, and SVG to OM 2).  He was noted to have an abdominal aortic aneurysm.  Carotid duplex showed mild stenosis bilaterally.    In 2023, he had a percutaneous endovascular repair of a 5.7 cm juxtarenal abdominal aortic aneurysm using a custom Cook Zenith fenestrated aortic endograft.  He was also noted to have severe right renal artery stenosis with atrophic right kidney and no iliac aneurysm.    In 2024, he presented to [facility] ED with exertional dyspnea.    Repeat echocardiogram showed LVEF 61%, mild LVH, diastolic function normal, left atrium volume mildly increased, aortic valve calcification, mild aortic regurgitation, mitral annular calcification, moderate bileaflet mitral valve thickening, trace to mild mitral regurgitation, mild tricuspid regurgitation, mild pulmonary hypertension,  and aortic root dilated at 4.1 cm.  Right and left heart catheterization revealed patent grafts, no new CAD, normal right and left-sided filling pressures.   He was recommended to follow-up with pulmonology outpatient.    On 11/5/2024, he presented to the [facility] ED with dyspnea and [place].  He was admitted for acute on chronic HFpEF and suspected right elbow cellulitis was treated with antibiotic therapy.  He had BENTON and losartan was held.  EKG showed normal sinus rhythm with PVCs.  He was recommended perform daily weights and follow a low-sodium diet.    On 11/12/2024, Dr. Giordano decreased his furosemide to Monday, Wednesday, and Fridays.    He presents today with his wife accompanying him.  We reviewed the hospital records and medications.  He still taking the furosemide 3 days/week, but has not been taking the potassium.  He never stopped his losartan.  He reports an occasional cough and lightheadedness.  He denies chest pain, shortness of air, PND, orthopnea, edema, palpitations, syncope, or bleeding.  He is compliant with his CPAP machine.      Past Medical History:   Diagnosis Date    AAA (abdominal aortic aneurysm)     Abnormal glucose     Anxiety     CHF (congestive heart failure)     Closed Colles' fracture of right radius 08/25/2022    COPD (chronic obstructive pulmonary disease)     Coronary artery disease     Encounter for special screening examination for neoplasm of prostate 10/2012    Hematuria     JUST MONITORING    History of COVID-19 2021    Hyperlipidemia     Hypertension     Myocardial infarction 03/2022    NSTEMI (non-ST elevated myocardial infarction) 03/29/2022    THONY on CPAP     Pneumonia of both lower lobes due to infectious organism 04/05/2023    PVCs (premature ventricular contractions) 11/20/2024    Radius fracture     RIGHT    Right renal artery stenosis     Vitamin D deficiency disease        Past Surgical History:   Procedure Laterality Date     ARTERIOGRAM AORTIC N/A 01/30/2023    Procedure: Zenith Fenestrated Endovascular Repair;  Surgeon: Mariusz Kumar MD;  Location: [facility] OR 18/19;  Service: Vascular;  Laterality: N/A;    CARDIAC CATHETERIZATION N/A 03/29/2022    Procedure: Left Heart Cath;  Surgeon: Neto Paige MD;  Location: [facility] CATH INVASIVE LOCATION;  Service: Cardiology;  Laterality: N/A;    CARDIAC CATHETERIZATION N/A 03/29/2022    Procedure: Coronary angiography;  Surgeon: Neto Paige MD;  Location: [facility] CATH INVASIVE LOCATION;  Service: Cardiology;  Laterality: N/A;    CARDIAC CATHETERIZATION N/A 03/29/2022    Procedure: Left ventriculography;  Surgeon: Neto Paige MD;  Location: [facility] INVASIVE LOCATION;  Service: Cardiology;  Laterality: N/A;    CARDIAC CATHETERIZATION N/A 8/22/2024    Procedure: Right and Left Heart Cath;  Surgeon: Jarrod Jasso MD;  Location: [facility] INVASIVE LOCATION;  Service: Cardiovascular;  Laterality: N/A;  History of pulmonary hypertension, worsening dyspnea on exertion, evaluate for worsening CAD or PHTN    CARDIAC CATHETERIZATION N/A 8/22/2024    Procedure: Coronary angiography;  Surgeon: Jarrod Jasso MD;  Location: [facility] INVASIVE LOCATION;  Service: Cardiovascular;  Laterality: N/A;    CARDIAC CATHETERIZATION  8/22/2024    Procedure: Saphenous Vein Graft;  Surgeon: Jarrod Jasso MD;  Location: [facility] INVASIVE LOCATION;  Service: Cardiovascular;;    CARDIAC CATHETERIZATION N/A 8/22/2024    Procedure: Native mammary injection;  Surgeon: Jarrod Jasso MD;  Location: [facility] CATH INVASIVE LOCATION;  Service: Cardiovascular;  Laterality: N/A;    COLONOSCOPY N/A 12/07/2020    Procedure: COLONOSCOPY INTO CECUM WITH HOT SNARE POLYPECTOMIES, COLD BX POLYPECTOMIES, RESOLUTION CLIPS X;  Surgeon: Regi Mercado MD;  Location: [facility] ENDOSCOPY;  Service: General;  Laterality: N/A;  PRE:  POSITIVE COLOGUARD  POST:  POLYPS    CORONARY ARTERY BYPASS GRAFT N/A  2022    Procedure: STERNOTOMY, CORONARY ARTERY BYPASS UTILIZINGTHE RT SAPHENOUS VEIN WITH LEFT INTERNAL MAMMARY ARTERY GRAFT X3 OFF PUMP, PRP;  Surgeon: Colten Zamora MD;  Location: [facility];  Service: Cardiothoracic;  Laterality: N/A;    ORIF WRIST FRACTURE Right 2022    Procedure: RIGHT DISTAL RADIUS FRACTURE OPEN REDUCTION INTERNAL FIXATION;  Surgeon: Bubba Mello MD;  Location: [facility];  Service: Orthopedics;  Laterality: Right;    TRANSESOPHAGEAL ECHOCARDIOGRAM (MARTHA) N/A 2022    Procedure: TRANSESOPHAGEAL ECHOCARDIOGRAM WITH ANESTHESIA;  Surgeon: Colten Zamora MD;  Location: [facility];  Service: Cardiothoracic;  Laterality: N/A;       Social History     Socioeconomic History    Marital status:      Spouse name: Suzanne   Tobacco Use    Smoking status: Former     Current packs/day: 0.00     Average packs/day: 1 pack/day for 45.0 years (45.0 ttl pk-yrs)     Types: Cigarettes     Start date: 3/26/1977     Quit date: 3/26/2022     Years since quittin.6     Passive exposure: Past    Smokeless tobacco: Never    Tobacco comments:     caffeine - 3 cups coffee daily, tea or soda occas.   Vaping Use    Vaping status: Never Used   Substance and Sexual Activity    Alcohol use: Yes     Alcohol/week: 2.0 standard drinks of alcohol     Types: 2 Cans of beer per week    Drug use: No    Sexual activity: Defer       Family History   Problem Relation Age of Onset    Lung cancer Brother     Liver cancer Brother     Colon polyps Brother     Malig Hyperthermia Neg Hx        The following portion of the patient's history were reviewed and updated as appropriate: past medical history, past surgical history, past social history, past family history, allergies, current medications, and problem list.    Review of Systems   Constitutional: Negative.   Cardiovascular: Negative.    Respiratory:  Positive for cough.    Neurological:  Positive for light-headedness.       No  Known Allergies      Current Outpatient Medications:     albuterol sulfate HFA (Proventil HFA) 108 (90 Base) MCG/ACT inhaler, Inhale 2 puffs Every 4 (Four) Hours As Needed for Wheezing., Disp: 18 g, Rfl: 0    amLODIPine (NORVASC) 10 MG tablet, Take 1 tablet by mouth Daily., Disp: 90 tablet, Rfl: 1    Ascorbic Acid (VITAMIN C PO), Take 1 tablet by mouth Daily., Disp: , Rfl:     aspirin 81 MG EC tablet, Take 1 tablet by mouth Daily. (Patient taking differently: Take 1 tablet by mouth Every Night.), Disp: 30 tablet, Rfl: 3    buPROPion XL (Wellbutrin XL) 300 MG 24 hr tablet, Take 1 tablet by mouth Daily., Disp: 90 tablet, Rfl: 1    Ferrous Sulfate (Iron) 28 MG tablet, Take 1 tablet by mouth Daily., Disp: , Rfl:     FLUoxetine (PROzac) 40 MG capsule, Take 2 capsules by mouth Daily., Disp: 180 capsule, Rfl: 1    furosemide (Lasix) 40 MG tablet, Take 1 tablet by mouth Daily. (Patient taking differently: Take 1 tablet by mouth Daily. TAKES EVERY OTHER DAY), Disp: 30 tablet, Rfl: 0  Losartan 100 mg 1 tablet daily    metoprolol succinate XL (TOPROL-XL) 50 MG 24 hr tablet, Take 1 tablet by mouth every night at bedtime., Disp: 90 tablet, Rfl: 0    multivitamin with minerals tablet tablet, Take 1 tablet by mouth Daily., Disp: 30 tablet, Rfl: 3    nitroglycerin (NITROSTAT) 0.4 MG SL tablet, Place 1 tablet under the tongue Every 5 (Five) Minutes As Needed for Chest Pain (Only if SBP Greater Than 100). Take no more than 3 doses in 15 minutes., Disp: 30 tablet, Rfl: 3    pantoprazole (PROTONIX) 20 MG EC tablet, Take 1 tablet by mouth Daily., Disp: , Rfl:     rosuvastatin (CRESTOR) 5 MG tablet, Take 1 tablet by mouth Daily., Disp: 30 tablet, Rfl: 0    Trelegy Ellipta 100-62.5-25 MCG/INH inhaler, Inhale 1 puff Daily., Disp: , Rfl:     potassium chloride (MICRO-K) 10 MEQ CR capsule, Take 1 capsule by mouth 2 (Two) Times a Day., Disp: 30 capsule, Rfl: 0         Objective:     Vitals:    11/19/24 1511   BP: 120/72   BP Location: Right  "arm   Patient Position: Sitting   Cuff Size: Adult   Pulse: 60   SpO2: 96%   Weight: 83.3 kg (183 lb 9.6 oz)   Height: 180.3 cm (70.98\")     Body mass index is 25.62 kg/m².    PHYSICAL EXAM:    Vitals Reviewed.   General Appearance: No acute distress, well developed and well nourished.   HENT: No hearing loss noted.    Respiratory: No signs of respiratory distress. Respiration rhythm and depth normal.  Clear to auscultation.   Cardiovascular:  Jugular Venous Pressure: Normal  Heart Rate and Rhythm: Normal, Heart Sounds: Normal S1 and S2. No S3 or S4 noted.  Murmurs: No murmurs noted. No rubs, thrills, or gallops.   Lower Extremities: No edema noted.  Musculoskeletal: Normal movement of extremities.  Skin: General appearance normal.    Psychiatric: Patient alert and oriented to person, place, and time. Speech and behavior appropriate. Normal mood and affect.       ECG 12 Lead    Date/Time: 11/19/2024 3:07 PM  Performed by: Mary Lafleur APRN    Authorized by: Mary Lafleur APRN  Comparison: compared with previous ECG from 11/5/2024  Similar to previous ECG  Rhythm: sinus rhythm  Ectopy: unifocal PVCs  Rate: normal  BPM: 60  Conduction: conduction normal  ST Segments: ST segments normal  T Waves: T waves normal  QRS axis: normal  Other findings: left ventricular hypertrophy and poor R wave progression    Clinical impression: non-specific ECG            Assessment:       Diagnosis Plan   1. Acute on chronic heart failure with preserved ejection fraction (HFpEF)        2. Acute hypoxemic respiratory failure        3. Coronary artery disease involving native coronary artery of native heart without angina pectoris        4. Aortic root dilation        5. Pulmonary hypertension        6. PVCs (premature ventricular contractions)        7. Status post endovascular aneurysm repair (EVAR)        8. Right renal artery stenosis        9. Primary hypertension        10. THONY on CPAP        11. BENTON (acute kidney injury)   "             Plan:       1.  HFpEF: Per echo in September 2024, LVEF 61% and diastolic function normal.  Right left heart catheterization showed normal pressures.  He was recently hospitalized for acute hypoxic respiratory failure and diastolic heart failure.  He is currently taking furosemide Monday, Wednesday, and Friday.  He is euvolemic.  We discussed daily weights and low-sodium diet.  Call with heart failure symptoms.    2.  Acute hypoxemic respiratory failure.  Diagnosis of COPD and previous cigarette smoker.  Follows with Dr. Rj Thakkar.     3.  Coronary Artery Disease: History of non-STEMI and CABG x 3 in March 2022.  Recent coronary angiography showed patent grafts and no new disease.  Continue aspirin.  He is holding his rosuvastatin because of possible myalgias.    4.  Aortic root dilation measuring 4.1 cm on recent echocardiogram.    5/6/7.  Trace to mild mitral regurgitation, mild tricuspid regurgitation, and mild pulmonary hypertension noted on recent echocardiogram.    8.  PVCs noted in the past.  Asymptomatic.  Continue metoprolol.    9.  Status post endovascular abdominal aortic aneurysm repair.  Follows with vascular surgery.    10.  Right renal artery stenosis.    11.  Hypertension: Blood pressure normal.    12.  Obstructive sleep apnea on CPAP.    13.  Acute kidney injury.  He was supposed to hold his losartan.  He misunderstood the instructions and is still taking losartan daily.  I recommend holding the losartan and we will repeat a BMP next week.  Further recommendations to follow.    14.  He will follow-up with me in 6 weeks and with Dr. Claudio in February 2025.    As always, it has been a pleasure to participate in your patient's care. Thank you.         Sincerely,         TAYLOR Belcher  [facility] Cardiology      Dictated utilizing Dragon Dictation

## 2024-11-19 NOTE — TELEPHONE ENCOUNTER
Chanelle fax to refill or send new prescription for Losartan 100mg qd, qty 90 w/ add'l refills sent to [facility].    Looks like med was d/c by IP a few months back?    Za  11/19/24

## 2024-11-19 NOTE — OUTREACH NOTE
COPD/PN Week 2 Survey      Flowsheet Row Responses   [facility] facility patient discharged from? [place]   Does the patient have one of the following disease processes/diagnoses(primary or secondary)? COPD   Week 2 attempt successful? No   Unsuccessful attempts Attempt 1  [UTR 2 contact numbers]            Bina VAZQUEZ - Registered Nurse

## 2024-11-20 PROBLEM — I50.32 CHRONIC HEART FAILURE WITH PRESERVED EJECTION FRACTION (HFPEF): Status: RESOLVED | Noted: 2024-11-08 | Resolved: 2024-11-20

## 2024-11-20 PROBLEM — R79.89 POSITIVE D DIMER: Status: RESOLVED | Noted: 2022-03-26 | Resolved: 2024-11-20

## 2024-11-20 PROBLEM — R93.1 ABNORMAL FINDINGS ON DIAGNOSTIC IMAGING OF HEART AND CORONARY CIRCULATION: Status: RESOLVED | Noted: 2022-03-26 | Resolved: 2024-11-20

## 2024-11-20 PROBLEM — I27.20 PULMONARY HYPERTENSION: Status: ACTIVE | Noted: 2024-11-20

## 2024-11-20 PROBLEM — R06.09 EXERTIONAL DYSPNEA: Status: RESOLVED | Noted: 2024-08-20 | Resolved: 2024-11-20

## 2024-11-20 PROBLEM — I49.3 PVCS (PREMATURE VENTRICULAR CONTRACTIONS): Status: ACTIVE | Noted: 2024-11-20

## 2024-11-20 PROBLEM — R09.02 HYPOXIA: Status: RESOLVED | Noted: 2023-04-05 | Resolved: 2024-11-20

## 2024-11-20 PROBLEM — I21.4 NSTEMI (NON-ST ELEVATED MYOCARDIAL INFARCTION): Status: RESOLVED | Noted: 2022-03-29 | Resolved: 2024-11-20

## 2024-11-20 PROBLEM — R50.9 FEVER: Status: RESOLVED | Noted: 2023-04-05 | Resolved: 2024-11-20

## 2024-11-20 PROBLEM — R06.02 SHORTNESS OF BREATH: Status: RESOLVED | Noted: 2022-03-26 | Resolved: 2024-11-20

## 2024-11-20 PROBLEM — J18.9 PNEUMONIA OF BOTH LOWER LOBES DUE TO INFECTIOUS ORGANISM: Status: RESOLVED | Noted: 2023-04-05 | Resolved: 2024-11-20

## 2024-11-20 PROBLEM — I50.32 CHRONIC DIASTOLIC CONGESTIVE HEART FAILURE: Status: RESOLVED | Noted: 2022-03-26 | Resolved: 2024-11-20

## 2024-11-20 PROBLEM — S52.531A CLOSED COLLES' FRACTURE OF RIGHT RADIUS: Status: RESOLVED | Noted: 2022-08-25 | Resolved: 2024-11-20

## 2024-11-20 PROBLEM — I77.810 AORTIC ROOT DILATION: Status: ACTIVE | Noted: 2024-11-20

## 2024-11-20 RX ORDER — FUROSEMIDE 40 MG/1
TABLET ORAL
Start: 2024-11-20

## 2024-11-25 ENCOUNTER — LAB (OUTPATIENT)
Dept: LAB | Facility: HOSPITAL | Age: 69
End: 2024-11-25
Payer: MEDICARE

## 2024-11-25 ENCOUNTER — TELEPHONE (OUTPATIENT)
Dept: CARDIOLOGY | Facility: CLINIC | Age: 69
End: 2024-11-25
Payer: MEDICARE

## 2024-11-25 DIAGNOSIS — I10 PRIMARY HYPERTENSION: ICD-10-CM

## 2024-11-25 LAB
ANION GAP SERPL CALCULATED.3IONS-SCNC: 12 MMOL/L (ref 5–15)
BUN SERPL-MCNC: 20 MG/DL (ref 8–23)
BUN/CREAT SERPL: 14.7 (ref 7–25)
CALCIUM SPEC-SCNC: 9.2 MG/DL (ref 8.6–10.5)
CHLORIDE SERPL-SCNC: 102 MMOL/L (ref 98–107)
CO2 SERPL-SCNC: 21 MMOL/L (ref 22–29)
CREAT SERPL-MCNC: 1.36 MG/DL (ref 0.76–1.27)
EGFRCR SERPLBLD CKD-EPI 2021: 56.3 ML/MIN/1.73
GLUCOSE SERPL-MCNC: 95 MG/DL (ref 65–99)
POTASSIUM SERPL-SCNC: 4.1 MMOL/L (ref 3.5–5.2)
SODIUM SERPL-SCNC: 135 MMOL/L (ref 136–145)

## 2024-11-25 PROCEDURE — 80048 BASIC METABOLIC PNL TOTAL CA: CPT

## 2024-11-25 PROCEDURE — 36415 COLL VENOUS BLD VENIPUNCTURE: CPT

## 2024-11-25 NOTE — TELEPHONE ENCOUNTER
----- Message from Matthew Giordano sent at 11/25/2024  1:25 PM EST -----  Please notify patient of the result. Labs look ok. Kidney function at baseline. No changes

## 2024-11-25 NOTE — TELEPHONE ENCOUNTER
Notified patient of results and recommendations; patient verbalizes understanding.    Selene Martinez Cardiology Triage  11/25/24 14:13 EST

## 2024-11-25 NOTE — TELEPHONE ENCOUNTER
Called and left VM. Will continue to try to reach patient. HUB transfer call to triage.     Loreto Velasquez RN  Triage [facility]

## 2024-11-27 NOTE — TELEPHONE ENCOUNTER
Refill Protocol Failed, Requesting Metoprolol Succ  mg, last refill for 50 mg         LOV 11/19/24 w/ TK    FU 12/17/24 w/ TK     Last Labs-    CBC 11/08/24    CMP 11/05/24    TSH 08/23/24    LIPID 08/20/24        Please review and sign.

## 2024-11-28 RX ORDER — METOPROLOL SUCCINATE 50 MG/1
50 TABLET, EXTENDED RELEASE ORAL
Qty: 90 TABLET | Refills: 0 | Status: SHIPPED | OUTPATIENT
Start: 2024-11-28

## 2024-12-09 ENCOUNTER — HOSPITAL ENCOUNTER (OUTPATIENT)
Dept: CT IMAGING | Facility: HOSPITAL | Age: 69
Discharge: HOME | End: 2024-12-09
Admitting: INTERNAL MEDICINE
Payer: MEDICARE

## 2024-12-09 DIAGNOSIS — R91.1 LUNG NODULE: ICD-10-CM

## 2024-12-09 PROCEDURE — 71250 CT THORAX DX C-: CPT

## 2024-12-17 ENCOUNTER — OFFICE VISIT (OUTPATIENT)
Dept: CARDIOLOGY | Facility: CLINIC | Age: 69
End: 2024-12-17
Payer: MEDICARE

## 2024-12-17 VITALS
HEART RATE: 57 BPM | WEIGHT: 183 LBS | BODY MASS INDEX: 25.62 KG/M2 | SYSTOLIC BLOOD PRESSURE: 150 MMHG | DIASTOLIC BLOOD PRESSURE: 70 MMHG | HEIGHT: 71 IN

## 2024-12-17 DIAGNOSIS — N18.31 STAGE 3A CHRONIC KIDNEY DISEASE: ICD-10-CM

## 2024-12-17 DIAGNOSIS — Z98.890 HISTORY OF STENT INSERTION OF RENAL ARTERY: ICD-10-CM

## 2024-12-17 DIAGNOSIS — I77.810 AORTIC ROOT DILATION: ICD-10-CM

## 2024-12-17 DIAGNOSIS — I25.10 CORONARY ARTERY DISEASE INVOLVING NATIVE CORONARY ARTERY OF NATIVE HEART WITHOUT ANGINA PECTORIS: ICD-10-CM

## 2024-12-17 DIAGNOSIS — I50.33 ACUTE ON CHRONIC HEART FAILURE WITH PRESERVED EJECTION FRACTION (HFPEF): ICD-10-CM

## 2024-12-17 DIAGNOSIS — I71.42 JUXTARENAL ABDOMINAL AORTIC ANEURYSM (AAA) WITHOUT RUPTURE: Chronic | ICD-10-CM

## 2024-12-17 DIAGNOSIS — I10 PRIMARY HYPERTENSION: Primary | ICD-10-CM

## 2024-12-17 DIAGNOSIS — I49.3 PVCS (PREMATURE VENTRICULAR CONTRACTIONS): ICD-10-CM

## 2024-12-17 DIAGNOSIS — E78.49 OTHER HYPERLIPIDEMIA: ICD-10-CM

## 2024-12-17 DIAGNOSIS — I27.20 PULMONARY HYPERTENSION: ICD-10-CM

## 2024-12-17 DIAGNOSIS — Z98.890 STATUS POST ENDOVASCULAR ANEURYSM REPAIR (EVAR): ICD-10-CM

## 2024-12-17 DIAGNOSIS — Z86.79 STATUS POST ENDOVASCULAR ANEURYSM REPAIR (EVAR): ICD-10-CM

## 2024-12-17 DIAGNOSIS — I70.1 RIGHT RENAL ARTERY STENOSIS: ICD-10-CM

## 2024-12-17 PROBLEM — J96.01 ACUTE HYPOXEMIC RESPIRATORY FAILURE: Status: RESOLVED | Noted: 2024-11-05 | Resolved: 2024-12-17
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
## 2024-12-17 PROBLEM — L03.90 CELLULITIS: Status: RESOLVED | Noted: 2024-11-06 | Resolved: 2024-12-17

## 2024-12-17 RX ORDER — DOXYCYCLINE 100 MG/1
1 CAPSULE ORAL EVERY 12 HOURS SCHEDULED
COMMUNITY
Start: 2024-12-12

## 2024-12-17 RX ORDER — HYDRALAZINE HYDROCHLORIDE 25 MG/1
25 TABLET, FILM COATED ORAL 3 TIMES DAILY
Qty: 270 TABLET | Refills: 0 | Status: SHIPPED | OUTPATIENT
Start: 2024-12-17

## 2024-12-17 NOTE — PROGRESS NOTES
Date of Office Visit: 2024  Encounter Provider: TAYLOR Alex  Place of Service: [facility] CARDIOLOGY  Patient Name: Osman Aparicio  :1955  Primary Cardiologist: Dr. Susan Mercedes    Chief Complaint   Patient presents with    Hypertension   :     HPI: Osman Aparicio is a 69 y.o. male who presents today for follow-up on kidney function and hypertension.  I have reviewed his medical records.    He has known hypertension, hyperlipidemia, COPD, and obstructive sleep apnea on CPAP.  He quit smoking in 2022.    In 2022, he had a non-STEMI and underwent CABG x 3 (LIMA to LAD, SVG to OM1, and SVG to OM 2).  He was also diagnosed with mild carotid artery stenosis, abdominal aortic aneurysm, grade 3 diastolic dysfunction, moderate mitral regurgitation, and moderate pulmonary hypertension.    In 2023, he underwent abdominal aortic aneurysm endograft repair was noted to have severe right renal artery stenosis.    In 2024, he was hospitalized for dyspnea. Repeat echocardiogram showed LVEF 61%, mild LVH, diastolic function normal, left atrium volume mildly increased, aortic valve calcification, mild aortic regurgitation, mitral annular calcification, moderate bileaflet mitral valve thickening, trace to mild mitral regurgitation, mild tricuspid regurgitation, mild pulmonary hypertension, and aortic root dilated at 4.1 cm.Right and left heart catheterization revealed patent grafts, no new CAD, normal right and left-sided filling pressures.  Was recommended to follow-up with pulmonology outpatient.    In 2024, he was hospitalized for dyspnea and a fall.  He was treated for acute heart failure and right elbow cellulitis.  Acute kidney injury and was recommended to hold his losartan.  He was noted to have PVCs.    He followed up with me posthospitalization and was still taking the losartan.  He was taking the furosemide 3 times per week.   Recommended holding the losartan and rechecking a BMP.    A month ago, his BUN was 36 and creatinine 1.66.  After holding the losartan and his BUN decreased to 20 and creatinine 1.36.  His blood pressure has been elevated.    He presents today for a follow-up visit with his wife accompanying him.  Blood pressures have been elevated since stopping losartan and running the 150s/80s at home.  He feels better than he has in years.  He denies chest pain, shortness of air, PND, orthopnea, edema, palpitations, dizziness, or syncope.  He stopped his rosuvastatin and his leg and back pain has completely resolved which has improved his quality of life.      Past Medical History:   Diagnosis Date    AAA (abdominal aortic aneurysm)     Abnormal glucose     Acute hypoxemic respiratory failure 11/05/2024    Anxiety     Cellulitis 11/06/2024    CHF (congestive heart failure)     Closed Colles' fracture of right radius 08/25/2022    COPD (chronic obstructive pulmonary disease)     Coronary artery disease     Encounter for special screening examination for neoplasm of prostate 10/2012    Fall 11/06/2024    Hematuria     JUST MONITORING    History of COVID-19 2021    Hyperlipidemia     Hypertension     Myocardial infarction 03/2022    NSTEMI (non-ST elevated myocardial infarction) 03/29/2022    THONY on CPAP     Pneumonia of both lower lobes due to infectious organism 04/05/2023    PVCs (premature ventricular contractions) 11/20/2024    Radius fracture     RIGHT    Right renal artery stenosis     Vitamin D deficiency disease        Past Surgical History:   Procedure Laterality Date    ARTERIOGRAM AORTIC N/A 01/30/2023    Procedure: Zenith Fenestrated Endovascular Repair;  Surgeon: Mariusz Kumar MD;  Location: [facility] OR 18/19;  Service: Vascular;  Laterality: N/A;    CARDIAC CATHETERIZATION N/A 03/29/2022    Procedure: Left Heart Cath;  Surgeon: Neto Paige MD;  Location: [facility] CATH INVASIVE LOCATION;   Service: Cardiology;  Laterality: N/A;    CARDIAC CATHETERIZATION N/A 03/29/2022    Procedure: Coronary angiography;  Surgeon: Neto Paige MD;  Location: [facility] CATH INVASIVE LOCATION;  Service: Cardiology;  Laterality: N/A;    CARDIAC CATHETERIZATION N/A 03/29/2022    Procedure: Left ventriculography;  Surgeon: Neto Paige MD;  Location: [facility] CATH INVASIVE LOCATION;  Service: Cardiology;  Laterality: N/A;    CARDIAC CATHETERIZATION N/A 8/22/2024    Procedure: Right and Left Heart Cath;  Surgeon: Jarrod Jasso MD;  Location: [facility] CATH INVASIVE LOCATION;  Service: Cardiovascular;  Laterality: N/A;  History of pulmonary hypertension, worsening dyspnea on exertion, evaluate for worsening CAD or PHTN    CARDIAC CATHETERIZATION N/A 8/22/2024    Procedure: Coronary angiography;  Surgeon: Jarrod Jasso MD;  Location: [facility] CATH INVASIVE LOCATION;  Service: Cardiovascular;  Laterality: N/A;    CARDIAC CATHETERIZATION  8/22/2024    Procedure: Saphenous Vein Graft;  Surgeon: Jarrod Jasso MD;  Location: [facility] CATH INVASIVE LOCATION;  Service: Cardiovascular;;    CARDIAC CATHETERIZATION N/A 8/22/2024    Procedure: Native mammary injection;  Surgeon: Jarrod Jasso MD;  Location: [facility] CATH INVASIVE LOCATION;  Service: Cardiovascular;  Laterality: N/A;    COLONOSCOPY N/A 12/07/2020    Procedure: COLONOSCOPY INTO CECUM WITH HOT SNARE POLYPECTOMIES, COLD BX POLYPECTOMIES, RESOLUTION CLIPS X;  Surgeon: Regi Mercado MD;  Location: [facility] ENDOSCOPY;  Service: General;  Laterality: N/A;  PRE:  POSITIVE COLOGUARD  POST:  POLYPS    CORONARY ARTERY BYPASS GRAFT N/A 04/01/2022    Procedure: STERNOTOMY, CORONARY ARTERY BYPASS UTILIZINGTHE RT SAPHENOUS VEIN WITH LEFT INTERNAL MAMMARY ARTERY GRAFT X3 OFF PUMP, PRP;  Surgeon: Colten Zamora MD;  Location: [facility] CVOR;  Service: Cardiothoracic;  Laterality: N/A;    ORIF WRIST FRACTURE Right 08/26/2022    Procedure: RIGHT DISTAL RADIUS FRACTURE OPEN REDUCTION  INTERNAL FIXATION;  Surgeon: Bubba Mello MD;  Location: [facility] OR [facility];  Service: Orthopedics;  Laterality: Right;    TRANSESOPHAGEAL ECHOCARDIOGRAM (MARTHA) N/A 2022    Procedure: TRANSESOPHAGEAL ECHOCARDIOGRAM WITH ANESTHESIA;  Surgeon: Colten Zamora MD;  Location: [facility] CVOR;  Service: Cardiothoracic;  Laterality: N/A;       Social History     Socioeconomic History    Marital status:      Spouse name: Suzanne   Tobacco Use    Smoking status: Former     Current packs/day: 0.00     Average packs/day: 1 pack/day for 45.0 years (45.0 ttl pk-yrs)     Types: Cigarettes     Start date: 3/26/1977     Quit date: 3/26/2022     Years since quittin.7     Passive exposure: Past    Smokeless tobacco: Never    Tobacco comments:     caffeine - 3 cups coffee daily, tea or soda occas.   Vaping Use    Vaping status: Never Used   Substance and Sexual Activity    Alcohol use: Yes     Alcohol/week: 2.0 standard drinks of alcohol     Types: 2 Cans of beer per week    Drug use: No    Sexual activity: Defer       Family History   Problem Relation Age of Onset    Lung cancer Brother     Liver cancer Brother     Colon polyps Brother     Malig Hyperthermia Neg Hx        The following portion of the patient's history were reviewed and updated as appropriate: past medical history, past surgical history, past social history, past family history, allergies, current medications, and problem list.    Review of Systems   Constitutional: Negative.   Cardiovascular: Negative.    Respiratory: Negative.     Hematologic/Lymphatic: Negative.    Neurological: Negative.        Allergies   Allergen Reactions    Rosuvastatin Myalgia     Back and leg pain         Current Outpatient Medications:     albuterol sulfate HFA (Proventil HFA) 108 (90 Base) MCG/ACT inhaler, Inhale 2 puffs Every 4 (Four) Hours As Needed for Wheezing., Disp: 18 g, Rfl: 0    amLODIPine (NORVASC) 10 MG tablet, Take 1 tablet by mouth Daily., Disp: 90 tablet,  "Rfl: 1    Ascorbic Acid (VITAMIN C PO), Take 1 tablet by mouth Daily., Disp: , Rfl:     aspirin 81 MG EC tablet, Take 1 tablet by mouth Daily., Disp: 30 tablet, Rfl: 3    buPROPion XL (Wellbutrin XL) 300 MG 24 hr tablet, Take 1 tablet by mouth Daily., Disp: 90 tablet, Rfl: 1    doxycycline (VIBRAMYCIN) 100 MG capsule, Take 1 capsule by mouth Every 12 (Twelve) Hours., Disp: , Rfl:     Ferrous Sulfate (Iron) 28 MG tablet, Take 1 tablet by mouth Daily., Disp: , Rfl:     FLUoxetine (PROzac) 40 MG capsule, Take 2 capsules by mouth Daily., Disp: 180 capsule, Rfl: 1    furosemide (Lasix) 40 MG tablet, 1 tablet daily on Monday, Wednesday, Friday, Disp: , Rfl:     metoprolol succinate XL (TOPROL-XL) 50 MG 24 hr tablet, Take 1 tablet by mouth every night at bedtime., Disp: 90 tablet, Rfl: 0    multivitamin with minerals tablet tablet, Take 1 tablet by mouth Daily., Disp: 30 tablet, Rfl: 3    nitroglycerin (NITROSTAT) 0.4 MG SL tablet, Place 1 tablet under the tongue Every 5 (Five) Minutes As Needed for Chest Pain (Only if SBP Greater Than 100). Take no more than 3 doses in 15 minutes., Disp: 30 tablet, Rfl: 3    pantoprazole (PROTONIX) 20 MG EC tablet, Take 1 tablet by mouth Daily., Disp: , Rfl:     potassium chloride (MICRO-K) 10 MEQ CR capsule, Take 1 capsule by mouth 2 (Two) Times a Day., Disp: 30 capsule, Rfl: 0    Trelegy Ellipta 100-62.5-25 MCG/INH inhaler, Inhale 1 puff Daily., Disp: , Rfl:     hydrALAZINE (APRESOLINE) 25 MG tablet, Take 1 tablet by mouth 3 (Three) Times a Day. Stop furosemide and potassium, Disp: 270 tablet, Rfl: 0         Objective:     Vitals:    12/17/24 1441   BP: 150/70   BP Location: Left arm   Patient Position: Sitting   Cuff Size: Adult   Pulse: 57   Weight: 83 kg (183 lb)   Height: 180.3 cm (70.98\")     Body mass index is 25.53 kg/m².    PHYSICAL EXAM:    Vitals Reviewed.   General Appearance: No acute distress, well developed and well nourished.   HENT: No hearing loss noted.    Respiratory: " No signs of respiratory distress. Respiration rhythm and depth normal.  Clear to auscultation.   Cardiovascular:  Jugular Venous Pressure: Normal  Heart Rate and Rhythm: Normal, Heart Sounds: Normal S1 and S2. No S3 or S4 noted.  Murmurs: No murmurs noted. No rubs, thrills, or gallops.   Lower Extremities: No edema noted.  Musculoskeletal: Normal movement of extremities.  Skin: General appearance normal.    Psychiatric: Patient alert and oriented to person, place, and time. Speech and behavior appropriate. Normal mood and affect.       ECG 12 Lead    Date/Time: 12/17/2024 2:43 PM  Performed by: Mary Lafleur APRN    Authorized by: Mary Lafleur APRN  Comparison: compared with previous ECG from 11/19/2024  Similar to previous ECG  Rhythm: sinus rhythm  Rate: bradycardic  BPM: 57  Conduction: conduction normal  ST Segments: ST segments normal  T Waves: T waves normal  QRS axis: normal  Other findings: left ventricular hypertrophy    Clinical impression: non-specific ECG            Assessment:       Diagnosis Plan   1. Primary hypertension        2. Stage 3a chronic kidney disease        3. Right renal artery stenosis        4. History of left renal stent        5. Acute on chronic heart failure with preserved ejection fraction (HFpEF)        6. Pulmonary hypertension        7. Coronary artery disease involving native coronary artery of native heart without angina pectoris        8. Aortic root dilation        9. Juxtarenal abdominal aortic aneurysm (AAA) without rupture        10. Status post endovascular aneurysm repair (EVAR)        11. Other hyperlipidemia        12. PVCs (premature ventricular contractions)               Plan:       His blood pressure is elevated since we stopped the losartan.  His creatinine has improved.  He has known right renal artery stenosis and status post left renal artery stent.  I have recommended continuing amlodipine and metoprolol.  Add hydralazine 25 mg 1 tablet 3 times per  day.    He has really concentrated on following a low-salt diet since his acute exacerbation of heart failure.  He would like to stop his furosemide and potassium and see how he does.  He says he feels better now than he has in years.  I explained that he if he has any recurrent shortness of breath or edema, will need to restart the furosemide and he agrees.    Call if blood pressure remains elevated greater than systolic 140s.  Keep scheduled appointment with Dr. Claudio in February 2025.    As always, it has been a pleasure to participate in your patient's care. Thank you.         Sincerely,         TAYLOR Belcher  [facility] Cardiology      Dictated utilizing Dragon Dictation  I spent 30 minutes reviewing her medical records/testing/previous office notes/labs, face-to-face interaction with patient, physical examination, formulating the plan of care, and discussion of plan of care with patient.

## 2025-02-04 RX ORDER — METOPROLOL SUCCINATE 50 MG/1
50 TABLET, EXTENDED RELEASE ORAL
Qty: 90 TABLET | Refills: 0 | Status: SHIPPED | OUTPATIENT
Start: 2025-02-04

## 2025-02-20 ENCOUNTER — OFFICE VISIT (OUTPATIENT)
Dept: CARDIOLOGY | Facility: CLINIC | Age: 70
End: 2025-02-20
Payer: MEDICARE

## 2025-02-20 VITALS
HEART RATE: 60 BPM | WEIGHT: 192 LBS | DIASTOLIC BLOOD PRESSURE: 78 MMHG | HEIGHT: 71 IN | SYSTOLIC BLOOD PRESSURE: 136 MMHG | BODY MASS INDEX: 26.88 KG/M2

## 2025-02-20 DIAGNOSIS — I25.10 CORONARY ARTERY DISEASE INVOLVING NATIVE CORONARY ARTERY OF NATIVE HEART WITHOUT ANGINA PECTORIS: Primary | ICD-10-CM

## 2025-02-20 DIAGNOSIS — Z86.79 STATUS POST ENDOVASCULAR ANEURYSM REPAIR (EVAR): ICD-10-CM

## 2025-02-20 DIAGNOSIS — Z98.890 STATUS POST ENDOVASCULAR ANEURYSM REPAIR (EVAR): ICD-10-CM

## 2025-02-20 DIAGNOSIS — I10 PRIMARY HYPERTENSION: ICD-10-CM

## 2025-02-20 DIAGNOSIS — N18.31 STAGE 3A CHRONIC KIDNEY DISEASE: ICD-10-CM

## 2025-02-20 DIAGNOSIS — E78.49 OTHER HYPERLIPIDEMIA: ICD-10-CM

## 2025-02-20 RX ORDER — ATORVASTATIN CALCIUM 40 MG/1
40 TABLET, FILM COATED ORAL NIGHTLY
Qty: 30 TABLET | Refills: 11 | Status: SHIPPED | OUTPATIENT
Start: 2025-02-20

## 2025-02-20 NOTE — PROGRESS NOTES
Date of Office Visit: 2025  Encounter Provider: Susan Claudio MD  Place of Service: [facility] CARDIOLOGY  Patient Name: Osman Aparicio  :1955      Patient ID:  Osman Aparicio is a 69 y.o. male is here for  followup for CAD, s/p CABG        History of Present Illness    He has a past medical history of hyperlipidemia, hypertension, current tobacco use (>40 year smoking history), alcohol abuse, COPD, impaired fasting glucose, CAD- status post CABG, PAD, abdominal aortic aneurysm- s/p endovascular graft 2023, family history of CAD with 2 brothers having MIs in their 40s.     He presented to the emergency department with complaints of shortness of air 3/26/2022. His shortness of breath began days ago, but progressively worsened last night and he called EMS. In route to the hospital, he received IV Solumedrol and a duoneb and arrived to [facility] ER on 5LNC. Per patient, his shortness of breath was greatly improved with oxygen and the medications given.      He had an echo done done 3/26/22 showing mild left ventricular dilation, ejection fraction 56%, grade 3 reversible restrictive diastolic dysfunction, moderate left atrial lodgment, moderate mitral insufficiency, trace to mild aortic and tricuspid insufficiency, severe septal apical hypokinesis, RVSP 52 mmHg.  He had a CT of the chest which showed mild pulmonary edema, abdominal aortic aneurysm, mild bilateral pleural effusions but no pulmonary embolism.  Cath done 3/29/2022 showed calcified left main without stenosis, chronically occluded ostial to mid LAD, 89% first OM stenosis, 95% second OM stenosis, 50% distal RCA stenosis.  The LAD filled via left to left collaterals.  He went on for CABG done 2022 with receiving LIMA to LAD, SVG to OM1, SVG to OM 2.  He also had an abdominal aortic ultrasound which showed an abdominal aortic aneurysm measuring 5.5 cm.  Vascular surgery saw him and arrange for outpatient  follow-up - Dr. Kumar.  Carotid duplex showed mild bilateral internal carotid artery stenosis.      1/30/2023, he had a percutaneous endovascular repair of a 5.7 cm juxtarenal abdominal aortic aneurysm using a custom Cook Zenith fenestrated aortic endograft.  He has done well with this.  They did note severe right renal artery stenosis with atrophic right kidney, no iliac aneurysm.    He was hospitalized for dyspnea 9/2024.  Echocardiogram done 8/21/2024 shows ejection fraction 61% with mild concentric left hypertrophy, RVSP 38 mmHg, aortic root 4.1 cm, normal diastolic function, mild left atrial enlargement.  Cardiac catheterization done 8/22/2024 for dyspnea on exertion showed severe native vessel disease with patent LIMA to LAD, patent SVG to OM1, patent SVG to OM 2.  Medical treatment was recommended.  He was hospitalized 11/2024 for dyspnea and a fall and was treated for acute heart failure and right elbow cellulitis.  He was recommended to hold his losartan for acute kidney injury and he had PVCs at that time.  Gerson saw in the office and held the losartan and his creatinine came down to 1.36.    Labs 11/25/2024 shows creatinine 1.36, otherwise normal BMP.      Since decreasing his sodium, his breathing is much better, he has no swelling, his energy level is better.  He has had no dizziness or syncope.  He had severe myalgias with Crestor and had to stop this.  He has no orthopnea or PND.  He has no exertional chest sinus or pressure.  He is back to golfing and feels well.    Past Medical History:   Diagnosis Date    AAA (abdominal aortic aneurysm)     Abnormal glucose     Acute hypoxemic respiratory failure 11/05/2024    Anxiety     Cellulitis 11/06/2024    CHF (congestive heart failure)     Closed Colles' fracture of right radius 08/25/2022    COPD (chronic obstructive pulmonary disease)     Coronary artery disease     Encounter for special screening examination for neoplasm of prostate 10/2012    Fall  11/06/2024    Hematuria     JUST MONITORING    History of COVID-19     2021    Hyperlipidemia     Hypertension     Myocardial infarction 03/2022    NSTEMI (non-ST elevated myocardial infarction) 03/29/2022    THONY on CPAP     Pneumonia of both lower lobes due to infectious organism 04/05/2023    PVCs (premature ventricular contractions) 11/20/2024    Radius fracture     RIGHT    Right renal artery stenosis     Vitamin D deficiency disease          Past Surgical History:   Procedure Laterality Date    ARTERIOGRAM AORTIC N/A 01/30/2023    Procedure: Zenith Fenestrated Endovascular Repair;  Surgeon: Mariusz Kumar MD;  Location: [facility] OR 18/19;  Service: Vascular;  Laterality: N/A;    CARDIAC CATHETERIZATION N/A 03/29/2022    Procedure: Left Heart Cath;  Surgeon: Neto Paige MD;  Location: [facility] CATH INVASIVE LOCATION;  Service: Cardiology;  Laterality: N/A;    CARDIAC CATHETERIZATION N/A 03/29/2022    Procedure: Coronary angiography;  Surgeon: Neto Paige MD;  Location: [facility] CATH INVASIVE LOCATION;  Service: Cardiology;  Laterality: N/A;    CARDIAC CATHETERIZATION N/A 03/29/2022    Procedure: Left ventriculography;  Surgeon: Neto Paige MD;  Location: [facility] CATH INVASIVE LOCATION;  Service: Cardiology;  Laterality: N/A;    CARDIAC CATHETERIZATION N/A 8/22/2024    Procedure: Right and Left Heart Cath;  Surgeon: Jarrod Jasso MD;  Location: [facility] CATH INVASIVE LOCATION;  Service: Cardiovascular;  Laterality: N/A;  History of pulmonary hypertension, worsening dyspnea on exertion, evaluate for worsening CAD or PHTN    CARDIAC CATHETERIZATION N/A 8/22/2024    Procedure: Coronary angiography;  Surgeon: Jarrod Jasso MD;  Location: [facility] CATH INVASIVE LOCATION;  Service: Cardiovascular;  Laterality: N/A;    CARDIAC CATHETERIZATION  8/22/2024    Procedure: Saphenous Vein Graft;  Surgeon: Jarrod Jasso MD;  Location: [facility] CATH INVASIVE LOCATION;  Service: Cardiovascular;;     CARDIAC CATHETERIZATION N/A 8/22/2024    Procedure: Native mammary injection;  Surgeon: Jarrod Jasso MD;  Location: [facility] CATH INVASIVE LOCATION;  Service: Cardiovascular;  Laterality: N/A;    COLONOSCOPY N/A 12/07/2020    Procedure: COLONOSCOPY INTO CECUM WITH HOT SNARE POLYPECTOMIES, COLD BX POLYPECTOMIES, RESOLUTION CLIPS X;  Surgeon: Regi Mercado MD;  Location: [facility] ENDOSCOPY;  Service: General;  Laterality: N/A;  PRE:  POSITIVE COLOGUARD  POST:  POLYPS    CORONARY ARTERY BYPASS GRAFT N/A 04/01/2022    Procedure: STERNOTOMY, CORONARY ARTERY BYPASS UTILIZINGTHE RT SAPHENOUS VEIN WITH LEFT INTERNAL MAMMARY ARTERY GRAFT X3 OFF PUMP, PRP;  Surgeon: Colten Zamora MD;  Location: [facility] CVOR;  Service: Cardiothoracic;  Laterality: N/A;    ORIF WRIST FRACTURE Right 08/26/2022    Procedure: RIGHT DISTAL RADIUS FRACTURE OPEN REDUCTION INTERNAL FIXATION;  Surgeon: Bubba Mello MD;  Location: [facility] OR [facility];  Service: Orthopedics;  Laterality: Right;    TRANSESOPHAGEAL ECHOCARDIOGRAM (MARTHA) N/A 04/01/2022    Procedure: TRANSESOPHAGEAL ECHOCARDIOGRAM WITH ANESTHESIA;  Surgeon: Colten Zamora MD;  Location: [facility] CVOR;  Service: Cardiothoracic;  Laterality: N/A;       Current Outpatient Medications on File Prior to Visit   Medication Sig Dispense Refill    albuterol sulfate HFA (Proventil HFA) 108 (90 Base) MCG/ACT inhaler Inhale 2 puffs Every 4 (Four) Hours As Needed for Wheezing. 18 g 0    amLODIPine (NORVASC) 10 MG tablet Take 1 tablet by mouth Daily. 90 tablet 1    Ascorbic Acid (VITAMIN C PO) Take 1 tablet by mouth Daily.      aspirin 81 MG EC tablet Take 1 tablet by mouth Daily. 30 tablet 3    buPROPion XL (Wellbutrin XL) 300 MG 24 hr tablet Take 1 tablet by mouth Daily. 90 tablet 1    doxycycline (VIBRAMYCIN) 100 MG capsule Take 1 capsule by mouth Every 12 (Twelve) Hours.      Ferrous Sulfate (Iron) 28 MG tablet Take 1 tablet by mouth Daily.      FLUoxetine (PROzac) 40 MG capsule Take 2  "capsules by mouth Daily. 180 capsule 1    hydrALAZINE (APRESOLINE) 25 MG tablet Take 1 tablet by mouth 3 (Three) Times a Day. Stop furosemide and potassium 270 tablet 0    metoprolol succinate XL (TOPROL-XL) 50 MG 24 hr tablet Take 1 tablet by mouth every night at bedtime. 90 tablet 0    multivitamin with minerals tablet tablet Take 1 tablet by mouth Daily. 30 tablet 3    nitroglycerin (NITROSTAT) 0.4 MG SL tablet Place 1 tablet under the tongue Every 5 (Five) Minutes As Needed for Chest Pain (Only if SBP Greater Than 100). Take no more than 3 doses in 15 minutes. 30 tablet 3    pantoprazole (PROTONIX) 20 MG EC tablet Take 1 tablet by mouth Daily.      Trelegy Ellipta 100-62.5-25 MCG/INH inhaler Inhale 1 puff Daily.       No current facility-administered medications on file prior to visit.       Social History     Socioeconomic History    Marital status:      Spouse name: Suzanne   Tobacco Use    Smoking status: Former     Current packs/day: 0.00     Average packs/day: 1 pack/day for 45.0 years (45.0 ttl pk-yrs)     Types: Cigarettes     Start date: 3/26/1977     Quit date: 3/26/2022     Years since quittin.9     Passive exposure: Past    Smokeless tobacco: Never    Tobacco comments:     caffeine - 3 cups coffee daily, tea or soda occas.   Vaping Use    Vaping status: Never Used   Substance and Sexual Activity    Alcohol use: Yes     Alcohol/week: 2.0 standard drinks of alcohol     Types: 2 Cans of beer per week    Drug use: No    Sexual activity: Defer             Procedures    ECG 12 Lead    Date/Time: 2025 2:52 PM  Performed by: Susan Claudio MD    Authorized by: Susan Claudio MD  Comparison: compared with previous ECG   Similar to previous ECG  Rhythm: sinus rhythm  ST Depression: I, aVL, V5 and V6              Objective:      Vitals:    25 1432   BP: 136/78   Pulse: 60   Weight: 87.1 kg (192 lb)   Height: 180.3 cm (71\")     Body mass index is 26.78 kg/m².    Vitals reviewed. "   Constitutional:       General: Not in acute distress.     Appearance: Not diaphoretic.   Neck:      Vascular: No carotid bruit or JVD.   Pulmonary:      Effort: Pulmonary effort is normal.      Breath sounds: Normal breath sounds.   Cardiovascular:      Normal rate. Regular rhythm.      Murmurs: There is no murmur.      No gallop.  No rub.   Pulses:     Intact distal pulses.      Carotid: 2+ bilaterally.     Radial: 2+ bilaterally.     Dorsalis pedis: 2+ bilaterally.     Posterior tibial: 2+ bilaterally.  Edema:     Peripheral edema absent.   Neurological:      Cranial Nerves: No cranial nerve deficit.         Lab Review:       Assessment:      Diagnosis Plan   1. Coronary artery disease involving native coronary artery of native heart without angina pectoris        2. Primary hypertension        3. Stage 3a chronic kidney disease        4. Other hyperlipidemia        5. Status post endovascular aneurysm repair (EVAR)            CAD, status post CABG, on aspirin, no angina, no heart failure.  Abdominal aortic aneurysm, seeing Dr. Kumar- s/p endovascular repair 1/2023.   HFpEF, stable and better since he is watch his sodium intake.  Renal artery stenosis  Hypertension, under fair control.  Mild bilateral carotid artery stenosis  COPD  Diastolic dysfunction  Anemia  Hyperlipidemia, myalgias with rosuvastatin, start atorvastatin 40 mg nightly.  If he cannot tolerate this, would recommend PCSK9 therapy.     Plan:       See Alissa in 6 months, no other testing this time, overall doing well.

## 2025-03-12 RX ORDER — HYDRALAZINE HYDROCHLORIDE 25 MG/1
25 TABLET, FILM COATED ORAL 3 TIMES DAILY
Qty: 270 TABLET | Refills: 0 | Status: SHIPPED | OUTPATIENT
Start: 2025-03-12

## 2025-03-17 DIAGNOSIS — I70.1 ATHEROSCLEROSIS OF RENAL ARTERY: ICD-10-CM

## 2025-03-17 DIAGNOSIS — I71.42 JUXTARENAL ABDOMINAL AORTIC ANEURYSM (AAA) WITHOUT RUPTURE: Primary | ICD-10-CM

## 2025-03-17 DIAGNOSIS — N26.1 ATROPHIC KIDNEY: ICD-10-CM

## 2025-06-09 RX ORDER — HYDRALAZINE HYDROCHLORIDE 25 MG/1
25 TABLET, FILM COATED ORAL 3 TIMES DAILY
Qty: 270 TABLET | Refills: 0 | Status: SHIPPED | OUTPATIENT
Start: 2025-06-09

## 2025-06-09 NOTE — TELEPHONE ENCOUNTER
NOV-08/06/25-EE  LOV-02/20/25-RM    CAD, status post CABG, on aspirin, no angina, no heart failure.  Abdominal aortic aneurysm, seeing Dr. Kumar- s/p endovascular repair 1/2023.   HFpEF, stable and better since he is watch his sodium intake.  Renal artery stenosis  Hypertension, under fair control.  Mild bilateral carotid artery stenosis  COPD  Diastolic dysfunction  Anemia  Hyperlipidemia, myalgias with rosuvastatin, start atorvastatin 40 mg nightly.  If he cannot tolerate this, would recommend PCSK9 therapy.     Plan:       See Alissa in 6 months, no other testing this time, overall doing well.

## 2025-07-12 DIAGNOSIS — F32.A ANXIETY AND DEPRESSION: ICD-10-CM

## 2025-07-12 DIAGNOSIS — F41.9 ANXIETY AND DEPRESSION: ICD-10-CM

## 2025-07-14 ENCOUNTER — TRANSCRIBE ORDERS (OUTPATIENT)
Dept: ADMINISTRATIVE | Facility: HOSPITAL | Age: 70
End: 2025-07-14
Payer: MEDICARE

## 2025-07-14 DIAGNOSIS — Z87.891 PERSONAL HISTORY OF NICOTINE DEPENDENCE: Primary | ICD-10-CM

## 2025-07-14 RX ORDER — FLUOXETINE HYDROCHLORIDE 40 MG/1
80 CAPSULE ORAL DAILY
Qty: 28 CAPSULE | Refills: 0 | Status: SHIPPED | OUTPATIENT
Start: 2025-07-14

## 2025-07-26 DIAGNOSIS — F41.9 ANXIETY AND DEPRESSION: ICD-10-CM

## 2025-07-26 DIAGNOSIS — F32.A ANXIETY AND DEPRESSION: ICD-10-CM

## 2025-07-29 RX ORDER — BUPROPION HYDROCHLORIDE 300 MG/1
300 TABLET ORAL DAILY
Qty: 90 TABLET | Refills: 0 | Status: SHIPPED | OUTPATIENT
Start: 2025-07-29

## 2025-08-01 DIAGNOSIS — I10 ESSENTIAL HYPERTENSION: ICD-10-CM

## 2025-08-01 RX ORDER — AMLODIPINE BESYLATE 10 MG/1
10 TABLET ORAL DAILY
Qty: 30 TABLET | Refills: 0 | Status: SHIPPED | OUTPATIENT
Start: 2025-08-01

## 2025-08-14 ENCOUNTER — OFFICE VISIT (OUTPATIENT)
Dept: FAMILY MEDICINE CLINIC | Facility: CLINIC | Age: 70
End: 2025-08-14
Payer: MEDICARE

## 2025-08-14 VITALS
HEART RATE: 57 BPM | HEIGHT: 71 IN | OXYGEN SATURATION: 98 % | SYSTOLIC BLOOD PRESSURE: 160 MMHG | BODY MASS INDEX: 25.83 KG/M2 | TEMPERATURE: 98.4 F | WEIGHT: 184.5 LBS | DIASTOLIC BLOOD PRESSURE: 68 MMHG

## 2025-08-14 DIAGNOSIS — D36.9 ADENOMATOUS POLYPS: ICD-10-CM

## 2025-08-14 DIAGNOSIS — Z13.1 SCREENING FOR DIABETES MELLITUS: Primary | ICD-10-CM

## 2025-08-14 DIAGNOSIS — Z12.5 SCREENING FOR PROSTATE CANCER: ICD-10-CM

## 2025-08-14 DIAGNOSIS — Z12.12 SCREENING FOR COLORECTAL CANCER: ICD-10-CM

## 2025-08-14 DIAGNOSIS — I10 ESSENTIAL HYPERTENSION: ICD-10-CM

## 2025-08-14 DIAGNOSIS — F32.A ANXIETY AND DEPRESSION: ICD-10-CM

## 2025-08-14 DIAGNOSIS — F41.9 ANXIETY AND DEPRESSION: ICD-10-CM

## 2025-08-14 DIAGNOSIS — Z12.11 SCREENING FOR COLORECTAL CANCER: ICD-10-CM

## 2025-08-14 DIAGNOSIS — Z23 NEED FOR VACCINATION AGAINST STREPTOCOCCUS PNEUMONIAE: ICD-10-CM

## 2025-08-14 RX ORDER — SILDENAFIL 100 MG/1
100 TABLET, FILM COATED ORAL DAILY PRN
Qty: 30 TABLET | Refills: 5 | Status: SHIPPED | OUTPATIENT
Start: 2025-08-14

## 2025-08-27 DIAGNOSIS — I10 ESSENTIAL HYPERTENSION: ICD-10-CM

## 2025-08-28 RX ORDER — AMLODIPINE BESYLATE 10 MG/1
10 TABLET ORAL DAILY
Qty: 90 TABLET | Refills: 0 | Status: SHIPPED | OUTPATIENT
Start: 2025-08-28

## (undated) DEVICE — 8 FOOT DISPOSABLE EXTENSION CABLE WITH SAFE CONNECT / ALLIGATOR CLIP

## (undated) DEVICE — STPLR SKIN VISISTAT WD 35CT

## (undated) DEVICE — RADIFOCUS GLIDEWIRE: Brand: GLIDEWIRE

## (undated) DEVICE — COVER,TABLE,HEAVY DUTY,79"X110",STRL: Brand: MEDLINE

## (undated) DEVICE — Device

## (undated) DEVICE — SUT SILK 2/0 TIES 18IN A185H

## (undated) DEVICE — INFLATION DEVICE: Brand: ENCORE™ 26

## (undated) DEVICE — BNDG ELAS MATRX V/CLS 4IN 5YD LF

## (undated) DEVICE — PENCL ES MEGADINE EZ/CLEAN BUTN W/HOLSTR 10FT

## (undated) DEVICE — CATH DIAG IMPULSE FL4 5F 100CM

## (undated) DEVICE — ADAPT CLN BIOGUARD AIR/H2O DISP

## (undated) DEVICE — GLV SURG SIGNATURE ESSENTIAL PF LTX SZ7.5

## (undated) DEVICE — WEBRIL* CAST PADDING: Brand: DEROYAL

## (undated) DEVICE — TUBING, SUCTION, 1/4" X 10', STRAIGHT: Brand: MEDLINE

## (undated) DEVICE — CATH DIAG IMPULSE FL3.5 5F 100CM

## (undated) DEVICE — DGW .035 FC J3MM 260CM TEF: Brand: EMERALD

## (undated) DEVICE — SYR LL SFT/PK 60ML

## (undated) DEVICE — AXIUS CORONARY SHUNT: Brand: AXIUS CORONARY SHUNT

## (undated) DEVICE — EQUIPMENT COVER BAG TYPE 48” X 36” (122CM X 91CM): Brand: EQUIPMENT COVER BAG TYPE

## (undated) DEVICE — SYS PERFUS SEP PLATLT W TIPS CUST

## (undated) DEVICE — CATH GUIDE SOFTVU SELECT/V HT OMNI .038 5F 65CM

## (undated) DEVICE — CATH DIAG IMPULSE PIG 5F 100CM

## (undated) DEVICE — KT ORCA ORCAPOD DISP STRL

## (undated) DEVICE — CVR PROB 96IN LF STRL

## (undated) DEVICE — CATH TEMPO 5F BER II 65CM: Brand: TEMPO

## (undated) DEVICE — SOL ISO/ALC RUB 70PCT 4OZ

## (undated) DEVICE — CATH DIAG IMPULSE FR5 5F 100CM

## (undated) DEVICE — MAT FLR ABSORBENT LG 4FT 10 2.5FT

## (undated) DEVICE — SYR LUERLOK 30CC

## (undated) DEVICE — DRN WND CH RND FUL/FLUT NO/TROC 3/8IN 28F

## (undated) DEVICE — PERCLOSE™ PROSTYLE™ SUTURE-MEDIATED CLOSURE AND REPAIR SYSTEM: Brand: PERCLOSE™ PROSTYLE™

## (undated) DEVICE — SUT PROLN 6/0 BV1 D/A 30IN 8709H

## (undated) DEVICE — TWIST DRILL Ø2.0MM X 40MM, L91MM, AO: Brand: APTUS

## (undated) DEVICE — VESSEL LOOPS X-RAY DETECTABLE: Brand: DEROYAL

## (undated) DEVICE — THE SINGLE USE ETRAP – POLYP TRAP IS USED FOR SUCTION RETRIEVAL OF ENDOSCOPICALLY REMOVED POLYPS.: Brand: ETRAP

## (undated) DEVICE — GLV SURG BIOGEL LTX PF 7

## (undated) DEVICE — UNDRGLV SURG BIOGEL PUNCTUREINDICATION SZ7 PF STRL

## (undated) DEVICE — INTRO CHECKFLO/XL .035 20F 25CM

## (undated) DEVICE — 2.5 TRILOCK EXPRESS SCREW 20MM,HD7,5/PKG
Type: IMPLANTABLE DEVICE | Site: RADIUS | Status: NON-FUNCTIONAL
Brand: APTUS
Removed: 2022-08-26

## (undated) DEVICE — ELECTRD BLD EZ CLN MOD 2.5IN

## (undated) DEVICE — THE TORRENT IRRIGATION SCOPE CONNECTOR IS USED WITH THE TORRENT IRRIGATION TUBING TO PROVIDE IRRIGATION FLUIDS SUCH AS STERILE WATER DURING GASTROINTESTINAL ENDOSCOPIC PROCEDURES WHEN USED IN CONJUNCTION WITH AN IRRIGATION PUMP (OR ELECTROSURGICAL UNIT).: Brand: TORRENT

## (undated) DEVICE — 3M™ IOBAN™ 2 ANTIMICROBIAL INCISE DRAPE 6648EZ: Brand: IOBAN™ 2

## (undated) DEVICE — DRP SLUSH WARMR MACH RECTG 66X44IN

## (undated) DEVICE — SUT SILK 3/0 TIES 18IN A184H

## (undated) DEVICE — ADAPT CHECKFLO PERFORMER ASSEMBL

## (undated) DEVICE — SHEATH STEER INTRO TOURGUIDE 7F 9MM 45CM

## (undated) DEVICE — PK HEART OPN 40

## (undated) DEVICE — SPLNT PLSTR 10 FAST 4X15IN

## (undated) DEVICE — SYS VASOVIEW HEMOPRO ENDOSCOPIC HARVST VESL

## (undated) DEVICE — SNAR POLYP SENSATION STDOVL 27 240 BX40

## (undated) DEVICE — NAVICROSS SUPPORT CATHETER: Brand: NAVICROSS

## (undated) DEVICE — CANN IRR VEN BVL TP

## (undated) DEVICE — STRIP CLS WND CURAD MEDI/STRIP HYPOALLERG 0.25X4IN PK/10

## (undated) DEVICE — ST ACC MICROPUNCTURE STFF .018 ECHO/PLDM/TP 4F/10CM 21G/7CM

## (undated) DEVICE — KT MANIFLD CARDIAC

## (undated) DEVICE — DISPOSABLE BIPOLAR FORCEPS 4" (10.2CM) JEWELERS, STRAIGHT 0.4MM TIP AND 12 FT. (3.6M) CABLE: Brand: KIRWAN

## (undated) DEVICE — CORONARY ARTERY BYPASS GRAFT MARKERS, STAINLESS STEEL, DISTAL, WITHOUT HOLDER: Brand: ANASTOMARK CORONARY ARTERY BYPASS GRAFT MARKERS, STAINLESS STEEL, DISTAL

## (undated) DEVICE — CATHETER,URETHRAL,REDRUBBER,STRL,14FR: Brand: MEDLINE INDUSTRIES, INC.

## (undated) DEVICE — RADIFOCUS TORQUE DEVICE MULTI-TORQUE VISE: Brand: RADIFOCUS TORQUE DEVICE

## (undated) DEVICE — SUT PROLN 5/0 RB1 D/A 36IN 8556H

## (undated) DEVICE — GLIDESHEATH SLENDER STAINLESS STEEL KIT: Brand: GLIDESHEATH SLENDER

## (undated) DEVICE — ADHS SKIN SURG TISS VISC PREMIERPRO EXOFIN HI/VISC FAST/DRY

## (undated) DEVICE — ANTIBACTERIAL UNDYED BRAIDED (POLYGLACTIN 910), SYNTHETIC ABSORBABLE SUTURE: Brand: COATED VICRYL

## (undated) DEVICE — SPNG GZ WOVN 4X4IN 12PLY 10/BX STRL

## (undated) DEVICE — PK ATS CUST W CARDIOTOMY RESEVOIR

## (undated) DEVICE — CANN VESL FREE FLO 2MM

## (undated) DEVICE — DRAPE,HAND,STERILE: Brand: MEDLINE

## (undated) DEVICE — DECANTER BAG 9": Brand: MEDLINE INDUSTRIES, INC.

## (undated) DEVICE — BND PRESS RADL COMFRT 14IN STRL

## (undated) DEVICE — ACROBAT-I VACUUM POSITIONER SYSTEM: Brand: ACROBAT-I POSITIONER

## (undated) DEVICE — SOL NACL 0.9PCT 1000ML

## (undated) DEVICE — 2.5/2.8 SCREWDRIVER BLADE, HD7, AO: Brand: APTUS

## (undated) DEVICE — CLAMP INSERT: Brand: STEALTH® CLAMP INSERT

## (undated) DEVICE — GAUZE,SPONGE,4"X4",16PLY,XRAY,STRL,LF: Brand: MEDLINE

## (undated) DEVICE — 28 FR RIGHT ANGLE – SOFT PVC CATHETER: Brand: PVC THORACIC CATHETERS

## (undated) DEVICE — GW EMR FIX EXCHG J STD .035 3MM 260CM

## (undated) DEVICE — BNDG ELAS ELITE V/CLOSE 3IN 5YD LF STRL

## (undated) DEVICE — SUT VIC 3/0 CTI 36IN J944H

## (undated) DEVICE — CATH DIAG CARD PERFORMA IMA BT 4F100CM

## (undated) DEVICE — PK CATH CARD 40

## (undated) DEVICE — PK AAA 40

## (undated) DEVICE — BALN PRESS WEDGE 5F 110CM

## (undated) DEVICE — SUT SILK 2/0 SH CR5 18IN C0125

## (undated) DEVICE — HEARTSTRING III SYSTEM 3.8MM: Brand: HEARTSTRING III SYSTEM 3.8MM

## (undated) DEVICE — TEMP PACING WIRE: Brand: MYO/WIRE

## (undated) DEVICE — CATH DIAG IMPULSE FR4 5F 100CM

## (undated) DEVICE — SENSR CERBRL O2 PK/2

## (undated) DEVICE — ST. SORBAVIEW ULTIMATE IJ SYSTEM A,C: Brand: CENTURION

## (undated) DEVICE — APPL CHLORAPREP HI/LITE 26ML ORNG

## (undated) DEVICE — TBG PRESS 96IN M/F ROT BRAID: Brand: MEDLINE INDUSTRIES, INC.

## (undated) DEVICE — GLIDESHEATH BASIC HYDROPHILIC COATED INTRODUCER SHEATH: Brand: GLIDESHEATH

## (undated) DEVICE — BG TRANSF W/COUPLER SPK 600ML

## (undated) DEVICE — SUT MNCRYL 3/0 RB1 27IN UD Y215H

## (undated) DEVICE — BNDG ELAS MATRX  2IN 5YD LF STRL

## (undated) DEVICE — DISPOSABLE TOURNIQUET CUFF SINGLE BLADDER, SINGLE PORT AND QUICK CONNECT CONNECTOR: Brand: COLOR CUFF

## (undated) DEVICE — PINNACLE R/O II INTRODUCER SHEATH WITH RADIOPAQUE MARKER: Brand: PINNACLE

## (undated) DEVICE — DRSNG GZ PETROLTM XEROFORM CURAD 1X8IN STRL

## (undated) DEVICE — CATH SZ ACCUVU SEG/20CM PIG 5F 100CM

## (undated) DEVICE — TBG INSUFFLATION LUER LOCK: Brand: MEDLINE INDUSTRIES, INC.

## (undated) DEVICE — DRAPE,FEM ANGIO,W/POUCHES, HD: Brand: MEDLINE

## (undated) DEVICE — 2.5 CORTICAL SCREW 26MM, HD7, 1/PKG
Type: IMPLANTABLE DEVICE | Site: RADIUS | Status: NON-FUNCTIONAL
Brand: APTUS
Removed: 2022-08-26

## (undated) DEVICE — OPTIFOAM GENTLE SA, POSTOP, 4X12: Brand: MEDLINE

## (undated) DEVICE — SUT SILK 4/0 TIES 18IN A183H

## (undated) DEVICE — BNDG ELAS MATRX V/CLS 6IN 5YD LF

## (undated) DEVICE — DRAPE,U/ SHT,SPLIT,PLAS,STERIL: Brand: MEDLINE

## (undated) DEVICE — TOTAL TRAY, 16FR 10ML SIL FOLEY, URN: Brand: MEDLINE

## (undated) DEVICE — PATIENT RETURN ELECTRODE, SINGLE-USE, CONTACT QUALITY MONITORING, ADULT, WITH 9FT CORD, FOR PATIENTS WEIGING OVER 33LBS. (15KG): Brand: MEGADYNE

## (undated) DEVICE — BLOWER/MISTER AXIOUS OPCAB W/TBG

## (undated) DEVICE — KT DRP MINIVIEW STRL

## (undated) DEVICE — RUBBERBAND LF STRL PK/2

## (undated) DEVICE — SYR LL TP 10ML STRL

## (undated) DEVICE — SYRINGE KIT,PACKAGED,,150FT,MK 7(ANGIO-ARTERION, 150ML SYR KIT W/QFT,MC)(60729385): Brand: MEDRAD® MARK 7 ARTERION DISPOSABLE SYRINGE 150 ML WITH QUICK FILL TUBE

## (undated) DEVICE — CANN O2 ETCO2 FITS ALL CONN CO2 SMPL A/ 7IN DISP LF

## (undated) DEVICE — PTA BALLOON DILATATION CATHETER: Brand: MUSTANG™

## (undated) DEVICE — PINNACLE R/O II HIFLO INTRODUCER SHEATH WITH RADIOPAQUE MARKER: Brand: PINNACLE

## (undated) DEVICE — 1016 S-DRAPE IRRIG POUCH 10/BOX: Brand: STERI-DRAPE™

## (undated) DEVICE — SUT PROLN 4/0 FS2 18IN 8683G

## (undated) DEVICE — LN SMPL CO2 SHTRM SD STREAM W/M LUER

## (undated) DEVICE — OASIS DRAIN, DUAL, IN-LINE, ATS COMPATIBLE: Brand: OASIS

## (undated) DEVICE — PK ORTHO MINOR TOWER 40

## (undated) DEVICE — SENSR O2 OXIMAX FNGR A/ 18IN NONSTR

## (undated) DEVICE — TOWEL,OR,DSP,ST,BLUE,STD,4/PK,20PK/CS: Brand: MEDLINE

## (undated) DEVICE — BIOPATCH™ ANTIMICROBIAL DRESSING WITH CHLORHEXIDINE GLUCONATE IS A HYDROPHILLIC POLYURETHANE ABSORPTIVE FOAM WITH CHLORHEXIDINE GLUCONATE (CHG) WHICH INHIBITS BACTERIAL GROWTH UNDER THE DRESSING. THE DRESSING IS INTENDED TO BE USED TO ABSORB EXUDATE, COVER A WOUND CAUSED BY VASCULAR AND NONVASCULAR PERCUTANEOUS MEDICAL DEVICES DURING SURGERY, AS WELL AS REDUCE LOCAL INFECTION AND COLONIZATION OF MICROORGANISMS.: Brand: BIOPATCH

## (undated) DEVICE — DRSNG SURESITE WNDW 2.38X2.75

## (undated) DEVICE — INTROFLXOR CHECKFLO HF .035 ANL1 55

## (undated) DEVICE — BNDG ELAS ELITE V/CLOSE 4IN 5YD LF STRL

## (undated) DEVICE — SYS STBL VAC ACROBAT I

## (undated) DEVICE — INTRO FLEXOR CHECKFLO HF .035 6F 55CM

## (undated) DEVICE — BALN OCCL CODA2 .035 9F 32MM 120CM

## (undated) DEVICE — SINGLE-USE BIOPSY FORCEPS: Brand: RADIAL JAW 4